# Patient Record
Sex: FEMALE | Race: WHITE | NOT HISPANIC OR LATINO | URBAN - METROPOLITAN AREA
[De-identification: names, ages, dates, MRNs, and addresses within clinical notes are randomized per-mention and may not be internally consistent; named-entity substitution may affect disease eponyms.]

---

## 2017-06-19 ENCOUNTER — OUTPATIENT (OUTPATIENT)
Dept: OUTPATIENT SERVICES | Facility: HOSPITAL | Age: 82
LOS: 1 days | End: 2017-06-19
Payer: MEDICARE

## 2017-06-19 ENCOUNTER — APPOINTMENT (OUTPATIENT)
Dept: MRI IMAGING | Facility: CLINIC | Age: 82
End: 2017-06-19

## 2017-06-19 DIAGNOSIS — Z00.8 ENCOUNTER FOR OTHER GENERAL EXAMINATION: ICD-10-CM

## 2017-06-19 PROBLEM — Z00.00 ENCOUNTER FOR PREVENTIVE HEALTH EXAMINATION: Noted: 2017-06-19

## 2017-06-19 PROCEDURE — 73718 MRI LOWER EXTREMITY W/O DYE: CPT

## 2021-01-01 ENCOUNTER — INPATIENT (INPATIENT)
Facility: HOSPITAL | Age: 86
LOS: 18 days | Discharge: NURSING, REHAB OR LONG-TERM CARE | DRG: 628 | End: 2021-12-31
Attending: INTERNAL MEDICINE | Admitting: INTERNAL MEDICINE
Payer: MEDICARE

## 2021-01-01 ENCOUNTER — TRANSCRIPTION ENCOUNTER (OUTPATIENT)
Age: 86
End: 2021-01-01

## 2021-01-01 ENCOUNTER — RESULT REVIEW (OUTPATIENT)
Age: 86
End: 2021-01-01

## 2021-01-01 VITALS
DIASTOLIC BLOOD PRESSURE: 72 MMHG | RESPIRATION RATE: 17 BRPM | SYSTOLIC BLOOD PRESSURE: 126 MMHG | OXYGEN SATURATION: 95 % | HEART RATE: 60 BPM | TEMPERATURE: 98 F

## 2021-01-01 VITALS
RESPIRATION RATE: 20 BRPM | OXYGEN SATURATION: 100 % | SYSTOLIC BLOOD PRESSURE: 139 MMHG | DIASTOLIC BLOOD PRESSURE: 81 MMHG | WEIGHT: 164.91 LBS | TEMPERATURE: 99 F | HEART RATE: 77 BPM | HEIGHT: 62 IN

## 2021-01-01 DIAGNOSIS — Z95.2 PRESENCE OF PROSTHETIC HEART VALVE: Chronic | ICD-10-CM

## 2021-01-01 DIAGNOSIS — E03.9 HYPOTHYROIDISM, UNSPECIFIED: ICD-10-CM

## 2021-01-01 DIAGNOSIS — M00.9 PYOGENIC ARTHRITIS, UNSPECIFIED: ICD-10-CM

## 2021-01-01 DIAGNOSIS — L03.115 CELLULITIS OF RIGHT LOWER LIMB: ICD-10-CM
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
## 2021-01-01 DIAGNOSIS — D63.8 ANEMIA IN OTHER CHRONIC DISEASES CLASSIFIED ELSEWHERE: ICD-10-CM

## 2021-01-01 DIAGNOSIS — I50.9 HEART FAILURE, UNSPECIFIED: ICD-10-CM

## 2021-01-01 DIAGNOSIS — I10 ESSENTIAL (PRIMARY) HYPERTENSION: ICD-10-CM

## 2021-01-01 DIAGNOSIS — N18.30 CHRONIC KIDNEY DISEASE, STAGE 3 UNSPECIFIED: ICD-10-CM

## 2021-01-01 DIAGNOSIS — E11.9 TYPE 2 DIABETES MELLITUS WITHOUT COMPLICATIONS: ICD-10-CM

## 2021-01-01 DIAGNOSIS — M86.179 OTHER ACUTE OSTEOMYELITIS, UNSPECIFIED ANKLE AND FOOT: ICD-10-CM

## 2021-01-01 DIAGNOSIS — I48.91 UNSPECIFIED ATRIAL FIBRILLATION: ICD-10-CM

## 2021-01-01 LAB
A1C WITH ESTIMATED AVERAGE GLUCOSE RESULT: 7.3 % — HIGH (ref 4–5.6)
ALBUMIN SERPL ELPH-MCNC: 2.7 G/DL — LOW (ref 3.3–5)
ALP SERPL-CCNC: 117 U/L — SIGNIFICANT CHANGE UP (ref 40–120)
ALT FLD-CCNC: 15 U/L — SIGNIFICANT CHANGE UP (ref 10–45)
ANION GAP SERPL CALC-SCNC: 10 MMOL/L — SIGNIFICANT CHANGE UP (ref 5–17)
ANION GAP SERPL CALC-SCNC: 10 MMOL/L — SIGNIFICANT CHANGE UP (ref 5–17)
ANION GAP SERPL CALC-SCNC: 11 MMOL/L — SIGNIFICANT CHANGE UP (ref 5–17)
ANION GAP SERPL CALC-SCNC: 12 MMOL/L — SIGNIFICANT CHANGE UP (ref 5–17)
ANION GAP SERPL CALC-SCNC: 12 MMOL/L — SIGNIFICANT CHANGE UP (ref 5–17)
ANION GAP SERPL CALC-SCNC: 13 MMOL/L — SIGNIFICANT CHANGE UP (ref 5–17)
ANION GAP SERPL CALC-SCNC: 14 MMOL/L — SIGNIFICANT CHANGE UP (ref 5–17)
ANION GAP SERPL CALC-SCNC: 9 MMOL/L — SIGNIFICANT CHANGE UP (ref 5–17)
ANION GAP SERPL CALC-SCNC: 9 MMOL/L — SIGNIFICANT CHANGE UP (ref 5–17)
APPEARANCE UR: ABNORMAL
APPEARANCE UR: CLEAR — SIGNIFICANT CHANGE UP
APTT BLD: 23.7 SEC — LOW (ref 27.5–35.5)
APTT BLD: 35.9 SEC — HIGH (ref 27.5–35.5)
AST SERPL-CCNC: 32 U/L — SIGNIFICANT CHANGE UP (ref 10–40)
BACTERIA # UR AUTO: NEGATIVE — SIGNIFICANT CHANGE UP
BACTERIA # UR AUTO: NEGATIVE — SIGNIFICANT CHANGE UP
BASE EXCESS BLDV CALC-SCNC: 7.7 MMOL/L — HIGH (ref -2–2)
BASOPHILS # BLD AUTO: 0.09 K/UL — SIGNIFICANT CHANGE UP (ref 0–0.2)
BASOPHILS NFR BLD AUTO: 0.8 % — SIGNIFICANT CHANGE UP (ref 0–2)
BILIRUB SERPL-MCNC: 0.3 MG/DL — SIGNIFICANT CHANGE UP (ref 0.2–1.2)
BILIRUB UR-MCNC: NEGATIVE — SIGNIFICANT CHANGE UP
BILIRUB UR-MCNC: NEGATIVE — SIGNIFICANT CHANGE UP
BLD GP AB SCN SERPL QL: NEGATIVE — SIGNIFICANT CHANGE UP
BLD GP AB SCN SERPL QL: NEGATIVE — SIGNIFICANT CHANGE UP
BUN SERPL-MCNC: 21 MG/DL — SIGNIFICANT CHANGE UP (ref 7–23)
BUN SERPL-MCNC: 22 MG/DL — SIGNIFICANT CHANGE UP (ref 7–23)
BUN SERPL-MCNC: 23 MG/DL — SIGNIFICANT CHANGE UP (ref 7–23)
BUN SERPL-MCNC: 25 MG/DL — HIGH (ref 7–23)
BUN SERPL-MCNC: 25 MG/DL — HIGH (ref 7–23)
BUN SERPL-MCNC: 26 MG/DL — HIGH (ref 7–23)
BUN SERPL-MCNC: 29 MG/DL — HIGH (ref 7–23)
BUN SERPL-MCNC: 29 MG/DL — HIGH (ref 7–23)
BUN SERPL-MCNC: 32 MG/DL — HIGH (ref 7–23)
BUN SERPL-MCNC: 33 MG/DL — HIGH (ref 7–23)
BUN SERPL-MCNC: 33 MG/DL — HIGH (ref 7–23)
BUN SERPL-MCNC: 35 MG/DL — HIGH (ref 7–23)
BUN SERPL-MCNC: 37 MG/DL — HIGH (ref 7–23)
BUN SERPL-MCNC: 37 MG/DL — HIGH (ref 7–23)
CA-I SERPL-SCNC: 1.36 MMOL/L — HIGH (ref 1.15–1.33)
CALCIUM SERPL-MCNC: 10.1 MG/DL — SIGNIFICANT CHANGE UP (ref 8.4–10.5)
CALCIUM SERPL-MCNC: 10.4 MG/DL — SIGNIFICANT CHANGE UP (ref 8.4–10.5)
CALCIUM SERPL-MCNC: 10.4 MG/DL — SIGNIFICANT CHANGE UP (ref 8.4–10.5)
CALCIUM SERPL-MCNC: 9.1 MG/DL — SIGNIFICANT CHANGE UP (ref 8.4–10.5)
CALCIUM SERPL-MCNC: 9.1 MG/DL — SIGNIFICANT CHANGE UP (ref 8.4–10.5)
CALCIUM SERPL-MCNC: 9.2 MG/DL — SIGNIFICANT CHANGE UP (ref 8.4–10.5)
CALCIUM SERPL-MCNC: 9.3 MG/DL — SIGNIFICANT CHANGE UP (ref 8.4–10.5)
CALCIUM SERPL-MCNC: 9.4 MG/DL — SIGNIFICANT CHANGE UP (ref 8.4–10.5)
CALCIUM SERPL-MCNC: 9.4 MG/DL — SIGNIFICANT CHANGE UP (ref 8.4–10.5)
CALCIUM SERPL-MCNC: 9.6 MG/DL — SIGNIFICANT CHANGE UP (ref 8.4–10.5)
CALCIUM SERPL-MCNC: 9.8 MG/DL — SIGNIFICANT CHANGE UP (ref 8.4–10.5)
CHLORIDE BLDV-SCNC: 100 MMOL/L — SIGNIFICANT CHANGE UP (ref 96–108)
CHLORIDE SERPL-SCNC: 100 MMOL/L — SIGNIFICANT CHANGE UP (ref 96–108)
CHLORIDE SERPL-SCNC: 101 MMOL/L — SIGNIFICANT CHANGE UP (ref 96–108)
CHLORIDE SERPL-SCNC: 103 MMOL/L — SIGNIFICANT CHANGE UP (ref 96–108)
CHLORIDE SERPL-SCNC: 94 MMOL/L — LOW (ref 96–108)
CHLORIDE SERPL-SCNC: 97 MMOL/L — SIGNIFICANT CHANGE UP (ref 96–108)
CHLORIDE SERPL-SCNC: 97 MMOL/L — SIGNIFICANT CHANGE UP (ref 96–108)
CHLORIDE SERPL-SCNC: 98 MMOL/L — SIGNIFICANT CHANGE UP (ref 96–108)
CHLORIDE SERPL-SCNC: 98 MMOL/L — SIGNIFICANT CHANGE UP (ref 96–108)
CHLORIDE SERPL-SCNC: 99 MMOL/L — SIGNIFICANT CHANGE UP (ref 96–108)
CO2 BLDV-SCNC: 35 MMOL/L — HIGH (ref 22–26)
CO2 SERPL-SCNC: 23 MMOL/L — SIGNIFICANT CHANGE UP (ref 22–31)
CO2 SERPL-SCNC: 24 MMOL/L — SIGNIFICANT CHANGE UP (ref 22–31)
CO2 SERPL-SCNC: 24 MMOL/L — SIGNIFICANT CHANGE UP (ref 22–31)
CO2 SERPL-SCNC: 25 MMOL/L — SIGNIFICANT CHANGE UP (ref 22–31)
CO2 SERPL-SCNC: 26 MMOL/L — SIGNIFICANT CHANGE UP (ref 22–31)
CO2 SERPL-SCNC: 27 MMOL/L — SIGNIFICANT CHANGE UP (ref 22–31)
COLOR SPEC: YELLOW — SIGNIFICANT CHANGE UP
COLOR SPEC: YELLOW — SIGNIFICANT CHANGE UP
COMMENT - URINE: SIGNIFICANT CHANGE UP
CORTIS AM PEAK SERPL-MCNC: 18.5 UG/DL — HIGH (ref 6–18.4)
CREAT ?TM UR-MCNC: 76 MG/DL — SIGNIFICANT CHANGE UP
CREAT SERPL-MCNC: 1.28 MG/DL — SIGNIFICANT CHANGE UP (ref 0.5–1.3)
CREAT SERPL-MCNC: 1.31 MG/DL — HIGH (ref 0.5–1.3)
CREAT SERPL-MCNC: 1.31 MG/DL — HIGH (ref 0.5–1.3)
CREAT SERPL-MCNC: 1.32 MG/DL — HIGH (ref 0.5–1.3)
CREAT SERPL-MCNC: 1.4 MG/DL — HIGH (ref 0.5–1.3)
CREAT SERPL-MCNC: 1.42 MG/DL — HIGH (ref 0.5–1.3)
CREAT SERPL-MCNC: 1.5 MG/DL — HIGH (ref 0.5–1.3)
CREAT SERPL-MCNC: 1.57 MG/DL — HIGH (ref 0.5–1.3)
CREAT SERPL-MCNC: 1.74 MG/DL — HIGH (ref 0.5–1.3)
CREAT SERPL-MCNC: 1.76 MG/DL — HIGH (ref 0.5–1.3)
CREAT SERPL-MCNC: 1.85 MG/DL — HIGH (ref 0.5–1.3)
CREAT SERPL-MCNC: 1.89 MG/DL — HIGH (ref 0.5–1.3)
CREAT SERPL-MCNC: 1.9 MG/DL — HIGH (ref 0.5–1.3)
CREAT SERPL-MCNC: 1.92 MG/DL — HIGH (ref 0.5–1.3)
CRP SERPL-MCNC: 144 MG/L — HIGH (ref 0–4)
CULTURE RESULTS: NO GROWTH — SIGNIFICANT CHANGE UP
CULTURE RESULTS: NO GROWTH — SIGNIFICANT CHANGE UP
CULTURE RESULTS: SIGNIFICANT CHANGE UP
DIFF PNL FLD: ABNORMAL
DIFF PNL FLD: NEGATIVE — SIGNIFICANT CHANGE UP
EOSINOPHIL # BLD AUTO: 0.29 K/UL — SIGNIFICANT CHANGE UP (ref 0–0.5)
EOSINOPHIL NFR BLD AUTO: 2.4 % — SIGNIFICANT CHANGE UP (ref 0–6)
EPI CELLS # UR: 2 /HPF — SIGNIFICANT CHANGE UP
EPI CELLS # UR: 2 /HPF — SIGNIFICANT CHANGE UP
ERYTHROCYTE [SEDIMENTATION RATE] IN BLOOD: 120 MM/HR — HIGH (ref 0–20)
ESTIMATED AVERAGE GLUCOSE: 163 MG/DL — HIGH (ref 68–114)
FERRITIN SERPL-MCNC: 385 NG/ML — HIGH (ref 15–150)
FERRITIN SERPL-MCNC: 420 NG/ML — HIGH (ref 15–150)
FOLATE SERPL-MCNC: 14.4 NG/ML — SIGNIFICANT CHANGE UP
GAS PNL BLDV: 135 MMOL/L — LOW (ref 136–145)
GAS PNL BLDV: SIGNIFICANT CHANGE UP
GAS PNL BLDV: SIGNIFICANT CHANGE UP
GLUCOSE BLDC GLUCOMTR-MCNC: 100 MG/DL — HIGH (ref 70–99)
GLUCOSE BLDC GLUCOMTR-MCNC: 101 MG/DL — HIGH (ref 70–99)
GLUCOSE BLDC GLUCOMTR-MCNC: 102 MG/DL — HIGH (ref 70–99)
GLUCOSE BLDC GLUCOMTR-MCNC: 106 MG/DL — HIGH (ref 70–99)
GLUCOSE BLDC GLUCOMTR-MCNC: 106 MG/DL — HIGH (ref 70–99)
GLUCOSE BLDC GLUCOMTR-MCNC: 107 MG/DL — HIGH (ref 70–99)
GLUCOSE BLDC GLUCOMTR-MCNC: 108 MG/DL — HIGH (ref 70–99)
GLUCOSE BLDC GLUCOMTR-MCNC: 110 MG/DL — HIGH (ref 70–99)
GLUCOSE BLDC GLUCOMTR-MCNC: 114 MG/DL — HIGH (ref 70–99)
GLUCOSE BLDC GLUCOMTR-MCNC: 116 MG/DL — HIGH (ref 70–99)
GLUCOSE BLDC GLUCOMTR-MCNC: 117 MG/DL — HIGH (ref 70–99)
GLUCOSE BLDC GLUCOMTR-MCNC: 118 MG/DL — HIGH (ref 70–99)
GLUCOSE BLDC GLUCOMTR-MCNC: 121 MG/DL — HIGH (ref 70–99)
GLUCOSE BLDC GLUCOMTR-MCNC: 129 MG/DL — HIGH (ref 70–99)
GLUCOSE BLDC GLUCOMTR-MCNC: 133 MG/DL — HIGH (ref 70–99)
GLUCOSE BLDC GLUCOMTR-MCNC: 136 MG/DL — HIGH (ref 70–99)
GLUCOSE BLDC GLUCOMTR-MCNC: 137 MG/DL — HIGH (ref 70–99)
GLUCOSE BLDC GLUCOMTR-MCNC: 138 MG/DL — HIGH (ref 70–99)
GLUCOSE BLDC GLUCOMTR-MCNC: 138 MG/DL — HIGH (ref 70–99)
GLUCOSE BLDC GLUCOMTR-MCNC: 139 MG/DL — HIGH (ref 70–99)
GLUCOSE BLDC GLUCOMTR-MCNC: 141 MG/DL — HIGH (ref 70–99)
GLUCOSE BLDC GLUCOMTR-MCNC: 144 MG/DL — HIGH (ref 70–99)
GLUCOSE BLDC GLUCOMTR-MCNC: 144 MG/DL — HIGH (ref 70–99)
GLUCOSE BLDC GLUCOMTR-MCNC: 145 MG/DL — HIGH (ref 70–99)
GLUCOSE BLDC GLUCOMTR-MCNC: 146 MG/DL — HIGH (ref 70–99)
GLUCOSE BLDC GLUCOMTR-MCNC: 147 MG/DL — HIGH (ref 70–99)
GLUCOSE BLDC GLUCOMTR-MCNC: 147 MG/DL — HIGH (ref 70–99)
GLUCOSE BLDC GLUCOMTR-MCNC: 148 MG/DL — HIGH (ref 70–99)
GLUCOSE BLDC GLUCOMTR-MCNC: 149 MG/DL — HIGH (ref 70–99)
GLUCOSE BLDC GLUCOMTR-MCNC: 151 MG/DL — HIGH (ref 70–99)
GLUCOSE BLDC GLUCOMTR-MCNC: 158 MG/DL — HIGH (ref 70–99)
GLUCOSE BLDC GLUCOMTR-MCNC: 163 MG/DL — HIGH (ref 70–99)
GLUCOSE BLDC GLUCOMTR-MCNC: 166 MG/DL — HIGH (ref 70–99)
GLUCOSE BLDC GLUCOMTR-MCNC: 167 MG/DL — HIGH (ref 70–99)
GLUCOSE BLDC GLUCOMTR-MCNC: 169 MG/DL — HIGH (ref 70–99)
GLUCOSE BLDC GLUCOMTR-MCNC: 177 MG/DL — HIGH (ref 70–99)
GLUCOSE BLDC GLUCOMTR-MCNC: 177 MG/DL — HIGH (ref 70–99)
GLUCOSE BLDC GLUCOMTR-MCNC: 181 MG/DL — HIGH (ref 70–99)
GLUCOSE BLDC GLUCOMTR-MCNC: 182 MG/DL — HIGH (ref 70–99)
GLUCOSE BLDC GLUCOMTR-MCNC: 184 MG/DL — HIGH (ref 70–99)
GLUCOSE BLDC GLUCOMTR-MCNC: 185 MG/DL — HIGH (ref 70–99)
GLUCOSE BLDC GLUCOMTR-MCNC: 186 MG/DL — HIGH (ref 70–99)
GLUCOSE BLDC GLUCOMTR-MCNC: 191 MG/DL — HIGH (ref 70–99)
GLUCOSE BLDC GLUCOMTR-MCNC: 201 MG/DL — HIGH (ref 70–99)
GLUCOSE BLDC GLUCOMTR-MCNC: 202 MG/DL — HIGH (ref 70–99)
GLUCOSE BLDC GLUCOMTR-MCNC: 205 MG/DL — HIGH (ref 70–99)
GLUCOSE BLDC GLUCOMTR-MCNC: 205 MG/DL — HIGH (ref 70–99)
GLUCOSE BLDC GLUCOMTR-MCNC: 208 MG/DL — HIGH (ref 70–99)
GLUCOSE BLDC GLUCOMTR-MCNC: 210 MG/DL — HIGH (ref 70–99)
GLUCOSE BLDC GLUCOMTR-MCNC: 211 MG/DL — HIGH (ref 70–99)
GLUCOSE BLDC GLUCOMTR-MCNC: 214 MG/DL — HIGH (ref 70–99)
GLUCOSE BLDC GLUCOMTR-MCNC: 217 MG/DL — HIGH (ref 70–99)
GLUCOSE BLDC GLUCOMTR-MCNC: 218 MG/DL — HIGH (ref 70–99)
GLUCOSE BLDC GLUCOMTR-MCNC: 221 MG/DL — HIGH (ref 70–99)
GLUCOSE BLDC GLUCOMTR-MCNC: 228 MG/DL — HIGH (ref 70–99)
GLUCOSE BLDC GLUCOMTR-MCNC: 228 MG/DL — HIGH (ref 70–99)
GLUCOSE BLDC GLUCOMTR-MCNC: 233 MG/DL — HIGH (ref 70–99)
GLUCOSE BLDC GLUCOMTR-MCNC: 234 MG/DL — HIGH (ref 70–99)
GLUCOSE BLDC GLUCOMTR-MCNC: 240 MG/DL — HIGH (ref 70–99)
GLUCOSE BLDC GLUCOMTR-MCNC: 242 MG/DL — HIGH (ref 70–99)
GLUCOSE BLDC GLUCOMTR-MCNC: 245 MG/DL — HIGH (ref 70–99)
GLUCOSE BLDC GLUCOMTR-MCNC: 246 MG/DL — HIGH (ref 70–99)
GLUCOSE BLDC GLUCOMTR-MCNC: 247 MG/DL — HIGH (ref 70–99)
GLUCOSE BLDC GLUCOMTR-MCNC: 250 MG/DL — HIGH (ref 70–99)
GLUCOSE BLDC GLUCOMTR-MCNC: 255 MG/DL — HIGH (ref 70–99)
GLUCOSE BLDC GLUCOMTR-MCNC: 258 MG/DL — HIGH (ref 70–99)
GLUCOSE BLDC GLUCOMTR-MCNC: 258 MG/DL — HIGH (ref 70–99)
GLUCOSE BLDC GLUCOMTR-MCNC: 260 MG/DL — HIGH (ref 70–99)
GLUCOSE BLDC GLUCOMTR-MCNC: 271 MG/DL — HIGH (ref 70–99)
GLUCOSE BLDC GLUCOMTR-MCNC: 275 MG/DL — HIGH (ref 70–99)
GLUCOSE BLDC GLUCOMTR-MCNC: 277 MG/DL — HIGH (ref 70–99)
GLUCOSE BLDC GLUCOMTR-MCNC: 281 MG/DL — HIGH (ref 70–99)
GLUCOSE BLDC GLUCOMTR-MCNC: 281 MG/DL — HIGH (ref 70–99)
GLUCOSE BLDC GLUCOMTR-MCNC: 292 MG/DL — HIGH (ref 70–99)
GLUCOSE BLDC GLUCOMTR-MCNC: 298 MG/DL — HIGH (ref 70–99)
GLUCOSE BLDC GLUCOMTR-MCNC: 300 MG/DL — HIGH (ref 70–99)
GLUCOSE BLDC GLUCOMTR-MCNC: 306 MG/DL — HIGH (ref 70–99)
GLUCOSE BLDC GLUCOMTR-MCNC: 313 MG/DL — HIGH (ref 70–99)
GLUCOSE BLDC GLUCOMTR-MCNC: 323 MG/DL — HIGH (ref 70–99)
GLUCOSE BLDC GLUCOMTR-MCNC: 62 MG/DL — LOW (ref 70–99)
GLUCOSE BLDC GLUCOMTR-MCNC: 82 MG/DL — SIGNIFICANT CHANGE UP (ref 70–99)
GLUCOSE BLDC GLUCOMTR-MCNC: 83 MG/DL — SIGNIFICANT CHANGE UP (ref 70–99)
GLUCOSE BLDC GLUCOMTR-MCNC: 92 MG/DL — SIGNIFICANT CHANGE UP (ref 70–99)
GLUCOSE BLDC GLUCOMTR-MCNC: 96 MG/DL — SIGNIFICANT CHANGE UP (ref 70–99)
GLUCOSE BLDC GLUCOMTR-MCNC: 97 MG/DL — SIGNIFICANT CHANGE UP (ref 70–99)
GLUCOSE BLDC GLUCOMTR-MCNC: 99 MG/DL — SIGNIFICANT CHANGE UP (ref 70–99)
GLUCOSE BLDV-MCNC: 107 MG/DL — HIGH (ref 70–99)
GLUCOSE SERPL-MCNC: 102 MG/DL — HIGH (ref 70–99)
GLUCOSE SERPL-MCNC: 103 MG/DL — HIGH (ref 70–99)
GLUCOSE SERPL-MCNC: 119 MG/DL — HIGH (ref 70–99)
GLUCOSE SERPL-MCNC: 119 MG/DL — HIGH (ref 70–99)
GLUCOSE SERPL-MCNC: 122 MG/DL — HIGH (ref 70–99)
GLUCOSE SERPL-MCNC: 127 MG/DL — HIGH (ref 70–99)
GLUCOSE SERPL-MCNC: 136 MG/DL — HIGH (ref 70–99)
GLUCOSE SERPL-MCNC: 167 MG/DL — HIGH (ref 70–99)
GLUCOSE SERPL-MCNC: 199 MG/DL — HIGH (ref 70–99)
GLUCOSE SERPL-MCNC: 207 MG/DL — HIGH (ref 70–99)
GLUCOSE SERPL-MCNC: 213 MG/DL — HIGH (ref 70–99)
GLUCOSE SERPL-MCNC: 218 MG/DL — HIGH (ref 70–99)
GLUCOSE SERPL-MCNC: 243 MG/DL — HIGH (ref 70–99)
GLUCOSE SERPL-MCNC: 285 MG/DL — HIGH (ref 70–99)
GLUCOSE UR QL: ABNORMAL
GLUCOSE UR QL: NEGATIVE — SIGNIFICANT CHANGE UP
GRAM STN FLD: SIGNIFICANT CHANGE UP
GRAN CASTS # UR COMP ASSIST: 1 /LPF — SIGNIFICANT CHANGE UP
HCO3 BLDV-SCNC: 34 MMOL/L — HIGH (ref 22–29)
HCT VFR BLD CALC: 23.8 % — LOW (ref 34.5–45)
HCT VFR BLD CALC: 24.4 % — LOW (ref 34.5–45)
HCT VFR BLD CALC: 25 % — LOW (ref 34.5–45)
HCT VFR BLD CALC: 25 % — LOW (ref 34.5–45)
HCT VFR BLD CALC: 25.3 % — LOW (ref 34.5–45)
HCT VFR BLD CALC: 25.3 % — LOW (ref 34.5–45)
HCT VFR BLD CALC: 25.4 % — LOW (ref 34.5–45)
HCT VFR BLD CALC: 25.8 % — LOW (ref 34.5–45)
HCT VFR BLD CALC: 25.9 % — LOW (ref 34.5–45)
HCT VFR BLD CALC: 26.8 % — LOW (ref 34.5–45)
HCT VFR BLD CALC: 27.5 % — LOW (ref 34.5–45)
HCT VFR BLD CALC: 27.5 % — LOW (ref 34.5–45)
HCT VFR BLD CALC: 28.1 % — LOW (ref 34.5–45)
HCT VFR BLD CALC: 29 % — LOW (ref 34.5–45)
HCT VFR BLD CALC: 30.3 % — LOW (ref 34.5–45)
HCT VFR BLDA CALC: 39 % — SIGNIFICANT CHANGE UP (ref 34.5–46.5)
HGB BLD CALC-MCNC: 13 G/DL — SIGNIFICANT CHANGE UP (ref 11.7–16.1)
HGB BLD-MCNC: 7.2 G/DL — LOW (ref 11.5–15.5)
HGB BLD-MCNC: 7.4 G/DL — LOW (ref 11.5–15.5)
HGB BLD-MCNC: 7.4 G/DL — LOW (ref 11.5–15.5)
HGB BLD-MCNC: 7.5 G/DL — LOW (ref 11.5–15.5)
HGB BLD-MCNC: 7.6 G/DL — LOW (ref 11.5–15.5)
HGB BLD-MCNC: 7.7 G/DL — LOW (ref 11.5–15.5)
HGB BLD-MCNC: 7.9 G/DL — LOW (ref 11.5–15.5)
HGB BLD-MCNC: 8.1 G/DL — LOW (ref 11.5–15.5)
HGB BLD-MCNC: 8.1 G/DL — LOW (ref 11.5–15.5)
HGB BLD-MCNC: 8.2 G/DL — LOW (ref 11.5–15.5)
HGB BLD-MCNC: 8.3 G/DL — LOW (ref 11.5–15.5)
HGB BLD-MCNC: 8.7 G/DL — LOW (ref 11.5–15.5)
HGB BLD-MCNC: 9.2 G/DL — LOW (ref 11.5–15.5)
HYALINE CASTS # UR AUTO: 1 /LPF — SIGNIFICANT CHANGE UP (ref 0–7)
HYALINE CASTS # UR AUTO: 7 /LPF — HIGH (ref 0–2)
IMM GRANULOCYTES NFR BLD AUTO: 1.3 % — SIGNIFICANT CHANGE UP (ref 0–1.5)
INR BLD: 1.26 RATIO — HIGH (ref 0.88–1.16)
INR BLD: 1.59 RATIO — HIGH (ref 0.88–1.16)
IRON SATN MFR SERPL: 11 % — LOW (ref 14–50)
IRON SATN MFR SERPL: 14 % — SIGNIFICANT CHANGE UP (ref 14–50)
IRON SATN MFR SERPL: 20 UG/DL — LOW (ref 30–160)
IRON SATN MFR SERPL: 30 UG/DL — SIGNIFICANT CHANGE UP (ref 30–160)
KETONES UR-MCNC: NEGATIVE — SIGNIFICANT CHANGE UP
KETONES UR-MCNC: NEGATIVE — SIGNIFICANT CHANGE UP
LACTATE BLDV-MCNC: 1.7 MMOL/L — SIGNIFICANT CHANGE UP (ref 0.7–2)
LEUKOCYTE ESTERASE UR-ACNC: ABNORMAL
LEUKOCYTE ESTERASE UR-ACNC: NEGATIVE — SIGNIFICANT CHANGE UP
LYMPHOCYTES # BLD AUTO: 1 K/UL — SIGNIFICANT CHANGE UP (ref 1–3.3)
LYMPHOCYTES # BLD AUTO: 8.4 % — LOW (ref 13–44)
MCHC RBC-ENTMCNC: 28.8 PG — SIGNIFICANT CHANGE UP (ref 27–34)
MCHC RBC-ENTMCNC: 28.8 PG — SIGNIFICANT CHANGE UP (ref 27–34)
MCHC RBC-ENTMCNC: 28.9 PG — SIGNIFICANT CHANGE UP (ref 27–34)
MCHC RBC-ENTMCNC: 29 PG — SIGNIFICANT CHANGE UP (ref 27–34)
MCHC RBC-ENTMCNC: 29.1 PG — SIGNIFICANT CHANGE UP (ref 27–34)
MCHC RBC-ENTMCNC: 29.2 GM/DL — LOW (ref 32–36)
MCHC RBC-ENTMCNC: 29.2 PG — SIGNIFICANT CHANGE UP (ref 27–34)
MCHC RBC-ENTMCNC: 29.3 PG — SIGNIFICANT CHANGE UP (ref 27–34)
MCHC RBC-ENTMCNC: 29.4 PG — SIGNIFICANT CHANGE UP (ref 27–34)
MCHC RBC-ENTMCNC: 29.4 PG — SIGNIFICANT CHANGE UP (ref 27–34)
MCHC RBC-ENTMCNC: 29.5 GM/DL — LOW (ref 32–36)
MCHC RBC-ENTMCNC: 29.5 GM/DL — LOW (ref 32–36)
MCHC RBC-ENTMCNC: 29.5 PG — SIGNIFICANT CHANGE UP (ref 27–34)
MCHC RBC-ENTMCNC: 29.7 GM/DL — LOW (ref 32–36)
MCHC RBC-ENTMCNC: 29.8 GM/DL — LOW (ref 32–36)
MCHC RBC-ENTMCNC: 30 GM/DL — LOW (ref 32–36)
MCHC RBC-ENTMCNC: 30.2 GM/DL — LOW (ref 32–36)
MCHC RBC-ENTMCNC: 30.3 GM/DL — LOW (ref 32–36)
MCHC RBC-ENTMCNC: 30.4 GM/DL — LOW (ref 32–36)
MCHC RBC-ENTMCNC: 30.6 GM/DL — LOW (ref 32–36)
MCHC RBC-ENTMCNC: 30.8 GM/DL — LOW (ref 32–36)
MCV RBC AUTO: 95.4 FL — SIGNIFICANT CHANGE UP (ref 80–100)
MCV RBC AUTO: 95.8 FL — SIGNIFICANT CHANGE UP (ref 80–100)
MCV RBC AUTO: 96.2 FL — SIGNIFICANT CHANGE UP (ref 80–100)
MCV RBC AUTO: 96.3 FL — SIGNIFICANT CHANGE UP (ref 80–100)
MCV RBC AUTO: 96.5 FL — SIGNIFICANT CHANGE UP (ref 80–100)
MCV RBC AUTO: 96.7 FL — SIGNIFICANT CHANGE UP (ref 80–100)
MCV RBC AUTO: 96.8 FL — SIGNIFICANT CHANGE UP (ref 80–100)
MCV RBC AUTO: 96.9 FL — SIGNIFICANT CHANGE UP (ref 80–100)
MCV RBC AUTO: 96.9 FL — SIGNIFICANT CHANGE UP (ref 80–100)
MCV RBC AUTO: 97 FL — SIGNIFICANT CHANGE UP (ref 80–100)
MCV RBC AUTO: 97.1 FL — SIGNIFICANT CHANGE UP (ref 80–100)
MCV RBC AUTO: 97.6 FL — SIGNIFICANT CHANGE UP (ref 80–100)
MCV RBC AUTO: 98.3 FL — SIGNIFICANT CHANGE UP (ref 80–100)
MCV RBC AUTO: 98.8 FL — SIGNIFICANT CHANGE UP (ref 80–100)
MCV RBC AUTO: 98.9 FL — SIGNIFICANT CHANGE UP (ref 80–100)
MONOCYTES # BLD AUTO: 1.23 K/UL — HIGH (ref 0–0.9)
MONOCYTES NFR BLD AUTO: 10.4 % — SIGNIFICANT CHANGE UP (ref 2–14)
MRSA PCR RESULT.: SIGNIFICANT CHANGE UP
NEUTROPHILS # BLD AUTO: 9.11 K/UL — HIGH (ref 1.8–7.4)
NEUTROPHILS NFR BLD AUTO: 76.7 % — SIGNIFICANT CHANGE UP (ref 43–77)
NIGHT BLUE STAIN TISS: SIGNIFICANT CHANGE UP
NITRITE UR-MCNC: NEGATIVE — SIGNIFICANT CHANGE UP
NITRITE UR-MCNC: NEGATIVE — SIGNIFICANT CHANGE UP
NRBC # BLD: 0 /100 WBCS — SIGNIFICANT CHANGE UP (ref 0–0)
PCO2 BLDV: 52 MMHG — HIGH (ref 39–42)
PH BLDV: 7.42 — SIGNIFICANT CHANGE UP (ref 7.32–7.43)
PH UR: 5.5 — SIGNIFICANT CHANGE UP (ref 5–8)
PH UR: 6 — SIGNIFICANT CHANGE UP (ref 5–8)
PHOSPHATE SERPL-MCNC: 3 MG/DL — SIGNIFICANT CHANGE UP (ref 2.5–4.5)
PLATELET # BLD AUTO: 199 K/UL — SIGNIFICANT CHANGE UP (ref 150–400)
PLATELET # BLD AUTO: 249 K/UL — SIGNIFICANT CHANGE UP (ref 150–400)
PLATELET # BLD AUTO: 253 K/UL — SIGNIFICANT CHANGE UP (ref 150–400)
PLATELET # BLD AUTO: 286 K/UL — SIGNIFICANT CHANGE UP (ref 150–400)
PLATELET # BLD AUTO: 296 K/UL — SIGNIFICANT CHANGE UP (ref 150–400)
PLATELET # BLD AUTO: 322 K/UL — SIGNIFICANT CHANGE UP (ref 150–400)
PLATELET # BLD AUTO: 333 K/UL — SIGNIFICANT CHANGE UP (ref 150–400)
PLATELET # BLD AUTO: 342 K/UL — SIGNIFICANT CHANGE UP (ref 150–400)
PLATELET # BLD AUTO: 343 K/UL — SIGNIFICANT CHANGE UP (ref 150–400)
PLATELET # BLD AUTO: 346 K/UL — SIGNIFICANT CHANGE UP (ref 150–400)
PLATELET # BLD AUTO: 364 K/UL — SIGNIFICANT CHANGE UP (ref 150–400)
PLATELET # BLD AUTO: 369 K/UL — SIGNIFICANT CHANGE UP (ref 150–400)
PLATELET # BLD AUTO: 371 K/UL — SIGNIFICANT CHANGE UP (ref 150–400)
PO2 BLDV: 23 MMHG — LOW (ref 25–45)
POTASSIUM BLDV-SCNC: 3.9 MMOL/L — SIGNIFICANT CHANGE UP (ref 3.5–5.1)
POTASSIUM SERPL-MCNC: 3.5 MMOL/L — SIGNIFICANT CHANGE UP (ref 3.5–5.3)
POTASSIUM SERPL-MCNC: 3.5 MMOL/L — SIGNIFICANT CHANGE UP (ref 3.5–5.3)
POTASSIUM SERPL-MCNC: 3.8 MMOL/L — SIGNIFICANT CHANGE UP (ref 3.5–5.3)
POTASSIUM SERPL-MCNC: 3.8 MMOL/L — SIGNIFICANT CHANGE UP (ref 3.5–5.3)
POTASSIUM SERPL-MCNC: 4 MMOL/L — SIGNIFICANT CHANGE UP (ref 3.5–5.3)
POTASSIUM SERPL-MCNC: 4.1 MMOL/L — SIGNIFICANT CHANGE UP (ref 3.5–5.3)
POTASSIUM SERPL-MCNC: 4.1 MMOL/L — SIGNIFICANT CHANGE UP (ref 3.5–5.3)
POTASSIUM SERPL-MCNC: 4.2 MMOL/L — SIGNIFICANT CHANGE UP (ref 3.5–5.3)
POTASSIUM SERPL-MCNC: 4.2 MMOL/L — SIGNIFICANT CHANGE UP (ref 3.5–5.3)
POTASSIUM SERPL-MCNC: 4.3 MMOL/L — SIGNIFICANT CHANGE UP (ref 3.5–5.3)
POTASSIUM SERPL-MCNC: 4.4 MMOL/L — SIGNIFICANT CHANGE UP (ref 3.5–5.3)
POTASSIUM SERPL-MCNC: 4.5 MMOL/L — SIGNIFICANT CHANGE UP (ref 3.5–5.3)
POTASSIUM SERPL-MCNC: 4.6 MMOL/L — SIGNIFICANT CHANGE UP (ref 3.5–5.3)
POTASSIUM SERPL-MCNC: 4.6 MMOL/L — SIGNIFICANT CHANGE UP (ref 3.5–5.3)
POTASSIUM SERPL-SCNC: 3.5 MMOL/L — SIGNIFICANT CHANGE UP (ref 3.5–5.3)
POTASSIUM SERPL-SCNC: 3.5 MMOL/L — SIGNIFICANT CHANGE UP (ref 3.5–5.3)
POTASSIUM SERPL-SCNC: 3.8 MMOL/L — SIGNIFICANT CHANGE UP (ref 3.5–5.3)
POTASSIUM SERPL-SCNC: 3.8 MMOL/L — SIGNIFICANT CHANGE UP (ref 3.5–5.3)
POTASSIUM SERPL-SCNC: 4 MMOL/L — SIGNIFICANT CHANGE UP (ref 3.5–5.3)
POTASSIUM SERPL-SCNC: 4.1 MMOL/L — SIGNIFICANT CHANGE UP (ref 3.5–5.3)
POTASSIUM SERPL-SCNC: 4.1 MMOL/L — SIGNIFICANT CHANGE UP (ref 3.5–5.3)
POTASSIUM SERPL-SCNC: 4.2 MMOL/L — SIGNIFICANT CHANGE UP (ref 3.5–5.3)
POTASSIUM SERPL-SCNC: 4.2 MMOL/L — SIGNIFICANT CHANGE UP (ref 3.5–5.3)
POTASSIUM SERPL-SCNC: 4.3 MMOL/L — SIGNIFICANT CHANGE UP (ref 3.5–5.3)
POTASSIUM SERPL-SCNC: 4.4 MMOL/L — SIGNIFICANT CHANGE UP (ref 3.5–5.3)
POTASSIUM SERPL-SCNC: 4.5 MMOL/L — SIGNIFICANT CHANGE UP (ref 3.5–5.3)
POTASSIUM SERPL-SCNC: 4.6 MMOL/L — SIGNIFICANT CHANGE UP (ref 3.5–5.3)
POTASSIUM SERPL-SCNC: 4.6 MMOL/L — SIGNIFICANT CHANGE UP (ref 3.5–5.3)
PROT ?TM UR-MCNC: 88 MG/DL — HIGH (ref 0–12)
PROT SERPL-MCNC: 6.1 G/DL — SIGNIFICANT CHANGE UP (ref 6–8.3)
PROT UR-MCNC: 100 — SIGNIFICANT CHANGE UP
PROT UR-MCNC: ABNORMAL
PROT/CREAT UR-RTO: 1.2 RATIO — HIGH (ref 0–0.2)
PROTHROM AB SERPL-ACNC: 15 SEC — HIGH (ref 10.6–13.6)
PROTHROM AB SERPL-ACNC: 18.7 SEC — HIGH (ref 10.6–13.6)
PTH-INTACT FLD-MCNC: 134 PG/ML — HIGH (ref 15–65)
RBC # BLD: 2.46 M/UL — LOW (ref 3.8–5.2)
RBC # BLD: 2.52 M/UL — LOW (ref 3.8–5.2)
RBC # BLD: 2.56 M/UL — LOW (ref 3.8–5.2)
RBC # BLD: 2.58 M/UL — LOW (ref 3.8–5.2)
RBC # BLD: 2.61 M/UL — LOW (ref 3.8–5.2)
RBC # BLD: 2.62 M/UL — LOW (ref 3.8–5.2)
RBC # BLD: 2.62 M/UL — LOW (ref 3.8–5.2)
RBC # BLD: 2.65 M/UL — LOW (ref 3.8–5.2)
RBC # BLD: 2.67 M/UL — LOW (ref 3.8–5.2)
RBC # BLD: 2.68 M/UL — LOW (ref 3.8–5.2)
RBC # BLD: 2.76 M/UL — LOW (ref 3.8–5.2)
RBC # BLD: 2.78 M/UL — LOW (ref 3.8–5.2)
RBC # BLD: 2.85 M/UL — LOW (ref 3.8–5.2)
RBC # BLD: 2.86 M/UL — LOW (ref 3.8–5.2)
RBC # BLD: 2.97 M/UL — LOW (ref 3.8–5.2)
RBC # BLD: 3.15 M/UL — LOW (ref 3.8–5.2)
RBC # FLD: 14.8 % — HIGH (ref 10.3–14.5)
RBC # FLD: 14.9 % — HIGH (ref 10.3–14.5)
RBC # FLD: 15 % — HIGH (ref 10.3–14.5)
RBC # FLD: 15.1 % — HIGH (ref 10.3–14.5)
RBC # FLD: 15.2 % — HIGH (ref 10.3–14.5)
RBC # FLD: 15.3 % — HIGH (ref 10.3–14.5)
RBC # FLD: 15.3 % — HIGH (ref 10.3–14.5)
RBC # FLD: 15.5 % — HIGH (ref 10.3–14.5)
RBC # FLD: 15.9 % — HIGH (ref 10.3–14.5)
RBC # FLD: 16.1 % — HIGH (ref 10.3–14.5)
RBC # FLD: 17.3 % — HIGH (ref 10.3–14.5)
RBC # FLD: 18 % — HIGH (ref 10.3–14.5)
RBC # FLD: 18.1 % — HIGH (ref 10.3–14.5)
RBC CASTS # UR COMP ASSIST: 4 /HPF — SIGNIFICANT CHANGE UP (ref 0–4)
RBC CASTS # UR COMP ASSIST: 6 /HPF — HIGH (ref 0–4)
RETICS #: 61.6 K/UL — SIGNIFICANT CHANGE UP (ref 25–125)
RETICS/RBC NFR: 2.4 % — SIGNIFICANT CHANGE UP (ref 0.5–2.5)
RH IG SCN BLD-IMP: POSITIVE — SIGNIFICANT CHANGE UP
S AUREUS DNA NOSE QL NAA+PROBE: SIGNIFICANT CHANGE UP
SAO2 % BLDV: 39.7 % — LOW (ref 67–88)
SARS-COV-2 RNA SPEC QL NAA+PROBE: SIGNIFICANT CHANGE UP
SODIUM SERPL-SCNC: 132 MMOL/L — LOW (ref 135–145)
SODIUM SERPL-SCNC: 132 MMOL/L — LOW (ref 135–145)
SODIUM SERPL-SCNC: 133 MMOL/L — LOW (ref 135–145)
SODIUM SERPL-SCNC: 134 MMOL/L — LOW (ref 135–145)
SODIUM SERPL-SCNC: 135 MMOL/L — SIGNIFICANT CHANGE UP (ref 135–145)
SODIUM SERPL-SCNC: 136 MMOL/L — SIGNIFICANT CHANGE UP (ref 135–145)
SODIUM SERPL-SCNC: 136 MMOL/L — SIGNIFICANT CHANGE UP (ref 135–145)
SODIUM SERPL-SCNC: 137 MMOL/L — SIGNIFICANT CHANGE UP (ref 135–145)
SODIUM SERPL-SCNC: 138 MMOL/L — SIGNIFICANT CHANGE UP (ref 135–145)
SODIUM SERPL-SCNC: 138 MMOL/L — SIGNIFICANT CHANGE UP (ref 135–145)
SODIUM SERPL-SCNC: 139 MMOL/L — SIGNIFICANT CHANGE UP (ref 135–145)
SODIUM SERPL-SCNC: 139 MMOL/L — SIGNIFICANT CHANGE UP (ref 135–145)
SP GR SPEC: 1.01 — SIGNIFICANT CHANGE UP (ref 1.01–1.02)
SP GR SPEC: 1.02 — SIGNIFICANT CHANGE UP (ref 1.01–1.02)
SPECIMEN SOURCE: SIGNIFICANT CHANGE UP
TIBC SERPL-MCNC: 177 UG/DL — LOW (ref 220–430)
TIBC SERPL-MCNC: 209 UG/DL — LOW (ref 220–430)
TSH SERPL-MCNC: 2.84 UIU/ML — SIGNIFICANT CHANGE UP (ref 0.27–4.2)
UIBC SERPL-MCNC: 157 UG/DL — SIGNIFICANT CHANGE UP (ref 110–370)
UIBC SERPL-MCNC: 179 UG/DL — SIGNIFICANT CHANGE UP (ref 110–370)
UROBILINOGEN FLD QL: NEGATIVE — SIGNIFICANT CHANGE UP
UROBILINOGEN FLD QL: NEGATIVE — SIGNIFICANT CHANGE UP
VANCOMYCIN FLD-MCNC: 12.9 UG/ML — SIGNIFICANT CHANGE UP
VANCOMYCIN FLD-MCNC: 17.1 UG/ML — SIGNIFICANT CHANGE UP
VANCOMYCIN TROUGH SERPL-MCNC: 16.4 UG/ML — SIGNIFICANT CHANGE UP (ref 10–20)
VANCOMYCIN TROUGH SERPL-MCNC: 16.4 UG/ML — SIGNIFICANT CHANGE UP (ref 10–20)
VANCOMYCIN TROUGH SERPL-MCNC: 9.6 UG/ML — LOW (ref 10–20)
VIT B12 SERPL-MCNC: >2000 PG/ML — HIGH (ref 232–1245)
WBC # BLD: 10.34 K/UL — SIGNIFICANT CHANGE UP (ref 3.8–10.5)
WBC # BLD: 10.56 K/UL — HIGH (ref 3.8–10.5)
WBC # BLD: 10.89 K/UL — HIGH (ref 3.8–10.5)
WBC # BLD: 11.87 K/UL — HIGH (ref 3.8–10.5)
WBC # BLD: 12.31 K/UL — HIGH (ref 3.8–10.5)
WBC # BLD: 12.8 K/UL — HIGH (ref 3.8–10.5)
WBC # BLD: 8.36 K/UL — SIGNIFICANT CHANGE UP (ref 3.8–10.5)
WBC # BLD: 8.75 K/UL — SIGNIFICANT CHANGE UP (ref 3.8–10.5)
WBC # BLD: 8.88 K/UL — SIGNIFICANT CHANGE UP (ref 3.8–10.5)
WBC # BLD: 9.01 K/UL — SIGNIFICANT CHANGE UP (ref 3.8–10.5)
WBC # BLD: 9.15 K/UL — SIGNIFICANT CHANGE UP (ref 3.8–10.5)
WBC # BLD: 9.27 K/UL — SIGNIFICANT CHANGE UP (ref 3.8–10.5)
WBC # BLD: 9.4 K/UL — SIGNIFICANT CHANGE UP (ref 3.8–10.5)
WBC # BLD: 9.66 K/UL — SIGNIFICANT CHANGE UP (ref 3.8–10.5)
WBC # BLD: 9.75 K/UL — SIGNIFICANT CHANGE UP (ref 3.8–10.5)
WBC # FLD AUTO: 10.34 K/UL — SIGNIFICANT CHANGE UP (ref 3.8–10.5)
WBC # FLD AUTO: 10.56 K/UL — HIGH (ref 3.8–10.5)
WBC # FLD AUTO: 10.89 K/UL — HIGH (ref 3.8–10.5)
WBC # FLD AUTO: 11.87 K/UL — HIGH (ref 3.8–10.5)
WBC # FLD AUTO: 12.31 K/UL — HIGH (ref 3.8–10.5)
WBC # FLD AUTO: 12.8 K/UL — HIGH (ref 3.8–10.5)
WBC # FLD AUTO: 8.36 K/UL — SIGNIFICANT CHANGE UP (ref 3.8–10.5)
WBC # FLD AUTO: 8.75 K/UL — SIGNIFICANT CHANGE UP (ref 3.8–10.5)
WBC # FLD AUTO: 8.88 K/UL — SIGNIFICANT CHANGE UP (ref 3.8–10.5)
WBC # FLD AUTO: 9.01 K/UL — SIGNIFICANT CHANGE UP (ref 3.8–10.5)
WBC # FLD AUTO: 9.15 K/UL — SIGNIFICANT CHANGE UP (ref 3.8–10.5)
WBC # FLD AUTO: 9.27 K/UL — SIGNIFICANT CHANGE UP (ref 3.8–10.5)
WBC # FLD AUTO: 9.4 K/UL — SIGNIFICANT CHANGE UP (ref 3.8–10.5)
WBC # FLD AUTO: 9.66 K/UL — SIGNIFICANT CHANGE UP (ref 3.8–10.5)
WBC # FLD AUTO: 9.75 K/UL — SIGNIFICANT CHANGE UP (ref 3.8–10.5)
WBC UR QL: 120 /HPF — HIGH (ref 0–5)
WBC UR QL: 2 /HPF — SIGNIFICANT CHANGE UP (ref 0–5)

## 2021-01-01 PROCEDURE — 97164 PT RE-EVAL EST PLAN CARE: CPT

## 2021-01-01 PROCEDURE — 70450 CT HEAD/BRAIN W/O DYE: CPT | Mod: 26,MA

## 2021-01-01 PROCEDURE — 85018 HEMOGLOBIN: CPT

## 2021-01-01 PROCEDURE — 96374 THER/PROPH/DIAG INJ IV PUSH: CPT

## 2021-01-01 PROCEDURE — 36415 COLL VENOUS BLD VENIPUNCTURE: CPT

## 2021-01-01 PROCEDURE — 99233 SBSQ HOSP IP/OBS HIGH 50: CPT

## 2021-01-01 PROCEDURE — 87077 CULTURE AEROBIC IDENTIFY: CPT

## 2021-01-01 PROCEDURE — 81001 URINALYSIS AUTO W/SCOPE: CPT

## 2021-01-01 PROCEDURE — 82803 BLOOD GASES ANY COMBINATION: CPT

## 2021-01-01 PROCEDURE — 88311 DECALCIFY TISSUE: CPT

## 2021-01-01 PROCEDURE — 76770 US EXAM ABDO BACK WALL COMP: CPT | Mod: 26

## 2021-01-01 PROCEDURE — 86140 C-REACTIVE PROTEIN: CPT

## 2021-01-01 PROCEDURE — 10060 I&D ABSCESS SIMPLE/SINGLE: CPT

## 2021-01-01 PROCEDURE — 93280 PM DEVICE PROGR EVAL DUAL: CPT | Mod: 26

## 2021-01-01 PROCEDURE — 99232 SBSQ HOSP IP/OBS MODERATE 35: CPT

## 2021-01-01 PROCEDURE — 72125 CT NECK SPINE W/O DYE: CPT | Mod: 26,MA

## 2021-01-01 PROCEDURE — 82746 ASSAY OF FOLIC ACID SERUM: CPT

## 2021-01-01 PROCEDURE — 86900 BLOOD TYPING SEROLOGIC ABO: CPT

## 2021-01-01 PROCEDURE — U0003: CPT

## 2021-01-01 PROCEDURE — 88304 TISSUE EXAM BY PATHOLOGIST: CPT | Mod: 26

## 2021-01-01 PROCEDURE — 80048 BASIC METABOLIC PNL TOTAL CA: CPT

## 2021-01-01 PROCEDURE — 83735 ASSAY OF MAGNESIUM: CPT

## 2021-01-01 PROCEDURE — 99222 1ST HOSP IP/OBS MODERATE 55: CPT

## 2021-01-01 PROCEDURE — 73030 X-RAY EXAM OF SHOULDER: CPT | Mod: 26,LT

## 2021-01-01 PROCEDURE — 82310 ASSAY OF CALCIUM: CPT

## 2021-01-01 PROCEDURE — 97530 THERAPEUTIC ACTIVITIES: CPT

## 2021-01-01 PROCEDURE — 85014 HEMATOCRIT: CPT

## 2021-01-01 PROCEDURE — 73610 X-RAY EXAM OF ANKLE: CPT | Mod: 26,RT

## 2021-01-01 PROCEDURE — 87075 CULTR BACTERIA EXCEPT BLOOD: CPT

## 2021-01-01 PROCEDURE — 83605 ASSAY OF LACTIC ACID: CPT

## 2021-01-01 PROCEDURE — 80202 ASSAY OF VANCOMYCIN: CPT

## 2021-01-01 PROCEDURE — 87635 SARS-COV-2 COVID-19 AMP PRB: CPT

## 2021-01-01 PROCEDURE — 76770 US EXAM ABDO BACK WALL COMP: CPT

## 2021-01-01 PROCEDURE — 97110 THERAPEUTIC EXERCISES: CPT

## 2021-01-01 PROCEDURE — 83970 ASSAY OF PARATHORMONE: CPT

## 2021-01-01 PROCEDURE — 99231 SBSQ HOSP IP/OBS SF/LOW 25: CPT | Mod: GC

## 2021-01-01 PROCEDURE — 87206 SMEAR FLUORESCENT/ACID STAI: CPT

## 2021-01-01 PROCEDURE — 99285 EMERGENCY DEPT VISIT HI MDM: CPT | Mod: 25

## 2021-01-01 PROCEDURE — 88311 DECALCIFY TISSUE: CPT | Mod: 26

## 2021-01-01 PROCEDURE — 86850 RBC ANTIBODY SCREEN: CPT

## 2021-01-01 PROCEDURE — 73610 X-RAY EXAM OF ANKLE: CPT

## 2021-01-01 PROCEDURE — 72125 CT NECK SPINE W/O DYE: CPT | Mod: MA

## 2021-01-01 PROCEDURE — 83036 HEMOGLOBIN GLYCOSYLATED A1C: CPT

## 2021-01-01 PROCEDURE — 99231 SBSQ HOSP IP/OBS SF/LOW 25: CPT | Mod: 25,GC

## 2021-01-01 PROCEDURE — 88304 TISSUE EXAM BY PATHOLOGIST: CPT

## 2021-01-01 PROCEDURE — 71045 X-RAY EXAM CHEST 1 VIEW: CPT

## 2021-01-01 PROCEDURE — 85730 THROMBOPLASTIN TIME PARTIAL: CPT

## 2021-01-01 PROCEDURE — 70450 CT HEAD/BRAIN W/O DYE: CPT | Mod: MA

## 2021-01-01 PROCEDURE — 82330 ASSAY OF CALCIUM: CPT

## 2021-01-01 PROCEDURE — 82962 GLUCOSE BLOOD TEST: CPT

## 2021-01-01 PROCEDURE — 84156 ASSAY OF PROTEIN URINE: CPT

## 2021-01-01 PROCEDURE — 97116 GAIT TRAINING THERAPY: CPT

## 2021-01-01 PROCEDURE — 87205 SMEAR GRAM STAIN: CPT

## 2021-01-01 PROCEDURE — 82947 ASSAY GLUCOSE BLOOD QUANT: CPT

## 2021-01-01 PROCEDURE — 83550 IRON BINDING TEST: CPT

## 2021-01-01 PROCEDURE — 82607 VITAMIN B-12: CPT

## 2021-01-01 PROCEDURE — 73723 MRI JOINT LWR EXTR W/O&W/DYE: CPT

## 2021-01-01 PROCEDURE — 87040 BLOOD CULTURE FOR BACTERIA: CPT

## 2021-01-01 PROCEDURE — 73723 MRI JOINT LWR EXTR W/O&W/DYE: CPT | Mod: 26,RT

## 2021-01-01 PROCEDURE — 80053 COMPREHEN METABOLIC PANEL: CPT

## 2021-01-01 PROCEDURE — 87070 CULTURE OTHR SPECIMN AEROBIC: CPT

## 2021-01-01 PROCEDURE — 85025 COMPLETE CBC W/AUTO DIFF WBC: CPT

## 2021-01-01 PROCEDURE — 87640 STAPH A DNA AMP PROBE: CPT

## 2021-01-01 PROCEDURE — 84295 ASSAY OF SERUM SODIUM: CPT

## 2021-01-01 PROCEDURE — 96375 TX/PRO/DX INJ NEW DRUG ADDON: CPT

## 2021-01-01 PROCEDURE — 84443 ASSAY THYROID STIM HORMONE: CPT

## 2021-01-01 PROCEDURE — 87116 MYCOBACTERIA CULTURE: CPT

## 2021-01-01 PROCEDURE — 83540 ASSAY OF IRON: CPT

## 2021-01-01 PROCEDURE — 82435 ASSAY OF BLOOD CHLORIDE: CPT

## 2021-01-01 PROCEDURE — 82533 TOTAL CORTISOL: CPT

## 2021-01-01 PROCEDURE — A9585: CPT

## 2021-01-01 PROCEDURE — 82728 ASSAY OF FERRITIN: CPT

## 2021-01-01 PROCEDURE — 82570 ASSAY OF URINE CREATININE: CPT

## 2021-01-01 PROCEDURE — 85652 RBC SED RATE AUTOMATED: CPT

## 2021-01-01 PROCEDURE — 85610 PROTHROMBIN TIME: CPT

## 2021-01-01 PROCEDURE — 86901 BLOOD TYPING SEROLOGIC RH(D): CPT

## 2021-01-01 PROCEDURE — 87086 URINE CULTURE/COLONY COUNT: CPT

## 2021-01-01 PROCEDURE — 73030 X-RAY EXAM OF SHOULDER: CPT

## 2021-01-01 PROCEDURE — 84132 ASSAY OF SERUM POTASSIUM: CPT

## 2021-01-01 PROCEDURE — 99285 EMERGENCY DEPT VISIT HI MDM: CPT

## 2021-01-01 PROCEDURE — 84100 ASSAY OF PHOSPHORUS: CPT

## 2021-01-01 PROCEDURE — 97162 PT EVAL MOD COMPLEX 30 MIN: CPT

## 2021-01-01 PROCEDURE — 85045 AUTOMATED RETICULOCYTE COUNT: CPT

## 2021-01-01 PROCEDURE — 71045 X-RAY EXAM CHEST 1 VIEW: CPT | Mod: 26

## 2021-01-01 PROCEDURE — 85027 COMPLETE CBC AUTOMATED: CPT

## 2021-01-01 PROCEDURE — U0005: CPT

## 2021-01-01 PROCEDURE — 87641 MR-STAPH DNA AMP PROBE: CPT

## 2021-01-01 RX ORDER — ERYTHROPOIETIN 10000 [IU]/ML
10000 INJECTION, SOLUTION INTRAVENOUS; SUBCUTANEOUS ONCE
Refills: 0 | Status: COMPLETED | OUTPATIENT
Start: 2021-01-01 | End: 2021-01-01

## 2021-01-01 RX ORDER — INSULIN LISPRO 100/ML
4 VIAL (ML) SUBCUTANEOUS
Refills: 0 | Status: DISCONTINUED | OUTPATIENT
Start: 2021-01-01 | End: 2021-01-01

## 2021-01-01 RX ORDER — CHLORHEXIDINE GLUCONATE 213 G/1000ML
1 SOLUTION TOPICAL
Refills: 0 | Status: DISCONTINUED | OUTPATIENT
Start: 2021-01-01 | End: 2021-01-01

## 2021-01-01 RX ORDER — INSULIN LISPRO 100/ML
6 VIAL (ML) SUBCUTANEOUS
Refills: 0 | Status: DISCONTINUED | OUTPATIENT
Start: 2021-01-01 | End: 2021-01-01

## 2021-01-01 RX ORDER — METRONIDAZOLE 500 MG
500 TABLET ORAL EVERY 12 HOURS
Refills: 0 | Status: DISCONTINUED | OUTPATIENT
Start: 2021-01-01 | End: 2021-01-01

## 2021-01-01 RX ORDER — INSULIN LISPRO 100/ML
2 VIAL (ML) SUBCUTANEOUS
Refills: 0 | Status: DISCONTINUED | OUTPATIENT
Start: 2021-01-01 | End: 2021-01-01

## 2021-01-01 RX ORDER — VANCOMYCIN HCL 1 G
750 VIAL (EA) INTRAVENOUS EVERY 24 HOURS
Refills: 0 | Status: DISCONTINUED | OUTPATIENT
Start: 2021-01-01 | End: 2021-01-01

## 2021-01-01 RX ORDER — DEXTROSE 50 % IN WATER 50 %
15 SYRINGE (ML) INTRAVENOUS ONCE
Refills: 0 | Status: DISCONTINUED | OUTPATIENT
Start: 2021-01-01 | End: 2021-01-01

## 2021-01-01 RX ORDER — VANCOMYCIN HCL 1 G
1000 VIAL (EA) INTRAVENOUS EVERY 24 HOURS
Refills: 0 | Status: DISCONTINUED | OUTPATIENT
Start: 2021-01-01 | End: 2021-01-01

## 2021-01-01 RX ORDER — LANOLIN ALCOHOL/MO/W.PET/CERES
3 CREAM (GRAM) TOPICAL ONCE
Refills: 0 | Status: COMPLETED | OUTPATIENT
Start: 2021-01-01 | End: 2021-01-01

## 2021-01-01 RX ORDER — PANTOPRAZOLE SODIUM 20 MG/1
1 TABLET, DELAYED RELEASE ORAL
Qty: 0 | Refills: 0 | DISCHARGE

## 2021-01-01 RX ORDER — INSULIN LISPRO 100/ML
VIAL (ML) SUBCUTANEOUS
Refills: 0 | Status: DISCONTINUED | OUTPATIENT
Start: 2021-01-01 | End: 2021-01-01

## 2021-01-01 RX ORDER — INSULIN GLARGINE 100 [IU]/ML
5 INJECTION, SOLUTION SUBCUTANEOUS AT BEDTIME
Refills: 0 | Status: DISCONTINUED | OUTPATIENT
Start: 2021-01-01 | End: 2021-01-01

## 2021-01-01 RX ORDER — LEVOTHYROXINE SODIUM 125 MCG
1 TABLET ORAL
Qty: 0 | Refills: 0 | DISCHARGE
Start: 2021-01-01

## 2021-01-01 RX ORDER — HYDROMORPHONE HYDROCHLORIDE 2 MG/ML
0.5 INJECTION INTRAMUSCULAR; INTRAVENOUS; SUBCUTANEOUS ONCE
Refills: 0 | Status: DISCONTINUED | OUTPATIENT
Start: 2021-01-01 | End: 2021-01-01

## 2021-01-01 RX ORDER — CLOPIDOGREL BISULFATE 75 MG/1
75 TABLET, FILM COATED ORAL DAILY
Refills: 0 | Status: DISCONTINUED | OUTPATIENT
Start: 2021-01-01 | End: 2021-01-01

## 2021-01-01 RX ORDER — METOPROLOL TARTRATE 50 MG
100 TABLET ORAL DAILY
Refills: 0 | Status: DISCONTINUED | OUTPATIENT
Start: 2021-01-01 | End: 2021-01-01

## 2021-01-01 RX ORDER — LEVOTHYROXINE SODIUM 125 MCG
1 TABLET ORAL
Qty: 0 | Refills: 0 | DISCHARGE

## 2021-01-01 RX ORDER — NYSTATIN CREAM 100000 [USP'U]/G
1 CREAM TOPICAL
Qty: 0 | Refills: 0 | DISCHARGE
Start: 2021-01-01

## 2021-01-01 RX ORDER — FUROSEMIDE 40 MG
1 TABLET ORAL
Qty: 0 | Refills: 0 | DISCHARGE

## 2021-01-01 RX ORDER — INSULIN LISPRO 100/ML
0 VIAL (ML) SUBCUTANEOUS
Qty: 0 | Refills: 0 | DISCHARGE
Start: 2021-01-01

## 2021-01-01 RX ORDER — PANTOPRAZOLE SODIUM 20 MG/1
40 TABLET, DELAYED RELEASE ORAL
Refills: 0 | Status: DISCONTINUED | OUTPATIENT
Start: 2021-01-01 | End: 2021-01-01

## 2021-01-01 RX ORDER — MAGNESIUM HYDROXIDE 400 MG/1
30 TABLET, CHEWABLE ORAL
Qty: 0 | Refills: 0 | DISCHARGE

## 2021-01-01 RX ORDER — INSULIN LISPRO 100/ML
VIAL (ML) SUBCUTANEOUS AT BEDTIME
Refills: 0 | Status: DISCONTINUED | OUTPATIENT
Start: 2021-01-01 | End: 2021-01-01

## 2021-01-01 RX ORDER — SIMVASTATIN 20 MG/1
20 TABLET, FILM COATED ORAL AT BEDTIME
Refills: 0 | Status: DISCONTINUED | OUTPATIENT
Start: 2021-01-01 | End: 2021-01-01

## 2021-01-01 RX ORDER — SODIUM CHLORIDE 9 MG/ML
1000 INJECTION, SOLUTION INTRAVENOUS
Refills: 0 | Status: DISCONTINUED | OUTPATIENT
Start: 2021-01-01 | End: 2021-01-01

## 2021-01-01 RX ORDER — MEROPENEM 1 G/30ML
1000 INJECTION INTRAVENOUS EVERY 12 HOURS
Refills: 0 | Status: DISCONTINUED | OUTPATIENT
Start: 2021-01-01 | End: 2021-01-01

## 2021-01-01 RX ORDER — FUROSEMIDE 40 MG
40 TABLET ORAL DAILY
Refills: 0 | Status: DISCONTINUED | OUTPATIENT
Start: 2021-01-01 | End: 2021-01-01

## 2021-01-01 RX ORDER — INSULIN GLARGINE 100 [IU]/ML
14 INJECTION, SOLUTION SUBCUTANEOUS AT BEDTIME
Refills: 0 | Status: DISCONTINUED | OUTPATIENT
Start: 2021-01-01 | End: 2021-01-01

## 2021-01-01 RX ORDER — AMLODIPINE BESYLATE 2.5 MG/1
1 TABLET ORAL
Qty: 0 | Refills: 0 | DISCHARGE

## 2021-01-01 RX ORDER — CLOPIDOGREL BISULFATE 75 MG/1
1 TABLET, FILM COATED ORAL
Qty: 0 | Refills: 0 | DISCHARGE

## 2021-01-01 RX ORDER — INSULIN GLARGINE 100 [IU]/ML
7 INJECTION, SOLUTION SUBCUTANEOUS AT BEDTIME
Refills: 0 | Status: DISCONTINUED | OUTPATIENT
Start: 2021-01-01 | End: 2021-01-01

## 2021-01-01 RX ORDER — PREGABALIN 225 MG/1
2.5 CAPSULE ORAL
Qty: 0 | Refills: 0 | DISCHARGE

## 2021-01-01 RX ORDER — APIXABAN 2.5 MG/1
1 TABLET, FILM COATED ORAL
Qty: 0 | Refills: 0 | DISCHARGE

## 2021-01-01 RX ORDER — NYSTATIN CREAM 100000 [USP'U]/G
1 CREAM TOPICAL
Qty: 0 | Refills: 0 | DISCHARGE

## 2021-01-01 RX ORDER — SENNA PLUS 8.6 MG/1
2 TABLET ORAL
Qty: 0 | Refills: 0 | DISCHARGE
Start: 2021-01-01

## 2021-01-01 RX ORDER — SIMVASTATIN 20 MG/1
1 TABLET, FILM COATED ORAL
Qty: 0 | Refills: 0 | DISCHARGE

## 2021-01-01 RX ORDER — LANOLIN ALCOHOL/MO/W.PET/CERES
1 CREAM (GRAM) TOPICAL ONCE
Refills: 0 | Status: COMPLETED | OUTPATIENT
Start: 2021-01-01 | End: 2021-01-01

## 2021-01-01 RX ORDER — DEXTROSE 50 % IN WATER 50 %
25 SYRINGE (ML) INTRAVENOUS ONCE
Refills: 0 | Status: DISCONTINUED | OUTPATIENT
Start: 2021-01-01 | End: 2021-01-01

## 2021-01-01 RX ORDER — LIRAGLUTIDE 6 MG/ML
1.2 INJECTION SUBCUTANEOUS
Qty: 0 | Refills: 0 | DISCHARGE

## 2021-01-01 RX ORDER — PANTOPRAZOLE SODIUM 20 MG/1
1 TABLET, DELAYED RELEASE ORAL
Qty: 0 | Refills: 0 | DISCHARGE
Start: 2021-01-01

## 2021-01-01 RX ORDER — SENNA PLUS 8.6 MG/1
2 TABLET ORAL ONCE
Refills: 0 | Status: COMPLETED | OUTPATIENT
Start: 2021-01-01 | End: 2021-01-01

## 2021-01-01 RX ORDER — CALCITRIOL 0.5 UG/1
1 CAPSULE ORAL
Qty: 0 | Refills: 0 | DISCHARGE
Start: 2021-01-01

## 2021-01-01 RX ORDER — SODIUM CHLORIDE 9 MG/ML
250 INJECTION INTRAMUSCULAR; INTRAVENOUS; SUBCUTANEOUS ONCE
Refills: 0 | Status: COMPLETED | OUTPATIENT
Start: 2021-01-01 | End: 2021-01-01

## 2021-01-01 RX ORDER — ASCORBIC ACID 60 MG
500 TABLET,CHEWABLE ORAL DAILY
Refills: 0 | Status: DISCONTINUED | OUTPATIENT
Start: 2021-01-01 | End: 2021-01-01

## 2021-01-01 RX ORDER — MEROPENEM 1 G/30ML
500 INJECTION INTRAVENOUS EVERY 12 HOURS
Refills: 0 | Status: DISCONTINUED | OUTPATIENT
Start: 2021-01-01 | End: 2021-01-01

## 2021-01-01 RX ORDER — SENNA PLUS 8.6 MG/1
2 TABLET ORAL AT BEDTIME
Refills: 0 | Status: DISCONTINUED | OUTPATIENT
Start: 2021-01-01 | End: 2021-01-01

## 2021-01-01 RX ORDER — CEFEPIME 1 G/1
INJECTION, POWDER, FOR SOLUTION INTRAMUSCULAR; INTRAVENOUS
Refills: 0 | Status: DISCONTINUED | OUTPATIENT
Start: 2021-01-01 | End: 2021-01-01

## 2021-01-01 RX ORDER — FUROSEMIDE 40 MG
1 TABLET ORAL
Qty: 0 | Refills: 0 | DISCHARGE
Start: 2021-01-01

## 2021-01-01 RX ORDER — POLYETHYLENE GLYCOL 3350 17 G/17G
17 POWDER, FOR SOLUTION ORAL DAILY
Refills: 0 | Status: DISCONTINUED | OUTPATIENT
Start: 2021-01-01 | End: 2021-01-01

## 2021-01-01 RX ORDER — INSULIN GLARGINE 100 [IU]/ML
10 INJECTION, SOLUTION SUBCUTANEOUS AT BEDTIME
Refills: 0 | Status: DISCONTINUED | OUTPATIENT
Start: 2021-01-01 | End: 2021-01-01

## 2021-01-01 RX ORDER — APIXABAN 2.5 MG/1
2.5 TABLET, FILM COATED ORAL EVERY 12 HOURS
Refills: 0 | Status: DISCONTINUED | OUTPATIENT
Start: 2021-01-01 | End: 2021-01-01

## 2021-01-01 RX ORDER — INSULIN GLARGINE 100 [IU]/ML
10 INJECTION, SOLUTION SUBCUTANEOUS
Qty: 0 | Refills: 0 | DISCHARGE
Start: 2021-01-01

## 2021-01-01 RX ORDER — APIXABAN 2.5 MG/1
1 TABLET, FILM COATED ORAL
Qty: 0 | Refills: 0 | DISCHARGE
Start: 2021-01-01

## 2021-01-01 RX ORDER — CEFEPIME 1 G/1
1000 INJECTION, POWDER, FOR SOLUTION INTRAMUSCULAR; INTRAVENOUS ONCE
Refills: 0 | Status: COMPLETED | OUTPATIENT
Start: 2021-01-01 | End: 2021-01-01

## 2021-01-01 RX ORDER — ACETAMINOPHEN 500 MG
2 TABLET ORAL
Qty: 0 | Refills: 0 | DISCHARGE

## 2021-01-01 RX ORDER — ASCORBIC ACID 60 MG
1 TABLET,CHEWABLE ORAL
Qty: 0 | Refills: 0 | DISCHARGE
Start: 2021-01-01

## 2021-01-01 RX ORDER — INSULIN LISPRO 100/ML
0 VIAL (ML) SUBCUTANEOUS
Qty: 0 | Refills: 0 | DISCHARGE

## 2021-01-01 RX ORDER — LEVOTHYROXINE SODIUM 125 MCG
50 TABLET ORAL DAILY
Refills: 0 | Status: DISCONTINUED | OUTPATIENT
Start: 2021-01-01 | End: 2021-01-01

## 2021-01-01 RX ORDER — ENOXAPARIN SODIUM 100 MG/ML
20 INJECTION SUBCUTANEOUS
Qty: 0 | Refills: 0 | DISCHARGE
Start: 2021-01-01

## 2021-01-01 RX ORDER — ACETAMINOPHEN 500 MG
2 TABLET ORAL
Qty: 0 | Refills: 0 | DISCHARGE
Start: 2021-01-01

## 2021-01-01 RX ORDER — ALLOPURINOL 300 MG
1 TABLET ORAL
Qty: 0 | Refills: 0 | DISCHARGE
Start: 2021-01-01

## 2021-01-01 RX ORDER — ALLOPURINOL 300 MG
1 TABLET ORAL
Qty: 0 | Refills: 0 | DISCHARGE

## 2021-01-01 RX ORDER — DEXTROSE 50 % IN WATER 50 %
12.5 SYRINGE (ML) INTRAVENOUS ONCE
Refills: 0 | Status: DISCONTINUED | OUTPATIENT
Start: 2021-01-01 | End: 2021-01-01

## 2021-01-01 RX ORDER — METOPROLOL TARTRATE 50 MG
1 TABLET ORAL
Qty: 0 | Refills: 0 | DISCHARGE

## 2021-01-01 RX ORDER — ALLOPURINOL 300 MG
100 TABLET ORAL DAILY
Refills: 0 | Status: DISCONTINUED | OUTPATIENT
Start: 2021-01-01 | End: 2021-01-01

## 2021-01-01 RX ORDER — ACETAMINOPHEN 500 MG
3 TABLET ORAL
Qty: 0 | Refills: 0 | DISCHARGE
Start: 2021-01-01

## 2021-01-01 RX ORDER — INSULIN LISPRO 100/ML
5 VIAL (ML) SUBCUTANEOUS
Refills: 0 | Status: DISCONTINUED | OUTPATIENT
Start: 2021-01-01 | End: 2021-01-01

## 2021-01-01 RX ORDER — DOCUSATE SODIUM 100 MG
2 CAPSULE ORAL
Qty: 0 | Refills: 0 | DISCHARGE

## 2021-01-01 RX ORDER — METOPROLOL TARTRATE 50 MG
100 TABLET ORAL
Qty: 0 | Refills: 0 | DISCHARGE

## 2021-01-01 RX ORDER — ERTAPENEM SODIUM 1 G/1
500 INJECTION, POWDER, LYOPHILIZED, FOR SOLUTION INTRAMUSCULAR; INTRAVENOUS
Qty: 0 | Refills: 0 | DISCHARGE

## 2021-01-01 RX ORDER — ACETAMINOPHEN 500 MG
1000 TABLET ORAL ONCE
Refills: 0 | Status: COMPLETED | OUTPATIENT
Start: 2021-01-01 | End: 2021-01-01

## 2021-01-01 RX ORDER — POLYETHYLENE GLYCOL 3350 17 G/17G
17 POWDER, FOR SOLUTION ORAL
Qty: 0 | Refills: 0 | DISCHARGE
Start: 2021-01-01

## 2021-01-01 RX ORDER — ENOXAPARIN SODIUM 100 MG/ML
0 INJECTION SUBCUTANEOUS
Qty: 0 | Refills: 0 | DISCHARGE

## 2021-01-01 RX ORDER — FUROSEMIDE 40 MG
20 TABLET ORAL DAILY
Refills: 0 | Status: DISCONTINUED | OUTPATIENT
Start: 2021-01-01 | End: 2021-01-01

## 2021-01-01 RX ORDER — ERTAPENEM SODIUM 1 G/1
500 INJECTION, POWDER, LYOPHILIZED, FOR SOLUTION INTRAMUSCULAR; INTRAVENOUS EVERY 24 HOURS
Refills: 0 | Status: DISCONTINUED | OUTPATIENT
Start: 2021-01-01 | End: 2021-01-01

## 2021-01-01 RX ORDER — VANCOMYCIN HCL 1 G
750 VIAL (EA) INTRAVENOUS ONCE
Refills: 0 | Status: COMPLETED | OUTPATIENT
Start: 2021-01-01 | End: 2021-01-01

## 2021-01-01 RX ORDER — VANCOMYCIN HCL 1 G
1000 VIAL (EA) INTRAVENOUS ONCE
Refills: 0 | Status: COMPLETED | OUTPATIENT
Start: 2021-01-01 | End: 2021-01-01

## 2021-01-01 RX ORDER — GLUCAGON INJECTION, SOLUTION 0.5 MG/.1ML
1 INJECTION, SOLUTION SUBCUTANEOUS ONCE
Refills: 0 | Status: DISCONTINUED | OUTPATIENT
Start: 2021-01-01 | End: 2021-01-01

## 2021-01-01 RX ORDER — HEPARIN SODIUM 5000 [USP'U]/ML
5000 INJECTION INTRAVENOUS; SUBCUTANEOUS EVERY 12 HOURS
Refills: 0 | Status: DISCONTINUED | OUTPATIENT
Start: 2021-01-01 | End: 2021-01-01

## 2021-01-01 RX ORDER — APIXABAN 2.5 MG/1
2.5 TABLET, FILM COATED ORAL
Refills: 0 | Status: DISCONTINUED | OUTPATIENT
Start: 2021-01-01 | End: 2021-01-01

## 2021-01-01 RX ORDER — ACETAMINOPHEN 500 MG
1000 TABLET ORAL ONCE
Refills: 0 | Status: DISCONTINUED | OUTPATIENT
Start: 2021-01-01 | End: 2021-01-01

## 2021-01-01 RX ORDER — ONDANSETRON 8 MG/1
4 TABLET, FILM COATED ORAL ONCE
Refills: 0 | Status: DISCONTINUED | OUTPATIENT
Start: 2021-01-01 | End: 2021-01-01

## 2021-01-01 RX ORDER — SENNA PLUS 8.6 MG/1
1 TABLET ORAL ONCE
Refills: 0 | Status: COMPLETED | OUTPATIENT
Start: 2021-01-01 | End: 2021-01-01

## 2021-01-01 RX ORDER — AMLODIPINE BESYLATE 2.5 MG/1
5 TABLET ORAL DAILY
Refills: 0 | Status: DISCONTINUED | OUTPATIENT
Start: 2021-01-01 | End: 2021-01-01

## 2021-01-01 RX ORDER — CEFEPIME 1 G/1
1000 INJECTION, POWDER, FOR SOLUTION INTRAMUSCULAR; INTRAVENOUS EVERY 12 HOURS
Refills: 0 | Status: DISCONTINUED | OUTPATIENT
Start: 2021-01-01 | End: 2021-01-01

## 2021-01-01 RX ORDER — LANOLIN ALCOHOL/MO/W.PET/CERES
3 CREAM (GRAM) TOPICAL AT BEDTIME
Refills: 0 | Status: DISCONTINUED | OUTPATIENT
Start: 2021-01-01 | End: 2021-01-01

## 2021-01-01 RX ORDER — ERTAPENEM SODIUM 1 G/1
1000 INJECTION, POWDER, LYOPHILIZED, FOR SOLUTION INTRAMUSCULAR; INTRAVENOUS EVERY 24 HOURS
Refills: 0 | Status: DISCONTINUED | OUTPATIENT
Start: 2021-01-01 | End: 2021-01-01

## 2021-01-01 RX ORDER — NYSTATIN CREAM 100000 [USP'U]/G
1 CREAM TOPICAL
Refills: 0 | Status: DISCONTINUED | OUTPATIENT
Start: 2021-01-01 | End: 2021-01-01

## 2021-01-01 RX ORDER — ACETAMINOPHEN 500 MG
650 TABLET ORAL EVERY 6 HOURS
Refills: 0 | Status: DISCONTINUED | OUTPATIENT
Start: 2021-01-01 | End: 2021-01-01

## 2021-01-01 RX ORDER — INSULIN GLARGINE 100 [IU]/ML
18 INJECTION, SOLUTION SUBCUTANEOUS AT BEDTIME
Refills: 0 | Status: DISCONTINUED | OUTPATIENT
Start: 2021-01-01 | End: 2021-01-01

## 2021-01-01 RX ORDER — TRAMADOL HYDROCHLORIDE 50 MG/1
25 TABLET ORAL ONCE
Refills: 0 | Status: DISCONTINUED | OUTPATIENT
Start: 2021-01-01 | End: 2021-01-01

## 2021-01-01 RX ORDER — FENTANYL CITRATE 50 UG/ML
25 INJECTION INTRAVENOUS
Refills: 0 | Status: DISCONTINUED | OUTPATIENT
Start: 2021-01-01 | End: 2021-01-01

## 2021-01-01 RX ORDER — AMLODIPINE BESYLATE 2.5 MG/1
1 TABLET ORAL
Qty: 0 | Refills: 0 | DISCHARGE
Start: 2021-01-01

## 2021-01-01 RX ORDER — CALCITRIOL 0.5 UG/1
0.25 CAPSULE ORAL DAILY
Refills: 0 | Status: DISCONTINUED | OUTPATIENT
Start: 2021-01-01 | End: 2021-01-01

## 2021-01-01 RX ORDER — METOPROLOL TARTRATE 50 MG
1 TABLET ORAL
Qty: 0 | Refills: 0 | DISCHARGE
Start: 2021-01-01

## 2021-01-01 RX ORDER — LANOLIN ALCOHOL/MO/W.PET/CERES
1 CREAM (GRAM) TOPICAL
Qty: 0 | Refills: 0 | DISCHARGE
Start: 2021-01-01

## 2021-01-01 RX ORDER — INSULIN LISPRO 100/ML
8 VIAL (ML) SUBCUTANEOUS
Refills: 0 | Status: DISCONTINUED | OUTPATIENT
Start: 2021-01-01 | End: 2021-01-01

## 2021-01-01 RX ORDER — ACETAMINOPHEN 500 MG
325 TABLET ORAL ONCE
Refills: 0 | Status: COMPLETED | OUTPATIENT
Start: 2021-01-01 | End: 2021-01-01

## 2021-01-01 RX ORDER — CHOLECALCIFEROL (VITAMIN D3) 125 MCG
1 CAPSULE ORAL
Qty: 0 | Refills: 0 | DISCHARGE

## 2021-01-01 RX ADMIN — Medication 6 UNIT(S): at 18:09

## 2021-01-01 RX ADMIN — CHLORHEXIDINE GLUCONATE 1 APPLICATION(S): 213 SOLUTION TOPICAL at 05:10

## 2021-01-01 RX ADMIN — Medication 500 MILLIGRAM(S): at 11:24

## 2021-01-01 RX ADMIN — AMLODIPINE BESYLATE 5 MILLIGRAM(S): 2.5 TABLET ORAL at 05:06

## 2021-01-01 RX ADMIN — POLYETHYLENE GLYCOL 3350 17 GRAM(S): 17 POWDER, FOR SOLUTION ORAL at 12:14

## 2021-01-01 RX ADMIN — INSULIN GLARGINE 14 UNIT(S): 100 INJECTION, SOLUTION SUBCUTANEOUS at 22:34

## 2021-01-01 RX ADMIN — Medication 50 MICROGRAM(S): at 05:03

## 2021-01-01 RX ADMIN — CLOPIDOGREL BISULFATE 75 MILLIGRAM(S): 75 TABLET, FILM COATED ORAL at 11:42

## 2021-01-01 RX ADMIN — Medication 3 MILLIGRAM(S): at 22:57

## 2021-01-01 RX ADMIN — HYDROMORPHONE HYDROCHLORIDE 0.5 MILLIGRAM(S): 2 INJECTION INTRAMUSCULAR; INTRAVENOUS; SUBCUTANEOUS at 01:54

## 2021-01-01 RX ADMIN — AMLODIPINE BESYLATE 5 MILLIGRAM(S): 2.5 TABLET ORAL at 05:32

## 2021-01-01 RX ADMIN — Medication 100 MILLIGRAM(S): at 05:01

## 2021-01-01 RX ADMIN — ERTAPENEM SODIUM 100 MILLIGRAM(S): 1 INJECTION, POWDER, LYOPHILIZED, FOR SOLUTION INTRAMUSCULAR; INTRAVENOUS at 18:10

## 2021-01-01 RX ADMIN — Medication 50 MICROGRAM(S): at 05:15

## 2021-01-01 RX ADMIN — PANTOPRAZOLE SODIUM 40 MILLIGRAM(S): 20 TABLET, DELAYED RELEASE ORAL at 06:14

## 2021-01-01 RX ADMIN — Medication 50 MICROGRAM(S): at 05:51

## 2021-01-01 RX ADMIN — INSULIN GLARGINE 5 UNIT(S): 100 INJECTION, SOLUTION SUBCUTANEOUS at 22:00

## 2021-01-01 RX ADMIN — Medication 2: at 08:54

## 2021-01-01 RX ADMIN — Medication 1 TABLET(S): at 11:42

## 2021-01-01 RX ADMIN — CEFEPIME 100 MILLIGRAM(S): 1 INJECTION, POWDER, FOR SOLUTION INTRAMUSCULAR; INTRAVENOUS at 05:52

## 2021-01-01 RX ADMIN — ERTAPENEM SODIUM 100 MILLIGRAM(S): 1 INJECTION, POWDER, LYOPHILIZED, FOR SOLUTION INTRAMUSCULAR; INTRAVENOUS at 17:41

## 2021-01-01 RX ADMIN — CEFEPIME 100 MILLIGRAM(S): 1 INJECTION, POWDER, FOR SOLUTION INTRAMUSCULAR; INTRAVENOUS at 17:26

## 2021-01-01 RX ADMIN — Medication 100 MILLIGRAM(S): at 17:26

## 2021-01-01 RX ADMIN — SENNA PLUS 1 TABLET(S): 8.6 TABLET ORAL at 23:44

## 2021-01-01 RX ADMIN — CEFEPIME 100 MILLIGRAM(S): 1 INJECTION, POWDER, FOR SOLUTION INTRAMUSCULAR; INTRAVENOUS at 06:07

## 2021-01-01 RX ADMIN — SIMVASTATIN 20 MILLIGRAM(S): 20 TABLET, FILM COATED ORAL at 21:43

## 2021-01-01 RX ADMIN — CHLORHEXIDINE GLUCONATE 1 APPLICATION(S): 213 SOLUTION TOPICAL at 05:26

## 2021-01-01 RX ADMIN — Medication 250 MILLIGRAM(S): at 14:42

## 2021-01-01 RX ADMIN — Medication 2: at 17:50

## 2021-01-01 RX ADMIN — NYSTATIN CREAM 1 APPLICATION(S): 100000 CREAM TOPICAL at 17:40

## 2021-01-01 RX ADMIN — Medication 3: at 12:38

## 2021-01-01 RX ADMIN — Medication 100 MILLIGRAM(S): at 11:49

## 2021-01-01 RX ADMIN — Medication 20 MILLIGRAM(S): at 05:52

## 2021-01-01 RX ADMIN — POLYETHYLENE GLYCOL 3350 17 GRAM(S): 17 POWDER, FOR SOLUTION ORAL at 12:21

## 2021-01-01 RX ADMIN — Medication 500 MILLIGRAM(S): at 11:36

## 2021-01-01 RX ADMIN — Medication 3: at 08:57

## 2021-01-01 RX ADMIN — CLOPIDOGREL BISULFATE 75 MILLIGRAM(S): 75 TABLET, FILM COATED ORAL at 12:18

## 2021-01-01 RX ADMIN — Medication 1 TABLET(S): at 12:18

## 2021-01-01 RX ADMIN — Medication 100 MILLIGRAM(S): at 05:23

## 2021-01-01 RX ADMIN — Medication 4 UNIT(S): at 09:02

## 2021-01-01 RX ADMIN — Medication 1: at 22:06

## 2021-01-01 RX ADMIN — MEROPENEM 100 MILLIGRAM(S): 1 INJECTION INTRAVENOUS at 17:41

## 2021-01-01 RX ADMIN — SENNA PLUS 2 TABLET(S): 8.6 TABLET ORAL at 22:18

## 2021-01-01 RX ADMIN — Medication 8 UNIT(S): at 08:59

## 2021-01-01 RX ADMIN — Medication 100 MILLIGRAM(S): at 13:04

## 2021-01-01 RX ADMIN — Medication 1: at 09:00

## 2021-01-01 RX ADMIN — Medication 1 MILLIGRAM(S): at 23:46

## 2021-01-01 RX ADMIN — HYDROMORPHONE HYDROCHLORIDE 0.5 MILLIGRAM(S): 2 INJECTION INTRAMUSCULAR; INTRAVENOUS; SUBCUTANEOUS at 02:20

## 2021-01-01 RX ADMIN — CLOPIDOGREL BISULFATE 75 MILLIGRAM(S): 75 TABLET, FILM COATED ORAL at 13:31

## 2021-01-01 RX ADMIN — CHLORHEXIDINE GLUCONATE 1 APPLICATION(S): 213 SOLUTION TOPICAL at 06:23

## 2021-01-01 RX ADMIN — CHLORHEXIDINE GLUCONATE 1 APPLICATION(S): 213 SOLUTION TOPICAL at 05:32

## 2021-01-01 RX ADMIN — Medication 1 TABLET(S): at 11:24

## 2021-01-01 RX ADMIN — CHLORHEXIDINE GLUCONATE 1 APPLICATION(S): 213 SOLUTION TOPICAL at 06:51

## 2021-01-01 RX ADMIN — MEROPENEM 100 MILLIGRAM(S): 1 INJECTION INTRAVENOUS at 18:22

## 2021-01-01 RX ADMIN — CLOPIDOGREL BISULFATE 75 MILLIGRAM(S): 75 TABLET, FILM COATED ORAL at 18:46

## 2021-01-01 RX ADMIN — SIMVASTATIN 20 MILLIGRAM(S): 20 TABLET, FILM COATED ORAL at 22:40

## 2021-01-01 RX ADMIN — CLOPIDOGREL BISULFATE 75 MILLIGRAM(S): 75 TABLET, FILM COATED ORAL at 13:15

## 2021-01-01 RX ADMIN — INSULIN GLARGINE 10 UNIT(S): 100 INJECTION, SOLUTION SUBCUTANEOUS at 22:18

## 2021-01-01 RX ADMIN — HEPARIN SODIUM 5000 UNIT(S): 5000 INJECTION INTRAVENOUS; SUBCUTANEOUS at 05:51

## 2021-01-01 RX ADMIN — AMLODIPINE BESYLATE 5 MILLIGRAM(S): 2.5 TABLET ORAL at 06:29

## 2021-01-01 RX ADMIN — SIMVASTATIN 20 MILLIGRAM(S): 20 TABLET, FILM COATED ORAL at 21:07

## 2021-01-01 RX ADMIN — PANTOPRAZOLE SODIUM 40 MILLIGRAM(S): 20 TABLET, DELAYED RELEASE ORAL at 06:31

## 2021-01-01 RX ADMIN — Medication 100 MILLIGRAM(S): at 11:30

## 2021-01-01 RX ADMIN — INSULIN GLARGINE 14 UNIT(S): 100 INJECTION, SOLUTION SUBCUTANEOUS at 22:05

## 2021-01-01 RX ADMIN — Medication 8 UNIT(S): at 17:33

## 2021-01-01 RX ADMIN — CEFEPIME 100 MILLIGRAM(S): 1 INJECTION, POWDER, FOR SOLUTION INTRAMUSCULAR; INTRAVENOUS at 05:14

## 2021-01-01 RX ADMIN — CHLORHEXIDINE GLUCONATE 1 APPLICATION(S): 213 SOLUTION TOPICAL at 05:01

## 2021-01-01 RX ADMIN — Medication 100 MILLIGRAM(S): at 05:33

## 2021-01-01 RX ADMIN — Medication 50 MICROGRAM(S): at 05:26

## 2021-01-01 RX ADMIN — Medication 1: at 13:04

## 2021-01-01 RX ADMIN — AMLODIPINE BESYLATE 5 MILLIGRAM(S): 2.5 TABLET ORAL at 05:23

## 2021-01-01 RX ADMIN — Medication 1 TABLET(S): at 11:27

## 2021-01-01 RX ADMIN — Medication 500 MILLIGRAM(S): at 11:33

## 2021-01-01 RX ADMIN — AMLODIPINE BESYLATE 5 MILLIGRAM(S): 2.5 TABLET ORAL at 05:01

## 2021-01-01 RX ADMIN — Medication 1 TABLET(S): at 12:13

## 2021-01-01 RX ADMIN — Medication 50 MICROGRAM(S): at 05:32

## 2021-01-01 RX ADMIN — MEROPENEM 100 MILLIGRAM(S): 1 INJECTION INTRAVENOUS at 17:34

## 2021-01-01 RX ADMIN — Medication 100 MILLIGRAM(S): at 12:24

## 2021-01-01 RX ADMIN — Medication 3: at 12:55

## 2021-01-01 RX ADMIN — Medication 250 MILLIGRAM(S): at 11:37

## 2021-01-01 RX ADMIN — TRAMADOL HYDROCHLORIDE 25 MILLIGRAM(S): 50 TABLET ORAL at 14:15

## 2021-01-01 RX ADMIN — Medication 6 UNIT(S): at 08:33

## 2021-01-01 RX ADMIN — APIXABAN 2.5 MILLIGRAM(S): 2.5 TABLET, FILM COATED ORAL at 06:46

## 2021-01-01 RX ADMIN — INSULIN GLARGINE 10 UNIT(S): 100 INJECTION, SOLUTION SUBCUTANEOUS at 22:07

## 2021-01-01 RX ADMIN — Medication 650 MILLIGRAM(S): at 02:22

## 2021-01-01 RX ADMIN — CALCITRIOL 0.25 MICROGRAM(S): 0.5 CAPSULE ORAL at 13:08

## 2021-01-01 RX ADMIN — Medication 650 MILLIGRAM(S): at 22:27

## 2021-01-01 RX ADMIN — Medication 650 MILLIGRAM(S): at 23:15

## 2021-01-01 RX ADMIN — Medication 100 MILLIGRAM(S): at 11:42

## 2021-01-01 RX ADMIN — CALCITRIOL 0.25 MICROGRAM(S): 0.5 CAPSULE ORAL at 09:13

## 2021-01-01 RX ADMIN — MEROPENEM 100 MILLIGRAM(S): 1 INJECTION INTRAVENOUS at 06:45

## 2021-01-01 RX ADMIN — AMLODIPINE BESYLATE 5 MILLIGRAM(S): 2.5 TABLET ORAL at 06:13

## 2021-01-01 RX ADMIN — Medication 4 UNIT(S): at 08:43

## 2021-01-01 RX ADMIN — Medication 4 UNIT(S): at 08:27

## 2021-01-01 RX ADMIN — Medication 500 MILLIGRAM(S): at 13:15

## 2021-01-01 RX ADMIN — APIXABAN 2.5 MILLIGRAM(S): 2.5 TABLET, FILM COATED ORAL at 17:26

## 2021-01-01 RX ADMIN — Medication 4 UNIT(S): at 17:36

## 2021-01-01 RX ADMIN — MEROPENEM 100 MILLIGRAM(S): 1 INJECTION INTRAVENOUS at 17:47

## 2021-01-01 RX ADMIN — CALCITRIOL 0.25 MICROGRAM(S): 0.5 CAPSULE ORAL at 11:33

## 2021-01-01 RX ADMIN — CHLORHEXIDINE GLUCONATE 1 APPLICATION(S): 213 SOLUTION TOPICAL at 06:04

## 2021-01-01 RX ADMIN — PANTOPRAZOLE SODIUM 40 MILLIGRAM(S): 20 TABLET, DELAYED RELEASE ORAL at 05:23

## 2021-01-01 RX ADMIN — SENNA PLUS 2 TABLET(S): 8.6 TABLET ORAL at 21:43

## 2021-01-01 RX ADMIN — ERYTHROPOIETIN 10000 UNIT(S): 10000 INJECTION, SOLUTION INTRAVENOUS; SUBCUTANEOUS at 15:40

## 2021-01-01 RX ADMIN — Medication 3 MILLIGRAM(S): at 22:58

## 2021-01-01 RX ADMIN — INSULIN GLARGINE 10 UNIT(S): 100 INJECTION, SOLUTION SUBCUTANEOUS at 21:43

## 2021-01-01 RX ADMIN — Medication 2: at 17:42

## 2021-01-01 RX ADMIN — Medication 1 TABLET(S): at 13:01

## 2021-01-01 RX ADMIN — Medication 100 MILLIGRAM(S): at 11:36

## 2021-01-01 RX ADMIN — Medication 40 MILLIGRAM(S): at 05:23

## 2021-01-01 RX ADMIN — APIXABAN 2.5 MILLIGRAM(S): 2.5 TABLET, FILM COATED ORAL at 05:23

## 2021-01-01 RX ADMIN — AMLODIPINE BESYLATE 5 MILLIGRAM(S): 2.5 TABLET ORAL at 05:15

## 2021-01-01 RX ADMIN — Medication 20 MILLIGRAM(S): at 06:06

## 2021-01-01 RX ADMIN — Medication 8 UNIT(S): at 17:50

## 2021-01-01 RX ADMIN — Medication 50 MICROGRAM(S): at 06:42

## 2021-01-01 RX ADMIN — PANTOPRAZOLE SODIUM 40 MILLIGRAM(S): 20 TABLET, DELAYED RELEASE ORAL at 05:06

## 2021-01-01 RX ADMIN — POLYETHYLENE GLYCOL 3350 17 GRAM(S): 17 POWDER, FOR SOLUTION ORAL at 13:16

## 2021-01-01 RX ADMIN — Medication 100 MILLIGRAM(S): at 06:29

## 2021-01-01 RX ADMIN — Medication 40 MILLIGRAM(S): at 05:25

## 2021-01-01 RX ADMIN — NYSTATIN CREAM 1 APPLICATION(S): 100000 CREAM TOPICAL at 17:37

## 2021-01-01 RX ADMIN — Medication 100 MILLIGRAM(S): at 06:13

## 2021-01-01 RX ADMIN — INSULIN GLARGINE 10 UNIT(S): 100 INJECTION, SOLUTION SUBCUTANEOUS at 22:14

## 2021-01-01 RX ADMIN — Medication 6 UNIT(S): at 17:25

## 2021-01-01 RX ADMIN — CLOPIDOGREL BISULFATE 75 MILLIGRAM(S): 75 TABLET, FILM COATED ORAL at 11:48

## 2021-01-01 RX ADMIN — MEROPENEM 100 MILLIGRAM(S): 1 INJECTION INTRAVENOUS at 05:05

## 2021-01-01 RX ADMIN — MEROPENEM 100 MILLIGRAM(S): 1 INJECTION INTRAVENOUS at 05:00

## 2021-01-01 RX ADMIN — MEROPENEM 100 MILLIGRAM(S): 1 INJECTION INTRAVENOUS at 06:28

## 2021-01-01 RX ADMIN — MEROPENEM 100 MILLIGRAM(S): 1 INJECTION INTRAVENOUS at 05:36

## 2021-01-01 RX ADMIN — PANTOPRAZOLE SODIUM 40 MILLIGRAM(S): 20 TABLET, DELAYED RELEASE ORAL at 05:20

## 2021-01-01 RX ADMIN — Medication 1 TABLET(S): at 11:33

## 2021-01-01 RX ADMIN — Medication 1 TABLET(S): at 09:12

## 2021-01-01 RX ADMIN — Medication 50 MICROGRAM(S): at 05:20

## 2021-01-01 RX ADMIN — AMLODIPINE BESYLATE 5 MILLIGRAM(S): 2.5 TABLET ORAL at 05:03

## 2021-01-01 RX ADMIN — PANTOPRAZOLE SODIUM 40 MILLIGRAM(S): 20 TABLET, DELAYED RELEASE ORAL at 06:12

## 2021-01-01 RX ADMIN — Medication 100 MILLIGRAM(S): at 06:42

## 2021-01-01 RX ADMIN — Medication 400 MILLIGRAM(S): at 06:45

## 2021-01-01 RX ADMIN — Medication 4 UNIT(S): at 12:14

## 2021-01-01 RX ADMIN — Medication 100 MILLIGRAM(S): at 13:00

## 2021-01-01 RX ADMIN — SIMVASTATIN 20 MILLIGRAM(S): 20 TABLET, FILM COATED ORAL at 21:42

## 2021-01-01 RX ADMIN — PANTOPRAZOLE SODIUM 40 MILLIGRAM(S): 20 TABLET, DELAYED RELEASE ORAL at 06:08

## 2021-01-01 RX ADMIN — Medication 2: at 15:30

## 2021-01-01 RX ADMIN — CEFEPIME 100 MILLIGRAM(S): 1 INJECTION, POWDER, FOR SOLUTION INTRAMUSCULAR; INTRAVENOUS at 05:32

## 2021-01-01 RX ADMIN — Medication 100 MILLIGRAM(S): at 12:18

## 2021-01-01 RX ADMIN — CLOPIDOGREL BISULFATE 75 MILLIGRAM(S): 75 TABLET, FILM COATED ORAL at 13:04

## 2021-01-01 RX ADMIN — Medication 6 UNIT(S): at 12:25

## 2021-01-01 RX ADMIN — ERTAPENEM SODIUM 100 MILLIGRAM(S): 1 INJECTION, POWDER, LYOPHILIZED, FOR SOLUTION INTRAMUSCULAR; INTRAVENOUS at 17:24

## 2021-01-01 RX ADMIN — Medication 1: at 13:46

## 2021-01-01 RX ADMIN — HEPARIN SODIUM 5000 UNIT(S): 5000 INJECTION INTRAVENOUS; SUBCUTANEOUS at 05:31

## 2021-01-01 RX ADMIN — Medication 3: at 13:34

## 2021-01-01 RX ADMIN — APIXABAN 2.5 MILLIGRAM(S): 2.5 TABLET, FILM COATED ORAL at 06:04

## 2021-01-01 RX ADMIN — SIMVASTATIN 20 MILLIGRAM(S): 20 TABLET, FILM COATED ORAL at 21:27

## 2021-01-01 RX ADMIN — Medication 50 MICROGRAM(S): at 06:12

## 2021-01-01 RX ADMIN — Medication 250 MILLIGRAM(S): at 12:18

## 2021-01-01 RX ADMIN — MEROPENEM 100 MILLIGRAM(S): 1 INJECTION INTRAVENOUS at 18:27

## 2021-01-01 RX ADMIN — APIXABAN 2.5 MILLIGRAM(S): 2.5 TABLET, FILM COATED ORAL at 18:56

## 2021-01-01 RX ADMIN — Medication 1: at 16:55

## 2021-01-01 RX ADMIN — NYSTATIN CREAM 1 APPLICATION(S): 100000 CREAM TOPICAL at 16:55

## 2021-01-01 RX ADMIN — Medication 100 MILLIGRAM(S): at 05:27

## 2021-01-01 RX ADMIN — Medication 1: at 09:01

## 2021-01-01 RX ADMIN — APIXABAN 2.5 MILLIGRAM(S): 2.5 TABLET, FILM COATED ORAL at 17:41

## 2021-01-01 RX ADMIN — Medication 2: at 18:00

## 2021-01-01 RX ADMIN — CALCITRIOL 0.25 MICROGRAM(S): 0.5 CAPSULE ORAL at 11:32

## 2021-01-01 RX ADMIN — Medication 50 MICROGRAM(S): at 05:28

## 2021-01-01 RX ADMIN — Medication 5 MILLIGRAM(S): at 01:47

## 2021-01-01 RX ADMIN — APIXABAN 2.5 MILLIGRAM(S): 2.5 TABLET, FILM COATED ORAL at 05:06

## 2021-01-01 RX ADMIN — Medication 5 MILLIGRAM(S): at 22:18

## 2021-01-01 RX ADMIN — APIXABAN 2.5 MILLIGRAM(S): 2.5 TABLET, FILM COATED ORAL at 18:46

## 2021-01-01 RX ADMIN — Medication 2: at 13:01

## 2021-01-01 RX ADMIN — APIXABAN 2.5 MILLIGRAM(S): 2.5 TABLET, FILM COATED ORAL at 17:47

## 2021-01-01 RX ADMIN — SIMVASTATIN 20 MILLIGRAM(S): 20 TABLET, FILM COATED ORAL at 22:18

## 2021-01-01 RX ADMIN — CHLORHEXIDINE GLUCONATE 1 APPLICATION(S): 213 SOLUTION TOPICAL at 05:05

## 2021-01-01 RX ADMIN — Medication 100 MILLIGRAM(S): at 18:45

## 2021-01-01 RX ADMIN — Medication 5 MILLIGRAM(S): at 22:06

## 2021-01-01 RX ADMIN — HEPARIN SODIUM 5000 UNIT(S): 5000 INJECTION INTRAVENOUS; SUBCUTANEOUS at 09:12

## 2021-01-01 RX ADMIN — SIMVASTATIN 20 MILLIGRAM(S): 20 TABLET, FILM COATED ORAL at 22:08

## 2021-01-01 RX ADMIN — CLOPIDOGREL BISULFATE 75 MILLIGRAM(S): 75 TABLET, FILM COATED ORAL at 12:25

## 2021-01-01 RX ADMIN — SIMVASTATIN 20 MILLIGRAM(S): 20 TABLET, FILM COATED ORAL at 22:28

## 2021-01-01 RX ADMIN — SIMVASTATIN 20 MILLIGRAM(S): 20 TABLET, FILM COATED ORAL at 22:01

## 2021-01-01 RX ADMIN — Medication 100 MILLIGRAM(S): at 06:45

## 2021-01-01 RX ADMIN — Medication 3 MILLIGRAM(S): at 22:15

## 2021-01-01 RX ADMIN — ERYTHROPOIETIN 10000 UNIT(S): 10000 INJECTION, SOLUTION INTRAVENOUS; SUBCUTANEOUS at 12:24

## 2021-01-01 RX ADMIN — APIXABAN 2.5 MILLIGRAM(S): 2.5 TABLET, FILM COATED ORAL at 05:25

## 2021-01-01 RX ADMIN — Medication 650 MILLIGRAM(S): at 14:07

## 2021-01-01 RX ADMIN — Medication 4: at 09:21

## 2021-01-01 RX ADMIN — HEPARIN SODIUM 5000 UNIT(S): 5000 INJECTION INTRAVENOUS; SUBCUTANEOUS at 06:32

## 2021-01-01 RX ADMIN — CHLORHEXIDINE GLUCONATE 1 APPLICATION(S): 213 SOLUTION TOPICAL at 09:22

## 2021-01-01 RX ADMIN — Medication 50 MICROGRAM(S): at 06:04

## 2021-01-01 RX ADMIN — INSULIN GLARGINE 18 UNIT(S): 100 INJECTION, SOLUTION SUBCUTANEOUS at 22:58

## 2021-01-01 RX ADMIN — CALCITRIOL 0.25 MICROGRAM(S): 0.5 CAPSULE ORAL at 11:24

## 2021-01-01 RX ADMIN — MEROPENEM 100 MILLIGRAM(S): 1 INJECTION INTRAVENOUS at 21:59

## 2021-01-01 RX ADMIN — Medication 250 MILLIGRAM(S): at 07:38

## 2021-01-01 RX ADMIN — AMLODIPINE BESYLATE 5 MILLIGRAM(S): 2.5 TABLET ORAL at 05:20

## 2021-01-01 RX ADMIN — APIXABAN 2.5 MILLIGRAM(S): 2.5 TABLET, FILM COATED ORAL at 06:42

## 2021-01-01 RX ADMIN — Medication 1: at 17:58

## 2021-01-01 RX ADMIN — Medication 2 UNIT(S): at 08:57

## 2021-01-01 RX ADMIN — Medication 2: at 13:30

## 2021-01-01 RX ADMIN — Medication 2: at 12:35

## 2021-01-01 RX ADMIN — MEROPENEM 100 MILLIGRAM(S): 1 INJECTION INTRAVENOUS at 12:26

## 2021-01-01 RX ADMIN — Medication 500 MILLIGRAM(S): at 13:31

## 2021-01-01 RX ADMIN — Medication 6 UNIT(S): at 18:26

## 2021-01-01 RX ADMIN — Medication 650 MILLIGRAM(S): at 13:28

## 2021-01-01 RX ADMIN — NYSTATIN CREAM 1 APPLICATION(S): 100000 CREAM TOPICAL at 05:23

## 2021-01-01 RX ADMIN — Medication 3 MILLIGRAM(S): at 21:27

## 2021-01-01 RX ADMIN — SODIUM CHLORIDE 250 MILLILITER(S): 9 INJECTION INTRAMUSCULAR; INTRAVENOUS; SUBCUTANEOUS at 18:50

## 2021-01-01 RX ADMIN — Medication 100 MILLIGRAM(S): at 12:39

## 2021-01-01 RX ADMIN — Medication 500 MILLIGRAM(S): at 12:18

## 2021-01-01 RX ADMIN — Medication 2: at 08:49

## 2021-01-01 RX ADMIN — MEROPENEM 100 MILLIGRAM(S): 1 INJECTION INTRAVENOUS at 06:51

## 2021-01-01 RX ADMIN — CALCITRIOL 0.25 MICROGRAM(S): 0.5 CAPSULE ORAL at 11:48

## 2021-01-01 RX ADMIN — CLOPIDOGREL BISULFATE 75 MILLIGRAM(S): 75 TABLET, FILM COATED ORAL at 09:12

## 2021-01-01 RX ADMIN — Medication 6 UNIT(S): at 09:14

## 2021-01-01 RX ADMIN — MEROPENEM 100 MILLIGRAM(S): 1 INJECTION INTRAVENOUS at 18:03

## 2021-01-01 RX ADMIN — Medication 1000 MILLIGRAM(S): at 07:17

## 2021-01-01 RX ADMIN — Medication 6 UNIT(S): at 08:48

## 2021-01-01 RX ADMIN — Medication 2 UNIT(S): at 18:15

## 2021-01-01 RX ADMIN — APIXABAN 2.5 MILLIGRAM(S): 2.5 TABLET, FILM COATED ORAL at 05:32

## 2021-01-01 RX ADMIN — Medication 500 MILLIGRAM(S): at 13:00

## 2021-01-01 RX ADMIN — Medication 50 MICROGRAM(S): at 06:29

## 2021-01-01 RX ADMIN — CEFEPIME 100 MILLIGRAM(S): 1 INJECTION, POWDER, FOR SOLUTION INTRAMUSCULAR; INTRAVENOUS at 20:15

## 2021-01-01 RX ADMIN — CHLORHEXIDINE GLUCONATE 1 APPLICATION(S): 213 SOLUTION TOPICAL at 11:23

## 2021-01-01 RX ADMIN — Medication 4: at 18:10

## 2021-01-01 RX ADMIN — NYSTATIN CREAM 1 APPLICATION(S): 100000 CREAM TOPICAL at 05:20

## 2021-01-01 RX ADMIN — Medication 8 UNIT(S): at 13:31

## 2021-01-01 RX ADMIN — Medication 100 MILLIGRAM(S): at 05:32

## 2021-01-01 RX ADMIN — Medication 50 MICROGRAM(S): at 06:50

## 2021-01-01 RX ADMIN — CLOPIDOGREL BISULFATE 75 MILLIGRAM(S): 75 TABLET, FILM COATED ORAL at 11:30

## 2021-01-01 RX ADMIN — HEPARIN SODIUM 5000 UNIT(S): 5000 INJECTION INTRAVENOUS; SUBCUTANEOUS at 18:17

## 2021-01-01 RX ADMIN — APIXABAN 2.5 MILLIGRAM(S): 2.5 TABLET, FILM COATED ORAL at 06:13

## 2021-01-01 RX ADMIN — Medication 40 MILLIGRAM(S): at 05:32

## 2021-01-01 RX ADMIN — Medication 100 MILLIGRAM(S): at 13:16

## 2021-01-01 RX ADMIN — Medication 20 MILLIGRAM(S): at 18:46

## 2021-01-01 RX ADMIN — MEROPENEM 100 MILLIGRAM(S): 1 INJECTION INTRAVENOUS at 17:50

## 2021-01-01 RX ADMIN — SIMVASTATIN 20 MILLIGRAM(S): 20 TABLET, FILM COATED ORAL at 22:00

## 2021-01-01 RX ADMIN — CLOPIDOGREL BISULFATE 75 MILLIGRAM(S): 75 TABLET, FILM COATED ORAL at 11:33

## 2021-01-01 RX ADMIN — Medication 100 MILLIGRAM(S): at 11:33

## 2021-01-01 RX ADMIN — Medication 1 TABLET(S): at 12:25

## 2021-01-01 RX ADMIN — Medication 40 MILLIGRAM(S): at 05:20

## 2021-01-01 RX ADMIN — Medication 2: at 17:25

## 2021-01-01 RX ADMIN — PANTOPRAZOLE SODIUM 40 MILLIGRAM(S): 20 TABLET, DELAYED RELEASE ORAL at 06:04

## 2021-01-01 RX ADMIN — AMLODIPINE BESYLATE 5 MILLIGRAM(S): 2.5 TABLET ORAL at 06:31

## 2021-01-01 RX ADMIN — APIXABAN 2.5 MILLIGRAM(S): 2.5 TABLET, FILM COATED ORAL at 17:38

## 2021-01-01 RX ADMIN — Medication 1 TABLET(S): at 11:36

## 2021-01-01 RX ADMIN — AMLODIPINE BESYLATE 5 MILLIGRAM(S): 2.5 TABLET ORAL at 06:04

## 2021-01-01 RX ADMIN — PANTOPRAZOLE SODIUM 40 MILLIGRAM(S): 20 TABLET, DELAYED RELEASE ORAL at 06:42

## 2021-01-01 RX ADMIN — PANTOPRAZOLE SODIUM 40 MILLIGRAM(S): 20 TABLET, DELAYED RELEASE ORAL at 05:32

## 2021-01-01 RX ADMIN — Medication 100 MILLIGRAM(S): at 12:36

## 2021-01-01 RX ADMIN — Medication 2 UNIT(S): at 12:38

## 2021-01-01 RX ADMIN — Medication 100 MILLIGRAM(S): at 05:14

## 2021-01-01 RX ADMIN — APIXABAN 2.5 MILLIGRAM(S): 2.5 TABLET, FILM COATED ORAL at 18:28

## 2021-01-01 RX ADMIN — Medication 50 MICROGRAM(S): at 06:06

## 2021-01-01 RX ADMIN — Medication 325 MILLIGRAM(S): at 00:18

## 2021-01-01 RX ADMIN — AMLODIPINE BESYLATE 5 MILLIGRAM(S): 2.5 TABLET ORAL at 05:52

## 2021-01-01 RX ADMIN — CALCITRIOL 0.25 MICROGRAM(S): 0.5 CAPSULE ORAL at 12:18

## 2021-01-01 RX ADMIN — Medication 3 MILLIGRAM(S): at 23:08

## 2021-01-01 RX ADMIN — Medication 100 MILLIGRAM(S): at 11:31

## 2021-01-01 RX ADMIN — APIXABAN 2.5 MILLIGRAM(S): 2.5 TABLET, FILM COATED ORAL at 17:37

## 2021-01-01 RX ADMIN — Medication 40 MILLIGRAM(S): at 06:05

## 2021-01-01 RX ADMIN — Medication 6 UNIT(S): at 13:09

## 2021-01-01 RX ADMIN — Medication 4 UNIT(S): at 17:59

## 2021-01-01 RX ADMIN — Medication 100 MILLIGRAM(S): at 06:06

## 2021-01-01 RX ADMIN — Medication 2: at 23:35

## 2021-01-01 RX ADMIN — CLOPIDOGREL BISULFATE 75 MILLIGRAM(S): 75 TABLET, FILM COATED ORAL at 13:00

## 2021-01-01 RX ADMIN — Medication 100 MILLIGRAM(S): at 13:31

## 2021-01-01 RX ADMIN — PANTOPRAZOLE SODIUM 40 MILLIGRAM(S): 20 TABLET, DELAYED RELEASE ORAL at 06:06

## 2021-01-01 RX ADMIN — Medication 100 MILLIGRAM(S): at 11:24

## 2021-01-01 RX ADMIN — Medication 1: at 13:15

## 2021-01-01 RX ADMIN — CLOPIDOGREL BISULFATE 75 MILLIGRAM(S): 75 TABLET, FILM COATED ORAL at 12:13

## 2021-01-01 RX ADMIN — Medication 6 UNIT(S): at 09:20

## 2021-01-01 RX ADMIN — MEROPENEM 100 MILLIGRAM(S): 1 INJECTION INTRAVENOUS at 17:36

## 2021-01-01 RX ADMIN — Medication 4 UNIT(S): at 17:37

## 2021-01-01 RX ADMIN — Medication 100 MILLIGRAM(S): at 05:06

## 2021-01-01 RX ADMIN — Medication 100 MILLIGRAM(S): at 09:12

## 2021-01-01 RX ADMIN — Medication 100 MILLIGRAM(S): at 18:46

## 2021-01-01 RX ADMIN — MEROPENEM 100 MILLIGRAM(S): 1 INJECTION INTRAVENOUS at 19:34

## 2021-01-01 RX ADMIN — Medication 100 MILLIGRAM(S): at 05:26

## 2021-01-01 RX ADMIN — Medication 3: at 09:00

## 2021-01-01 RX ADMIN — Medication 3 MILLIGRAM(S): at 21:07

## 2021-01-01 RX ADMIN — PANTOPRAZOLE SODIUM 40 MILLIGRAM(S): 20 TABLET, DELAYED RELEASE ORAL at 05:27

## 2021-01-01 RX ADMIN — Medication 20 MILLIGRAM(S): at 05:15

## 2021-01-01 RX ADMIN — SIMVASTATIN 20 MILLIGRAM(S): 20 TABLET, FILM COATED ORAL at 22:06

## 2021-01-01 RX ADMIN — Medication 5 MILLIGRAM(S): at 21:43

## 2021-01-01 RX ADMIN — APIXABAN 2.5 MILLIGRAM(S): 2.5 TABLET, FILM COATED ORAL at 17:50

## 2021-01-01 RX ADMIN — AMLODIPINE BESYLATE 5 MILLIGRAM(S): 2.5 TABLET ORAL at 06:45

## 2021-01-01 RX ADMIN — Medication 8 UNIT(S): at 09:25

## 2021-01-01 RX ADMIN — SIMVASTATIN 20 MILLIGRAM(S): 20 TABLET, FILM COATED ORAL at 21:48

## 2021-01-01 RX ADMIN — INSULIN GLARGINE 14 UNIT(S): 100 INJECTION, SOLUTION SUBCUTANEOUS at 22:24

## 2021-01-01 RX ADMIN — Medication 3 MILLIGRAM(S): at 22:07

## 2021-01-01 RX ADMIN — CLOPIDOGREL BISULFATE 75 MILLIGRAM(S): 75 TABLET, FILM COATED ORAL at 11:24

## 2021-01-01 RX ADMIN — Medication 250 MILLIGRAM(S): at 14:54

## 2021-01-01 RX ADMIN — INSULIN GLARGINE 14 UNIT(S): 100 INJECTION, SOLUTION SUBCUTANEOUS at 22:41

## 2021-01-01 RX ADMIN — INSULIN GLARGINE 7 UNIT(S): 100 INJECTION, SOLUTION SUBCUTANEOUS at 22:43

## 2021-01-01 RX ADMIN — Medication 100 MILLIGRAM(S): at 12:14

## 2021-01-01 RX ADMIN — Medication 1 TABLET(S): at 11:49

## 2021-01-01 RX ADMIN — AMLODIPINE BESYLATE 5 MILLIGRAM(S): 2.5 TABLET ORAL at 05:25

## 2021-01-01 RX ADMIN — APIXABAN 2.5 MILLIGRAM(S): 2.5 TABLET, FILM COATED ORAL at 05:20

## 2021-01-01 RX ADMIN — Medication 650 MILLIGRAM(S): at 02:15

## 2021-01-01 RX ADMIN — Medication 20 MILLIGRAM(S): at 06:42

## 2021-01-01 RX ADMIN — Medication 1 TABLET(S): at 13:15

## 2021-01-01 RX ADMIN — CEFEPIME 100 MILLIGRAM(S): 1 INJECTION, POWDER, FOR SOLUTION INTRAMUSCULAR; INTRAVENOUS at 06:31

## 2021-01-01 RX ADMIN — Medication 20 MILLIGRAM(S): at 05:28

## 2021-01-01 RX ADMIN — Medication 6 UNIT(S): at 18:02

## 2021-01-01 RX ADMIN — Medication 100 MILLIGRAM(S): at 05:51

## 2021-01-01 RX ADMIN — INSULIN GLARGINE 10 UNIT(S): 100 INJECTION, SOLUTION SUBCUTANEOUS at 21:26

## 2021-01-01 RX ADMIN — SIMVASTATIN 20 MILLIGRAM(S): 20 TABLET, FILM COATED ORAL at 22:52

## 2021-01-01 RX ADMIN — Medication 50 MICROGRAM(S): at 05:05

## 2021-01-01 RX ADMIN — PANTOPRAZOLE SODIUM 40 MILLIGRAM(S): 20 TABLET, DELAYED RELEASE ORAL at 06:45

## 2021-01-01 RX ADMIN — INSULIN GLARGINE 14 UNIT(S): 100 INJECTION, SOLUTION SUBCUTANEOUS at 22:00

## 2021-01-01 RX ADMIN — Medication 100 MILLIGRAM(S): at 06:05

## 2021-01-01 RX ADMIN — INSULIN GLARGINE 10 UNIT(S): 100 INJECTION, SOLUTION SUBCUTANEOUS at 22:04

## 2021-01-01 RX ADMIN — Medication 500 MILLIGRAM(S): at 11:42

## 2021-01-01 RX ADMIN — Medication 1 TABLET(S): at 12:43

## 2021-01-01 RX ADMIN — CHLORHEXIDINE GLUCONATE 1 APPLICATION(S): 213 SOLUTION TOPICAL at 05:36

## 2021-01-01 RX ADMIN — Medication 500 MILLIGRAM(S): at 13:04

## 2021-01-01 RX ADMIN — CHLORHEXIDINE GLUCONATE 1 APPLICATION(S): 213 SOLUTION TOPICAL at 05:19

## 2021-01-01 RX ADMIN — CHLORHEXIDINE GLUCONATE 1 APPLICATION(S): 213 SOLUTION TOPICAL at 06:32

## 2021-01-01 RX ADMIN — CLOPIDOGREL BISULFATE 75 MILLIGRAM(S): 75 TABLET, FILM COATED ORAL at 11:31

## 2021-01-01 RX ADMIN — Medication 500 MILLIGRAM(S): at 11:27

## 2021-01-01 RX ADMIN — CHLORHEXIDINE GLUCONATE 1 APPLICATION(S): 213 SOLUTION TOPICAL at 05:33

## 2021-01-01 RX ADMIN — Medication 50 MICROGRAM(S): at 06:32

## 2021-01-01 RX ADMIN — Medication 20 MILLIGRAM(S): at 06:45

## 2021-01-01 RX ADMIN — ERYTHROPOIETIN 10000 UNIT(S): 10000 INJECTION, SOLUTION INTRAVENOUS; SUBCUTANEOUS at 17:25

## 2021-01-01 RX ADMIN — Medication 2: at 11:10

## 2021-01-01 RX ADMIN — Medication 100 MILLIGRAM(S): at 06:31

## 2021-01-01 RX ADMIN — INSULIN GLARGINE 18 UNIT(S): 100 INJECTION, SOLUTION SUBCUTANEOUS at 22:00

## 2021-01-01 RX ADMIN — Medication 6 UNIT(S): at 12:21

## 2021-01-01 RX ADMIN — Medication 500 MILLIGRAM(S): at 11:49

## 2021-01-01 RX ADMIN — CEFEPIME 100 MILLIGRAM(S): 1 INJECTION, POWDER, FOR SOLUTION INTRAMUSCULAR; INTRAVENOUS at 17:47

## 2021-01-01 RX ADMIN — AMLODIPINE BESYLATE 5 MILLIGRAM(S): 2.5 TABLET ORAL at 05:27

## 2021-01-01 RX ADMIN — CLOPIDOGREL BISULFATE 75 MILLIGRAM(S): 75 TABLET, FILM COATED ORAL at 12:39

## 2021-01-01 RX ADMIN — ERTAPENEM SODIUM 100 MILLIGRAM(S): 1 INJECTION, POWDER, LYOPHILIZED, FOR SOLUTION INTRAMUSCULAR; INTRAVENOUS at 18:18

## 2021-01-01 RX ADMIN — SIMVASTATIN 20 MILLIGRAM(S): 20 TABLET, FILM COATED ORAL at 22:07

## 2021-01-01 RX ADMIN — Medication 20 MILLIGRAM(S): at 05:32

## 2021-01-01 RX ADMIN — Medication 20 MILLIGRAM(S): at 05:01

## 2021-01-01 RX ADMIN — CALCITRIOL 0.25 MICROGRAM(S): 0.5 CAPSULE ORAL at 12:14

## 2021-01-01 RX ADMIN — MEROPENEM 100 MILLIGRAM(S): 1 INJECTION INTRAVENOUS at 06:31

## 2021-01-01 RX ADMIN — Medication 500 MILLIGRAM(S): at 09:13

## 2021-01-01 RX ADMIN — CALCITRIOL 0.25 MICROGRAM(S): 0.5 CAPSULE ORAL at 11:42

## 2021-01-01 RX ADMIN — APIXABAN 2.5 MILLIGRAM(S): 2.5 TABLET, FILM COATED ORAL at 06:29

## 2021-01-01 RX ADMIN — HEPARIN SODIUM 5000 UNIT(S): 5000 INJECTION INTRAVENOUS; SUBCUTANEOUS at 18:51

## 2021-01-01 RX ADMIN — Medication 1 TABLET(S): at 13:04

## 2021-01-01 RX ADMIN — PANTOPRAZOLE SODIUM 40 MILLIGRAM(S): 20 TABLET, DELAYED RELEASE ORAL at 06:29

## 2021-01-01 RX ADMIN — Medication 8 UNIT(S): at 13:04

## 2021-01-01 RX ADMIN — Medication 20 MILLIGRAM(S): at 06:31

## 2021-01-01 RX ADMIN — TRAMADOL HYDROCHLORIDE 25 MILLIGRAM(S): 50 TABLET ORAL at 13:26

## 2021-01-01 RX ADMIN — Medication 3: at 17:36

## 2021-01-01 RX ADMIN — NYSTATIN CREAM 1 APPLICATION(S): 100000 CREAM TOPICAL at 05:26

## 2021-01-01 RX ADMIN — Medication 50 MICROGRAM(S): at 05:33

## 2021-01-01 RX ADMIN — CHLORHEXIDINE GLUCONATE 1 APPLICATION(S): 213 SOLUTION TOPICAL at 09:12

## 2021-01-01 RX ADMIN — SIMVASTATIN 20 MILLIGRAM(S): 20 TABLET, FILM COATED ORAL at 21:24

## 2021-01-01 RX ADMIN — PANTOPRAZOLE SODIUM 40 MILLIGRAM(S): 20 TABLET, DELAYED RELEASE ORAL at 05:01

## 2021-01-01 RX ADMIN — AMLODIPINE BESYLATE 5 MILLIGRAM(S): 2.5 TABLET ORAL at 18:46

## 2021-01-01 RX ADMIN — MEROPENEM 100 MILLIGRAM(S): 1 INJECTION INTRAVENOUS at 05:31

## 2021-01-01 RX ADMIN — CEFEPIME 100 MILLIGRAM(S): 1 INJECTION, POWDER, FOR SOLUTION INTRAMUSCULAR; INTRAVENOUS at 18:10

## 2021-01-01 RX ADMIN — PANTOPRAZOLE SODIUM 40 MILLIGRAM(S): 20 TABLET, DELAYED RELEASE ORAL at 05:03

## 2021-01-01 RX ADMIN — Medication 500 MILLIGRAM(S): at 11:31

## 2021-01-01 RX ADMIN — Medication 500 MILLIGRAM(S): at 12:14

## 2021-01-01 RX ADMIN — APIXABAN 2.5 MILLIGRAM(S): 2.5 TABLET, FILM COATED ORAL at 17:39

## 2021-01-01 RX ADMIN — Medication 100 MILLIGRAM(S): at 05:03

## 2021-01-01 RX ADMIN — Medication 4 UNIT(S): at 18:01

## 2021-01-01 RX ADMIN — Medication 250 MILLIGRAM(S): at 12:35

## 2021-01-01 RX ADMIN — CEFEPIME 100 MILLIGRAM(S): 1 INJECTION, POWDER, FOR SOLUTION INTRAMUSCULAR; INTRAVENOUS at 17:37

## 2021-01-01 RX ADMIN — Medication 6 UNIT(S): at 09:00

## 2021-01-01 RX ADMIN — APIXABAN 2.5 MILLIGRAM(S): 2.5 TABLET, FILM COATED ORAL at 18:00

## 2021-01-01 RX ADMIN — Medication 1 TABLET(S): at 13:31

## 2021-01-01 RX ADMIN — Medication 5 UNIT(S): at 17:45

## 2021-01-01 RX ADMIN — Medication 100 MILLIGRAM(S): at 05:20

## 2021-01-01 RX ADMIN — Medication 1: at 17:35

## 2021-01-01 RX ADMIN — Medication 3 MILLIGRAM(S): at 21:24

## 2021-01-01 RX ADMIN — MEROPENEM 100 MILLIGRAM(S): 1 INJECTION INTRAVENOUS at 23:46

## 2021-01-01 RX ADMIN — Medication 3 MILLIGRAM(S): at 22:18

## 2021-01-01 RX ADMIN — CEFEPIME 100 MILLIGRAM(S): 1 INJECTION, POWDER, FOR SOLUTION INTRAMUSCULAR; INTRAVENOUS at 05:27

## 2021-01-01 RX ADMIN — Medication 250 MILLIGRAM(S): at 12:44

## 2021-01-01 RX ADMIN — Medication 2: at 08:57

## 2021-01-01 RX ADMIN — NYSTATIN CREAM 1 APPLICATION(S): 100000 CREAM TOPICAL at 06:06

## 2021-01-01 RX ADMIN — SIMVASTATIN 20 MILLIGRAM(S): 20 TABLET, FILM COATED ORAL at 22:57

## 2021-01-01 RX ADMIN — AMLODIPINE BESYLATE 5 MILLIGRAM(S): 2.5 TABLET ORAL at 06:42

## 2021-01-01 RX ADMIN — PANTOPRAZOLE SODIUM 40 MILLIGRAM(S): 20 TABLET, DELAYED RELEASE ORAL at 06:24

## 2021-01-01 RX ADMIN — Medication 50 MICROGRAM(S): at 05:01

## 2021-01-01 RX ADMIN — CHLORHEXIDINE GLUCONATE 1 APPLICATION(S): 213 SOLUTION TOPICAL at 05:52

## 2021-01-01 RX ADMIN — Medication 500 MILLIGRAM(S): at 12:24

## 2021-01-01 RX ADMIN — Medication 1: at 13:09

## 2021-01-01 RX ADMIN — Medication 500 MILLIGRAM(S): at 12:43

## 2021-01-01 RX ADMIN — Medication 4 UNIT(S): at 12:18

## 2021-01-01 RX ADMIN — CLOPIDOGREL BISULFATE 75 MILLIGRAM(S): 75 TABLET, FILM COATED ORAL at 17:26

## 2021-01-01 RX ADMIN — HEPARIN SODIUM 5000 UNIT(S): 5000 INJECTION INTRAVENOUS; SUBCUTANEOUS at 05:00

## 2021-01-01 RX ADMIN — APIXABAN 2.5 MILLIGRAM(S): 2.5 TABLET, FILM COATED ORAL at 18:11

## 2021-01-01 RX ADMIN — CLOPIDOGREL BISULFATE 75 MILLIGRAM(S): 75 TABLET, FILM COATED ORAL at 12:36

## 2021-01-01 RX ADMIN — Medication 100 MILLIGRAM(S): at 05:31

## 2021-01-01 RX ADMIN — Medication 1 TABLET(S): at 11:31

## 2021-01-01 RX ADMIN — APIXABAN 2.5 MILLIGRAM(S): 2.5 TABLET, FILM COATED ORAL at 18:10

## 2021-01-01 RX ADMIN — PANTOPRAZOLE SODIUM 40 MILLIGRAM(S): 20 TABLET, DELAYED RELEASE ORAL at 05:33

## 2021-01-01 RX ADMIN — NYSTATIN CREAM 1 APPLICATION(S): 100000 CREAM TOPICAL at 05:32

## 2021-01-01 RX ADMIN — Medication 325 MILLIGRAM(S): at 23:46

## 2021-01-01 RX ADMIN — Medication 50 MICROGRAM(S): at 05:23

## 2021-01-01 RX ADMIN — ERYTHROPOIETIN 10000 UNIT(S): 10000 INJECTION, SOLUTION INTRAVENOUS; SUBCUTANEOUS at 16:06

## 2021-01-01 RX ADMIN — HEPARIN SODIUM 5000 UNIT(S): 5000 INJECTION INTRAVENOUS; SUBCUTANEOUS at 17:49

## 2021-01-01 RX ADMIN — INSULIN GLARGINE 14 UNIT(S): 100 INJECTION, SOLUTION SUBCUTANEOUS at 22:35

## 2021-01-01 RX ADMIN — Medication 4 UNIT(S): at 12:56

## 2021-01-01 RX ADMIN — APIXABAN 2.5 MILLIGRAM(S): 2.5 TABLET, FILM COATED ORAL at 05:28

## 2021-01-01 RX ADMIN — MEROPENEM 100 MILLIGRAM(S): 1 INJECTION INTRAVENOUS at 06:12

## 2021-01-01 RX ADMIN — CALCITRIOL 0.25 MICROGRAM(S): 0.5 CAPSULE ORAL at 13:16

## 2021-01-01 RX ADMIN — APIXABAN 2.5 MILLIGRAM(S): 2.5 TABLET, FILM COATED ORAL at 05:15

## 2021-12-12 NOTE — ED PROVIDER NOTE - CLINICAL SUMMARY MEDICAL DECISION MAKING FREE TEXT BOX
81 y.o F with PMHx of TAVR earlier this year, DM, hypothyroidism, afib on eliquis here after fall and found to have right ankle wound. Right ankle wound concerning for infection, here with elevated CRP, WBC, and imaging findings concerning for possible osteomyelitis. Given vancomycin and cefepime and plan to admit for osteomyelitis.

## 2021-12-12 NOTE — ED PROVIDER NOTE - ATTENDING CONTRIBUTION TO CARE
Attending MD Serna.  Agree with HPI by resident physician.  Pt is an 80 yo female with pmhx of gastritis, HLD, gout, cardiac murmur, HTN, hypothyroidism, DMII, s/p recent TAVR in September of this year on eliquis and plavix who presents to ED after she fell out of bed onto a hard floor when she attempted to roll over and fell.  Pt sent to ED following fall however also noted to be febrile with LLE cellulitis and wound.  Pt has a picc line in place but med rec does not appear to indicate current abxs admin at living facility.  Concern for worsened cellulitis vs. osteomyelitis.  Will obtain trauma imaging, labs, administer abxs and admit to medicine.

## 2021-12-12 NOTE — PHYSICAL THERAPY INITIAL EVALUATION ADULT - GENERAL OBSERVATIONS, REHAB EVAL
Pt rec'd on stretcher w/ IVL, room air and NAD .Pt now s/p R ankle I&D & fibula bone biopsy on 12/21, x-ray R ankle (-) fx or dislocation, pt now WBAT RLE per orders from podiatry. Pt received semi-supine in bed in NAD, +IV R PICC, +primafit, +cair boots, A&Ox3-4, agreeable to PT.

## 2021-12-12 NOTE — ED ADULT NURSE REASSESSMENT NOTE - NS ED NURSE REASSESS COMMENT FT1
pt resting in bed, vitals stable, R lateral malleolus dime-sized wound dressing changed, clean dressing applied, R lower leg redness/erythema noted, LLE lymphedema noted, R mid-arm PICC in place, *pink band placed, nasal cannula 2 LO2 in place, 93% room air, denies c/o, +spoke with dtr over phone, adm pending

## 2021-12-12 NOTE — ED PROVIDER NOTE - NSICDXPASTMEDICALHX_GEN_ALL_CORE_FT
PAST MEDICAL HISTORY:  Diabetes 2     Dyslipidemia     Dyslipidemia     Gastritis     Gout     H/O: Total Hysterectomy, BSO 1988     HTN (Hypertension)     Hypothyroidism     Murmur, Cardiac     right Rotator Cuff (Capsule) tear- no surgical intervention     S/P Tonsillectomy     Spinal Stenosis lumbar L4-5

## 2021-12-12 NOTE — ED ADULT NURSE REASSESSMENT NOTE - NS ED NURSE REASSESS COMMENT FT1
pt sitting up eating breakfast, 2 units Admelog SQ given prior to eating, +admitted, bed pending, seen by Hospitalist dr franz pt sitting up eating breakfast, 2 units Admelog SQ given prior to eating, +admitted, bed pending, seen by Hospitalist dr franz, call hernandez in reach

## 2021-12-12 NOTE — H&P ADULT - HISTORY OF PRESENT ILLNESS
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 Notes shes been trying to move out of bed and fell, hitting mainly her hip and parts of her back. She does note feeling a bit weaker than usual, and not drinking enough water because she doesn't want to urinate at night. No fevers, chills, cough that she endorses. Does note hitting her R ankle previously and developing infection recently, for which was treated? Has PICC in R arm, and she is unsure what it is for. D/w  who said patient was admitted to [facility] 3 weeks ago and was diagnosed with right ankle cellulitis ? osteomyelitis. The patient was discharge to [facility]. She fell out of bed in the SNF and was transferred to [facility]

## 2021-12-12 NOTE — H&P ADULT - NSHPPHYSICALEXAM_GEN_ALL_CORE
PHYSICAL EXAM: vital signs noted on [place]  in no apparent distress  HEENT: DEBBIE EOMI  Neck: Supple, no JVD, no thyromegaly  Lungs: decreased breath sounds at bases   CVS: S1 S2 ejection systolic murmur +   Abdomen: no tenderness, no organomegaly, BS present  Neuro: AO x 3 no focal weakness, no sensory abnormalities  Skin: warm, dry right ankle with bandage with clean looking ulcer   chronic changes with venous stasis bilateral LE  Ext: no cyanosis or clubbing, 1+ edema  right arm PICC  Msk: no joint swelling or deformities  Back: no CVA tenderness, no kyphosis/scoliosis

## 2021-12-12 NOTE — H&P ADULT - NSHPREVIEWOFSYSTEMS_GEN_ALL_CORE
Gen: no loss of wt poor appetite  ENT: no dizziness no hearing loss  Ophth: no blurring of vision no loss of vision  Resp: No cough no sputum production  CVS: No chest pain no palpitations no orthopnea  GI: no nausea, vomiting or diarrhea   : no dysuria, hematuria  Endo: no polyuria no excessive sweating  Neuro: no weakness no paresthesias  Heme: No petechiae no easy bruising  Msk: bilateral ankle pain with   Skin: No rash no itching

## 2021-12-12 NOTE — ED PROVIDER NOTE - NSICDXPASTSURGICALHX_GEN_ALL_CORE_FT
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

## 2021-12-12 NOTE — ED PROVIDER NOTE - PHYSICAL EXAMINATION
VITALS:     GENERAL: NAD, lying in bed comfortably, immobile appearing. frail, pale  HEAD:  Atraumatic, Normocephalic  EYES: EOMI, PERRLA, conjunctiva and sclera clear  ENT: dry  mucous membranes  NECK: Supple, No JVD  CHEST/LUNG: Clear to auscultation bilaterally; No rales, rhonchi, wheezing, or rubs. Unlabored respirations  HEART: Regular rate and rhythm; No murmurs, rubs, or gallops  ABDOMEN: Bowel sounds present; Soft, Nontender, Nondistended. No hepatomegaly  EXTREMITIES:  2+ Peripheral Pulses, brisk capillary refill. No clubbing, cyanosis, or edema  NERVOUS SYSTEM:  Alert & Oriented X3, speech clear. No deficits   MSK: FROM all 4 extremities, full and equal strength. R ankle wound  SKIN: No rashes or lesions  PSYCH: No SI/HI, normal affect VITALS:     GENERAL: NAD, lying in bed comfortably, immobile appearing. frail, pale  HEAD:  Atraumatic, Normocephalic  EYES: EOMI, PERRLA, conjunctiva and sclera clear  ENT: dry  mucous membranes  NECK: Supple, No JVD  CHEST/LUNG: Clear to auscultation bilaterally; No rales, rhonchi, wheezing, or rubs. Unlabored respirations  HEART: Regular rate and rhythm; No murmurs, rubs, or gallops  ABDOMEN: Bowel sounds present; Soft, Nontender, Nondistended. No hepatomegaly  EXTREMITIES:  2+ Peripheral Pulses, brisk capillary refill. No clubbing, cyanosis, or edema  NERVOUS SYSTEM:  Alert & Oriented X3, speech clear. No deficits   MSK: FROM all 4 extremities, full and equal strength. R ankle wound, with expressed purulence  SKIN: No rashes or lesions  PSYCH: No SI/HI, normal affect

## 2021-12-12 NOTE — H&P ADULT - ASSESSMENT
89 y.o F with PMH of TAVR earlier this year, DM 2, hypothyroidism, A fib on Eliquis here because she fell while trying to get out of [facility] and was brought here for evaluation

## 2021-12-12 NOTE — ED ADULT NURSE REASSESSMENT NOTE - NS ED NURSE REASSESS COMMENT FT1
Pt straight cathed using sterile technique. RN and ED tech present to confirm sterility. Pt tolerated procedure well. Sterile specimen collected. UA and Culture sent as ordered. Approx 700 cc dark yellow urine output noted to bag.

## 2021-12-12 NOTE — ED PROVIDER NOTE - NS ED ROS FT
REVIEW OF SYSTEMS:    CONSTITUTIONAL: Weakness.  EYES/ENT: No visual changes;  No vertigo or throat pain   NECK: No pain or stiffness  RESPIRATORY: No cough, wheezing, hemoptysis; No shortness of breath  CARDIOVASCULAR: No chest pain or palpitations  GASTROINTESTINAL: No abdominal or epigastric pain. No nausea, vomiting, or hematemesis; No diarrhea or constipation. No melena or hematochezia.  GENITOURINARY: No dysuria, frequency or hematuria  NEUROLOGICAL: No numbness or weakness  SKIN: No itching, burning, rashes, or lesions   All other review of systems is negative unless indicated above.

## 2021-12-12 NOTE — ED PROVIDER NOTE - CARE PLAN
1 Principal Discharge DX:	Osteomyelitis of ankle or foot, acute  Secondary Diagnosis:	Osteomyelitis of ankle or foot, acute

## 2021-12-12 NOTE — H&P ADULT - PROBLEM SELECTOR PLAN 3
on po furosemide   will continue for now   may change to IV if shortness of breath occurs  currently comfortable

## 2021-12-12 NOTE — H&P ADULT - NSICDXPASTSURGICALHX_GEN_ALL_CORE_FT
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

## 2021-12-12 NOTE — ED PROVIDER NOTE - OBJECTIVE STATEMENT
81 y.o F with PMHx of TAVR earlier this year, DM, hypothyroidism, afib on eliquis here because she fell while trying to get out of bed. Notes shes been trying to move out of bed and fell, hitting mainly her hip and parts of her back. She does note feeling a bit weaker than usual, and not drinking enough water because she doesn't want to urinate at night. No fevers, chills, cough that she endorses. Does note hitting her R ankle previously and developing infection recently, for which was treated? Has PICC in R arm, and she is unsure what it is for.

## 2021-12-12 NOTE — H&P ADULT - PROBLEM SELECTOR PLAN 1
unclear if osteomyelitis or just cellulitis   not clear on details but suspects she had bone infection   also patient has a PICC which is suggestive of osteomyelitis  ESR and CRP are quite high consistent with osteomyelitis  continue cefepime for now  obtain records from [facility] tomorrow

## 2021-12-12 NOTE — ED ADULT NURSE NOTE - OBJECTIVE STATEMENT
80 y/o female PMHx Diabetes 2, Dyslipidemia, Gastritis, Gout, Total Hysterectomy, HTN, Hypothyroidism, Heart Murmur, PPM, right Rotator Cuff (Capsule) tear, Spinal Stenosis lumbar L4-5 arrives to [facility]. Primary RN did not receive patient, patient received by covering RN. Pt states woke up having to use the restroom, fell from bed and landed on right hip. 80 y/o female PMHx TAVR earlier this year, Afib on eliquis, Diabetes 2, Dyslipidemia, Gastritis, Gout, Total Hysterectomy, HTN, Hypothyroidism, Heart Murmur, PPM, right Rotator Cuff (Capsule) tear, Spinal Stenosis lumbar L4-5 [facility] to [facility]. Primary RN did not receive patient, patient received by covering RN. Pt states woke up having to use the restroom, fell from bed and landed on right hip and back. Patient states she does not feel weaker than usual, and endorses decreased fluid intake to prevent urinating during the night. Patient arrives with PICC line in right upper arm for IV abx after hit right ankle and dx with cellulitis. Patient is A&Ox4, moments of confusion, neuro assessment intact. Respirations spontaneous and unlabored. Denies SOB, dyspnea, cough, chest pain, palpitations. No abdominal pain, soft NT/ND. No n/v/d. Febrile. No sick contacts. Skin is hot/dry and normal for race, except sacral blanchable redness. Decreased ROM left shoulder from previous injury. 82 y/o female PMHx TAVR earlier this year, Afib on eliquis, Diabetes 2, Dyslipidemia, Gastritis, Gout, Total Hysterectomy, HTN, Hypothyroidism, Heart Murmur, PPM, right Rotator Cuff (Capsule) tear, Spinal Stenosis lumbar L4-5 arrives to [facility] ED by [facility] from [facility] with c/o fall. Primary RN did not receive patient, patient received by covering RN. Pt states woke up having to use the restroom, fell from bed and landed on right hip and back, denies hitting head or LOC. Patient states she does not feel weaker than usual, and endorses decreased fluid intake to prevent urinating during the night. Patient arrives with PICC line in right upper arm for IV abx after hit right ankle and dx with cellulitis. Patient is A&Ox4, moments of confusion, neuro assessment intact. Respirations spontaneous and unlabored. Denies SOB, dyspnea, cough, chest pain, palpitations. No abdominal pain, soft NT/ND. No n/v/d. Febrile. No sick contacts. Skin is hot/dry and normal for race, except sacral blanchable redness. Decreased ROM left shoulder from previous injury.

## 2021-12-13 NOTE — CONSULT NOTE ADULT - ASSESSMENT
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 Notes shes been trying to move out of bed and fell, hitting mainly her hip and parts of her back. She does note feeling a bit weaker than usual, and not drinking enough water because she doesn't want to urinate at night. No fevers, chills, cough that she endorses. Does note hitting her R ankle previously and developing infection recently, for which was treated? Has PICC in R arm, and she is unsure what it is for.     Per review of [facility] records no abx since 12/3  Exam with R lateral malleolar ulcer with discharge  No surrounding cellulitis  X ray with perisosteal reaction  High ESra nd CRP though is non specific and could reflect fall     ? OM with sinus tract  ? Ulcer with cellulitis  ? other pathology      Rec:  A) Ankle ulcer  Podiatry eval  check Cx  MRI  empiric vanco + cefepime-pt tolerating despite cefazolin and zosyn allergy  Monitor vanco trougha nd creatinine   Obtain old records from [facility] though there may be progression from her recent admission    B) abx allergied  tolerating cefepime so far  monitor clinically for s/s any allergic reaction    C) fall  will defer to primary team    Will tailor plan for ID issues  per course,results.Will defer to primary team on management of other issues.  Assessment, plan and recommendations as detailed above were discussed with the medical/primary  team.  Will Follow.  Beeper [phone] [facility] [id number].   Wknd/afterhours/No response-[phone] or Fellow on call

## 2021-12-13 NOTE — PATIENT PROFILE ADULT - FALL HARM RISK - HARM RISK INTERVENTIONS

## 2021-12-13 NOTE — CONSULT NOTE ADULT - SUBJECTIVE AND OBJECTIVE BOX
Podiatry pager #: [phone]    Patient is a 89y old  Female who presents with a chief complaint of s/p fall (13 Dec 2021 13:02)      HPI:  89 y.o F with PMH of TAVR earlier this year, DM 2, hypothyroidism, A fib on Eliquis here because she fell while trying to get out of bed. Notes shes been trying to move out of bed and fell, hitting mainly her hip and parts of her back. She does note feeling a bit weaker than usual, and not drinking enough water because she doesn't want to urinate at night. No fevers, chills, cough that she endorses. Does note hitting her R ankle previously and developing infection recently, for which was treated? Has PICC in R arm, and she is unsure what it is for. D/w  who said patient was admitted to [facility] 3 weeks ago and was diagnosed with right ankle cellulitis ? osteomyelitis. The patient was discharge to [facility] re[facility]. 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 1000 milliLiter(s) (50 mL/Hr) IV Continuous <Continuous>  dextrose 5%. 1000 milliLiter(s) (100 mL/Hr) IV Continuous <Continuous>  dextrose 50% Injectable 25 Gram(s) IV Push once  dextrose 50% Injectable 12.5 Gram(s) IV Push once  dextrose 50% Injectable 25 Gram(s) IV Push once  furosemide    Tablet 20 milliGRAM(s) Oral daily  glucagon  Injectable 1 milliGRAM(s) IntraMuscular once  insulin lispro (ADMELOG) corrective regimen sliding scale   SubCutaneous three times a day before meals  levothyroxine 50 MICROGram(s) Oral daily  metoprolol succinate  milliGRAM(s) Oral daily  pantoprazole    Tablet 40 milliGRAM(s) Oral before breakfast  simvastatin 20 milliGRAM(s) Oral at bedtime  vancomycin  IVPB 1000 milliGRAM(s) IV Intermittent every 24 hours    MEDICATIONS  (PRN):      Allergies    Bactrim (Unknown)  Flagyl (Hives; Pruritus)  Macrobid (Other)  sulfa drugs (Hives)  Zosyn (Other)    Intolerances        VITALS:    Vital Signs Last 24 Hrs  T(C): 36.7 (13 Dec 2021 08:27), Max: 37.1 (12 Dec 2021 21:18)  T(F): 98.1 (13 Dec 2021 08:27), Max: 98.7 (12 Dec 2021 21:18)  HR: 73 (13 Dec 2021 08:27) (72 - 88)  BP: 129/82 (13 Dec 2021 08:27) (120/47 - 150/72)  BP(mean): 72 (12 Dec 2021 20:22) (72 - 72)  RR: 18 (13 Dec 2021 08:27) (18 - 20)  SpO2: 94% (13 Dec 2021 08:27) (93% - 99%)    LABS:                          8.7    8.75  )-----------( 322      ( 13 Dec 2021 07:19 )             29.0       12-13    137  |  99  |  23  ----------------------------<  285<H>  4.0   |  25  |  1.31<H>    Ca    9.4      13 Dec 2021 07:19  Phos  2.3     12-12  Mg     2.0     12-12    TPro  6.1  /  Alb  2.7<L>  /  TBili  0.3  /  DBili  x   /  AST  32  /  ALT  15  /  AlkPhos  117  12-12      CAPILLARY BLOOD GLUCOSE      POCT Blood Glucose.: 271 mg/dL (13 Dec 2021 13:10)  POCT Blood Glucose.: 292 mg/dL (13 Dec 2021 09:28)  POCT Blood Glucose.: 260 mg/dL (12 Dec 2021 22:17)  POCT Blood Glucose.: 228 mg/dL (12 Dec 2021 21:19)  POCT Blood Glucose.: 208 mg/dL (12 Dec 2021 18:43)  POCT Blood Glucose.: 211 mg/dL (12 Dec 2021 15:23)      PT/INR - ( 12 Dec 2021 07:15 )   PT: 18.7 sec;   INR: 1.59 ratio         PTT - ( 12 Dec 2021 07:15 )  PTT:35.9 sec    LOWER EXTREMITY PHYSICAL EXAM:    Vasular: DP/PT 1/4, B/L, CFT <3 seconds B/L, Temperature gradient WNL, B/L.   Neuro: Epicritic sensation diminished to the level of _, B/L.  Musculoskeletal/Ortho: unremarkable    Skin: Right lateral mal wound to periosteum, w/ scant purulence noted, wound tracks 1.5cm proximally, no malodor, mild periwound erythema.    s/p R lateral mal incision and drainage to the level of subQ and not beyond using a 15 blade along the lines of tracking, expressing no purulence, no malodor    Left posterior heel deep tissue injury, no fluctuance, no local signs of infection    RADIOLOGY & ADDITIONAL STUDIES:  < from: Xray Ankle Complete 3 Views, Right (12.12.21 @ 05:05) >    ACC: [id number] EXAM:  XR ANKLE COMP MIN 3 VIEWS RT                          PROCEDURE DATE:  12/12/2021          INTERPRETATION:  CLINICAL INDICATION: Right ankle wound, concern for   osteomyelitis.    TECHNIQUE: 3 views of the right ankle.    COMPARISON: There are no similar prior studies available for comparison.    FINDINGS:  There is a soft tissue defect at the lateral malleolus with mild   periosteal reaction of the distal fibula, suspicious for osteomyelitis.  There is circumferential softtissue swelling about the ankle, most   prominent over the lateral malleolus.  No tracking subcutaneous gas.  Joint arthrosis, particularly about the tarsometatarsal joints.  Small calcaneal enthesophytes.    IMPRESSION:  Soft tissue defect at the lateral malleolus with mild periosteal reaction   of the distal fibula, suspicious for osteomyelitis. MRI can be performed   for further evaluation.    --- End of Report ---           STELLA NUNN MD; Resident Radiology  This document has been electronically signed.  ESTHELA SANON MD; Attending Radiologist  This document has been electronically signed. Dec 12 2021 10:11AM    < end of copied text >     Podiatry pager #: [phone]/ [phone]    Patient is a 89y old  Female who presents with a chief complaint of s/p fall (13 Dec 2021 13:02)      HPI:  89 y.o F with PMH of TAVR earlier this year, DM 2, hypothyroidism, A fib on Eliquis here because she fell while trying to get out of bed. Notes shes been trying to move out of bed and fell, hitting mainly her hip and parts of her back. She does note feeling a bit weaker than usual, and not drinking enough water because she doesn't want to urinate at night. No fevers, chills, cough that she endorses. Does note hitting her R ankle previously and developing infection recently, for which was treated? Has PICC in R arm, and she is unsure what it is for. D/w  who said patient was admitted to [facility] 3 weeks ago and was diagnosed with right ankle cellulitis ? osteomyelitis. The patient was discharge to [place] rehab. 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 1000 milliLiter(s) (50 mL/Hr) IV Continuous <Continuous>  dextrose 5%. 1000 milliLiter(s) (100 mL/Hr) IV Continuous <Continuous>  dextrose 50% Injectable 25 Gram(s) IV Push once  dextrose 50% Injectable 12.5 Gram(s) IV Push once  dextrose 50% Injectable 25 Gram(s) IV Push once  furosemide    Tablet 20 milliGRAM(s) Oral daily  glucagon  Injectable 1 milliGRAM(s) IntraMuscular once  insulin lispro (ADMELOG) corrective regimen sliding scale   SubCutaneous three times a day before meals  levothyroxine 50 MICROGram(s) Oral daily  metoprolol succinate  milliGRAM(s) Oral daily  pantoprazole    Tablet 40 milliGRAM(s) Oral before breakfast  simvastatin 20 milliGRAM(s) Oral at bedtime  vancomycin  IVPB 1000 milliGRAM(s) IV Intermittent every 24 hours    MEDICATIONS  (PRN):      Allergies    Bactrim (Unknown)  Flagyl (Hives; Pruritus)  Macrobid (Other)  sulfa drugs (Hives)  Zosyn (Other)    Intolerances        VITALS:    Vital Signs Last 24 Hrs  T(C): 36.7 (13 Dec 2021 08:27), Max: 37.1 (12 Dec 2021 21:18)  T(F): 98.1 (13 Dec 2021 08:27), Max: 98.7 (12 Dec 2021 21:18)  HR: 73 (13 Dec 2021 08:27) (72 - 88)  BP: 129/82 (13 Dec 2021 08:27) (120/47 - 150/72)  BP(mean): 72 (12 Dec 2021 20:22) (72 - 72)  RR: 18 (13 Dec 2021 08:27) (18 - 20)  SpO2: 94% (13 Dec 2021 08:27) (93% - 99%)    LABS:                          8.7    8.75  )-----------( 322      ( 13 Dec 2021 07:19 )             29.0       12-13    137  |  99  |  23  ----------------------------<  285<H>  4.0   |  25  |  1.31<H>    Ca    9.4      13 Dec 2021 07:19  Phos  2.3     12-12  Mg     2.0     12-12    TPro  6.1  /  Alb  2.7<L>  /  TBili  0.3  /  DBili  x   /  AST  32  /  ALT  15  /  AlkPhos  117  12-12      CAPILLARY BLOOD GLUCOSE      POCT Blood Glucose.: 271 mg/dL (13 Dec 2021 13:10)  POCT Blood Glucose.: 292 mg/dL (13 Dec 2021 09:28)  POCT Blood Glucose.: 260 mg/dL (12 Dec 2021 22:17)  POCT Blood Glucose.: 228 mg/dL (12 Dec 2021 21:19)  POCT Blood Glucose.: 208 mg/dL (12 Dec 2021 18:43)  POCT Blood Glucose.: 211 mg/dL (12 Dec 2021 15:23)      PT/INR - ( 12 Dec 2021 07:15 )   PT: 18.7 sec;   INR: 1.59 ratio         PTT - ( 12 Dec 2021 07:15 )  PTT:35.9 sec    LOWER EXTREMITY PHYSICAL EXAM:    Vasular: DP/PT 1/4, B/L, CFT <3 seconds B/L, Temperature gradient WNL, B/L.   Neuro: Epicritic sensation diminished to the level of _, B/L.  Musculoskeletal/Ortho: unremarkable    Skin: Right lateral mal wound to periosteum, w/ scant purulence noted, wound tracks 1.5cm proximally, no malodor, mild periwound erythema.    s/p R lateral mal incision and drainage to the level of subQ and not beyond using a 15 blade along the lines of tracking, expressing no purulence, no malodor    bilateral posterior heel deep tissue injury, no fluctuance, no local signs of infection    RADIOLOGY & ADDITIONAL STUDIES:  < from: Xray Ankle Complete 3 Views, Right (12.12.21 @ 05:05) >    ACC: [id number] EXAM:  XR ANKLE COMP MIN 3 VIEWS RT                          PROCEDURE DATE:  12/12/2021          INTERPRETATION:  CLINICAL INDICATION: Right ankle wound, concern for   osteomyelitis.    TECHNIQUE: 3 views of the right ankle.    COMPARISON: There are no similar prior studies available for comparison.    FINDINGS:  There is a soft tissue defect at the lateral malleolus with mild   periosteal reaction of the distal fibula, suspicious for osteomyelitis.  There is circumferential softtissue swelling about the ankle, most   prominent over the lateral malleolus.  No tracking subcutaneous gas.  Joint arthrosis, particularly about the tarsometatarsal joints.  Small calcaneal enthesophytes.    IMPRESSION:  Soft tissue defect at the lateral malleolus with mild periosteal reaction   of the distal fibula, suspicious for osteomyelitis. MRI can be performed   for further evaluation.    --- End of Report ---           STELLA NUNN MD; Resident Radiology  This document has been electronically signed.  ESTHELA SANON MD; Attending Radiologist  This document has been electronically signed. Dec 12 2021 10:11AM    < end of copied text >

## 2021-12-13 NOTE — CONSULT NOTE ADULT - ASSESSMENT
89yo who presents w/ R lateral mal wound  -Right lateral mal wound to periosteum, w/ scant purulence noted, wound tracks 1.5cm proximally, no malodor, mild periwound erythema.  s/p R lateral mal incision and drainage to the level of subQ and not beyond using a 15 blade along the lines of tracking, expressing no purulence, no malodor  -Left heel DTI  -R ankle xray demonstrating: lateral malleolus with mild periosteal reaction of the distal fibula, suspicious for osteomyelitis.  -R ankle MRI pending  -R ankle wound taken  -Continue IV antibiotics  -Please offload bilateral heels w/ z flow booties  -Pod plan pending MRI  -Seen with attending 89yo who presents w/ R lateral mal wound  -Right lateral mal wound to periosteum, w/ scant purulence noted, wound tracks 1.5cm proximally, no malodor, mild periwound erythema.  s/p R lateral mal incision and drainage to the level of subQ and not beyond using a 15 blade along the lines of tracking, expressing no purulence, no malodor  -bilateral heel DTI  -R ankle xray demonstrating: lateral malleolus with mild periosteal reaction of the distal fibula, suspicious for osteomyelitis.  -R ankle MRI pending  -R ankle wound taken  -Continue IV antibiotics  -Please offload bilateral heels w/ z flow booties  -Pod plan pending MRI  -Seen with attending

## 2021-12-13 NOTE — CHART NOTE - NSCHARTNOTEFT_GEN_A_CORE
Wound Care Team Note:    Request for wound care consult for foot wound received and referred to wound care team Podiatrists. Will defer to Podiatry for management. Will not follow.    Deyanira Barber NP-C, CWOCN [id number]

## 2021-12-13 NOTE — CONSULT NOTE ADULT - ATTENDING COMMENTS
-Right lateral mal wound to periosteum, w/ scant purulence noted, wound tracks 1.5cm proximally, no malodor, mild periwound erythema  -Left heel DTI  -R ankle xray demonstrating: lateral malleolus with mild periosteal reaction of the distal fibula, suspicious for osteomyelitis.  -R ankle MRI pending  -Continue IV antibiotics  -Pod plan pending MRI

## 2021-12-13 NOTE — PROGRESS NOTE ADULT - SUBJECTIVE AND OBJECTIVE BOX
Patient is a 89y old  Female who presents with a chief complaint of s/p fall (12 Dec 2021 12:42)      DATE OF SERVICE: 21 @ 13:03    SUBJECTIVE / OVERNIGHT EVENTS: overnight events noted    ROS:  Resp: No cough no sputum production  CVS: No chest pain no palpitations no orthopnea  GI: no N/V/D  : no dysuria, no hematuria  Neuro: no weakness no paresthesias  Heme: No petechiae no easy bruising  Msk: right ankle pain no swelling  Skin: No rash no itching        MEDICATIONS  (STANDING):  allopurinol 100 milliGRAM(s) Oral daily  amLODIPine   Tablet 5 milliGRAM(s) Oral daily  apixaban 2.5 milliGRAM(s) Oral every 12 hours  cefepime   IVPB      cefepime   IVPB 1000 milliGRAM(s) IV Intermittent every 12 hours  clopidogrel Tablet 75 milliGRAM(s) Oral daily  dextrose 40% Gel 15 Gram(s) Oral once  dextrose 5%. 1000 milliLiter(s) (50 mL/Hr) IV Continuous <Continuous>  dextrose 5%. 1000 milliLiter(s) (100 mL/Hr) IV Continuous <Continuous>  dextrose 50% Injectable 25 Gram(s) IV Push once  dextrose 50% Injectable 12.5 Gram(s) IV Push once  dextrose 50% Injectable 25 Gram(s) IV Push once  furosemide    Tablet 20 milliGRAM(s) Oral daily  glucagon  Injectable 1 milliGRAM(s) IntraMuscular once  insulin lispro (ADMELOG) corrective regimen sliding scale   SubCutaneous three times a day before meals  levothyroxine 50 MICROGram(s) Oral daily  metoprolol succinate  milliGRAM(s) Oral daily  pantoprazole    Tablet 40 milliGRAM(s) Oral before breakfast  simvastatin 20 milliGRAM(s) Oral at bedtime  vancomycin  IVPB 1000 milliGRAM(s) IV Intermittent every 24 hours    MEDICATIONS  (PRN):        CAPILLARY BLOOD GLUCOSE      POCT Blood Glucose.: 292 mg/dL (13 Dec 2021 09:28)  POCT Blood Glucose.: 260 mg/dL (12 Dec 2021 22:17)  POCT Blood Glucose.: 228 mg/dL (12 Dec 2021 21:19)  POCT Blood Glucose.: 208 mg/dL (12 Dec 2021 18:43)  POCT Blood Glucose.: 211 mg/dL (12 Dec 2021 15:23)    I&O's Summary    12 Dec 2021 07:01  -  13 Dec 2021 07:00  --------------------------------------------------------  IN: 250 mL / OUT: 700 mL / NET: -450 mL        Vital Signs Last 24 Hrs  T(C): 36.7 (13 Dec 2021 08:27), Max: 37.1 (12 Dec 2021 21:18)  T(F): 98.1 (13 Dec 2021 08:27), Max: 98.7 (12 Dec 2021 21:18)  HR: 73 (13 Dec 2021 08:) (72 - 88)  BP: 129/82 (13 Dec 2021 08:27) (120/47 - 150/72)  BP(mean): 72 (12 Dec 2021 20:22) (72 - 72)  RR: 18 (13 Dec 2021 08:) (18 - 20)  SpO2: 94% (13 Dec 2021 08:27) (93% - 99%)    PHYSICAL EXAM:   HEENT: DEBBIE EOMI  Neck: Supple, no JVD  Lungs: clear  CVS: S1 S2 systolic murmur +   Abdomen: non tender BS +  Neuro: AO x 3 nonfocal  Skin: right ankle with clean looking ulcer and draining small wound  chronic changes with venous stasis bilateral LE  Ext: no cyanosis or clubbing, 1+ edema  right arm PICC  Msk: no joint swelling    LABS:                        8.7    8.75  )-----------( 322      ( 13 Dec 2021 07:19 )             29.0         137  |  99  |  23  ----------------------------<  285<H>  4.0   |  25  |  1.31<H>    Ca    9.4      13 Dec 2021 07:19  Phos  2.3     12-12  Mg     2.0     12-12    TPro  6.1  /  Alb  2.7<L>  /  TBili  0.3  /  DBili  x   /  AST  32  /  ALT  15  /  AlkPhos  117  12-12    PT/INR - ( 12 Dec 2021 07:15 )   PT: 18.7 sec;   INR: 1.59 ratio         PTT - ( 12 Dec 2021 07:15 )  PTT:35.9 sec      Urinalysis Basic - ( 12 Dec 2021 06:32 )    Color: Yellow / Appearance: Clear / S.019 / pH: x  Gluc: x / Ketone: Negative  / Bili: Negative / Urobili: Negative   Blood: x / Protein: 30 mg/dL / Nitrite: Negative   Leuk Esterase: Negative / RBC: 6 /hpf / WBC 2 /HPF   Sq Epi: x / Non Sq Epi: 2 /hpf / Bacteria: Negative          All consultant(s) notes reviewed and care discussed with other providers        Contact Number, Dr Ho [phone]

## 2021-12-13 NOTE — CONSULT NOTE ADULT - SUBJECTIVE AND OBJECTIVE BOX
Patient is a 89y old  Female who presents with a chief complaint of s/p fall (13 Dec 2021 13:24)    From HPI" HPI:  89 y.o F with PMH of TAVR earlier this year, DM 2, hypothyroidism, A fib on Eliquis here because she fell while trying to get out of bed. Notes shes been trying to move out of bed and fell, hitting mainly her hip and parts of her back. She does note feeling a bit weaker than usual, and not drinking enough water because she doesn't want to urinate at night. No fevers, chills, cough that she endorses. Does note hitting her R ankle previously and developing infection recently, for which was treated? Has PICC in R arm, and she is unsure what it is for. D/w  who said patient was admitted to [facility] 3 weeks ago and was diagnosed with right ankle cellulitis ? osteomyelitis. The patient was discharge to [facility] re[facility]. She fell out of bed in the SNF and was transferred to [facility] (12 Dec 2021 12:42)  "    Above verified-agree with above unless noted below.  on my interview- pt says that she was on abx previously but none recently  Review of 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    REVIEW OF SYSTEMS  General:	Denies any malaise fatigue or chills. Fevers absent    Skin:No rash  	  Ophthalmologic:Denies any visual complaints,discharge redness or photophobia  	  ENMT:No nasal discharge,headache,sinus congestion or throat pain.No dental complaints    Respiratory and Thorax:No cough,sputum or chest pain.Denies shortness of breath  	  Cardiovascular:	No chest pain,palpitaions or dizziness    Gastrointestinal:	NO nausea,abdominal pain or diarrhea.    Genitourinary:	No dysuria,frequency. No flank pain    Musculoskeletal:	says ankle swelling  denies pain  some discharge from wound though pt unclear on timing /quantity    Neurological:No confusion,diziness.No extremity weakness.No bladder or bowel incontinence	        Hematology/Lymphatics:	No LN swelling.No gum bleeding     Endocrine:	No recent weight gain or loss.No abnormal heat/cold intolerance    Allergic/Immunologic:	No hives or rash   Allergies    Bactrim (Unknown)  Flagyl (Hives; Pruritus)  Macrobid (Other)  sulfa drugs (Hives)  Zosyn (Other)    Intolerances        Antimicrobials:    cefepime   IVPB      cefepime   IVPB 1000 milliGRAM(s) IV Intermittent every 12 hours  vancomycin  IVPB 1000 milliGRAM(s) IV Intermittent every 24 hours      Prior Antimicrobials:  MEDICATIONS  (STANDING):    cefepime   IVPB   100 mL/Hr IV Intermittent (12-13-21 @ 06:07)   100 mL/Hr IV Intermittent (12-12-21 @ 20:15)    cefepime   IVPB   100 mL/Hr IV Intermittent (12-12-21 @ 06:31)    vancomycin  IVPB   250 mL/Hr IV Intermittent (12-13-21 @ 12:18)    vancomycin  IVPB.   250 mL/Hr IV Intermittent (12-12-21 @ 07:38)      Other medications reviewed      Vital Signs Last 24 Hrs  T(C): 36.7 (13 Dec 2021 08:27), Max: 37.1 (12 Dec 2021 21:18)  T(F): 98.1 (13 Dec 2021 08:27), Max: 98.7 (12 Dec 2021 21:18)  HR: 73 (13 Dec 2021 08:27) (72 - 88)  BP: 129/82 (13 Dec 2021 08:27) (120/47 - 150/72)  BP(mean): 72 (12 Dec 2021 20:22) (72 - 72)  RR: 18 (13 Dec 2021 08:27) (18 - 20)  SpO2: 94% (13 Dec 2021 08:27) (93% - 99%)    PHYSICAL EXAM:Pleasant patient in no acute distress.      Constitutional:Comfortable.Awake and alert  No cachexia     Eyes:PERRL EOMI.NO discharge or conjunctival injection    ENMT:No sinus tenderness.No thrush.No pharyngeal exudate or erythema.Fair dental hygiene    Neck:Supple,No LN,no JVD      Respiratory:Good air entry bilaterally,CTA    Cardiovascular:S1 S2 wnl, ESM no rub or gallops    Gastrointestinal:Soft BS(+) no tenderness no masses ,No rebound or guarding    Genitourinary:No CVA tendereness         Extremities:R ankle lateral malleolus- swelling with pinhole like ulcer with soem discharge  No LOM  minimal tenderness  no erythema     Heel DTI no skin break     R UE PICC no erythema or tenderness    Vascular:peripheral pulses felt    Neurological:AAO X 2,No grossly focal deficits          Musculoskeletal:No joint swelling or LOM            Labs:                            8.7    8.75  )-----------( 322      ( 13 Dec 2021 07:19 )             29.0     WBC Count: 8.75 (12-13-21 @ 07:19)  WBC Count: 11.87 (12-12-21 @ 04:51)      12-13    137  |  99  |  23  ----------------------------<  285<H>  4.0   |  25  |  1.31<H>      Creatinine, Serum: 1.31 mg/dL (12-13-21 @ 07:19)  Creatinine, Serum: 1.50 mg/dL (12-12-21 @ 04:51)    Ca    9.4      13 Dec 2021 07:19  Phos  2.3     12-12  Mg     2.0     12-12    TPro  6.1  /  Alb  2.7<L>  /  TBili  0.3  /  DBili  x   /  AST  32  /  ALT  15  /  AlkPhos  117  12-12            Culture - Blood (collected 12 Dec 2021 08:25)  Source: .Blood Blood-Venous  Preliminary Report (13 Dec 2021 09:01):    No growth to date.    Culture - Blood (collected 12 Dec 2021 08:25)  Source: .Blood Blood-Peripheral  Preliminary Report (13 Dec 2021 09:01):    No growth to date.      < from: Xray Shoulder 2 Views, Left (12.12.21 @ 18:33) >      IMPRESSION:  1.  No acute fracture or dislocation.  2.  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    --- End of Report ---    < end of copied text >  < from: CT Head No Cont (12.12.21 @ 05:48) >  IMPRESSION:    CT BRAIN: No acuteintracranial bleeding.    CT CERVICAL SPINE: No fracture.  Reversed cervical lordosis associated with advanced spondylosis and   spondylolisthesis.    --- End of Report ---      < end of copied text >  < from: CT Cervical Spine No Cont (12.12.21 @ 05:43) >  IMPRESSION:    CT BRAIN: No acuteintracranial bleeding.    CT CERVICAL SPINE: No fracture.  Reversed cervical lordosis associated with advanced spondylosis and   spondylolisthesis.    --- End of Report ---        < end of copied text >  < from: Xray Chest 1 View- PORTABLE-Urgent (Xray Chest 1 View- PORTABLE-Urgent .) (12.12.21 @ 05:09) >  IMPRESSION:    Prominent interstitial markings represent pulmonary vascular congestion.  Bibasilar opacities may represent atelectasis or multifocal infection.    --- End of Report ---        < end of copied text >  < from: Xray Ankle Complete 3 Views, Right (12.12.21 @ 05:05) >    IMPRESSION:  Soft tissue defect at the lateral malleolus with mild periosteal reaction   of the distal fibula, suspicious for osteomyelitis. MRI can be performed   for further evaluation.    --- End of Report ---        < end of copied text >        Imaging studies(films) were independently reviewed.Findings as detailed in report above     Sedimentation Rate, Erythrocyte (12.12.21 @ 08:21)   Sedimentation Rate, Erythrocyte: 120 mm/hr  C-Reactive Protein, Serum (12.12.21 @ 04:51)   C-Reactive Protein, Serum: 144 mg/L

## 2021-12-14 NOTE — PROGRESS NOTE ADULT - SUBJECTIVE AND OBJECTIVE BOX
Patient is a 89y old  Female who presents with a chief complaint of s/p fall (14 Dec 2021 09:55)    Being followed by ID for R ankle ulcer    Interval history:s/p ankle debridement at bedside by podiatry  denies any pain  alert awake- answers questions-however confused as to location  collateral obtained by dr franz from vascular surgeon at [facility]-pt was beiong treated with IV invanz for wound infection-though entire course unclear( MAR from [facility] no abx)  No acute events      ROS:  No cough,SOB,CP  No N/V/D./abd pain  No other complaints      Antimicrobials:    cefepime   IVPB      cefepime   IVPB 1000 milliGRAM(s) IV Intermittent every 12 hours  vancomycin  IVPB 1000 milliGRAM(s) IV Intermittent every 24 hours    Other medications reviewed    Vital Signs Last 24 Hrs  T(C): 36.6 (12-14-21 @ 04:26), Max: 36.9 (12-13-21 @ 20:30)  T(F): 97.9 (12-14-21 @ 04:26), Max: 98.5 (12-13-21 @ 20:30)  HR: 74 (12-14-21 @ 04:26) (65 - 74)  BP: 144/60 (12-14-21 @ 04:26) (125/73 - 162/61)  BP(mean): --  RR: 17 (12-14-21 @ 04:26) (17 - 17)  SpO2: 92% (12-14-21 @ 04:26) (92% - 94%)    Physical Exam:        HEENT PERRLA EOMI    No oral exudate or erythema  L axillary PM     Chest Good AE,CTA    CVS RRR S1 S2 WNl No murmur or rub or gallop    Abd soft BS normal No tenderness no masses    R ankle dressing no discharge     PICC  site no erythema tenderness or discharge    CNS AAO X 2   no focal    Lab Data:                          8.1    10.56 )-----------( 286      ( 14 Dec 2021 03:26 )             26.8       12-13    137  |  99  |  23  ----------------------------<  285<H>  4.0   |  25  |  1.31<H>    Ca    9.4      13 Dec 2021 07:19          Culture - Urine (collected 12 Dec 2021 14:14)  Source: Catheterized Catheterized  Final Report (13 Dec 2021 15:24):    No growth    Culture - Blood (collected 12 Dec 2021 08:25)  Source: .Blood Blood-Venous  Preliminary Report (13 Dec 2021 09:01):    No growth to date.    Culture - Blood (collected 12 Dec 2021 08:25)  Source: .Blood Blood-Peripheral  Preliminary Report (13 Dec 2021 09:01):    No growth to date.      < from: Xray Ankle Complete 3 Views, Right (12.12.21 @ 05:05) >    IMPRESSION:  Soft tissue defect at the lateral malleolus with mild periosteal reaction   of the distal fibula, suspicious for osteomyelitis. MRI can be performed   for further evaluation.    --- End of Report ---    < end of copied text >

## 2021-12-14 NOTE — PROGRESS NOTE ADULT - ASSESSMENT
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 Notes shes been trying to move out of bed and fell, hitting mainly her hip and parts of her back. She does note feeling a bit weaker than usual, and not drinking enough water because she doesn't want to urinate at night. No fevers, chills, cough that she endorses. Does note hitting her R ankle previously and developing infection recently, for which was treated? Has PICC in R arm, and she is unsure what it is for.     Per review of [facility] records no abx since 12/3  Exam with R lateral malleolar ulcer with discharge  No surrounding cellulitis  X ray with perisosteal reaction  High ESra nd CRP though is non specific and could reflect fall   previousl;y was on Invanz at [facility] though no abx at MAR in SNF    ? OM with sinus tract  ? Ulcer with cellulitis  ? other pathology      Rec:  A) Ankle ulcer  Podiatry eval noted- s/p debridement- follow Cx  check Cx  MRI if possible with PM-if not obtain CT   empiric vanco + cefepime-pt tolerating despite cefazolin and zosyn allergy  Monitor vanco trougha nd creatinine   Monitor for s/s any confusion with cefepime   Obtain old records from [facility] though there may be progression from her recent admission    B) abx allergies  tolerating cefepime so far  monitor clinically for s/s any allergic reaction    C) fall  will defer to primary team    Will tailor plan for ID issues  per course,results.Will defer to primary team on management of other issues.  Assessment, plan and recommendations as detailed above were discussed with the medical/primary  team.  Will Follow.  Beeper [phone] [facility] [id number].   Wknd/afterhours/No response-[phone] or Fellow on call

## 2021-12-14 NOTE — CHART NOTE - NSCHARTNOTEFT_GEN_A_CORE
C/C: Called for patient with bright red blood saturated through right foot dressing.  Pt asymptomatic at this time.  Pt unaware of bleeding.  Currently taking Eliquis for A-fib. C/C: Called for patient with bright red blood saturated through right foot dressing onto the bedding.  Pt asymptomatic at this time.  Pt unaware of bleeding.  VSS. Currently taking Eliquis for A-fib.  Repeating H/Hct at this time, check Type and screen.  Spoke with Podiatry and will be able to check patient.     Advised RN to elevate extremity, place Z-flow boots on patient.    ~Sofia Pollock ANP-C   [phone]

## 2021-12-14 NOTE — CONSULT NOTE ADULT - SUBJECTIVE AND OBJECTIVE BOX
Wound Surgery Consult Note:    HPI:  89 y.o F with PMH of TAVR earlier this year, DM 2, hypothyroidism, A fib on Eliquis here because she fell while trying to get out of bed. Notes shes been trying to move out of bed and fell, hitting mainly her hip and parts of her back. She does note feeling a bit weaker than usual, and not drinking enough water because she doesn't want to urinate at night. No fevers, chills, cough that she endorses. Does note hitting her R ankle previously and developing infection recently, for which was treated? Has PICC in R arm, and she is unsure what it is for. D/w  who said patient was admitted to [facility] 3 weeks ago and was diagnosed with right ankle cellulitis ? osteomyelitis. The patient was discharge to [place] rehab. She fell out of bed in the SNF and was transferred to [facility] (12 Dec 2021 12:42)    Request for wound care consult for sacral/bilateral buttocks skin breakdown received from nursing. Ms. Briones was encountered on an alternating air with low air loss surface. She was able turn and reposition self in bed although she is weak. Her extreme immobility, inactivity, as well as poor nutritional status all contribute to her high risk for pressure injury development and hinder healing. Identification of the initial signs of deep tissue damage at the level of the skin within 72 hours of admission make this an injury that occurred prior to admission.    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 1000 milliLiter(s) (50 mL/Hr) IV Continuous <Continuous>  dextrose 5%. 1000 milliLiter(s) (100 mL/Hr) IV Continuous <Continuous>  dextrose 50% Injectable 25 Gram(s) IV Push once  dextrose 50% Injectable 12.5 Gram(s) IV Push once  dextrose 50% Injectable 25 Gram(s) IV Push once  furosemide    Tablet 20 milliGRAM(s) Oral daily  glucagon  Injectable 1 milliGRAM(s) IntraMuscular once  insulin lispro (ADMELOG) corrective regimen sliding scale   SubCutaneous three times a day before meals  insulin lispro (ADMELOG) corrective regimen sliding scale   SubCutaneous at bedtime  levothyroxine 50 MICROGram(s) Oral daily  metoprolol succinate  milliGRAM(s) Oral daily  pantoprazole    Tablet 40 milliGRAM(s) Oral before breakfast  simvastatin 20 milliGRAM(s) Oral at bedtime  vancomycin  IVPB 1000 milliGRAM(s) IV Intermittent every 24 hours    MEDICATIONS  (PRN):    Allergies    Bactrim (Unknown)  Flagyl (Hives; Pruritus)  Macrobid (Other)  sulfa drugs (Hives)  Zosyn (Other)    Intolerances    Vital Signs Last 24 Hrs  T(C): 36.6 (14 Dec 2021 04:26), Max: 36.9 (13 Dec 2021 20:30)  T(F): 97.9 (14 Dec 2021 04:26), Max: 98.5 (13 Dec 2021 20:30)  HR: 74 (14 Dec 2021 04:26) (65 - 74)  BP: 144/60 (14 Dec 2021 04:26) (125/73 - 162/61)  BP(mean): --  RR: 17 (14 Dec 2021 04:26) (17 - 17)  SpO2: 92% (14 Dec 2021 04:26) (92% - 94%)    Physical Exam:  General: Somnolent, arousable   Respiratory: no SOB on room air  Gastrointestinal: soft NT/ND  Neurology: verbal, following commands  Musculoskeletal: no contractures  Vascular: BLE edema equal  Skin:  Sacral and bilateral buttocks with central L 1cm x w 1cm x D none - area of dusky purple intact discolored skin surrounded by nonblanching erythema, small area of deep maroon intact discolored skin on the Right buttocks - L 0.5cm x W 2cm X D none, no drainage  No odor, increased warmth, tenderness, induration, fluctuance    LABS:  12-13    137  |  99  |  23  ----------------------------<  285<H>  4.0   |  25  |  1.31<H>    Ca    9.4      13 Dec 2021 07:19                            8.1    10.56 )-----------( 286      ( 14 Dec 2021 03:26 )             26.8

## 2021-12-14 NOTE — PHARMACOTHERAPY INTERVENTION NOTE - COMMENTS
89F with PMH of TAVR, T2DM (A1C 7.3), hypothyrodisim, AF on eliquis presenting s/p fall while trying to get out of bed.  provided history in which patient was admitted to [facility] 3 weeks ago where she was diagnosed with right ankle cellulitis ? osteomyelitis. Pt was discharged to [facility] but upon review of records, pt has not had abx since 12/3. For this admission, pt was noted to have R lateral malleolar ulcer with discharge with Xray of right ankle showing periosteal reaction, high ESR and CRP, pending MRI or CT depending on if compatible with pacemaker. Pt is now on Empiric vancomycin 1 gram q24h and cefepime 1 gram q12H for possible osteomyelitis. Today is day 3 of abx, ordered pre-third dose trough for vancomycin at 11 AM. Spoke with nurse Adair about obtaining trough. Pre-third trough of 9.6 with calculated AUC of 348. Would continue current vancomycin regimen now, as trough and AUC expected to increase with repeated dosing, and given patient's age.     Thank you,    Nena Levi PharmD, PGY-1 Pharmacy Resident  Available on [employer] or [employer] [phone] with PMH of TAVR, T2DM (A1C 7.3), hypothyrodisim, AF on eliquis presenting s/p fall while trying to get out of bed.  provided history in which patient was admitted to [facility] 3 weeks ago where she was diagnosed with right ankle cellulitis/? osteomyelitis. Pt was discharged to [facility] but upon review of records, pt has not had abx since 12/3. For this admission, pt was noted to have R lateral malleolar ulcer with discharge with Xray of right ankle showing periosteal reaction, high ESR and CRP, pending MRI or CT depending on if compatible with pacemaker. Pt is now on Empiric vancomycin 1 gram q24h and cefepime 1 gram q12H for possible osteomyelitis. Today is day 3 of abx, ordered pre-third dose trough for vancomycin at 11 AM. Spoke with nurse Adair about obtaining trough. Pre-third trough of 9.6 with calculated AUC of 348. Would continue current vancomycin regimen now, as trough and AUC expected to increase with repeated dosing, and given patient's age.     Thank you,    Nena Levi PharmD, PGY-1 Pharmacy Resident  Available on [employer] or [employer] [phone] with PMH of TAVR, T2DM (A1C 7.3), hypothyrodisim, AF on eliquis presenting s/p fall while trying to get out of bed.  provided history in which patient was admitted to [facility] 3 weeks ago where she was diagnosed with right ankle cellulitis/? osteomyelitis. Pt was discharged to [place] [facility] but upon review of records, pt has not had abx since 12/3. For this admission, pt was noted to have R lateral malleolar ulcer with discharge with Xray of right ankle showing periosteal reaction, high ESR and CRP, pending MRI or CT depending on if compatible with pacemaker. Pt is now on Empiric vancomycin 1 gram q24h and cefepime 1 gram q12H for possible osteomyelitis. Today is day 3 of abx, ordered pre-third dose trough for vancomycin at 11 AM. Spoke with nurse Adair about obtaining trough. Pre-third trough of 9.6 with calculated AUC of 348. SCr 1.31 yesterday; improved from admission). Would continue current vancomycin regimen now, as trough and AUC expected to increase with repeated dosing, and given patient's age.     Thank you,    Nena Levi PharmD, PGY-1 Pharmacy Resident  Available on [employer] or [employer] [phone]

## 2021-12-14 NOTE — CHART NOTE - NSCHARTNOTEFT_GEN_A_CORE
Podiatry called for strikethrough/ bleeding  no active bleeding  surgicel, DSD and compressive dressing applied  will reassess in am

## 2021-12-14 NOTE — PHARMACOTHERAPY INTERVENTION NOTE - COMMENTS
89F with PMH of TAVR, T2DM (A1C 7.3), hypothyrodisim, AF on eliquis presenting s/p fall while trying to get out of bed. Attempted to perform medication reconciliation but pt appeared lethargic. Will further clarify medication history with ED technician tomorrow and reattempt med rec tomorrow. Pt was able to confirm she does basal + bolus insulin regimen at home. She could not confirm the lantus dosing but endorses taking 2 units of humalog with meals. Reached out to ACP Willow who started the pt on Lantus 5 units with insulin sliding scale. Recommended also starting 2 units insulin lispro with meals for mealtime coverage. Willow will consider this.    Thank you,    Nena Levi PharmD, PGY-1 Pharmacy Resident  Available on [employer] or [employer] [phone] with PMH of TAVR, T2DM (A1C 7.3), hypothyrodisim, AF on eliquis presenting s/p fall while trying to get out of bed. Attempted to perform medication reconciliation but pt appeared lethargic. Will further clarify medication history with ED technician tomorrow and reattempt med rec tomorrow. Pt was able to confirm she does basal + bolus insulin regimen at home. She could not confirm the lantus dosing but endorses taking 2 units of humalog with meals. Recommend adding lantus 5 units with insulin sliding scale and admelog 2 units insulin lispro with meals for mealtime coverage. Discussed with NP Willow    Thank you,    Nena Levi PharmD, PGY-1 Pharmacy Resident  Available on [employer] or [employer] [phone]

## 2021-12-14 NOTE — PROGRESS NOTE ADULT - ASSESSMENT
89yo who presents w/ R lateral mal wound  -Right lateral mal wound to periosteum, w/ scant purulence noted, wound tracks 1.5cm proximally, no malodor, mild periwound erythema.  s/p R lateral mal incision and drainage to the level of subQ and not beyond using a 15 blade along the lines of tracking, expressing no purulence, no malodor  -bilateral heel DTI- present upon admission   -R ankle xray demonstrating: lateral malleolus with mild periosteal reaction of the distal fibula, suspicious for osteomyelitis.  -R ankle MRI pending  -R ankle wound culture pending   -Continue IV antibiotics  -Please offload bilateral heels w/ z flow booties  -Pod plan pending MRI  -Discussed with attending

## 2021-12-14 NOTE — CONSULT NOTE ADULT - ASSESSMENT
Impression:    Sacral/bilateral Buttocks deep tissue injury present on admission  Moisture Associated Dermatitis    Recommend:  1.) topical therapy: sacral/bilateral buttock injury - cleanse with incontinence cleanser, pat dry, apply an allevyn foam dressing daily  2.) Incontinence Management - incontinence cleanser, pads, pericare BID, continue female external urinary catheter  3.) Maintain on an alternating air with low air loss surface  4.) Turn and reposition Q 2 hours  5.) Nutrition optimization  6.) Offload heels/feet with complete cair air fluidized boots; ensure that the soles of the feet are not resting on the foot board of the bed.  7.) Chair cushion for chair sitting    Care as per medicine. Will not actively follow but will remain available. Please recall for new issues or deterioration  Upon [facility] [address] [place] [address] [phone]  Seen and discussed with clinical nurse  Thank you for this consult  Deyanira Barber, FACUNDO-C, CWOCN [id number]

## 2021-12-14 NOTE — PROGRESS NOTE ADULT - SUBJECTIVE AND OBJECTIVE BOX
Date of service: 12-14-21 @ 18:06      Patient is a 89y old  Female who presents with a chief complaint of s/p fall (14 Dec 2021 10:41)                                                               INTERVAL HPI/OVERNIGHT EVENTS:    REVIEW OF SYSTEMS:     CONSTITUTIONAL: No weakness, fevers or chills  RESPIRATORY: No cough, wheezing,  No shortness of breath  CARDIOVASCULAR: No chest pain or palpitations  GASTROINTESTINAL: No abdominal pain  . No nausea, vomiting, or hematemesis; No diarrhea or constipation. No melena or hematochezia.  GENITOURINARY: No dysuria, frequency or hematuria  NEUROLOGICAL: No numbness or weakness                                                                                                                                                                                                                                                                               Medications:  MEDICATIONS  (STANDING):  allopurinol 100 milliGRAM(s) Oral daily  amLODIPine   Tablet 5 milliGRAM(s) Oral daily  apixaban 2.5 milliGRAM(s) Oral every 12 hours  cefepime   IVPB      cefepime   IVPB 1000 milliGRAM(s) IV Intermittent every 12 hours  clopidogrel Tablet 75 milliGRAM(s) Oral daily  dextrose 40% Gel 15 Gram(s) Oral once  dextrose 5%. 1000 milliLiter(s) (50 mL/Hr) IV Continuous <Continuous>  dextrose 5%. 1000 milliLiter(s) (100 mL/Hr) IV Continuous <Continuous>  dextrose 50% Injectable 25 Gram(s) IV Push once  dextrose 50% Injectable 12.5 Gram(s) IV Push once  dextrose 50% Injectable 25 Gram(s) IV Push once  furosemide    Tablet 20 milliGRAM(s) Oral daily  glucagon  Injectable 1 milliGRAM(s) IntraMuscular once  insulin glargine Injectable (LANTUS) 5 Unit(s) SubCutaneous at bedtime  insulin lispro (ADMELOG) corrective regimen sliding scale   SubCutaneous at bedtime  insulin lispro (ADMELOG) corrective regimen sliding scale   SubCutaneous three times a day before meals  insulin lispro Injectable (ADMELOG) 2 Unit(s) SubCutaneous three times a day before meals  levothyroxine 50 MICROGram(s) Oral daily  metoprolol succinate  milliGRAM(s) Oral daily  pantoprazole    Tablet 40 milliGRAM(s) Oral before breakfast  simvastatin 20 milliGRAM(s) Oral at bedtime  vancomycin  IVPB 1000 milliGRAM(s) IV Intermittent every 24 hours    MEDICATIONS  (PRN):       Allergies    Bactrim (Unknown)  Flagyl (Hives; Pruritus)  Macrobid (Other)  sulfa drugs (Hives)  Zosyn (Other)    Intolerances      Vital Signs Last 24 Hrs  T(C): 36.8 (14 Dec 2021 12:01), Max: 36.9 (13 Dec 2021 20:30)  T(F): 98.2 (14 Dec 2021 12:01), Max: 98.5 (13 Dec 2021 20:30)  HR: 74 (14 Dec 2021 12:01) (65 - 74)  BP: 146/64 (14 Dec 2021 12:01) (125/73 - 146/64)  BP(mean): --  RR: 17 (14 Dec 2021 12:01) (17 - 17)  SpO2: 93% (14 Dec 2021 12:01) (92% - 93%)  CAPILLARY BLOOD GLUCOSE      POCT Blood Glucose.: 250 mg/dL (14 Dec 2021 17:34)  POCT Blood Glucose.: 233 mg/dL (14 Dec 2021 12:23)  POCT Blood Glucose.: 313 mg/dL (14 Dec 2021 08:58)  POCT Blood Glucose.: 323 mg/dL (13 Dec 2021 23:31)  POCT Blood Glucose.: 298 mg/dL (13 Dec 2021 22:22)      12-13 @ 07:01  -  12-14 @ 07:00  --------------------------------------------------------  IN: 0 mL / OUT: 1010 mL / NET: -1010 mL    12-14 @ 07:01  -  12-14 @ 18:06  --------------------------------------------------------  IN: 240 mL / OUT: 625 mL / NET: -385 mL      Physical Exam:      General:  NAD   HEENT:  Nonicteric, PERRLA  CV:  RRR, S1S2   Lungs:  CTA B/L, no wheezes, rales, rhonchi  Abdomen:  Soft, non-tender, no distended, positive BS  Extremities: R foot in dressing/ACE wrap  non pitting edema                                                                                                                                                                                                                                                                                             LABS:                               8.1    10.56 )-----------( 286      ( 14 Dec 2021 03:26 )             26.8                      12-13    137  |  99  |  23  ----------------------------<  285<H>  4.0   |  25  |  1.31<H>    Ca    9.4      13 Dec 2021 07:19

## 2021-12-15 NOTE — DIETITIAN INITIAL EVALUATION ADULT. - ORAL INTAKE PTA/DIET HISTORY
Pt reports decreased appetite and PO intake since March (~8months) due to not feeling hungry. Confirms NKFA. Reports taking Multivitamin, Vitamin D3, Vitamin B12, and Vitamin B Complex PTA; denies drinking oral supplements due to disliking them. Pt reports not following any type of diet or restriction at home. Reports monitoring BG via censor with ranges  mg/dl and sometimes <70 mg/dl and states taking Lantus and Humalog at home; HbA1c (12/13) 7.3% - indicates good BG control due to age.

## 2021-12-15 NOTE — PROGRESS NOTE ADULT - ASSESSMENT
Assessment and Plan:   · Assessment	  89yo who presents w/ R lateral mal wound    -Right lateral mal wound to periosteum, w/ scant purulence noted, wound tracks 1.5cm proximally, no malodor, mild periwound erythema.  s/p R lateral mal incision and drainage to the level of subQ and not beyond using a 15 blade along the lines of tracking, expressing no purulence, no malodor  -bilateral heel DTI- present upon admission   -R ankle xray demonstrating: lateral malleolus with mild periosteal reaction of the distal fibula, suspicious for osteomyelitis.  -R ankle MRI still pending  -R ankle wound culture no growth   -Continue IV antibiotics  -Please offload bilateral heels w/ z flow booties  Patient interested in getting physical therapy for balance training  -Pod plan pending MRI  -Podiatry will follow

## 2021-12-15 NOTE — DIETITIAN INITIAL EVALUATION ADULT. - REASON FOR ADMISSION
Pt 89 y/o F with PMH as per chart: TAVR earlier this year, DM2, hypothyroidism, A fib on Eliquis, dyslipidemia, HTN, gastritis, gout, admitted S/P fall, brought here for evaluation, found with cellulitis of right ankle, acute CHF.

## 2021-12-15 NOTE — DIETITIAN INITIAL EVALUATION ADULT. - SIGNS/SYMPTOMS
PO intake <75% with 11.4% weight loss x ~8 months; fluid accumulation; sign of muscle loss Pt with pressure ulcers as above

## 2021-12-15 NOTE — PROGRESS NOTE ADULT - SUBJECTIVE AND OBJECTIVE BOX
Patient is a 89y old  Female who presents with a chief complaint of s/p fall (14 Dec 2021 18:06)    Being followed by ID for R ankle wound     Interval history:feels well  denies any pain   answers queries appropriately  No acute events      ROS:  No cough,SOB,CP  No N/V/D./abd pain  No other complaints      Antimicrobials:    cefepime   IVPB      cefepime   IVPB 1000 milliGRAM(s) IV Intermittent every 12 hours  vancomycin  IVPB 1000 milliGRAM(s) IV Intermittent every 24 hours    Other medications reviewed    Vital Signs Last 24 Hrs  T(C): 36.8 (12-15-21 @ 04:45), Max: 36.8 (12-14-21 @ 12:01)  T(F): 98.2 (12-15-21 @ 04:45), Max: 98.3 (12-14-21 @ 20:03)  HR: 80 (12-15-21 @ 04:45) (74 - 80)  BP: 154/62 (12-15-21 @ 04:45) (144/75 - 154/62)  BP(mean): --  RR: 17 (12-15-21 @ 04:45) (17 - 17)  SpO2: 93% (12-15-21 @ 04:45) (93% - 93%)    Physical Exam:  HEENT PERRLA EOMI    No oral exudate or erythema  L axillary PM     Chest Good AE,CTA    CVS RRR S1 S2 WNl No murmur or rub or gallop    Abd soft BS normal No tenderness no masses    R ankle dressing no discharge     PICC  site no erythema tenderness or discharge    CNS AAO X 2  Lab Data:                          7.7    10.89 )-----------( 371      ( 15 Dec 2021 06:54 )             25.0       12-15    135  |  97  |  21  ----------------------------<  243<H>  3.5   |  27  |  1.40<H>    Ca    9.1      15 Dec 2021 06:54          Culture - Abscess with Gram Stain (collected 13 Dec 2021 18:54)  Source: .Abscess  Preliminary Report (14 Dec 2021 16:03):    No growth    Culture - Abscess with Gram Stain (collected 13 Dec 2021 18:54)  Source: .Abscess right ankle wound  Preliminary Report (14 Dec 2021 16:02):    No growth    Culture - Other (collected 13 Dec 2021 18:54)  Source: .Other Foot wound  Preliminary Report (14 Dec 2021 16:11):    No growth to date.    Culture - Urine (collected 12 Dec 2021 14:14)  Source: Catheterized Catheterized  Final Report (13 Dec 2021 15:24):    No growth    Culture - Blood (collected 12 Dec 2021 08:25)  Source: .Blood Blood-Venous  Preliminary Report (13 Dec 2021 09:01):    No growth to date.    Culture - Blood (collected 12 Dec 2021 08:25)  Source: .Blood Blood-Peripheral  Preliminary Report (13 Dec 2021 09:01):    No growth to date.            Vancomycin Level, Trough: 9.6 ug/mL (12-14-21 @ 10:45)    < from: Xray Ankle Complete 3 Views, Right (12.12.21 @ 05:05) >  IMPRESSION:  Soft tissue defect at the lateral malleolus with mild periosteal reaction   of the distal fibula, suspicious for osteomyelitis. MRI can be performed   for further evaluation.    --- End of Report ---          < end of copied text >

## 2021-12-15 NOTE — PROGRESS NOTE ADULT - ASSESSMENT
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 Notes shes been trying to move out of bed and fell, hitting mainly her hip and parts of her back. She does note feeling a bit weaker than usual, and not drinking enough water because she doesn't want to urinate at night. No fevers, chills, cough that she endorses. Does note hitting her R ankle previously and developing infection recently, for which was treated? Has PICC in R arm, and she is unsure what it is for.     Per review of [facility] records no abx since 12/3  Exam with R lateral malleolar ulcer with discharge  No surrounding cellulitis  X ray with perisosteal reaction  High ESra nd CRP though is non specific and could reflect fall   previousl;y was on In[facility]z at [facility] though no abx [facility]    ? OM with sinus tract  ? Ulcer with cellulitis  ? other pathology      Rec:  A) Ankle ulcer  Podiatry eval noted- s/p debridement- follow Cx  MRI if possible with PM-if not obtain CT   empiric vanco + cefepime-pt tolerating despite cefazolin and zosyn allergy  Monitor vanco trougha nd creatinine -would not push vanco dose unless MRSA   Monitor for s/s any confusion with cefepime       B) abx allergies  tolerating cefepime so far  monitor clinically for s/s any allergic reaction    C) fall  will defer to primary team    Will tailor plan for ID issues  per course,results.Will defer to primary team on management of other issues.  Assessment, plan and recommendations as detailed above were discussed with the medical/primary  team.  Will Follow.  Beeper [phone] [facility] [id number].   Wknd/afterhours/No response-[phone] or Fellow on call

## 2021-12-15 NOTE — DIETITIAN INITIAL EVALUATION ADULT. - OTHER INFO
Pt reports continues with decreased appetite and PO in house. Noted 26-50% PO intake as per flow sheets x 1 yesterday (12/14) and 25% of breakfast tray at bedside. Offered nutritional supplement - pt agreed to try diet Mighty Shakes. Denies difficulty chewing/swallowing. Pt denies nausea, vomiting, diarrhea, or constipation, reports last BM "maybe" yesterday (12/14).     Pt reports ~12 pounds weight loss x 8 months due to decreased PO intake from 185 to 173 pounds, followed by weight gain due to fluid accumulation to 193 pounds. Weight as per flow sheets (12/12) 164 pounds - suggests 21 pounds weight loss. Obtained bed weight (12/15) 169.8 pounds -?accuracy due to edema.     Provided recommendations to optimize PO and protein intake and education on hypoglycemia management, recommended nutrient-dense snacks, non-perishable food, or nutritional supplement between meals and to start with protein; reviewed foods with protein, nutrient-dense snacks, and menu order procedures in hospital. Reviewed BG monitoring and how to manage low BG. Pt denies having further questions/concerns about diet and nutrition - made aware RD remains available.

## 2021-12-15 NOTE — DIETITIAN INITIAL EVALUATION ADULT. - ADD RECOMMEND
1. Will continue to monitor PO intake, weight, labs, skin, GI status, diet. 2. Encourage PO intake and obtain food preferences as able (obtained at this time). 3. Recommend Multivitamin and Vitamin C if medically feasible to optimize nutrient intake and further aid with pressure ulcer healing. 4. Provided recommendations to optimize PO and protein intake and education on hypoglycemia management - made aware RD remains available. 5. Malnutrition notification placed in chart.

## 2021-12-15 NOTE — DIETITIAN INITIAL EVALUATION ADULT. - PHYSCIAL ASSESSMENT
Skin: pressure ulcers in khushbu. heel suspected deep tissue injury, right lower buttock stage 2, and sacrum stage 1 as per documentation.   Performed nutrition focused physical exam with pt's consent and noted: overweight

## 2021-12-15 NOTE — DIETITIAN INITIAL EVALUATION ADULT. - REASON INDICATOR FOR ASSESSMENT
Nutrition consult received for pressure ulcer >2.  Information obtained from: medical record and pt.

## 2021-12-15 NOTE — DIETITIAN INITIAL EVALUATION ADULT. - PERTINENT LABORATORY DATA
(12/13) HbA1c 7.3%; Finger sticks: (12/15) 300 (12/14) 245 - 313; (12/15) Cr 1.40, , all other pertinent labs WNL

## 2021-12-15 NOTE — PHARMACOTHERAPY INTERVENTION NOTE - COMMENTS
Medication reconciliation completed. Please refer to specifics in home medication list (outpatient medication review). Medications verified with patient and [place] records in chart.     Medication list from [place]:  acetaminophen 325 mg oral tablet: 2 tab(s) orally every 6 hours, As Needed for pain   allopurinol 100 mg oral tablet: 1 tab(s) orally once a day  cholecalciferol 125 mcg (5000 intl units) oral tablet: 1 tab(s) orally once a day  cyanocobalamin 1000 mcg oral tablet: 2.5 tab(s) orally once a day  docusate potassium 100 mg oral capsule: 2 cap(s) orally once a day (at bedtime)  Dulcolax Laxative 10 mg rectal suppository: 1 suppository(ies) rectal once a day  Eliquis 2.5 mg oral tablet: 1 tab(s) orally 2 times a day  Fleet Enema 19 g-7 g rectal enema: 1 enema rectally PRN for constipation x 1 dose  HumaLOG 100 units/mL subcutaneous solution: inject via sliding scale  Lantus Solostar Pen 100 units/mL subcutaneous solution: 15 units once a day  Lasix 20 mg oral tablet: 1 tab(s) orally once a day  Metoprolol Succinate  mg oral tablet, extended release: 1 tab(s) orally once a day  Milk of Magnesia 8% oral suspension: 30 milliliter(s) orally prn for constipation  Multiple Vitamins oral tablet: 1 tab(s) orally once a day  Norvasc 5 mg oral tablet: 1 tab(s) orally once a day  nystatin 100,000 units/g topical cream: Apply topically to affected area on abdomen twice daily for rash  pantoprazole 40 mg oral delayed release tablet: 1 tab(s) orally once a day  Plavix 75 mg oral tablet: 1 tab(s) orally once a day  Synthroid 50 mcg (0.05 mg) oral tablet: 1 tab(s) orally once a day  Tramadol 25 mg oral tablet: 1 tab(s) orally every 6 hour(s), As Needed for moderate to severe pain  Zocor 20 mg oral tablet: 1 tab(s) orally once a day (at bedtime)    Removed:  Victoza 18 mg/3 mL subcutaneous solution: 1.2 milligram(s) subcutaneous once a day (patient uses at home but was not continued at [place])    Reconciled medication with patient and [facility]. Patient says she would like nurse to explain what medication she is being given during administration. Discussed with nurse. Patient would like to be restarted on vitamin supplements. Recommend starting multivitamin for patient. Patient is on cefepime 1 gram q12h +  vancomycin 1 gram q24h for right ankle infection, ordered vancomycin trough before 12/16 dose, scheduled blood draw for 11:18 AM. Patient has been on 5 unit Lantus and 2 unit Admelog with meals and sliding scale. Given her glucose of 313, 233, 250, 245 yesterday (12/14) and glucose of 243, 300, and 281 today. Recommend increase Lantus to 10 unit and Admelog 4 units with meals. Discussed with FACUNDO Daugherty.     Time spent: 10 minutes    Thank you,    Nena Levi PharmD, PGY-1 Pharmacy Resident  Available on [employer] or [employer] [id number] Medication reconciliation completed. Please refer to specifics in home medication list (outpatient medication review). Medications verified with patient and [facility] records in chart.     Medication list from [facility]:  acetaminophen 325 mg oral tablet: 2 tab(s) orally every 6 hours, As Needed for pain   allopurinol 100 mg oral tablet: 1 tab(s) orally once a day  cholecalciferol 125 mcg (5000 intl units) oral tablet: 1 tab(s) orally once a day  cyanocobalamin 1000 mcg oral tablet: 2.5 tab(s) orally once a day  docusate potassium 100 mg oral capsule: 2 cap(s) orally once a day (at bedtime)  Dulcolax Laxative 10 mg rectal suppository: 1 suppository(ies) rectal once a day  Eliquis 2.5 mg oral tablet: 1 tab(s) orally 2 times a day  Fleet Enema 19 g-7 g rectal enema: 1 enema rectally PRN for constipation x 1 dose  HumaLOG 100 units/mL subcutaneous solution: inject via sliding scale  Lantus Solostar Pen 100 units/mL subcutaneous solution: 15 units once a day  Lasix 20 mg oral tablet: 1 tab(s) orally once a day  Metoprolol Succinate  mg oral tablet, extended release: 1 tab(s) orally once a day  Milk of Magnesia 8% oral suspension: 30 milliliter(s) orally prn for constipation  Multiple Vitamins oral tablet: 1 tab(s) orally once a day  Norvasc 5 mg oral tablet: 1 tab(s) orally once a day  nystatin 100,000 units/g topical cream: Apply topically to affected area on abdomen twice daily for rash  pantoprazole 40 mg oral delayed release tablet: 1 tab(s) orally once a day  Plavix 75 mg oral tablet: 1 tab(s) orally once a day  Synthroid 50 mcg (0.05 mg) oral tablet: 1 tab(s) orally once a day  Tramadol 25 mg oral tablet: 1 tab(s) orally every 6 hour(s), As Needed for moderate to severe pain  Zocor 20 mg oral tablet: 1 tab(s) orally once a day (at bedtime)    Removed:  Victoza 18 mg/3 mL subcutaneous solution: 1.2 milligram(s) subcutaneous once a day (patient uses at home but was not continued at [place])    Reconciled medication with patient and [facility]. Patient says she would like nurse to explain what medication she is being given during administration. Discussed with nurse. Patient is on cefepime 1 gram q12h +  vancomycin 1 gram q24h for right ankle infection, ordered vancomycin trough before 12/16 dose, scheduled blood draw for 11:18 AM. Patient has been on 5 unit Lantus and 2 unit Admelog with meals and sliding scale. Given her glucose of 313, 233, 250, 245 yesterday (12/14) and glucose of 243, 300, and 281 today. Recommend increase Lantus to 10 unit and Admelog 4 units with meals. Discussed with NP [place].     Time spent: 10 minutes    Thank you,    Nena Levi PharmD, PGY-1 Pharmacy Resident  Available on [employer] or [employer] [id number] Medication reconciliation completed. Please refer to specifics in home medication list (outpatient medication review). Medications verified with patient and [place] records in chart.     Medication list from [place]:  acetaminophen 325 mg oral tablet: 2 tab(s) orally every 6 hours, As Needed for pain   allopurinol 100 mg oral tablet: 1 tab(s) orally once a day  cholecalciferol 125 mcg (5000 intl units) oral tablet: 1 tab(s) orally once a day  cyanocobalamin 1000 mcg oral tablet: 2.5 tab(s) orally once a day  docusate potassium 100 mg oral capsule: 2 cap(s) orally once a day (at bedtime)  Dulcolax Laxative 10 mg rectal suppository: 1 suppository(ies) rectal once a day  Eliquis 2.5 mg oral tablet: 1 tab(s) orally 2 times a day  Fleet Enema 19 g-7 g rectal enema: 1 enema rectally PRN for constipation x 1 dose  HumaLOG 100 units/mL subcutaneous solution: inject via sliding scale  Lantus Solostar Pen 100 units/mL subcutaneous solution: 15 units once a day  Lasix 20 mg oral tablet: 1 tab(s) orally once a day  Metoprolol Succinate  mg oral tablet, extended release: 1 tab(s) orally once a day  Milk of Magnesia 8% oral suspension: 30 milliliter(s) orally prn for constipation  Multiple Vitamins oral tablet: 1 tab(s) orally once a day  Norvasc 5 mg oral tablet: 1 tab(s) orally once a day  nystatin 100,000 units/g topical cream: Apply topically to affected area on abdomen twice daily for rash  pantoprazole 40 mg oral delayed release tablet: 1 tab(s) orally once a day  Plavix 75 mg oral tablet: 1 tab(s) orally once a day  Synthroid 50 mcg (0.05 mg) oral tablet: 1 tab(s) orally once a day  Tramadol 25 mg oral tablet: 1 tab(s) orally every 6 hour(s), As Needed for moderate to severe pain  Victoza 18 mg/3 mL subcutaneous solution: 1.2 milligram(s) subcutaneous once a day (patient uses at home but was not continued at [place])  Zocor 20 mg oral tablet: 1 tab(s) orally once a day (at bedtime)    Reconciled medication with patient and with copy of medication list from [facility] in physical chart. Patient says she would like nurse to explain what medication she is being given during administration. Discussed with nurse. Patient is on cefepime 1 gram q12h +  vancomycin 1 gram q24h for right ankle infection, ordered vancomycin trough before 12/16 dose, scheduled blood draw for 11:18 AM. Patient has been on 5 unit Lantus and 2 unit Admelog with meals and sliding scale. Given her glucose of 313, 233, 250, 245 yesterday (12/14) and glucose of 243, 300, and 281 today. Recommend increase Lantus to 10 unit and Admelog 4 units with meals. Discussed with FACUNDO Daugherty.     Time spent: 10 minutes    Thank you,    Nena Levi PharmD, PGY-1 Pharmacy Resident  Available on [employer] or [employer] [id number]

## 2021-12-15 NOTE — DIETITIAN INITIAL EVALUATION ADULT. - PROBLEM SELECTOR PLAN 1
unclear if osteomyelitis or just cellulitis   not clear on details but suspects she had bone infection   also patient has a PICC which is suggestive of osteomyelitis  ESR and CRP are quite high consistent with osteomyelitis  continue cefepime for now  obtain records from [facility] tomorrow

## 2021-12-15 NOTE — DIETITIAN INITIAL EVALUATION ADULT. - DIET TYPE
1. Recommend Consistent Carbohydrate with snack diet. Will continue to monitor as able and adjust as needed. 2. RD will provide diet Mighty Shakes 3xday to optimize kcal and protein intake.

## 2021-12-15 NOTE — PROGRESS NOTE ADULT - SUBJECTIVE AND OBJECTIVE BOX
Patient is a 89y old  Female who presents with a chief complaint of Pt 89 y/o F with PMH as per chart: TAVR earlier this year, DM2, hypothyroidism, A fib on Eliquis, dyslipidemia, HTN, gastritis, gout, admitted S/P fall, brought here for evaluation, found with cellulitis of right ankle, acute CHF. (15 Dec 2021 10:22)       INTERVAL HPI/OVERNIGHT EVENTS:  Patient seen and evaluated at bedside.  Pt is resting comfortable in NAD. Denies N/V/F/C.  Pain rated at X/10    Allergies    Bactrim (Unknown)  Flagyl (Hives; Pruritus)  Macrobid (Other)  sulfa drugs (Hives)  Zosyn (Other)    Intolerances        Vital Signs Last 24 Hrs  T(C): 36.7 (15 Dec 2021 11:19), Max: 36.8 (14 Dec 2021 20:03)  T(F): 98.1 (15 Dec 2021 11:19), Max: 98.3 (14 Dec 2021 20:03)  HR: 70 (15 Dec 2021 11:19) (70 - 80)  BP: 124/69 (15 Dec 2021 11:19) (124/69 - 154/62)  BP(mean): --  RR: 18 (15 Dec 2021 11:19) (17 - 18)  SpO2: 94% (15 Dec 2021 11:19) (93% - 94%)    LABS:                        7.7    10.89 )-----------( 371      ( 15 Dec 2021 06:54 )             25.0     12-15    135  |  97  |  21  ----------------------------<  243<H>  3.5   |  27  |  1.40<H>    Ca    9.1      15 Dec 2021 06:54          CAPILLARY BLOOD GLUCOSE      POCT Blood Glucose.: 281 mg/dL (15 Dec 2021 12:35)  POCT Blood Glucose.: 300 mg/dL (15 Dec 2021 08:45)  POCT Blood Glucose.: 245 mg/dL (14 Dec 2021 21:37)  POCT Blood Glucose.: 250 mg/dL (14 Dec 2021 17:34)

## 2021-12-15 NOTE — PROGRESS NOTE ADULT - SUBJECTIVE AND OBJECTIVE BOX
Patient is a 89y old  Female who presents with a chief complaint of s/p fall (15 Dec 2021 14:04)      DATE OF SERVICE: 12-15-21 @ 17:07    SUBJECTIVE / OVERNIGHT EVENTS: overnight events noted    ROS:  Resp: No cough no sputum production  CVS: No chest pain no palpitations no orthopnea  GI: no N/V/D  : no dysuria, no hematuria  Neuro: no weakness no paresthesias        MEDICATIONS  (STANDING):  allopurinol 100 milliGRAM(s) Oral daily  amLODIPine   Tablet 5 milliGRAM(s) Oral daily  apixaban 2.5 milliGRAM(s) Oral every 12 hours  ascorbic acid 500 milliGRAM(s) Oral daily  cefepime   IVPB      cefepime   IVPB 1000 milliGRAM(s) IV Intermittent every 12 hours  chlorhexidine 2% Cloths 1 Application(s) Topical <User Schedule>  clopidogrel Tablet 75 milliGRAM(s) Oral daily  dextrose 40% Gel 15 Gram(s) Oral once  dextrose 5%. 1000 milliLiter(s) (50 mL/Hr) IV Continuous <Continuous>  dextrose 5%. 1000 milliLiter(s) (100 mL/Hr) IV Continuous <Continuous>  dextrose 50% Injectable 25 Gram(s) IV Push once  dextrose 50% Injectable 12.5 Gram(s) IV Push once  dextrose 50% Injectable 25 Gram(s) IV Push once  furosemide    Tablet 20 milliGRAM(s) Oral daily  glucagon  Injectable 1 milliGRAM(s) IntraMuscular once  insulin glargine Injectable (LANTUS) 10 Unit(s) SubCutaneous at bedtime  insulin lispro (ADMELOG) corrective regimen sliding scale   SubCutaneous three times a day before meals  insulin lispro (ADMELOG) corrective regimen sliding scale   SubCutaneous at bedtime  insulin lispro Injectable (ADMELOG) 4 Unit(s) SubCutaneous three times a day before meals  levothyroxine 50 MICROGram(s) Oral daily  metoprolol succinate  milliGRAM(s) Oral daily  multivitamin 1 Tablet(s) Oral daily  pantoprazole    Tablet 40 milliGRAM(s) Oral before breakfast  simvastatin 20 milliGRAM(s) Oral at bedtime  vancomycin  IVPB 1000 milliGRAM(s) IV Intermittent every 24 hours    MEDICATIONS  (PRN):        CAPILLARY BLOOD GLUCOSE      POCT Blood Glucose.: 281 mg/dL (15 Dec 2021 12:35)  POCT Blood Glucose.: 300 mg/dL (15 Dec 2021 08:45)  POCT Blood Glucose.: 245 mg/dL (14 Dec 2021 21:37)  POCT Blood Glucose.: 250 mg/dL (14 Dec 2021 17:34)    I&O's Summary    14 Dec 2021 07:01  -  15 Dec 2021 07:00  --------------------------------------------------------  IN: 360 mL / OUT: 925 mL / NET: -565 mL    15 Dec 2021 07:01  -  15 Dec 2021 17:07  --------------------------------------------------------  IN: 480 mL / OUT: 375 mL / NET: 105 mL        Vital Signs Last 24 Hrs  T(C): 36.7 (15 Dec 2021 11:19), Max: 36.8 (14 Dec 2021 20:03)  T(F): 98.1 (15 Dec 2021 11:19), Max: 98.3 (14 Dec 2021 20:03)  HR: 70 (15 Dec 2021 11:19) (70 - 80)  BP: 124/69 (15 Dec 2021 11:19) (124/69 - 154/62)  BP(mean): --  RR: 18 (15 Dec 2021 11:19) (17 - 18)  SpO2: 94% (15 Dec 2021 11:19) (93% - 94%)    PHYSICAL EXAM:   HEENT: DEBBIE EOMI  Neck: Supple, no JVD  Lungs: clear  CVS: S1 S2 systolic murmur +   Abdomen: non tender BS +  Neuro: AO x 3 nonfocal  Ext: no cyanosis or clubbing, 1+ edema  right ankle bandage  right arm PICC  Msk: no joint swelling    LABS:                        7.7    10.89 )-----------( 371      ( 15 Dec 2021 06:54 )             25.0     12-15    135  |  97  |  21  ----------------------------<  243<H>  3.5   |  27  |  1.40<H>    Ca    9.1      15 Dec 2021 06:54                  All consultant(s) notes reviewed and care discussed with other providers        Contact Number, Dr Ho [phone]

## 2021-12-16 NOTE — PROGRESS NOTE ADULT - SUBJECTIVE AND OBJECTIVE BOX
Patient is a 89y old  Female who presents with a chief complaint of s/p fall (15 Dec 2021 17:07)    Being followed by ID for ankle infection    Interval history:feels well  denies any ankle pain   ?diarrhea  No acute events      ROS:  No cough,SOB,CP  No N/V/abd pain  No other complaints      Antimicrobials:    cefepime   IVPB      cefepime   IVPB 1000 milliGRAM(s) IV Intermittent every 12 hours  vancomycin  IVPB 1000 milliGRAM(s) IV Intermittent every 24 hours    Other medications reviewed  MEDICATIONS  (STANDING):  allopurinol 100 milliGRAM(s) Oral daily  amLODIPine   Tablet 5 milliGRAM(s) Oral daily  apixaban 2.5 milliGRAM(s) Oral every 12 hours  ascorbic acid 500 milliGRAM(s) Oral daily  cefepime   IVPB      cefepime   IVPB 1000 milliGRAM(s) IV Intermittent every 12 hours  chlorhexidine 2% Cloths 1 Application(s) Topical <User Schedule>  clopidogrel Tablet 75 milliGRAM(s) Oral daily  dextrose 40% Gel 15 Gram(s) Oral once  dextrose 5%. 1000 milliLiter(s) (50 mL/Hr) IV Continuous <Continuous>  dextrose 5%. 1000 milliLiter(s) (100 mL/Hr) IV Continuous <Continuous>  dextrose 50% Injectable 25 Gram(s) IV Push once  dextrose 50% Injectable 12.5 Gram(s) IV Push once  dextrose 50% Injectable 25 Gram(s) IV Push once  furosemide    Tablet 20 milliGRAM(s) Oral daily  glucagon  Injectable 1 milliGRAM(s) IntraMuscular once  insulin glargine Injectable (LANTUS) 10 Unit(s) SubCutaneous at bedtime  insulin lispro (ADMELOG) corrective regimen sliding scale   SubCutaneous at bedtime  insulin lispro (ADMELOG) corrective regimen sliding scale   SubCutaneous three times a day before meals  insulin lispro Injectable (ADMELOG) 4 Unit(s) SubCutaneous three times a day before meals  levothyroxine 50 MICROGram(s) Oral daily  metoprolol succinate  milliGRAM(s) Oral daily  multivitamin 1 Tablet(s) Oral daily  pantoprazole    Tablet 40 milliGRAM(s) Oral before breakfast  simvastatin 20 milliGRAM(s) Oral at bedtime  vancomycin  IVPB 1000 milliGRAM(s) IV Intermittent every 24 hours      Vital Signs Last 24 Hrs  T(C): 37.1 (12-16-21 @ 04:08), Max: 37.4 (12-15-21 @ 20:31)  T(F): 98.8 (12-16-21 @ 04:08), Max: 99.3 (12-15-21 @ 20:31)  HR: 83 (12-16-21 @ 04:08) (70 - 83)  BP: 146/62 (12-16-21 @ 04:08) (124/69 - 146/62)  BP(mean): --  RR: 18 (12-16-21 @ 04:08) (18 - 18)  SpO2: 97% (12-16-21 @ 04:08) (94% - 97%)    Physical Exam:  HEENT PERRLA EOMI    No oral exudate or erythema  L axillary PM     Chest Good AE,CTA    CVS RRR S1 S2 WNl No murmur or rub or gallop    Abd soft BS normal No tenderness no masses    R ankle dressing no discharge   ankle no erythema     PICC  site no erythema tenderness or discharge    CNS AAO X 2    Lab Data:                          7.9    12.80 )-----------( 343      ( 16 Dec 2021 07:08 )             25.8     WBC Count: 12.80 (12-16-21 @ 07:08)  WBC Count: 10.89 (12-15-21 @ 06:54)  WBC Count: 10.56 (12-14-21 @ 03:26)  WBC Count: 8.75 (12-13-21 @ 07:19)  WBC Count: 11.87 (12-12-21 @ 04:51)    12-16    132<L>  |  94<L>  |  22  ----------------------------<  207<H>  3.5   |  26  |  1.42<H>    Ca    9.1      16 Dec 2021 07:06          Culture - Abscess with Gram Stain (collected 13 Dec 2021 18:54)  Source: .Abscess  Preliminary Report (14 Dec 2021 16:03):    No growth    Culture - Abscess with Gram Stain (collected 13 Dec 2021 18:54)  Source: .Abscess right ankle wound  Preliminary Report (14 Dec 2021 16:02):    No growth    Culture - Other (collected 13 Dec 2021 18:54)  Source: .Other Foot wound  Final Report (15 Dec 2021 10:11):    No growth    Culture - Urine (collected 12 Dec 2021 14:14)  Source: Catheterized Catheterized  Final Report (13 Dec 2021 15:24):    No growth    Culture - Blood (collected 12 Dec 2021 08:25)  Source: .Blood Blood-Venous  Preliminary Report (13 Dec 2021 09:01):    No growth to date.    Culture - Blood (collected 12 Dec 2021 08:25)  Source: .Blood Blood-Peripheral  Preliminary Report (13 Dec 2021 09:01):    No growth to date.            Vancomycin Level, Trough: 9.6 ug/mL (12-14-21 @ 10:45)             Patient is a 89y old  Female who presents with a chief complaint of s/p fall (15 Dec 2021 17:07)    Being followed by ID for ankle infection    Interval history:feels well  denies any ankle pain   ?diarrhea-none documented   No acute events      ROS:  No cough,SOB,CP  No N/V/abd pain  No other complaints      Antimicrobials:    cefepime   IVPB      cefepime   IVPB 1000 milliGRAM(s) IV Intermittent every 12 hours  vancomycin  IVPB 1000 milliGRAM(s) IV Intermittent every 24 hours    Other medications reviewed  MEDICATIONS  (STANDING):  allopurinol 100 milliGRAM(s) Oral daily  amLODIPine   Tablet 5 milliGRAM(s) Oral daily  apixaban 2.5 milliGRAM(s) Oral every 12 hours  ascorbic acid 500 milliGRAM(s) Oral daily  cefepime   IVPB      cefepime   IVPB 1000 milliGRAM(s) IV Intermittent every 12 hours  chlorhexidine 2% Cloths 1 Application(s) Topical <User Schedule>  clopidogrel Tablet 75 milliGRAM(s) Oral daily  dextrose 40% Gel 15 Gram(s) Oral once  dextrose 5%. 1000 milliLiter(s) (50 mL/Hr) IV Continuous <Continuous>  dextrose 5%. 1000 milliLiter(s) (100 mL/Hr) IV Continuous <Continuous>  dextrose 50% Injectable 25 Gram(s) IV Push once  dextrose 50% Injectable 12.5 Gram(s) IV Push once  dextrose 50% Injectable 25 Gram(s) IV Push once  furosemide    Tablet 20 milliGRAM(s) Oral daily  glucagon  Injectable 1 milliGRAM(s) IntraMuscular once  insulin glargine Injectable (LANTUS) 10 Unit(s) SubCutaneous at bedtime  insulin lispro (ADMELOG) corrective regimen sliding scale   SubCutaneous at bedtime  insulin lispro (ADMELOG) corrective regimen sliding scale   SubCutaneous three times a day before meals  insulin lispro Injectable (ADMELOG) 4 Unit(s) SubCutaneous three times a day before meals  levothyroxine 50 MICROGram(s) Oral daily  metoprolol succinate  milliGRAM(s) Oral daily  multivitamin 1 Tablet(s) Oral daily  pantoprazole    Tablet 40 milliGRAM(s) Oral before breakfast  simvastatin 20 milliGRAM(s) Oral at bedtime  vancomycin  IVPB 1000 milliGRAM(s) IV Intermittent every 24 hours      Vital Signs Last 24 Hrs  T(C): 37.1 (12-16-21 @ 04:08), Max: 37.4 (12-15-21 @ 20:31)  T(F): 98.8 (12-16-21 @ 04:08), Max: 99.3 (12-15-21 @ 20:31)  HR: 83 (12-16-21 @ 04:08) (70 - 83)  BP: 146/62 (12-16-21 @ 04:08) (124/69 - 146/62)  BP(mean): --  RR: 18 (12-16-21 @ 04:08) (18 - 18)  SpO2: 97% (12-16-21 @ 04:08) (94% - 97%)    Physical Exam:  HEENT PERRLA EOMI    No oral exudate or erythema  L axillary PM     Chest Good AE,CTA    CVS RRR S1 S2 WNl No murmur or rub or gallop    Abd soft BS normal No tenderness no masses    R ankle dressing no discharge   ankle no erythema     PICC  site no erythema tenderness or discharge    CNS AAO X 2    Lab Data:                          7.9    12.80 )-----------( 343      ( 16 Dec 2021 07:08 )             25.8     WBC Count: 12.80 (12-16-21 @ 07:08)  WBC Count: 10.89 (12-15-21 @ 06:54)  WBC Count: 10.56 (12-14-21 @ 03:26)  WBC Count: 8.75 (12-13-21 @ 07:19)  WBC Count: 11.87 (12-12-21 @ 04:51)    12-16    132<L>  |  94<L>  |  22  ----------------------------<  207<H>  3.5   |  26  |  1.42<H>    Ca    9.1      16 Dec 2021 07:06          Culture - Abscess with Gram Stain (collected 13 Dec 2021 18:54)  Source: .Abscess  Preliminary Report (14 Dec 2021 16:03):    No growth    Culture - Abscess with Gram Stain (collected 13 Dec 2021 18:54)  Source: .Abscess right ankle wound  Preliminary Report (14 Dec 2021 16:02):    No growth    Culture - Other (collected 13 Dec 2021 18:54)  Source: .Other Foot wound  Final Report (15 Dec 2021 10:11):    No growth    Culture - Urine (collected 12 Dec 2021 14:14)  Source: Catheterized Catheterized  Final Report (13 Dec 2021 15:24):    No growth    Culture - Blood (collected 12 Dec 2021 08:25)  Source: .Blood Blood-Venous  Preliminary Report (13 Dec 2021 09:01):    No growth to date.    Culture - Blood (collected 12 Dec 2021 08:25)  Source: .Blood Blood-Peripheral  Preliminary Report (13 Dec 2021 09:01):    No growth to date.            Vancomycin Level, Trough: 9.6 ug/mL (12-14-21 @ 10:45)      < from: US Kidney and Bladder (12.15.21 @ 11:52) >  IMPRESSION:  Increased renal echogenicity, suggesting medical renal disease. No   hydronephrosis.    Urinary bladder collapsed around a Heredia catheter.    Bilateral pleural effusions.    --- End of Report ---        < end of copied text >        < from: Xray Chest 1 View- PORTABLE-Routine (Xray Chest 1 View- PORTABLE-Routine .) (12.15.21 @ 10:18) >  IMPRESSION:    The heart is normal in size. The Lungs are clear. No pleural effusion. No   pneumothorax. A pacer was placed on the left and the electrodes are in   the right atrium and right ventricular. Calcified aortic knob.    --- End of Report ---      < end of copied text >

## 2021-12-16 NOTE — CONSULT NOTE ADULT - ASSESSMENT
Assessment  DMT2: 89y Female with DM T2 with hyperglycemia, A1C 7.3%, was on insulin at home, now on basal bolus insulin, blood sugars running high and not at target, no hypoglycemic episodes, eating partial meals.  Fall: workup in progress, stable, monitored.  Hypothyroidism: On Synthroid 50 mcg po daily, compliant with Synthroid intake, euthyroid.  HTN: Controlled,  on antihypertensive medications.  CKD: Monitor labs/BMP,       Jono Zaldivar MD  Cell: [phone]  Office: [phone]     Assessment  DMT2: 89y Female with DM T2 with hyperglycemia, A1C 7.3%, was on insulin at home, now on basal bolus insulin, blood sugars running high and not at target, no hypoglycemic  episodes, eating partial meals.  Fall: workup in progress, stable, monitored.  Hypothyroidism: On Synthroid 50 mcg po daily, compliant with Synthroid intake, euthyroid.  HTN: Controlled,  on antihypertensive medications.  CKD: Monitor labs/BMP,       Jono Zaldivar MD  Cell: [phone]  Office: [phone]

## 2021-12-16 NOTE — PROGRESS NOTE ADULT - ASSESSMENT
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 Notes shes been trying to move out of bed and fell, hitting mainly her hip and parts of her back. She does note feeling a bit weaker than usual, and not drinking enough water because she doesn't want to urinate at night. No fevers, chills, cough that she endorses. Does note hitting her R ankle previously and developing infection recently, for which was treated? Has PICC in R arm, and she is unsure what it is for.     Per review of [facility] records no abx since 12/3  Exam with R lateral malleolar ulcer with discharge  No surrounding cellulitis  X ray with perisosteal reaction  High ESra nd CRP though is non specific and could reflect fall   previousl;y was on Invanz at [facility] though [facility]    ? OM with sinus tract  ? Ulcer with cellulitis  ? other pathology  also increased WBC today      Rec:  A) Ankle ulcer  Podiatry eval noted- s/p debridement- follow Cx-so far negative   MRI if possible with PM-if not obtain CT   empiric vanco + cefepime-pt tolerating despite cefazolin and zosyn allergy  Monitor vanco trougha nd creatinine -would not push vanco dose unless MRSA   Monitor for s/s any confusion with cefepime       B) abx allergies  tolerating cefepime so far  monitor clinically for s/s any allergic reaction    C) fall  will defer to primary team    D) leucocytosis  monitor for C diff   Monitor for s/s any other foci  if further incraese check cultures and repeat imaging     Will tailor plan for ID issues  per course,results.Will defer to primary team on management of other issues.  Assessment, plan and recommendations as detailed above were discussed with the medical/primary  team.  Will Follow.  Beeper [phone] JONATHAN [id number].   Wknd/afterhours/No response-[phone] or Fellow on call

## 2021-12-16 NOTE — CONSULT NOTE ADULT - SUBJECTIVE AND OBJECTIVE BOX
HPI:  89 y.o F with PMH of TAVR earlier this year, DM 2, hypothyroidism, A fib on Eliquis here because she fell while trying to get out of bed. Notes shes been trying to move out of bed and fell, hitting mainly her hip and parts of her back. She does note feeling a bit weaker than usual, and not drinking enough water because she doesn't want to urinate at night. No fevers, chills, cough that she endorses. Does note hitting her R ankle previously and developing infection recently, for which was treated? Has PICC in R arm, and she is unsure what it is for. D/w  who said patient was admitted to [facility] 3 weeks ago and was diagnosed with right ankle cellulitis ? osteomyelitis. The patient was discharge to [facility]. She fell out of bed in the SNF and was transferred to [facility] (12 Dec 2021 12:42)    Patient has history of diabetes, A1C 7.3%, on insulin at home, no recent hypoglycemic episodes, no polyuria polydipsia. Patient follows up with PCP for diabetes management.  Endo was consulted for glycemic control.    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    S/P bilateral Cataract Extraction and ocular lens implant    S/P Tonsillectomy    S/P TAVR (transcatheter aortic valve replacement)        FAMILY HISTORY:  No pertinent family history in first degree relatives        Social History:    Outpatient Medications:    MEDICATIONS  (STANDING):  allopurinol 100 milliGRAM(s) Oral daily  amLODIPine   Tablet 5 milliGRAM(s) Oral daily  apixaban 2.5 milliGRAM(s) Oral every 12 hours  ascorbic acid 500 milliGRAM(s) Oral daily  cefepime   IVPB      cefepime   IVPB 1000 milliGRAM(s) IV Intermittent every 12 hours  chlorhexidine 2% Cloths 1 Application(s) Topical <User Schedule>  clopidogrel Tablet 75 milliGRAM(s) Oral daily  dextrose 40% Gel 15 Gram(s) Oral once  dextrose 5%. 1000 milliLiter(s) (50 mL/Hr) IV Continuous <Continuous>  dextrose 5%. 1000 milliLiter(s) (100 mL/Hr) IV Continuous <Continuous>  dextrose 50% Injectable 25 Gram(s) IV Push once  dextrose 50% Injectable 12.5 Gram(s) IV Push once  dextrose 50% Injectable 25 Gram(s) IV Push once  furosemide    Tablet 20 milliGRAM(s) Oral daily  glucagon  Injectable 1 milliGRAM(s) IntraMuscular once  insulin glargine Injectable (LANTUS) 10 Unit(s) SubCutaneous at bedtime  insulin lispro (ADMELOG) corrective regimen sliding scale   SubCutaneous at bedtime  insulin lispro (ADMELOG) corrective regimen sliding scale   SubCutaneous three times a day before meals  insulin lispro Injectable (ADMELOG) 4 Unit(s) SubCutaneous three times a day before meals  levothyroxine 50 MICROGram(s) Oral daily  metoprolol succinate  milliGRAM(s) Oral daily  multivitamin 1 Tablet(s) Oral daily  pantoprazole    Tablet 40 milliGRAM(s) Oral before breakfast  simvastatin 20 milliGRAM(s) Oral at bedtime  vancomycin  IVPB 1000 milliGRAM(s) IV Intermittent every 24 hours    MEDICATIONS  (PRN):  acetaminophen     Tablet .. 650 milliGRAM(s) Oral every 6 hours PRN Temp greater or equal to 38C (100.4F), Moderate Pain (4 - 6)      Allergies    Bactrim (Unknown)  Flagyl (Hives; Pruritus)  Macrobid (Other)  sulfa drugs (Hives)  Zosyn (Other)    Intolerances      Review of Systems:  Constitutional: No fever, no chills  Eyes: No blurry vision  Neuro: No tremors  HEENT: No pain, no neck swelling  Cardiovascular: No chest pain, no palpitations  Respiratory: Has SOB, no cough  GI: No nausea, vomiting, abdominal pain  : No dysuria  Skin: no rash  MSK: Has leg swelling.  Psych: no depression  Endocrine: no polyuria, polydipsia    ALL OTHER SYSTEMS REVIEWED AND NEGATIVE    UNABLE TO OBTAIN    PHYSICAL EXAM:  VITALS: T(C): 37.1 (12-16-21 @ 04:08)  T(F): 98.8 (12-16-21 @ 04:08), Max: 99.3 (12-15-21 @ 20:31)  HR: 83 (12-16-21 @ 04:08) (74 - 83)  BP: 146/62 (12-16-21 @ 04:08) (132/56 - 146/62)  RR:  (18 - 18)  SpO2:  (94% - 97%)  Wt(kg): --  GENERAL: NAD, well-groomed, well-developed  EYES: No proptosis, no lid lag  HEENT:  Atraumatic, Normocephalic  THYROID: Normal size, no palpable nodules  RESPIRATORY: Clear to auscultation bilaterally; No rales, rhonchi, wheezing  CARDIOVASCULAR: Si S2, No murmurs;  GI: Soft, non distended, normal bowel sounds  SKIN: Dry, intact, No rashes or lesions  MUSCULOSKELETAL: Has BL lower extremity edema.  NEURO:  no tremor, sensation decreased in feet BL,    POCT Blood Glucose.: 255 mg/dL (12-16-21 @ 12:31)  POCT Blood Glucose.: 247 mg/dL (12-16-21 @ 08:15)  POCT Blood Glucose.: 242 mg/dL (12-15-21 @ 21:20)  POCT Blood Glucose.: 258 mg/dL (12-15-21 @ 17:16)  POCT Blood Glucose.: 281 mg/dL (12-15-21 @ 12:35)  POCT Blood Glucose.: 300 mg/dL (12-15-21 @ 08:45)  POCT Blood Glucose.: 245 mg/dL (12-14-21 @ 21:37)  POCT Blood Glucose.: 250 mg/dL (12-14-21 @ 17:34)  POCT Blood Glucose.: 233 mg/dL (12-14-21 @ 12:23)  POCT Blood Glucose.: 313 mg/dL (12-14-21 @ 08:58)  POCT Blood Glucose.: 323 mg/dL (12-13-21 @ 23:31)  POCT Blood Glucose.: 298 mg/dL (12-13-21 @ 22:22)  POCT Blood Glucose.: 306 mg/dL (12-13-21 @ 17:38)                            7.9    12.80 )-----------( 343      ( 16 Dec 2021 07:08 )             25.8       12-16    132<L>  |  94<L>  |  22  ----------------------------<  207<H>  3.5   |  26  |  1.42<H>    EGFR if : 38<L>  EGFR if non : 32<L>    Ca    9.1      12-16        Thyroid Function Tests:  12-13 @ 10:23 TSH 2.84 FreeT4 -- T3 -- Anti TPO -- Anti Thyroglobulin Ab -- TSI --              Radiology:                    HPI:  89 y.o F with PMH of TAVR earlier this year, DM 2, hypothyroidism, A fib on Eliquis here because she fell while trying to get out of bed. Notes shes been trying to move out of bed  and fell, hitting mainly her hip and parts of her back. She does note feeling a bit weaker than usual, and not drinking enough water because she doesn't want to urinate at night. No fevers, chills, cough that she endorses. Does note hitting her R ankle previously and developing infection recently, for which was treated? Has PICC in R arm, and she is unsure what it is for. D/w  who said patient was admitted to [facility] 3 weeks ago and was diagnosed with right ankle cellulitis ? osteomyelitis. The patient was discharge to [facility]. She fell out of bed in the SNF and was transferred to [facility] (12 Dec 2021 12:42)    Patient has history of diabetes, A1C 7.3%, on insulin at home, no recent hypoglycemic episodes, no polyuria polydipsia. Patient follows up with PCP for diabetes management.  Endo was consulted for glycemic control.    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    S/P bilateral Cataract Extraction and ocular lens implant    S/P Tonsillectomy    S/P TAVR (transcatheter aortic valve replacement)        FAMILY HISTORY:  No pertinent family history in first degree relatives        Social History:    Outpatient Medications:    MEDICATIONS  (STANDING):  allopurinol 100 milliGRAM(s) Oral daily  amLODIPine   Tablet 5 milliGRAM(s) Oral daily  apixaban 2.5 milliGRAM(s) Oral every 12 hours  ascorbic acid 500 milliGRAM(s) Oral daily  cefepime   IVPB      cefepime   IVPB 1000 milliGRAM(s) IV Intermittent every 12 hours  chlorhexidine 2% Cloths 1 Application(s) Topical <User Schedule>  clopidogrel Tablet 75 milliGRAM(s) Oral daily  dextrose 40% Gel 15 Gram(s) Oral once  dextrose 5%. 1000 milliLiter(s) (50 mL/Hr) IV Continuous <Continuous>  dextrose 5%. 1000 milliLiter(s) (100 mL/Hr) IV Continuous <Continuous>  dextrose 50% Injectable 25 Gram(s) IV Push once  dextrose 50% Injectable 12.5 Gram(s) IV Push once  dextrose 50% Injectable 25 Gram(s) IV Push once  furosemide    Tablet 20 milliGRAM(s) Oral daily  glucagon  Injectable 1 milliGRAM(s) IntraMuscular once  insulin glargine Injectable (LANTUS) 10 Unit(s) SubCutaneous at bedtime  insulin lispro (ADMELOG) corrective regimen sliding scale   SubCutaneous at bedtime  insulin lispro (ADMELOG) corrective regimen sliding scale   SubCutaneous three times a day before meals  insulin lispro Injectable (ADMELOG) 4 Unit(s) SubCutaneous three times a day before meals  levothyroxine 50 MICROGram(s) Oral daily  metoprolol succinate  milliGRAM(s) Oral daily  multivitamin 1 Tablet(s) Oral daily  pantoprazole    Tablet 40 milliGRAM(s) Oral before breakfast  simvastatin 20 milliGRAM(s) Oral at bedtime  vancomycin  IVPB 1000 milliGRAM(s) IV Intermittent every 24 hours    MEDICATIONS  (PRN):  acetaminophen     Tablet .. 650 milliGRAM(s) Oral every 6 hours PRN Temp greater or equal to 38C (100.4F), Moderate Pain (4 - 6)      Allergies    Bactrim (Unknown)  Flagyl (Hives; Pruritus)  Macrobid (Other)  sulfa drugs (Hives)  Zosyn (Other)    Intolerances      Review of Systems:  Constitutional: No fever, no chills  Eyes: No blurry vision  Neuro: No tremors  HEENT: No pain, no neck swelling  Cardiovascular: No chest pain, no palpitations  Respiratory: Has SOB, no cough  GI: No nausea, vomiting, abdominal pain  : No dysuria  Skin: no rash  MSK: Has leg swelling.  Psych: no depression  Endocrine: no polyuria, polydipsia    ALL OTHER SYSTEMS REVIEWED AND NEGATIVE    UNABLE TO OBTAIN    PHYSICAL EXAM:  VITALS: T(C): 37.1 (12-16-21 @ 04:08)  T(F): 98.8 (12-16-21 @ 04:08), Max: 99.3 (12-15-21 @ 20:31)  HR: 83 (12-16-21 @ 04:08) (74 - 83)  BP: 146/62 (12-16-21 @ 04:08) (132/56 - 146/62)  RR:  (18 - 18)  SpO2:  (94% - 97%)  Wt(kg): --  GENERAL: NAD, well-groomed, well-developed  EYES: No proptosis, no lid lag  HEENT:  Atraumatic, Normocephalic  THYROID: Normal size, no palpable nodules  RESPIRATORY: Clear to auscultation bilaterally; No rales, rhonchi, wheezing  CARDIOVASCULAR: Si S2, No murmurs;  GI: Soft, non distended, normal bowel sounds  SKIN: Dry, intact, No rashes or lesions  MUSCULOSKELETAL: Has BL lower extremity edema.  NEURO:  no tremor, sensation decreased in feet BL,    POCT Blood Glucose.: 255 mg/dL (12-16-21 @ 12:31)  POCT Blood Glucose.: 247 mg/dL (12-16-21 @ 08:15)  POCT Blood Glucose.: 242 mg/dL (12-15-21 @ 21:20)  POCT Blood Glucose.: 258 mg/dL (12-15-21 @ 17:16)  POCT Blood Glucose.: 281 mg/dL (12-15-21 @ 12:35)  POCT Blood Glucose.: 300 mg/dL (12-15-21 @ 08:45)  POCT Blood Glucose.: 245 mg/dL (12-14-21 @ 21:37)  POCT Blood Glucose.: 250 mg/dL (12-14-21 @ 17:34)  POCT Blood Glucose.: 233 mg/dL (12-14-21 @ 12:23)  POCT Blood Glucose.: 313 mg/dL (12-14-21 @ 08:58)  POCT Blood Glucose.: 323 mg/dL (12-13-21 @ 23:31)  POCT Blood Glucose.: 298 mg/dL (12-13-21 @ 22:22)  POCT Blood Glucose.: 306 mg/dL (12-13-21 @ 17:38)                            7.9    12.80 )-----------( 343      ( 16 Dec 2021 07:08 )             25.8       12-16    132<L>  |  94<L>  |  22  ----------------------------<  207<H>  3.5   |  26  |  1.42<H>    EGFR if : 38<L>  EGFR if non : 32<L>    Ca    9.1      12-16        Thyroid Function Tests:  12-13 @ 10:23 TSH 2.84 FreeT4 -- T3 -- Anti TPO -- Anti Thyroglobulin Ab -- TSI --              Radiology:

## 2021-12-16 NOTE — CONSULT NOTE ADULT - PROBLEM SELECTOR RECOMMENDATION 9
Will increase Lantus to 14u at bedtime.  Will increase Admelog to 6u before each meal and continue Admelog correction scale coverage. Will continue monitoring FS and FU.  If blood sugars are stable can DC on current basal bolus insulin regimen, endo FU 3 months.  Patient counseled for compliance with consistent low carb diet.

## 2021-12-16 NOTE — PROGRESS NOTE ADULT - SUBJECTIVE AND OBJECTIVE BOX
Patient is a 89y old  Female who presents with a chief complaint of s/p fall (16 Dec 2021 14:24)      DATE OF SERVICE: 12-16-21 @ 15:29    SUBJECTIVE / OVERNIGHT EVENTS: overnight events noted    ROS:  Resp: No cough no sputum production  CVS: No chest pain no palpitations no orthopnea  GI: no N/V/D  : no dysuria, no hematuria  Neuro: no weakness no paresthesias          MEDICATIONS  (STANDING):  allopurinol 100 milliGRAM(s) Oral daily  amLODIPine   Tablet 5 milliGRAM(s) Oral daily  apixaban 2.5 milliGRAM(s) Oral every 12 hours  ascorbic acid 500 milliGRAM(s) Oral daily  cefepime   IVPB      cefepime   IVPB 1000 milliGRAM(s) IV Intermittent every 12 hours  chlorhexidine 2% Cloths 1 Application(s) Topical <User Schedule>  clopidogrel Tablet 75 milliGRAM(s) Oral daily  dextrose 40% Gel 15 Gram(s) Oral once  dextrose 5%. 1000 milliLiter(s) (50 mL/Hr) IV Continuous <Continuous>  dextrose 5%. 1000 milliLiter(s) (100 mL/Hr) IV Continuous <Continuous>  dextrose 50% Injectable 25 Gram(s) IV Push once  dextrose 50% Injectable 12.5 Gram(s) IV Push once  dextrose 50% Injectable 25 Gram(s) IV Push once  furosemide    Tablet 20 milliGRAM(s) Oral daily  glucagon  Injectable 1 milliGRAM(s) IntraMuscular once  insulin glargine Injectable (LANTUS) 14 Unit(s) SubCutaneous at bedtime  insulin lispro (ADMELOG) corrective regimen sliding scale   SubCutaneous at bedtime  insulin lispro (ADMELOG) corrective regimen sliding scale   SubCutaneous three times a day before meals  insulin lispro Injectable (ADMELOG) 6 Unit(s) SubCutaneous three times a day before meals  levothyroxine 50 MICROGram(s) Oral daily  metoprolol succinate  milliGRAM(s) Oral daily  multivitamin 1 Tablet(s) Oral daily  pantoprazole    Tablet 40 milliGRAM(s) Oral before breakfast  simvastatin 20 milliGRAM(s) Oral at bedtime  vancomycin  IVPB 1000 milliGRAM(s) IV Intermittent every 24 hours    MEDICATIONS  (PRN):  acetaminophen     Tablet .. 650 milliGRAM(s) Oral every 6 hours PRN Temp greater or equal to 38C (100.4F), Moderate Pain (4 - 6)        CAPILLARY BLOOD GLUCOSE      POCT Blood Glucose.: 255 mg/dL (16 Dec 2021 12:31)  POCT Blood Glucose.: 247 mg/dL (16 Dec 2021 08:15)  POCT Blood Glucose.: 242 mg/dL (15 Dec 2021 21:20)  POCT Blood Glucose.: 258 mg/dL (15 Dec 2021 17:16)    I&O's Summary    15 Dec 2021 07:01  -  16 Dec 2021 07:00  --------------------------------------------------------  IN: 890 mL / OUT: 625 mL / NET: 265 mL    16 Dec 2021 07:01  -  16 Dec 2021 15:29  --------------------------------------------------------  IN: 480 mL / OUT: 0 mL / NET: 480 mL        Vital Signs Last 24 Hrs  T(C): 37.1 (16 Dec 2021 04:08), Max: 37.4 (15 Dec 2021 20:31)  T(F): 98.8 (16 Dec 2021 04:08), Max: 99.3 (15 Dec 2021 20:31)  HR: 83 (16 Dec 2021 04:08) (74 - 83)  BP: 146/62 (16 Dec 2021 04:08) (132/56 - 146/62)  BP(mean): --  RR: 18 (16 Dec 2021 04:08) (18 - 18)  SpO2: 97% (16 Dec 2021 04:08) (94% - 97%)    PHYSICAL EXAM:   HEENT: DEBBIE EOMI  Neck: Supple, no JVD  Lungs: clear  CVS: S1 S2 systolic murmur +   Abdomen: non tender BS +  Neuro: AO x 3 nonfocal  Ext: no cyanosis or clubbing, 1+ edema  right ankle bandage  right arm PICC  Msk: no joint swelling    LABS:                        7.9    12.80 )-----------( 343      ( 16 Dec 2021 07:08 )             25.8     12-16    132<L>  |  94<L>  |  22  ----------------------------<  207<H>  3.5   |  26  |  1.42<H>    Ca    9.1      16 Dec 2021 07:06                  All consultant(s) notes reviewed and care discussed with other providers        Contact Number, Dr Ho [phone]

## 2021-12-16 NOTE — PROCEDURE NOTE - ADDITIONAL PROCEDURE DETAILS
Indication for Interrogation: Post-MRI Ankle   Presenting Rhythm: AFib 70-100s  Measured Data: Sensing WNL, V Threshold WNL, Lead Impedence Stable, Unable to determine Atrial threshold d/t underlying AF rates, Normal device function. Not PPM dependent.   Changes made: MRI mode disabled. Mode programmed to AAIR<-->DDDR       GEOVANI Hdz PA-C  [id number]
Indication for Interrogation: Pre-MRI Ankle   Presenting Rhythm: AFib / V-Sensed 70-100s  Measured Data: Sensing WNL, V Threshold WNL, Lead Impedence Stable, Unable to determine Atrial threshold d/t underlying AF rates, Normal device function. Not PPM dependent.   Changes made: MRI mode enabled. Mode programmed to ODO (pacing off).  Call back post-MRI for reprogramming      GEOVANI Hdz PA-C  [id number]
Indication for Interrogation: Pre-MRI Ankle   Presenting Rhythm: AFib / V-Sensed 70-100s  Measured Data: Sensing WNL, V Threshold WNL, Lead Impedence Stable, Unable to determine Atrial threshold d/t underlying AF rates, Normal device function. Not PPM dependent.   Events since Last Interrogation: multiple recorded episodes of AF, reviewed available EGMs likely consistent with AF, most recent episode began 12/11/2021.   Changes made: none  Discussed with primary team.       GEOVANI Hdz PA-C  [id number]

## 2021-12-17 NOTE — PROGRESS NOTE ADULT - ASSESSMENT
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 Notes shes been trying to move out of bed and fell, hitting mainly her hip and parts of her back. She does note feeling a bit weaker than usual, and not drinking enough water because she doesn't want to urinate at night. No fevers, chills, cough that she endorses. Does note hitting her R ankle previously and developing infection recently, for which was treated? Has PICC in R arm, and she is unsure what it is for.     Per review of [facility] records no abx since 12/3  Exam with R lateral malleolar ulcer with discharge  No surrounding cellulitis  X ray with perisosteal reaction  High ESra nd CRP though is non specific and could reflect fall   previousl;y was on Invanz at [facility] though no abx at [facility] in SNF  MRI as above   ? OM ? septic arthritis- ? Cx negative as did get some abx prior to Cx  Less likely non infectious  Blood Cx negative-MRSA PCR negative  Pt though with recent PCR     Rec:  A) Ankle ulcer  Podiatry eval noted- s/p debridement- follow Cx-so far negative   would recommend consideration for washout/surgical intervention given she has TAVR and has previously been on abx  for now empiric vanco + cefepime-pt tolerating despite cefazolin and zosyn allergy  add flagyl  If no MRSA on Cx will attempt de-escalation   Monitor vanco trougha nd creatinine -would not push vanco dose unless MRSA   Monitor for s/s any confusion with cefepime       B) abx allergies  tolerating cefepime so far  monitor clinically for s/s any allergic reaction    C) fall  will defer to primary team    D) leucocytosis  monitor for C diff   Monitor for s/s any other foci  flagyl added today   if further increase check cultures and repeat imaging     Will tailor plan for ID issues  per course,results.Will defer to primary team on management of other issues.  Assessment, plan and recommendations as detailed above were discussed with the medical/primary  team.  Infectious Diseases Service will cover over weekend.  Please call [phone] if issues

## 2021-12-17 NOTE — PROGRESS NOTE ADULT - SUBJECTIVE AND OBJECTIVE BOX
Patient is a 89y old  Female who presents with a chief complaint of s/p fall (16 Dec 2021 15:29)    Being followed by ID for ankle cellulitis/OM    Interval history:denies any pain  denies ankle pain   flagyl added  No acute events      ROS:  No cough,SOB,CP  No N/V/D./abd pain  No other complaints      Antimicrobials:    cefepime   IVPB      cefepime   IVPB 1000 milliGRAM(s) IV Intermittent every 12 hours  metroNIDAZOLE  IVPB 500 milliGRAM(s) IV Intermittent every 12 hours  vancomycin  IVPB 1000 milliGRAM(s) IV Intermittent every 24 hours    Other medications reviewed    Vital Signs Last 24 Hrs  T(C): 36.8 (12-17-21 @ 04:43), Max: 36.9 (12-16-21 @ 20:15)  T(F): 98.2 (12-17-21 @ 04:43), Max: 98.4 (12-16-21 @ 20:15)  HR: 78 (12-17-21 @ 06:00) (71 - 78)  BP: 132/68 (12-17-21 @ 06:00) (126/71 - 132/68)  BP(mean): --  RR: 18 (12-17-21 @ 04:43) (18 - 18)  SpO2: 93% (12-17-21 @ 04:43) (93% - 96%)    Physical Exam:    HEENT PERRLA EOMI    No oral exudate or erythema  L axillary PM     Chest Good AE,CTA    CVS RRR S1 S2 WNl No murmur or rub or gallop    Abd soft BS normal No tenderness no masses    R ankle dressing no discharge   ankle no erythema   no LOM   PICC  site no erythema tenderness or discharge    CNS AAO X 2  Lab Data:                          7.7    12.31 )-----------( 342      ( 17 Dec 2021 06:44 )             25.4     WBC Count: 12.31 (12-17-21 @ 06:44)  WBC Count: 12.80 (12-16-21 @ 07:08)  WBC Count: 10.89 (12-15-21 @ 06:54)  WBC Count: 10.56 (12-14-21 @ 03:26)  WBC Count: 8.75 (12-13-21 @ 07:19)  WBC Count: 11.87 (12-12-21 @ 04:51)    12-17    132<L>  |  97  |  26<H>  ----------------------------<  213<H>  3.8   |  26  |  1.57<H>    Ca    9.3      17 Dec 2021 06:43          Culture - Abscess with Gram Stain (collected 13 Dec 2021 18:54)  Source: .Abscess  Preliminary Report (14 Dec 2021 16:03):    No growth    Culture - Abscess with Gram Stain (collected 13 Dec 2021 18:54)  Source: .Abscess right ankle wound  Preliminary Report (14 Dec 2021 16:02):    No growth    Culture - Other (collected 13 Dec 2021 18:54)  Source: .Other Foot wound  Final Report (15 Dec 2021 10:11):    No growth    Culture - Urine (collected 12 Dec 2021 14:14)  Source: Catheterized Catheterized  Final Report (13 Dec 2021 15:24):    No growth            Vancomycin Level, Trough: 16.4 ug/mL (12-16-21 @ 17:15)    MRSA/MSSA PCR (12.16.21 @ 19:23)   MRSA PCR Result.: NotDetec:    < from: MR Ankle w/wo IV Cont, Right (12.16.21 @ 14:35) >  IMPRESSION:  1. Changes concerning for septic arthritis of the ankle/hindfoot and   midfoot with osteomyelitis given the clinical concern. Inflammatory   arthropathy could also be considered in the appropriate setting.  2. Mild tenosynovitis of the medial flexor tendons with moderate   tenosynovitis of the peroneal tendons. This may be reactive or infectious.  3. Findings discussed with FACUNDO Navas on 12/16/2021 3:00 PM.    --- End of Report ---    < end of copied text >

## 2021-12-17 NOTE — PROGRESS NOTE ADULT - NSPROGADDITIONALINFOA_GEN_ALL_CORE
start heparin SQ  discussed with patient in detail, expresses understanding of treatment plans.  discussed with patient's  at bedside    discussed with covering ACP

## 2021-12-17 NOTE — PROGRESS NOTE ADULT - SUBJECTIVE AND OBJECTIVE BOX
Patient is a 89y old  Female who presents with a chief complaint of s/p fall (17 Dec 2021 12:40)      DATE OF SERVICE: 12-17-21 @ 15:13    SUBJECTIVE / OVERNIGHT EVENTS: overnight events noted    ROS:  Resp: No cough no sputum production  CVS: No chest pain no palpitations no orthopnea  GI: no N/V/D  : no dysuria, no hematuria        MEDICATIONS  (STANDING):  allopurinol 100 milliGRAM(s) Oral daily  amLODIPine   Tablet 5 milliGRAM(s) Oral daily  ascorbic acid 500 milliGRAM(s) Oral daily  chlorhexidine 2% Cloths 1 Application(s) Topical <User Schedule>  clopidogrel Tablet 75 milliGRAM(s) Oral daily  dextrose 40% Gel 15 Gram(s) Oral once  dextrose 5%. 1000 milliLiter(s) (50 mL/Hr) IV Continuous <Continuous>  dextrose 5%. 1000 milliLiter(s) (100 mL/Hr) IV Continuous <Continuous>  dextrose 50% Injectable 25 Gram(s) IV Push once  dextrose 50% Injectable 12.5 Gram(s) IV Push once  dextrose 50% Injectable 25 Gram(s) IV Push once  furosemide    Tablet 20 milliGRAM(s) Oral daily  glucagon  Injectable 1 milliGRAM(s) IntraMuscular once  heparin   Injectable 5000 Unit(s) SubCutaneous every 12 hours  insulin glargine Injectable (LANTUS) 18 Unit(s) SubCutaneous at bedtime  insulin lispro (ADMELOG) corrective regimen sliding scale   SubCutaneous three times a day before meals  insulin lispro (ADMELOG) corrective regimen sliding scale   SubCutaneous at bedtime  insulin lispro Injectable (ADMELOG) 8 Unit(s) SubCutaneous three times a day before meals  levothyroxine 50 MICROGram(s) Oral daily  meropenem  IVPB 1000 milliGRAM(s) IV Intermittent every 12 hours  metoprolol succinate  milliGRAM(s) Oral daily  multivitamin 1 Tablet(s) Oral daily  pantoprazole    Tablet 40 milliGRAM(s) Oral before breakfast  simvastatin 20 milliGRAM(s) Oral at bedtime  vancomycin  IVPB 750 milliGRAM(s) IV Intermittent every 24 hours    MEDICATIONS  (PRN):  acetaminophen     Tablet .. 650 milliGRAM(s) Oral every 6 hours PRN Temp greater or equal to 38C (100.4F), Moderate Pain (4 - 6)        CAPILLARY BLOOD GLUCOSE      POCT Blood Glucose.: 205 mg/dL (17 Dec 2021 12:14)  POCT Blood Glucose.: 281 mg/dL (17 Dec 2021 08:43)  POCT Blood Glucose.: 258 mg/dL (16 Dec 2021 21:34)  POCT Blood Glucose.: 246 mg/dL (16 Dec 2021 17:16)    I&O's Summary    16 Dec 2021 07:01  -  17 Dec 2021 07:00  --------------------------------------------------------  IN: 990 mL / OUT: 700 mL / NET: 290 mL    17 Dec 2021 07:01  -  17 Dec 2021 15:13  --------------------------------------------------------  IN: 960 mL / OUT: 400 mL / NET: 560 mL        Vital Signs Last 24 Hrs  T(C): 36.4 (17 Dec 2021 13:32), Max: 36.9 (16 Dec 2021 20:15)  T(F): 97.5 (17 Dec 2021 13:32), Max: 98.4 (16 Dec 2021 20:15)  HR: 81 (17 Dec 2021 13:32) (71 - 81)  BP: 105/70 (17 Dec 2021 13:32) (105/70 - 132/68)  BP(mean): --  RR: 18 (17 Dec 2021 13:32) (18 - 18)  SpO2: 97% (17 Dec 2021 13:32) (93% - 97%)    PHYSICAL EXAM:   HEENT: DEBBIE EOMI  Neck: Supple, no JVD  Lungs: clear  CVS: S1 S2 systolic murmur +   Abdomen: non tender BS +  Neuro: AO x 3 nonfocal  Ext: 1+ edema right ankle bandage  right arm PICC  Msk: no joint swelling    LABS:                        7.7    12.31 )-----------( 342      ( 17 Dec 2021 06:44 )             25.4     12-17    132<L>  |  97  |  26<H>  ----------------------------<  213<H>  3.8   |  26  |  1.57<H>    Ca    9.3      17 Dec 2021 06:43                  All consultant(s) notes reviewed and care discussed with other providers        Contact Number, Dr Ho [phone]

## 2021-12-17 NOTE — PHARMACOTHERAPY INTERVENTION NOTE - COMMENTS
89F with PMH significant for TAVR, T2DM (A1c 7.3%), hypothyroidism, AF on Eliquis p/w s/p fall at rehab trying to get out of bed. Pt had recent admission to [facility] 3 weeks prior for right ankle cellulits/?osteomyelitis. Being followed by ID, with recent MRI of ankle on 12/16 showing changes concerning for septic arthritis of the ankle/hindfoot and midfoot with osteomyelitis given clinical concern. Pt is s/p debridement with abscess and wound cultures NGTD. Pt is on vancomycin 1 gram q12h + meropenem 1g q12h (CrCl 23 mL/min). SCr slightly increasing, unknown baseline. Trough drawn yesterday 12/16 was 16.4 with  a calculated . Recommend decreasing dose to vancomycin 750 mg q24h and redraw pre-third dose trough on 12/19. Discussed with Dr. Bill and FACUNDO Chavez.     Thank you,    Nena Levi PharmD, PGY-1 Pharmacy Resident  Available on [employer] or [email or web address] [phone] with PMH significant for TAVR, T2DM (A1c 7.3%), hypothyroidism, AF on Eliquis p/w s/p fall at rehab trying to get out of bed. Pt had recent admission to [facility] 3 weeks prior for right ankle cellulits/?osteomyelitis. Being followed by ID, with recent MRI of ankle on 12/16 showing changes concerning for septic arthritis of the ankle/hindfoot and midfoot with osteomyelitis given clinical concern. Pt is s/p debridement with abscess and wound cultures NGTD, MRSA/MSSA PCR negative. Pt is on vancomycin 1 gram q12h + meropenem 1g q12h (CrCl 23 mL/min). SCr slightly increasing, unknown baseline. Trough drawn yesterday 12/16 was 16.4 with  a calculated . Recommend decreasing dose to vancomycin 750 mg q24h and redraw pre-third dose trough on 12/19. Discussed with Dr. Bill and FACUNDO Chavez.     Thank you,    Nena Levi PharmD, PGY-1 Pharmacy Resident  Available on [employer] or [employer] [phone] with PMH significant for TAVR, T2DM (A1c 7.3%), hypothyroidism, AF on Eliquis p/w s/p fall at rehab trying to get out of bed. Pt had recent admission to [facility] 3 weeks prior for right ankle cellulits/?osteomyelitis. Being followed by ID, with recent MRI of ankle on 12/16 showing changes concerning for septic arthritis of the ankle/hindfoot and midfoot with osteomyelitis given clinical concern. Pt is s/p debridement with abscess and wound cultures NGTD, MRSA/MSSA PCR negative. Pt is on vancomycin 1 gram q12h + meropenem 1g q12h (CrCl 28 mL/min). SCr slightly increasing, unknown baseline. Trough drawn yesterday 12/16 was 16.4 with  a calculated . Recommend decreasing dose to vancomycin 750 mg q24h and redraw pre-third dose trough on 12/19. Discussed with Dr. Bill and FACUNDO Chavez.     Thank you,    Nena Levi PharmD, PGY-1 Pharmacy Resident  Available on [employer] or [employer] [phone]

## 2021-12-17 NOTE — PROGRESS NOTE ADULT - SUBJECTIVE AND OBJECTIVE BOX
Patient is a 89y old  Female who presents with a chief complaint of s/p fall (17 Dec 2021 08:38)       INTERVAL HPI/OVERNIGHT EVENTS:  Patient seen and evaluated at bedside.  Pt is resting comfortable in NAD. Denies N/V/F/C.      Allergies    Bactrim (Unknown)  Flagyl (Hives; Pruritus)  Macrobid (Other)  sulfa drugs (Hives)  Zosyn (Other)    Intolerances        Vital Signs Last 24 Hrs  T(C): 36.8 (17 Dec 2021 04:43), Max: 36.9 (16 Dec 2021 20:15)  T(F): 98.2 (17 Dec 2021 04:43), Max: 98.4 (16 Dec 2021 20:15)  HR: 78 (17 Dec 2021 06:00) (71 - 78)  BP: 132/68 (17 Dec 2021 06:00) (126/71 - 132/68)  BP(mean): --  RR: 18 (17 Dec 2021 04:43) (18 - 18)  SpO2: 93% (17 Dec 2021 04:43) (93% - 96%)    LABS:                        7.7    12.31 )-----------( 342      ( 17 Dec 2021 06:44 )             25.4     12-17    132<L>  |  97  |  26<H>  ----------------------------<  213<H>  3.8   |  26  |  1.57<H>    Ca    9.3      17 Dec 2021 06:43          CAPILLARY BLOOD GLUCOSE      POCT Blood Glucose.: 281 mg/dL (17 Dec 2021 08:43)  POCT Blood Glucose.: 258 mg/dL (16 Dec 2021 21:34)  POCT Blood Glucose.: 246 mg/dL (16 Dec 2021 17:16)  POCT Blood Glucose.: 255 mg/dL (16 Dec 2021 12:31)      Lower Extremity Physical Exam:    Vasular: DP/PT 1/4, B/L, CFT <3 seconds B/L, Temperature gradient WNL, B/L.   Neuro: Epicritic sensation diminished to the level of _, B/L.  Musculoskeletal/Ortho: Able for R Ankle ROM without any pain elicited  Skin: Right lateral mal I&D wound down to periosteum , w/ no purulence noted, wound tracks 1.5cm proximally, no malodor, mild periwound erythema.  bilateral posterior heel deep tissue injury, no fluctuance, no local signs of infection    RADIOLOGY & ADDITIONAL TESTS:  < from: MR Ankle w/wo IV Cont, Right (12.16.21 @ 14:35) >    ACC: [id number] EXAM:  MR ANKLE WAW IC RT                          PROCEDURE DATE:  12/16/2021          INTERPRETATION:  MR ANKLE WITHOUT AND WITH IV CONTRAST RIGHT    HISTORY:  Right ankle pain. Concern for osteomyelitis. Right ankle   infection following injury. Type II diabetes.    TECHNIQUE:  Multiplanar MRI of the right ankle was performed without and   with 7 cc administered Gadavist intravenous gadolinium contrast using 9   sequences.    COMPARISON:  Right ankle x-rays dated December 12, 2021    FINDINGS:    OSSEOUS STRUCTURES    Acute Fractures/Contusions:  None.    Chronic Fractures/Ossifications:  None.    Marrow:  There is extensive marrow edema and enhancement the   ankle/hindfoot and midfoot. Marrow edema also involves the 2nd through   5th metatarsal bases.    Tarsal Coalition:  None.    LIGAMENTS    Talofibular/Calcaneofibular Ligaments:  Intact.    Tibiofibular/Syndesmotic Ligaments:  Intact.    Medial Deltoid Ligament Complex:  Intact.    Subtalar Ligaments:  Intact.    TENDONS    Posterior Tibialis/Medial Flexor Tendons:  Intact with mild tendon   sheath enhancement.    Peroneal Tendons:  Intact with small tendon sheath effusion and   moderate enhancement.    Extensor Tendons:  Intact.    ACHILLES TENDON  Intact.    PLANTAR FASCIA  Intact.    JOINTS    Tibiotalar Joint:  Articular surfaces appear preserved although there   is mild widening of the superolateral mortise. Severe synovitis is   present with moderate effusion decompressing posteriorly along the flexor  hallucis longus myotendinous junction.    Subtalar Joints:  Cartilage surfaces appear preserved although there is   severe synovitis. Small to moderate posterior subtalar joint effusion is   also present.    Intertarsal Joints:  Tiny scattered osteophytes are present with severe   synovitis and small effusions.    Tarsometatarsal Joints:  Tiny scattered osteophytes are noted.    SOFT TISSUES    Masses/Cysts:  None.    Musculature:  Severe muscle atrophy is consistent with diabetic   neuropathy.    Subcutaneous Tissues:  Severe subcutaneous edema is present with   enhancement reflecting cellulitis. Lateral sided soft tissue wounds are   noted. No soft tissue gas is appreciated.    NEUROVASCULAR STRUCTURES    Tarsal Tunnel:  Preserved.    IMPRESSION:  1. Changes concerning for septic arthritis of the ankle/hindfoot and   midfoot with osteomyelitis given the clinical concern. Inflammatory   arthropathy could also be considered in the appropriate setting.  2. Mild tenosynovitis of the medial flexor tendons with moderate   tenosynovitis of the peroneal tendons. This may be reactive or infectious.  3. Findings discussed with FACUNDO Navas on 12/16/2021 3:00 PM.    --- End of Report ---            VENU HILL MD; Attending Radiologist  This document has been electronically signed. Dec 16 2021  3:08PM    < end of copied text >

## 2021-12-17 NOTE — PROGRESS NOTE ADULT - SUBJECTIVE AND OBJECTIVE BOX
Chief complaint  Patient is a 89y old  Female who presents with a chief complaint of s/p fall (17 Dec 2021 11:13)   Review of systems  Patient in bed, looks comfortable, no hypoglycemic episodes.    Labs and Fingersticks  CAPILLARY BLOOD GLUCOSE      POCT Blood Glucose.: 205 mg/dL (17 Dec 2021 12:14)  POCT Blood Glucose.: 281 mg/dL (17 Dec 2021 08:43)  POCT Blood Glucose.: 258 mg/dL (16 Dec 2021 21:34)  POCT Blood Glucose.: 246 mg/dL (16 Dec 2021 17:16)      Anion Gap, Serum: 9 (12-17 @ 06:43)  Anion Gap, Serum: 12 (12-16 @ 07:06)      Calcium, Total Serum: 9.3 (12-17 @ 06:43)  Calcium, Total Serum: 9.1 (12-16 @ 07:06)          12-17    132<L>  |  97  |  26<H>  ----------------------------<  213<H>  3.8   |  26  |  1.57<H>    Ca    9.3      17 Dec 2021 06:43                          7.7    12.31 )-----------( 342      ( 17 Dec 2021 06:44 )             25.4     Medications  MEDICATIONS  (STANDING):  allopurinol 100 milliGRAM(s) Oral daily  amLODIPine   Tablet 5 milliGRAM(s) Oral daily  ascorbic acid 500 milliGRAM(s) Oral daily  chlorhexidine 2% Cloths 1 Application(s) Topical <User Schedule>  clopidogrel Tablet 75 milliGRAM(s) Oral daily  dextrose 40% Gel 15 Gram(s) Oral once  dextrose 5%. 1000 milliLiter(s) (50 mL/Hr) IV Continuous <Continuous>  dextrose 5%. 1000 milliLiter(s) (100 mL/Hr) IV Continuous <Continuous>  dextrose 50% Injectable 25 Gram(s) IV Push once  dextrose 50% Injectable 12.5 Gram(s) IV Push once  dextrose 50% Injectable 25 Gram(s) IV Push once  furosemide    Tablet 20 milliGRAM(s) Oral daily  glucagon  Injectable 1 milliGRAM(s) IntraMuscular once  heparin   Injectable 5000 Unit(s) SubCutaneous every 12 hours  insulin glargine Injectable (LANTUS) 18 Unit(s) SubCutaneous at bedtime  insulin lispro (ADMELOG) corrective regimen sliding scale   SubCutaneous at bedtime  insulin lispro (ADMELOG) corrective regimen sliding scale   SubCutaneous three times a day before meals  insulin lispro Injectable (ADMELOG) 8 Unit(s) SubCutaneous three times a day before meals  levothyroxine 50 MICROGram(s) Oral daily  meropenem  IVPB 1000 milliGRAM(s) IV Intermittent every 12 hours  metoprolol succinate  milliGRAM(s) Oral daily  multivitamin 1 Tablet(s) Oral daily  pantoprazole    Tablet 40 milliGRAM(s) Oral before breakfast  simvastatin 20 milliGRAM(s) Oral at bedtime  vancomycin  IVPB 750 milliGRAM(s) IV Intermittent every 24 hours      Physical Exam  General: Patient comfortable in bed  Vital Signs Last 12 Hrs  T(F): 98.2 (12-17-21 @ 04:43), Max: 98.2 (12-17-21 @ 04:43)  HR: 78 (12-17-21 @ 06:00) (77 - 78)  BP: 132/68 (12-17-21 @ 06:00) (130/57 - 132/68)  BP(mean): --  RR: 18 (12-17-21 @ 04:43) (18 - 18)  SpO2: 93% (12-17-21 @ 04:43) (93% - 93%)  Neck: No palpable thyroid nodules.  CVS: S1S2, No murmurs  Respiratory: No wheezing, no crepitations  GI: Abdomen soft, bowel sounds positive  Musculoskeletal:  edema lower extremities.   Skin: No skin rashes, no ecchymosis    Diagnostics    Cortisol AM, Serum: AM Sched. Collection: 17-Dec-2021 06:00 (12-16 @ 14:32)           Chief complaint  Patient is a 89y old  Female who presents with a chief complaint of s/p fall (17 Dec 2021 11:13)   Review of systems  Patient in bed, looks comfortable, no hypoglycemic episodes.    Labs and Fingersticks  CAPILLARY BLOOD GLUCOSE      POCT Blood Glucose.: 205 mg/dL (17 Dec 2021 12:14)  POCT Blood Glucose.: 281 mg/dL (17 Dec 2021 08:43)  POCT Blood Glucose.: 258 mg/dL (16 Dec 2021 21:34)  POCT Blood Glucose.: 246 mg/dL (16 Dec 2021 17:16)      Anion Gap, Serum: 9 (12-17 @ 06:43)  Anion Gap, Serum: 12 (12-16 @ 07:06)      Calcium, Total Serum: 9.3 (12-17 @ 06:43)  Calcium, Total Serum: 9.1 (12-16 @ 07:06)          12-17    132<L>  |  97  |  26<H>  ----------------------------<  213<H>  3.8   |  26  |  1.57<H>    Ca    9.3      17 Dec 2021 06:43                          7.7    12.31 )-----------( 342      ( 17 Dec 2021 06:44 )             25.4     Medications  MEDICATIONS  (STANDING):  allopurinol 100 milliGRAM(s) Oral daily  amLODIPine   Tablet 5 milliGRAM(s) Oral daily  ascorbic acid 500 milliGRAM(s) Oral daily  chlorhexidine 2% Cloths 1 Application(s) Topical <User Schedule>  clopidogrel Tablet 75 milliGRAM(s) Oral daily  dextrose 40% Gel 15 Gram(s) Oral once  dextrose 5%. 1000 milliLiter(s) (50 mL/Hr) IV Continuous <Continuous>  dextrose 5%. 1000 milliLiter(s) (100 mL/Hr) IV Continuous <Continuous>  dextrose 50% Injectable 25 Gram(s) IV Push once  dextrose 50% Injectable 12.5 Gram(s) IV Push once  dextrose 50% Injectable 25 Gram(s) IV Push once  furosemide    Tablet 20 milliGRAM(s) Oral daily  glucagon  Injectable 1 milliGRAM(s) IntraMuscular once  heparin   Injectable 5000 Unit(s) SubCutaneous every 12 hours  insulin glargine Injectable (LANTUS) 18 Unit(s) SubCutaneous at bedtime  insulin lispro (ADMELOG) corrective regimen sliding scale   SubCutaneous at bedtime  insulin lispro (ADMELOG) corrective regimen sliding scale   SubCutaneous three times a day before meals  insulin lispro Injectable (ADMELOG) 8 Unit(s) SubCutaneous three times a day before meals  levothyroxine 50 MICROGram(s) Oral daily  meropenem  IVPB 1000 milliGRAM(s) IV Intermittent every 12 hours  metoprolol succinate  milliGRAM(s) Oral daily  multivitamin 1 Tablet(s) Oral daily  pantoprazole    Tablet 40 milliGRAM(s) Oral before breakfast  simvastatin 20 milliGRAM(s) Oral at bedtime  vancomycin  IVPB 750 milliGRAM(s) IV Intermittent every 24 hours      Physical Exam  General: Patient comfortable in bed  Vital Signs Last 12 Hrs  T(F): 98.2 (12-17-21 @ 04:43), Max: 98.2 (12-17-21 @ 04:43)  HR: 78 (12-17-21 @ 06:00) (77 - 78)  BP: 132/68 (12-17-21 @ 06:00) (130/57 - 132/68)  BP(mean): --  RR: 18 (12-17-21 @ 04:43) (18 - 18)  SpO2: 93% (12-17-21 @ 04:43) (93% - 93%)  Neck: No palpable thyroid nodules.  CVS: S1S2, No murmurs  Respiratory: No wheezing, no crepitations  GI: Abdomen soft, bowel sounds positive  Musculoskeletal:  edema lower extremities.   Skin: No skin rashes, no ecchymosis    Diagnostics    Cortisol AM, Serum: AM Sched. Collection: 17-Dec-2021 06:00 (12-16 @ 14:32)

## 2021-12-17 NOTE — PROGRESS NOTE ADULT - ASSESSMENT
Assessment  DMT2: 89y Female with DM T2 with hyperglycemia, A1C 7.3%, was on insulin at home, now on basal bolus insulin, increased dose yesterday, blood sugars are still running high and not at target, no hypoglycemic episodes, eating meals, NAD.  Fall: AI R/O, workup in progress, stable, monitored.  Hypothyroidism: On Synthroid 50 mcg po daily, compliant with Synthroid intake, euthyroid.  HTN: Controlled,  on antihypertensive medications.  CKD: Monitor labs/BMP,       Jono Zaldivar MD  Cell: [phone]  Office: [phone]     Assessment  DMT2: 89y Female with DM T2 with hyperglycemia, A1C 7.3%, was on insulin at home, now on basal bolus insulin, increased dose yesterday, blood sugars are still running high and not at target, no hypoglycemic episodes,  eating meals, NAD.  Fall: AI R/O, workup in progress, stable, monitored.  Hypothyroidism: On Synthroid 50 mcg po daily, compliant with Synthroid intake, euthyroid.  HTN: Controlled,  on antihypertensive medications.  CKD: Monitor labs/BMP,       Jono Zaldivar MD  Cell: [phone]  Office: [phone]

## 2021-12-17 NOTE — PROGRESS NOTE ADULT - ASSESSMENT
89yo who presents w/ R lateral mal wound  - Pt seen and evaluated  - Right lateral mal I&D wound down to periosteum , w/ no purulence noted, wound tracks 1.5cm proximally, no malodor, mild periwound erythema. Bilateral posterior heel deep tissue injury, no fluctuance, no local signs of infection  -Bilateral heel DTI- present upon admission without any signs of infection  -R ankle xray demonstrating: lateral malleolus with mild periosteal reaction of the distal fibula, suspicious for osteomyelitis.  -R ankle MRI showing possible osteomyelitis of ankle, hindfoot and midfoot; no asbcess  -R ankle wound culture prelim showing no growth   -Continue IV antibiotics and follow ID recs (appreciated)  -Please offload bilateral heels w/ z flow booties  -Extensive and detailed discussion with patient and daugther Tawanna over the phone at bedside concerning patient's condition with optimal treatment being OR surgical intervention for incision and drainage with bone biopsy as compared to bedside bone biopsy alone. Patient and daughter showing and confirming full understanding and in agreement for plan.  -Pod plan for R ankle wound incision and drainage with washout and Bone Biopsy for Monday 12/21 at 1PM w/ Dr. Katina Márquez  - Please evaluate and medical and cardiac clearance for procedure under local anesthesia and sedation  -Discussed with attending 89yo who presents w/ R lateral mal wound  - Pt seen and evaluated  - Right lateral mal I&D wound down to periosteum , w/ no purulence noted, wound tracks 1.5cm proximally, no malodor, mild periwound erythema. Bilateral posterior heel deep tissue injury, no fluctuance, no local signs of infection  -Bilateral heel DTI- present upon admission without any signs of infection  -R ankle xray demonstrating: lateral malleolus with mild periosteal reaction of the distal fibula, suspicious for osteomyelitis.  -R ankle MRI showing possible osteomyelitis of ankle, hindfoot and midfoot; no asbcess  -R ankle wound culture prelim showing no growth   -Continue IV antibiotics and follow ID recs (appreciated)  -Please offload bilateral heels w/ z flow booties  -Please have PT consult requested by patient and family for upper body exercising since patient has been bedbound for prolonged period of time (Appreciated)  -Extensive and detailed discussion with patient and daugther Tawanna over the phone at bedside concerning patient's condition with optimal treatment being OR surgical intervention for incision and drainage with bone biopsy as compared to bedside bone biopsy alone. Patient and daughter showing and confirming full understanding and in agreement for plan.  -Consented and Pod plan for R ankle wound incision and drainage with washout and Bone Biopsy for Monday 12/21 at 1PM w/ Dr. Katina Márquez  - Please evaluate and medical and cardiac clearance for procedure under local anesthesia and sedation  -Discussed with attending

## 2021-12-18 NOTE — PROGRESS NOTE ADULT - SUBJECTIVE AND OBJECTIVE BOX
Patient is a 89y old  Female who presents with a chief complaint of s/p fall (17 Dec 2021 15:13)    Being followed by ID for ankle OM , abscess    Interval history:feels well  denies any pain  podiatry plan noted   No acute events      ROS:  No cough,SOB,CP  No N/V/D./abd pain  No other complaints      Antimicrobials:    meropenem  IVPB 1000 milliGRAM(s) IV Intermittent every 12 hours  vancomycin  IVPB 750 milliGRAM(s) IV Intermittent every 24 hours    Other medications reviewed    Vital Signs Last 24 Hrs  T(C): 36.8 (12-18-21 @ 04:30), Max: 36.8 (12-18-21 @ 04:30)  T(F): 98.3 (12-18-21 @ 04:30), Max: 98.3 (12-18-21 @ 04:30)  HR: 68 (12-18-21 @ 06:15) (65 - 81)  BP: 128/70 (12-18-21 @ 06:15) (105/70 - 128/70)  BP(mean): --  RR: 18 (12-18-21 @ 06:15) (17 - 18)  SpO2: 96% (12-18-21 @ 04:30) (96% - 98%)    Physical Exam:    HEENT PERRLA EOMI    No oral exudate or erythema  L axillary PM     Chest Good AE,CTA    CVS RRR S1 S2 WNl No murmur or rub or gallop    Abd soft BS normal No tenderness no masses    R ankle dressing no discharge   ankle no erythema   no LOM   PICC  site no erythema tenderness or discharge    CNS AAO X 2  Lab Data:                          7.2    9.27  )-----------( 322      ( 18 Dec 2021 08:12 )             23.8       12-18    134<L>  |  98  |  29<H>  ----------------------------<  199<H>  3.8   |  24  |  1.76<H>    Creatinine, Serum: 1.76 mg/dL (12-18-21 @ 08:12)  Creatinine, Serum: 1.57 mg/dL (12-17-21 @ 06:43)  Creatinine, Serum: 1.42 mg/dL (12-16-21 @ 07:06)  Creatinine, Serum: 1.40 mg/dL (12-15-21 @ 06:54)      Ca    9.2      18 Dec 2021 08:12          Culture - Abscess with Gram Stain (collected 13 Dec 2021 18:54)  Source: .Abscess  Preliminary Report (17 Dec 2021 20:43):    Growth in fluid media only Staphylococcus epidermidis    "Susceptibilities not performed"    Culture - Abscess with Gram Stain (collected 13 Dec 2021 18:54)  Source: .Abscess right ankle wound  Preliminary Report (14 Dec 2021 16:02):    No growth    Culture - Other (collected 13 Dec 2021 18:54)  Source: .Other Foot wound  Final Report (15 Dec 2021 10:11):    No growth            Vancomycin Level, Trough: 16.4 ug/mL (12-16-21 @ 17:15)      < from: MR Ankle w/wo IV Cont, Right (12.16.21 @ 14:35) >    IMPRESSION:  1. Changes concerning for septic arthritis of the ankle/hindfoot and   midfoot with osteomyelitis given the clinical concern. Inflammatory   arthropathy could also be considered in the appropriate setting.  2. Mild tenosynovitis of the medial flexor tendons with moderate   tenosynovitis of the peroneal tendons. This may be reactive or infectious.  3. Findings discussed with FACUNDO Navas on 12/16/2021 3:00 PM.    --- End of Report ---    < end of copied text >

## 2021-12-18 NOTE — PROGRESS NOTE ADULT - SUBJECTIVE AND OBJECTIVE BOX
Patient is a 89y old  Female who presents with a chief complaint of s/p fall (18 Dec 2021 10:43)    DATE OF SERVICE: 12-18-21 @ 13:47    SUBJECTIVE / OVERNIGHT EVENTS: overnight events noted    ROS:  Resp: No cough no sputum production  CVS: No chest pain no palpitations no orthopnea  GI: no N/V/D  : no dysuria, no hematuria  Neuro: no weakness no paresthesias          MEDICATIONS  (STANDING):  allopurinol 100 milliGRAM(s) Oral daily  amLODIPine   Tablet 5 milliGRAM(s) Oral daily  ascorbic acid 500 milliGRAM(s) Oral daily  chlorhexidine 2% Cloths 1 Application(s) Topical <User Schedule>  clopidogrel Tablet 75 milliGRAM(s) Oral daily  dextrose 40% Gel 15 Gram(s) Oral once  dextrose 5%. 1000 milliLiter(s) (50 mL/Hr) IV Continuous <Continuous>  dextrose 5%. 1000 milliLiter(s) (100 mL/Hr) IV Continuous <Continuous>  dextrose 50% Injectable 25 Gram(s) IV Push once  dextrose 50% Injectable 12.5 Gram(s) IV Push once  dextrose 50% Injectable 25 Gram(s) IV Push once  furosemide    Tablet 20 milliGRAM(s) Oral daily  glucagon  Injectable 1 milliGRAM(s) IntraMuscular once  heparin   Injectable 5000 Unit(s) SubCutaneous every 12 hours  insulin glargine Injectable (LANTUS) 18 Unit(s) SubCutaneous at bedtime  insulin lispro (ADMELOG) corrective regimen sliding scale   SubCutaneous three times a day before meals  insulin lispro (ADMELOG) corrective regimen sliding scale   SubCutaneous at bedtime  insulin lispro Injectable (ADMELOG) 8 Unit(s) SubCutaneous three times a day before meals  levothyroxine 50 MICROGram(s) Oral daily  meropenem  IVPB 1000 milliGRAM(s) IV Intermittent every 12 hours  metoprolol succinate  milliGRAM(s) Oral daily  multivitamin 1 Tablet(s) Oral daily  pantoprazole    Tablet 40 milliGRAM(s) Oral before breakfast  simvastatin 20 milliGRAM(s) Oral at bedtime    MEDICATIONS  (PRN):  acetaminophen     Tablet .. 650 milliGRAM(s) Oral every 6 hours PRN Temp greater or equal to 38C (100.4F), Moderate Pain (4 - 6)        CAPILLARY BLOOD GLUCOSE      POCT Blood Glucose.: 218 mg/dL (18 Dec 2021 13:15)  POCT Blood Glucose.: 205 mg/dL (18 Dec 2021 07:56)  POCT Blood Glucose.: 234 mg/dL (17 Dec 2021 22:54)  POCT Blood Glucose.: 202 mg/dL (17 Dec 2021 17:46)    I&O's Summary    17 Dec 2021 07:01  -  18 Dec 2021 07:00  --------------------------------------------------------  IN: 1520 mL / OUT: 750 mL / NET: 770 mL    18 Dec 2021 07:01  -  18 Dec 2021 13:47  --------------------------------------------------------  IN: 600 mL / OUT: 800 mL / NET: -200 mL        Vital Signs Last 24 Hrs  T(C): 36.6 (18 Dec 2021 12:51), Max: 36.8 (18 Dec 2021 04:30)  T(F): 97.9 (18 Dec 2021 12:51), Max: 98.3 (18 Dec 2021 04:30)  HR: 69 (18 Dec 2021 12:51) (65 - 70)  BP: 128/70 (18 Dec 2021 12:51) (111/68 - 128/70)  BP(mean): --  RR: 17 (18 Dec 2021 12:51) (17 - 18)  SpO2: 95% (18 Dec 2021 12:51) (95% - 98%)      PHYSICAL EXAM:   HEENT: DEBBIE EOMI  Neck: Supple, no JVD  Lungs: clear  CVS: S1 S2 systolic murmur +   Abdomen: non tender BS +  Neuro: AO x 3 nonfocal  Ext: 1+ edema right ankle bandage  right arm PICC  Msk: no joint swelling    LABS:                        7.2    9.27  )-----------( 322      ( 18 Dec 2021 08:12 )             23.8     12-18    134<L>  |  98  |  29<H>  ----------------------------<  199<H>  3.8   |  24  |  1.76<H>    Ca    9.2      18 Dec 2021 08:12                  All consultant(s) notes reviewed and care discussed with other providers        Contact Number, Dr Ho [phone]

## 2021-12-18 NOTE — PROGRESS NOTE ADULT - ASSESSMENT
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 Notes shes been trying to move out of bed and fell, hitting mainly her hip and parts of her back. She does note feeling a bit weaker than usual, and not drinking enough water because she doesn't want to urinate at night. No fevers, chills, cough that she endorses. Does note hitting her R ankle previously and developing infection recently, for which was treated? Has PICC in R arm, and she is unsure what it is for.     Per review of [facility] records no abx since 12/3  Exam with R lateral malleolar ulcer with discharge  No surrounding cellulitis  X ray with perisosteal reaction  High ESra nd CRP though is non specific and could reflect fall   previously was on Invanz at [facility] though [facility] abx [facility]  MRI as above   ? OM ? septic arthritis  Less likely non infectious  Blood Cx negative-MRSA PCR negative  Pt though with recent PCR     Rec:  A) Ankle ulcer  Cx with only CNS so far butw as on abx  Podiatry eval noted- for surgical intervention MOnday  continue vanco + merem   creatinine higher-check trough prior to next dose and adjust dose if needed  If deeper Cx no MRSA will dc vanco  final plan depending on results         B) abx allergies  tolerating cefepime so far  monitor clinically for s/s any allergic reaction    C) fall  will defer to primary team    D) leucocytosis  monitor for C diff   Monitor for s/s any other foci  decreased today     Will tailor plan for ID issues  per course,results.Will defer to primary team on management of other issues.  Assessment, plan and recommendations as detailed above were discussed with the medical/primary  team.  Infectious Diseases Service will cover over weekend.  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 Notes shes been trying to move out of bed and fell, hitting mainly her hip and parts of her back. She does note feeling a bit weaker than usual, and not drinking enough water because she doesn't want to urinate at night. No fevers, chills, cough that she endorses. Does note hitting her R ankle previously and developing infection recently, for which was treated? Has PICC in R arm, and she is unsure what it is for.     Per review of [facility] records no abx since 12/3  Exam with R lateral malleolar ulcer with discharge  No surrounding cellulitis  X ray with perisosteal reaction  High ESra nd CRP though is non specific and could reflect fall   previously was on [facility]z at [facility] [place] though no abx [facility]  MRI as above   ? OM ? septic arthritis  Less likely non infectious  Blood Cx negative-MRSA PCR negative  Pt though with recent PCR     Rec:  A) Ankle ulcer  Cx with only CNS so far butw as on abx  Podiatry eval noted- for surgical intervention MOnday  continue merem   creatinine higher-dose vanco by levels- if no MRSA on deeper Cx will dc vanco   final plan depending on results         B) abx allergies  tolerating cefepime so far  monitor clinically for s/s any allergic reaction    C) fall  will defer to primary team    D) leucocytosis  monitor for C diff   Monitor for s/s any other foci  decreased today     Will tailor plan for ID issues  per course,results.Will defer to primary team on management of other issues.  Assessment, plan and recommendations as detailed above were discussed with the medical/primary  team.  Infectious Diseases Service will cover over weekend.  Please call [phone] if issues

## 2021-12-18 NOTE — PROGRESS NOTE ADULT - NSPROGADDITIONALINFOA_GEN_ALL_CORE
discussed with patient in detail, expresses understanding of treatment plans.   discussed with patient's  at bedside   discussed with son

## 2021-12-18 NOTE — PROGRESS NOTE ADULT - ASSESSMENT
Assessment  DMT2: 89y Female with DM T2 with hyperglycemia, A1C 7.3%, was on insulin at home, now on basal bolus insulin, increased dose yesterday, blood sugars improving, no hypoglycemic episodes,  eating meals.  Fall: AI R/O, workup in progress, stable, monitored.  Hypothyroidism: On Synthroid 50 mcg po daily, compliant with Synthroid intake, euthyroid.  HTN: Controlled,  on antihypertensive medications.  CKD: Monitor labs/BMP,       Jono Zaldivar MD  Cell: [phone]  Office: [phone]

## 2021-12-18 NOTE — PROGRESS NOTE ADULT - SUBJECTIVE AND OBJECTIVE BOX
Chief complaint    Patient is a 89y old  Female who presents with a chief complaint of s/p fall (18 Dec 2021 13:46)   Review of systems  Patient in bed, appears comfortable.    Labs and Fingersticks  CAPILLARY BLOOD GLUCOSE      POCT Blood Glucose.: 218 mg/dL (18 Dec 2021 13:15)  POCT Blood Glucose.: 205 mg/dL (18 Dec 2021 07:56)  POCT Blood Glucose.: 234 mg/dL (17 Dec 2021 22:54)  POCT Blood Glucose.: 202 mg/dL (17 Dec 2021 17:46)      Anion Gap, Serum: 9 (12-17 @ 06:43)      Calcium, Total Serum: 9.2 (12-18 @ 08:12)  Calcium, Total Serum: 9.3 (12-17 @ 06:43)          12-18    134<L>  |  98  |  29<H>  ----------------------------<  199<H>  3.8   |  24  |  1.76<H>    Ca    9.2      18 Dec 2021 08:12                          7.2    9.27  )-----------( 322      ( 18 Dec 2021 08:12 )             23.8     Medications  MEDICATIONS  (STANDING):  allopurinol 100 milliGRAM(s) Oral daily  amLODIPine   Tablet 5 milliGRAM(s) Oral daily  ascorbic acid 500 milliGRAM(s) Oral daily  chlorhexidine 2% Cloths 1 Application(s) Topical <User Schedule>  clopidogrel Tablet 75 milliGRAM(s) Oral daily  dextrose 40% Gel 15 Gram(s) Oral once  dextrose 5%. 1000 milliLiter(s) (50 mL/Hr) IV Continuous <Continuous>  dextrose 5%. 1000 milliLiter(s) (100 mL/Hr) IV Continuous <Continuous>  dextrose 50% Injectable 25 Gram(s) IV Push once  dextrose 50% Injectable 12.5 Gram(s) IV Push once  dextrose 50% Injectable 25 Gram(s) IV Push once  furosemide    Tablet 20 milliGRAM(s) Oral daily  glucagon  Injectable 1 milliGRAM(s) IntraMuscular once  heparin   Injectable 5000 Unit(s) SubCutaneous every 12 hours  insulin glargine Injectable (LANTUS) 18 Unit(s) SubCutaneous at bedtime  insulin lispro (ADMELOG) corrective regimen sliding scale   SubCutaneous three times a day before meals  insulin lispro (ADMELOG) corrective regimen sliding scale   SubCutaneous at bedtime  insulin lispro Injectable (ADMELOG) 8 Unit(s) SubCutaneous three times a day before meals  levothyroxine 50 MICROGram(s) Oral daily  meropenem  IVPB 1000 milliGRAM(s) IV Intermittent every 12 hours  metoprolol succinate  milliGRAM(s) Oral daily  multivitamin 1 Tablet(s) Oral daily  pantoprazole    Tablet 40 milliGRAM(s) Oral before breakfast  simvastatin 20 milliGRAM(s) Oral at bedtime      Physical Exam  General: Patient comfortable in bed  Vital Signs Last 12 Hrs  T(F): 97.9 (12-18-21 @ 12:51), Max: 98.3 (12-18-21 @ 04:30)  HR: 69 (12-18-21 @ 12:51) (65 - 69)  BP: 128/70 (12-18-21 @ 12:51) (117/69 - 128/70)  BP(mean): --  RR: 17 (12-18-21 @ 12:51) (17 - 18)  SpO2: 95% (12-18-21 @ 12:51) (95% - 96%)  Neck: No palpable thyroid nodules.  CVS: S1S2, No murmurs  Respiratory: No wheezing, no crepitations  GI: Abdomen soft, bowel sounds positive  Musculoskeletal:  edema lower extremities.     Diagnostics

## 2021-12-19 NOTE — PROGRESS NOTE ADULT - ASSESSMENT
Assessment  DMT2: 89y Female with DM T2 with hyperglycemia, A1C 7.3%, was on insulin at home, now on basal bolus insulin, blood sugars are trending down now, no hypoglycemic episodes,  eating meals.  Fall: AI R/O, workup in progress, stable, monitored.  Hypothyroidism: On Synthroid 50 mcg po daily, compliant with Synthroid intake, euthyroid.  HTN: Controlled,  on antihypertensive medications.  CKD: Monitor labs/BMP,       Jono Zaldivar MD  Cell: [phone]  Office: [phone]

## 2021-12-19 NOTE — PROGRESS NOTE ADULT - ASSESSMENT
89yo F w/ R lateral mal wound  - Pt seen and evaluated  - afebrile, no leukocytosis   - Right lateral mal I&D wound down to periosteum , w/ no purulence noted, wound tracks 1.5cm proximally, no malodor, mild periwound erythema. Bilateral posterior heel deep tissue injury, no fluctuance, no local signs of infection  -Bilateral heel DTI- present upon admission without any signs of infection  -R ankle xray demonstrating: lateral malleolus with mild periosteal reaction of the distal fibula, suspicious for osteomyelitis.  -R ankle MRI showing possible osteomyelitis of ankle, hindfoot and midfoot; no abscess  -R ankle wound culture prelim showing Staph epi in fluid media only   -Continue IV antibiotics and follow ID recs (appreciated)  -Please offload bilateral heels w/ z flow booties  -Please have PT consult requested by patient and family for upper body exercising since patient has been bedbound for prolonged period of time (Appreciated)  -Extensive and detailed discussion with patient and daughter Tawanna over the phone at bedside concerning patient's condition with optimal treatment being OR surgical intervention for incision and drainage with bone biopsy as compared to bedside bone biopsy alone. Patient and daughter showing and confirming full understanding and in agreement for plan.  - Booked for R ankle wound incision and drainage with washout and Bone Biopsy for Monday 12/21 at 5PM w/ Dr. Katina Márquez  - Please document medical optimization for surgery under light sedation w/ local anesthesia  - NPO after 9am tomorrow, please coordinate breakfast early   -Discussed with attending

## 2021-12-19 NOTE — PROGRESS NOTE ADULT - SUBJECTIVE AND OBJECTIVE BOX
Chief complaint    Patient is a 89y old  Female who presents with a chief complaint of s/p fall (19 Dec 2021 14:19)   Review of systems  Patient in bed, appears comfortable.    Labs and Fingersticks  CAPILLARY BLOOD GLUCOSE      POCT Blood Glucose.: 83 mg/dL (19 Dec 2021 17:15)  POCT Blood Glucose.: 110 mg/dL (19 Dec 2021 12:52)  POCT Blood Glucose.: 138 mg/dL (19 Dec 2021 08:46)  POCT Blood Glucose.: 92 mg/dL (18 Dec 2021 21:34)      Anion Gap, Serum: 13 (12-19 @ 07:28)      Calcium, Total Serum: 9.8 (12-19 @ 07:28)  Calcium, Total Serum: 9.2 (12-18 @ 08:12)          12-19    135  |  99  |  35<H>  ----------------------------<  136<H>  4.2   |  23  |  1.92<H>    Ca    9.8      19 Dec 2021 07:28                          7.7    10.34 )-----------( 249      ( 19 Dec 2021 07:28 )             25.9     Medications  MEDICATIONS  (STANDING):  allopurinol 100 milliGRAM(s) Oral daily  amLODIPine   Tablet 5 milliGRAM(s) Oral daily  ascorbic acid 500 milliGRAM(s) Oral daily  chlorhexidine 2% Cloths 1 Application(s) Topical <User Schedule>  clopidogrel Tablet 75 milliGRAM(s) Oral daily  dextrose 40% Gel 15 Gram(s) Oral once  dextrose 5%. 1000 milliLiter(s) (50 mL/Hr) IV Continuous <Continuous>  dextrose 5%. 1000 milliLiter(s) (100 mL/Hr) IV Continuous <Continuous>  dextrose 50% Injectable 25 Gram(s) IV Push once  dextrose 50% Injectable 12.5 Gram(s) IV Push once  dextrose 50% Injectable 25 Gram(s) IV Push once  glucagon  Injectable 1 milliGRAM(s) IntraMuscular once  heparin   Injectable 5000 Unit(s) SubCutaneous every 12 hours  insulin glargine Injectable (LANTUS) 18 Unit(s) SubCutaneous at bedtime  insulin lispro (ADMELOG) corrective regimen sliding scale   SubCutaneous at bedtime  insulin lispro (ADMELOG) corrective regimen sliding scale   SubCutaneous three times a day before meals  insulin lispro Injectable (ADMELOG) 8 Unit(s) SubCutaneous three times a day before meals  levothyroxine 50 MICROGram(s) Oral daily  meropenem  IVPB 1000 milliGRAM(s) IV Intermittent every 12 hours  metoprolol succinate  milliGRAM(s) Oral daily  multivitamin 1 Tablet(s) Oral daily  pantoprazole    Tablet 40 milliGRAM(s) Oral before breakfast  simvastatin 20 milliGRAM(s) Oral at bedtime  sodium chloride 0.9% Bolus 250 milliLiter(s) IV Bolus once      Physical Exam  General: Patient comfortable in bed  Vital Signs Last 12 Hrs  T(F): 98.4 (12-19-21 @ 13:30), Max: 98.4 (12-19-21 @ 13:30)  HR: 67 (12-19-21 @ 13:30) (67 - 67)  BP: 129/70 (12-19-21 @ 13:30) (129/70 - 129/70)  BP(mean): --  RR: 18 (12-19-21 @ 13:30) (18 - 18)  SpO2: 97% (12-19-21 @ 13:30) (97% - 97%)  Neck: No palpable thyroid nodules.  CVS: S1S2, No murmurs  Respiratory: No wheezing, no crepitations  GI: Abdomen soft, bowel sounds positive  Musculoskeletal:  edema lower extremities.     Diagnostics

## 2021-12-19 NOTE — PROGRESS NOTE ADULT - SUBJECTIVE AND OBJECTIVE BOX
Patient is a 89y old  Female who presents with a chief complaint of s/p fall (19 Dec 2021 11:04)      DATE OF SERVICE: 12-19-21 @ 14:19    SUBJECTIVE / OVERNIGHT EVENTS: overnight events noted    ROS:  Resp: No cough no sputum production  CVS: No chest pain no palpitations no orthopnea  GI: no N/V/D  : no dysuria, no hematuria  Neuro: no weakness no paresthesias  Heme: No petechiae no easy bruising  Msk: No joint pain no swelling  Skin: No rash no itching        MEDICATIONS  (STANDING):  allopurinol 100 milliGRAM(s) Oral daily  amLODIPine   Tablet 5 milliGRAM(s) Oral daily  ascorbic acid 500 milliGRAM(s) Oral daily  chlorhexidine 2% Cloths 1 Application(s) Topical <User Schedule>  clopidogrel Tablet 75 milliGRAM(s) Oral daily  dextrose 40% Gel 15 Gram(s) Oral once  dextrose 5%. 1000 milliLiter(s) (50 mL/Hr) IV Continuous <Continuous>  dextrose 5%. 1000 milliLiter(s) (100 mL/Hr) IV Continuous <Continuous>  dextrose 50% Injectable 25 Gram(s) IV Push once  dextrose 50% Injectable 12.5 Gram(s) IV Push once  dextrose 50% Injectable 25 Gram(s) IV Push once  glucagon  Injectable 1 milliGRAM(s) IntraMuscular once  heparin   Injectable 5000 Unit(s) SubCutaneous every 12 hours  insulin glargine Injectable (LANTUS) 18 Unit(s) SubCutaneous at bedtime  insulin lispro (ADMELOG) corrective regimen sliding scale   SubCutaneous three times a day before meals  insulin lispro (ADMELOG) corrective regimen sliding scale   SubCutaneous at bedtime  insulin lispro Injectable (ADMELOG) 8 Unit(s) SubCutaneous three times a day before meals  levothyroxine 50 MICROGram(s) Oral daily  meropenem  IVPB 1000 milliGRAM(s) IV Intermittent every 12 hours  metoprolol succinate  milliGRAM(s) Oral daily  multivitamin 1 Tablet(s) Oral daily  pantoprazole    Tablet 40 milliGRAM(s) Oral before breakfast  simvastatin 20 milliGRAM(s) Oral at bedtime  sodium chloride 0.9% Bolus 250 milliLiter(s) IV Bolus once    MEDICATIONS  (PRN):  acetaminophen     Tablet .. 650 milliGRAM(s) Oral every 6 hours PRN Temp greater or equal to 38C (100.4F), Moderate Pain (4 - 6)        CAPILLARY BLOOD GLUCOSE      POCT Blood Glucose.: 110 mg/dL (19 Dec 2021 12:52)  POCT Blood Glucose.: 138 mg/dL (19 Dec 2021 08:46)  POCT Blood Glucose.: 92 mg/dL (18 Dec 2021 21:34)  POCT Blood Glucose.: 136 mg/dL (18 Dec 2021 17:13)    I&O's Summary    18 Dec 2021 07:01  -  19 Dec 2021 07:00  --------------------------------------------------------  IN: 840 mL / OUT: 1400 mL / NET: -560 mL        Vital Signs Last 24 Hrs  T(C): 36.9 (19 Dec 2021 13:30), Max: 36.9 (19 Dec 2021 13:30)  T(F): 98.4 (19 Dec 2021 13:30), Max: 98.4 (19 Dec 2021 13:30)  HR: 67 (19 Dec 2021 13:30) (65 - 68)  BP: 129/70 (19 Dec 2021 13:30) (125/67 - 135/74)  BP(mean): --  RR: 18 (19 Dec 2021 13:30) (17 - 18)  SpO2: 97% (19 Dec 2021 13:30) (95% - 97%)      PHYSICAL EXAM:   HEENT: DEBBIE EOMI  Neck: Supple, no JVD  Lungs: clear  CVS: S1 S2 systolic murmur +   Abdomen: non tender BS +  Neuro: AO x 3 nonfocal  Ext: 1+ edema right ankle bandage  right arm PICC  Msk: no joint swelling    LABS:                        7.7    10.34 )-----------( 249      ( 19 Dec 2021 07:28 )             25.9     12-19    135  |  99  |  35<H>  ----------------------------<  136<H>  4.2   |  23  |  1.92<H>    Ca    9.8      19 Dec 2021 07:28                  All consultant(s) notes reviewed and care discussed with other providers        Contact Number, Dr Ho [phone]

## 2021-12-19 NOTE — PROGRESS NOTE ADULT - SUBJECTIVE AND OBJECTIVE BOX
Podiatry pager #: [phone] ([place])/ [phone] ([facility])    Patient is a 89y old  Female who presents with a chief complaint of s/p fall (18 Dec 2021 14:07)       INTERVAL HPI/OVERNIGHT EVENTS:  Patient seen and evaluated at bedside.  Pt is resting comfortable in NAD. Denies N/V/F/C.     Allergies    Bactrim (Unknown)  Flagyl (Hives; Pruritus)  Macrobid (Other)  sulfa drugs (Hives)  Zosyn (Other)    Intolerances        Vital Signs Last 24 Hrs  T(C): 36.6 (19 Dec 2021 04:43), Max: 36.6 (18 Dec 2021 12:51)  T(F): 97.8 (19 Dec 2021 04:43), Max: 97.9 (18 Dec 2021 12:51)  HR: 68 (19 Dec 2021 04:43) (65 - 69)  BP: 135/74 (19 Dec 2021 04:43) (125/67 - 135/74)  BP(mean): --  RR: 17 (19 Dec 2021 04:43) (17 - 17)  SpO2: 96% (19 Dec 2021 04:43) (95% - 96%)    LABS:                        7.7    10.34 )-----------( 249      ( 19 Dec 2021 07:28 )             25.9     12-19    135  |  99  |  35<H>  ----------------------------<  136<H>  4.2   |  23  |  1.92<H>    Ca    9.8      19 Dec 2021 07:28          CAPILLARY BLOOD GLUCOSE      POCT Blood Glucose.: 138 mg/dL (19 Dec 2021 08:46)  POCT Blood Glucose.: 92 mg/dL (18 Dec 2021 21:34)  POCT Blood Glucose.: 136 mg/dL (18 Dec 2021 17:13)  POCT Blood Glucose.: 218 mg/dL (18 Dec 2021 13:15)      Lower Extremity Physical Exam:  Vascular: DP/PT 1/4, B/L, CFT <3 seconds B/L, Temperature gradient WNL, B/L.   Neuro: Epicritic sensation diminished to the level of _, B/L.  Musculoskeletal/Ortho: Able for R Ankle ROM without any pain elicited  Skin: Right lateral mal I&D wound down to periosteum , w/ no purulence noted, wound tracks 1.5cm proximally, no malodor, mild periwound erythema.  bilateral posterior heel deep tissue injury, no fluctuance, no local signs of infection    RADIOLOGY & ADDITIONAL TESTS:

## 2021-12-20 NOTE — PRE-ANESTHESIA EVALUATION ADULT - NSANTHPEFT_GEN_ALL_CORE
PHYSICAL EXAM:  GENERAL: NAD, well-developed  CHEST/LUNG: Clear to auscultation bilaterally; No wheeze  HEART: Regular rate and rhythm;  PSYCH/NEURO: AAOx3 PHYSICAL EXAM:  GENERAL: NAD, well-developed  CHEST/LUNG: Clear to auscultation bilaterally; No wheeze  HEART: Regular rate and rhythm; Systolic murmur  PSYCH/NEURO: AAOx3

## 2021-12-20 NOTE — DISCHARGE NOTE PROVIDER - NSDCCPCAREPLAN_GEN_ALL_CORE_FT
PRINCIPAL DISCHARGE DIAGNOSIS  Diagnosis: Osteomyelitis of ankle or foot, acute  Assessment and Plan of Treatment: Continue with antibiotics as prescribed.  Last date of regimen will be 1/31/22.  Weekly CBC and CMP.  Follow up with infectious disease in 3-4 weeks.  Please call to schedule your appointment.      SECONDARY DISCHARGE DIAGNOSES  Diagnosis: Afib  Assessment and Plan of Treatment: Rate controlled.  Continue with Eliquis as prescribed.    Diagnosis: Stage 3 chronic kidney disease  Assessment and Plan of Treatment: Heredia placed for urinary retention with failed trial of void.  Trial of void to be attempted again while at rehab.  Creatinine on 12/27=1.28.    Diagnosis: Anemia of chronic disease  Assessment and Plan of Treatment: Hemoglobin stable.  On date of discharge Hgb = 9.1.    Diagnosis: Diabetes mellitus  Assessment and Plan of Treatment: Continue with insulin as prescribed.    Diagnosis: HTN (hypertension)  Assessment and Plan of Treatment: Continue to take your blood pressure medications as prescribed.    Diagnosis: Hypothyroidism  Assessment and Plan of Treatment: Continue with synthroid as prescribed.    Diagnosis: Acute CHF  Assessment and Plan of Treatment: Continue lasix daily as prescribed.     PRINCIPAL DISCHARGE DIAGNOSIS  Diagnosis: Osteomyelitis of ankle or foot, acute  Assessment and Plan of Treatment: Continue with antibiotics (Ertapenem) as prescribed.  Last date of regimen will be 1/31/22.  Weekly CBC and CMP.  Follow up with infectious disease in 3-4 weeks.  Please call to schedule your appointment.      SECONDARY DISCHARGE DIAGNOSES  Diagnosis: Acute CHF  Assessment and Plan of Treatment: Continue lasix daily as prescribed.    Diagnosis: Diabetes mellitus  Assessment and Plan of Treatment: Continue with insulin as prescribed.    Diagnosis: Anemia of chronic disease  Assessment and Plan of Treatment: Hemoglobin stable.  On date of discharge Hgb = 9.1.    Diagnosis: Stage 3 chronic kidney disease  Assessment and Plan of Treatment: Heredia placed for urinary retention with failed trial of void.  Trial of void to be attempted again while at rehab.  Creatinine on 12/27=1.28.    Diagnosis: Afib  Assessment and Plan of Treatment: Rate controlled.  Continue with Eliquis as prescribed.    Diagnosis: Hypothyroidism  Assessment and Plan of Treatment: Continue with synthroid as prescribed.    Diagnosis: HTN (hypertension)  Assessment and Plan of Treatment: Continue to take your blood pressure medications as prescribed.

## 2021-12-20 NOTE — CONSULT NOTE ADULT - ASSESSMENT
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

## 2021-12-20 NOTE — PROGRESS NOTE ADULT - ASSESSMENT
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 Notes shes been trying to move out of bed and fell, hitting mainly her hip and parts of her back. She does note feeling a bit weaker than usual, and not drinking enough water because she doesn't want to urinate at night. No fevers, chills, cough that she endorses. Does note hitting her R ankle previously and developing infection recently, for which was treated? Has PICC in R arm, and she is unsure what it is for.     Per review of [facility] records no abx since 12/3  Exam with R lateral malleolar ulcer with discharge  No surrounding cellulitis  X ray with perisosteal reaction  High ESra nd CRP though is non specific and could reflect fall   previously was on Invanz at [facility] though no abx at [facility] in [facility]  MRI as above   ? OM ? septic arthritis  Less likely non infectious  Blood Cx negative-MRSA PCR negative  Pt though with recent PCR     Rec:  A) Ankle ulcer  Cx with only CNS so far butw as on abx  Podiatry eval noted- for surgical intervention MOnday  continue merem   creatinine higher-dose vanco by levels- if no MRSA on deeper Cx will dc vanco   final plan depending on results         B) abx allergies  tolerating cefepime so far  monitor clinically for s/s any allergic reaction    C) fall  will defer to primary team    D) leucocytosis  monitor for C diff   Monitor for s/s any other foci  decreased today     Will tailor plan for ID issues  per course,results.Will defer to primary team on management of other issues.  Assessment, plan and recommendations as detailed above were discussed with the medical/primary  team.  Will Follow.  Beeper [phone] [facility] [id number].   Wknd/afterhours/No response-[phone] or Fellow on call

## 2021-12-20 NOTE — DISCHARGE NOTE PROVIDER - NSDCMRMEDTOKEN_GEN_ALL_CORE_FT
acetaminophen 325 mg oral tablet: 2 tab(s) orally every 6 hours, As Needed for pain   allopurinol 100 mg oral tablet: 1 tab(s) orally once a day  cholecalciferol 125 mcg (5000 intl units) oral tablet: 1 tab(s) orally once a day  cyanocobalamin 1000 mcg oral tablet: 2.5 tab(s) orally once a day  docusate potassium 100 mg oral capsule: 2 cap(s) orally once a day (at bedtime)  Dulcolax Laxative 10 mg rectal suppository: 1 suppository(ies) rectal once a day  Eliquis 2.5 mg oral tablet: 1 tab(s) orally 2 times a day  Fleet Enema 19 g-7 g rectal enema: 1 enema rectally PRN for constipation x 1 dose  HumaLOG 100 units/mL subcutaneous solution: inject via sliding scale  Lantus Solostar Pen 100 units/mL subcutaneous solution: 15 units once a day  Lasix 20 mg oral tablet: 1 tab(s) orally once a day  Metoprolol Succinate  mg oral tablet, extended release: 1 tab(s) orally once a day  Milk of Magnesia 8% oral suspension: 30 milliliter(s) orally prn for constipation  Multiple Vitamins oral tablet: 1 tab(s) orally once a day  Norvasc 5 mg oral tablet: 1 tab(s) orally once a day  nystatin 100,000 units/g topical cream: Apply topically to affected area on abdomen twice daily for rash  pantoprazole 40 mg oral delayed release tablet: 1 tab(s) orally once a day  Plavix 75 mg oral tablet: 1 tab(s) orally once a day  Synthroid 50 mcg (0.05 mg) oral tablet: 1 tab(s) orally once a day  Victoza 18 mg/3 mL subcutaneous solution: 1.2 milligram(s) subcutaneous once a day    Note:Pharmacy has directions as 1.8mg daily pt states she takes 1.2mg daily  Zocor 20 mg oral tablet: 1 tab(s) orally once a day (at bedtime)   acetaminophen 325 mg oral tablet: 2 tab(s) orally every 6 hours, As needed, Temp greater or equal to 38C (100.4F), Moderate Pain (4 - 6)  allopurinol 100 mg oral tablet: 1 tab(s) orally once a day  amLODIPine 5 mg oral tablet: 1 tab(s) orally once a day  apixaban 2.5 mg oral tablet: 1 tab(s) orally 2 times a day  ascorbic acid 500 mg oral tablet: 1 tab(s) orally once a day  bisacodyl 5 mg oral delayed release tablet: 1 tab(s) orally once a day (at bedtime)  calcitriol 0.25 mcg oral capsule: 1 cap(s) orally once a day  ertapenem: 500 milligram(s) intravenously once a day  furosemide 40 mg oral tablet: 1 tab(s) orally once a day  insulin glargine: 10 unit(s) subcutaneous once a day (at bedtime)  insulin lispro 100 units/mL injectable solution: 0 Unit(s) if Glucose 0 - 250  1 Unit(s) if Glucose 251 - 300  2 Unit(s) if Glucose 301 - 350  3 Unit(s) if Glucose 351 - 400  4 Unit(s) if Glucose Greater Than 400  insulin lispro 100 units/mL injectable solution: 1 Unit(s) if Glucose 151 - 200  2 Unit(s) if Glucose 201 - 250  3 Unit(s) if Glucose 251 - 300  4 Unit(s) if Glucose 301 - 350  5 Unit(s) if Glucose 351 - 400  6 Unit(s) if Glucose Greater Than 400  levothyroxine 50 mcg (0.05 mg) oral tablet: 1 tab(s) orally once a day  melatonin 3 mg oral tablet: 1 tab(s) orally once a day (at bedtime)  metoprolol succinate 100 mg oral tablet, extended release: 1 tab(s) orally once a day  Multiple Vitamins oral tablet: 1 tab(s) orally once a day  nystatin 100,000 units/g topical powder: 1 application topically 2 times a day    To abdomen  pantoprazole 40 mg oral delayed release tablet: 1 tab(s) orally once a day (before a meal)  Plavix 75 mg oral tablet: 1 tab(s) orally once a day  polyethylene glycol 3350 oral powder for reconstitution: 17 gram(s) orally once a day  senna oral tablet: 2 tab(s) orally once a day (at bedtime)  simvastatin 20 mg oral tablet: 1 tab(s) orally once a day (at bedtime)   acetaminophen 325 mg oral tablet: 2 tab(s) orally every 6 hours, As needed, Temp greater or equal to 38C (100.4F), Moderate Pain (4 - 6)  allopurinol 100 mg oral tablet: 1 tab(s) orally once a day  amLODIPine 5 mg oral tablet: 1 tab(s) orally once a day  apixaban 2.5 mg oral tablet: 1 tab(s) orally 2 times a day  ascorbic acid 500 mg oral tablet: 1 tab(s) orally once a day  bisacodyl 5 mg oral delayed release tablet: 1 tab(s) orally once a day (at bedtime)  calcitriol 0.25 mcg oral capsule: 1 cap(s) orally once a day  ertapenem: 500 milligram(s) intravenously once a day until 01/31/22  furosemide 40 mg oral tablet: 1 tab(s) orally once a day  insulin glargine: 10 unit(s) subcutaneous once a day (at bedtime)  insulin lispro 100 units/mL injectable solution: 0 Unit(s) if Glucose 0 - 250  1 Unit(s) if Glucose 251 - 300  2 Unit(s) if Glucose 301 - 350  3 Unit(s) if Glucose 351 - 400  4 Unit(s) if Glucose Greater Than 400  insulin lispro 100 units/mL injectable solution: 1 Unit(s) if Glucose 151 - 200  2 Unit(s) if Glucose 201 - 250  3 Unit(s) if Glucose 251 - 300  4 Unit(s) if Glucose 301 - 350  5 Unit(s) if Glucose 351 - 400  6 Unit(s) if Glucose Greater Than 400  levothyroxine 50 mcg (0.05 mg) oral tablet: 1 tab(s) orally once a day  melatonin 3 mg oral tablet: 1 tab(s) orally once a day (at bedtime)  metoprolol succinate 100 mg oral tablet, extended release: 1 tab(s) orally once a day  Multiple Vitamins oral tablet: 1 tab(s) orally once a day  nystatin 100,000 units/g topical powder: 1 application topically 2 times a day    To abdomen  pantoprazole 40 mg oral delayed release tablet: 1 tab(s) orally once a day (before a meal)  Plavix 75 mg oral tablet: 1 tab(s) orally once a day  polyethylene glycol 3350 oral powder for reconstitution: 17 gram(s) orally once a day  senna oral tablet: 2 tab(s) orally once a day (at bedtime)  simvastatin 20 mg oral tablet: 1 tab(s) orally once a day (at bedtime)

## 2021-12-20 NOTE — CONSULT NOTE ADULT - CONSULT REASON
MARICARMEN
ankle ulcer
R lateral mal wound
sacral/buttocks skin discoloration
High Blood Sugars/DMT2

## 2021-12-20 NOTE — BRIEF OPERATIVE NOTE - NSICDXBRIEFPROCEDURE_GEN_ALL_CORE_FT
PROCEDURES:  Incision and drainage of infected bursa of ankle 20-Dec-2021 19:52:38  Maryuri Faye  Biopsy of bone of right ankle 20-Dec-2021 19:53:23  Maryuri Faye

## 2021-12-20 NOTE — PROGRESS NOTE ADULT - ASSESSMENT
Assessment  DMT2: 89y Female with DM T2 with hyperglycemia, A1C 7.3%, was on insulin at home, now on basal bolus insulin, decreased dose, bolus dose being held 2/2 NPO status, blood sugars are improving and in acceptable range, no hypoglycemic episodes, for right ankle wound debridement and washout today.  Fall: AI R/O, workup in progress, stable, monitored.  Hypothyroidism: On Synthroid 50 mcg po daily, compliant with Synthroid intake, euthyroid.  HTN: Controlled,  on antihypertensive medications.  CKD: Monitor labs/BMP,       Jono Zaldivar MD  Cell: [phone]  Office: [phone]     Assessment  DMT2: 89y Female with DM T2 with hyperglycemia, A1C 7.3%, was on insulin at home, now on basal bolus insulin, decreased dose, bolus dose being held 2/2 NPO status, blood sugars are improving and in acceptable range, no hypoglycemic  episodes, for right ankle wound debridement and washout today.  Fall: AI R/O, workup in progress, stable, monitored.  Hypothyroidism: On Synthroid 50 mcg po daily, compliant with Synthroid intake, euthyroid.  HTN: Controlled,  on antihypertensive medications.  CKD: Monitor labs/BMP,       Jono Zaldivar MD  Cell: [phone]  Office: [phone]

## 2021-12-20 NOTE — BRIEF OPERATIVE NOTE - SPECIMENS
path 1 - right fibula bone biopsy; micro 1 - right fibula bone biopsy, 2 - right deep wound culture ankle

## 2021-12-20 NOTE — DISCHARGE NOTE PROVIDER - DETAILS OF MALNUTRITION DIAGNOSIS/DIAGNOSES
This patient has been assessed with a concern for Malnutrition and was treated during this hospitalization for the following Nutrition diagnosis/diagnoses:     -  12/15/2021: Severe protein-calorie malnutrition

## 2021-12-20 NOTE — DISCHARGE NOTE PROVIDER - HOSPITAL COURSE
89 y.o F with PMH of TAVR earlier this year, DM 2, hypothyroidism, A fib on Eliquis presented to hospital because she fell while trying to get out of bed in [facility].  She was brought here for evaluation.  Diagnosed with cellulitis of right ankle.  Followed by infectious disease with recommendation to continue antibiotics now in Invanz 500 IV qd x 6 weeks total.     Problem/Plan - 2:  ·  Problem: Acute CHF.   ·  Plan: currently euvolemic   continue furosemide 40 po qd.     Problem/Plan - 3:  ·  Problem: Diabetes mellitus.   ·  Plan: finger sticks better  continue finger sticks with short acting insulin sliding scale   continue to follow endo recommendations.     Problem/Plan - 4:  ·  Problem: Anemia of chronic disease.   ·  Plan: will continue to monitor   likely multifactorial.     Problem/Plan - 5:  ·  Problem: Stage 3 chronic kidney disease.   ·  Plan: will need to be discharged with in-dwelling Heredia for TOV in [facility]acute Rehab   creatinine at baseline.     Problem/Plan - 6:  ·  Problem: Afib.   ·  Plan: heart rate controlled  chest pain apixaban.     Problem/Plan - 7:  ·  Problem: HTN (hypertension).   ·  Plan: acceptable  continue home meds with hold parameters.     Problem/Plan - 8:  ·  Problem: Hypothyroidism.   ·  Plan: continue synthroid.     89 y.o F with PMH of TAVR earlier this year, DM 2, hypothyroidism, A fib on Eliquis presented to hospital because she fell while trying to get out of bed in [facility].  She was brought here for evaluation.  Diagnosed with cellulitis of right ankle.  Followed by infectious disease with recommendation to continue antibiotics now in Invanz 500 IV qd x 6 weeks total (last date of abx to be 1/31/22).  To have weekly CBC, CMP while in rehab and follow up with infectious disease in 3 to 4 weeks.  Has indwelling dejesus catheter for urinary retention.  Failed TOV.      Medically cleared for d/c to rehab with dejesus.   Trial of void while at rehab.           89 y.o F with PMH of TAVR earlier this year, DM 2, hypothyroidism, A fib on Eliquis presented to hospital because she fell while trying to get out of bed in [facility].  She was brought here for evaluation.  Diagnosed with cellulitis of right ankle.  Followed by infectious disease with recommendation to continue antibiotics now in Invanz 500 IV qd x 6 weeks total (last date of abx to be 1/31/22).  To have weekly CBC, CMP while in rehab and follow up with infectious disease in 3 to 4 weeks.  Has indwelling dejesus catheter for urinary retention.  Failed TOV.    Medically cleared for d/c to rehab with Dejesus.   Trial of void while at rehab.  She was seen by wound care, Podiatry, ID, EP, Endocrine and Renal.

## 2021-12-20 NOTE — BRIEF OPERATIVE NOTE - OPERATION/FINDINGS
Pt is s/p right ankle incision and drainage and right fibula bone biopsy, partially open, EBL 20cc, low concern for residual infection, low concern for viability. Patient stable for discharge pending bone biopsy results, and possible PICC placement Pt is s/p right ankle incision and drainage and right fibula bone biopsy, partially open, EBL 20cc, 3cc purulence noted, low concern for residual infection, low concern for viability. Patient stable for discharge pending bone biopsy results, and possible PICC placement

## 2021-12-20 NOTE — DISCHARGE NOTE PROVIDER - CARE PROVIDERS DIRECT ADDRESSES
,[email or web address],DirectAddress_[email or web address]

## 2021-12-20 NOTE — PROGRESS NOTE ADULT - ASSESSMENT
Plan:   To OR today at 5pm with Dr. Márquez for right ankle wound debridement w/ washout.   CXR on sunrise.  EKG on sunrise.  Awaiting medical clearance documentation  Consent signed and in chart.  Procedure was explained to patient in detail. All alternatives, risks and complications were discussed. All questions answered.

## 2021-12-20 NOTE — DISCHARGE NOTE PROVIDER - NSDCFUADDAPPT_GEN_ALL_CORE_FT
Podiatry Discharge Instructions:  Follow up: Please follow up with Dr. Márquez within 1 week of discharge from the hospital, please call [phone] for appointment and discuss that you recently were seen in the hospital.  Wound Care: Please apply 1/4' iodoform packing, 4x4 gauze, and kerlix to right ankle  Weight bearing: Please weight bear as tolerated to the right ankle  Antibiotics: Please continue as instructed. Podiatry Discharge Instructions:  Follow up: Please follow up with Dr. Márquez within 1 week of discharge from the hospital, please call [phone] for appointment and discuss that you recently were seen in the hospital.  Wound Care: Please keep dressing clean, dry and intact until follow up.   Weight bearing: Please weight bear as tolerated to the right ankle  Antibiotics: Please continue as instructed.

## 2021-12-20 NOTE — DISCHARGE NOTE PROVIDER - PROVIDER TOKENS
PROVIDER:[TOKEN:[[id number]],FOLLOWUP:[1 month]],PROVIDER:[TOKEN:[[id number]]],PROVIDER:[TOKEN:[[id number]],FOLLOWUP:[1 month]],PROVIDER:[TOKEN:[[id number]]]

## 2021-12-20 NOTE — CONSULT NOTE ADULT - SUBJECTIVE AND OBJECTIVE BOX
NEPHROLOGY - [facility]    Patient seen and examined.    HPI:  89 y.o F with PMH of TAVR earlier this year, DM 2, hypothyroidism, A fib on Eliquis here because she fell while trying to get out of bed. Notes shes been trying to move out of bed and fell, hitting mainly her hip and parts of her back. She does note feeling a bit weaker than usual, and not drinking enough water because she doesn't want to urinate at night. No fevers, chills, cough that she endorses. Does note hitting her R ankle previously and developing infection recently, for which was treated? Has PICC in R arm, and she is unsure what it is for. D/w  who said patient was admitted to [facility] 3 weeks ago and was diagnosed with right ankle cellulitis ? osteomyelitis. The patient was discharge to [facility]. 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 1000 milliLiter(s) (50 mL/Hr) IV Continuous <Continuous>  dextrose 5%. 1000 milliLiter(s) (100 mL/Hr) IV Continuous <Continuous>  dextrose 50% Injectable 25 Gram(s) IV Push once  dextrose 50% Injectable 12.5 Gram(s) IV Push once  dextrose 50% Injectable 25 Gram(s) IV Push once  glucagon  Injectable 1 milliGRAM(s) IntraMuscular once  heparin   Injectable 5000 Unit(s) SubCutaneous every 12 hours  insulin glargine Injectable (LANTUS) 14 Unit(s) SubCutaneous at bedtime  insulin lispro (ADMELOG) corrective regimen sliding scale   SubCutaneous three times a day before meals  insulin lispro (ADMELOG) corrective regimen sliding scale   SubCutaneous at bedtime  insulin lispro Injectable (ADMELOG) 6 Unit(s) SubCutaneous three times a day before meals  levothyroxine 50 MICROGram(s) Oral daily  meropenem  IVPB 1000 milliGRAM(s) IV Intermittent every 12 hours  metoprolol succinate  milliGRAM(s) Oral daily  multivitamin 1 Tablet(s) Oral daily  pantoprazole    Tablet 40 milliGRAM(s) Oral before breakfast  simvastatin 20 milliGRAM(s) Oral at bedtime      Allergies    Bactrim (Unknown)  Flagyl (Hives; Pruritus)  Macrobid (Other)  sulfa drugs (Hives)  Zosyn (Other)    Intolerances        SOCIAL HISTORY:  Denies alcohol abuse, drug abuse or tobacco usage.     FAMILY HISTORY:  No pertinent family history in first degree relatives        VITALS:  T(C): 36.7 (12-20-21 @ 05:01), Max: 36.9 (12-19-21 @ 13:30)  HR: 75 (12-20-21 @ 05:01) (67 - 75)  BP: 134/74 (12-20-21 @ 05:01) (128/66 - 134/74)  RR: 18 (12-20-21 @ 05:01) (18 - 18)  SpO2: 95% (12-20-21 @ 05:01) (95% - 97%)    REVIEW OF SYSTEMS:  Denies any nausea, vomiting, diarrhea, fever or chills. Denies chest pain, SOB, focal weakness, hematuria or dysuria. Good oral intake and denies fatigue or weakness. All other pertinent systems are reviewed and are negative.    PHYSICAL EXAM:  Constitutional: NAD  HEENT: EOMI  Neck:  No JVD, supple   Respiratory: CTA B/L  Cardiovascular: S1 and S2, RRR  Gastrointestinal: + BS, soft, NT, ND  Extremities: No peripheral edema, + peripheral pulses  Neurological: A/O x 3, CN2-12 intact  Psychiatric: Normal mood, normal affect  : No Heredia  Skin: No rashes, C/D/I  Access: Not applicable    I and O's:    12-18 @ 07:01  -  12-19 @ 07:00  --------------------------------------------------------  IN: 840 mL / OUT: 1400 mL / NET: -560 mL    12-19 @ 07:01  -  12-20 @ 07:00  --------------------------------------------------------  IN: 0 mL / OUT: 1100 mL / NET: -1100 mL          LABS:                        7.5    9.15  )-----------( 199      ( 20 Dec 2021 07:07 )             25.0     12-20    139  |  101  |  33<H>  ----------------------------<  102<H>  4.1   |  25  |  1.85<H>    Ca    9.2      20 Dec 2021 07:02        URINE:      RADIOLOGY & ADDITIONAL STUDIES:     NEPHROLOGY - [facility]    Patient seen and examined.    HPI:  89 y.o F with PMH of TAVR earlier this year, DM 2, hypothyroidism, A fib on Eliquis here because she fell while trying to get out of bed. Notes shes been trying to move out of bed and fell, hitting mainly her hip and parts of her back. She does note feeling a bit weaker than usual, and not drinking enough water because she doesn't want to urinate at night. No fevers, chills, cough that she endorses. Does note hitting her R ankle previously and developing infection recently, for which was treated? Has PICC in R arm, and she is unsure what it is for. D/w  who said patient was admitted to [facility] 3 weeks ago and was diagnosed with right ankle cellulitis ? osteomyelitis. The patient was discharge to [facility]. She fell out of bed in the SNF and was transferred to [facility] (12 Dec 2021 12:42)  She has had CKD stage 4 and she is aware creatinine elevation in the past.  She has known of this for at least 5 years.  DM for 30 yrs now   There is no hematuria or bubbles in the urine.  No history of NSAIDS or nephrolithisis.  The patient urinates once or twice in the night and there is no incontinence.  No family hx or renal disease or back pain.  No alleviating or aggravating factors with respect to the kidneys.       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    S/P bilateral Cataract Extraction and ocular lens implant    S/P Tonsillectomy    S/P TAVR (transcatheter aortic valve replacement)        MEDICATIONS  (STANDING):  allopurinol 100 milliGRAM(s) Oral daily  amLODIPine   Tablet 5 milliGRAM(s) Oral daily  ascorbic acid 500 milliGRAM(s) Oral daily  chlorhexidine 2% Cloths 1 Application(s) Topical <User Schedule>  clopidogrel Tablet 75 milliGRAM(s) Oral daily  dextrose 40% Gel 15 Gram(s) Oral once  dextrose 5%. 1000 milliLiter(s) (50 mL/Hr) IV Continuous <Continuous>  dextrose 5%. 1000 milliLiter(s) (100 mL/Hr) IV Continuous <Continuous>  dextrose 50% Injectable 25 Gram(s) IV Push once  dextrose 50% Injectable 12.5 Gram(s) IV Push once  dextrose 50% Injectable 25 Gram(s) IV Push once  glucagon  Injectable 1 milliGRAM(s) IntraMuscular once  heparin   Injectable 5000 Unit(s) SubCutaneous every 12 hours  insulin glargine Injectable (LANTUS) 14 Unit(s) SubCutaneous at bedtime  insulin lispro (ADMELOG) corrective regimen sliding scale   SubCutaneous three times a day before meals  insulin lispro (ADMELOG) corrective regimen sliding scale   SubCutaneous at bedtime  insulin lispro Injectable (ADMELOG) 6 Unit(s) SubCutaneous three times a day before meals  levothyroxine 50 MICROGram(s) Oral daily  meropenem  IVPB 1000 milliGRAM(s) IV Intermittent every 12 hours  metoprolol succinate  milliGRAM(s) Oral daily  multivitamin 1 Tablet(s) Oral daily  pantoprazole    Tablet 40 milliGRAM(s) Oral before breakfast  simvastatin 20 milliGRAM(s) Oral at bedtime      Allergies    Bactrim (Unknown)  Flagyl (Hives; Pruritus)  Macrobid (Other)  sulfa drugs (Hives)  Zosyn (Other)    Intolerances        SOCIAL HISTORY:  Denies alcohol abuse, drug abuse or tobacco usage.     FAMILY HISTORY:  No pertinent family history in first degree relatives        VITALS:  T(C): 36.7 (12-20-21 @ 05:01), Max: 36.9 (12-19-21 @ 13:30)  HR: 75 (12-20-21 @ 05:01) (67 - 75)  BP: 134/74 (12-20-21 @ 05:01) (128/66 - 134/74)  RR: 18 (12-20-21 @ 05:01) (18 - 18)  SpO2: 95% (12-20-21 @ 05:01) (95% - 97%)    REVIEW OF SYSTEMS:  Denies any nausea, vomiting, diarrhea, fever or chills. Good oral intake and denies fatigue or weakness. All other pertinent systems are reviewed and are negative.    PHYSICAL EXAM:  Constitutional: NAD  HEENT: EOMI  Neck:  No JVD, supple   Respiratory: CTA B/L  Cardiovascular: S1 and S2, RRR  Gastrointestinal: + BS, soft, NT, ND  Extremities: No peripheral edema, + peripheral pulses  Neurological: A/O x 3, CN2-12 intact  Psychiatric: Normal mood, normal affect  : +  Heredia  Skin: No rashes, C/D/I  Access: Not applicable    I and O's:    12-18 @ 07:01  -  12-19 @ 07:00  --------------------------------------------------------  IN: 840 mL / OUT: 1400 mL / NET: -560 mL    12-19 @ 07:01  -  12-20 @ 07:00  --------------------------------------------------------  IN: 0 mL / OUT: 1100 mL / NET: -1100 mL          LABS:                        7.5    9.15  )-----------( 199      ( 20 Dec 2021 07:07 )             25.0     12-20    139  |  101  |  33<H>  ----------------------------<  102<H>  4.1   |  25  |  1.85<H>    Ca    9.2      20 Dec 2021 07:02        URINE:      RADIOLOGY & ADDITIONAL STUDIES:    < from: US Kidney and Bladder (12.15.21 @ 11:52) >    ACC: [id number] EXAM:  US KIDNEYS AND BLADDER                          PROCEDURE DATE:  12/15/2021          INTERPRETATION:  CLINICAL INFORMATION: Urinary retention.    COMPARISON: CT abdomen pelvis 10/18/2011    TECHNIQUE: Sonography of the kidneys and bladder.    FINDINGS:    Right kidney: 9.1 cm. No hydronephrosis. Increased cortical echogenicity.   Renal cysts, measuring up to 1.9 x 2.3 x 1.9 cm in the upper pole.    Left kidney: 11.8 cm. No hydronephrosis. Increased cortical echogenicity.   Renal cysts, measuring up to 4.6 x 3.8 x 3.9 cm in the lower pole.    Urinary bladder: Decompressed with Heredia catheter present.    Other: Partially imaged bilateral pleural effusions.    IMPRESSION:  Increased renal echogenicity, suggesting medical renal disease. No   hydronephrosis.    Urinary bladder collapsed around a Heredia catheter.    Bilateral pleural effusions.    --- End of Report ---           ARMINDA DUNN MD; Resident Interventional Radiology  This document has been electronically signed.  GADIEL LAKE MD; Attending Radiologist  This document has been electronically signed. Dec 15 2021 12:05PM    < end of copied text >

## 2021-12-20 NOTE — PROGRESS NOTE ADULT - SUBJECTIVE AND OBJECTIVE BOX
Patient is a 89y old  Female who presents with a chief complaint of s/p fall (19 Dec 2021 18:03)      INTERVAL HPI/OVERNIGHT EVENTS:   Pt is scheduled for Right ankle wound debridement w/ washout with Dr. Márquez at 5pm. Patient is aware of procedure and is NPO since midnight.    MEDICATIONS  (STANDING):  allopurinol 100 milliGRAM(s) Oral daily  amLODIPine   Tablet 5 milliGRAM(s) Oral daily  ascorbic acid 500 milliGRAM(s) Oral daily  chlorhexidine 2% Cloths 1 Application(s) Topical <User Schedule>  clopidogrel Tablet 75 milliGRAM(s) Oral daily  dextrose 40% Gel 15 Gram(s) Oral once  dextrose 5%. 1000 milliLiter(s) (50 mL/Hr) IV Continuous <Continuous>  dextrose 5%. 1000 milliLiter(s) (100 mL/Hr) IV Continuous <Continuous>  dextrose 50% Injectable 25 Gram(s) IV Push once  dextrose 50% Injectable 12.5 Gram(s) IV Push once  dextrose 50% Injectable 25 Gram(s) IV Push once  glucagon  Injectable 1 milliGRAM(s) IntraMuscular once  heparin   Injectable 5000 Unit(s) SubCutaneous every 12 hours  insulin glargine Injectable (LANTUS) 14 Unit(s) SubCutaneous at bedtime  insulin lispro (ADMELOG) corrective regimen sliding scale   SubCutaneous at bedtime  insulin lispro (ADMELOG) corrective regimen sliding scale   SubCutaneous three times a day before meals  insulin lispro Injectable (ADMELOG) 6 Unit(s) SubCutaneous three times a day before meals  levothyroxine 50 MICROGram(s) Oral daily  meropenem  IVPB 1000 milliGRAM(s) IV Intermittent every 12 hours  metoprolol succinate  milliGRAM(s) Oral daily  multivitamin 1 Tablet(s) Oral daily  pantoprazole    Tablet 40 milliGRAM(s) Oral before breakfast  simvastatin 20 milliGRAM(s) Oral at bedtime    MEDICATIONS  (PRN):  acetaminophen     Tablet .. 650 milliGRAM(s) Oral every 6 hours PRN Temp greater or equal to 38C (100.4F), Moderate Pain (4 - 6)      Allergies    Bactrim (Unknown)  Flagyl (Hives; Pruritus)  Macrobid (Other)  sulfa drugs (Hives)  Zosyn (Other)    Intolerances        Vital Signs Last 24 Hrs  T(C): 36.7 (20 Dec 2021 05:01), Max: 36.9 (19 Dec 2021 13:30)  T(F): 98.1 (20 Dec 2021 05:01), Max: 98.4 (19 Dec 2021 13:30)  HR: 75 (20 Dec 2021 05:01) (67 - 75)  BP: 134/74 (20 Dec 2021 05:01) (128/66 - 134/74)  BP(mean): --  RR: 18 (20 Dec 2021 05:01) (18 - 18)  SpO2: 95% (20 Dec 2021 05:01) (95% - 97%)    LABS:                        7.7    10.34 )-----------( 249      ( 19 Dec 2021 07:28 )             25.9     12-19    135  |  99  |  35<H>  ----------------------------<  136<H>  4.2   |  23  |  1.92<H>    Ca    9.8      19 Dec 2021 07:28          CAPILLARY BLOOD GLUCOSE      POCT Blood Glucose.: 141 mg/dL (19 Dec 2021 21:29)  POCT Blood Glucose.: 83 mg/dL (19 Dec 2021 17:15)  POCT Blood Glucose.: 110 mg/dL (19 Dec 2021 12:52)  POCT Blood Glucose.: 138 mg/dL (19 Dec 2021 08:46)      RADIOLOGY & ADDITIONAL TESTS:

## 2021-12-20 NOTE — PRE-ANESTHESIA EVALUATION ADULT - NS MD HP INPLANTS MED DEV
right hip THR, bilateral IOL/Artificial joint/Lens implant right hip THR, bilateral IOL/Artificial joint/Lens implant/Pacemaker

## 2021-12-20 NOTE — PROGRESS NOTE ADULT - SUBJECTIVE AND OBJECTIVE BOX
Chief complaint  Patient is a 89y old  Female who presents with a chief complaint of s/p fall (20 Dec 2021 10:47)   Review of systems  No hypoglycemic episodes.    Labs and Fingersticks  CAPILLARY BLOOD GLUCOSE      POCT Blood Glucose.: 169 mg/dL (20 Dec 2021 12:59)  POCT Blood Glucose.: 101 mg/dL (20 Dec 2021 09:00)  POCT Blood Glucose.: 141 mg/dL (19 Dec 2021 21:29)  POCT Blood Glucose.: 83 mg/dL (19 Dec 2021 17:15)      Anion Gap, Serum: 13 (12-20 @ 07:02)  Anion Gap, Serum: 13 (12-19 @ 07:28)      Calcium, Total Serum: 9.2 (12-20 @ 07:02)  Calcium, Total Serum: 9.8 (12-19 @ 07:28)          12-20    139  |  101  |  33<H>  ----------------------------<  102<H>  4.1   |  25  |  1.85<H>    Ca    9.2      20 Dec 2021 07:02  Phos  3.0     12-20                          7.5    9.15  )-----------( 199      ( 20 Dec 2021 07:07 )             25.0     Medications  MEDICATIONS  (STANDING):  allopurinol 100 milliGRAM(s) Oral daily  amLODIPine   Tablet 5 milliGRAM(s) Oral daily  ascorbic acid 500 milliGRAM(s) Oral daily  chlorhexidine 2% Cloths 1 Application(s) Topical <User Schedule>  clopidogrel Tablet 75 milliGRAM(s) Oral daily  dextrose 40% Gel 15 Gram(s) Oral once  dextrose 5%. 1000 milliLiter(s) (50 mL/Hr) IV Continuous <Continuous>  dextrose 5%. 1000 milliLiter(s) (100 mL/Hr) IV Continuous <Continuous>  dextrose 50% Injectable 25 Gram(s) IV Push once  dextrose 50% Injectable 12.5 Gram(s) IV Push once  dextrose 50% Injectable 25 Gram(s) IV Push once  glucagon  Injectable 1 milliGRAM(s) IntraMuscular once  heparin   Injectable 5000 Unit(s) SubCutaneous every 12 hours  insulin glargine Injectable (LANTUS) 14 Unit(s) SubCutaneous at bedtime  insulin lispro (ADMELOG) corrective regimen sliding scale   SubCutaneous at bedtime  insulin lispro (ADMELOG) corrective regimen sliding scale   SubCutaneous three times a day before meals  insulin lispro Injectable (ADMELOG) 6 Unit(s) SubCutaneous three times a day before meals  levothyroxine 50 MICROGram(s) Oral daily  meropenem  IVPB 1000 milliGRAM(s) IV Intermittent every 12 hours  metoprolol succinate  milliGRAM(s) Oral daily  multivitamin 1 Tablet(s) Oral daily  pantoprazole    Tablet 40 milliGRAM(s) Oral before breakfast  simvastatin 20 milliGRAM(s) Oral at bedtime      Physical Exam  General: Patient comfortable in bed  Vital Signs Last 12 Hrs  T(F): 98 (12-20-21 @ 14:16), Max: 98.1 (12-20-21 @ 05:01)  HR: 66 (12-20-21 @ 14:16) (66 - 75)  BP: 121/77 (12-20-21 @ 14:16) (121/77 - 134/74)  BP(mean): --  RR: 18 (12-20-21 @ 14:16) (18 - 18)  SpO2: 97% (12-20-21 @ 14:16) (95% - 97%)    Diagnostics    Cortisol AM, Serum: AM Sched. Collection: 17-Dec-2021 06:00 (12-16 @ 14:32)           Chief complaint  Patient is a 89y old  Female who presents with a chief complaint of s/p fall (20 Dec 2021 10:47)   Review of systems  No hypoglycemic episodes.    Labs and Fingersticks  CAPILLARY BLOOD GLUCOSE      POCT Blood Glucose.: 169 mg/dL (20 Dec 2021 12:59)  POCT Blood Glucose.: 101 mg/dL (20 Dec 2021 09:00)  POCT Blood Glucose.: 141 mg/dL (19 Dec 2021 21:29)  POCT Blood Glucose.: 83 mg/dL (19 Dec 2021 17:15)      Anion Gap, Serum: 13 (12-20 @ 07:02)  Anion Gap, Serum: 13 (12-19 @ 07:28)      Calcium, Total Serum: 9.2 (12-20 @ 07:02)  Calcium, Total Serum: 9.8 (12-19 @ 07:28)          12-20    139  |  101  |  33<H>  ----------------------------<  102<H>  4.1   |  25  |  1.85<H>    Ca    9.2      20 Dec 2021 07:02  Phos  3.0     12-20                          7.5    9.15  )-----------( 199      ( 20 Dec 2021 07:07 )             25.0     Medications  MEDICATIONS  (STANDING):  allopurinol 100 milliGRAM(s) Oral daily  amLODIPine   Tablet 5 milliGRAM(s) Oral daily  ascorbic acid 500 milliGRAM(s) Oral daily  chlorhexidine 2% Cloths 1 Application(s) Topical <User Schedule>  clopidogrel Tablet 75 milliGRAM(s) Oral daily  dextrose 40% Gel 15 Gram(s) Oral once  dextrose 5%. 1000 milliLiter(s) (50 mL/Hr) IV Continuous <Continuous>  dextrose 5%. 1000 milliLiter(s) (100 mL/Hr) IV Continuous <Continuous>  dextrose 50% Injectable 25 Gram(s) IV Push once  dextrose 50% Injectable 12.5 Gram(s) IV Push once  dextrose 50% Injectable 25 Gram(s) IV Push once  glucagon  Injectable 1 milliGRAM(s) IntraMuscular once  heparin   Injectable 5000 Unit(s) SubCutaneous every 12 hours  insulin glargine Injectable (LANTUS) 14 Unit(s) SubCutaneous at bedtime  insulin lispro (ADMELOG) corrective regimen sliding scale   SubCutaneous at bedtime  insulin lispro (ADMELOG) corrective regimen sliding scale   SubCutaneous three times a day before meals  insulin lispro Injectable (ADMELOG) 6 Unit(s) SubCutaneous three times a day before meals  levothyroxine 50 MICROGram(s) Oral daily  meropenem  IVPB 1000 milliGRAM(s) IV Intermittent every 12 hours  metoprolol succinate  milliGRAM(s) Oral daily  multivitamin 1 Tablet(s) Oral daily  pantoprazole    Tablet 40 milliGRAM(s) Oral before breakfast  simvastatin 20 milliGRAM(s) Oral at bedtime      Physical Exam  General: Patient comfortable in bed  Vital Signs Last 12 Hrs  T(F): 98 (12-20-21 @ 14:16), Max: 98.1 (12-20-21 @ 05:01)  HR: 66 (12-20-21 @ 14:16) (66 - 75)  BP: 121/77 (12-20-21 @ 14:16) (121/77 - 134/74)  BP(mean): --  RR: 18 (12-20-21 @ 14:16) (18 - 18)  SpO2: 97% (12-20-21 @ 14:16) (95% - 97%)    Diagnostics    Cortisol AM, Serum: AM Sched. Collection: 17-Dec-2021 06:00 (12-16 @ 14:32)

## 2021-12-20 NOTE — PRE-ANESTHESIA EVALUATION ADULT - NSANTHPMHFT_GEN_ALL_CORE
89f hx CHF, CKD3, anemia of chronic disease, afib, hypothyroidism, DM, cellulitis presenting for I&D. Patient currently denies CHF symptoms. Plan for MAC with ankle block by surgical team, MADHAVI as backup. Cleared by medicine.

## 2021-12-20 NOTE — PROGRESS NOTE ADULT - SUBJECTIVE AND OBJECTIVE BOX
Patient is a 89y old  Female who presents with a chief complaint of s/p fall (20 Dec 2021 08:07)    Being followed by ID for ankle OM /septic arthritis    Interval history:denies any pain   No acute events      ROS:  No cough,SOB,CP  No N/V/D./abd pain  No other complaints      Antimicrobials:    meropenem  IVPB 1000 milliGRAM(s) IV Intermittent every 12 hours  vancomycin  IVPB 750 milliGRAM(s) IV Intermittent once-being dosed by levels     Other medications reviewed    Vital Signs Last 24 Hrs  T(C): 36.7 (12-20-21 @ 05:01), Max: 36.9 (12-19-21 @ 13:30)  T(F): 98.1 (12-20-21 @ 05:01), Max: 98.4 (12-19-21 @ 13:30)  HR: 75 (12-20-21 @ 05:01) (67 - 75)  BP: 134/74 (12-20-21 @ 05:01) (128/66 - 134/74)  BP(mean): --  RR: 18 (12-20-21 @ 05:01) (18 - 18)  SpO2: 95% (12-20-21 @ 05:01) (95% - 97%)    Physical Exam:  HEENT PERRLA EOMI    No oral exudate or erythema  L axillary PM     Chest Good AE,CTA    CVS RRR S1 S2 WNl No murmur or rub or gallop    Abd soft BS normal No tenderness no masses    R ankle dressing no discharge   ankle no erythema   no LOM   PICC  site no erythema tenderness or discharge    CNS AAO X 2  Lab Data:                          7.5    9.15  )-----------( 199      ( 20 Dec 2021 07:07 )             25.0       12-20    139  |  101  |  33<H>  ----------------------------<  102<H>  4.1   |  25  |  1.85<H>    Ca    9.2      20 Dec 2021 07:02                Vancomycin Level, Random: 12.9 ug/mL (12-20-21 @ 07:07)  Vancomycin Level, Random: 17.1 ug/mL (12-19-21 @ 07:28)      < from: MR Ankle w/wo IV Cont, Right (12.16.21 @ 14:35) >  IMPRESSION:  1. Changes concerning for septic arthritis of the ankle/hindfoot and   midfoot with osteomyelitis given the clinical concern. Inflammatory   arthropathy could also be considered in the appropriate setting.  2. Mild tenosynovitis of the medial flexor tendons with moderate   tenosynovitis of the peroneal tendons. This may be reactive or infectious.  3. Findings discussed with FACUNDO Navas on 12/16/2021 3:00 PM.    --- End of Report ---          < end of copied text >

## 2021-12-20 NOTE — DISCHARGE NOTE PROVIDER - CARE PROVIDER_API CALL
Jad Bill)  Infectious Disease; Internal Medicine  [address] [place] [address]  Phone: [phone]  Fax: [phone]  Follow Up Time: 1 month    Katina Márquez (DPM)  Surgery  [address] [facility], [place] [address]  Phone: [phone]  Fax: [phone]  Follow Up Time:     Jono Zaldivar)  EndocrinologyMetabDiabetes; Internal Medicine  [address] [place] [address]  Phone: [phone]  Fax: [phone]  Follow Up Time: 1 month    Alondra Martinez)  Internal Medicine; Nephrology  [address] [facility], [address]  [place] [address]  Phone: [phone]  Fax: [phone]  Follow Up Time:

## 2021-12-20 NOTE — PROGRESS NOTE ADULT - SUBJECTIVE AND OBJECTIVE BOX
Patient is a 89y old  Female who presents with a chief complaint of s/p fall (20 Dec 2021 14:46)      DATE OF SERVICE: 21 @ 16:14    SUBJECTIVE / OVERNIGHT EVENTS: overnight events noted    ROS:  Resp: No cough no sputum production  CVS: No chest pain no palpitations no orthopnea  GI: no N/V/D  : no dysuria, no hematuria  Neuro: no weakness no paresthesias          MEDICATIONS  (STANDING):  allopurinol 100 milliGRAM(s) Oral daily  amLODIPine   Tablet 5 milliGRAM(s) Oral daily  ascorbic acid 500 milliGRAM(s) Oral daily  chlorhexidine 2% Cloths 1 Application(s) Topical <User Schedule>  clopidogrel Tablet 75 milliGRAM(s) Oral daily  dextrose 40% Gel 15 Gram(s) Oral once  dextrose 5%. 1000 milliLiter(s) (50 mL/Hr) IV Continuous <Continuous>  dextrose 5%. 1000 milliLiter(s) (100 mL/Hr) IV Continuous <Continuous>  dextrose 50% Injectable 25 Gram(s) IV Push once  dextrose 50% Injectable 12.5 Gram(s) IV Push once  dextrose 50% Injectable 25 Gram(s) IV Push once  glucagon  Injectable 1 milliGRAM(s) IntraMuscular once  heparin   Injectable 5000 Unit(s) SubCutaneous every 12 hours  insulin glargine Injectable (LANTUS) 14 Unit(s) SubCutaneous at bedtime  insulin lispro (ADMELOG) corrective regimen sliding scale   SubCutaneous at bedtime  insulin lispro (ADMELOG) corrective regimen sliding scale   SubCutaneous three times a day before meals  insulin lispro Injectable (ADMELOG) 6 Unit(s) SubCutaneous three times a day before meals  levothyroxine 50 MICROGram(s) Oral daily  meropenem  IVPB 1000 milliGRAM(s) IV Intermittent every 12 hours  metoprolol succinate  milliGRAM(s) Oral daily  multivitamin 1 Tablet(s) Oral daily  pantoprazole    Tablet 40 milliGRAM(s) Oral before breakfast  simvastatin 20 milliGRAM(s) Oral at bedtime    MEDICATIONS  (PRN):  acetaminophen     Tablet .. 650 milliGRAM(s) Oral every 6 hours PRN Temp greater or equal to 38C (100.4F), Moderate Pain (4 - 6)        CAPILLARY BLOOD GLUCOSE      POCT Blood Glucose.: 169 mg/dL (20 Dec 2021 12:59)  POCT Blood Glucose.: 101 mg/dL (20 Dec 2021 09:00)  POCT Blood Glucose.: 141 mg/dL (19 Dec 2021 21:29)  POCT Blood Glucose.: 83 mg/dL (19 Dec 2021 17:15)    I&O's Summary    19 Dec 2021 07:01  -  20 Dec 2021 07:00  --------------------------------------------------------  IN: 0 mL / OUT: 1100 mL / NET: -1100 mL    20 Dec 2021 07:01  -  20 Dec 2021 16:14  --------------------------------------------------------  IN: 240 mL / OUT: 400 mL / NET: -160 mL        Vital Signs Last 24 Hrs  T(C): 36.7 (20 Dec 2021 14:16), Max: 36.7 (20 Dec 2021 05:01)  T(F): 98 (20 Dec 2021 14:16), Max: 98.1 (20 Dec 2021 05:01)  HR: 66 (20 Dec 2021 14:16) (66 - 75)  BP: 121/77 (20 Dec 2021 14:16) (121/77 - 134/74)  BP(mean): --  RR: 18 (20 Dec 2021 14:16) (18 - 18)  SpO2: 97% (20 Dec 2021 14:16) (95% - 97%)    PHYSICAL EXAM:   HEENT: DEBBIE EOMI  Neck: Supple, no JVD  Lungs: clear  CVS: S1 S2 systolic murmur +   Abdomen: non tender BS +  Neuro: AO x 3 nonfocal  Ext: 1+ edema right ankle bandage  right arm PICC  Msk: no joint swelling    LABS:                        7.5    9.15  )-----------( 199      ( 20 Dec 2021 07:07 )             25.0     12-20    139  |  101  |  33<H>  ----------------------------<  102<H>  4.1   |  25  |  1.85<H>    Ca    9.2      20 Dec 2021 07:02  Phos  3.0     12-20      PT/INR - ( 20 Dec 2021 07:07 )   PT: 15.0 sec;   INR: 1.26 ratio         PTT - ( 20 Dec 2021 07:07 )  PTT:23.7 sec      Urinalysis Basic - ( 20 Dec 2021 12:08 )    Color: Yellow / Appearance: Slightly Turbid / S.012 / pH: x  Gluc: x / Ketone: Negative  / Bili: Negative / Urobili: Negative   Blood: x / Protein: 100 / Nitrite: Negative   Leuk Esterase: Large / RBC: 4 /hpf /  /HPF   Sq Epi: x / Non Sq Epi: 2 /hpf / Bacteria: Negative          All consultant(s) notes reviewed and care discussed with other providers        Contact Number, Dr Ho [phone]

## 2021-12-21 NOTE — PHYSICAL THERAPY INITIAL EVALUATION ADULT - BED MOBILITY TRAINING, PT EVAL
goal: pt will be I with all bed mobs within 4 weeks
goal: pt will be I with all bed mobs within 4 weeks

## 2021-12-21 NOTE — PROGRESS NOTE ADULT - SUBJECTIVE AND OBJECTIVE BOX
[facility]Y-[facility] [phone]        Patient seen and examined in bed.  She was in good spirits and offered no complaints         MEDICATIONS  (STANDING):  allopurinol 100 milliGRAM(s) Oral daily  amLODIPine   Tablet 5 milliGRAM(s) Oral daily  ascorbic acid 500 milliGRAM(s) Oral daily  chlorhexidine 2% Cloths 1 Application(s) Topical <User Schedule>  clopidogrel Tablet 75 milliGRAM(s) Oral daily  dextrose 40% Gel 15 Gram(s) Oral once  dextrose 5%. 1000 milliLiter(s) (50 mL/Hr) IV Continuous <Continuous>  dextrose 5%. 1000 milliLiter(s) (100 mL/Hr) IV Continuous <Continuous>  dextrose 50% Injectable 25 Gram(s) IV Push once  dextrose 50% Injectable 25 Gram(s) IV Push once  dextrose 50% Injectable 12.5 Gram(s) IV Push once  glucagon  Injectable 1 milliGRAM(s) IntraMuscular once  heparin   Injectable 5000 Unit(s) SubCutaneous every 12 hours  insulin glargine Injectable (LANTUS) 14 Unit(s) SubCutaneous at bedtime  insulin lispro (ADMELOG) corrective regimen sliding scale   SubCutaneous three times a day before meals  insulin lispro (ADMELOG) corrective regimen sliding scale   SubCutaneous at bedtime  insulin lispro Injectable (ADMELOG) 6 Unit(s) SubCutaneous three times a day before meals  levothyroxine 50 MICROGram(s) Oral daily  meropenem  IVPB 500 milliGRAM(s) IV Intermittent every 12 hours  metoprolol succinate  milliGRAM(s) Oral daily  multivitamin 1 Tablet(s) Oral daily  pantoprazole    Tablet 40 milliGRAM(s) Oral before breakfast  simvastatin 20 milliGRAM(s) Oral at bedtime      VITAL:  T(C): , Max: 36.7 (21 @ 14:16)  T(F): , Max: 98.1 (21 @ 21:43)  HR: 62 (21 @ 04:36)  BP: 112/68 (21 @ 04:36)  BP(mean): 88 (21 @ 21:00)  RR: 18 (21 @ 04:36)  SpO2: 96% (21 @ 04:36)  Wt(kg): --    I and O's:     @ 07:01  -   @ 07:00  --------------------------------------------------------  IN: 720 mL / OUT: 700 mL / NET: 20 mL      Height (cm): 157.5 ( @ 18:32)  Weight (kg): 74.8 ( @ 18:32)  BMI (kg/m2): 30.2 ( @ 18:32)  BSA (m2): 1.76 ( @ 18:32)    PHYSICAL EXAM:    Constitutional: NAD  Neck:  No JVD  Respiratory: CTAB/L  Cardiovascular: S1 and S2  Gastrointestinal: BS+, soft, NT/ND  Extremities: No peripheral edema  Neurological: A/O x 3, no focal deficits  Psychiatric: Normal mood, normal affect  : No Heredia  Skin: No rashes  Access: Not applicable    LABS:                        7.4    9.75  )-----------( 364      ( 21 Dec 2021 07:10 )             24.4         138  |  103  |  33<H>  ----------------------------<  127<H>  4.3   |  26  |  1.74<H>    Ca    9.8      21 Dec 2021 07:10  Phos  3.0                 Urine Studies:  Urinalysis Basic - ( 20 Dec 2021 12:08 )    Color: Yellow / Appearance: Slightly Turbid / S.012 / pH: x  Gluc: x / Ketone: Negative  / Bili: Negative / Urobili: Negative   Blood: x / Protein: 100 / Nitrite: Negative   Leuk Esterase: Large / RBC: 4 /hpf /  /HPF   Sq Epi: x / Non Sq Epi: 2 /hpf / Bacteria: Negative      Creatinine, Random Urine: 76 mg/dL ( @ 12:07)  Protein/Creatinine Ratio Calculation: 1.2 Ratio ( @ 12:07)        RADIOLOGY & ADDITIONAL STUDIES:

## 2021-12-21 NOTE — PROGRESS NOTE ADULT - NSPROGADDITIONALINFOA_GEN_ALL_CORE
discussed with patient in detail, expresses understanding of treatment plans.  discussed with covering ACP  likely discharge to [facility] Thu

## 2021-12-21 NOTE — PHYSICAL THERAPY INITIAL EVALUATION ADULT - DIAGNOSIS, PT EVAL
Decreased strength, endurance and balance limiting functional mobility
Decreased strength, endurance and balance limiting functional mobility

## 2021-12-21 NOTE — PROGRESS NOTE ADULT - ASSESSMENT
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 Notes shes been trying to move out of bed and fell, hitting mainly her hip and parts of her back. She does note feeling a bit weaker than usual, and not drinking enough water because she doesn't want to urinate at night. No fevers, chills, cough that she endorses. Does note hitting her R ankle previously and developing infection recently, for which was treated? Has PICC in R arm, and she is unsure what it is for.     Per review of [facility] records no abx since 12/3  Exam with R lateral malleolar ulcer with discharge  No surrounding cellulitis  X ray with perisosteal reaction  High ESra nd CRP though is non specific and could reflect fall   previously was on Invanz at [facility] though no abx at [facility] in SNF  MRI as above   ? OM ? septic arthritis  Less likely non infectious  s/p I&D- purulence noted  Cx pending     Rec:  A) Ankle ulcer  Cx with only CNS so far but was on abx  s/p washout  OR cx pending  continue merem   creatinine higher-dose vanco by levels- if no MRSA on deeper Cx no further vanco  final plan depending on results -may need long course as suspicious for OM        B) abx allergies  tolerating merem   monitor clinically for s/s any allergic reaction    C) fall  will defer to primary team    D) leucocytosis  monitor for C diff   Monitor for s/s any other foci  decreased today     Will tailor plan for ID issues  per course,results.Will defer to primary team on management of other issues.  Assessment, plan and recommendations as detailed above were discussed with the medical/primary  team.  I am away tomorrow.My colleagues in ID service will cover .Please call [phone] if any issues or questions.

## 2021-12-21 NOTE — PHYSICAL THERAPY INITIAL EVALUATION ADULT - STRENGTHENING, PT EVAL
goal: pt will inc strength by 1/2 grade MMT within 4 weeks
goal: pt will inc strength by 1/2 grade MMT within 4 weeks

## 2021-12-21 NOTE — PHYSICAL THERAPY INITIAL EVALUATION ADULT - TRANSFER TRAINING, PT EVAL
goal: pt will be I with transfers w/ use of RW within 4 weeks
goal: pt will be I with transfers w/ use of RW within 4 weeks

## 2021-12-21 NOTE — PHYSICAL THERAPY INITIAL EVALUATION ADULT - IMPAIRMENTS FOUND, PT EVAL
aerobic capacity/endurance/gait, locomotion, and balance/muscle strength
not reaching hands back when sitting down/aerobic capacity/endurance/gait, locomotion, and balance/muscle strength/poor safety awareness/posture/ROM

## 2021-12-21 NOTE — PHYSICAL THERAPY INITIAL EVALUATION ADULT - ASR WT BEARING STATUS EVAL
No fevers, chills, cough that she endorses. Does note hitting her R ankle previously and developing infection recently, for which was treated? Has PICC in R arm, and she is unsure what it is for. D/w  who said patient was admitted to [facility] 3 weeks ago and was diagnosed with right ankle cellulitis ? osteomyelitis. The patient was discharge to [place] rehab. She fell out of bed in the SNF and was transferred to [facility]/no weight-bearing restrictions
No fevers, chills, cough that she endorses. Does note hitting her R ankle previously and developing infection recently, for which was treated? Has PICC in R arm, and she is unsure what it is for. D/w  who said patient was admitted to [facility] 3 weeks ago and was diagnosed with right ankle cellulitis ? osteomyelitis. The patient was discharge to [place] rehab. She fell out of bed in the SNF and was transferred to [facility]

## 2021-12-21 NOTE — PHYSICAL THERAPY INITIAL EVALUATION ADULT - PERTINENT HX OF CURRENT PROBLEM, REHAB EVAL
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 Notes shes been trying to move out of bed and fell, hitting mainly her hip and parts of her back. She does note feeling a bit weaker than usual, and not drinking enough water because she doesn't want to urinate at night.
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 Notes shes been trying to move out of bed and fell, hitting mainly her hip and parts of her back. She does note feeling a bit weaker than usual, and not drinking enough water because she doesn't want to urinate at night.

## 2021-12-21 NOTE — PROGRESS NOTE ADULT - SUBJECTIVE AND OBJECTIVE BOX
Podiatry pager #: [phone] ([place])/ [phone] ([facility])    Patient is a 89y old  Female who presents with a chief complaint of s/p fall (20 Dec 2021 19:56)       INTERVAL HPI/OVERNIGHT EVENTS:  Patient seen and evaluated at bedside.  Pt is resting comfortable in NAD. Denies N/V/F/C.     Allergies    Bactrim (Unknown)  Flagyl (Hives; Pruritus)  Macrobid (Other)  sulfa drugs (Hives)  Zosyn (Other)    Intolerances        Vital Signs Last 24 Hrs  T(C): 36.3 (21 Dec 2021 04:36), Max: 36.7 (20 Dec 2021 14:16)  T(F): 97.4 (21 Dec 2021 04:36), Max: 98.1 (20 Dec 2021 21:43)  HR: 62 (21 Dec 2021 04:36) (62 - 78)  BP: 112/68 (21 Dec 2021 04:36) (112/55 - 163/69)  BP(mean): 88 (20 Dec 2021 21:00) (77 - 99)  RR: 18 (21 Dec 2021 04:36) (16 - 19)  SpO2: 96% (21 Dec 2021 04:36) (93% - 100%)    LABS:                        7.4    9.75  )-----------( 364      ( 21 Dec 2021 07:10 )             24.4     12-21    138  |  103  |  33<H>  ----------------------------<  127<H>  4.3   |  26  |  1.74<H>    Ca    9.8      21 Dec 2021 07:10  Phos  3.0     12-20      PT/INR - ( 20 Dec 2021 07:07 )   PT: 15.0 sec;   INR: 1.26 ratio         PTT - ( 20 Dec 2021 07:07 )  PTT:23.7 sec  Urinalysis Basic - ( 20 Dec 2021 12:08 )    Color: Yellow / Appearance: Slightly Turbid / S.012 / pH: x  Gluc: x / Ketone: Negative  / Bili: Negative / Urobili: Negative   Blood: x / Protein: 100 / Nitrite: Negative   Leuk Esterase: Large / RBC: 4 /hpf /  /HPF   Sq Epi: x / Non Sq Epi: 2 /hpf / Bacteria: Negative      CAPILLARY BLOOD GLUCOSE      POCT Blood Glucose.: 145 mg/dL (21 Dec 2021 09:02)  POCT Blood Glucose.: 182 mg/dL (20 Dec 2021 21:51)  POCT Blood Glucose.: 107 mg/dL (20 Dec 2021 20:32)  POCT Blood Glucose.: 97 mg/dL (20 Dec 2021 20:09)  POCT Blood Glucose.: 102 mg/dL (20 Dec 2021 17:29)  POCT Blood Glucose.: 169 mg/dL (20 Dec 2021 12:59)      Lower Extremity Physical Exam:  Vascular: DP/PT 1/4, B/L, CFT <3 seconds B/L, Temperature gradient WNL, B/L.   Neuro: Epicritic sensation diminished to the level of _, B/L.  Musculoskeletal/Ortho: Able for R Ankle ROM without any pain elicited  Skin: bilateral posterior heel deep tissue injury, no fluctuance, no local signs of infection  s/p right ankle I&D w/ right fibular bone biopsy partially closed (DOS ): sutures intact w/ central wound opening, no purulence, no periwound erythema, no signs of acute infection, skin flaps warm to touch     RADIOLOGY & ADDITIONAL TESTS:

## 2021-12-21 NOTE — PROGRESS NOTE ADULT - ASSESSMENT
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

## 2021-12-21 NOTE — PROGRESS NOTE ADULT - SUBJECTIVE AND OBJECTIVE BOX
Patient is a 89y old  Female who presents with a chief complaint of s/p fall (21 Dec 2021 10:50)    Being followed by ID for ankle OM/arthritis    Interval history:s/p washout/debridement   feels well  denies pain presenetly   No acute events      ROS:  No cough,SOB,CP  No N/V/D./abd pain  No other complaints      Antimicrobials:    meropenem  IVPB 500 milliGRAM(s) IV Intermittent every 12 hours  s/p vanco yesterday    Other medications reviewed    Vital Signs Last 24 Hrs  T(C): 36.3 (12-21-21 @ 04:36), Max: 36.7 (12-20-21 @ 14:16)  T(F): 97.4 (12-21-21 @ 04:36), Max: 98.1 (12-20-21 @ 21:43)  HR: 62 (12-21-21 @ 04:36) (62 - 78)  BP: 112/68 (12-21-21 @ 04:36) (112/55 - 163/69)  BP(mean): 88 (12-20-21 @ 21:00) (77 - 99)  RR: 18 (12-21-21 @ 04:36) (16 - 19)  SpO2: 96% (12-21-21 @ 04:36) (93% - 100%)    Physical Exam:    HEENT PERRLA EOMI    No oral exudate or erythema  L axillary PM     Chest Good AE,CTA    CVS RRR S1 S2 WNl No murmur or rub or gallop    Abd soft BS normal No tenderness no masses    R ankle dressing no discharge   ankle no erythema   no LOM   PICC  site no erythema tenderness or discharge    CNS AAO X 2  Lab Data:                          7.4    9.75  )-----------( 364      ( 21 Dec 2021 07:10 )             24.4       12-21    138  |  103  |  33<H>  ----------------------------<  127<H>  4.3   |  26  |  1.74<H>    Ca    9.8      21 Dec 2021 07:10  Phos  3.0     12-20          Culture - Tissue with Gram Stain (collected 21 Dec 2021 01:10)  Source: .Tissue right fibula  Gram Stain (21 Dec 2021 05:09):    No polymorphonuclear cells seen per low power field    No organisms seen per oil power field            Vancomycin Level, Trough: 16.4 ug/mL (12-21-21 @ 07:11)  Vancomycin Level, Random: 12.9 ug/mL (12-20-21 @ 07:07)        < from: Xray Ankle Complete 3 Views, Right (12.20.21 @ 20:07) >  IMPRESSION:  Postprocedural osseous and soft tissue changes in lateral right ankle   region. Otherwise no tracking gas collections. Changes suspicious for   septicarthritis and osteomyelitis better demonstrated on the MRI study.   Also correlate with biopsy results    No fractures or dislocations.    Lateral ankle mortise widening may reflect ligamentous   laxity/insufficiency. Otherwise no acute fractures or dislocations.    Appearance of a pes planus on lateral view however nonweightbearing.    Generalized osteopenia otherwise no discrete lytic or blastic lesions.    --- End of Report ---      < end of copied text >  < from: MR Ankle w/wo IV Cont, Right (12.16.21 @ 14:35) >    IMPRESSION:  1. Changes concerning for septic arthritis of the ankle/hindfoot and   midfoot with osteomyelitis given the clinical concern. Inflammatory   arthropathy could also be considered in the appropriate setting.  2. Mild tenosynovitis of the medial flexor tendons with moderate   tenosynovitis of the peroneal tendons. This may be reactive or infectious.  3. Findings discussed with FACUNDO Navas on 12/16/2021 3:00 PM.    --- End of Report ---      < end of copied text >

## 2021-12-21 NOTE — PHYSICAL THERAPY INITIAL EVALUATION ADULT - BALANCE TRAINING, PT EVAL
goal: pt will improve balance by 1/2 grade within 4weeks
goal: pt will improve balance by 1/2 grade within 4weeks

## 2021-12-21 NOTE — PROGRESS NOTE ADULT - ASSESSMENT
Assessment  DMT2: 89y Female with DM T2 with hyperglycemia, A1C 7.3%, was on insulin at home, now on basal bolus insulin, received half-dose Lantus, blood sugars are stable and trending within acceptable range, no hypoglycemic episodes, postop right ankle wound debridement and washout, eating meals, NAD.  Fall: AI R/O, workup in progress, stable, monitored.  Hypothyroidism: On Synthroid 50 mcg po daily, compliant with Synthroid intake, euthyroid.  HTN: Controlled,  on antihypertensive medications.  CKD: Monitor labs/BMP,       Jono Zaldivar MD  Cell: [phone]  Office: [phone]     Assessment  DMT2: 89y Female with DM T2 with hyperglycemia, A1C 7.3%, was on insulin at home, now on basal bolus insulin, received half-dose Lantus, blood sugars are stable and trending within acceptable range, no hypoglycemic episodes, postop is s/o I&D of ankle abscess, eating meals, NAD.  Fall: AI R/O, workup in progress, stable, monitored.  Hypothyroidism: On Synthroid 50 mcg po daily, compliant with Synthroid intake, euthyroid.  HTN: Controlled,  on antihypertensive medications.  CKD: Monitor labs/BMP,       Jono Zaldivar MD  Cell: [phone]  Office: [phone]     Assessment  DMT2: 89y Female with DM T2 with hyperglycemia, A1C 7.3%, was on insulin at home, now on basal bolus insulin, received half-dose Lantus, blood sugars are stable and trending  within acceptable range, no hypoglycemic episodes, postop is s/o I&D of ankle abscess, eating meals, NAD.  Fall: AI R/O, workup in progress, stable, monitored.  Hypothyroidism: On Synthroid 50 mcg po daily, compliant with Synthroid intake, euthyroid.  HTN: Controlled,  on antihypertensive medications.  CKD: Monitor labs/BMP,       Jono Zaldivar MD  Cell: [phone]  Office: [phone]

## 2021-12-21 NOTE — PHYSICAL THERAPY INITIAL EVALUATION ADULT - GAIT DEVIATIONS NOTED, PT EVAL
kyphotic standing posture/increased time in double stance/decreased step length/decreased stride length

## 2021-12-21 NOTE — PROGRESS NOTE ADULT - SUBJECTIVE AND OBJECTIVE BOX
Chief complaint  Patient is a 89y old  Female who presents with a chief complaint of s/p fall (21 Dec 2021 10:56)   Review of systems  Patient in bed, looks comfortable, no hypoglycemic episodes.    Labs and Fingersticks  CAPILLARY BLOOD GLUCOSE      POCT Blood Glucose.: 100 mg/dL (21 Dec 2021 12:09)  POCT Blood Glucose.: 145 mg/dL (21 Dec 2021 09:02)  POCT Blood Glucose.: 182 mg/dL (20 Dec 2021 21:51)  POCT Blood Glucose.: 107 mg/dL (20 Dec 2021 20:32)  POCT Blood Glucose.: 97 mg/dL (20 Dec 2021 20:09)  POCT Blood Glucose.: 102 mg/dL (20 Dec 2021 17:29)      Anion Gap, Serum: 9 (12-21 @ 07:10)  Anion Gap, Serum: 13 (12-20 @ 07:02)      Calcium, Total Serum: 9.8 (12-21 @ 07:10)  Calcium, Total Serum: 9.2 (12-20 @ 07:02)  Intact PTH: 134 *H* (12-20 @ 19:56)          12-21    138  |  103  |  33<H>  ----------------------------<  127<H>  4.3   |  26  |  1.74<H>    Ca    9.8      21 Dec 2021 07:10  Phos  3.0     12-20                          7.4    9.75  )-----------( 364      ( 21 Dec 2021 07:10 )             24.4     Medications  MEDICATIONS  (STANDING):  allopurinol 100 milliGRAM(s) Oral daily  amLODIPine   Tablet 5 milliGRAM(s) Oral daily  apixaban 2.5 milliGRAM(s) Oral two times a day  ascorbic acid 500 milliGRAM(s) Oral daily  chlorhexidine 2% Cloths 1 Application(s) Topical <User Schedule>  clopidogrel Tablet 75 milliGRAM(s) Oral daily  dextrose 40% Gel 15 Gram(s) Oral once  dextrose 5%. 1000 milliLiter(s) (100 mL/Hr) IV Continuous <Continuous>  dextrose 5%. 1000 milliLiter(s) (50 mL/Hr) IV Continuous <Continuous>  dextrose 50% Injectable 25 Gram(s) IV Push once  dextrose 50% Injectable 12.5 Gram(s) IV Push once  dextrose 50% Injectable 25 Gram(s) IV Push once  glucagon  Injectable 1 milliGRAM(s) IntraMuscular once  insulin glargine Injectable (LANTUS) 14 Unit(s) SubCutaneous at bedtime  insulin lispro (ADMELOG) corrective regimen sliding scale   SubCutaneous three times a day before meals  insulin lispro (ADMELOG) corrective regimen sliding scale   SubCutaneous at bedtime  insulin lispro Injectable (ADMELOG) 6 Unit(s) SubCutaneous three times a day before meals  levothyroxine 50 MICROGram(s) Oral daily  meropenem  IVPB 500 milliGRAM(s) IV Intermittent every 12 hours  metoprolol succinate  milliGRAM(s) Oral daily  multivitamin 1 Tablet(s) Oral daily  pantoprazole    Tablet 40 milliGRAM(s) Oral before breakfast  simvastatin 20 milliGRAM(s) Oral at bedtime      Physical Exam  Culture - Tissue with Gram Stain (collected 12-21-21 @ 01:10)  Source: .Tissue right fibula  Gram Stain (12-21-21 @ 05:09):    No polymorphonuclear cells seen per low power field    No organisms seen per oil power field      General: Patient comfortable in bed  Vital Signs Last 12 Hrs  T(F): 97.7 (12-21-21 @ 11:52), Max: 97.7 (12-21-21 @ 11:52)  HR: 58 (12-21-21 @ 11:52) (58 - 78)  BP: 112/67 (12-21-21 @ 11:52) (112/67 - 128/78)  BP(mean): --  RR: 18 (12-21-21 @ 11:52) (18 - 18)  SpO2: 98% (12-21-21 @ 11:52) (96% - 98%)  Neck: No palpable thyroid nodules.  CVS: S1S2, No murmurs  Respiratory: No wheezing, no crepitations  GI: Abdomen soft, bowel sounds positive  Musculoskeletal:  edema lower extremities.   Skin: No skin rashes, no ecchymosis    Diagnostics    Cortisol AM, Serum: AM Sched. Collection: 17-Dec-2021 06:00 (12-16 @ 14:32)             Chief complaint  Patient is a 89y old  Female who presents with a chief complaint of s/p fall (21 Dec 2021 10:56)   Review of systems  Patient in bed, looks comfortable, no hypoglycemic episodes.    Labs and Fingersticks  CAPILLARY BLOOD GLUCOSE      POCT Blood Glucose.: 100 mg/dL (21 Dec 2021 12:09)  POCT Blood Glucose.: 145 mg/dL (21 Dec 2021 09:02)  POCT Blood Glucose.: 182 mg/dL (20 Dec 2021 21:51)  POCT Blood Glucose.: 107 mg/dL (20 Dec 2021 20:32)  POCT Blood Glucose.: 97 mg/dL (20 Dec 2021 20:09)  POCT Blood Glucose.: 102 mg/dL (20 Dec 2021 17:29)      Anion Gap, Serum: 9 (12-21 @ 07:10)  Anion Gap, Serum: 13 (12-20 @ 07:02)        Calcium, Total Serum: 9.8 (12-21 @ 07:10)  Calcium, Total Serum: 9.2 (12-20 @ 07:02)  Intact PTH: 134 *H* (12-20 @ 19:56)          12-21    138  |  103  |  33<H>  ----------------------------<  127<H>  4.3   |  26  |  1.74<H>    Ca    9.8      21 Dec 2021 07:10  Phos  3.0     12-20                          7.4    9.75  )-----------( 364      ( 21 Dec 2021 07:10 )             24.4     Medications  MEDICATIONS  (STANDING):  allopurinol 100 milliGRAM(s) Oral daily  amLODIPine   Tablet 5 milliGRAM(s) Oral daily  apixaban 2.5 milliGRAM(s) Oral two times a day  ascorbic acid 500 milliGRAM(s) Oral daily  chlorhexidine 2% Cloths 1 Application(s) Topical <User Schedule>  clopidogrel Tablet 75 milliGRAM(s) Oral daily  dextrose 40% Gel 15 Gram(s) Oral once  dextrose 5%. 1000 milliLiter(s) (100 mL/Hr) IV Continuous <Continuous>  dextrose 5%. 1000 milliLiter(s) (50 mL/Hr) IV Continuous <Continuous>  dextrose 50% Injectable 25 Gram(s) IV Push once  dextrose 50% Injectable 12.5 Gram(s) IV Push once  dextrose 50% Injectable 25 Gram(s) IV Push once  glucagon  Injectable 1 milliGRAM(s) IntraMuscular once  insulin glargine Injectable (LANTUS) 14 Unit(s) SubCutaneous at bedtime  insulin lispro (ADMELOG) corrective regimen sliding scale   SubCutaneous three times a day before meals  insulin lispro (ADMELOG) corrective regimen sliding scale   SubCutaneous at bedtime  insulin lispro Injectable (ADMELOG) 6 Unit(s) SubCutaneous three times a day before meals  levothyroxine 50 MICROGram(s) Oral daily  meropenem  IVPB 500 milliGRAM(s) IV Intermittent every 12 hours  metoprolol succinate  milliGRAM(s) Oral daily  multivitamin 1 Tablet(s) Oral daily  pantoprazole    Tablet 40 milliGRAM(s) Oral before breakfast  simvastatin 20 milliGRAM(s) Oral at bedtime      Physical Exam  Culture - Tissue with Gram Stain (collected 12-21-21 @ 01:10)  Source: .Tissue right fibula  Gram Stain (12-21-21 @ 05:09):    No polymorphonuclear cells seen per low power field    No organisms seen per oil power field      General: Patient comfortable in bed  Vital Signs Last 12 Hrs  T(F): 97.7 (12-21-21 @ 11:52), Max: 97.7 (12-21-21 @ 11:52)  HR: 58 (12-21-21 @ 11:52) (58 - 78)  BP: 112/67 (12-21-21 @ 11:52) (112/67 - 128/78)  BP(mean): --  RR: 18 (12-21-21 @ 11:52) (18 - 18)  SpO2: 98% (12-21-21 @ 11:52) (96% - 98%)  Neck: No palpable thyroid nodules.  CVS: S1S2, No murmurs  Respiratory: No wheezing, no crepitations  GI: Abdomen soft, bowel sounds positive  Musculoskeletal:  edema lower extremities.   Skin: No skin rashes, no ecchymosis    Diagnostics    Cortisol AM, Serum: AM Sched. Collection: 17-Dec-2021 06:00 (12-16 @ 14:32)

## 2021-12-21 NOTE — PHYSICAL THERAPY INITIAL EVALUATION ADULT - MANUAL MUSCLE TESTING RESULTS, REHAB EVAL
R UE at least 3+/5, L UE at least 2/5 (painful w/ AROM), R LE at least 3+/5 and  L LE at least 2+/5 MMT
R UE at least 3+/5, L UE at least 2/5 (painful w/ AROM), R LE at least 3+/5 and  L LE at least 2+/5 MMT

## 2021-12-21 NOTE — PHYSICAL THERAPY INITIAL EVALUATION ADULT - GENERAL OBSERVATIONS, REHAB EVAL
.Pt now s/p R ankle I&D & fibula bone biopsy on 12/21, x-ray R ankle (-) fx or dislocation, pt now WBAT RLE per orders from podiatry. Pt received semi-supine in bed in NAD, +IV R PICC, +primafit, +cair boots, A&Ox3-4, agreeable to PT.

## 2021-12-21 NOTE — PROGRESS NOTE ADULT - SUBJECTIVE AND OBJECTIVE BOX
Patient is a 89y old  Female who presents with a chief complaint of s/p fall (21 Dec 2021 13:43)      DATE OF SERVICE: 21 @ 15:52    SUBJECTIVE / OVERNIGHT EVENTS: overnight events noted    ROS:  Resp: No cough no sputum production  CVS: No chest pain no palpitations no orthopnea  GI: no N/V/D  : no dysuria, no hematuria  Msk: No joint pain no swelling  Skin: No rash no itching        MEDICATIONS  (STANDING):  allopurinol 100 milliGRAM(s) Oral daily  amLODIPine   Tablet 5 milliGRAM(s) Oral daily  apixaban 2.5 milliGRAM(s) Oral two times a day  ascorbic acid 500 milliGRAM(s) Oral daily  chlorhexidine 2% Cloths 1 Application(s) Topical <User Schedule>  clopidogrel Tablet 75 milliGRAM(s) Oral daily  dextrose 40% Gel 15 Gram(s) Oral once  dextrose 5%. 1000 milliLiter(s) (100 mL/Hr) IV Continuous <Continuous>  dextrose 5%. 1000 milliLiter(s) (50 mL/Hr) IV Continuous <Continuous>  dextrose 50% Injectable 25 Gram(s) IV Push once  dextrose 50% Injectable 12.5 Gram(s) IV Push once  dextrose 50% Injectable 25 Gram(s) IV Push once  glucagon  Injectable 1 milliGRAM(s) IntraMuscular once  insulin glargine Injectable (LANTUS) 14 Unit(s) SubCutaneous at bedtime  insulin lispro (ADMELOG) corrective regimen sliding scale   SubCutaneous three times a day before meals  insulin lispro (ADMELOG) corrective regimen sliding scale   SubCutaneous at bedtime  insulin lispro Injectable (ADMELOG) 6 Unit(s) SubCutaneous three times a day before meals  levothyroxine 50 MICROGram(s) Oral daily  meropenem  IVPB 500 milliGRAM(s) IV Intermittent every 12 hours  metoprolol succinate  milliGRAM(s) Oral daily  multivitamin 1 Tablet(s) Oral daily  pantoprazole    Tablet 40 milliGRAM(s) Oral before breakfast  simvastatin 20 milliGRAM(s) Oral at bedtime    MEDICATIONS  (PRN):  acetaminophen     Tablet .. 650 milliGRAM(s) Oral every 6 hours PRN Temp greater or equal to 38C (100.4F), Moderate Pain (4 - 6)        CAPILLARY BLOOD GLUCOSE      POCT Blood Glucose.: 100 mg/dL (21 Dec 2021 12:09)  POCT Blood Glucose.: 145 mg/dL (21 Dec 2021 09:02)  POCT Blood Glucose.: 182 mg/dL (20 Dec 2021 21:51)  POCT Blood Glucose.: 107 mg/dL (20 Dec 2021 20:32)  POCT Blood Glucose.: 97 mg/dL (20 Dec 2021 20:09)  POCT Blood Glucose.: 102 mg/dL (20 Dec 2021 17:29)    I&O's Summary    20 Dec 2021 07:01  -  21 Dec 2021 07:00  --------------------------------------------------------  IN: 720 mL / OUT: 700 mL / NET: 20 mL    21 Dec 2021 07:01  -  21 Dec 2021 15:52  --------------------------------------------------------  IN: 480 mL / OUT: 125 mL / NET: 355 mL        Vital Signs Last 24 Hrs  T(C): 36.5 (21 Dec 2021 11:52), Max: 36.7 (20 Dec 2021 18:32)  T(F): 97.7 (21 Dec 2021 11:52), Max: 98.1 (20 Dec 2021 21:43)  HR: 58 (21 Dec 2021 11:52) (58 - 78)  BP: 112/67 (21 Dec 2021 11:52) (112/55 - 163/69)  BP(mean): 88 (20 Dec 2021 21:00) (77 - 99)  RR: 18 (21 Dec 2021 11:52) (16 - 19)  SpO2: 98% (21 Dec 2021 11:52) (93% - 100%)    PHYSICAL EXAM:   HEENT: DEBBIE EOMI  Neck: Supple, no JVD  Lungs: clear  CVS: S1 S2 systolic murmur +   Abdomen: non tender BS +  Neuro: AO x 3 nonfocal  Ext: 1+ edema right ankle bandage  right arm PICC  Msk: no joint swelling    LABS:                        7.4    9.75  )-----------( 364      ( 21 Dec 2021 07:10 )             24.4     12-21    138  |  103  |  33<H>  ----------------------------<  127<H>  4.3   |  26  |  1.74<H>    Ca    9.8      21 Dec 2021 07:10  Phos  3.0     12-20      PT/INR - ( 20 Dec 2021 07:07 )   PT: 15.0 sec;   INR: 1.26 ratio         PTT - ( 20 Dec 2021 07:07 )  PTT:23.7 sec      Urinalysis Basic - ( 20 Dec 2021 12:08 )    Color: Yellow / Appearance: Slightly Turbid / S.012 / pH: x  Gluc: x / Ketone: Negative  / Bili: Negative / Urobili: Negative   Blood: x / Protein: 100 / Nitrite: Negative   Leuk Esterase: Large / RBC: 4 /hpf /  /HPF   Sq Epi: x / Non Sq Epi: 2 /hpf / Bacteria: Negative          All consultant(s) notes reviewed and care discussed with other providers        Contact Number, Dr Ho [phone]

## 2021-12-21 NOTE — PHYSICAL THERAPY INITIAL EVALUATION ADULT - ADDITIONAL COMMENTS
Pt came from rehab, prior to that she lived in private home w/  and was I with ADLs with use of RW
Pt came from rehab, prior to that she lived in private home w/  and was I with ADLs with use of RW

## 2021-12-22 NOTE — PROGRESS NOTE ADULT - SUBJECTIVE AND OBJECTIVE BOX
[facility]PHROLOGY[facility] [phone]        Patient seen and examined in bed.  She was the same        MEDICATIONS  (STANDING):  allopurinol 100 milliGRAM(s) Oral daily  amLODIPine   Tablet 5 milliGRAM(s) Oral daily  apixaban 2.5 milliGRAM(s) Oral two times a day  ascorbic acid 500 milliGRAM(s) Oral daily  chlorhexidine 2% Cloths 1 Application(s) Topical <User Schedule>  clopidogrel Tablet 75 milliGRAM(s) Oral daily  dextrose 40% Gel 15 Gram(s) Oral once  dextrose 5%. 1000 milliLiter(s) (100 mL/Hr) IV Continuous <Continuous>  dextrose 5%. 1000 milliLiter(s) (50 mL/Hr) IV Continuous <Continuous>  dextrose 50% Injectable 25 Gram(s) IV Push once  dextrose 50% Injectable 12.5 Gram(s) IV Push once  dextrose 50% Injectable 25 Gram(s) IV Push once  glucagon  Injectable 1 milliGRAM(s) IntraMuscular once  insulin glargine Injectable (LANTUS) 14 Unit(s) SubCutaneous at bedtime  insulin lispro (ADMELOG) corrective regimen sliding scale   SubCutaneous three times a day before meals  insulin lispro (ADMELOG) corrective regimen sliding scale   SubCutaneous at bedtime  insulin lispro Injectable (ADMELOG) 6 Unit(s) SubCutaneous three times a day before meals  levothyroxine 50 MICROGram(s) Oral daily  meropenem  IVPB 500 milliGRAM(s) IV Intermittent every 12 hours  metoprolol succinate  milliGRAM(s) Oral daily  multivitamin 1 Tablet(s) Oral daily  pantoprazole    Tablet 40 milliGRAM(s) Oral before breakfast  simvastatin 20 milliGRAM(s) Oral at bedtime      VITAL:  T(C): , Max: 36.7 (21 @ 20:13)  T(F): , Max: 98.1 (21 @ 20:13)  HR: 66 (21 @ 05:13)  BP: 121/65 (21 @ 05:13)  BP(mean): --  RR: 18 (21 @ 05:13)  SpO2: 95% (21 @ 05:13)  Wt(kg): --    I and O's:     @ 07:01  -   @ 07:00  --------------------------------------------------------  IN: 720 mL / OUT: 425 mL / NET: 295 mL          PHYSICAL EXAM:    Constitutional: NAD  Neck:  No JVD  Respiratory: CTAB/L  Cardiovascular: S1 and S2  Gastrointestinal: BS+, soft, NT/ND  Extremities: No peripheral edema  Neurological: A/O x 3, no focal deficits  Psychiatric: Normal mood, normal affect  : No Heredia  Skin: No rashes  Access: Not applicable    LABS:                        7.4    9.66  )-----------( 296      ( 22 Dec 2021 08:07 )             25.3         135  |  99  |  37<H>  ----------------------------<  218<H>  4.5   |  24  |  1.90<H>    Ca    9.2      22 Dec 2021 08:07  Phos  3.0                 Urine Studies:  Urinalysis Basic - ( 20 Dec 2021 12:08 )    Color: Yellow / Appearance: Slightly Turbid / S.012 / pH: x  Gluc: x / Ketone: Negative  / Bili: Negative / Urobili: Negative   Blood: x / Protein: 100 / Nitrite: Negative   Leuk Esterase: Large / RBC: 4 /hpf /  /HPF   Sq Epi: x / Non Sq Epi: 2 /hpf / Bacteria: Negative      Creatinine, Random Urine: 76 mg/dL ( @ 12:07)  Protein/Creatinine Ratio Calculation: 1.2 Ratio ( @ 12:07)        RADIOLOGY & ADDITIONAL STUDIES:  < from: Xray Ankle Complete 3 Views, Right (21 @ 20:07) >    ACC: [id number] EXAM:  XR ANKLE COMP MIN 3 VIEWS RT                          PROCEDURE DATE:  2021          INTERPRETATION:  CLINICAL INDICATION: status post right ankle I&D and   fibular bone biopsy    EXAM:  Frontal, oblique, and lateral right ankle from 2021 at 2007.   Reviewed in conjunction with MRI from 2021.    IMPRESSION:  Postprocedural osseous and soft tissue changes in lateral right ankle   region. Otherwise no tracking gas collections. Changes suspicious for   septicarthritis and osteomyelitis better demonstrated on the MRI study.   Also correlate with biopsy results    No fractures or dislocations.    Lateral ankle mortise widening may reflect ligamentous   laxity/insufficiency. Otherwise no acute fractures or dislocations.    Appearance of a pes planus on lateral view however nonweightbearing.    Generalized osteopenia otherwise no discrete lytic or blastic lesions.    --- End of Report ---            MELONIE IRIZARRY MD; Attending Radiologist  This document has been electronically signed. Dec 21 2021  9:27AM    < end of copied text >

## 2021-12-22 NOTE — PROGRESS NOTE ADULT - ASSESSMENT
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

## 2021-12-22 NOTE — PROGRESS NOTE ADULT - SUBJECTIVE AND OBJECTIVE BOX
[address] [place] [id number]  [phone]      3/14/2017      Isiah Cooper  [address] [facility] [address]      Dear Isiah:    This is to confirm that the date of surgery with Dr. Santoyo has been reschedule from March 17, 2017 to April 10, 2017 as you requested.  As your surgery is scheduled for Monday, please call the Pre-Admission Testing department at [phone] after 10 a.m. on April 7, 2017 to confirm the time you should arrive on the day of surgery.     Please contact Dr. Santoyo's office should you have any questions.    Sincerely,        Sarai Rojas LPN   The Office of Kobi Santoyo MD  Department of Otolaryngology        Patient is a 89y old  Female who presents with a chief complaint of s/p fall (22 Dec 2021 14:52)      DATE OF SERVICE: 12-22-21 @ 15:15    SUBJECTIVE / OVERNIGHT EVENTS: overnight events noted    ROS:  Resp: No cough no sputum production  CVS: No chest pain no palpitations no orthopnea  GI: no N/V/D  : no dysuria, no hematuria  Neuro: no weakness no paresthesias  Heme: No petechiae no easy bruising  Msk: No joint pain no swelling  Skin: No rash no itching        MEDICATIONS  (STANDING):  allopurinol 100 milliGRAM(s) Oral daily  amLODIPine   Tablet 5 milliGRAM(s) Oral daily  apixaban 2.5 milliGRAM(s) Oral two times a day  ascorbic acid 500 milliGRAM(s) Oral daily  calcitriol   Capsule 0.25 MICROGram(s) Oral daily  chlorhexidine 2% Cloths 1 Application(s) Topical <User Schedule>  clopidogrel Tablet 75 milliGRAM(s) Oral daily  dextrose 40% Gel 15 Gram(s) Oral once  dextrose 5%. 1000 milliLiter(s) (100 mL/Hr) IV Continuous <Continuous>  dextrose 5%. 1000 milliLiter(s) (50 mL/Hr) IV Continuous <Continuous>  dextrose 50% Injectable 25 Gram(s) IV Push once  dextrose 50% Injectable 12.5 Gram(s) IV Push once  dextrose 50% Injectable 25 Gram(s) IV Push once  glucagon  Injectable 1 milliGRAM(s) IntraMuscular once  insulin glargine Injectable (LANTUS) 14 Unit(s) SubCutaneous at bedtime  insulin lispro (ADMELOG) corrective regimen sliding scale   SubCutaneous three times a day before meals  insulin lispro (ADMELOG) corrective regimen sliding scale   SubCutaneous at bedtime  insulin lispro Injectable (ADMELOG) 6 Unit(s) SubCutaneous three times a day before meals  levothyroxine 50 MICROGram(s) Oral daily  meropenem  IVPB 500 milliGRAM(s) IV Intermittent every 12 hours  metoprolol succinate  milliGRAM(s) Oral daily  multivitamin 1 Tablet(s) Oral daily  pantoprazole    Tablet 40 milliGRAM(s) Oral before breakfast  simvastatin 20 milliGRAM(s) Oral at bedtime    MEDICATIONS  (PRN):  acetaminophen     Tablet .. 650 milliGRAM(s) Oral every 6 hours PRN Temp greater or equal to 38C (100.4F), Moderate Pain (4 - 6)        CAPILLARY BLOOD GLUCOSE      POCT Blood Glucose.: 163 mg/dL (22 Dec 2021 12:55)  POCT Blood Glucose.: 240 mg/dL (22 Dec 2021 08:40)  POCT Blood Glucose.: 121 mg/dL (21 Dec 2021 21:38)  POCT Blood Glucose.: 129 mg/dL (21 Dec 2021 18:03)    I&O's Summary    21 Dec 2021 07:01  -  22 Dec 2021 07:00  --------------------------------------------------------  IN: 720 mL / OUT: 425 mL / NET: 295 mL    22 Dec 2021 07:01  -  22 Dec 2021 15:15  --------------------------------------------------------  IN: 600 mL / OUT: 0 mL / NET: 600 mL        Vital Signs Last 24 Hrs  T(C): 36.4 (22 Dec 2021 11:54), Max: 36.7 (21 Dec 2021 20:13)  T(F): 97.5 (22 Dec 2021 11:54), Max: 98.1 (21 Dec 2021 20:13)  HR: 61 (22 Dec 2021 11:54) (61 - 70)  BP: 114/62 (22 Dec 2021 11:54) (104/49 - 138/67)  BP(mean): --  RR: 18 (22 Dec 2021 11:54) (18 - 18)  SpO2: 96% (22 Dec 2021 11:54) (93% - 96%)    PHYSICAL EXAM:   HEENT: DEBBIE EOMI  Neck: Supple, no JVD  Lungs: clear  CVS: S1 S2 systolic murmur +   Abdomen: non tender BS +  Neuro: Alert nonfocal  Ext: right ankle bandage  Msk: no joint swelling    LABS:                        7.4    9.66  )-----------( 296      ( 22 Dec 2021 08:07 )             25.3     12-22    135  |  99  |  37<H>  ----------------------------<  218<H>  4.5   |  24  |  1.90<H>    Ca    9.2      22 Dec 2021 08:07                  All consultant(s) notes reviewed and care discussed with other providers        Contact Number, Dr Ho [phone]

## 2021-12-22 NOTE — PROGRESS NOTE ADULT - SUBJECTIVE AND OBJECTIVE BOX
Podiatry pager #: [phone] ([place])/ [phone] ([facility])    Patient is a 89y old  Female who presents with a chief complaint of s/p fall (21 Dec 2021 15:52)       INTERVAL HPI/OVERNIGHT EVENTS:  Patient seen and evaluated at bedside.  Pt is resting comfortable in NAD. Denies N/V/F/C.     Allergies    Bactrim (Unknown)  Flagyl (Hives; Pruritus)  Macrobid (Other)  sulfa drugs (Hives)  Zosyn (Other)    Intolerances        Vital Signs Last 24 Hrs  T(C): 36.6 (22 Dec 2021 05:13), Max: 36.7 (21 Dec 2021 20:13)  T(F): 97.8 (22 Dec 2021 05:13), Max: 98.1 (21 Dec 2021 20:13)  HR: 66 (22 Dec 2021 05:13) (58 - 70)  BP: 121/65 (22 Dec 2021 05:13) (104/49 - 138/67)  BP(mean): --  RR: 18 (22 Dec 2021 05:13) (18 - 18)  SpO2: 95% (22 Dec 2021 05:13) (93% - 98%)    LABS:                        7.4    9.66  )-----------( 296      ( 22 Dec 2021 08:07 )             25.3     12-    135  |  99  |  37<H>  ----------------------------<  218<H>  4.5   |  24  |  1.90<H>    Ca    9.2      22 Dec 2021 08:07  Phos  3.0     12-20        Urinalysis Basic - ( 20 Dec 2021 12:08 )    Color: Yellow / Appearance: Slightly Turbid / S.012 / pH: x  Gluc: x / Ketone: Negative  / Bili: Negative / Urobili: Negative   Blood: x / Protein: 100 / Nitrite: Negative   Leuk Esterase: Large / RBC: 4 /hpf /  /HPF   Sq Epi: x / Non Sq Epi: 2 /hpf / Bacteria: Negative      CAPILLARY BLOOD GLUCOSE      POCT Blood Glucose.: 240 mg/dL (22 Dec 2021 08:40)  POCT Blood Glucose.: 121 mg/dL (21 Dec 2021 21:38)  POCT Blood Glucose.: 129 mg/dL (21 Dec 2021 18:03)  POCT Blood Glucose.: 100 mg/dL (21 Dec 2021 12:09)      Lower Extremity Physical Exam:  Vascular: DP/PT 1/4, B/L, CFT <3 seconds B/L, Temperature gradient WNL, B/L.   Neuro: Epicritic sensation diminished to the level of _, B/L.  Musculoskeletal/Ortho: Able for R Ankle ROM without any pain elicited  Skin: bilateral posterior heel deep tissue injury, no fluctuance, no local signs of infection  s/p right ankle I&D w/ right fibular bone biopsy partially closed (DOS ): sutures intact w/ central wound opening, no purulence, no periwound erythema, no signs of acute infection, skin flaps warm to touch     RADIOLOGY & ADDITIONAL TESTS:

## 2021-12-22 NOTE — PROGRESS NOTE ADULT - ASSESSMENT
Assessment  DMT2: 89y Female with DM T2 with hyperglycemia, A1C 7.3%, was on insulin at home, now on basal bolus insulin, adjusted dose, blood sugars are fluctuating within overall acceptable range, no hypoglycemic episodes, postop is s/o I&D of ankle abscess, eating meals, NAD.  Fall: AI R/O, workup in progress, stable, monitored.  Hypothyroidism: On Synthroid 50 mcg po daily, compliant with Synthroid intake, euthyroid.  HTN: Controlled,  on antihypertensive medications.  CKD: Monitor labs/BMP,       Jono Zaldivar MD  Cell: [phone]  Office: [phone]     Assessment  DMT2: 89y Female with DM T2 with hyperglycemia, A1C 7.3%, was on insulin at home, now on basal bolus insulin, adjusted dose, blood sugars are fluctuating within overall  acceptable range, no hypoglycemic episodes, postop is s/o I&D of ankle abscess, eating meals, NAD.  Fall: AI R/O, workup in progress, stable, monitored.  Hypothyroidism: On Synthroid 50 mcg po daily, compliant with Synthroid intake, euthyroid.  HTN: Controlled,  on antihypertensive medications.  CKD: Monitor labs/BMP,       Jono Zaldivar MD  Cell: [phone]  Office: [phone]

## 2021-12-22 NOTE — PROVIDER CONTACT NOTE (OTHER) - BACKGROUND
Dx: Acute osteomyelitis of ankle and foot
pt had urine retention and dejesus
Dx: Acute osteomyelitis of ankle and foot

## 2021-12-22 NOTE — PROVIDER CONTACT NOTE (OTHER) - SITUATION
Pt. RT heel wound bleeding through dressing. Saturated.
pt did not void after dejesus dc,d, bladder scan shows 190 cc
Pt c/o bladder fullness

## 2021-12-22 NOTE — PROVIDER CONTACT NOTE (OTHER) - ASSESSMENT
Pt is A&Ox4. Pt denies any chest pain, shortness of breath, headache or dizziness. Pt states she feels like she needs to urinate but can't. Pt shows no s/s of distress.
Pt. A&O x4 denied Light headedness & Dizziness. VSS, BP:136/61, HR: 69. RN reinforced dressing with ABD pad.
pt was not feeling uncomfortable , encouraged to void during the night.

## 2021-12-22 NOTE — PROVIDER CONTACT NOTE (OTHER) - ACTION/TREATMENT ORDERED:
PA notified. Bladder scan/straight cath if needed ordered. Will continue to monitor.
was done 300  urine out put
Provide came to see pt. ordered CBC & T/S STAT. Called Podiatry to come change heel dressing. Zfloat boot applied.

## 2021-12-22 NOTE — PROGRESS NOTE ADULT - SUBJECTIVE AND OBJECTIVE BOX
Podiatry pager #: [phone] ([place])/ [phone] ([facility])    Patient is a 89y old  Female who presents with a chief complaint of s/p fall (22 Dec 2021 09:31)       INTERVAL HPI/OVERNIGHT EVENTS:  Patient seen and evaluated at bedside.  Pt is resting comfortable in NAD. Denies N/V/F/C.     Allergies    Bactrim (Unknown)  Flagyl (Hives; Pruritus)  Macrobid (Other)  sulfa drugs (Hives)  Zosyn (Other)    Intolerances        Vital Signs Last 24 Hrs  T(C): 36.6 (22 Dec 2021 05:13), Max: 36.7 (21 Dec 2021 20:13)  T(F): 97.8 (22 Dec 2021 05:13), Max: 98.1 (21 Dec 2021 20:13)  HR: 66 (22 Dec 2021 05:13) (58 - 70)  BP: 121/65 (22 Dec 2021 05:13) (104/49 - 138/67)  BP(mean): --  RR: 18 (22 Dec 2021 05:13) (18 - 18)  SpO2: 95% (22 Dec 2021 05:13) (93% - 98%)    LABS:                        7.4    9.66  )-----------( 296      ( 22 Dec 2021 08:07 )             25.3     12-    135  |  99  |  37<H>  ----------------------------<  218<H>  4.5   |  24  |  1.90<H>    Ca    9.2      22 Dec 2021 08:07  Phos  3.0     12-20        Urinalysis Basic - ( 20 Dec 2021 12:08 )    Color: Yellow / Appearance: Slightly Turbid / S.012 / pH: x  Gluc: x / Ketone: Negative  / Bili: Negative / Urobili: Negative   Blood: x / Protein: 100 / Nitrite: Negative   Leuk Esterase: Large / RBC: 4 /hpf /  /HPF   Sq Epi: x / Non Sq Epi: 2 /hpf / Bacteria: Negative      CAPILLARY BLOOD GLUCOSE      POCT Blood Glucose.: 240 mg/dL (22 Dec 2021 08:40)  POCT Blood Glucose.: 121 mg/dL (21 Dec 2021 21:38)  POCT Blood Glucose.: 129 mg/dL (21 Dec 2021 18:03)  POCT Blood Glucose.: 100 mg/dL (21 Dec 2021 12:09)      Lower Extremity Physical Exam:  Vascular: DP/PT 1/4, B/L, CFT <3 seconds B/L, Temperature gradient WNL, B/L.   Neuro: Epicritic sensation diminished to the level of _, B/L.  Musculoskeletal/Ortho: Able for R Ankle ROM without any pain elicited  Skin: bilateral posterior heel deep tissue injury, no fluctuance, no local signs of infection  s/p right ankle I&D w/ right fibular bone biopsy partially closed (DOS ): sutures intact w/ central wound opening, no purulence, no periwound erythema, no signs of acute infection, skin flaps warm to touch     RADIOLOGY & ADDITIONAL TESTS:

## 2021-12-22 NOTE — PROGRESS NOTE ADULT - SUBJECTIVE AND OBJECTIVE BOX
Chief complaint  Patient is a 89y old  Female who presents with a chief complaint of s/p fall (22 Dec 2021 10:38)   Review of systems  Patient in bed, looks comfortable, no hypoglycemic episodes.    Labs and Fingersticks  CAPILLARY BLOOD GLUCOSE      POCT Blood Glucose.: 163 mg/dL (22 Dec 2021 12:55)  POCT Blood Glucose.: 240 mg/dL (22 Dec 2021 08:40)  POCT Blood Glucose.: 121 mg/dL (21 Dec 2021 21:38)  POCT Blood Glucose.: 129 mg/dL (21 Dec 2021 18:03)      Anion Gap, Serum: 12 (12-22 @ 08:07)  Anion Gap, Serum: 9 (12-21 @ 07:10)      Calcium, Total Serum: 9.2 (12-22 @ 08:07)  Calcium, Total Serum: 9.8 (12-21 @ 07:10)  Intact PTH: 134 *H* (12-20 @ 19:56)          12-22    135  |  99  |  37<H>  ----------------------------<  218<H>  4.5   |  24  |  1.90<H>    Ca    9.2      22 Dec 2021 08:07                          7.4    9.66  )-----------( 296      ( 22 Dec 2021 08:07 )             25.3     Medications  MEDICATIONS  (STANDING):  allopurinol 100 milliGRAM(s) Oral daily  amLODIPine   Tablet 5 milliGRAM(s) Oral daily  apixaban 2.5 milliGRAM(s) Oral two times a day  ascorbic acid 500 milliGRAM(s) Oral daily  calcitriol   Capsule 0.25 MICROGram(s) Oral daily  chlorhexidine 2% Cloths 1 Application(s) Topical <User Schedule>  clopidogrel Tablet 75 milliGRAM(s) Oral daily  dextrose 40% Gel 15 Gram(s) Oral once  dextrose 5%. 1000 milliLiter(s) (100 mL/Hr) IV Continuous <Continuous>  dextrose 5%. 1000 milliLiter(s) (50 mL/Hr) IV Continuous <Continuous>  dextrose 50% Injectable 25 Gram(s) IV Push once  dextrose 50% Injectable 12.5 Gram(s) IV Push once  dextrose 50% Injectable 25 Gram(s) IV Push once  glucagon  Injectable 1 milliGRAM(s) IntraMuscular once  insulin glargine Injectable (LANTUS) 14 Unit(s) SubCutaneous at bedtime  insulin lispro (ADMELOG) corrective regimen sliding scale   SubCutaneous three times a day before meals  insulin lispro (ADMELOG) corrective regimen sliding scale   SubCutaneous at bedtime  insulin lispro Injectable (ADMELOG) 6 Unit(s) SubCutaneous three times a day before meals  levothyroxine 50 MICROGram(s) Oral daily  meropenem  IVPB 500 milliGRAM(s) IV Intermittent every 12 hours  metoprolol succinate  milliGRAM(s) Oral daily  multivitamin 1 Tablet(s) Oral daily  pantoprazole    Tablet 40 milliGRAM(s) Oral before breakfast  simvastatin 20 milliGRAM(s) Oral at bedtime      Physical Exam  General: Patient comfortable in bed  Vital Signs Last 12 Hrs  T(F): 97.5 (12-22-21 @ 11:54), Max: 97.8 (12-22-21 @ 05:13)  HR: 61 (12-22-21 @ 11:54) (61 - 66)  BP: 114/62 (12-22-21 @ 11:54) (114/62 - 121/65)  BP(mean): --  RR: 18 (12-22-21 @ 11:54) (18 - 18)  SpO2: 96% (12-22-21 @ 11:54) (95% - 96%)  Neck: No palpable thyroid nodules.  CVS: S1S2, No murmurs  Respiratory: No wheezing, no crepitations  GI: Abdomen soft, bowel sounds positive  Musculoskeletal:  edema lower extremities.   Skin: No skin rashes, no ecchymosis    Diagnostics    Cortisol AM, Serum: AM Sched. Collection: 17-Dec-2021 06:00 (12-16 @ 14:32)           Chief complaint  Patient is a 89y old  Female who presents with a chief complaint of s/p fall (22 Dec 2021 10:38)   Review of systems  Patient in bed, looks comfortable, no hypoglycemic episodes.    Labs and Fingersticks  CAPILLARY BLOOD GLUCOSE      POCT Blood Glucose.: 163 mg/dL (22 Dec 2021 12:55)  POCT Blood Glucose.: 240 mg/dL (22 Dec 2021 08:40)  POCT Blood Glucose.: 121 mg/dL (21 Dec 2021 21:38)  POCT Blood Glucose.: 129 mg/dL (21 Dec 2021 18:03)      Anion Gap, Serum: 12 (12-22 @ 08:07)  Anion Gap, Serum: 9 (12-21 @ 07:10)      Calcium, Total Serum: 9.2 (12-22 @ 08:07)  Calcium, Total Serum: 9.8 (12-21 @ 07:10)  Intact PTH: 134 *H* (12-20 @ 19:56)          12-22    135  |  99  |  37<H>  ----------------------------<  218<H>  4.5   |  24  |  1.90<H>    Ca    9.2      22 Dec 2021 08:07                          7.4    9.66  )-----------( 296      ( 22 Dec 2021 08:07 )             25.3     Medications  MEDICATIONS  (STANDING):  allopurinol 100 milliGRAM(s) Oral daily  amLODIPine   Tablet 5 milliGRAM(s) Oral daily  apixaban 2.5 milliGRAM(s) Oral two times a day  ascorbic acid 500 milliGRAM(s) Oral daily  calcitriol   Capsule 0.25 MICROGram(s) Oral daily  chlorhexidine 2% Cloths 1 Application(s) Topical <User Schedule>  clopidogrel Tablet 75 milliGRAM(s) Oral daily  dextrose 40% Gel 15 Gram(s) Oral once  dextrose 5%. 1000 milliLiter(s) (100 mL/Hr) IV Continuous <Continuous>  dextrose 5%. 1000 milliLiter(s) (50 mL/Hr) IV Continuous <Continuous>  dextrose 50% Injectable 25 Gram(s) IV Push once  dextrose 50% Injectable 12.5 Gram(s) IV Push once  dextrose 50% Injectable 25 Gram(s) IV Push once  glucagon  Injectable 1 milliGRAM(s) IntraMuscular once  insulin glargine Injectable (LANTUS) 14 Unit(s) SubCutaneous at bedtime  insulin lispro (ADMELOG) corrective regimen sliding scale   SubCutaneous three times a day before meals  insulin lispro (ADMELOG) corrective regimen sliding scale   SubCutaneous at bedtime  insulin lispro Injectable (ADMELOG) 6 Unit(s) SubCutaneous three times a day before meals  levothyroxine 50 MICROGram(s) Oral daily  meropenem  IVPB 500 milliGRAM(s) IV Intermittent every 12 hours  metoprolol succinate  milliGRAM(s) Oral daily  multivitamin 1 Tablet(s) Oral daily  pantoprazole    Tablet 40 milliGRAM(s) Oral before breakfast  simvastatin 20 milliGRAM(s) Oral at bedtime      Physical Exam  General: Patient comfortable in bed  Vital Signs Last 12 Hrs  T(F): 97.5 (12-22-21 @ 11:54), Max: 97.8 (12-22-21 @ 05:13)  HR: 61 (12-22-21 @ 11:54) (61 - 66)  BP: 114/62 (12-22-21 @ 11:54) (114/62 - 121/65)  BP(mean): --  RR: 18 (12-22-21 @ 11:54) (18 - 18)  SpO2: 96% (12-22-21 @ 11:54) (95% - 96%)  Neck: No palpable thyroid nodules.  CVS: S1S2, No murmurs  Respiratory: No wheezing, no crepitations  GI: Abdomen soft, bowel sounds positive  Musculoskeletal:  edema lower extremities.   Skin: No skin rashes, no ecchymosis    Diagnostics    Cortisol AM, Serum: AM Sched. Collection: 17-Dec-2021 06:00 (12-16 @ 14:32)

## 2021-12-22 NOTE — PROGRESS NOTE ADULT - ASSESSMENT
89yo F s/p right lateral ankle I&D w/ fibular bone biopsy (DOS 12/20)  - Pt seen and evaluated  - afebrile, no leukocytosis   - Right lateral ankle I&D site w/ sutures intact proximally and distally, central incision left open for packing, no purulence or drainage, no periwound erythema, no acute signs of infection, skin flaps warm to touch   -Bilateral heel DTI- present upon admission without any signs of infection  -Please offload bilateral heels w/ z flow booties  -Please have PT consult requested by patient and family for upper body exercising since patient has been bedbound for prolonged period of time (Appreciated)  - low concern for residual infection  - low concern for viability  - pod plan for possible delayed primary closure at bedside on Thursday pending appearance  - discharge pending bone biopsy results, if positive for OM - PICC line likely necessary   - ID recommendations appreciated   - Discussed with attending

## 2021-12-23 NOTE — PROGRESS NOTE ADULT - SUBJECTIVE AND OBJECTIVE BOX
Podiatry pager #: [phone] ([place])/ [phone] ([facility])    Patient is a 89y old  Female who presents with a chief complaint of s/p fall (23 Dec 2021 11:11)       INTERVAL HPI/OVERNIGHT EVENTS:  Patient seen and evaluated at bedside.  Pt is resting comfortable in NAD. Denies N/V/F/C.     Allergies    Bactrim (Unknown)  Flagyl (Hives; Pruritus)  Macrobid (Other)  sulfa drugs (Hives)  Zosyn (Other)    Intolerances        Vital Signs Last 24 Hrs  T(C): 36.6 (23 Dec 2021 11:06), Max: 36.7 (23 Dec 2021 04:04)  T(F): 97.9 (23 Dec 2021 11:06), Max: 98.1 (23 Dec 2021 04:04)  HR: 62 (23 Dec 2021 11:06) (61 - 62)  BP: 120/60 (23 Dec 2021 11:06) (120/60 - 137/75)  BP(mean): --  RR: 18 (23 Dec 2021 11:06) (18 - 18)  SpO2: 93% (23 Dec 2021 11:06) (93% - 95%)    LABS:                        7.6    9.01  )-----------( 346      ( 23 Dec 2021 07:03 )             25.3     12-23    133<L>  |  98  |  37<H>  ----------------------------<  119<H>  4.6   |  25  |  1.89<H>    Ca    9.6      23 Dec 2021 07:05          CAPILLARY BLOOD GLUCOSE      POCT Blood Glucose.: 138 mg/dL (23 Dec 2021 12:13)  POCT Blood Glucose.: 149 mg/dL (23 Dec 2021 08:59)  POCT Blood Glucose.: 146 mg/dL (22 Dec 2021 21:35)  POCT Blood Glucose.: 148 mg/dL (22 Dec 2021 17:43)  POCT Blood Glucose.: 163 mg/dL (22 Dec 2021 12:55)      Lower Extremity Physical Exam:  Vascular: DP/PT 1/4, B/L, CFT <3 seconds B/L, Temperature gradient WNL, B/L.   Neuro: Epicritic sensation diminished to the level of _, B/L.  Musculoskeletal/Ortho: Able for R Ankle ROM without any pain elicited  Skin: bilateral posterior heel deep tissue injury, no fluctuance, no local signs of infection  s/p right ankle I&D w/ right fibular bone biopsy partially closed (DOS 12/20): sutures intact w/ central wound opening, no purulence, no periwound erythema, no signs of acute infection, skin flaps warm to touch     RADIOLOGY & ADDITIONAL TESTS:

## 2021-12-23 NOTE — PROGRESS NOTE ADULT - SUBJECTIVE AND OBJECTIVE BOX
Patient is a 89y old  Female who presents with a chief complaint of s/p fall (22 Dec 2021 15:14)    Being followed by ID for ankle OM    Interval history:denies pain  no other complaints   No acute events      ROS:  No cough,SOB,CP  No N/V/D./abd pain  No other complaints      Antimicrobials:    meropenem  IVPB 500 milliGRAM(s) IV Intermittent every 12 hours    Other medications reviewed    Vital Signs Last 24 Hrs  T(C): 36.7 (12-23-21 @ 04:04), Max: 36.7 (12-23-21 @ 04:04)  T(F): 98.1 (12-23-21 @ 04:04), Max: 98.1 (12-23-21 @ 04:04)  HR: 61 (12-23-21 @ 04:04) (61 - 61)  BP: 137/75 (12-23-21 @ 04:04) (114/62 - 137/75)  BP(mean): --  RR: 18 (12-23-21 @ 04:04) (18 - 18)  SpO2: 95% (12-23-21 @ 04:04) (95% - 96%)    Physical Exam:  HEENT PERRLA EOMI    No oral exudate or erythema  L axillary PM     Chest Good AE,CTA    CVS RRR S1 S2 WNl No murmur or rub or gallop    Abd soft BS normal No tenderness no masses    R ankle dressing no discharge   ankle no erythema   no LOM   PICC  site no erythema tenderness or discharge    CNS AAO X 2  Lab Data:                          7.6    9.01  )-----------( 346      ( 23 Dec 2021 07:03 )             25.3       12-23    133<L>  |  98  |  37<H>  ----------------------------<  119<H>  4.6   |  25  |  1.89<H>    Creatinine, Serum: 1.89 mg/dL (12-23-21 @ 07:05)  Creatinine, Serum: 1.90 mg/dL (12-22-21 @ 08:07)  Creatinine, Serum: 1.74 mg/dL (12-21-21 @ 07:10)  Creatinine, Serum: 1.85 mg/dL (12-20-21 @ 07:02)  Creatinine, Serum: 1.92 mg/dL (12-19-21 @ 07:28)      Ca    9.6      23 Dec 2021 07:05          Culture - Acid Fast - Other w/Smear (collected 21 Dec 2021 01:41)  Source: .Other right foot  Preliminary Report (22 Dec 2021 15:05):    Culture is being performed.    Culture - Surgical Swab (collected 21 Dec 2021 01:38)  Source: .Surgical Swab right foot  Preliminary Report (22 Dec 2021 08:16):    No growth to date.    Culture - Tissue with Gram Stain (collected 21 Dec 2021 01:10)  Source: .Tissue right fibula  Gram Stain (21 Dec 2021 05:09):    No polymorphonuclear cells seen per low power field    No organisms seen per oil power field  Preliminary Report (21 Dec 2021 17:44):    No growth      < from: Xray Ankle Complete 3 Views, Right (12.20.21 @ 20:07) >  IMPRESSION:  Postprocedural osseous and soft tissue changes in lateral right ankle   region. Otherwise no tracking gas collections. Changes suspicious for   septicarthritis and osteomyelitis better demonstrated on the MRI study.   Also correlate with biopsy results    No fractures or dislocations.    Lateral ankle mortise widening may reflect ligamentous   laxity/insufficiency. Otherwise no acute fractures or dislocations.    Appearance of a pes planus on lateral view however nonweightbearing.    Generalized osteopenia otherwise no discrete lytic or blastic lesions.    --- End of Report ---        < end of copied text >

## 2021-12-23 NOTE — PROGRESS NOTE ADULT - SUBJECTIVE AND OBJECTIVE BOX
[facility]PHROLOGY[facility] [phone]        Patient seen and examined in bed.  She was in good spirits         MEDICATIONS  (STANDING):  allopurinol 100 milliGRAM(s) Oral daily  amLODIPine   Tablet 5 milliGRAM(s) Oral daily  apixaban 2.5 milliGRAM(s) Oral two times a day  ascorbic acid 500 milliGRAM(s) Oral daily  bisacodyl 5 milliGRAM(s) Oral at bedtime  calcitriol   Capsule 0.25 MICROGram(s) Oral daily  chlorhexidine 2% Cloths 1 Application(s) Topical <User Schedule>  clopidogrel Tablet 75 milliGRAM(s) Oral daily  dextrose 40% Gel 15 Gram(s) Oral once  dextrose 5%. 1000 milliLiter(s) (100 mL/Hr) IV Continuous <Continuous>  dextrose 5%. 1000 milliLiter(s) (50 mL/Hr) IV Continuous <Continuous>  dextrose 50% Injectable 25 Gram(s) IV Push once  dextrose 50% Injectable 12.5 Gram(s) IV Push once  dextrose 50% Injectable 25 Gram(s) IV Push once  glucagon  Injectable 1 milliGRAM(s) IntraMuscular once  insulin glargine Injectable (LANTUS) 14 Unit(s) SubCutaneous at bedtime  insulin lispro (ADMELOG) corrective regimen sliding scale   SubCutaneous three times a day before meals  insulin lispro (ADMELOG) corrective regimen sliding scale   SubCutaneous at bedtime  insulin lispro Injectable (ADMELOG) 6 Unit(s) SubCutaneous three times a day before meals  levothyroxine 50 MICROGram(s) Oral daily  meropenem  IVPB 500 milliGRAM(s) IV Intermittent every 12 hours  metoprolol succinate  milliGRAM(s) Oral daily  multivitamin 1 Tablet(s) Oral daily  pantoprazole    Tablet 40 milliGRAM(s) Oral before breakfast  polyethylene glycol 3350 17 Gram(s) Oral daily  senna 2 Tablet(s) Oral at bedtime  simvastatin 20 milliGRAM(s) Oral at bedtime      VITAL:  T(C): , Max: 36.7 (12-23-21 @ 04:04)  T(F): , Max: 98.1 (12-23-21 @ 04:04)  HR: 62 (12-23-21 @ 11:06)  BP: 120/60 (12-23-21 @ 11:06)  BP(mean): --  RR: 18 (12-23-21 @ 11:06)  SpO2: 93% (12-23-21 @ 11:06)  Wt(kg): --    I and O's:    12-22 @ 07:01  -  12-23 @ 07:00  --------------------------------------------------------  IN: 1320 mL / OUT: 1100 mL / NET: 220 mL          PHYSICAL EXAM:    Constitutional: NAD  Neck:  No JVD  Respiratory: CTAB/L  Cardiovascular: S1 and S2  Gastrointestinal: BS+, soft, NT/ND  Extremities: No peripheral edema  Neurological: A/O x 3, no focal deficits  Psychiatric: Normal mood, normal affect  : No [place]  Skin: No rashes  Access: PICC    LABS:                        7.6    9.01  )-----------( 346      ( 23 Dec 2021 07:03 )             25.3     12-23    133<L>  |  98  |  37<H>  ----------------------------<  119<H>  4.6   |  25  |  1.89<H>    Ca    9.6      23 Dec 2021 07:05            Urine Studies:          RADIOLOGY & ADDITIONAL STUDIES:

## 2021-12-23 NOTE — PROGRESS NOTE ADULT - ASSESSMENT
89yo F s/p right lateral ankle I&D w/ fibular bone biopsy (DOS 12/20)  - Pt seen and evaluated  - afebrile, no leukocytosis   - Right lateral ankle I&D site w/ sutures intact proximally and distally, central incision left open for packing, no purulence or drainage, no periwound erythema, no acute signs of infection, skin flaps warm to touch   -Bilateral heel DTI- present upon admission without any signs of infection  -Please offload bilateral heels w/ z flow booties  -Please have PT consult requested by patient and family for upper body exercising since patient has been bedbound for prolonged period of time (Appreciated)  - low concern for residual infection  - low concern for viability  - Discussed bedside delayed primary closure with patient, explaining soft tissue infection resolved and benefits of closing open wound including ability to bear weight sooner and reduced chance of re-infection. Pt agreed and consented to bedside delayed primary closure procedure of the right lateral ankle.   - 10cc 1% lidocaine plain injection administered to right lateral ankle   - Bedside right lateral ankle central incision delayed primary closure performed using 3-0 nylon w/ skin well coapted  - discharge pending bone biopsy results, if positive for OM - PICC line likely necessary   - ID recommendations appreciated   - Seen with attending

## 2021-12-23 NOTE — PROGRESS NOTE ADULT - SUBJECTIVE AND OBJECTIVE BOX
Patient is a 89y old  Female who presents with a chief complaint of s/p fall (23 Dec 2021 12:28)      DATE OF SERVICE: 12-23-21 @ 13:19    SUBJECTIVE / OVERNIGHT EVENTS: overnight events noted    ROS:  Resp: No cough no sputum production  CVS: No chest pain no palpitations no orthopnea  GI: no N/V/D  : no dysuria, no hematuria        MEDICATIONS  (STANDING):  allopurinol 100 milliGRAM(s) Oral daily  amLODIPine   Tablet 5 milliGRAM(s) Oral daily  apixaban 2.5 milliGRAM(s) Oral two times a day  ascorbic acid 500 milliGRAM(s) Oral daily  bisacodyl 5 milliGRAM(s) Oral at bedtime  calcitriol   Capsule 0.25 MICROGram(s) Oral daily  chlorhexidine 2% Cloths 1 Application(s) Topical <User Schedule>  clopidogrel Tablet 75 milliGRAM(s) Oral daily  dextrose 40% Gel 15 Gram(s) Oral once  dextrose 5%. 1000 milliLiter(s) (100 mL/Hr) IV Continuous <Continuous>  dextrose 5%. 1000 milliLiter(s) (50 mL/Hr) IV Continuous <Continuous>  dextrose 50% Injectable 25 Gram(s) IV Push once  dextrose 50% Injectable 12.5 Gram(s) IV Push once  dextrose 50% Injectable 25 Gram(s) IV Push once  epoetin destiny-epbx (RETACRIT) Injectable 92066 Unit(s) SubCutaneous once  glucagon  Injectable 1 milliGRAM(s) IntraMuscular once  insulin glargine Injectable (LANTUS) 14 Unit(s) SubCutaneous at bedtime  insulin lispro (ADMELOG) corrective regimen sliding scale   SubCutaneous three times a day before meals  insulin lispro (ADMELOG) corrective regimen sliding scale   SubCutaneous at bedtime  insulin lispro Injectable (ADMELOG) 6 Unit(s) SubCutaneous three times a day before meals  levothyroxine 50 MICROGram(s) Oral daily  meropenem  IVPB 500 milliGRAM(s) IV Intermittent every 12 hours  metoprolol succinate  milliGRAM(s) Oral daily  multivitamin 1 Tablet(s) Oral daily  pantoprazole    Tablet 40 milliGRAM(s) Oral before breakfast  polyethylene glycol 3350 17 Gram(s) Oral daily  senna 2 Tablet(s) Oral at bedtime  simvastatin 20 milliGRAM(s) Oral at bedtime  traMADol 25 milliGRAM(s) Oral once    MEDICATIONS  (PRN):  acetaminophen     Tablet .. 650 milliGRAM(s) Oral every 6 hours PRN Temp greater or equal to 38C (100.4F), Moderate Pain (4 - 6)        CAPILLARY BLOOD GLUCOSE      POCT Blood Glucose.: 138 mg/dL (23 Dec 2021 12:13)  POCT Blood Glucose.: 149 mg/dL (23 Dec 2021 08:59)  POCT Blood Glucose.: 146 mg/dL (22 Dec 2021 21:35)  POCT Blood Glucose.: 148 mg/dL (22 Dec 2021 17:43)    I&O's Summary    22 Dec 2021 07:01  -  23 Dec 2021 07:00  --------------------------------------------------------  IN: 1320 mL / OUT: 1100 mL / NET: 220 mL    23 Dec 2021 07:01  -  23 Dec 2021 13:19  --------------------------------------------------------  IN: 240 mL / OUT: 300 mL / NET: -60 mL        Vital Signs Last 24 Hrs  T(C): 36.6 (23 Dec 2021 11:06), Max: 36.7 (23 Dec 2021 04:04)  T(F): 97.9 (23 Dec 2021 11:06), Max: 98.1 (23 Dec 2021 04:04)  HR: 62 (23 Dec 2021 11:06) (61 - 62)  BP: 120/60 (23 Dec 2021 11:06) (120/60 - 137/75)  BP(mean): --  RR: 18 (23 Dec 2021 11:06) (18 - 18)  SpO2: 93% (23 Dec 2021 11:06) (93% - 95%)    PHYSICAL EXAM:   HEENT: DEBBIE EOMI  Neck: Supple, no JVD  Lungs: clear  CVS: S1 S2 systolic murmur +   Abdomen: non tender BS +  Neuro: Alert nonfocal  Ext: right ankle bandage  Msk: no joint swelling    LABS:                        7.6    9.01  )-----------( 346      ( 23 Dec 2021 07:03 )             25.3     12-23    133<L>  |  98  |  37<H>  ----------------------------<  119<H>  4.6   |  25  |  1.89<H>    Ca    9.6      23 Dec 2021 07:05                  All consultant(s) notes reviewed and care discussed with other providers        Contact Number, Dr Ho [phone]

## 2021-12-23 NOTE — PROGRESS NOTE ADULT - SUBJECTIVE AND OBJECTIVE BOX
Chief complaint  Patient is a 89y old  Female who presents with a chief complaint of s/p fall (23 Dec 2021 13:19)   Review of systems  Patient in bed, looks comfortable, no hypoglycemic episodes.    Labs and Fingersticks  CAPILLARY BLOOD GLUCOSE      POCT Blood Glucose.: 138 mg/dL (23 Dec 2021 12:13)  POCT Blood Glucose.: 149 mg/dL (23 Dec 2021 08:59)  POCT Blood Glucose.: 146 mg/dL (22 Dec 2021 21:35)  POCT Blood Glucose.: 148 mg/dL (22 Dec 2021 17:43)      Anion Gap, Serum: 10 (12-23 @ 07:05)  Anion Gap, Serum: 12 (12-22 @ 08:07)      Calcium, Total Serum: 9.6 (12-23 @ 07:05)  Calcium, Total Serum: 9.2 (12-22 @ 08:07)          12-23    133<L>  |  98  |  37<H>  ----------------------------<  119<H>  4.6   |  25  |  1.89<H>    Ca    9.6      23 Dec 2021 07:05                          7.6    9.01  )-----------( 346      ( 23 Dec 2021 07:03 )             25.3     Medications  MEDICATIONS  (STANDING):  allopurinol 100 milliGRAM(s) Oral daily  amLODIPine   Tablet 5 milliGRAM(s) Oral daily  apixaban 2.5 milliGRAM(s) Oral two times a day  ascorbic acid 500 milliGRAM(s) Oral daily  bisacodyl 5 milliGRAM(s) Oral at bedtime  calcitriol   Capsule 0.25 MICROGram(s) Oral daily  chlorhexidine 2% Cloths 1 Application(s) Topical <User Schedule>  clopidogrel Tablet 75 milliGRAM(s) Oral daily  dextrose 40% Gel 15 Gram(s) Oral once  dextrose 5%. 1000 milliLiter(s) (100 mL/Hr) IV Continuous <Continuous>  dextrose 5%. 1000 milliLiter(s) (50 mL/Hr) IV Continuous <Continuous>  dextrose 50% Injectable 25 Gram(s) IV Push once  dextrose 50% Injectable 12.5 Gram(s) IV Push once  dextrose 50% Injectable 25 Gram(s) IV Push once  epoetin destiny-epbx (RETACRIT) Injectable 69848 Unit(s) SubCutaneous once  glucagon  Injectable 1 milliGRAM(s) IntraMuscular once  insulin glargine Injectable (LANTUS) 14 Unit(s) SubCutaneous at bedtime  insulin lispro (ADMELOG) corrective regimen sliding scale   SubCutaneous three times a day before meals  insulin lispro (ADMELOG) corrective regimen sliding scale   SubCutaneous at bedtime  insulin lispro Injectable (ADMELOG) 6 Unit(s) SubCutaneous three times a day before meals  levothyroxine 50 MICROGram(s) Oral daily  meropenem  IVPB 500 milliGRAM(s) IV Intermittent every 12 hours  metoprolol succinate  milliGRAM(s) Oral daily  multivitamin 1 Tablet(s) Oral daily  pantoprazole    Tablet 40 milliGRAM(s) Oral before breakfast  polyethylene glycol 3350 17 Gram(s) Oral daily  senna 2 Tablet(s) Oral at bedtime  simvastatin 20 milliGRAM(s) Oral at bedtime      Physical Exam  General: Patient comfortable in bed  Vital Signs Last 12 Hrs  T(F): 97.9 (12-23-21 @ 11:06), Max: 98.1 (12-23-21 @ 04:04)  HR: 62 (12-23-21 @ 11:06) (61 - 62)  BP: 120/60 (12-23-21 @ 11:06) (120/60 - 137/75)  BP(mean): --  RR: 18 (12-23-21 @ 11:06) (18 - 18)  SpO2: 93% (12-23-21 @ 11:06) (93% - 95%)  Neck: No palpable thyroid nodules.  CVS: S1S2, No murmurs  Respiratory: No wheezing, no crepitations  GI: Abdomen soft, bowel sounds positive  Musculoskeletal:  edema lower extremities.   Skin: No skin rashes, no ecchymosis    Diagnostics    Cortisol AM, Serum: AM Sched. Collection: 17-Dec-2021 06:00 (12-16 @ 14:32)             Chief complaint  Patient is a 89y old  Female who presents with a chief complaint of s/p fall (23 Dec 2021 13:19)   Review of systems  Patient in bed, looks comfortable, no hypoglycemic episodes.    Labs and Fingersticks  CAPILLARY BLOOD GLUCOSE      POCT Blood Glucose.: 138 mg/dL (23 Dec 2021 12:13)  POCT Blood Glucose.: 149 mg/dL (23 Dec 2021 08:59)  POCT Blood Glucose.: 146 mg/dL (22 Dec 2021 21:35)  POCT Blood Glucose.: 148 mg/dL (22 Dec 2021 17:43)      Anion Gap, Serum: 10 (12-23 @ 07:05)  Anion Gap, Serum: 12 (12-22 @ 08:07)      Calcium, Total Serum: 9.6 (12-23 @ 07:05)  Calcium, Total Serum: 9.2 (12-22 @ 08:07)          12-23    133<L>  |  98  |  37<H>  ----------------------------<  119<H>  4.6   |  25  |  1.89<H>    Ca    9.6       23 Dec 2021 07:05                          7.6    9.01  )-----------( 346      ( 23 Dec 2021 07:03 )             25.3     Medications  MEDICATIONS  (STANDING):  allopurinol 100 milliGRAM(s) Oral daily  amLODIPine   Tablet 5 milliGRAM(s) Oral daily  apixaban 2.5 milliGRAM(s) Oral two times a day  ascorbic acid 500 milliGRAM(s) Oral daily  bisacodyl 5 milliGRAM(s) Oral at bedtime  calcitriol   Capsule 0.25 MICROGram(s) Oral daily  chlorhexidine 2% Cloths 1 Application(s) Topical <User Schedule>  clopidogrel Tablet 75 milliGRAM(s) Oral daily  dextrose 40% Gel 15 Gram(s) Oral once  dextrose 5%. 1000 milliLiter(s) (100 mL/Hr) IV Continuous <Continuous>  dextrose 5%. 1000 milliLiter(s) (50 mL/Hr) IV Continuous <Continuous>  dextrose 50% Injectable 25 Gram(s) IV Push once  dextrose 50% Injectable 12.5 Gram(s) IV Push once  dextrose 50% Injectable 25 Gram(s) IV Push once  epoetin destiny-epbx (RETACRIT) Injectable 14047 Unit(s) SubCutaneous once  glucagon  Injectable 1 milliGRAM(s) IntraMuscular once  insulin glargine Injectable (LANTUS) 14 Unit(s) SubCutaneous at bedtime  insulin lispro (ADMELOG) corrective regimen sliding scale   SubCutaneous three times a day before meals  insulin lispro (ADMELOG) corrective regimen sliding scale   SubCutaneous at bedtime  insulin lispro Injectable (ADMELOG) 6 Unit(s) SubCutaneous three times a day before meals  levothyroxine 50 MICROGram(s) Oral daily  meropenem  IVPB 500 milliGRAM(s) IV Intermittent every 12 hours  metoprolol succinate  milliGRAM(s) Oral daily  multivitamin 1 Tablet(s) Oral daily  pantoprazole    Tablet 40 milliGRAM(s) Oral before breakfast  polyethylene glycol 3350 17 Gram(s) Oral daily  senna 2 Tablet(s) Oral at bedtime  simvastatin 20 milliGRAM(s) Oral at bedtime      Physical Exam  General: Patient comfortable in bed  Vital Signs Last 12 Hrs  T(F): 97.9 (12-23-21 @ 11:06), Max: 98.1 (12-23-21 @ 04:04)  HR: 62 (12-23-21 @ 11:06) (61 - 62)  BP: 120/60 (12-23-21 @ 11:06) (120/60 - 137/75)  BP(mean): --  RR: 18 (12-23-21 @ 11:06) (18 - 18)  SpO2: 93% (12-23-21 @ 11:06) (93% - 95%)  Neck: No palpable thyroid nodules.  CVS: S1S2, No murmurs  Respiratory: No wheezing, no crepitations  GI: Abdomen soft, bowel sounds positive  Musculoskeletal:  edema lower extremities.   Skin: No skin rashes, no ecchymosis    Diagnostics    Cortisol AM, Serum: AM Sched. Collection: 17-Dec-2021 06:00 (12-16 @ 14:32)

## 2021-12-23 NOTE — PROGRESS NOTE ADULT - ASSESSMENT
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 Notes shes been trying to move out of bed and fell, hitting mainly her hip and parts of her back. She does note feeling a bit weaker than usual, and not drinking enough water because she doesn't want to urinate at night. No fevers, chills, cough that she endorses. Does note hitting her R ankle previously and developing infection recently, for which was treated? Has PICC in R arm, and she is unsure what it is for.     Per review of [facility] records no abx since 12/3  Exam with R lateral malleolar ulcer with discharge  No surrounding cellulitis  X ray with perisosteal reaction  High ESra nd CRP though is non specific and could reflect fall   previously was on [facility] at [facility] though [facility]x [facility]  MRI as above   ? OM ? septic arthritis  Less likely non infectious  s/p I&D- purulence noted-for delayed closure  Cx so far no growth     Rec:  A) Ankle ulcer  Cx with only CNS so far but was on abx  s/p washout  OR cx so far negative but was on abx  continue merem    if no MRSA on deeper Cx no further vanco  final plan depending on results -may need long course as suspicious for OM  if no growth on Cx IV INvanz X 6 weeks with weekly CBC CMP-already has PICC (unless path and Cx remain negative and not s/o OM)        B) abx allergies  tolerating merem -was on invanz before   monitor clinically for s/s any allergic reaction    C)) leucocytosis  monitor for C diff   Monitor for s/s any other foci  resolved    Will tailor plan for ID issues  per course,results.Will defer to primary team on management of other issues.  Assessment, plan and recommendations as detailed above were discussed with the medical/primary  team.  I am away till 12/27/21  My colleagues in ID service will cover and follow for ID issues -Please call [phone] if issues or questions.

## 2021-12-23 NOTE — CHART NOTE - NSCHARTNOTEFT_GEN_A_CORE
Nutrition Follow Up Note  Patient seen for malnutrition follow up    Pt 89 y/o F with PMH as per chart: TAVR earlier this year, DM2, hypothyroidism, A fib on Eliquis, dyslipidemia, HTN, gastritis, gout, admitted S/P fall, brought here for evaluation, found with cellulitis of right ankle, acute CHF, S/P incision and drainage of infected bursa of ankle and biopsy of bone of right ankle ().     Source: [x] Patient       [x] EMR        [x] RN        [] Family at bedside       [] Other:    -If unable to interview patient: [] Trach/Vent/BiPAP  [] Disoriented/confused/inappropriate to interview as per chart     Pt forgetful at times as per documentation - confirmed information with RN. Pt reports good appetite and PO intake. Noted % PO intake as per flow sheets. Reports drinking Mighty Shakes 3xday - continues to refuse other supplements. Denies difficulty chewing. Reports sometimes difficulty swallowing dry chicken - refused changes in diet texture. Pt denies nausea, vomiting, diarrhea, or constipation, reports last BM yesterday (). Encouraged to continue adequate kcal and protein intake to help with skin healing. Pt denies having further questions/concerns about diet and nutrition - made aware RD remains available.     Diet Order:   Diet, Regular:   Consistent Carbohydrate {No Snacks} (CSTCHO) (-)    Weights:   Daily Weight in k.2 (12-18), Weight in k.2 (12-17) -?accuracy as pt with edema.     Nutritionally Pertinent MEDICATIONS  (STANDING):  allopurinol  amLODIPine   Tablet  ascorbic acid  bisacodyl  calcitriol   Capsule  dextrose 40% Gel  dextrose 5%.  dextrose 5%.  dextrose 50% Injectable  dextrose 50% Injectable  dextrose 50% Injectable  glucagon  Injectable  insulin glargine Injectable (LANTUS)  insulin lispro (ADMELOG) corrective regimen sliding scale  insulin lispro (ADMELOG) corrective regimen sliding scale  insulin lispro Injectable (ADMELOG)  levothyroxine  meropenem  IVPB  metoprolol succinate ER  multivitamin  pantoprazole    Tablet  polyethylene glycol 3350  senna  simvastatin    Pertinent Labs: ( @ 07:05): Na 133<L>, BUN 37<H>, Cr 1.89<H>, <H>, K+ 4.6, Phos --, Mg --, Alk Phos --, ALT/SGPT --, AST/SGOT --    A1C with Estimated Average Glucose Result: 7.3 % (- @ 10:22)    Finger Sticks:  POCT Blood Glucose.: 138 mg/dL ( @ 12:13)  POCT Blood Glucose.: 149 mg/dL ( @ 08:59)  POCT Blood Glucose.: 146 mg/dL ( @ 21:35)  POCT Blood Glucose.: 148 mg/dL ( @ 17:43)    Skin per nursing documentation: pressure ulcers in khushbu. heel suspected deep tissue injury, right lower buttock stage 2, and sacrum stage 1.   Edema as per flow sheets: +2 left foot and +3 right foot (previously +3 khushbu. leg and +4 khushbu. foot edema).      Estimated Needs:   [x] no change since previous assessment (meets estimated needs for pressure ulcer healing).   [] recalculated:     Previous Nutrition Diagnoses:  [x] Malnutrition; severe   [x] Increased Nutrient Needs      Nutrition Diagnoses are: [x] ongoing - addressed with PO intake encouragement and nutritional supplements.    New Nutrition Diagnosis: [x] Not applicable    Nutrition Care Plan:  [x] Achieved      Nutrition Interventions:     Education Provided:       [x] Yes  [] No    Recommendations:      1. Recommend Consistent Carbohydrate with snack diet. Defer diet/fluid consistencies to medical team/pt's preference. Will continue to monitor as able and adjust as needed.   2. RD will continue to provide diet Mighty Shakes 3xday to optimize kcal and protein intake.  3. Encourage PO intake and obtain food preferences as able (obtained at this time).   4. Continue Multivitamin and Vitamin C as medically feasible to optimize nutrient intake and further aid with pressure ulcer healing.  5. Encouraged to continue adequate kcal and protein intake to help with skin healing.   6. Continue to obtain weights as able to identify changes if any.     Monitoring and Evaluation:   Continue to monitor nutritional intake, tolerance to diet prescription, weights, labs, skin integrity    RD remains available upon request and will follow up per protocol  Natalie Larry MS RDN CDN #[phone]

## 2021-12-23 NOTE — PROGRESS NOTE ADULT - ASSESSMENT
Assessment  DMT2: 89y Female with DM T2 with hyperglycemia, A1C 7.3%, was on insulin at home, now on basal bolus insulin, FS improving and trending within overall  acceptable range, no hypoglycemic episodes, postop is s/p I&D of ankle abscess, eating meals, NAD.  Fall: AI R/O, workup in progress, stable, monitored.  Hypothyroidism: On Synthroid 50 mcg po daily, compliant with Synthroid intake, euthyroid.  HTN: Controlled,  on antihypertensive medications.  CKD: Monitor labs/BMP,       Jono Zaldivar MD  Cell: [phone]  Office: [phone]     Assessment  DMT2: 89y  Female with DM T2 with hyperglycemia, A1C 7.3%, was on insulin at home, now on basal bolus insulin, FS improving and trending within overall  acceptable range, no hypoglycemic episodes, postop is s/p I&D of ankle abscess, eating meals, NAD.  Fall: AI R/O, workup in progress, stable, monitored.  Hypothyroidism: On Synthroid 50 mcg po daily, compliant with Synthroid intake, euthyroid.  HTN: Controlled,  on antihypertensive medications.  CKD: Monitor labs/BMP,       Jono Zaldivar MD  Cell: [phone]  Office: [phone]

## 2021-12-23 NOTE — PROGRESS NOTE ADULT - ASSESSMENT
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

## 2021-12-24 NOTE — PROGRESS NOTE ADULT - ASSESSMENT
89yo F s/p right lateral ankle I&D w/ fibular bone biopsy (DOS 12/20), now closed   - Pt seen and evaluated  - afebrile, no leukocytosis   - Right lateral ankle I&D site w/ sutures intact, skin well coapted, no purulence or drainage, no periwound erythema, no acute signs of infection, skin flaps warm to touch   -Bilateral heel DTI- present upon admission without any signs of infection  -Please offload bilateral heels w/ z flow booties  -Please have PT consult requested by patient and family for upper body exercising since patient has been bedbound for prolonged period of time (Appreciated)  - low concern for residual infection  - low concern for viability  - keep dressing clean, dry and intact   - discharge pending bone biopsy results, if positive for OM - PICC line likely necessary   - ID recommendations appreciated   - Discussed with attending

## 2021-12-24 NOTE — PROGRESS NOTE ADULT - SUBJECTIVE AND OBJECTIVE BOX
Chief complaint  Patient is a 89y old  Female who presents with a chief complaint of s/p fall (24 Dec 2021 08:54)   Review of systems  Patient in bed, looks comfortable, no hypoglycemic episodes.    Labs and Fingersticks  CAPILLARY BLOOD GLUCOSE      POCT Blood Glucose.: 99 mg/dL (24 Dec 2021 12:39)  POCT Blood Glucose.: 114 mg/dL (24 Dec 2021 08:17)  POCT Blood Glucose.: 108 mg/dL (23 Dec 2021 22:05)  POCT Blood Glucose.: 106 mg/dL (23 Dec 2021 17:43)      Anion Gap, Serum: 10 (12-23 @ 07:05)      Calcium, Total Serum: 9.6 (12-23 @ 07:05)          12-23    133<L>  |  98  |  37<H>  ----------------------------<  119<H>  4.6   |  25  |  1.89<H>    Ca    9.6      23 Dec 2021 07:05                          7.6    9.01  )-----------( 346      ( 23 Dec 2021 07:03 )             25.3     Medications  MEDICATIONS  (STANDING):  allopurinol 100 milliGRAM(s) Oral daily  amLODIPine   Tablet 5 milliGRAM(s) Oral daily  apixaban 2.5 milliGRAM(s) Oral two times a day  ascorbic acid 500 milliGRAM(s) Oral daily  bisacodyl 5 milliGRAM(s) Oral at bedtime  calcitriol   Capsule 0.25 MICROGram(s) Oral daily  chlorhexidine 2% Cloths 1 Application(s) Topical <User Schedule>  clopidogrel Tablet 75 milliGRAM(s) Oral daily  dextrose 40% Gel 15 Gram(s) Oral once  dextrose 5%. 1000 milliLiter(s) (100 mL/Hr) IV Continuous <Continuous>  dextrose 5%. 1000 milliLiter(s) (50 mL/Hr) IV Continuous <Continuous>  dextrose 50% Injectable 25 Gram(s) IV Push once  dextrose 50% Injectable 12.5 Gram(s) IV Push once  dextrose 50% Injectable 25 Gram(s) IV Push once  glucagon  Injectable 1 milliGRAM(s) IntraMuscular once  insulin glargine Injectable (LANTUS) 14 Unit(s) SubCutaneous at bedtime  insulin lispro (ADMELOG) corrective regimen sliding scale   SubCutaneous three times a day before meals  insulin lispro (ADMELOG) corrective regimen sliding scale   SubCutaneous at bedtime  insulin lispro Injectable (ADMELOG) 5 Unit(s) SubCutaneous three times a day before meals  levothyroxine 50 MICROGram(s) Oral daily  meropenem  IVPB 500 milliGRAM(s) IV Intermittent every 12 hours  metoprolol succinate  milliGRAM(s) Oral daily  multivitamin 1 Tablet(s) Oral daily  pantoprazole    Tablet 40 milliGRAM(s) Oral before breakfast  polyethylene glycol 3350 17 Gram(s) Oral daily  senna 2 Tablet(s) Oral at bedtime  simvastatin 20 milliGRAM(s) Oral at bedtime      Physical Exam  General: Patient comfortable in bed  Vital Signs Last 12 Hrs  T(F): 98.3 (12-24-21 @ 11:29), Max: 98.3 (12-24-21 @ 11:29)  HR: 78 (12-24-21 @ 11:29) (78 - 96)  BP: 140/72 (12-24-21 @ 11:29) (140/72 - 150/73)  BP(mean): --  RR: 18 (12-24-21 @ 11:29) (18 - 18)  SpO2: 94% (12-24-21 @ 11:29) (94% - 96%)  Neck: No palpable thyroid nodules.  CVS: S1S2, No murmurs  Respiratory: No wheezing, no crepitations  GI: Abdomen soft, bowel sounds positive  Musculoskeletal:  edema lower extremities.   Skin: No skin rashes, no ecchymosis    Diagnostics    Cortisol AM, Serum: AM Sched. Collection: 17-Dec-2021 06:00 (12-16 @ 14:32)           Chief complaint  Patient is a 89y old  Female who presents with a chief complaint of s/p fall (24 Dec 2021 08:54)   Review of systems  Patient in bed, looks comfortable, no hypoglycemic episodes.    Labs and Fingersticks  CAPILLARY BLOOD GLUCOSE      POCT Blood Glucose.: 99 mg/dL (24 Dec 2021 12:39)  POCT Blood Glucose.: 114 mg/dL (24 Dec 2021 08:17)  POCT Blood Glucose.: 108 mg/dL (23 Dec 2021 22:05)  POCT Blood Glucose.: 106 mg/dL (23 Dec 2021 17:43)      Anion Gap, Serum: 10 (12-23 @ 07:05)      Calcium, Total Serum: 9.6 (12-23 @ 07:05)          12-23    133<L>  |  98  |  37<H>  ----------------------------<  119<H>  4.6   |  25  |  1.89<H>    Ca    9.6      23 Dec 2021 07:05                          7.6    9.01  )-----------( 346      ( 23 Dec 2021 07:03 )             25.3     Medications  MEDICATIONS  (STANDING):  allopurinol 100 milliGRAM(s) Oral daily  amLODIPine   Tablet 5 milliGRAM(s) Oral daily  apixaban 2.5 milliGRAM(s) Oral two times a day  ascorbic acid 500 milliGRAM(s) Oral daily  bisacodyl 5 milliGRAM(s) Oral at bedtime  calcitriol   Capsule 0.25 MICROGram(s) Oral daily  chlorhexidine 2% Cloths 1 Application(s) Topical <User Schedule>  clopidogrel Tablet 75 milliGRAM(s) Oral daily  dextrose 40% Gel 15 Gram(s) Oral once  dextrose 5%. 1000 milliLiter(s) (100 mL/Hr) IV Continuous <Continuous>  dextrose 5%. 1000 milliLiter(s) (50 mL/Hr) IV Continuous <Continuous>  dextrose 50% Injectable 25 Gram(s) IV Push once  dextrose 50% Injectable 12.5 Gram(s) IV Push once  dextrose 50% Injectable 25 Gram(s) IV Push once  glucagon  Injectable 1 milliGRAM(s) IntraMuscular once  insulin glargine Injectable (LANTUS) 14 Unit(s) SubCutaneous at bedtime  insulin lispro (ADMELOG) corrective regimen sliding scale   SubCutaneous three times a day before meals  insulin lispro (ADMELOG) corrective regimen sliding scale   SubCutaneous at bedtime  insulin lispro Injectable (ADMELOG) 5 Unit(s) SubCutaneous three times a day before meals  levothyroxine 50 MICROGram(s) Oral daily  meropenem  IVPB 500 milliGRAM(s) IV Intermittent every 12 hours  metoprolol succinate  milliGRAM(s) Oral daily  multivitamin 1 Tablet(s) Oral daily  pantoprazole    Tablet 40 milliGRAM(s) Oral before breakfast  polyethylene glycol 3350 17 Gram(s) Oral daily  senna 2 Tablet(s) Oral at bedtime  simvastatin 20 milliGRAM(s) Oral at bedtime      Physical Exam  General: Patient comfortable in bed  Vital Signs Last 12 Hrs  T(F): 98.3 (12-24-21 @ 11:29), Max: 98.3 (12-24-21 @ 11:29)  HR: 78 (12-24-21 @ 11:29) (78 - 96)  BP: 140/72 (12-24-21 @ 11:29) (140/72 - 150/73)  BP(mean): --  RR: 18 (12-24-21 @ 11:29) (18 - 18)  SpO2: 94% (12-24-21 @ 11:29) (94% - 96%)  Neck: No palpable thyroid nodules.  CVS: S1S2, No murmurs  Respiratory: No wheezing, no crepitations  GI: Abdomen soft, bowel sounds positive  Musculoskeletal:  edema lower extremities.   Skin: No skin rashes, no ecchymosis    Diagnostics    Cortisol AM, Serum: AM Sched. Collection: 17-Dec-2021 06:00 (12-16 @ 14:32)

## 2021-12-24 NOTE — PROGRESS NOTE ADULT - SUBJECTIVE AND OBJECTIVE BOX
Patient is a 89y old  Female who presents with a chief complaint of s/p fall (24 Dec 2021 12:55)      DATE OF SERVICE: 12-24-21 @ 15:22    SUBJECTIVE / OVERNIGHT EVENTS: overnight events noted    ROS:  Resp: No cough no sputum production  CVS: No chest pain no palpitations no orthopnea  GI: no N/V/D  : no dysuria, no hematuria  Neuro: no weakness no paresthesias          MEDICATIONS  (STANDING):  allopurinol 100 milliGRAM(s) Oral daily  amLODIPine   Tablet 5 milliGRAM(s) Oral daily  apixaban 2.5 milliGRAM(s) Oral two times a day  ascorbic acid 500 milliGRAM(s) Oral daily  bisacodyl 5 milliGRAM(s) Oral at bedtime  calcitriol   Capsule 0.25 MICROGram(s) Oral daily  chlorhexidine 2% Cloths 1 Application(s) Topical <User Schedule>  clopidogrel Tablet 75 milliGRAM(s) Oral daily  dextrose 40% Gel 15 Gram(s) Oral once  dextrose 5%. 1000 milliLiter(s) (100 mL/Hr) IV Continuous <Continuous>  dextrose 5%. 1000 milliLiter(s) (50 mL/Hr) IV Continuous <Continuous>  dextrose 50% Injectable 25 Gram(s) IV Push once  dextrose 50% Injectable 12.5 Gram(s) IV Push once  dextrose 50% Injectable 25 Gram(s) IV Push once  glucagon  Injectable 1 milliGRAM(s) IntraMuscular once  insulin glargine Injectable (LANTUS) 14 Unit(s) SubCutaneous at bedtime  insulin lispro (ADMELOG) corrective regimen sliding scale   SubCutaneous three times a day before meals  insulin lispro (ADMELOG) corrective regimen sliding scale   SubCutaneous at bedtime  insulin lispro Injectable (ADMELOG) 5 Unit(s) SubCutaneous three times a day before meals  levothyroxine 50 MICROGram(s) Oral daily  meropenem  IVPB 500 milliGRAM(s) IV Intermittent every 12 hours  metoprolol succinate  milliGRAM(s) Oral daily  multivitamin 1 Tablet(s) Oral daily  pantoprazole    Tablet 40 milliGRAM(s) Oral before breakfast  polyethylene glycol 3350 17 Gram(s) Oral daily  senna 2 Tablet(s) Oral at bedtime  simvastatin 20 milliGRAM(s) Oral at bedtime    MEDICATIONS  (PRN):  acetaminophen     Tablet .. 650 milliGRAM(s) Oral every 6 hours PRN Temp greater or equal to 38C (100.4F), Moderate Pain (4 - 6)        CAPILLARY BLOOD GLUCOSE      POCT Blood Glucose.: 99 mg/dL (24 Dec 2021 12:39)  POCT Blood Glucose.: 114 mg/dL (24 Dec 2021 08:17)  POCT Blood Glucose.: 108 mg/dL (23 Dec 2021 22:05)  POCT Blood Glucose.: 106 mg/dL (23 Dec 2021 17:43)    I&O's Summary    23 Dec 2021 07:01  -  24 Dec 2021 07:00  --------------------------------------------------------  IN: 1200 mL / OUT: 1350 mL / NET: -150 mL    24 Dec 2021 07:01  -  24 Dec 2021 15:22  --------------------------------------------------------  IN: 0 mL / OUT: 900 mL / NET: -900 mL        Vital Signs Last 24 Hrs  T(C): 36.8 (24 Dec 2021 11:29), Max: 36.8 (23 Dec 2021 20:53)  T(F): 98.3 (24 Dec 2021 11:29), Max: 98.3 (23 Dec 2021 20:53)  HR: 78 (24 Dec 2021 11:29) (61 - 96)  BP: 140/72 (24 Dec 2021 11:29) (129/73 - 150/73)  BP(mean): --  RR: 18 (24 Dec 2021 11:29) (18 - 18)  SpO2: 94% (24 Dec 2021 11:29) (94% - 96%)    PHYSICAL EXAM:   HEENT: DEBBIE EOMI  Neck: Supple, no JVD  Lungs: clear  CVS: S1 S2 systolic murmur +   Abdomen: non tender BS +  Neuro: Alert nonfocal  Ext: right ankle bandage  Msk: no joint swelling    LABS:                        7.6    9.01  )-----------( 346      ( 23 Dec 2021 07:03 )             25.3     12-23    133<L>  |  98  |  37<H>  ----------------------------<  119<H>  4.6   |  25  |  1.89<H>    Ca    9.6      23 Dec 2021 07:05                  All consultant(s) notes reviewed and care discussed with other providers        Contact Number, Dr Ho [phone]

## 2021-12-24 NOTE — PROGRESS NOTE ADULT - SUBJECTIVE AND OBJECTIVE BOX
Podiatry pager #: [phone] ([place])/ [phone] ([facility])    Patient is a 89y old  Female who presents with a chief complaint of s/p fall (23 Dec 2021 13:49)       INTERVAL HPI/OVERNIGHT EVENTS:  Patient seen and evaluated at bedside.  Pt is resting comfortable in NAD. Denies N/V/F/C.     Allergies    Bactrim (Unknown)  Flagyl (Hives; Pruritus)  Macrobid (Other)  sulfa drugs (Hives)  Zosyn (Other)    Intolerances        Vital Signs Last 24 Hrs  T(C): 36.8 (24 Dec 2021 04:59), Max: 36.8 (23 Dec 2021 20:53)  T(F): 98.2 (24 Dec 2021 04:59), Max: 98.3 (23 Dec 2021 20:53)  HR: 96 (24 Dec 2021 04:59) (61 - 96)  BP: 150/73 (24 Dec 2021 04:59) (120/60 - 150/73)  BP(mean): --  RR: 18 (24 Dec 2021 04:59) (18 - 18)  SpO2: 96% (24 Dec 2021 04:59) (93% - 96%)    LABS:                        7.6    9.01  )-----------( 346      ( 23 Dec 2021 07:03 )             25.3     12-23    133<L>  |  98  |  37<H>  ----------------------------<  119<H>  4.6   |  25  |  1.89<H>    Ca    9.6      23 Dec 2021 07:05          CAPILLARY BLOOD GLUCOSE      POCT Blood Glucose.: 114 mg/dL (24 Dec 2021 08:17)  POCT Blood Glucose.: 108 mg/dL (23 Dec 2021 22:05)  POCT Blood Glucose.: 106 mg/dL (23 Dec 2021 17:43)  POCT Blood Glucose.: 138 mg/dL (23 Dec 2021 12:13)  POCT Blood Glucose.: 149 mg/dL (23 Dec 2021 08:59)      Lower Extremity Physical Exam:  Vascular: DP/PT 1/4, B/L, CFT <3 seconds B/L, Temperature gradient WNL, B/L.   Neuro: Epicritic sensation diminished to the level of _, B/L.  Musculoskeletal/Ortho: Able for R Ankle ROM without any pain elicited  Skin: bilateral posterior heel deep tissue injury, no fluctuance, no local signs of infection  s/p right ankle I&D w/ right fibular bone biopsy, now closed w/ bedside DPC on 12/23: sutures intact, skin well coapted, no purulence, no periwound erythema, no signs of acute infection, skin flaps warm to touch     RADIOLOGY & ADDITIONAL TESTS:

## 2021-12-24 NOTE — PROGRESS NOTE ADULT - NSPROGADDITIONALINFOA_GEN_ALL_CORE
discussed with patient in detail, expresses understanding of treatment plans.  discussed with patient's  over the phone in detail

## 2021-12-25 NOTE — PROGRESS NOTE ADULT - ASSESSMENT
Assessment  DMT2: 89y  Female with DM T2 with hyperglycemia, A1C 7.3%, was on insulin at home, now on basal bolus insulin, blood sugars  trending down, no hypoglycemic episodes,  eating meals, no overnight events.  Fall: AI R/O, workup in progress, stable, monitored.  Hypothyroidism: On Synthroid 50 mcg po daily, compliant with Synthroid intake, euthyroid.  HTN: Controlled,  on antihypertensive medications.  CKD: Monitor labs/BMP,       Jono Zaldivar MD  Cell: [phone]  Office: [phone]

## 2021-12-25 NOTE — PROGRESS NOTE ADULT - SUBJECTIVE AND OBJECTIVE BOX
Chief complaint    Patient is a 89y old  Female who presents with a chief complaint of s/p fall (24 Dec 2021 15:22)   Review of systems  Patient in bed, appears comfortable.    Labs and Fingersticks  CAPILLARY BLOOD GLUCOSE      POCT Blood Glucose.: 82 mg/dL (25 Dec 2021 08:41)  POCT Blood Glucose.: 133 mg/dL (24 Dec 2021 22:38)  POCT Blood Glucose.: 139 mg/dL (24 Dec 2021 21:25)  POCT Blood Glucose.: 144 mg/dL (24 Dec 2021 17:30)  POCT Blood Glucose.: 99 mg/dL (24 Dec 2021 12:39)        Medications  MEDICATIONS  (STANDING):  allopurinol 100 milliGRAM(s) Oral daily  amLODIPine   Tablet 5 milliGRAM(s) Oral daily  apixaban 2.5 milliGRAM(s) Oral two times a day  ascorbic acid 500 milliGRAM(s) Oral daily  bisacodyl 5 milliGRAM(s) Oral at bedtime  calcitriol   Capsule 0.25 MICROGram(s) Oral daily  chlorhexidine 2% Cloths 1 Application(s) Topical <User Schedule>  clopidogrel Tablet 75 milliGRAM(s) Oral daily  dextrose 40% Gel 15 Gram(s) Oral once  dextrose 5%. 1000 milliLiter(s) (100 mL/Hr) IV Continuous <Continuous>  dextrose 5%. 1000 milliLiter(s) (50 mL/Hr) IV Continuous <Continuous>  dextrose 50% Injectable 25 Gram(s) IV Push once  dextrose 50% Injectable 12.5 Gram(s) IV Push once  dextrose 50% Injectable 25 Gram(s) IV Push once  furosemide    Tablet 20 milliGRAM(s) Oral daily  glucagon  Injectable 1 milliGRAM(s) IntraMuscular once  insulin glargine Injectable (LANTUS) 10 Unit(s) SubCutaneous at bedtime  insulin lispro (ADMELOG) corrective regimen sliding scale   SubCutaneous three times a day before meals  insulin lispro (ADMELOG) corrective regimen sliding scale   SubCutaneous at bedtime  insulin lispro Injectable (ADMELOG) 4 Unit(s) SubCutaneous three times a day before meals  levothyroxine 50 MICROGram(s) Oral daily  meropenem  IVPB 500 milliGRAM(s) IV Intermittent every 12 hours  metoprolol succinate  milliGRAM(s) Oral daily  multivitamin 1 Tablet(s) Oral daily  pantoprazole    Tablet 40 milliGRAM(s) Oral before breakfast  polyethylene glycol 3350 17 Gram(s) Oral daily  senna 2 Tablet(s) Oral at bedtime  simvastatin 20 milliGRAM(s) Oral at bedtime      Physical Exam  General: Patient comfortable in bed  Vital Signs Last 12 Hrs  T(F): 97.6 (12-25-21 @ 05:26), Max: 97.6 (12-25-21 @ 05:26)  HR: 67 (12-25-21 @ 05:26) (67 - 67)  BP: 145/72 (12-25-21 @ 05:26) (145/72 - 145/72)  BP(mean): --  RR: 18 (12-25-21 @ 05:26) (18 - 18)  SpO2: 93% (12-25-21 @ 05:26) (93% - 93%)  Neck: No palpable thyroid nodules.  CVS: S1S2, No murmurs  Respiratory: No wheezing, no crepitations  GI: Abdomen soft, bowel sounds positive  Musculoskeletal:  edema lower extremities.     Diagnostics

## 2021-12-25 NOTE — PROGRESS NOTE ADULT - SUBJECTIVE AND OBJECTIVE BOX
Patient is a 89y old  Female who presents with a chief complaint of s/p fall (25 Dec 2021 11:13)      DATE OF SERVICE: 12-25-21 @ 13:21    SUBJECTIVE / OVERNIGHT EVENTS: overnight events noted    ROS:  Resp: No cough no sputum production  CVS: No chest pain no palpitations no orthopnea  GI: no N/V/D  : no dysuria, no hematuria  Neuro: no weakness no paresthesias  Heme: No petechiae no easy bruising  Msk: No joint pain no swelling  Skin: No rash no itching        MEDICATIONS  (STANDING):  allopurinol 100 milliGRAM(s) Oral daily  amLODIPine   Tablet 5 milliGRAM(s) Oral daily  apixaban 2.5 milliGRAM(s) Oral two times a day  ascorbic acid 500 milliGRAM(s) Oral daily  bisacodyl 5 milliGRAM(s) Oral at bedtime  calcitriol   Capsule 0.25 MICROGram(s) Oral daily  chlorhexidine 2% Cloths 1 Application(s) Topical <User Schedule>  clopidogrel Tablet 75 milliGRAM(s) Oral daily  dextrose 40% Gel 15 Gram(s) Oral once  dextrose 5%. 1000 milliLiter(s) (100 mL/Hr) IV Continuous <Continuous>  dextrose 5%. 1000 milliLiter(s) (50 mL/Hr) IV Continuous <Continuous>  dextrose 50% Injectable 25 Gram(s) IV Push once  dextrose 50% Injectable 12.5 Gram(s) IV Push once  dextrose 50% Injectable 25 Gram(s) IV Push once  furosemide    Tablet 20 milliGRAM(s) Oral daily  glucagon  Injectable 1 milliGRAM(s) IntraMuscular once  insulin glargine Injectable (LANTUS) 10 Unit(s) SubCutaneous at bedtime  insulin lispro (ADMELOG) corrective regimen sliding scale   SubCutaneous three times a day before meals  insulin lispro (ADMELOG) corrective regimen sliding scale   SubCutaneous at bedtime  insulin lispro Injectable (ADMELOG) 4 Unit(s) SubCutaneous three times a day before meals  levothyroxine 50 MICROGram(s) Oral daily  meropenem  IVPB 500 milliGRAM(s) IV Intermittent every 12 hours  metoprolol succinate  milliGRAM(s) Oral daily  multivitamin 1 Tablet(s) Oral daily  pantoprazole    Tablet 40 milliGRAM(s) Oral before breakfast  polyethylene glycol 3350 17 Gram(s) Oral daily  senna 2 Tablet(s) Oral at bedtime  simvastatin 20 milliGRAM(s) Oral at bedtime    MEDICATIONS  (PRN):  acetaminophen     Tablet .. 650 milliGRAM(s) Oral every 6 hours PRN Temp greater or equal to 38C (100.4F), Moderate Pain (4 - 6)        CAPILLARY BLOOD GLUCOSE      POCT Blood Glucose.: 117 mg/dL (25 Dec 2021 12:24)  POCT Blood Glucose.: 82 mg/dL (25 Dec 2021 08:41)  POCT Blood Glucose.: 133 mg/dL (24 Dec 2021 22:38)  POCT Blood Glucose.: 139 mg/dL (24 Dec 2021 21:25)  POCT Blood Glucose.: 144 mg/dL (24 Dec 2021 17:30)    I&O's Summary    24 Dec 2021 07:01  -  25 Dec 2021 07:00  --------------------------------------------------------  IN: 0 mL / OUT: 1900 mL / NET: -1900 mL    25 Dec 2021 07:01  -  25 Dec 2021 13:21  --------------------------------------------------------  IN: 0 mL / OUT: 800 mL / NET: -800 mL        Vital Signs Last 24 Hrs  T(C): 36.8 (25 Dec 2021 12:01), Max: 36.8 (25 Dec 2021 12:01)  T(F): 98.2 (25 Dec 2021 12:01), Max: 98.2 (25 Dec 2021 12:01)  HR: 65 (25 Dec 2021 12:01) (64 - 67)  BP: 149/75 (25 Dec 2021 12:01) (125/66 - 149/75)  BP(mean): --  RR: 18 (25 Dec 2021 12:01) (18 - 18)  SpO2: 95% (25 Dec 2021 12:01) (93% - 95%)    PHYSICAL EXAM:   HEENT: DEBBIE EOMI  Neck: Supple, no JVD  Lungs: clear  CVS: S1 S2 systolic murmur +   Abdomen: non tender BS +  Neuro: Alert nonfocal  Ext: right ankle bandage  Msk: no joint swelling    LABS:                      All consultant(s) notes reviewed and care discussed with other providers        Contact Number, Dr Ho [phone]

## 2021-12-26 NOTE — PROGRESS NOTE ADULT - SUBJECTIVE AND OBJECTIVE BOX
Patient is a 89y old  Female who presents with a chief complaint of s/p fall (26 Dec 2021 10:15)      DATE OF SERVICE: 12-26-21 @ 13:29    SUBJECTIVE / OVERNIGHT EVENTS: overnight events noted    ROS:  Resp: No cough no sputum production  CVS: No chest pain no palpitations no orthopnea  GI: no N/V/D  : no dysuria, no hematuria  Neuro: no weakness no paresthesias          MEDICATIONS  (STANDING):  allopurinol 100 milliGRAM(s) Oral daily  amLODIPine   Tablet 5 milliGRAM(s) Oral daily  apixaban 2.5 milliGRAM(s) Oral two times a day  ascorbic acid 500 milliGRAM(s) Oral daily  bisacodyl 5 milliGRAM(s) Oral at bedtime  calcitriol   Capsule 0.25 MICROGram(s) Oral daily  chlorhexidine 2% Cloths 1 Application(s) Topical <User Schedule>  clopidogrel Tablet 75 milliGRAM(s) Oral daily  dextrose 40% Gel 15 Gram(s) Oral once  dextrose 5%. 1000 milliLiter(s) (100 mL/Hr) IV Continuous <Continuous>  dextrose 5%. 1000 milliLiter(s) (50 mL/Hr) IV Continuous <Continuous>  dextrose 50% Injectable 25 Gram(s) IV Push once  dextrose 50% Injectable 12.5 Gram(s) IV Push once  dextrose 50% Injectable 25 Gram(s) IV Push once  glucagon  Injectable 1 milliGRAM(s) IntraMuscular once  insulin glargine Injectable (LANTUS) 10 Unit(s) SubCutaneous at bedtime  insulin lispro (ADMELOG) corrective regimen sliding scale   SubCutaneous three times a day before meals  insulin lispro (ADMELOG) corrective regimen sliding scale   SubCutaneous at bedtime  insulin lispro Injectable (ADMELOG) 4 Unit(s) SubCutaneous three times a day before meals  levothyroxine 50 MICROGram(s) Oral daily  melatonin 3 milliGRAM(s) Oral at bedtime  meropenem  IVPB 500 milliGRAM(s) IV Intermittent every 12 hours  metoprolol succinate  milliGRAM(s) Oral daily  multivitamin 1 Tablet(s) Oral daily  pantoprazole    Tablet 40 milliGRAM(s) Oral before breakfast  polyethylene glycol 3350 17 Gram(s) Oral daily  senna 2 Tablet(s) Oral at bedtime  simvastatin 20 milliGRAM(s) Oral at bedtime    MEDICATIONS  (PRN):  acetaminophen     Tablet .. 650 milliGRAM(s) Oral every 6 hours PRN Temp greater or equal to 38C (100.4F), Moderate Pain (4 - 6)        CAPILLARY BLOOD GLUCOSE      POCT Blood Glucose.: 118 mg/dL (26 Dec 2021 12:41)  POCT Blood Glucose.: 177 mg/dL (26 Dec 2021 08:59)  POCT Blood Glucose.: 181 mg/dL (25 Dec 2021 21:47)  POCT Blood Glucose.: 221 mg/dL (25 Dec 2021 17:34)    I&O's Summary    25 Dec 2021 07:01  -  26 Dec 2021 07:00  --------------------------------------------------------  IN: 600 mL / OUT: 1800 mL / NET: -1200 mL        Vital Signs Last 24 Hrs  T(C): 36.4 (26 Dec 2021 12:41), Max: 36.9 (26 Dec 2021 07:00)  T(F): 97.6 (26 Dec 2021 12:41), Max: 98.5 (26 Dec 2021 07:00)  HR: 66 (26 Dec 2021 12:41) (65 - 68)  BP: 153/76 (26 Dec 2021 12:41) (146/70 - 153/76)  BP(mean): --  RR: 18 (26 Dec 2021 12:41) (18 - 19)  SpO2: 94% (26 Dec 2021 12:41) (93% - 95%)    PHYSICAL EXAM:   HEENT: DEBBIE EOMI  Neck: Supple, no JVD  Lungs: clear  CVS: S1 S2 systolic murmur +   Abdomen: non tender BS +  Neuro: Alert nonfocal  Ext: right ankle bandage  Msk: no joint swelling    LABS:                        8.2    9.40  )-----------( 369      ( 26 Dec 2021 07:22 )             27.5     12-26    136  |  100  |  32<H>  ----------------------------<  167<H>  4.4   |  25  |  1.31<H>    Ca    10.4      26 Dec 2021 07:20                  All consultant(s) notes reviewed and care discussed with other providers        Contact Number, Dr Ho [phone]

## 2021-12-26 NOTE — PROGRESS NOTE ADULT - ASSESSMENT
Assessment  DMT2: 89y  Female with DM T2 with hyperglycemia, A1C 7.3%, was on insulin at home, now on basal bolus insulin, blood sugars  trending down, no hypoglycemic episodes,  eating meals, no overnight events.  Fall: AI R/O, workup in progress, stable, monitored.  Hypothyroidism: On Synthroid 50 mcg po daily, compliant with Synthroid intake, euthyroid.  HTN: Controlled,  on antihypertensive medications.  CKD: Monitor labs/BMP,       Dr Malhotra  cell; [phone]

## 2021-12-26 NOTE — PROGRESS NOTE ADULT - SUBJECTIVE AND OBJECTIVE BOX
Endocrinology Attending Covering for Dr. Zaldivar      Chief complaint  Patient is a 89y old  Female who presents with a chief complaint of s/p fall (25 Dec 2021 13:21)   Review of systems  Patient in bed, looks comfortable, no fever,  had no hypoglycemia.    Labs and Fingersticks  CAPILLARY BLOOD GLUCOSE      POCT Blood Glucose.: 177 mg/dL (26 Dec 2021 08:59)  POCT Blood Glucose.: 181 mg/dL (25 Dec 2021 21:47)  POCT Blood Glucose.: 221 mg/dL (25 Dec 2021 17:34)  POCT Blood Glucose.: 117 mg/dL (25 Dec 2021 12:24)      Anion Gap, Serum: 11 (12-26 @ 07:20)      Calcium, Total Serum: 10.4 (12-26 @ 07:20)          12-26    136  |  100  |  32<H>  ----------------------------<  167<H>  4.4   |  25  |  1.31<H>    Ca    10.4      26 Dec 2021 07:20                          8.2    9.40  )-----------( 369      ( 26 Dec 2021 07:22 )             27.5     Medications  MEDICATIONS  (STANDING):  allopurinol 100 milliGRAM(s) Oral daily  amLODIPine   Tablet 5 milliGRAM(s) Oral daily  apixaban 2.5 milliGRAM(s) Oral two times a day  ascorbic acid 500 milliGRAM(s) Oral daily  bisacodyl 5 milliGRAM(s) Oral at bedtime  calcitriol   Capsule 0.25 MICROGram(s) Oral daily  chlorhexidine 2% Cloths 1 Application(s) Topical <User Schedule>  clopidogrel Tablet 75 milliGRAM(s) Oral daily  dextrose 40% Gel 15 Gram(s) Oral once  dextrose 5%. 1000 milliLiter(s) (100 mL/Hr) IV Continuous <Continuous>  dextrose 5%. 1000 milliLiter(s) (50 mL/Hr) IV Continuous <Continuous>  dextrose 50% Injectable 25 Gram(s) IV Push once  dextrose 50% Injectable 12.5 Gram(s) IV Push once  dextrose 50% Injectable 25 Gram(s) IV Push once  furosemide    Tablet 20 milliGRAM(s) Oral daily  glucagon  Injectable 1 milliGRAM(s) IntraMuscular once  insulin glargine Injectable (LANTUS) 10 Unit(s) SubCutaneous at bedtime  insulin lispro (ADMELOG) corrective regimen sliding scale   SubCutaneous three times a day before meals  insulin lispro (ADMELOG) corrective regimen sliding scale   SubCutaneous at bedtime  insulin lispro Injectable (ADMELOG) 4 Unit(s) SubCutaneous three times a day before meals  levothyroxine 50 MICROGram(s) Oral daily  melatonin 3 milliGRAM(s) Oral at bedtime  meropenem  IVPB 500 milliGRAM(s) IV Intermittent every 12 hours  metoprolol succinate  milliGRAM(s) Oral daily  multivitamin 1 Tablet(s) Oral daily  pantoprazole    Tablet 40 milliGRAM(s) Oral before breakfast  polyethylene glycol 3350 17 Gram(s) Oral daily  senna 2 Tablet(s) Oral at bedtime  simvastatin 20 milliGRAM(s) Oral at bedtime      Physical Exam  General: Patient comfortable in bed  Vital Signs Last 12 Hrs  T(F): 98.5 (12-26-21 @ 07:00), Max: 98.5 (12-26-21 @ 07:00)  HR: 68 (12-26-21 @ 07:00) (68 - 68)  BP: 146/70 (12-26-21 @ 07:00) (146/70 - 146/70)  BP(mean): --  RR: 19 (12-26-21 @ 07:00) (19 - 19)  SpO2: 95% (12-26-21 @ 07:00) (95% - 95%)  Neck: No palpable thyroid nodules.  CVS: S1S2, No murmurs  Respiratory: No wheezing, no crepitations  GI: Abdomen soft, bowel sounds positive  Musculoskeletal:  edema lower extremities.   Skin: No skin rashes, no ecchymosis    Diagnostics

## 2021-12-27 NOTE — PROGRESS NOTE ADULT - SUBJECTIVE AND OBJECTIVE BOX
Chief complaint  Patient is a 89y old  Female who presents with a chief complaint of s/p fall (27 Dec 2021 10:20)   Review of systems  Patient in bed, looks comfortable, no hypoglycemic episodes.    Labs and Fingersticks  CAPILLARY BLOOD GLUCOSE      POCT Blood Glucose.: 144 mg/dL (27 Dec 2021 12:14)  POCT Blood Glucose.: 137 mg/dL (27 Dec 2021 08:37)  POCT Blood Glucose.: 191 mg/dL (26 Dec 2021 21:21)  POCT Blood Glucose.: 177 mg/dL (26 Dec 2021 17:47)      Anion Gap, Serum: 10 (12-27 @ 06:25)  Anion Gap, Serum: 11 (12-26 @ 07:20)      Calcium, Total Serum: 10.4 (12-27 @ 06:25)  Calcium, Total Serum: 10.4 (12-26 @ 07:20)          12-27    136  |  101  |  29<H>  ----------------------------<  122<H>  4.1   |  25  |  1.28    Ca    10.4      27 Dec 2021 06:25                          8.2    9.40  )-----------( 369      ( 26 Dec 2021 07:22 )             27.5     Medications  MEDICATIONS  (STANDING):  allopurinol 100 milliGRAM(s) Oral daily  amLODIPine   Tablet 5 milliGRAM(s) Oral daily  apixaban 2.5 milliGRAM(s) Oral two times a day  ascorbic acid 500 milliGRAM(s) Oral daily  bisacodyl 5 milliGRAM(s) Oral at bedtime  calcitriol   Capsule 0.25 MICROGram(s) Oral daily  chlorhexidine 2% Cloths 1 Application(s) Topical <User Schedule>  clopidogrel Tablet 75 milliGRAM(s) Oral daily  dextrose 40% Gel 15 Gram(s) Oral once  dextrose 5%. 1000 milliLiter(s) (100 mL/Hr) IV Continuous <Continuous>  dextrose 5%. 1000 milliLiter(s) (50 mL/Hr) IV Continuous <Continuous>  dextrose 50% Injectable 25 Gram(s) IV Push once  dextrose 50% Injectable 12.5 Gram(s) IV Push once  dextrose 50% Injectable 25 Gram(s) IV Push once  epoetin destiny-epbx (RETACRIT) Injectable 11450 Unit(s) SubCutaneous once  ertapenem  IVPB 500 milliGRAM(s) IV Intermittent every 24 hours  furosemide    Tablet 40 milliGRAM(s) Oral daily  glucagon  Injectable 1 milliGRAM(s) IntraMuscular once  insulin glargine Injectable (LANTUS) 10 Unit(s) SubCutaneous at bedtime  insulin lispro (ADMELOG) corrective regimen sliding scale   SubCutaneous three times a day before meals  insulin lispro (ADMELOG) corrective regimen sliding scale   SubCutaneous at bedtime  insulin lispro Injectable (ADMELOG) 4 Unit(s) SubCutaneous three times a day before meals  levothyroxine 50 MICROGram(s) Oral daily  melatonin 3 milliGRAM(s) Oral at bedtime  metoprolol succinate  milliGRAM(s) Oral daily  multivitamin 1 Tablet(s) Oral daily  nystatin Powder 1 Application(s) Topical two times a day  pantoprazole    Tablet 40 milliGRAM(s) Oral before breakfast  polyethylene glycol 3350 17 Gram(s) Oral daily  senna 2 Tablet(s) Oral at bedtime  simvastatin 20 milliGRAM(s) Oral at bedtime      Physical Exam  General: Patient comfortable in bed  Vital Signs Last 12 Hrs  T(F): 98.4 (12-27-21 @ 12:46), Max: 98.4 (12-27-21 @ 12:46)  HR: 67 (12-27-21 @ 12:46) (64 - 67)  BP: 122/60 (12-27-21 @ 12:46) (122/60 - 154/72)  BP(mean): --  RR: 17 (12-27-21 @ 12:46) (17 - 17)  SpO2: 95% (12-27-21 @ 12:46) (95% - 95%)  Neck: No palpable thyroid nodules.  CVS: S1S2, No murmurs  Respiratory: No wheezing, no crepitations  GI: Abdomen soft, bowel sounds positive  Musculoskeletal:  edema lower extremities.   Skin: No skin rashes, no ecchymosis    Diagnostics    Cortisol AM, Serum: AM Sched. Collection: 17-Dec-2021 06:00 (12-16 @ 14:32)

## 2021-12-27 NOTE — PROGRESS NOTE ADULT - SUBJECTIVE AND OBJECTIVE BOX
Patient is a 89y old  Female who presents with a chief complaint of s/p fall (27 Dec 2021 15:06)      DATE OF SERVICE: 12-27-21 @ 16:56    SUBJECTIVE / OVERNIGHT EVENTS: overnight events noted    ROS:  Resp: No cough no sputum production  CVS: No chest pain no palpitations no orthopnea  GI: no N/V/D  : no dysuria, no hematuria  Neuro: no weakness no paresthesias        MEDICATIONS  (STANDING):  allopurinol 100 milliGRAM(s) Oral daily  amLODIPine   Tablet 5 milliGRAM(s) Oral daily  apixaban 2.5 milliGRAM(s) Oral two times a day  ascorbic acid 500 milliGRAM(s) Oral daily  bisacodyl 5 milliGRAM(s) Oral at bedtime  calcitriol   Capsule 0.25 MICROGram(s) Oral daily  chlorhexidine 2% Cloths 1 Application(s) Topical <User Schedule>  clopidogrel Tablet 75 milliGRAM(s) Oral daily  dextrose 40% Gel 15 Gram(s) Oral once  dextrose 5%. 1000 milliLiter(s) (100 mL/Hr) IV Continuous <Continuous>  dextrose 5%. 1000 milliLiter(s) (50 mL/Hr) IV Continuous <Continuous>  dextrose 50% Injectable 25 Gram(s) IV Push once  dextrose 50% Injectable 12.5 Gram(s) IV Push once  dextrose 50% Injectable 25 Gram(s) IV Push once  ertapenem  IVPB 500 milliGRAM(s) IV Intermittent every 24 hours  furosemide    Tablet 40 milliGRAM(s) Oral daily  glucagon  Injectable 1 milliGRAM(s) IntraMuscular once  insulin glargine Injectable (LANTUS) 10 Unit(s) SubCutaneous at bedtime  insulin lispro (ADMELOG) corrective regimen sliding scale   SubCutaneous three times a day before meals  insulin lispro (ADMELOG) corrective regimen sliding scale   SubCutaneous at bedtime  insulin lispro Injectable (ADMELOG) 4 Unit(s) SubCutaneous three times a day before meals  levothyroxine 50 MICROGram(s) Oral daily  melatonin 3 milliGRAM(s) Oral at bedtime  metoprolol succinate  milliGRAM(s) Oral daily  multivitamin 1 Tablet(s) Oral daily  nystatin Powder 1 Application(s) Topical two times a day  pantoprazole    Tablet 40 milliGRAM(s) Oral before breakfast  polyethylene glycol 3350 17 Gram(s) Oral daily  senna 2 Tablet(s) Oral at bedtime  simvastatin 20 milliGRAM(s) Oral at bedtime    MEDICATIONS  (PRN):  acetaminophen     Tablet .. 650 milliGRAM(s) Oral every 6 hours PRN Temp greater or equal to 38C (100.4F), Moderate Pain (4 - 6)        CAPILLARY BLOOD GLUCOSE      POCT Blood Glucose.: 144 mg/dL (27 Dec 2021 12:14)  POCT Blood Glucose.: 137 mg/dL (27 Dec 2021 08:37)  POCT Blood Glucose.: 191 mg/dL (26 Dec 2021 21:21)  POCT Blood Glucose.: 177 mg/dL (26 Dec 2021 17:47)    I&O's Summary    26 Dec 2021 07:01  -  27 Dec 2021 07:00  --------------------------------------------------------  IN: 50 mL / OUT: 2300 mL / NET: -2250 mL        Vital Signs Last 24 Hrs  T(C): 36.9 (27 Dec 2021 12:46), Max: 36.9 (27 Dec 2021 12:46)  T(F): 98.4 (27 Dec 2021 12:46), Max: 98.4 (27 Dec 2021 12:46)  HR: 67 (27 Dec 2021 12:46) (64 - 77)  BP: 122/60 (27 Dec 2021 12:46) (122/60 - 154/72)  BP(mean): --  RR: 17 (27 Dec 2021 12:46) (17 - 19)  SpO2: 95% (27 Dec 2021 12:46) (95% - 96%)    PHYSICAL EXAM:  GENERAL: in no apparent distress  HEAD:  Atraumatic, Normocephalic  EYES: EOMI, PERRLA, sclera clear  NECK: Supple, No JVD  CHEST/LUNG: clear b/l, no wheeze  HEART: S1 S2; no murmurs appreciated  ABDOMEN: Soft, Nontender, Bowel sounds present  EXTREMITIES:  No clubbing or cyanosis,  no edema  NEUROLOGY: AO x 3 non-focal  SKIN: No rashes or lesions    LABS:                        8.2    9.40  )-----------( 369      ( 26 Dec 2021 07:22 )             27.5     12-27    136  |  101  |  29<H>  ----------------------------<  122<H>  4.1   |  25  |  1.28    Ca    10.4      27 Dec 2021 06:25                  All consultant(s) notes reviewed and care discussed with other providers        Contact Number, Dr Ho [phone]

## 2021-12-27 NOTE — PROGRESS NOTE ADULT - SUBJECTIVE AND OBJECTIVE BOX
Patient is a 89y old  Female who presents with a chief complaint of s/p fall (26 Dec 2021 13:29)    Being followed by ID for ankle OM    Interval history:feels well  No pain   No acute events      ROS:  No cough,SOB,CP  No N/V/D./abd pain  No other complaints      Antimicrobials:    ertapenem  IVPB 1000 milliGRAM(s) IV Intermittent every 24 hours    Other medications reviewed    Vital Signs Last 24 Hrs  T(C): 36.6 (12-27-21 @ 05:25), Max: 36.7 (12-26-21 @ 21:02)  T(F): 97.9 (12-27-21 @ 05:25), Max: 98.1 (12-26-21 @ 21:02)  HR: 64 (12-27-21 @ 05:25) (64 - 77)  BP: 154/72 (12-27-21 @ 05:25) (147/73 - 154/72)  BP(mean): --  RR: 17 (12-27-21 @ 05:25) (17 - 19)  SpO2: 95% (12-27-21 @ 05:25) (94% - 96%)    Physical Exam:  HEENT PERRLA EOMI    No oral exudate or erythema  L axillary PM     Chest Good AE,CTA    CVS RRR S1 S2 WNl No murmur or rub or gallop    Abd soft BS normal No tenderness no masses    R ankle dressing no discharge   ankle no erythema   no LOM   PICC  site no erythema tenderness or discharge    CNS AAO X 2  Lab Data:                          8.2    9.40  )-----------( 369      ( 26 Dec 2021 07:22 )             27.5       12-27    136  |  101  |  29<H>  ----------------------------<  122<H>  4.1   |  25  |  1.28    Ca    10.4      27 Dec 2021 06:25      Culture - Surgical Swab (12.21.21 @ 01:38)   Specimen Source: .Surgical Swab right foot   Culture Results:   No growth at 5 days Culture - Tissue with Gram Stain (12.21.21 @ 01:10)   Gram Stain:   No polymorphonuclear cells seen per low power field   No organisms seen per oil power field   Specimen Source: .Tissue right fibula   Culture Results:   No growth at 5 days

## 2021-12-27 NOTE — PROGRESS NOTE ADULT - ASSESSMENT
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 Notes shes been trying to move out of bed and fell, hitting mainly her hip and parts of her back. She does note feeling a bit weaker than usual, and not drinking enough water because she doesn't want to urinate at night. No fevers, chills, cough that she endorses. Does note hitting her R ankle previously and developing infection recently, for which was treated? Has PICC in R arm, and she is unsure what it is for.     Per review of [facility] records no abx since 12/3  Exam with R lateral malleolar ulcer with discharge  No surrounding cellulitis  X ray with perisosteal reaction  High ESra nd CRP though is non specific and could reflect fall   previously was on [facility]z at [facility] though no abx [facility] in [facility]  MRI as above   ? OM ? septic arthritis  Less likely non infectious  s/p I&D- purulence noted-for delayed closure  Cx so far no growth     Rec:  A) Ankle ulcer  Cx with only CNS so far but was on abx  s/p washout  OR cx negative but was on abx  Can change to INvanz X 6 weeks with weekly CBC CMP-already has PICC   Pt for JEREMIAS MCDOWELL MD to monitor labs  To follow in ID clinic in 3-4 weeks        B) abx allergies  tolerating merem -was on invanz before   monitor clinically for s/s any allergic reaction    C)) leucocytosis  likely from above   resolved    Will tailor plan for ID issues  per course,results.Will defer to primary team on management of other issues.  Assessment, plan and recommendations as detailed above were discussed with the medical/primary  team.  ID will follow as needed during hospital stay ,please call [phone] if any questions or issues.  to follow in ID clinic as detailed above

## 2021-12-27 NOTE — PROGRESS NOTE ADULT - SUBJECTIVE AND OBJECTIVE BOX
NEPHROLOGY-[facility] [phone]        Patient seen and examined in bed.  She was about the same         MEDICATIONS  (STANDING):  allopurinol 100 milliGRAM(s) Oral daily  amLODIPine   Tablet 5 milliGRAM(s) Oral daily  apixaban 2.5 milliGRAM(s) Oral two times a day  ascorbic acid 500 milliGRAM(s) Oral daily  bisacodyl 5 milliGRAM(s) Oral at bedtime  calcitriol   Capsule 0.25 MICROGram(s) Oral daily  chlorhexidine 2% Cloths 1 Application(s) Topical <User Schedule>  clopidogrel Tablet 75 milliGRAM(s) Oral daily  dextrose 40% Gel 15 Gram(s) Oral once  dextrose 5%. 1000 milliLiter(s) (100 mL/Hr) IV Continuous <Continuous>  dextrose 5%. 1000 milliLiter(s) (50 mL/Hr) IV Continuous <Continuous>  dextrose 50% Injectable 25 Gram(s) IV Push once  dextrose 50% Injectable 12.5 Gram(s) IV Push once  dextrose 50% Injectable 25 Gram(s) IV Push once  ertapenem  IVPB 1000 milliGRAM(s) IV Intermittent every 24 hours  furosemide    Tablet 40 milliGRAM(s) Oral daily  glucagon  Injectable 1 milliGRAM(s) IntraMuscular once  insulin glargine Injectable (LANTUS) 10 Unit(s) SubCutaneous at bedtime  insulin lispro (ADMELOG) corrective regimen sliding scale   SubCutaneous three times a day before meals  insulin lispro (ADMELOG) corrective regimen sliding scale   SubCutaneous at bedtime  insulin lispro Injectable (ADMELOG) 4 Unit(s) SubCutaneous three times a day before meals  levothyroxine 50 MICROGram(s) Oral daily  melatonin 3 milliGRAM(s) Oral at bedtime  metoprolol succinate  milliGRAM(s) Oral daily  multivitamin 1 Tablet(s) Oral daily  nystatin Powder 1 Application(s) Topical two times a day  pantoprazole    Tablet 40 milliGRAM(s) Oral before breakfast  polyethylene glycol 3350 17 Gram(s) Oral daily  senna 2 Tablet(s) Oral at bedtime  simvastatin 20 milliGRAM(s) Oral at bedtime      VITAL:  T(C): , Max: 36.7 (12-26-21 @ 21:02)  T(F): , Max: 98.1 (12-26-21 @ 21:02)  HR: 64 (12-27-21 @ 05:25)  BP: 154/72 (12-27-21 @ 05:25)  BP(mean): --  RR: 17 (12-27-21 @ 05:25)  SpO2: 95% (12-27-21 @ 05:25)  Wt(kg): --    I and O's:    12-26 @ 07:01  -  12-27 @ 07:00  --------------------------------------------------------  IN: 50 mL / OUT: 1200 mL / NET: -1150 mL          PHYSICAL EXAM:    Constitutional: NAD  Neck:  No JVD  Respiratory: CTAB/L  Cardiovascular: S1 and S2  Gastrointestinal: BS+, soft, NT/ND  Extremities: No peripheral edema  Neurological: A/O x 3, no focal deficits  Psychiatric: Normal mood, normal affect  : No Heredia  Skin: No rashes  Access: Not applicable    LABS:                        8.2    9.40  )-----------( 369      ( 26 Dec 2021 07:22 )             27.5     12-27    136  |  101  |  29<H>  ----------------------------<  122<H>  4.1   |  25  |  1.28    Ca    10.4      27 Dec 2021 06:25            Urine Studies:          RADIOLOGY & ADDITIONAL STUDIES:

## 2021-12-27 NOTE — PROGRESS NOTE ADULT - ASSESSMENT
Assessment  DMT2: 89y  Female with DM T2 with hyperglycemia, A1C 7.3%, was on insulin at home, now on low-dose basal bolus insulin, blood sugars are trending within overall acceptable range with intermittent postprandial elevations, no hypoglycemic episodes, eating meals, NAD, planning DC to rehab.  Fall: AI R/O, workup in progress, stable, monitored.  Hypothyroidism: On Synthroid 50 mcg po daily, compliant with Synthroid intake, euthyroid.  HTN: Controlled,  on antihypertensive medications.  CKD: Monitor labs/BMP,       Dr Malhotra  cell; [phone]

## 2021-12-27 NOTE — PHARMACOTHERAPY INTERVENTION NOTE - COMMENTS
89 yo F PMH of TAVR earlier this year, DM 2, hypothyroidism, A fib on Eliquis here because she fell while trying to get out of bed. Per ID patient to transition to Invanz x 6 weeks for ankle ulcer. Currently ordered for ertapenem 1g IV Q24H. SCr 1.28 today; improved overall; CrCl ~27 mL/min. Recommend switching ertapenem to 500mg IV Q24H. Discussed with Dr. Bill and VARGAS Sanchez.    Saul Monsalve, PharmD, BCPS  [phone]  Available on [employer] Teams

## 2021-12-27 NOTE — PROGRESS NOTE ADULT - ASSESSMENT
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

## 2021-12-28 NOTE — PROGRESS NOTE ADULT - ASSESSMENT
89yo F s/p right lateral ankle I&D w/ fibular bone biopsy (DOS 12/20), now closed   - Pt seen and evaluated  - afebrile, no leukocytosis   - Right lateral ankle I&D site w/ sutures intact & DPC, skin well coapted, no purulence or drainage, no periwound erythema, no acute signs of infection, skin flaps warm to touch   -Bilateral heel DTI- present upon admission without any signs of infection  -Please offload bilateral heels w/ z flow booties  -Please have PT consult requested by patient and family for upper body exercising since patient has been bedbound for prolonged period of time (Appreciated)  - low concern for residual infection  - low concern for viability  - keep dressing clean, dry and intact  - ID recs PICC for 6 weeks with Ertapenem   - stable for discharge from podiatry standpoint  - follow up information and instructions cand be found in the follow up section of the provider d/c note   - ID recommendations appreciated   - Discussed with attending

## 2021-12-28 NOTE — PROGRESS NOTE ADULT - SUBJECTIVE AND OBJECTIVE BOX
Patient is a 89y old  Female who presents with a chief complaint of s/p fall (27 Dec 2021 16:56)    Being followed by ID for ankle OM    Interval history:feels well  occ ankle pain but controlled-none now  No acute events      ROS:  No cough,SOB,CP  No N/V/D./abd pain  No other complaints      Antimicrobials:    ertapenem  IVPB 500 milliGRAM(s) IV Intermittent every 24 hours    Other medications reviewed    Vital Signs Last 24 Hrs  T(C): 36.5 (12-28-21 @ 04:26), Max: 36.9 (12-27-21 @ 12:46)  T(F): 97.7 (12-28-21 @ 04:26), Max: 98.4 (12-27-21 @ 12:46)  HR: 61 (12-28-21 @ 04:26) (61 - 67)  BP: 151/81 (12-28-21 @ 04:26) (122/60 - 151/81)  BP(mean): --  RR: 17 (12-28-21 @ 04:26) (17 - 18)  SpO2: 93% (12-28-21 @ 04:26) (93% - 95%)    Physical Exam:    HEENT PERRLA EOMI    No oral exudate or erythema  L axillary PM     Chest Good AE,CTA    CVS RRR S1 S2 WNl No murmur or rub or gallop    Abd soft BS normal No tenderness no masses    R ankle dressing no discharge   ankle no erythema   no LOM   PICC  site no erythema tenderness or discharge    CNS AAO X 2    Lab Data:        12-27    136  |  101  |  29<H>  ----------------------------<  122<H>  4.1   |  25  |  1.28    Ca    10.4      27 Dec 2021 06:25            COVID-19 PCR . (12.26.21 @ 07:28)   COVID-19 PCR: NotDetec:Culture - Surgical Swab (12.21.21 @ 01:38)   Specimen Source: .Surgical Swab right foot   Culture Results:   No growth at 5 days

## 2021-12-28 NOTE — PROGRESS NOTE ADULT - SUBJECTIVE AND OBJECTIVE BOX
Chief complaint  Patient is a 89y old  Female who presents with a chief complaint of s/p fall (28 Dec 2021 16:45)   Review of systems  Patient in bed, lethargic-appearing, had hypoglycemia.    Labs and Fingersticks  CAPILLARY BLOOD GLUCOSE      POCT Blood Glucose.: 277 mg/dL (28 Dec 2021 14:43)  POCT Blood Glucose.: 275 mg/dL (28 Dec 2021 14:40)  POCT Blood Glucose.: 62 mg/dL (28 Dec 2021 14:34)  POCT Blood Glucose.: 96 mg/dL (28 Dec 2021 12:08)  POCT Blood Glucose.: 147 mg/dL (28 Dec 2021 08:20)  POCT Blood Glucose.: 184 mg/dL (27 Dec 2021 21:55)  POCT Blood Glucose.: 151 mg/dL (27 Dec 2021 17:09)      Anion Gap, Serum: 10 (12-27 @ 06:25)      Calcium, Total Serum: 10.4 (12-27 @ 06:25)          12-27    136  |  101  |  29<H>  ----------------------------<  122<H>  4.1   |  25  |  1.28    Ca    10.4      27 Dec 2021 06:25      Medications  MEDICATIONS  (STANDING):  allopurinol 100 milliGRAM(s) Oral daily  amLODIPine   Tablet 5 milliGRAM(s) Oral daily  apixaban 2.5 milliGRAM(s) Oral two times a day  ascorbic acid 500 milliGRAM(s) Oral daily  bisacodyl 5 milliGRAM(s) Oral at bedtime  calcitriol   Capsule 0.25 MICROGram(s) Oral daily  chlorhexidine 2% Cloths 1 Application(s) Topical <User Schedule>  clopidogrel Tablet 75 milliGRAM(s) Oral daily  dextrose 40% Gel 15 Gram(s) Oral once  dextrose 5%. 1000 milliLiter(s) (100 mL/Hr) IV Continuous <Continuous>  dextrose 5%. 1000 milliLiter(s) (50 mL/Hr) IV Continuous <Continuous>  dextrose 50% Injectable 25 Gram(s) IV Push once  dextrose 50% Injectable 12.5 Gram(s) IV Push once  dextrose 50% Injectable 25 Gram(s) IV Push once  ertapenem  IVPB 500 milliGRAM(s) IV Intermittent every 24 hours  furosemide    Tablet 40 milliGRAM(s) Oral daily  glucagon  Injectable 1 milliGRAM(s) IntraMuscular once  insulin glargine Injectable (LANTUS) 10 Unit(s) SubCutaneous at bedtime  insulin lispro (ADMELOG) corrective regimen sliding scale   SubCutaneous three times a day before meals  insulin lispro (ADMELOG) corrective regimen sliding scale   SubCutaneous at bedtime  levothyroxine 50 MICROGram(s) Oral daily  melatonin 3 milliGRAM(s) Oral at bedtime  metoprolol succinate  milliGRAM(s) Oral daily  multivitamin 1 Tablet(s) Oral daily  nystatin Powder 1 Application(s) Topical two times a day  pantoprazole    Tablet 40 milliGRAM(s) Oral before breakfast  polyethylene glycol 3350 17 Gram(s) Oral daily  senna 2 Tablet(s) Oral at bedtime  simvastatin 20 milliGRAM(s) Oral at bedtime      Physical Exam  General: Patient comfortable in bed  Vital Signs Last 12 Hrs  T(F): 97.5 (12-28-21 @ 13:32), Max: 97.5 (12-28-21 @ 13:32)  HR: 60 (12-28-21 @ 13:32) (60 - 60)  BP: 138/70 (12-28-21 @ 13:32) (138/70 - 138/70)  BP(mean): --  RR: 18 (12-28-21 @ 13:32) (18 - 18)  SpO2: 93% (12-28-21 @ 13:32) (93% - 93%)  Neck: No palpable thyroid nodules.  CVS: S1S2, No murmurs  Respiratory: No wheezing, no crepitations  GI: Abdomen soft, bowel sounds positive  Musculoskeletal:  edema lower extremities.   Skin: No skin rashes, no ecchymosis    Diagnostics    Cortisol AM, Serum: AM Sched. Collection: 17-Dec-2021 06:00 (12-16 @ 14:32)

## 2021-12-28 NOTE — PROGRESS NOTE ADULT - ASSESSMENT
Assessment  DMT2: 89y  Female with DM T2 with hyperglycemia, A1C 7.3%, was on insulin at home, now on low-dose basal bolus insulin, blood sugars are fluctuating/trending down, had hypoglycemic episode, lethargic and not eating much.  Fall: AI R/O, workup in progress, stable, monitored.  Hypothyroidism: On Synthroid 50 mcg po daily, compliant with Synthroid intake, euthyroid.  HTN: Controlled,  on antihypertensive medications.  CKD: Monitor labs/BMP,       Dr Malhotra  cell; [phone]

## 2021-12-28 NOTE — PROGRESS NOTE ADULT - SUBJECTIVE AND OBJECTIVE BOX
Patient is a 89y old  Female who presents with a chief complaint of s/p fall (28 Dec 2021 13:23)      DATE OF SERVICE: 12-28-21 @ 16:45    SUBJECTIVE / OVERNIGHT EVENTS: overnight events noted    ROS:  Resp: No cough no sputum production  CVS: No chest pain no palpitations no orthopnea  GI: no N/V/D  : no dysuria, no hematuria  Neuro: no weakness no paresthesias  Heme: No petechiae no easy bruising  Msk: No joint pain no swelling  Skin: No rash no itching        MEDICATIONS  (STANDING):  allopurinol 100 milliGRAM(s) Oral daily  amLODIPine   Tablet 5 milliGRAM(s) Oral daily  apixaban 2.5 milliGRAM(s) Oral two times a day  ascorbic acid 500 milliGRAM(s) Oral daily  bisacodyl 5 milliGRAM(s) Oral at bedtime  calcitriol   Capsule 0.25 MICROGram(s) Oral daily  chlorhexidine 2% Cloths 1 Application(s) Topical <User Schedule>  clopidogrel Tablet 75 milliGRAM(s) Oral daily  dextrose 40% Gel 15 Gram(s) Oral once  dextrose 5%. 1000 milliLiter(s) (100 mL/Hr) IV Continuous <Continuous>  dextrose 5%. 1000 milliLiter(s) (50 mL/Hr) IV Continuous <Continuous>  dextrose 50% Injectable 25 Gram(s) IV Push once  dextrose 50% Injectable 12.5 Gram(s) IV Push once  dextrose 50% Injectable 25 Gram(s) IV Push once  ertapenem  IVPB 500 milliGRAM(s) IV Intermittent every 24 hours  furosemide    Tablet 40 milliGRAM(s) Oral daily  glucagon  Injectable 1 milliGRAM(s) IntraMuscular once  insulin glargine Injectable (LANTUS) 10 Unit(s) SubCutaneous at bedtime  insulin lispro (ADMELOG) corrective regimen sliding scale   SubCutaneous three times a day before meals  insulin lispro (ADMELOG) corrective regimen sliding scale   SubCutaneous at bedtime  insulin lispro Injectable (ADMELOG) 4 Unit(s) SubCutaneous three times a day before meals  levothyroxine 50 MICROGram(s) Oral daily  melatonin 3 milliGRAM(s) Oral at bedtime  metoprolol succinate  milliGRAM(s) Oral daily  multivitamin 1 Tablet(s) Oral daily  nystatin Powder 1 Application(s) Topical two times a day  pantoprazole    Tablet 40 milliGRAM(s) Oral before breakfast  polyethylene glycol 3350 17 Gram(s) Oral daily  senna 2 Tablet(s) Oral at bedtime  simvastatin 20 milliGRAM(s) Oral at bedtime    MEDICATIONS  (PRN):  acetaminophen     Tablet .. 650 milliGRAM(s) Oral every 6 hours PRN Temp greater or equal to 38C (100.4F), Moderate Pain (4 - 6)        CAPILLARY BLOOD GLUCOSE      POCT Blood Glucose.: 277 mg/dL (28 Dec 2021 14:43)  POCT Blood Glucose.: 275 mg/dL (28 Dec 2021 14:40)  POCT Blood Glucose.: 62 mg/dL (28 Dec 2021 14:34)  POCT Blood Glucose.: 96 mg/dL (28 Dec 2021 12:08)  POCT Blood Glucose.: 147 mg/dL (28 Dec 2021 08:20)  POCT Blood Glucose.: 184 mg/dL (27 Dec 2021 21:55)  POCT Blood Glucose.: 151 mg/dL (27 Dec 2021 17:09)    I&O's Summary    28 Dec 2021 07:01  -  28 Dec 2021 16:45  --------------------------------------------------------  IN: 0 mL / OUT: 1000 mL / NET: -1000 mL        Vital Signs Last 24 Hrs  T(C): 36.4 (28 Dec 2021 13:32), Max: 36.6 (27 Dec 2021 21:27)  T(F): 97.5 (28 Dec 2021 13:32), Max: 97.8 (27 Dec 2021 21:27)  HR: 60 (28 Dec 2021 13:32) (60 - 67)  BP: 138/70 (28 Dec 2021 13:32) (136/72 - 151/81)  BP(mean): --  RR: 18 (28 Dec 2021 13:32) (17 - 18)  SpO2: 93% (28 Dec 2021 13:32) (93% - 95%)    PHYSICAL EXAM:   HEENT: DEBBIE EOMI  Neck: Supple, no JVD  Lungs: clear  CVS: S1 S2 systolic murmur +   Abdomen: non tender BS +  Neuro: Alert nonfocal  Ext: right ankle bandage      LABS:    12-27    136  |  101  |  29<H>  ----------------------------<  122<H>  4.1   |  25  |  1.28    Ca    10.4      27 Dec 2021 06:25                  All consultant(s) notes reviewed and care discussed with other providers        Contact Number, Dr Ho [phone]

## 2021-12-28 NOTE — PROGRESS NOTE ADULT - SUBJECTIVE AND OBJECTIVE BOX
NEPHROLOGY-[facility] [phone]        Patient seen and examined no new complaints at this time.         MEDICATIONS  (STANDING):  allopurinol 100 milliGRAM(s) Oral daily  amLODIPine   Tablet 5 milliGRAM(s) Oral daily  apixaban 2.5 milliGRAM(s) Oral two times a day  ascorbic acid 500 milliGRAM(s) Oral daily  bisacodyl 5 milliGRAM(s) Oral at bedtime  calcitriol   Capsule 0.25 MICROGram(s) Oral daily  chlorhexidine 2% Cloths 1 Application(s) Topical <User Schedule>  clopidogrel Tablet 75 milliGRAM(s) Oral daily  dextrose 40% Gel 15 Gram(s) Oral once  dextrose 5%. 1000 milliLiter(s) (100 mL/Hr) IV Continuous <Continuous>  dextrose 5%. 1000 milliLiter(s) (50 mL/Hr) IV Continuous <Continuous>  dextrose 50% Injectable 25 Gram(s) IV Push once  dextrose 50% Injectable 12.5 Gram(s) IV Push once  dextrose 50% Injectable 25 Gram(s) IV Push once  ertapenem  IVPB 500 milliGRAM(s) IV Intermittent every 24 hours  furosemide    Tablet 40 milliGRAM(s) Oral daily  glucagon  Injectable 1 milliGRAM(s) IntraMuscular once  insulin glargine Injectable (LANTUS) 10 Unit(s) SubCutaneous at bedtime  insulin lispro (ADMELOG) corrective regimen sliding scale   SubCutaneous three times a day before meals  insulin lispro (ADMELOG) corrective regimen sliding scale   SubCutaneous at bedtime  insulin lispro Injectable (ADMELOG) 4 Unit(s) SubCutaneous three times a day before meals  levothyroxine 50 MICROGram(s) Oral daily  melatonin 3 milliGRAM(s) Oral at bedtime  metoprolol succinate  milliGRAM(s) Oral daily  multivitamin 1 Tablet(s) Oral daily  nystatin Powder 1 Application(s) Topical two times a day  pantoprazole    Tablet 40 milliGRAM(s) Oral before breakfast  polyethylene glycol 3350 17 Gram(s) Oral daily  senna 2 Tablet(s) Oral at bedtime  simvastatin 20 milliGRAM(s) Oral at bedtime      VITAL:  T(C): , Max: 36.9 (12-27-21 @ 12:46)  T(F): , Max: 98.4 (12-27-21 @ 12:46)  HR: 61 (12-28-21 @ 04:26)  BP: 151/81 (12-28-21 @ 04:26)  BP(mean): --  RR: 17 (12-28-21 @ 04:26)  SpO2: 93% (12-28-21 @ 04:26)  Wt(kg): --    I and O's:        PHYSICAL EXAM:    Constitutional: NAD  Neck:  No JVD  Respiratory: CTAB/L  Cardiovascular: S1 and S2  Gastrointestinal: BS+, soft, NT/ND  Extremities: No peripheral edema  Neurological: A/O x 3, no focal deficits  Psychiatric: Normal mood, normal affect  : + Heredia  Skin: No rashes  Access: Not applicable    LABS:    12-27    136  |  101  |  29<H>  ----------------------------<  122<H>  4.1   |  25  |  1.28    Ca    10.4      27 Dec 2021 06:25            Urine Studies:          RADIOLOGY & ADDITIONAL STUDIES:

## 2021-12-28 NOTE — PROGRESS NOTE ADULT - SUBJECTIVE AND OBJECTIVE BOX
Podiatry pager #: [phone] ([place])/ [phone] ([facility])    Patient is a 89y old  Female who presents with a chief complaint of s/p fall (28 Dec 2021 09:58)       INTERVAL HPI/OVERNIGHT EVENTS:  Patient seen and evaluated at bedside.  Pt is resting comfortable in NAD. Denies N/V/F/C.     Allergies    Bactrim (Unknown)  Flagyl (Hives; Pruritus)  Macrobid (Other)  sulfa drugs (Hives)  Zosyn (Other)    Intolerances        Vital Signs Last 24 Hrs  T(C): 36.5 (28 Dec 2021 04:26), Max: 36.6 (27 Dec 2021 21:27)  T(F): 97.7 (28 Dec 2021 04:26), Max: 97.8 (27 Dec 2021 21:27)  HR: 61 (28 Dec 2021 04:26) (61 - 67)  BP: 151/81 (28 Dec 2021 04:26) (136/72 - 151/81)  BP(mean): --  RR: 17 (28 Dec 2021 04:26) (17 - 18)  SpO2: 93% (28 Dec 2021 04:26) (93% - 95%)    LABS:    12-27    136  |  101  |  29<H>  ----------------------------<  122<H>  4.1   |  25  |  1.28    Ca    10.4      27 Dec 2021 06:25          CAPILLARY BLOOD GLUCOSE      POCT Blood Glucose.: 96 mg/dL (28 Dec 2021 12:08)  POCT Blood Glucose.: 147 mg/dL (28 Dec 2021 08:20)  POCT Blood Glucose.: 184 mg/dL (27 Dec 2021 21:55)  POCT Blood Glucose.: 151 mg/dL (27 Dec 2021 17:09)      Lower Extremity Physical Exam:  Vascular: DP/PT 1/4, B/L, CFT <3 seconds B/L, Temperature gradient WNL, B/L.   Neuro: Epicritic sensation diminished to the level of _, B/L.  Musculoskeletal/Ortho: Able for R Ankle ROM without any pain elicited  Skin: bilateral posterior heel deep tissue injury, no fluctuance, no local signs of infection  s/p right ankle I&D w/ right fibular bone biopsy, now closed w/ bedside DPC on 12/23: sutures intact, skin well coapted, no purulence, no periwound erythema, no signs of acute infection, skin flaps warm to touch     RADIOLOGY & ADDITIONAL TESTS:

## 2021-12-28 NOTE — PROGRESS NOTE ADULT - ASSESSMENT
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 Notes shes been trying to move out of bed and fell, hitting mainly her hip and parts of her back. She does note feeling a bit weaker than usual, and not drinking enough water because she doesn't want to urinate at night. No fevers, chills, cough that she endorses. Does note hitting her R ankle previously and developing infection recently, for which was treated? Has PICC in R arm, and she is unsure what it is for.     Per review of [facility] records no abx since 12/3  Exam with R lateral malleolar ulcer with discharge  No surrounding cellulitis  X ray with perisosteal reaction  High ESra nd CRP though is non specific and could reflect fall   previously was on [facility]z at [facility] though [facility] abx [facility] in [facility]  MRI as above   ? OM ? septic arthritis  Less likely non infectious  s/p I&D- purulence noted-for delayed closure  Cx so far no growth     Rec:  A) Ankle ulcer  Cx with only CNS so far but was on abx  s/p washout  OR cx negative but was on abx  Can change to INvanz X 6 weeks with weekly CBC CMP-already has PICC   Pt for JEREMIAS MCDOWELL MD to monitor labs  To follow in ID clinic in 3-4 weeks        B) abx allergies  tolerating merem -was on invanz before   monitor clinically for s/s any allergic reaction    C)) leucocytosis  likely from above   resolved    Will tailor plan for ID issues  per course,results.Will defer to primary team on management of other issues.  Assessment, plan and recommendations as detailed above were discussed with the medical/primary  team.  ID will follow as needed during hospital stay ,please call [phone] if any questions or issues.  to follow in ID clinic as detailed above

## 2021-12-28 NOTE — PROGRESS NOTE ADULT - ASSESSMENT
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

## 2021-12-28 NOTE — PROVIDER CONTACT NOTE (HYPOGLYCEMIA EVENT) - NS PROVIDER CONTACT BACKGROUND-HYPO
Age: 89y    Gender: Female    POCT Blood Glucose:  277 mg/dL (12-28-21 @ 14:43)  275 mg/dL (12-28-21 @ 14:40)  62 mg/dL (12-28-21 @ 14:34)  96 mg/dL (12-28-21 @ 12:08)  147 mg/dL (12-28-21 @ 08:20)  184 mg/dL (12-27-21 @ 21:55)      eMAR:allopurinol   100 milliGRAM(s) Oral (12-28-21 @ 12:14)    insulin glargine Injectable (LANTUS)   10 Unit(s) SubCutaneous (12-27-21 @ 22:18)    insulin lispro (ADMELOG) corrective regimen sliding scale   1 Unit(s) SubCutaneous (12-27-21 @ 17:35)    insulin lispro Injectable (ADMELOG)   4 Unit(s) SubCutaneous (12-28-21 @ 12:14)   4 Unit(s) SubCutaneous (12-28-21 @ 08:27)   4 Unit(s) SubCutaneous (12-27-21 @ 17:36)    levothyroxine   50 MICROGram(s) Oral (12-28-21 @ 05:32)    simvastatin   20 milliGRAM(s) Oral (12-27-21 @ 22:18)

## 2021-12-29 NOTE — PROGRESS NOTE ADULT - SUBJECTIVE AND OBJECTIVE BOX
Patient is a 89y old  Female who presents with a chief complaint of s/p fall (29 Dec 2021 14:40)      DATE OF SERVICE: 12-29-21 @ 16:21    SUBJECTIVE / OVERNIGHT EVENTS: overnight events noted  upset at not getting to Subacute Rehab     ROS:  Resp: No cough no sputum production  CVS: No chest pain no palpitations no orthopnea  GI: no N/V/D  : no dysuria, no hematuria  Neuro: no weakness no paresthesias  Heme: No petechiae no easy bruising  Msk: No joint pain no swelling  Skin: No rash no itching        MEDICATIONS  (STANDING):  allopurinol 100 milliGRAM(s) Oral daily  amLODIPine   Tablet 5 milliGRAM(s) Oral daily  apixaban 2.5 milliGRAM(s) Oral two times a day  ascorbic acid 500 milliGRAM(s) Oral daily  bisacodyl 5 milliGRAM(s) Oral at bedtime  calcitriol   Capsule 0.25 MICROGram(s) Oral daily  chlorhexidine 2% Cloths 1 Application(s) Topical <User Schedule>  clopidogrel Tablet 75 milliGRAM(s) Oral daily  dextrose 40% Gel 15 Gram(s) Oral once  dextrose 5%. 1000 milliLiter(s) (100 mL/Hr) IV Continuous <Continuous>  dextrose 5%. 1000 milliLiter(s) (50 mL/Hr) IV Continuous <Continuous>  dextrose 50% Injectable 25 Gram(s) IV Push once  dextrose 50% Injectable 12.5 Gram(s) IV Push once  dextrose 50% Injectable 25 Gram(s) IV Push once  ertapenem  IVPB 500 milliGRAM(s) IV Intermittent every 24 hours  furosemide    Tablet 40 milliGRAM(s) Oral daily  glucagon  Injectable 1 milliGRAM(s) IntraMuscular once  insulin glargine Injectable (LANTUS) 10 Unit(s) SubCutaneous at bedtime  insulin lispro (ADMELOG) corrective regimen sliding scale   SubCutaneous three times a day before meals  insulin lispro (ADMELOG) corrective regimen sliding scale   SubCutaneous at bedtime  levothyroxine 50 MICROGram(s) Oral daily  melatonin 3 milliGRAM(s) Oral at bedtime  metoprolol succinate  milliGRAM(s) Oral daily  multivitamin 1 Tablet(s) Oral daily  nystatin Powder 1 Application(s) Topical two times a day  pantoprazole    Tablet 40 milliGRAM(s) Oral before breakfast  polyethylene glycol 3350 17 Gram(s) Oral daily  senna 2 Tablet(s) Oral at bedtime  simvastatin 20 milliGRAM(s) Oral at bedtime    MEDICATIONS  (PRN):  acetaminophen     Tablet .. 650 milliGRAM(s) Oral every 6 hours PRN Temp greater or equal to 38C (100.4F), Moderate Pain (4 - 6)        CAPILLARY BLOOD GLUCOSE      POCT Blood Glucose.: 167 mg/dL (29 Dec 2021 13:11)  POCT Blood Glucose.: 106 mg/dL (29 Dec 2021 08:50)  POCT Blood Glucose.: 214 mg/dL (28 Dec 2021 21:31)  POCT Blood Glucose.: 116 mg/dL (28 Dec 2021 17:29)    I&O's Summary    28 Dec 2021 07:01  -  29 Dec 2021 07:00  --------------------------------------------------------  IN: 0 mL / OUT: 1000 mL / NET: -1000 mL    29 Dec 2021 07:01  -  29 Dec 2021 16:21  --------------------------------------------------------  IN: 240 mL / OUT: 600 mL / NET: -360 mL        Vital Signs Last 24 Hrs  T(C): 36.4 (29 Dec 2021 12:06), Max: 36.4 (29 Dec 2021 04:56)  T(F): 97.5 (29 Dec 2021 12:06), Max: 97.6 (29 Dec 2021 04:56)  HR: 60 (29 Dec 2021 12:06) (60 - 60)  BP: 146/72 (29 Dec 2021 12:06) (140/78 - 152/76)  BP(mean): --  RR: 17 (29 Dec 2021 12:06) (17 - 17)  SpO2: 96% (29 Dec 2021 12:06) (94% - 96%)    PHYSICAL EXAM:   HEENT: DEBBIE EOMI  Neck: Supple, no JVD  Lungs: clear  CVS: S1 S2 systolic murmur +   Abdomen: non tender BS +  Neuro: Alert nonfocal  Ext: no edema    LABS:                      All consultant(s) notes reviewed and care discussed with other providers        Contact Number, Dr Ho [phone]

## 2021-12-29 NOTE — PROGRESS NOTE ADULT - ASSESSMENT
Assessment  DMT2: 89y  Female with DM T2 with hyperglycemia, A1C 7.3%, was on insulin at home, now on basal insulin and coverage, DC'd bolus dose s/p hypoglycemia yesterday, blood sugars are improving, no new hypoglycemias, eating small portions, NAD, DC planning.  Fall: AI R/O, workup in progress, stable, monitored.  Hypothyroidism: On Synthroid 50 mcg po daily, compliant with Synthroid intake, euthyroid.  HTN: Controlled,  on antihypertensive medications.  CKD: Monitor labs/BMP,       Dr Malhotra  cell; [phone]

## 2021-12-29 NOTE — PROGRESS NOTE ADULT - SUBJECTIVE AND OBJECTIVE BOX
Chief complaint  Patient is a 89y old  Female who presents with a chief complaint of s/p fall (28 Dec 2021 16:49)   Review of systems  Patient in bed, looks comfortable, no hypoglycemic episodes.    Labs and Fingersticks  CAPILLARY BLOOD GLUCOSE      POCT Blood Glucose.: 167 mg/dL (29 Dec 2021 13:11)  POCT Blood Glucose.: 106 mg/dL (29 Dec 2021 08:50)  POCT Blood Glucose.: 214 mg/dL (28 Dec 2021 21:31)  POCT Blood Glucose.: 116 mg/dL (28 Dec 2021 17:29)  POCT Blood Glucose.: 277 mg/dL (28 Dec 2021 14:43)                        Medications  MEDICATIONS  (STANDING):  allopurinol 100 milliGRAM(s) Oral daily  amLODIPine   Tablet 5 milliGRAM(s) Oral daily  apixaban 2.5 milliGRAM(s) Oral two times a day  ascorbic acid 500 milliGRAM(s) Oral daily  bisacodyl 5 milliGRAM(s) Oral at bedtime  calcitriol   Capsule 0.25 MICROGram(s) Oral daily  chlorhexidine 2% Cloths 1 Application(s) Topical <User Schedule>  clopidogrel Tablet 75 milliGRAM(s) Oral daily  dextrose 40% Gel 15 Gram(s) Oral once  dextrose 5%. 1000 milliLiter(s) (50 mL/Hr) IV Continuous <Continuous>  dextrose 5%. 1000 milliLiter(s) (100 mL/Hr) IV Continuous <Continuous>  dextrose 50% Injectable 25 Gram(s) IV Push once  dextrose 50% Injectable 12.5 Gram(s) IV Push once  dextrose 50% Injectable 25 Gram(s) IV Push once  ertapenem  IVPB 500 milliGRAM(s) IV Intermittent every 24 hours  furosemide    Tablet 40 milliGRAM(s) Oral daily  glucagon  Injectable 1 milliGRAM(s) IntraMuscular once  insulin glargine Injectable (LANTUS) 10 Unit(s) SubCutaneous at bedtime  insulin lispro (ADMELOG) corrective regimen sliding scale   SubCutaneous three times a day before meals  insulin lispro (ADMELOG) corrective regimen sliding scale   SubCutaneous at bedtime  levothyroxine 50 MICROGram(s) Oral daily  melatonin 3 milliGRAM(s) Oral at bedtime  metoprolol succinate  milliGRAM(s) Oral daily  multivitamin 1 Tablet(s) Oral daily  nystatin Powder 1 Application(s) Topical two times a day  pantoprazole    Tablet 40 milliGRAM(s) Oral before breakfast  polyethylene glycol 3350 17 Gram(s) Oral daily  senna 2 Tablet(s) Oral at bedtime  simvastatin 20 milliGRAM(s) Oral at bedtime      Physical Exam  General: Patient comfortable in bed  Vital Signs Last 12 Hrs  T(F): 97.5 (12-29-21 @ 12:06), Max: 97.6 (12-29-21 @ 04:56)  HR: 60 (12-29-21 @ 12:06) (60 - 60)  BP: 146/72 (12-29-21 @ 12:06) (146/72 - 152/76)  BP(mean): --  RR: 17 (12-29-21 @ 12:06) (17 - 17)  SpO2: 96% (12-29-21 @ 12:06) (94% - 96%)  Neck: No palpable thyroid nodules.  CVS: S1S2, No murmurs  Respiratory: No wheezing, no crepitations  GI: Abdomen soft, bowel sounds positive  Musculoskeletal:  edema lower extremities.   Skin: No skin rashes, no ecchymosis    Diagnostics    Cortisol AM, Serum: AM Sched. Collection: 17-Dec-2021 06:00 (12-16 @ 14:32)

## 2021-12-30 NOTE — PROGRESS NOTE ADULT - ASSESSMENT
Assessment  DMT2: 89y  Female with DM T2 with hyperglycemia, A1C 7.3%, was on insulin at home, now on basal insulin and coverage, blood sugars trending within overall acceptable range, no new hypoglycemias. Patient appears more alert today, in good spirits, eating meals with improving appetite, DC planning for rehab.  Fall: AI R/O, workup in progress, stable, monitored.  Hypothyroidism: On Synthroid 50 mcg po daily, compliant with Synthroid intake, euthyroid.  HTN: Controlled,  on antihypertensive medications.  CKD: Monitor labs/BMP,       Dr Malhotra  cell; [phone]

## 2021-12-30 NOTE — CONSULT NOTE ADULT - CONSULT REQUESTED BY NAME
Department of Anesthesiology  Postprocedure Note    Patient: Nini Ryan  MRN: [id number]  YOB: 1965  Date of evaluation: 12/30/2021  Time:  4:28 PM     Procedure Summary     Date: 12/30/21 Room / Location: [facility]    Anesthesia Start: 6751 Anesthesia Stop: 2480    Procedure: CYSTOSCOPY BILATERAL URETEROSCOPY, LASER LITHOTRIPSY, BASKET RETRIEVAL OF STONE FRAGMENTS,  BILATERAL URETERAL STENT PLACEMENT (Bilateral Ureter) Diagnosis: (KIDNEY STONE)    Surgeons: Claude Marti MD Responsible Provider: Philipp Aranda DO    Anesthesia Type: general ASA Status: 2          Anesthesia Type: general    Linda Phase I: Linda Score: 9    Linda Phase II: Linda Score: 10    Last vitals: Reviewed and per EMR flowsheets.        Anesthesia Post Evaluation    Patient location during evaluation: PACU  Patient participation: complete - patient participated  Level of consciousness: awake and alert  Airway patency: patent  Nausea & Vomiting: no vomiting and no nausea  Complications: no  Cardiovascular status: hemodynamically stable  Respiratory status: acceptable  Hydration status: stable
RN
Dr Ho
Dr Ho
ED
Dr Ho

## 2021-12-30 NOTE — PROGRESS NOTE ADULT - SUBJECTIVE AND OBJECTIVE BOX
NEPHROLOGY-[facility] [phone]        Patient seen and examined in bed.  She was in good spirits         MEDICATIONS  (STANDING):  allopurinol 100 milliGRAM(s) Oral daily  amLODIPine   Tablet 5 milliGRAM(s) Oral daily  apixaban 2.5 milliGRAM(s) Oral two times a day  ascorbic acid 500 milliGRAM(s) Oral daily  bisacodyl 5 milliGRAM(s) Oral at bedtime  calcitriol   Capsule 0.25 MICROGram(s) Oral daily  chlorhexidine 2% Cloths 1 Application(s) Topical <User Schedule>  clopidogrel Tablet 75 milliGRAM(s) Oral daily  dextrose 40% Gel 15 Gram(s) Oral once  dextrose 5%. 1000 milliLiter(s) (100 mL/Hr) IV Continuous <Continuous>  dextrose 5%. 1000 milliLiter(s) (50 mL/Hr) IV Continuous <Continuous>  dextrose 50% Injectable 25 Gram(s) IV Push once  dextrose 50% Injectable 12.5 Gram(s) IV Push once  dextrose 50% Injectable 25 Gram(s) IV Push once  ertapenem  IVPB 500 milliGRAM(s) IV Intermittent every 24 hours  furosemide    Tablet 40 milliGRAM(s) Oral daily  glucagon  Injectable 1 milliGRAM(s) IntraMuscular once  insulin glargine Injectable (LANTUS) 10 Unit(s) SubCutaneous at bedtime  insulin lispro (ADMELOG) corrective regimen sliding scale   SubCutaneous three times a day before meals  insulin lispro (ADMELOG) corrective regimen sliding scale   SubCutaneous at bedtime  levothyroxine 50 MICROGram(s) Oral daily  melatonin 3 milliGRAM(s) Oral at bedtime  metoprolol succinate  milliGRAM(s) Oral daily  multivitamin 1 Tablet(s) Oral daily  nystatin Powder 1 Application(s) Topical two times a day  pantoprazole    Tablet 40 milliGRAM(s) Oral before breakfast  polyethylene glycol 3350 17 Gram(s) Oral daily  senna 2 Tablet(s) Oral at bedtime  simvastatin 20 milliGRAM(s) Oral at bedtime      VITAL:  T(C): , Max: 36.4 (12-29-21 @ 12:06)  T(F): , Max: 97.5 (12-29-21 @ 12:06)  HR: 61 (12-30-21 @ 05:24)  BP: 146/75 (12-30-21 @ 04:23)  BP(mean): --  RR: 17 (12-30-21 @ 05:24)  SpO2: 93% (12-30-21 @ 05:24)  Wt(kg): --    I and O's:    12-29 @ 07:01  -  12-30 @ 07:00  --------------------------------------------------------  IN: 240 mL / OUT: 1100 mL / NET: -860 mL          PHYSICAL EXAM:    Constitutional: NAD  Neck:  No JVD  Respiratory: CTAB/L  Cardiovascular: S1 and S2  Gastrointestinal: BS+, soft, NT/ND  Extremities: No peripheral edema  Neurological: A/O x 3, no focal deficits  Psychiatric: Normal mood, normal affect  : +  Heredia  Skin: No rashes  Access: Not applicable    LABS:                        8.1    8.88  )-----------( 322      ( 30 Dec 2021 07:18 )             27.5     12-30    138  |  101  |  25<H>  ----------------------------<  119<H>  4.2   |  26  |  1.32<H>    Ca    10.1      30 Dec 2021 07:18            Urine Studies:          RADIOLOGY & ADDITIONAL STUDIES:

## 2021-12-30 NOTE — PROGRESS NOTE ADULT - SUBJECTIVE AND OBJECTIVE BOX
Patient is a 89y old  Female who presents with a chief complaint of s/p fall (30 Dec 2021 15:42)      DATE OF SERVICE: 12-30-21 @ 18:05    SUBJECTIVE / OVERNIGHT EVENTS: overnight events noted    ROS:  Resp: No cough no sputum production  CVS: No chest pain no palpitations no orthopnea  GI: no N/V/D  : no dysuria, no hematuria  Neuro: no weakness no paresthesias          MEDICATIONS  (STANDING):  allopurinol 100 milliGRAM(s) Oral daily  amLODIPine   Tablet 5 milliGRAM(s) Oral daily  apixaban 2.5 milliGRAM(s) Oral two times a day  ascorbic acid 500 milliGRAM(s) Oral daily  bisacodyl 5 milliGRAM(s) Oral at bedtime  calcitriol   Capsule 0.25 MICROGram(s) Oral daily  chlorhexidine 2% Cloths 1 Application(s) Topical <User Schedule>  clopidogrel Tablet 75 milliGRAM(s) Oral daily  dextrose 40% Gel 15 Gram(s) Oral once  dextrose 5%. 1000 milliLiter(s) (100 mL/Hr) IV Continuous <Continuous>  dextrose 5%. 1000 milliLiter(s) (50 mL/Hr) IV Continuous <Continuous>  dextrose 50% Injectable 25 Gram(s) IV Push once  dextrose 50% Injectable 12.5 Gram(s) IV Push once  dextrose 50% Injectable 25 Gram(s) IV Push once  ertapenem  IVPB 500 milliGRAM(s) IV Intermittent every 24 hours  furosemide    Tablet 40 milliGRAM(s) Oral daily  glucagon  Injectable 1 milliGRAM(s) IntraMuscular once  insulin glargine Injectable (LANTUS) 10 Unit(s) SubCutaneous at bedtime  insulin lispro (ADMELOG) corrective regimen sliding scale   SubCutaneous three times a day before meals  insulin lispro (ADMELOG) corrective regimen sliding scale   SubCutaneous at bedtime  levothyroxine 50 MICROGram(s) Oral daily  melatonin 3 milliGRAM(s) Oral at bedtime  metoprolol succinate  milliGRAM(s) Oral daily  multivitamin 1 Tablet(s) Oral daily  nystatin Powder 1 Application(s) Topical two times a day  pantoprazole    Tablet 40 milliGRAM(s) Oral before breakfast  polyethylene glycol 3350 17 Gram(s) Oral daily  senna 2 Tablet(s) Oral at bedtime  simvastatin 20 milliGRAM(s) Oral at bedtime    MEDICATIONS  (PRN):  acetaminophen     Tablet .. 650 milliGRAM(s) Oral every 6 hours PRN Temp greater or equal to 38C (100.4F), Moderate Pain (4 - 6)        CAPILLARY BLOOD GLUCOSE      POCT Blood Glucose.: 166 mg/dL (30 Dec 2021 16:33)  POCT Blood Glucose.: 158 mg/dL (30 Dec 2021 12:44)  POCT Blood Glucose.: 147 mg/dL (30 Dec 2021 08:23)  POCT Blood Glucose.: 228 mg/dL (29 Dec 2021 21:36)    I&O's Summary    29 Dec 2021 07:01  -  30 Dec 2021 07:00  --------------------------------------------------------  IN: 240 mL / OUT: 1100 mL / NET: -860 mL    30 Dec 2021 07:01  -  30 Dec 2021 18:05  --------------------------------------------------------  IN: 480 mL / OUT: 600 mL / NET: -120 mL        Vital Signs Last 24 Hrs  T(C): 36.6 (30 Dec 2021 12:56), Max: 36.6 (30 Dec 2021 12:56)  T(F): 97.9 (30 Dec 2021 12:56), Max: 97.9 (30 Dec 2021 12:56)  HR: 67 (30 Dec 2021 12:56) (59 - 69)  BP: 115/66 (30 Dec 2021 12:56) (115/66 - 148/75)  BP(mean): --  RR: 17 (30 Dec 2021 12:56) (16 - 17)  SpO2: 94% (30 Dec 2021 12:56) (93% - 96%)    PHYSICAL EXAM:   HEENT: DEBBIE EOMI  Neck: Supple, no JVD  Lungs: clear  CVS: S1 S2 systolic murmur +   Abdomen: non tender BS +  Neuro: Alert nonfocal  Ext: no edema    LABS:                        8.1    8.88  )-----------( 322      ( 30 Dec 2021 07:18 )             27.5     12-30    138  |  101  |  25<H>  ----------------------------<  119<H>  4.2   |  26  |  1.32<H>    Ca    10.1      30 Dec 2021 07:18                  All consultant(s) notes reviewed and care discussed with other providers        Contact Number, Dr Ho [phone]

## 2021-12-30 NOTE — PROGRESS NOTE ADULT - ASSESSMENT
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

## 2021-12-30 NOTE — PROGRESS NOTE ADULT - SUBJECTIVE AND OBJECTIVE BOX
Chief complaint  Patient is a 89y old  Female who presents with a chief complaint of s/p fall (30 Dec 2021 09:50)   Review of systems  Patient awake in chair, well-appearing and in good spirits today, no hypoglycemic episodes.    Labs and Fingersticks  CAPILLARY BLOOD GLUCOSE      POCT Blood Glucose.: 158 mg/dL (30 Dec 2021 12:44)  POCT Blood Glucose.: 147 mg/dL (30 Dec 2021 08:23)  POCT Blood Glucose.: 228 mg/dL (29 Dec 2021 21:36)  POCT Blood Glucose.: 210 mg/dL (29 Dec 2021 17:33)      Anion Gap, Serum: 11 (12-30 @ 07:18)      Calcium, Total Serum: 10.1 (12-30 @ 07:18)          12-30    138  |  101  |  25<H>  ----------------------------<  119<H>  4.2   |  26  |  1.32<H>    Ca    10.1      30 Dec 2021 07:18                          8.1    8.88  )-----------( 322      ( 30 Dec 2021 07:18 )             27.5     Medications  MEDICATIONS  (STANDING):  allopurinol 100 milliGRAM(s) Oral daily  amLODIPine   Tablet 5 milliGRAM(s) Oral daily  apixaban 2.5 milliGRAM(s) Oral two times a day  ascorbic acid 500 milliGRAM(s) Oral daily  bisacodyl 5 milliGRAM(s) Oral at bedtime  calcitriol   Capsule 0.25 MICROGram(s) Oral daily  chlorhexidine 2% Cloths 1 Application(s) Topical <User Schedule>  clopidogrel Tablet 75 milliGRAM(s) Oral daily  dextrose 40% Gel 15 Gram(s) Oral once  dextrose 5%. 1000 milliLiter(s) (100 mL/Hr) IV Continuous <Continuous>  dextrose 5%. 1000 milliLiter(s) (50 mL/Hr) IV Continuous <Continuous>  dextrose 50% Injectable 25 Gram(s) IV Push once  dextrose 50% Injectable 12.5 Gram(s) IV Push once  dextrose 50% Injectable 25 Gram(s) IV Push once  epoetin destiny-epbx (RETACRIT) Injectable 10924 Unit(s) SubCutaneous once  ertapenem  IVPB 500 milliGRAM(s) IV Intermittent every 24 hours  furosemide    Tablet 40 milliGRAM(s) Oral daily  glucagon  Injectable 1 milliGRAM(s) IntraMuscular once  insulin glargine Injectable (LANTUS) 10 Unit(s) SubCutaneous at bedtime  insulin lispro (ADMELOG) corrective regimen sliding scale   SubCutaneous three times a day before meals  insulin lispro (ADMELOG) corrective regimen sliding scale   SubCutaneous at bedtime  levothyroxine 50 MICROGram(s) Oral daily  melatonin 3 milliGRAM(s) Oral at bedtime  metoprolol succinate  milliGRAM(s) Oral daily  multivitamin 1 Tablet(s) Oral daily  nystatin Powder 1 Application(s) Topical two times a day  pantoprazole    Tablet 40 milliGRAM(s) Oral before breakfast  polyethylene glycol 3350 17 Gram(s) Oral daily  senna 2 Tablet(s) Oral at bedtime  simvastatin 20 milliGRAM(s) Oral at bedtime      Physical Exam  General: Patient comfortable in bed  Vital Signs Last 12 Hrs  T(F): 97.9 (12-30-21 @ 12:56), Max: 97.9 (12-30-21 @ 12:56)  HR: 67 (12-30-21 @ 12:56) (59 - 67)  BP: 115/66 (12-30-21 @ 12:56) (115/66 - 146/75)  BP(mean): --  RR: 17 (12-30-21 @ 12:56) (16 - 17)  SpO2: 94% (12-30-21 @ 12:56) (93% - 94%)    Diagnostics    Cortisol AM, Serum: AM Sched. Collection: 17-Dec-2021 06:00 (12-16 @ 14:32)

## 2021-12-31 NOTE — PROGRESS NOTE ADULT - PROBLEM SELECTOR PLAN 2
Continue home-dose synthroid, will FU.
euvolemic   continue to monitor closely  currently comfortable
euvolemic   continue to monitor closely  currently comfortable  continue furosemide increase to 40 po qd
euvolemic   continue to monitor closely  currently comfortable  resume low dose furosemide 20 po qd
euvolemic now   no shortness of breath   currently comfortable
Continue home-dose synthroid, will FU.
Continue home-dose synthroid, will FU.
euvolemic   continue to monitor closely  currently comfortable  resume low dose furosemide on discharge
Continue home-dose synthroid, will FU.
Continue home-dose synthroid, will FU.
currently euvolemic   continue furosemide 40 po qd
likely mechanical  fall precautions   PT evaluation in progress
Continue home-dose synthroid, will FU.
likely mechanical  fall precautions
Continue home-dose synthroid, will FU.
euvolemic   continue to monitor closely  currently comfortable  resume low dose furosemide 20 po qd
euvolemic   continue to monitor closely  currently comfortable  continue furosemide increase to 20 po qd
likely mechanical  fall precautions   PT evaluation in progress
currently euvolemic   continue furosemide 40 po qd
currently euvolemic   continue furosemide 40 po qd
Continue home-dose synthroid, will FU.
currently euvolemic   continue furosemide 40 po qd
euvolemic   no shortness of breath   currently comfortable
Continue home-dose synthroid, will FU.
euvolemic   no shortness of breath   currently comfortable
likely mechanical  fall precautions   PT evaluation Recommends Subacute Rehab
acute CHF ruled out after admission   no shortness of breath   currently comfortable
euvolemic   no shortness of breath   currently comfortable

## 2021-12-31 NOTE — PROGRESS NOTE ADULT - PROBLEM SELECTOR PROBLEM 6
HTN (hypertension)
Stage 3 chronic kidney disease
Afib
HTN (hypertension)
Afib
Afib
Stage 3 chronic kidney disease
Afib
Stage 3 chronic kidney disease
Afib
Stage 3 chronic kidney disease
Afib
Stage 3 chronic kidney disease
Afib
Stage 3 chronic kidney disease
Afib
Afib
Stage 3 chronic kidney disease
Afib
Afib
Stage 3 chronic kidney disease
Stage 3 chronic kidney disease
Afib
Stage 3 chronic kidney disease
Afib
Stage 3 chronic kidney disease

## 2021-12-31 NOTE — PROGRESS NOTE ADULT - ASSESSMENT
Assessment  DMT2: 89y  Female with DM T2 with hyperglycemia, A1C 7.3%, was on insulin at home, now on basal insulin and coverage, blood sugars fluctuating within overall acceptable range, no new hypoglycemias, eating meals, NAD, planning DC to rehab today.  Fall: AI R/O, workup in progress, stable, monitored.  Hypothyroidism: On Synthroid 50 mcg po daily, compliant with Synthroid intake, euthyroid.  HTN: Controlled,  on antihypertensive medications.  CKD: Monitor labs/BMP,       Dr Malhotra  cell; [phone]

## 2021-12-31 NOTE — PROGRESS NOTE ADULT - PROBLEM SELECTOR PROBLEM 8
Hypothyroidism
HTN (hypertension)
Hypothyroidism
HTN (hypertension)

## 2021-12-31 NOTE — PROGRESS NOTE ADULT - PROBLEM SELECTOR PROBLEM 1
Cellulitis of right ankle
Diabetes mellitus
Diabetes mellitus
Cellulitis of right ankle
Diabetes mellitus
Diabetes mellitus
Cellulitis of right ankle
Cellulitis of right ankle
Diabetes mellitus
Septic arthritis
Cellulitis of right ankle
Diabetes mellitus
Septic arthritis
Cellulitis of right ankle
Diabetes mellitus
Cellulitis of right ankle
Septic arthritis
Cellulitis of right ankle

## 2021-12-31 NOTE — PROGRESS NOTE ADULT - NUTRITIONAL ASSESSMENT
This patient has been assessed with a concern for Malnutrition and has been determined to have a diagnosis/diagnoses of Severe protein-calorie malnutrition.    This patient is being managed with:   Diet Regular-  Consistent Carbohydrate {No Snacks} (CSTCHO)  Entered: Dec 20 2021  7:50PM    
This patient has been assessed with a concern for Malnutrition and has been determined to have a diagnosis/diagnoses of Severe protein-calorie malnutrition.    This patient is being managed with:   Diet Regular-  Consistent Carbohydrate {No Snacks} (CSTCHO)  Entered: Dec 12 2021 10:28AM    
This patient has been assessed with a concern for Malnutrition and has been determined to have a diagnosis/diagnoses of Severe protein-calorie malnutrition.    This patient is being managed with:   Diet Regular-  Consistent Carbohydrate {No Snacks} (CSTCHO)  Entered: Dec 20 2021  7:50PM    
This patient has been assessed with a concern for Malnutrition and has been determined to have a diagnosis/diagnoses of Severe protein-calorie malnutrition.    This patient is being managed with:   Diet NPO-  NPO for Procedure/Test     NPO Start Date: 20-Dec-2021   NPO Start Time: 09:00  Except Medications  Entered: Dec 20 2021  7:14AM    Diet Regular-  Consistent Carbohydrate {No Snacks} (CSTCHO)  Entered: Dec 12 2021 10:28AM    
This patient has been assessed with a concern for Malnutrition and has been determined to have a diagnosis/diagnoses of Severe protein-calorie malnutrition.    This patient is being managed with:   Diet Regular-  Consistent Carbohydrate {No Snacks} (CSTCHO)  Entered: Dec 20 2021  7:50PM    
This patient has been assessed with a concern for Malnutrition and has been determined to have a diagnosis/diagnoses of Severe protein-calorie malnutrition.    This patient is being managed with:   Diet Regular-  Consistent Carbohydrate {No Snacks} (CSTCHO)  Entered: Dec 20 2021  7:50PM    
This patient has been assessed with a concern for Malnutrition and has been determined to have a diagnosis/diagnoses of Severe protein-calorie malnutrition.    This patient is being managed with:   Diet Regular-  Consistent Carbohydrate {No Snacks} (CSTCHO)  Entered: Dec 12 2021 10:28AM    
This patient has been assessed with a concern for Malnutrition and has been determined to have a diagnosis/diagnoses of Severe protein-calorie malnutrition.    This patient is being managed with:   Diet Regular-  Consistent Carbohydrate {No Snacks} (CSTCHO)  Entered: Dec 20 2021  7:50PM    

## 2021-12-31 NOTE — PROGRESS NOTE ADULT - SUBJECTIVE AND OBJECTIVE BOX
Patient is a 89y old  Female who presents with a chief complaint of s/p fall (31 Dec 2021 11:49)      DATE OF SERVICE: 12-31-21 @ 15:16    SUBJECTIVE / OVERNIGHT EVENTS: overnight events noted    ROS:  Resp: No cough no sputum production  CVS: No chest pain no palpitations no orthopnea  GI: no N/V/D  : no dysuria, no hematuria          MEDICATIONS  (STANDING):  allopurinol 100 milliGRAM(s) Oral daily  amLODIPine   Tablet 5 milliGRAM(s) Oral daily  apixaban 2.5 milliGRAM(s) Oral two times a day  ascorbic acid 500 milliGRAM(s) Oral daily  bisacodyl 5 milliGRAM(s) Oral at bedtime  calcitriol   Capsule 0.25 MICROGram(s) Oral daily  chlorhexidine 2% Cloths 1 Application(s) Topical <User Schedule>  clopidogrel Tablet 75 milliGRAM(s) Oral daily  dextrose 40% Gel 15 Gram(s) Oral once  dextrose 5%. 1000 milliLiter(s) (100 mL/Hr) IV Continuous <Continuous>  dextrose 5%. 1000 milliLiter(s) (50 mL/Hr) IV Continuous <Continuous>  dextrose 50% Injectable 25 Gram(s) IV Push once  dextrose 50% Injectable 12.5 Gram(s) IV Push once  dextrose 50% Injectable 25 Gram(s) IV Push once  ertapenem  IVPB 500 milliGRAM(s) IV Intermittent every 24 hours  furosemide    Tablet 40 milliGRAM(s) Oral daily  glucagon  Injectable 1 milliGRAM(s) IntraMuscular once  insulin glargine Injectable (LANTUS) 10 Unit(s) SubCutaneous at bedtime  insulin lispro (ADMELOG) corrective regimen sliding scale   SubCutaneous three times a day before meals  insulin lispro (ADMELOG) corrective regimen sliding scale   SubCutaneous at bedtime  levothyroxine 50 MICROGram(s) Oral daily  melatonin 3 milliGRAM(s) Oral at bedtime  metoprolol succinate  milliGRAM(s) Oral daily  multivitamin 1 Tablet(s) Oral daily  nystatin Powder 1 Application(s) Topical two times a day  pantoprazole    Tablet 40 milliGRAM(s) Oral before breakfast  polyethylene glycol 3350 17 Gram(s) Oral daily  senna 2 Tablet(s) Oral at bedtime  simvastatin 20 milliGRAM(s) Oral at bedtime    MEDICATIONS  (PRN):  acetaminophen     Tablet .. 650 milliGRAM(s) Oral every 6 hours PRN Temp greater or equal to 38C (100.4F), Moderate Pain (4 - 6)        CAPILLARY BLOOD GLUCOSE      POCT Blood Glucose.: 185 mg/dL (31 Dec 2021 12:35)  POCT Blood Glucose.: 186 mg/dL (31 Dec 2021 08:29)  POCT Blood Glucose.: 217 mg/dL (30 Dec 2021 22:03)  POCT Blood Glucose.: 166 mg/dL (30 Dec 2021 16:33)    I&O's Summary    30 Dec 2021 07:01  -  31 Dec 2021 07:00  --------------------------------------------------------  IN: 480 mL / OUT: 1300 mL / NET: -820 mL        Vital Signs Last 24 Hrs  T(C): 36.7 (31 Dec 2021 12:13), Max: 36.7 (31 Dec 2021 12:13)  T(F): 98 (31 Dec 2021 12:13), Max: 98 (31 Dec 2021 12:13)  HR: 60 (31 Dec 2021 12:13) (60 - 66)  BP: 126/72 (31 Dec 2021 12:13) (126/72 - 143/77)  BP(mean): --  RR: 17 (31 Dec 2021 12:13) (17 - 18)  SpO2: 95% (31 Dec 2021 12:13) (95% - 98%)    PHYSICAL EXAM:   HEENT: DEBBIE EOMI  Neck: Supple, no JVD  Lungs: clear  CVS: S1 S2 systolic murmur +   Abdomen: non tender BS +  Neuro: Alert nonfocal  Ext: no edema    LABS:                        8.3    8.36  )-----------( 333      ( 31 Dec 2021 07:25 )             28.1     12-30    138  |  101  |  25<H>  ----------------------------<  119<H>  4.2   |  26  |  1.32<H>    Ca    10.1      30 Dec 2021 07:18                  All consultant(s) notes reviewed and care discussed with other providers        Contact Number, Dr Ho [phone]

## 2021-12-31 NOTE — PROGRESS NOTE ADULT - PROBLEM SELECTOR PLAN 6
Monitor labs, Renal FU.
heart rate controlled  chest pain apixaban
Monitor labs, Renal FU.
heart rate controlled  chest pain apixaban
heart rate controlled  off apixaban for procedure
heart rate controlled  chest pain apixaban
Monitor labs, Renal FU.
heart rate controlled  off apixaban for procedure
Monitor labs, Renal FU.
heart rate controlled  chest pain apixaban
heart rate controlled  resume apixaban per podiatry
heart rate controlled  chest pain apixaban
Monitor labs, Renal FU.
Monitor labs, Renal FU.
heart rate controlled  off apixaban for procedure
Monitor labs, Renal FU.
stable  MARICARMEN ruled out
Monitor labs, Renal FU.
acceptable  continue home meds with hold parameters
Monitor labs, Renal FU.
heart rate controlled  chest pain apixaban
stable  MARICARMEN ruled out
acceptable  continue home meds with hold parameters
heart rate controlled  chest pain apixaban
heart rate controlled  off apixaban for procedure
heart rate controlled  chest pain apixaban

## 2021-12-31 NOTE — PROGRESS NOTE ADULT - SUBJECTIVE AND OBJECTIVE BOX
Chief complaint  Patient is a 89y old  Female who presents with a chief complaint of s/p fall (30 Dec 2021 18:03)   Review of systems  Patient in bed, looks comfortable, no hypoglycemic episodes.    Labs and Fingersticks  CAPILLARY BLOOD GLUCOSE      POCT Blood Glucose.: 186 mg/dL (31 Dec 2021 08:29)  POCT Blood Glucose.: 217 mg/dL (30 Dec 2021 22:03)  POCT Blood Glucose.: 166 mg/dL (30 Dec 2021 16:33)  POCT Blood Glucose.: 158 mg/dL (30 Dec 2021 12:44)      Anion Gap, Serum: 11 (12-30 @ 07:18)      Calcium, Total Serum: 10.1 (12-30 @ 07:18)          12-30    138  |  101  |  25<H>  ----------------------------<  119<H>  4.2   |  26  |  1.32<H>    Ca    10.1      30 Dec 2021 07:18                          8.3    8.36  )-----------( 333      ( 31 Dec 2021 07:25 )             28.1     Medications  MEDICATIONS  (STANDING):  allopurinol 100 milliGRAM(s) Oral daily  amLODIPine   Tablet 5 milliGRAM(s) Oral daily  apixaban 2.5 milliGRAM(s) Oral two times a day  ascorbic acid 500 milliGRAM(s) Oral daily  bisacodyl 5 milliGRAM(s) Oral at bedtime  calcitriol   Capsule 0.25 MICROGram(s) Oral daily  chlorhexidine 2% Cloths 1 Application(s) Topical <User Schedule>  clopidogrel Tablet 75 milliGRAM(s) Oral daily  dextrose 40% Gel 15 Gram(s) Oral once  dextrose 5%. 1000 milliLiter(s) (100 mL/Hr) IV Continuous <Continuous>  dextrose 5%. 1000 milliLiter(s) (50 mL/Hr) IV Continuous <Continuous>  dextrose 50% Injectable 25 Gram(s) IV Push once  dextrose 50% Injectable 12.5 Gram(s) IV Push once  dextrose 50% Injectable 25 Gram(s) IV Push once  ertapenem  IVPB 500 milliGRAM(s) IV Intermittent every 24 hours  furosemide    Tablet 40 milliGRAM(s) Oral daily  glucagon  Injectable 1 milliGRAM(s) IntraMuscular once  insulin glargine Injectable (LANTUS) 10 Unit(s) SubCutaneous at bedtime  insulin lispro (ADMELOG) corrective regimen sliding scale   SubCutaneous three times a day before meals  insulin lispro (ADMELOG) corrective regimen sliding scale   SubCutaneous at bedtime  levothyroxine 50 MICROGram(s) Oral daily  melatonin 3 milliGRAM(s) Oral at bedtime  metoprolol succinate  milliGRAM(s) Oral daily  multivitamin 1 Tablet(s) Oral daily  nystatin Powder 1 Application(s) Topical two times a day  pantoprazole    Tablet 40 milliGRAM(s) Oral before breakfast  polyethylene glycol 3350 17 Gram(s) Oral daily  senna 2 Tablet(s) Oral at bedtime  simvastatin 20 milliGRAM(s) Oral at bedtime      Physical Exam  General: Patient comfortable in bed  Vital Signs Last 12 Hrs  T(F): 97 (12-31-21 @ 04:38), Max: 97 (12-31-21 @ 04:38)  HR: 66 (12-31-21 @ 04:38) (66 - 66)  BP: 134/77 (12-31-21 @ 04:38) (134/77 - 134/77)  BP(mean): --  RR: 18 (12-31-21 @ 04:38) (18 - 18)  SpO2: 98% (12-31-21 @ 04:38) (98% - 98%)  Neck: No palpable thyroid nodules.  CVS: S1S2, No murmurs  Respiratory: No wheezing, no crepitations  GI: Abdomen soft, bowel sounds positive  Musculoskeletal:  edema lower extremities.   Skin: No skin rashes, no ecchymosis    Diagnostics    Cortisol AM, Serum: AM Sched. Collection: 17-Dec-2021 06:00 (12-16 @ 14:32)

## 2021-12-31 NOTE — PROGRESS NOTE ADULT - PROBLEM SELECTOR PROBLEM 7
Hypothyroidism
Hypothyroidism
HTN (hypertension)
Afib
HTN (hypertension)
Afib

## 2021-12-31 NOTE — DISCHARGE NOTE NURSING/CASE MANAGEMENT/SOCIAL WORK - PATIENT PORTAL LINK FT
You can access the FollowMyHealth Patient Portal offered by [facility] by registering at the following website: [email or web address]. By joining [facility]’s FollowMyHealth portal, you will also be able to view your health information using other applications (apps) compatible with our system.

## 2021-12-31 NOTE — DISCHARGE NOTE NURSING/CASE MANAGEMENT/SOCIAL WORK - NSDCFUADDAPPT_GEN_ALL_CORE_FT
Podiatry Discharge Instructions:  Follow up: Please follow up with Dr. Márquez within 1 week of discharge from the hospital, please call [phone] for appointment and discuss that you recently were seen in the hospital.  Wound Care: Please keep dressing clean, dry and intact until follow up.   Weight bearing: Please weight bear as tolerated to the right ankle  Antibiotics: Please continue as instructed.

## 2021-12-31 NOTE — PROGRESS NOTE ADULT - PROBLEM SELECTOR PLAN 7
heart rate controlled  continue apixaban
acceptable  continue home meds with hold parameters
heart rate controlled  continue apixaban
acceptable  continue home meds with hold parameters
continue synthroid  TSH normal
acceptable  continue home meds with hold parameters
acceptable  continue home meds with hold parameters
continue synthroid  TSH normal    anemia : H/H slowly trending down   would check hoff and monitor   no reports of melena    MARICAREMN : baseline Cr. ?  seems improving Cr   monitor       ACP : pt is DNR
acceptable  continue home meds with hold parameters
acceptable  continue home meds with hold parameters

## 2021-12-31 NOTE — PROGRESS NOTE ADULT - PROBLEM SELECTOR PROBLEM 5
Stage 3 chronic kidney disease
HTN (hypertension)
HTN (hypertension)
Stage 3 chronic kidney disease
Stage 3 chronic kidney disease
HTN (hypertension)
Stage 3 chronic kidney disease
Stage 3 chronic kidney disease
HTN (hypertension)
HTN (hypertension)
Stage 3 chronic kidney disease
HTN (hypertension)
HTN (hypertension)
Stage 3 chronic kidney disease
HTN (hypertension)
Stage 3 chronic kidney disease
Afib
Anemia of chronic disease
Stage 3 chronic kidney disease
Anemia of chronic disease
Afib

## 2021-12-31 NOTE — PROGRESS NOTE ADULT - PROBLEM SELECTOR PROBLEM 3
Acute CHF
Cellulitis of right ankle
Diabetes mellitus
Cellulitis of right ankle
Diabetes mellitus
Cellulitis of right ankle
Diabetes mellitus
Cellulitis of right ankle
Diabetes mellitus
Diabetes mellitus
Cellulitis of right ankle
Diabetes mellitus
Acute CHF
Cellulitis of right ankle
Cellulitis of right ankle
Diabetes mellitus
Acute CHF
Acute CHF

## 2021-12-31 NOTE — DISCHARGE NOTE NURSING/CASE MANAGEMENT/SOCIAL WORK - NSDCPEFALRISK_GEN_ALL_CORE
For information on Fall & Injury Prevention, visit: [email or web address] OR  [email or web address] OR  https://www.cdc.gov/steadi/patient.html

## 2021-12-31 NOTE — PROGRESS NOTE ADULT - PROBLEM SELECTOR PLAN 5
Suggest to continue medications, monitoring, FU primary team recommendations.
Suggest to continue medications, monitoring, FU primary team recommendations.
will need to be discharged with in-dwelling [facility] for TOV in Subacute Rehab     creatinine at baseline
Suggest to continue medications, monitoring, FU primary team recommendations.
Suggest to continue medications, monitoring, FU primary team recommendations.
stable  MARICARMEN ruled out after admission
Suggest to continue medications, monitoring, FU primary team recommendations.
MARICARMEN on CKD  resolved   renal help appreciated   continue to follow  TOV
Suggest to continue medications, monitoring, FU primary team recommendations.
bump in creatinine likely secondary to TOV and recurrence of TOV  will need to be discharged with in-dwelling [facility] for TOV in Subacute Rehab   renal help appreciated   continue to follow
will need to be discharged with in-dwelling [facility] for TOV in Subacute Rehab   creatinine at baseline
will need to be discharged with in-dwelling [facility] for TOV in Subacute Rehab   renal help appreciated   continue to follow  creatinine at baseline
Suggest to continue medications, monitoring, FU primary team recommendations.
will need to be discharged with in-dwelling [facility] for TOV in Subacute Rehab     creatinine at baseline
Suggest to continue medications, monitoring, FU primary team recommendations.
MARICARMEN on CKD   unclear etiology  will discontinue furosemide    mls bolus  encourage po fluids  renal evaluation in AM (called by me)
Suggest to continue medications, monitoring, FU primary team recommendations.
will need to be discharged with in-dwelling [facility] for TOV in Subacute Rehab   creatinine at baseline
Suggest to continue medications, monitoring, FU primary team recommendations.
creatinine at baseline  TOV in Subacute Rehab
will continue to monitor   work up negative  no evidence of overt GI blood loss
Suggest to continue medications, monitoring, FU primary team recommendations.
will continue to monitor   work up negative  no evidence of overt GI blood loss
creatinine at baseline  TOV in Subacute Rehab
heart rate controlled  continue apixaban
stable  MARICARMEN ruled out after admission
heart rate controlled  continue apixaban
Suggest to continue medications, monitoring, FU primary team recommendations.
MARICARMEN on CKD   stable  renal help appreciated   continue to follow
will need to be discharged with in-dwelling [facility] for TOV in Subacute Rehab   renal help appreciated   continue to follow
will need to be discharged with in-dwelling [facility] for TOV in Subacute Rehab   creatinine at baseline

## 2021-12-31 NOTE — PROGRESS NOTE ADULT - PROVIDER SPECIALTY LIST ADULT
Infectious Disease
Infectious Disease
Nephrology
Endocrinology
Infectious Disease
Infectious Disease
Nephrology
Podiatry
Infectious Disease
Nephrology
Podiatry
Infectious Disease
Nephrology
Podiatry
Endocrinology
Internal Medicine
Endocrinology
Internal Medicine
Internal Medicine
Endocrinology
Endocrinology
Internal Medicine
Internal Medicine
Endocrinology
Internal Medicine
Endocrinology
Internal Medicine
Endocrinology
Internal Medicine
Internal Medicine
Endocrinology
Internal Medicine

## 2021-12-31 NOTE — PROGRESS NOTE ADULT - PROBLEM SELECTOR PLAN 3
Suggest to continue medications, monitoring, FU primary team/Podiatry recommendations.
finger sticks better  continue finger sticks with short acting insulin sliding scale   continue to follow endo recommendations
finger sticks better  continue finger sticks with short acting insulin sliding scale   continue to follow endo recommendations
uncontrolled DM Type 2   endo consultation appreciated
acute CHF ruled out after admission   no shortness of breath   currently comfortable
Suggest to continue medications, monitoring, FU primary team/Podiatry recommendations.
Suggest to continue medications, monitoring, FU primary team/Podiatry recommendations.
finger sticks better  continue finger sticks with short acting insulin sliding scale   continue to follow endo recommendations
uncontrolled DM Type 2   finger sticks better  continue finger sticks with short acting insulin sliding scale   endo consultation appreciated
finger sticks better  continue finger sticks with short acting insulin sliding scale   continue to follow endo recommendations
Suggest to continue medications, monitoring, FU primary team/Podiatry recommendations.
on po furosemide   will continue for now   no shortness of breath   currently comfortable
Suggest to continue medications, monitoring, FU primary team/Podiatry recommendations.
finger sticks better  continue finger sticks with short acting insulin sliding scale   continue to follow endo recommendations
uncontrolled DM Type 2   endo consultation appreciated
on po furosemide   will continue for now   no shortness of breath   currently comfortable
Suggest to continue medications, monitoring, FU primary team/Podiatry recommendations.
finger sticks better  continue finger sticks with short acting insulin sliding scale   continue to follow endo recommendations
uncontrolled DM Type 2   endo consultation appreciated
finger sticks better  continue finger sticks with short acting insulin sliding scale   continue to follow endo recommendations
finger sticks better  continue finger sticks with short acting insulin sliding scale   continue to follow endo recommendations
uncontrolled DM Type 2   endo consultation appreciated
acute CHF ruled out after admission   no shortness of breath   currently comfortable

## 2021-12-31 NOTE — PROGRESS NOTE ADULT - PROBLEM SELECTOR PROBLEM 4
Fall
Anemia of chronic disease
Anemia of chronic disease
Diabetes mellitus
Fall
Anemia of chronic disease
Diabetes mellitus
Anemia of chronic disease
Fall
Anemia of chronic disease
Anemia of chronic disease
Fall
Anemia of chronic disease
Diabetes mellitus
Fall
Anemia of chronic disease
Fall
Fall
Anemia of chronic disease
Anemia of chronic disease
Fall
Anemia of chronic disease
Anemia of chronic disease
Diabetes mellitus
Anemia of chronic disease

## 2021-12-31 NOTE — PROGRESS NOTE ADULT - PROBLEM SELECTOR PROBLEM 2
Acute CHF
Hypothyroidism
Acute CHF
Fall
Hypothyroidism
Acute CHF
Hypothyroidism
Acute CHF
Hypothyroidism
Acute CHF
Hypothyroidism
Acute CHF
Acute CHF
Hypothyroidism
Hypothyroidism
Acute CHF
Hypothyroidism
Fall
Fall
Acute CHF
Acute CHF
Fall

## 2021-12-31 NOTE — PROGRESS NOTE ADULT - ATTENDING COMMENTS
- Booked for R ankle wound incision and drainage with washout and Bone Biopsy for Monday 12/21 at 5PM   - Please document medical optimization for surgery under light sedation w/ local anesthesia  - NPO after 9am tomorrow, please coordinate breakfast early
-Extensive and detailed discussion with patient and daugther Tawanna over the phone at bedside concerning patient's condition with optimal treatment being OR surgical intervention for incision and drainage with bone biopsy as compared to bedside bone biopsy alone. Patient and daughter showing and confirming full understanding and in agreement for plan.  -Consented and Pod plan for R ankle wound incision and drainage with washout and Bone Biopsy for Monday 12/21 at 1PM
-Right lateral mal wound to periosteum, w/ scant purulence noted, wound tracks 1.5cm proximally, no malodor, mild periwound erythema.  s/p R lateral mal incision and drainage to the level of subQ and not beyond using a 15 blade along the lines of tracking, expressing no purulence, no malodor  -Continue IV antibiotics
Assessment and pllan discussed with ACP.
assessment and plan discussed with the ACp
assessment and plane discussed with the ACP
assessment and plan discussed with patient
assessment and plan of care discussed with ACP

## 2021-12-31 NOTE — PROGRESS NOTE ADULT - PROBLEM SELECTOR PLAN 1
MRI noted:    Changes concerning for septic arthritis of the ankle/hindfoot and   midfoot with osteomyelitis given the clinical concern. Inflammatory   arthropathy could also be considered in the appropriate setting.  2. Mild tenosynovitis of the medial flexor tendons with moderate   tenosynovitis of the peroneal tendons. This may be reactive or infectious  continue cefepime IV and vancomycin     ortho evaluation requested   ID feels needs washout  continue antibiotics
Will continue current insulin regimen for now. Will continue monitoring  blood sugars, will Follow up.  Patient counseled for compliance with consistent low carb diet.
discussed with podiatry attending  for OR washout Monday  apixaban on hold  continue antibiotics over the weekend  now on meropenem and vancomycin
Will continue current insulin regimen for now. Will continue monitoring FS and FU.  Patient counseled for compliance with consistent low carb diet.
Will increase Lantus to 20u at bedtime.  Will increase Admelog to 8u before each meal and continue Admelog correction scale coverage. Will continue monitoring FS and FU.  Patient counseled for compliance with consistent low carb diet and exercise as tolerated outpatient.
Continue current insulin regimen for now. Will continue monitoring FS and FU.  Patient was on insulin at home (Lantus & Humalog); If blood sugars are stable can DC on current basal bolus insulin regimen, endo FU 4 weeks.  Patient counseled for compliance with consistent low carb diet.
Will continue current insulin regimen for now. Will continue monitoring FS and FU.  Patient counseled for compliance with consistent low carb diet.
cultures remain negative so far   continue antibiotics now in Invanz 500 IV qd x 6 weeks total
s/p washout  await cultures   continue meropenem  discussed with ID
s/p washout  await cultures from same   continue meropenem  so far cultures negative
s/p washout  negative cultures so far   continue meropenem  likely 4 - 6 weeks total
Will continue Lantus 10u at bedtime as well as Admelog correction scale coverage. Will continue monitoring FS and FU.  Patient was on insulin at home (Lantus & Humalog); If blood sugars are stable can DC on the following DM regimen:  -Lantus 10u at bedtime  -Humalog sliding scale before meals  -Endo FU 4 weeks  Discussed plan with patient. Counseled for compliance with low-carb diet.
Will continue current insulin regimen for now. Will continue monitoring  blood sugars, will Follow up.  Patient counseled for compliance with consistent low carb diet.
continue antibiotics now in Invanz 500 IV qd x 6 weeks total
s/p washout  negative cultures so far   continue meropenem
Continue Lantus 10u at bedtime.  Continue  Admelog to 4u before each meal and continue Admelog correction scale coverage. Will continue monitoring FS and FU.  Patient was on insulin at home (Lantus & Humalog); If blood sugars are stable can DC on current basal bolus insulin regimen, endo FU 4 weeks.  Patient counseled for compliance with consistent low carb diet.
Will continue current insulin regimen for now. Will continue monitoring FS and FU.  Patient counseled for compliance with consistent low carb diet.
Will continue current insulin regimen for now. Will continue monitoring FS and FU.  Patient was on insulin at home (Lantus & Humalog); If blood sugars are stable can DC on the following DM regimen:  -Lantus 10u at bedtime  -Humalog sliding scale before meals  -Endo FU 4 weeks  Discussed plan with patient. Counseled for compliance with low-carb diet.
cultures remain negative so far   continue meropenem  likely 4 - 6 weeks total
with likely osteomyelitis   will continue Invanz via PICC
Will continue Lantus 10u at bedtime as well as Admelog correction scale coverage. Will continue monitoring FS and FU.  Patient was on insulin at home (Lantus & Humalog); If blood sugars are stable can DC on the following DM regimen:  -Lantus 10u at bedtime  -Tradjenta 5mg daily  -Discontinue all other hypoglycemic agents  -Endo FU 4 weeks  Patient counseled to increase PO intake, nutritional supplements as tolerated.
Will DC standing-dose Admelog for now.  Will continue Lantus 10u at bedtime as well as Admelog correction scale coverage. Will continue monitoring FS and FU.  Patient was on insulin at home (Lantus & Humalog); If blood sugars are stable can DC on the following DM regimen:  -Lantus 10u at bedtime  -Tradjenta 5mg daily  -Discontinue all other hypoglycemic agents  -Endo FU 4 weeks  Patient counseled to increase PO intake, nutritional supplements as tolerated.
Will continue current insulin regimen for now. Will continue monitoring FS and FU.  Patient was on insulin at home (Lantus & Humalog); If blood sugars are stable can DC on current basal bolus insulin regimen, endo FU 4 weeks.  Patient counseled for compliance with consistent low carb diet.
ID help appreciated  agree with recommendations  local gram stain and cultures from draining wound  continue cefepime IV and vancomycin   MR right ankle ordered
Will continue Lantus 14u at bedtime.  Will decrease Admelog to 5u before each meal and continue Admelog correction scale coverage. Will continue monitoring FS and FU.  Patient was on insulin at home (Lantus & Humalog); If blood sugars are stable can DC on current basal bolus insulin regimen, endo FU 4 weeks.  Patient counseled for compliance with consistent low carb diet.
cultures remain negative so far   continue meropenem  duration per ID
ID help appreciated  agree with recommendations  local gram stain and cultures from draining wound negative   continue cefepime IV and vancomycin   x-ray suspicious for OM   awaiting MRI right ankle
Will  decrease Lantus 10u at bedtime.  Will decrease Admelog to 4u before each meal and continue Admelog correction scale coverage. Will continue monitoring FS and FU.  Patient was on insulin at home (Lantus & Humalog); If blood sugars are stable can DC on current basal bolus insulin regimen, endo FU 4 weeks.  Patient counseled for compliance with consistent low carb diet.
with likely osteomyelitis   will continue Invanz per ID recommendations
apixaban on hold  now on meropenem
ID help appreciated  agree with recommendations  local gram stain and cultures from draining wound  continue cefepime IV and vancomycin   xray suspecious for OM   MR right ankle ordered : awaiting to clarify if ppm is compatible..if not check CT   monitor ESR and LFT while on Abx   monitor Cr while on vanc
with likely osteomyelitis   will continue Invanz
apixaban on hold  continue antibiotics over the weekend  now on meropenem and vancomycin
apixaban on hold  continue antibiotics over the weekend  now on meropenem

## 2021-12-31 NOTE — PROGRESS NOTE ADULT - PROBLEM SELECTOR PLAN 8
continue synthroid
continue synthroid
acceptable  continue home meds with hold parameters
continue synthroid
acceptable  continue home meds with hold parameters
continue synthroid
continue synthroid

## 2021-12-31 NOTE — PROGRESS NOTE ADULT - PROBLEM SELECTOR PLAN 4
FS uncontrolled : will continue Lantus and monitor   cont short acting insulin sliding scale  no oral meds  endo consultation requested
Suggest to continue medications, monitoring, FU primary team recommendations.
Suggest to continue medications, monitoring, FU primary team recommendations.
will continue to monitor   work up negative  no evidence of overt GI blood loss
Suggest to continue medications, monitoring, FU primary team recommendations.
Suggest to continue medications, monitoring, FU primary team recommendations.
will continue to monitor   likely multifactorial
FS uncontrolled : will continue Lantus and monitor   cont short acting insulin sliding scale  HbA1C 7.3  no oral meds  endo consultation requested
Suggest to continue medications, monitoring, FU primary team recommendations.
will continue to monitor   likely multifactorial including blood draws, CKD and inflammation
will continue to monitor   work up negative  no evidence of overt GI blood loss
will continue to monitor   work up negative  no evidence of overt GI blood loss
Suggest to continue medications, monitoring, FU primary team recommendations.
will continue to monitor   likely multifactorial
Suggest to continue medications, monitoring, FU primary team recommendations.
will continue to monitor   work up negative  no evidence of overt GI blood loss
will continue to monitor   work up negative  no evidence of overt GI blood loss
will continue to monitor   likely multifactorial
Suggest to continue medications, monitoring, FU primary team recommendations.
Suggest to continue medications, monitoring, FU primary team recommendations.
continue finger sticks with short acting insulin sliding scale  HbA1C 7.3  no oral meds
Suggest to continue medications, monitoring, FU primary team recommendations.
Suggest to continue medications, monitoring, FU primary team recommendations.
FS uncontrolled : will add lantus and monitor   cont short acting insulin sliding scale  HbA1C 7.3  no oral meds
will continue to monitor   likely multifactorial including blood draws, CKD and inflammation
will continue to monitor   work up negative  no evidence of overt GI blood loss
will continue to monitor   work up negative  no evidence of overt GI blood loss
will continue to monitor   likely multifactorial

## 2022-01-01 ENCOUNTER — NON-APPOINTMENT (OUTPATIENT)
Age: 87
End: 2022-01-01

## 2022-01-01 ENCOUNTER — LABORATORY RESULT (OUTPATIENT)
Age: 87
End: 2022-01-01

## 2022-01-01 ENCOUNTER — OUTPATIENT (OUTPATIENT)
Dept: OUTPATIENT SERVICES | Facility: HOSPITAL | Age: 87
LOS: 1 days | End: 2022-01-01

## 2022-01-01 ENCOUNTER — APPOINTMENT (OUTPATIENT)
Dept: GERIATRICS | Facility: CLINIC | Age: 87
End: 2022-01-01
Payer: MEDICARE

## 2022-01-01 ENCOUNTER — APPOINTMENT (OUTPATIENT)
Dept: GERIATRICS | Facility: CLINIC | Age: 87
End: 2022-01-01

## 2022-01-01 ENCOUNTER — INPATIENT (INPATIENT)
Facility: HOSPITAL | Age: 87
LOS: 35 days | DRG: 283 | End: 2022-06-04
Attending: INTERNAL MEDICINE | Admitting: STUDENT IN AN ORGANIZED HEALTH CARE EDUCATION/TRAINING PROGRAM
Payer: MEDICARE

## 2022-01-01 ENCOUNTER — TRANSCRIPTION ENCOUNTER (OUTPATIENT)
Age: 87
End: 2022-01-01

## 2022-01-01 ENCOUNTER — RESULT REVIEW (OUTPATIENT)
Age: 87
End: 2022-01-01

## 2022-01-01 ENCOUNTER — APPOINTMENT (OUTPATIENT)
Dept: RADIOLOGY | Facility: CLINIC | Age: 87
End: 2022-01-01
Payer: MEDICARE

## 2022-01-01 ENCOUNTER — INPATIENT (INPATIENT)
Facility: HOSPITAL | Age: 87
LOS: 1 days | Discharge: HOME | DRG: 312 | End: 2022-03-04
Attending: HOSPITALIST | Admitting: HOSPITALIST
Payer: MEDICARE

## 2022-01-01 ENCOUNTER — APPOINTMENT (OUTPATIENT)
Dept: ORTHOPEDIC SURGERY | Facility: CLINIC | Age: 87
End: 2022-01-01
Payer: MEDICARE

## 2022-01-01 VITALS — DIASTOLIC BLOOD PRESSURE: 52 MMHG | SYSTOLIC BLOOD PRESSURE: 132 MMHG

## 2022-01-01 VITALS
DIASTOLIC BLOOD PRESSURE: 70 MMHG | HEART RATE: 72 BPM | HEIGHT: 62 IN | TEMPERATURE: 98 F | RESPIRATION RATE: 18 BRPM | SYSTOLIC BLOOD PRESSURE: 158 MMHG

## 2022-01-01 VITALS
SYSTOLIC BLOOD PRESSURE: 138 MMHG | DIASTOLIC BLOOD PRESSURE: 58 MMHG | BODY MASS INDEX: 30.02 KG/M2 | OXYGEN SATURATION: 99 % | WEIGHT: 159 LBS | RESPIRATION RATE: 16 BRPM | HEIGHT: 61 IN | TEMPERATURE: 97.4 F | HEART RATE: 61 BPM

## 2022-01-01 VITALS
TEMPERATURE: 98.1 F | WEIGHT: 168 LBS | DIASTOLIC BLOOD PRESSURE: 50 MMHG | BODY MASS INDEX: 31.74 KG/M2 | RESPIRATION RATE: 20 BRPM | OXYGEN SATURATION: 92 % | HEART RATE: 65 BPM | SYSTOLIC BLOOD PRESSURE: 152 MMHG

## 2022-01-01 VITALS
RESPIRATION RATE: 18 BRPM | OXYGEN SATURATION: 96 % | DIASTOLIC BLOOD PRESSURE: 68 MMHG | HEART RATE: 70 BPM | TEMPERATURE: 98 F | SYSTOLIC BLOOD PRESSURE: 129 MMHG

## 2022-01-01 VITALS
RESPIRATION RATE: 24 BRPM | SYSTOLIC BLOOD PRESSURE: 142 MMHG | HEART RATE: 87 BPM | DIASTOLIC BLOOD PRESSURE: 74 MMHG | TEMPERATURE: 98 F | HEIGHT: 62 IN | OXYGEN SATURATION: 98 % | WEIGHT: 160.06 LBS

## 2022-01-01 VITALS — BODY MASS INDEX: 28.7 KG/M2 | HEIGHT: 63 IN | WEIGHT: 162 LBS

## 2022-01-01 VITALS
SYSTOLIC BLOOD PRESSURE: 121 MMHG | OXYGEN SATURATION: 96 % | HEART RATE: 60 BPM | DIASTOLIC BLOOD PRESSURE: 48 MMHG | TEMPERATURE: 98 F | RESPIRATION RATE: 20 BRPM

## 2022-01-01 DIAGNOSIS — N18.9 CHRONIC KIDNEY DISEASE, UNSPECIFIED: ICD-10-CM

## 2022-01-01 DIAGNOSIS — N18.30 CHRONIC KIDNEY DISEASE, STAGE 3 UNSPECIFIED: ICD-10-CM

## 2022-01-01 DIAGNOSIS — R53.81 OTHER MALAISE: ICD-10-CM

## 2022-01-01 DIAGNOSIS — I48.20 CHRONIC ATRIAL FIBRILLATION, UNSPECIFIED: ICD-10-CM

## 2022-01-01 DIAGNOSIS — K21.9 GASTRO-ESOPHAGEAL REFLUX DISEASE W/OUT ESOPHAGITIS: ICD-10-CM

## 2022-01-01 DIAGNOSIS — R74.01 ELEVATION OF LEVELS OF LIVER TRANSAMINASE LEVELS: ICD-10-CM

## 2022-01-01 DIAGNOSIS — I10 ESSENTIAL (PRIMARY) HYPERTENSION: ICD-10-CM

## 2022-01-01 DIAGNOSIS — E11.9 TYPE 2 DIABETES MELLITUS WITHOUT COMPLICATIONS: ICD-10-CM

## 2022-01-01 DIAGNOSIS — R45.1 RESTLESSNESS AND AGITATION: ICD-10-CM

## 2022-01-01 DIAGNOSIS — S91.301A UNSPECIFIED OPEN WOUND, RIGHT FOOT, INITIAL ENCOUNTER: ICD-10-CM

## 2022-01-01 DIAGNOSIS — I48.91 UNSPECIFIED ATRIAL FIBRILLATION: ICD-10-CM

## 2022-01-01 DIAGNOSIS — E03.9 HYPOTHYROIDISM, UNSPECIFIED: ICD-10-CM

## 2022-01-01 DIAGNOSIS — R53.1 WEAKNESS: ICD-10-CM

## 2022-01-01 DIAGNOSIS — R82.71 BACTERIURIA: ICD-10-CM

## 2022-01-01 DIAGNOSIS — I50.33 ACUTE ON CHRONIC DIASTOLIC (CONGESTIVE) HEART FAILURE: ICD-10-CM

## 2022-01-01 DIAGNOSIS — R06.00 DYSPNEA, UNSPECIFIED: ICD-10-CM

## 2022-01-01 DIAGNOSIS — I50.9 HEART FAILURE, UNSPECIFIED: ICD-10-CM

## 2022-01-01 DIAGNOSIS — Z23 ENCOUNTER FOR IMMUNIZATION: ICD-10-CM

## 2022-01-01 DIAGNOSIS — Z29.9 ENCOUNTER FOR PROPHYLACTIC MEASURES, UNSPECIFIED: ICD-10-CM

## 2022-01-01 DIAGNOSIS — S80.00XA CONTUSION OF UNSPECIFIED KNEE, INITIAL ENCOUNTER: ICD-10-CM

## 2022-01-01 DIAGNOSIS — Z95.0 PRESENCE OF CARDIAC PACEMAKER: ICD-10-CM

## 2022-01-01 DIAGNOSIS — L29.8 OTHER PRURITUS: ICD-10-CM

## 2022-01-01 DIAGNOSIS — J96.01 ACUTE RESPIRATORY FAILURE WITH HYPOXIA: ICD-10-CM

## 2022-01-01 DIAGNOSIS — Z86.79 PERSONAL HISTORY OF OTHER DISEASES OF THE CIRCULATORY SYSTEM: ICD-10-CM

## 2022-01-01 DIAGNOSIS — E78.5 HYPERLIPIDEMIA, UNSPECIFIED: ICD-10-CM

## 2022-01-01 DIAGNOSIS — M25.511 PAIN IN RIGHT SHOULDER: ICD-10-CM

## 2022-01-01 DIAGNOSIS — E11.9 TYPE 2 DIABETES MELLITUS W/OUT COMPLICATIONS: ICD-10-CM

## 2022-01-01 DIAGNOSIS — Z51.5 ENCOUNTER FOR PALLIATIVE CARE: ICD-10-CM

## 2022-01-01 DIAGNOSIS — R53.2 FUNCTIONAL QUADRIPLEGIA: ICD-10-CM

## 2022-01-01 DIAGNOSIS — Z96.642 PRESENCE OF LEFT ARTIFICIAL HIP JOINT: Chronic | ICD-10-CM

## 2022-01-01 DIAGNOSIS — F41.9 ANXIETY DISORDER, UNSPECIFIED: ICD-10-CM

## 2022-01-01 DIAGNOSIS — R52 PAIN, UNSPECIFIED: ICD-10-CM

## 2022-01-01 DIAGNOSIS — L89.90 PRESSURE ULCER OF UNSPECIFIED SITE, UNSPECIFIED STAGE: ICD-10-CM

## 2022-01-01 DIAGNOSIS — M19.011 PRIMARY OSTEOARTHRITIS, RIGHT SHOULDER: ICD-10-CM

## 2022-01-01 DIAGNOSIS — Z71.89 OTHER SPECIFIED COUNSELING: ICD-10-CM

## 2022-01-01 DIAGNOSIS — Z95.2 PRESENCE OF PROSTHETIC HEART VALVE: Chronic | ICD-10-CM

## 2022-01-01 DIAGNOSIS — Z86.39 PERSONAL HISTORY OF OTHER ENDOCRINE, NUTRITIONAL AND METABOLIC DISEASE: ICD-10-CM

## 2022-01-01 DIAGNOSIS — M25.562 PAIN IN LEFT KNEE: ICD-10-CM
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
## 2022-01-01 DIAGNOSIS — Z95.2 PRESENCE OF PROSTHETIC HEART VALVE: ICD-10-CM

## 2022-01-01 DIAGNOSIS — I50.30 UNSPECIFIED DIASTOLIC (CONGESTIVE) HEART FAILURE: ICD-10-CM

## 2022-01-01 DIAGNOSIS — L89.610 PRESSURE ULCER OF RIGHT HEEL, UNSTAGEABLE: ICD-10-CM

## 2022-01-01 LAB
-  AMIKACIN: SIGNIFICANT CHANGE UP
-  AMOXICILLIN/CLAVULANIC ACID: SIGNIFICANT CHANGE UP
-  AMPICILLIN/SULBACTAM: SIGNIFICANT CHANGE UP
-  AMPICILLIN: SIGNIFICANT CHANGE UP
-  AZTREONAM: SIGNIFICANT CHANGE UP
-  CEFAZOLIN: SIGNIFICANT CHANGE UP
-  CEFEPIME: SIGNIFICANT CHANGE UP
-  CEFOXITIN: SIGNIFICANT CHANGE UP
-  CEFTRIAXONE: SIGNIFICANT CHANGE UP
-  CIPROFLOXACIN: SIGNIFICANT CHANGE UP
-  ERTAPENEM: SIGNIFICANT CHANGE UP
-  GENTAMICIN: SIGNIFICANT CHANGE UP
-  IMIPENEM: SIGNIFICANT CHANGE UP
-  LEVOFLOXACIN: SIGNIFICANT CHANGE UP
-  MEROPENEM: SIGNIFICANT CHANGE UP
-  NITROFURANTOIN: SIGNIFICANT CHANGE UP
-  PIPERACILLIN/TAZOBACTAM: SIGNIFICANT CHANGE UP
-  TIGECYCLINE: SIGNIFICANT CHANGE UP
-  TOBRAMYCIN: SIGNIFICANT CHANGE UP
-  TRIMETHOPRIM/SULFAMETHOXAZOLE: SIGNIFICANT CHANGE UP
A1C WITH ESTIMATED AVERAGE GLUCOSE RESULT: 7.4 % — HIGH (ref 4–5.6)
ALBUMIN SERPL ELPH-MCNC: 2.8 G/DL — LOW (ref 3.3–5)
ALBUMIN SERPL ELPH-MCNC: 2.9 G/DL — LOW (ref 3.3–5)
ALBUMIN SERPL ELPH-MCNC: 3 G/DL — LOW (ref 3.3–5)
ALBUMIN SERPL ELPH-MCNC: 3 G/DL — LOW (ref 3.3–5)
ALBUMIN SERPL ELPH-MCNC: 3.1 G/DL — LOW (ref 3.3–5)
ALBUMIN SERPL ELPH-MCNC: 3.2 G/DL — LOW (ref 3.3–5)
ALBUMIN SERPL ELPH-MCNC: 3.2 G/DL — LOW (ref 3.3–5)
ALBUMIN SERPL ELPH-MCNC: 3.4 G/DL — SIGNIFICANT CHANGE UP (ref 3.3–5)
ALBUMIN SERPL ELPH-MCNC: 3.4 G/DL — SIGNIFICANT CHANGE UP (ref 3.3–5)
ALBUMIN SERPL ELPH-MCNC: 3.6 G/DL — SIGNIFICANT CHANGE UP (ref 3.3–5)
ALBUMIN SERPL ELPH-MCNC: 3.6 G/DL — SIGNIFICANT CHANGE UP (ref 3.3–5)
ALBUMIN SERPL ELPH-MCNC: 3.8 G/DL — SIGNIFICANT CHANGE UP (ref 3.3–5)
ALP SERPL-CCNC: 108 U/L — SIGNIFICANT CHANGE UP (ref 40–120)
ALP SERPL-CCNC: 109 U/L — SIGNIFICANT CHANGE UP (ref 40–120)
ALP SERPL-CCNC: 115 U/L — SIGNIFICANT CHANGE UP (ref 40–120)
ALP SERPL-CCNC: 119 U/L — SIGNIFICANT CHANGE UP (ref 40–120)
ALP SERPL-CCNC: 120 U/L — SIGNIFICANT CHANGE UP (ref 40–120)
ALP SERPL-CCNC: 121 U/L — HIGH (ref 40–120)
ALP SERPL-CCNC: 123 U/L — HIGH (ref 40–120)
ALP SERPL-CCNC: 144 U/L — HIGH (ref 40–120)
ALP SERPL-CCNC: 147 U/L — HIGH (ref 40–120)
ALP SERPL-CCNC: 150 U/L — HIGH (ref 40–120)
ALP SERPL-CCNC: 153 U/L — HIGH (ref 40–120)
ALP SERPL-CCNC: 91 U/L — SIGNIFICANT CHANGE UP (ref 40–120)
ALT FLD-CCNC: 14 U/L — SIGNIFICANT CHANGE UP (ref 10–45)
ALT FLD-CCNC: 15 U/L — SIGNIFICANT CHANGE UP (ref 10–45)
ALT FLD-CCNC: 21 U/L — SIGNIFICANT CHANGE UP (ref 10–45)
ALT FLD-CCNC: 22 U/L — SIGNIFICANT CHANGE UP (ref 10–45)
ALT FLD-CCNC: 32 U/L — SIGNIFICANT CHANGE UP (ref 10–45)
ALT FLD-CCNC: 39 U/L — SIGNIFICANT CHANGE UP (ref 10–45)
ALT FLD-CCNC: 45 U/L — SIGNIFICANT CHANGE UP (ref 10–45)
ALT FLD-CCNC: 45 U/L — SIGNIFICANT CHANGE UP (ref 10–45)
ALT FLD-CCNC: 68 U/L — HIGH (ref 10–45)
ALT FLD-CCNC: 77 U/L — HIGH (ref 10–45)
ALT FLD-CCNC: 78 U/L — HIGH (ref 10–45)
ALT FLD-CCNC: 79 U/L — HIGH (ref 10–45)
ANION GAP SERPL CALC-SCNC: 10 MMOL/L — SIGNIFICANT CHANGE UP (ref 5–17)
ANION GAP SERPL CALC-SCNC: 11 MMOL/L — SIGNIFICANT CHANGE UP (ref 5–17)
ANION GAP SERPL CALC-SCNC: 12 MMOL/L — SIGNIFICANT CHANGE UP (ref 5–17)
ANION GAP SERPL CALC-SCNC: 13 MMOL/L — SIGNIFICANT CHANGE UP (ref 5–17)
ANION GAP SERPL CALC-SCNC: 14 MMOL/L — SIGNIFICANT CHANGE UP (ref 5–17)
ANION GAP SERPL CALC-SCNC: 15 MMOL/L — SIGNIFICANT CHANGE UP (ref 5–17)
ANION GAP SERPL CALC-SCNC: 16 MMOL/L — SIGNIFICANT CHANGE UP (ref 5–17)
ANION GAP SERPL CALC-SCNC: 18 MMOL/L — HIGH (ref 5–17)
ANION GAP SERPL CALC-SCNC: 18 MMOL/L — HIGH (ref 5–17)
ANION GAP SERPL CALC-SCNC: 19 MMOL/L — HIGH (ref 5–17)
ANION GAP SERPL CALC-SCNC: 19 MMOL/L — HIGH (ref 5–17)
ANION GAP SERPL CALC-SCNC: 8 MMOL/L — SIGNIFICANT CHANGE UP (ref 5–17)
ANION GAP SERPL CALC-SCNC: 9 MMOL/L — SIGNIFICANT CHANGE UP (ref 5–17)
ANISOCYTOSIS BLD QL: SLIGHT — SIGNIFICANT CHANGE UP
APPEARANCE UR: ABNORMAL
APPEARANCE UR: CLEAR — SIGNIFICANT CHANGE UP
APTT BLD: 32.1 SEC — SIGNIFICANT CHANGE UP (ref 27.5–35.5)
APTT BLD: 34.5 SEC — SIGNIFICANT CHANGE UP (ref 27.5–35.5)
APTT BLD: 41.1 SEC — HIGH (ref 27.5–35.5)
APTT BLD: 85.3 SEC — HIGH (ref 27.5–35.5)
APTT BLD: >200 SEC — CRITICAL HIGH (ref 27.5–35.5)
AST SERPL-CCNC: 112 U/L — HIGH (ref 10–40)
AST SERPL-CCNC: 112 U/L — HIGH (ref 10–40)
AST SERPL-CCNC: 127 U/L — HIGH (ref 10–40)
AST SERPL-CCNC: 134 U/L — HIGH (ref 10–40)
AST SERPL-CCNC: 17 U/L — SIGNIFICANT CHANGE UP (ref 10–40)
AST SERPL-CCNC: 19 U/L — SIGNIFICANT CHANGE UP (ref 10–40)
AST SERPL-CCNC: 24 U/L — SIGNIFICANT CHANGE UP (ref 10–40)
AST SERPL-CCNC: 28 U/L — SIGNIFICANT CHANGE UP (ref 10–40)
AST SERPL-CCNC: 35 U/L — SIGNIFICANT CHANGE UP (ref 10–40)
AST SERPL-CCNC: 37 U/L — SIGNIFICANT CHANGE UP (ref 10–40)
AST SERPL-CCNC: 76 U/L — HIGH (ref 10–40)
AST SERPL-CCNC: 77 U/L — HIGH (ref 10–40)
BACTERIA # UR AUTO: 0 — SIGNIFICANT CHANGE UP
BACTERIA # UR AUTO: ABNORMAL
BACTERIA # UR AUTO: NEGATIVE — SIGNIFICANT CHANGE UP
BACTERIA # UR AUTO: NEGATIVE — SIGNIFICANT CHANGE UP
BASE EXCESS BLDV CALC-SCNC: -0.4 MMOL/L — SIGNIFICANT CHANGE UP (ref -2–2)
BASE EXCESS BLDV CALC-SCNC: -1.8 MMOL/L — SIGNIFICANT CHANGE UP (ref -2–2)
BASE EXCESS BLDV CALC-SCNC: 1.6 MMOL/L — SIGNIFICANT CHANGE UP (ref -2–2)
BASE EXCESS BLDV CALC-SCNC: 1.7 MMOL/L — SIGNIFICANT CHANGE UP (ref -2–2)
BASE EXCESS BLDV CALC-SCNC: 4.7 MMOL/L — HIGH (ref -2–2)
BASE EXCESS BLDV CALC-SCNC: 7 MMOL/L — HIGH (ref -2–2)
BASE EXCESS BLDV CALC-SCNC: 9.4 MMOL/L — HIGH (ref -2–2)
BASOPHILS # BLD AUTO: 0 K/UL — SIGNIFICANT CHANGE UP (ref 0–0.2)
BASOPHILS # BLD AUTO: 0.02 K/UL — SIGNIFICANT CHANGE UP (ref 0–0.2)
BASOPHILS # BLD AUTO: 0.03 K/UL — SIGNIFICANT CHANGE UP (ref 0–0.2)
BASOPHILS # BLD AUTO: 0.03 K/UL — SIGNIFICANT CHANGE UP (ref 0–0.2)
BASOPHILS # BLD AUTO: 0.05 K/UL — SIGNIFICANT CHANGE UP (ref 0–0.2)
BASOPHILS # BLD AUTO: 0.06 K/UL — SIGNIFICANT CHANGE UP (ref 0–0.2)
BASOPHILS # BLD AUTO: 0.08 K/UL — SIGNIFICANT CHANGE UP (ref 0–0.2)
BASOPHILS # BLD AUTO: 0.09 K/UL — SIGNIFICANT CHANGE UP (ref 0–0.2)
BASOPHILS NFR BLD AUTO: 0 % — SIGNIFICANT CHANGE UP (ref 0–2)
BASOPHILS NFR BLD AUTO: 0.2 % — SIGNIFICANT CHANGE UP (ref 0–2)
BASOPHILS NFR BLD AUTO: 0.3 % — SIGNIFICANT CHANGE UP (ref 0–2)
BASOPHILS NFR BLD AUTO: 0.4 % — SIGNIFICANT CHANGE UP (ref 0–2)
BASOPHILS NFR BLD AUTO: 0.6 % — SIGNIFICANT CHANGE UP (ref 0–2)
BASOPHILS NFR BLD AUTO: 0.9 % — SIGNIFICANT CHANGE UP (ref 0–2)
BASOPHILS NFR BLD AUTO: 1 % — SIGNIFICANT CHANGE UP (ref 0–2)
BASOPHILS NFR BLD AUTO: 1.1 % — SIGNIFICANT CHANGE UP (ref 0–2)
BILIRUB SERPL-MCNC: 0.3 MG/DL — SIGNIFICANT CHANGE UP (ref 0.2–1.2)
BILIRUB SERPL-MCNC: 0.4 MG/DL — SIGNIFICANT CHANGE UP (ref 0.2–1.2)
BILIRUB SERPL-MCNC: 0.5 MG/DL — SIGNIFICANT CHANGE UP (ref 0.2–1.2)
BILIRUB SERPL-MCNC: 0.6 MG/DL — SIGNIFICANT CHANGE UP (ref 0.2–1.2)
BILIRUB UR-MCNC: NEGATIVE — SIGNIFICANT CHANGE UP
BLD GP AB SCN SERPL QL: NEGATIVE — SIGNIFICANT CHANGE UP
BLD GP AB SCN SERPL QL: NEGATIVE — SIGNIFICANT CHANGE UP
BLOOD GAS VENOUS - CREATININE: SIGNIFICANT CHANGE UP MG/DL (ref 0.5–1.3)
BUN SERPL-MCNC: 104 MG/DL — HIGH (ref 7–23)
BUN SERPL-MCNC: 54 MG/DL — HIGH (ref 7–23)
BUN SERPL-MCNC: 54 MG/DL — HIGH (ref 7–23)
BUN SERPL-MCNC: 55 MG/DL — HIGH (ref 7–23)
BUN SERPL-MCNC: 59 MG/DL — HIGH (ref 7–23)
BUN SERPL-MCNC: 59 MG/DL — HIGH (ref 7–23)
BUN SERPL-MCNC: 60 MG/DL — HIGH (ref 7–23)
BUN SERPL-MCNC: 60 MG/DL — HIGH (ref 7–23)
BUN SERPL-MCNC: 61 MG/DL — HIGH (ref 7–23)
BUN SERPL-MCNC: 62 MG/DL — HIGH (ref 7–23)
BUN SERPL-MCNC: 62 MG/DL — HIGH (ref 7–23)
BUN SERPL-MCNC: 63 MG/DL — HIGH (ref 7–23)
BUN SERPL-MCNC: 65 MG/DL — HIGH (ref 7–23)
BUN SERPL-MCNC: 66 MG/DL — HIGH (ref 7–23)
BUN SERPL-MCNC: 67 MG/DL — HIGH (ref 7–23)
BUN SERPL-MCNC: 68 MG/DL — HIGH (ref 7–23)
BUN SERPL-MCNC: 71 MG/DL — HIGH (ref 7–23)
BUN SERPL-MCNC: 71 MG/DL — HIGH (ref 7–23)
BUN SERPL-MCNC: 72 MG/DL — HIGH (ref 7–23)
BUN SERPL-MCNC: 73 MG/DL — HIGH (ref 7–23)
BUN SERPL-MCNC: 73 MG/DL — HIGH (ref 7–23)
BUN SERPL-MCNC: 75 MG/DL — HIGH (ref 7–23)
BUN SERPL-MCNC: 77 MG/DL — HIGH (ref 7–23)
BUN SERPL-MCNC: 78 MG/DL — HIGH (ref 7–23)
BUN SERPL-MCNC: 88 MG/DL — HIGH (ref 7–23)
BUN SERPL-MCNC: 95 MG/DL — HIGH (ref 7–23)
BUN SERPL-MCNC: 96 MG/DL — HIGH (ref 7–23)
BUN SERPL-MCNC: 97 MG/DL — HIGH (ref 7–23)
CA-I SERPL-SCNC: 1.28 MMOL/L — SIGNIFICANT CHANGE UP (ref 1.15–1.33)
CA-I SERPL-SCNC: 1.3 MMOL/L — SIGNIFICANT CHANGE UP (ref 1.15–1.33)
CA-I SERPL-SCNC: 1.33 MMOL/L — SIGNIFICANT CHANGE UP (ref 1.15–1.33)
CA-I SERPL-SCNC: 1.37 MMOL/L — HIGH (ref 1.15–1.33)
CA-I SERPL-SCNC: 1.4 MMOL/L — HIGH (ref 1.15–1.33)
CA-I SERPL-SCNC: 1.47 MMOL/L — HIGH (ref 1.15–1.33)
CA-I SERPL-SCNC: 1.48 MMOL/L — HIGH (ref 1.15–1.33)
CALCIUM SERPL-MCNC: 10.1 MG/DL — SIGNIFICANT CHANGE UP (ref 8.4–10.5)
CALCIUM SERPL-MCNC: 10.2 MG/DL — SIGNIFICANT CHANGE UP (ref 8.4–10.5)
CALCIUM SERPL-MCNC: 10.2 MG/DL — SIGNIFICANT CHANGE UP (ref 8.4–10.5)
CALCIUM SERPL-MCNC: 10.3 MG/DL — SIGNIFICANT CHANGE UP (ref 8.4–10.5)
CALCIUM SERPL-MCNC: 10.3 MG/DL — SIGNIFICANT CHANGE UP (ref 8.4–10.5)
CALCIUM SERPL-MCNC: 10.4 MG/DL — SIGNIFICANT CHANGE UP (ref 8.4–10.5)
CALCIUM SERPL-MCNC: 10.5 MG/DL — SIGNIFICANT CHANGE UP (ref 8.4–10.5)
CALCIUM SERPL-MCNC: 10.6 MG/DL — HIGH (ref 8.4–10.5)
CALCIUM SERPL-MCNC: 10.7 MG/DL — HIGH (ref 8.4–10.5)
CALCIUM SERPL-MCNC: 10.8 MG/DL — HIGH (ref 8.4–10.5)
CALCIUM SERPL-MCNC: 10.9 MG/DL — HIGH (ref 8.4–10.5)
CALCIUM SERPL-MCNC: 11 MG/DL — HIGH (ref 8.4–10.5)
CALCIUM SERPL-MCNC: 11 MG/DL — HIGH (ref 8.4–10.5)
CALCIUM SERPL-MCNC: 11.1 MG/DL — HIGH (ref 8.4–10.5)
CALCIUM SERPL-MCNC: 9.5 MG/DL — SIGNIFICANT CHANGE UP (ref 8.4–10.5)
CALCIUM SERPL-MCNC: 9.6 MG/DL — SIGNIFICANT CHANGE UP (ref 8.4–10.5)
CALCIUM SERPL-MCNC: 9.7 MG/DL — SIGNIFICANT CHANGE UP (ref 8.4–10.5)
CALCIUM SERPL-MCNC: 9.8 MG/DL — SIGNIFICANT CHANGE UP (ref 8.4–10.5)
CALCIUM SERPL-MCNC: 9.8 MG/DL — SIGNIFICANT CHANGE UP (ref 8.4–10.5)
CHLORIDE BLDV-SCNC: 100 MMOL/L — SIGNIFICANT CHANGE UP (ref 96–108)
CHLORIDE BLDV-SCNC: 101 MMOL/L — SIGNIFICANT CHANGE UP (ref 96–108)
CHLORIDE BLDV-SCNC: 92 MMOL/L — LOW (ref 96–108)
CHLORIDE BLDV-SCNC: 94 MMOL/L — LOW (ref 96–108)
CHLORIDE BLDV-SCNC: 95 MMOL/L — LOW (ref 96–108)
CHLORIDE BLDV-SCNC: 99 MMOL/L — SIGNIFICANT CHANGE UP (ref 96–108)
CHLORIDE BLDV-SCNC: 99 MMOL/L — SIGNIFICANT CHANGE UP (ref 96–108)
CHLORIDE SERPL-SCNC: 100 MMOL/L — SIGNIFICANT CHANGE UP (ref 96–108)
CHLORIDE SERPL-SCNC: 101 MMOL/L — SIGNIFICANT CHANGE UP (ref 96–108)
CHLORIDE SERPL-SCNC: 89 MMOL/L — LOW (ref 96–108)
CHLORIDE SERPL-SCNC: 90 MMOL/L — LOW (ref 96–108)
CHLORIDE SERPL-SCNC: 91 MMOL/L — LOW (ref 96–108)
CHLORIDE SERPL-SCNC: 92 MMOL/L — LOW (ref 96–108)
CHLORIDE SERPL-SCNC: 92 MMOL/L — LOW (ref 96–108)
CHLORIDE SERPL-SCNC: 95 MMOL/L — LOW (ref 96–108)
CHLORIDE SERPL-SCNC: 96 MMOL/L — SIGNIFICANT CHANGE UP (ref 96–108)
CHLORIDE SERPL-SCNC: 96 MMOL/L — SIGNIFICANT CHANGE UP (ref 96–108)
CHLORIDE SERPL-SCNC: 97 MMOL/L — SIGNIFICANT CHANGE UP (ref 96–108)
CHLORIDE SERPL-SCNC: 98 MMOL/L — SIGNIFICANT CHANGE UP (ref 96–108)
CHLORIDE SERPL-SCNC: 99 MMOL/L — SIGNIFICANT CHANGE UP (ref 96–108)
CLOSURE TME COLL+EPINEP BLD: 90 K/UL — LOW (ref 150–400)
CO2 BLDV-SCNC: 26 MMOL/L — SIGNIFICANT CHANGE UP (ref 22–26)
CO2 BLDV-SCNC: 28 MMOL/L — HIGH (ref 22–26)
CO2 BLDV-SCNC: 30 MMOL/L — HIGH (ref 22–26)
CO2 BLDV-SCNC: 31 MMOL/L — HIGH (ref 22–26)
CO2 BLDV-SCNC: 33 MMOL/L — HIGH (ref 22–26)
CO2 BLDV-SCNC: 37 MMOL/L — HIGH (ref 22–26)
CO2 BLDV-SCNC: 37 MMOL/L — HIGH (ref 22–26)
CO2 SERPL-SCNC: 19 MMOL/L — LOW (ref 22–31)
CO2 SERPL-SCNC: 22 MMOL/L — SIGNIFICANT CHANGE UP (ref 22–31)
CO2 SERPL-SCNC: 23 MMOL/L — SIGNIFICANT CHANGE UP (ref 22–31)
CO2 SERPL-SCNC: 24 MMOL/L — SIGNIFICANT CHANGE UP (ref 22–31)
CO2 SERPL-SCNC: 25 MMOL/L — SIGNIFICANT CHANGE UP (ref 22–31)
CO2 SERPL-SCNC: 26 MMOL/L — SIGNIFICANT CHANGE UP (ref 22–31)
CO2 SERPL-SCNC: 26 MMOL/L — SIGNIFICANT CHANGE UP (ref 22–31)
CO2 SERPL-SCNC: 27 MMOL/L — SIGNIFICANT CHANGE UP (ref 22–31)
CO2 SERPL-SCNC: 28 MMOL/L — SIGNIFICANT CHANGE UP (ref 22–31)
CO2 SERPL-SCNC: 29 MMOL/L — SIGNIFICANT CHANGE UP (ref 22–31)
CO2 SERPL-SCNC: 30 MMOL/L — SIGNIFICANT CHANGE UP (ref 22–31)
COLOR SPEC: ABNORMAL
COLOR SPEC: YELLOW — SIGNIFICANT CHANGE UP
CREAT ?TM UR-MCNC: 139 MG/DL — SIGNIFICANT CHANGE UP
CREAT ?TM UR-MCNC: 70 MG/DL — SIGNIFICANT CHANGE UP
CREAT ?TM UR-MCNC: 94 MG/DL — SIGNIFICANT CHANGE UP
CREAT SERPL-MCNC: 1.39 MG/DL — HIGH (ref 0.5–1.3)
CREAT SERPL-MCNC: 1.41 MG/DL — HIGH (ref 0.5–1.3)
CREAT SERPL-MCNC: 1.43 MG/DL — HIGH (ref 0.5–1.3)
CREAT SERPL-MCNC: 1.44 MG/DL — HIGH (ref 0.5–1.3)
CREAT SERPL-MCNC: 1.47 MG/DL — HIGH (ref 0.5–1.3)
CREAT SERPL-MCNC: 1.48 MG/DL — HIGH (ref 0.5–1.3)
CREAT SERPL-MCNC: 1.5 MG/DL — HIGH (ref 0.5–1.3)
CREAT SERPL-MCNC: 1.51 MG/DL — HIGH (ref 0.5–1.3)
CREAT SERPL-MCNC: 1.55 MG/DL — HIGH (ref 0.5–1.3)
CREAT SERPL-MCNC: 1.57 MG/DL — HIGH (ref 0.5–1.3)
CREAT SERPL-MCNC: 1.58 MG/DL — HIGH (ref 0.5–1.3)
CREAT SERPL-MCNC: 1.59 MG/DL — HIGH (ref 0.5–1.3)
CREAT SERPL-MCNC: 1.6 MG/DL — HIGH (ref 0.5–1.3)
CREAT SERPL-MCNC: 1.64 MG/DL — HIGH (ref 0.5–1.3)
CREAT SERPL-MCNC: 1.66 MG/DL — HIGH (ref 0.5–1.3)
CREAT SERPL-MCNC: 1.68 MG/DL — HIGH (ref 0.5–1.3)
CREAT SERPL-MCNC: 1.76 MG/DL — HIGH (ref 0.5–1.3)
CREAT SERPL-MCNC: 1.78 MG/DL — HIGH (ref 0.5–1.3)
CREAT SERPL-MCNC: 1.84 MG/DL — HIGH (ref 0.5–1.3)
CREAT SERPL-MCNC: 1.86 MG/DL — HIGH (ref 0.5–1.3)
CREAT SERPL-MCNC: 1.87 MG/DL — HIGH (ref 0.5–1.3)
CREAT SERPL-MCNC: 1.98 MG/DL — HIGH (ref 0.5–1.3)
CREAT SERPL-MCNC: 1.99 MG/DL — HIGH (ref 0.5–1.3)
CREAT SERPL-MCNC: 2 MG/DL — HIGH (ref 0.5–1.3)
CREAT SERPL-MCNC: 2.02 MG/DL — HIGH (ref 0.5–1.3)
CREAT SERPL-MCNC: 2.13 MG/DL — HIGH (ref 0.5–1.3)
CREAT SERPL-MCNC: 2.17 MG/DL — HIGH (ref 0.5–1.3)
CREAT SERPL-MCNC: 2.18 MG/DL — HIGH (ref 0.5–1.3)
CREAT SERPL-MCNC: 2.35 MG/DL — HIGH (ref 0.5–1.3)
CREAT SERPL-MCNC: 2.48 MG/DL — HIGH (ref 0.5–1.3)
CREAT SERPL-MCNC: 2.77 MG/DL — HIGH (ref 0.5–1.3)
CREAT SERPL-MCNC: 3.01 MG/DL — HIGH (ref 0.5–1.3)
CREAT SERPL-MCNC: 3.12 MG/DL — HIGH (ref 0.5–1.3)
CREAT SERPL-MCNC: 3.16 MG/DL — HIGH (ref 0.5–1.3)
CREAT SERPL-MCNC: 3.24 MG/DL — HIGH (ref 0.5–1.3)
CREAT SERPL-MCNC: 3.34 MG/DL — HIGH (ref 0.5–1.3)
CULTURE RESULTS: SIGNIFICANT CHANGE UP
CULTURE RESULTS: SIGNIFICANT CHANGE UP
DACRYOCYTES BLD QL SMEAR: SLIGHT — SIGNIFICANT CHANGE UP
DAT POLY-SP REAG RBC QL: NEGATIVE — SIGNIFICANT CHANGE UP
DIFF PNL FLD: ABNORMAL
DIFF PNL FLD: NEGATIVE — SIGNIFICANT CHANGE UP
EGFR: 13 ML/MIN/1.73M2 — LOW
EGFR: 14 ML/MIN/1.73M2 — LOW
EGFR: 14 ML/MIN/1.73M2 — LOW
EGFR: 16 ML/MIN/1.73M2 — LOW
EGFR: 18 ML/MIN/1.73M2 — LOW
EGFR: 19 ML/MIN/1.73M2 — LOW
EGFR: 21 ML/MIN/1.73M2 — LOW
EGFR: 21 ML/MIN/1.73M2 — LOW
EGFR: 22 ML/MIN/1.73M2 — LOW
EGFR: 23 ML/MIN/1.73M2 — LOW
EGFR: 24 ML/MIN/1.73M2 — LOW
EGFR: 25 ML/MIN/1.73M2 — LOW
EGFR: 25 ML/MIN/1.73M2 — LOW
EGFR: 26 ML/MIN/1.73M2 — LOW
EGFR: 27 ML/MIN/1.73M2 — LOW
EGFR: 27 ML/MIN/1.73M2 — LOW
EGFR: 29 ML/MIN/1.73M2 — LOW
EGFR: 29 ML/MIN/1.73M2 — LOW
EGFR: 30 ML/MIN/1.73M2 — LOW
EGFR: 30 ML/MIN/1.73M2 — LOW
EGFR: 31 ML/MIN/1.73M2 — LOW
EGFR: 32 ML/MIN/1.73M2 — LOW
EGFR: 33 ML/MIN/1.73M2 — LOW
EGFR: 34 ML/MIN/1.73M2 — LOW
EGFR: 35 ML/MIN/1.73M2 — LOW
EGFR: 36 ML/MIN/1.73M2 — LOW
EOSINOPHIL # BLD AUTO: 0.01 K/UL — SIGNIFICANT CHANGE UP (ref 0–0.5)
EOSINOPHIL # BLD AUTO: 0.02 K/UL — SIGNIFICANT CHANGE UP (ref 0–0.5)
EOSINOPHIL # BLD AUTO: 0.03 K/UL — SIGNIFICANT CHANGE UP (ref 0–0.5)
EOSINOPHIL # BLD AUTO: 0.07 K/UL — SIGNIFICANT CHANGE UP (ref 0–0.5)
EOSINOPHIL # BLD AUTO: 0.07 K/UL — SIGNIFICANT CHANGE UP (ref 0–0.5)
EOSINOPHIL # BLD AUTO: 0.09 K/UL — SIGNIFICANT CHANGE UP (ref 0–0.5)
EOSINOPHIL # BLD AUTO: 0.12 K/UL — SIGNIFICANT CHANGE UP (ref 0–0.5)
EOSINOPHIL # BLD AUTO: 0.4 K/UL — SIGNIFICANT CHANGE UP (ref 0–0.5)
EOSINOPHIL NFR BLD AUTO: 0.1 % — SIGNIFICANT CHANGE UP (ref 0–6)
EOSINOPHIL NFR BLD AUTO: 0.2 % — SIGNIFICANT CHANGE UP (ref 0–6)
EOSINOPHIL NFR BLD AUTO: 0.4 % — SIGNIFICANT CHANGE UP (ref 0–6)
EOSINOPHIL NFR BLD AUTO: 0.9 % — SIGNIFICANT CHANGE UP (ref 0–6)
EOSINOPHIL NFR BLD AUTO: 1.9 % — SIGNIFICANT CHANGE UP (ref 0–6)
EOSINOPHIL NFR BLD AUTO: 4.8 % — SIGNIFICANT CHANGE UP (ref 0–6)
EPI CELLS # UR: 2 /HPF — SIGNIFICANT CHANGE UP
EPI CELLS # UR: 2 — SIGNIFICANT CHANGE UP
EPI CELLS # UR: 4 /HPF — SIGNIFICANT CHANGE UP
EPI CELLS # UR: 5 /HPF — SIGNIFICANT CHANGE UP
ESTIMATED AVERAGE GLUCOSE: 166 MG/DL — HIGH (ref 68–114)
FLUAV AG NPH QL: SIGNIFICANT CHANGE UP
FLUAV AG NPH QL: SIGNIFICANT CHANGE UP
FLUBV AG NPH QL: SIGNIFICANT CHANGE UP
FLUBV AG NPH QL: SIGNIFICANT CHANGE UP
GAS PNL BLDV: 125 MMOL/L — LOW (ref 136–145)
GAS PNL BLDV: 125 MMOL/L — LOW (ref 136–145)
GAS PNL BLDV: 128 MMOL/L — LOW (ref 136–145)
GAS PNL BLDV: 133 MMOL/L — LOW (ref 136–145)
GAS PNL BLDV: 135 MMOL/L — LOW (ref 136–145)
GAS PNL BLDV: 135 MMOL/L — LOW (ref 136–145)
GAS PNL BLDV: 139 MMOL/L — SIGNIFICANT CHANGE UP (ref 136–145)
GAS PNL BLDV: SIGNIFICANT CHANGE UP
GLUCOSE BLDC GLUCOMTR-MCNC: 101 MG/DL — HIGH (ref 70–99)
GLUCOSE BLDC GLUCOMTR-MCNC: 104 MG/DL — HIGH (ref 70–99)
GLUCOSE BLDC GLUCOMTR-MCNC: 105 MG/DL — HIGH (ref 70–99)
GLUCOSE BLDC GLUCOMTR-MCNC: 109 MG/DL — HIGH (ref 70–99)
GLUCOSE BLDC GLUCOMTR-MCNC: 109 MG/DL — HIGH (ref 70–99)
GLUCOSE BLDC GLUCOMTR-MCNC: 113 MG/DL — HIGH (ref 70–99)
GLUCOSE BLDC GLUCOMTR-MCNC: 117 MG/DL — HIGH (ref 70–99)
GLUCOSE BLDC GLUCOMTR-MCNC: 117 MG/DL — HIGH (ref 70–99)
GLUCOSE BLDC GLUCOMTR-MCNC: 119 MG/DL — HIGH (ref 70–99)
GLUCOSE BLDC GLUCOMTR-MCNC: 122 MG/DL — HIGH (ref 70–99)
GLUCOSE BLDC GLUCOMTR-MCNC: 123 MG/DL — HIGH (ref 70–99)
GLUCOSE BLDC GLUCOMTR-MCNC: 124 MG/DL — HIGH (ref 70–99)
GLUCOSE BLDC GLUCOMTR-MCNC: 124 MG/DL — HIGH (ref 70–99)
GLUCOSE BLDC GLUCOMTR-MCNC: 125 MG/DL — HIGH (ref 70–99)
GLUCOSE BLDC GLUCOMTR-MCNC: 126 MG/DL — HIGH (ref 70–99)
GLUCOSE BLDC GLUCOMTR-MCNC: 128 MG/DL — HIGH (ref 70–99)
GLUCOSE BLDC GLUCOMTR-MCNC: 129 MG/DL — HIGH (ref 70–99)
GLUCOSE BLDC GLUCOMTR-MCNC: 131 MG/DL — HIGH (ref 70–99)
GLUCOSE BLDC GLUCOMTR-MCNC: 133 MG/DL — HIGH (ref 70–99)
GLUCOSE BLDC GLUCOMTR-MCNC: 133 MG/DL — HIGH (ref 70–99)
GLUCOSE BLDC GLUCOMTR-MCNC: 135 MG/DL — HIGH (ref 70–99)
GLUCOSE BLDC GLUCOMTR-MCNC: 136 MG/DL — HIGH (ref 70–99)
GLUCOSE BLDC GLUCOMTR-MCNC: 137 MG/DL — HIGH (ref 70–99)
GLUCOSE BLDC GLUCOMTR-MCNC: 138 MG/DL — HIGH (ref 70–99)
GLUCOSE BLDC GLUCOMTR-MCNC: 138 MG/DL — HIGH (ref 70–99)
GLUCOSE BLDC GLUCOMTR-MCNC: 139 MG/DL — HIGH (ref 70–99)
GLUCOSE BLDC GLUCOMTR-MCNC: 139 MG/DL — HIGH (ref 70–99)
GLUCOSE BLDC GLUCOMTR-MCNC: 140 MG/DL — HIGH (ref 70–99)
GLUCOSE BLDC GLUCOMTR-MCNC: 140 MG/DL — HIGH (ref 70–99)
GLUCOSE BLDC GLUCOMTR-MCNC: 142 MG/DL — HIGH (ref 70–99)
GLUCOSE BLDC GLUCOMTR-MCNC: 144 MG/DL — HIGH (ref 70–99)
GLUCOSE BLDC GLUCOMTR-MCNC: 145 MG/DL — HIGH (ref 70–99)
GLUCOSE BLDC GLUCOMTR-MCNC: 147 MG/DL — HIGH (ref 70–99)
GLUCOSE BLDC GLUCOMTR-MCNC: 148 MG/DL — HIGH (ref 70–99)
GLUCOSE BLDC GLUCOMTR-MCNC: 148 MG/DL — HIGH (ref 70–99)
GLUCOSE BLDC GLUCOMTR-MCNC: 149 MG/DL — HIGH (ref 70–99)
GLUCOSE BLDC GLUCOMTR-MCNC: 149 MG/DL — HIGH (ref 70–99)
GLUCOSE BLDC GLUCOMTR-MCNC: 151 MG/DL — HIGH (ref 70–99)
GLUCOSE BLDC GLUCOMTR-MCNC: 153 MG/DL — HIGH (ref 70–99)
GLUCOSE BLDC GLUCOMTR-MCNC: 153 MG/DL — HIGH (ref 70–99)
GLUCOSE BLDC GLUCOMTR-MCNC: 154 MG/DL — HIGH (ref 70–99)
GLUCOSE BLDC GLUCOMTR-MCNC: 156 MG/DL — HIGH (ref 70–99)
GLUCOSE BLDC GLUCOMTR-MCNC: 157 MG/DL — HIGH (ref 70–99)
GLUCOSE BLDC GLUCOMTR-MCNC: 158 MG/DL — HIGH (ref 70–99)
GLUCOSE BLDC GLUCOMTR-MCNC: 159 MG/DL — HIGH (ref 70–99)
GLUCOSE BLDC GLUCOMTR-MCNC: 165 MG/DL — HIGH (ref 70–99)
GLUCOSE BLDC GLUCOMTR-MCNC: 165 MG/DL — HIGH (ref 70–99)
GLUCOSE BLDC GLUCOMTR-MCNC: 167 MG/DL — HIGH (ref 70–99)
GLUCOSE BLDC GLUCOMTR-MCNC: 167 MG/DL — HIGH (ref 70–99)
GLUCOSE BLDC GLUCOMTR-MCNC: 168 MG/DL — HIGH (ref 70–99)
GLUCOSE BLDC GLUCOMTR-MCNC: 169 MG/DL — HIGH (ref 70–99)
GLUCOSE BLDC GLUCOMTR-MCNC: 169 MG/DL — HIGH (ref 70–99)
GLUCOSE BLDC GLUCOMTR-MCNC: 170 MG/DL — HIGH (ref 70–99)
GLUCOSE BLDC GLUCOMTR-MCNC: 172 MG/DL — HIGH (ref 70–99)
GLUCOSE BLDC GLUCOMTR-MCNC: 172 MG/DL — HIGH (ref 70–99)
GLUCOSE BLDC GLUCOMTR-MCNC: 173 MG/DL — HIGH (ref 70–99)
GLUCOSE BLDC GLUCOMTR-MCNC: 174 MG/DL — HIGH (ref 70–99)
GLUCOSE BLDC GLUCOMTR-MCNC: 179 MG/DL — HIGH (ref 70–99)
GLUCOSE BLDC GLUCOMTR-MCNC: 180 MG/DL — HIGH (ref 70–99)
GLUCOSE BLDC GLUCOMTR-MCNC: 180 MG/DL — HIGH (ref 70–99)
GLUCOSE BLDC GLUCOMTR-MCNC: 181 MG/DL — HIGH (ref 70–99)
GLUCOSE BLDC GLUCOMTR-MCNC: 181 MG/DL — HIGH (ref 70–99)
GLUCOSE BLDC GLUCOMTR-MCNC: 182 MG/DL — HIGH (ref 70–99)
GLUCOSE BLDC GLUCOMTR-MCNC: 183 MG/DL — HIGH (ref 70–99)
GLUCOSE BLDC GLUCOMTR-MCNC: 185 MG/DL — HIGH (ref 70–99)
GLUCOSE BLDC GLUCOMTR-MCNC: 186 MG/DL — HIGH (ref 70–99)
GLUCOSE BLDC GLUCOMTR-MCNC: 187 MG/DL — HIGH (ref 70–99)
GLUCOSE BLDC GLUCOMTR-MCNC: 188 MG/DL — HIGH (ref 70–99)
GLUCOSE BLDC GLUCOMTR-MCNC: 195 MG/DL — HIGH (ref 70–99)
GLUCOSE BLDC GLUCOMTR-MCNC: 195 MG/DL — HIGH (ref 70–99)
GLUCOSE BLDC GLUCOMTR-MCNC: 196 MG/DL — HIGH (ref 70–99)
GLUCOSE BLDC GLUCOMTR-MCNC: 196 MG/DL — HIGH (ref 70–99)
GLUCOSE BLDC GLUCOMTR-MCNC: 198 MG/DL — HIGH (ref 70–99)
GLUCOSE BLDC GLUCOMTR-MCNC: 199 MG/DL — HIGH (ref 70–99)
GLUCOSE BLDC GLUCOMTR-MCNC: 201 MG/DL — HIGH (ref 70–99)
GLUCOSE BLDC GLUCOMTR-MCNC: 201 MG/DL — HIGH (ref 70–99)
GLUCOSE BLDC GLUCOMTR-MCNC: 202 MG/DL — HIGH (ref 70–99)
GLUCOSE BLDC GLUCOMTR-MCNC: 203 MG/DL — HIGH (ref 70–99)
GLUCOSE BLDC GLUCOMTR-MCNC: 208 MG/DL — HIGH (ref 70–99)
GLUCOSE BLDC GLUCOMTR-MCNC: 210 MG/DL — HIGH (ref 70–99)
GLUCOSE BLDC GLUCOMTR-MCNC: 210 MG/DL — HIGH (ref 70–99)
GLUCOSE BLDC GLUCOMTR-MCNC: 211 MG/DL — HIGH (ref 70–99)
GLUCOSE BLDC GLUCOMTR-MCNC: 216 MG/DL — HIGH (ref 70–99)
GLUCOSE BLDC GLUCOMTR-MCNC: 219 MG/DL — HIGH (ref 70–99)
GLUCOSE BLDC GLUCOMTR-MCNC: 220 MG/DL — HIGH (ref 70–99)
GLUCOSE BLDC GLUCOMTR-MCNC: 221 MG/DL — HIGH (ref 70–99)
GLUCOSE BLDC GLUCOMTR-MCNC: 231 MG/DL — HIGH (ref 70–99)
GLUCOSE BLDC GLUCOMTR-MCNC: 235 MG/DL — HIGH (ref 70–99)
GLUCOSE BLDC GLUCOMTR-MCNC: 236 MG/DL — HIGH (ref 70–99)
GLUCOSE BLDC GLUCOMTR-MCNC: 240 MG/DL — HIGH (ref 70–99)
GLUCOSE BLDC GLUCOMTR-MCNC: 243 MG/DL — HIGH (ref 70–99)
GLUCOSE BLDC GLUCOMTR-MCNC: 248 MG/DL — HIGH (ref 70–99)
GLUCOSE BLDC GLUCOMTR-MCNC: 256 MG/DL — HIGH (ref 70–99)
GLUCOSE BLDC GLUCOMTR-MCNC: 264 MG/DL — HIGH (ref 70–99)
GLUCOSE BLDC GLUCOMTR-MCNC: 266 MG/DL — HIGH (ref 70–99)
GLUCOSE BLDC GLUCOMTR-MCNC: 266 MG/DL — HIGH (ref 70–99)
GLUCOSE BLDC GLUCOMTR-MCNC: 274 MG/DL — HIGH (ref 70–99)
GLUCOSE BLDC GLUCOMTR-MCNC: 275 MG/DL — HIGH (ref 70–99)
GLUCOSE BLDC GLUCOMTR-MCNC: 282 MG/DL — HIGH (ref 70–99)
GLUCOSE BLDC GLUCOMTR-MCNC: 288 MG/DL — HIGH (ref 70–99)
GLUCOSE BLDC GLUCOMTR-MCNC: 289 MG/DL — HIGH (ref 70–99)
GLUCOSE BLDC GLUCOMTR-MCNC: 298 MG/DL — HIGH (ref 70–99)
GLUCOSE BLDC GLUCOMTR-MCNC: 339 MG/DL — HIGH (ref 70–99)
GLUCOSE BLDC GLUCOMTR-MCNC: 355 MG/DL — HIGH (ref 70–99)
GLUCOSE BLDC GLUCOMTR-MCNC: 38 MG/DL — CRITICAL LOW (ref 70–99)
GLUCOSE BLDC GLUCOMTR-MCNC: 41 MG/DL — CRITICAL LOW (ref 70–99)
GLUCOSE BLDC GLUCOMTR-MCNC: 59 MG/DL — LOW (ref 70–99)
GLUCOSE BLDC GLUCOMTR-MCNC: 63 MG/DL — LOW (ref 70–99)
GLUCOSE BLDC GLUCOMTR-MCNC: 63 MG/DL — LOW (ref 70–99)
GLUCOSE BLDC GLUCOMTR-MCNC: 66 MG/DL — LOW (ref 70–99)
GLUCOSE BLDC GLUCOMTR-MCNC: 69 MG/DL — LOW (ref 70–99)
GLUCOSE BLDC GLUCOMTR-MCNC: 76 MG/DL — SIGNIFICANT CHANGE UP (ref 70–99)
GLUCOSE BLDC GLUCOMTR-MCNC: 81 MG/DL — SIGNIFICANT CHANGE UP (ref 70–99)
GLUCOSE BLDC GLUCOMTR-MCNC: 85 MG/DL — SIGNIFICANT CHANGE UP (ref 70–99)
GLUCOSE BLDC GLUCOMTR-MCNC: 85 MG/DL — SIGNIFICANT CHANGE UP (ref 70–99)
GLUCOSE BLDC GLUCOMTR-MCNC: 88 MG/DL — SIGNIFICANT CHANGE UP (ref 70–99)
GLUCOSE BLDC GLUCOMTR-MCNC: 88 MG/DL — SIGNIFICANT CHANGE UP (ref 70–99)
GLUCOSE BLDC GLUCOMTR-MCNC: 99 MG/DL — SIGNIFICANT CHANGE UP (ref 70–99)
GLUCOSE BLDV-MCNC: 145 MG/DL — HIGH (ref 70–99)
GLUCOSE BLDV-MCNC: 154 MG/DL — HIGH (ref 70–99)
GLUCOSE BLDV-MCNC: 156 MG/DL — HIGH (ref 70–99)
GLUCOSE BLDV-MCNC: 169 MG/DL — HIGH (ref 70–99)
GLUCOSE BLDV-MCNC: 225 MG/DL — HIGH (ref 70–99)
GLUCOSE BLDV-MCNC: 76 MG/DL — SIGNIFICANT CHANGE UP (ref 70–99)
GLUCOSE BLDV-MCNC: 80 MG/DL — SIGNIFICANT CHANGE UP (ref 70–99)
GLUCOSE SERPL-MCNC: 116 MG/DL — HIGH (ref 70–99)
GLUCOSE SERPL-MCNC: 117 MG/DL — HIGH (ref 70–99)
GLUCOSE SERPL-MCNC: 120 MG/DL — HIGH (ref 70–99)
GLUCOSE SERPL-MCNC: 122 MG/DL — HIGH (ref 70–99)
GLUCOSE SERPL-MCNC: 126 MG/DL — HIGH (ref 70–99)
GLUCOSE SERPL-MCNC: 128 MG/DL — HIGH (ref 70–99)
GLUCOSE SERPL-MCNC: 129 MG/DL — HIGH (ref 70–99)
GLUCOSE SERPL-MCNC: 130 MG/DL — HIGH (ref 70–99)
GLUCOSE SERPL-MCNC: 137 MG/DL — HIGH (ref 70–99)
GLUCOSE SERPL-MCNC: 138 MG/DL — HIGH (ref 70–99)
GLUCOSE SERPL-MCNC: 140 MG/DL — HIGH (ref 70–99)
GLUCOSE SERPL-MCNC: 141 MG/DL — HIGH (ref 70–99)
GLUCOSE SERPL-MCNC: 148 MG/DL — HIGH (ref 70–99)
GLUCOSE SERPL-MCNC: 151 MG/DL — HIGH (ref 70–99)
GLUCOSE SERPL-MCNC: 151 MG/DL — HIGH (ref 70–99)
GLUCOSE SERPL-MCNC: 155 MG/DL — HIGH (ref 70–99)
GLUCOSE SERPL-MCNC: 156 MG/DL — HIGH (ref 70–99)
GLUCOSE SERPL-MCNC: 156 MG/DL — HIGH (ref 70–99)
GLUCOSE SERPL-MCNC: 160 MG/DL — HIGH (ref 70–99)
GLUCOSE SERPL-MCNC: 162 MG/DL — HIGH (ref 70–99)
GLUCOSE SERPL-MCNC: 162 MG/DL — HIGH (ref 70–99)
GLUCOSE SERPL-MCNC: 163 MG/DL — HIGH (ref 70–99)
GLUCOSE SERPL-MCNC: 167 MG/DL — HIGH (ref 70–99)
GLUCOSE SERPL-MCNC: 169 MG/DL — HIGH (ref 70–99)
GLUCOSE SERPL-MCNC: 177 MG/DL — HIGH (ref 70–99)
GLUCOSE SERPL-MCNC: 178 MG/DL — HIGH (ref 70–99)
GLUCOSE SERPL-MCNC: 191 MG/DL — HIGH (ref 70–99)
GLUCOSE SERPL-MCNC: 211 MG/DL — HIGH (ref 70–99)
GLUCOSE SERPL-MCNC: 215 MG/DL — HIGH (ref 70–99)
GLUCOSE SERPL-MCNC: 232 MG/DL — HIGH (ref 70–99)
GLUCOSE SERPL-MCNC: 237 MG/DL — HIGH (ref 70–99)
GLUCOSE SERPL-MCNC: 251 MG/DL — HIGH (ref 70–99)
GLUCOSE SERPL-MCNC: 43 MG/DL — CRITICAL LOW (ref 70–99)
GLUCOSE SERPL-MCNC: 58 MG/DL — LOW (ref 70–99)
GLUCOSE SERPL-MCNC: 74 MG/DL — SIGNIFICANT CHANGE UP (ref 70–99)
GLUCOSE SERPL-MCNC: 84 MG/DL — SIGNIFICANT CHANGE UP (ref 70–99)
GLUCOSE UR QL: NEGATIVE — SIGNIFICANT CHANGE UP
GRAN CASTS # UR COMP ASSIST: 1 /LPF — SIGNIFICANT CHANGE UP
HAPTOGLOB SERPL-MCNC: 149 MG/DL — SIGNIFICANT CHANGE UP (ref 34–200)
HCO3 BLDV-SCNC: 25 MMOL/L — SIGNIFICANT CHANGE UP (ref 22–29)
HCO3 BLDV-SCNC: 26 MMOL/L — SIGNIFICANT CHANGE UP (ref 22–29)
HCO3 BLDV-SCNC: 28 MMOL/L — SIGNIFICANT CHANGE UP (ref 22–29)
HCO3 BLDV-SCNC: 29 MMOL/L — SIGNIFICANT CHANGE UP (ref 22–29)
HCO3 BLDV-SCNC: 32 MMOL/L — HIGH (ref 22–29)
HCO3 BLDV-SCNC: 35 MMOL/L — HIGH (ref 22–29)
HCO3 BLDV-SCNC: 36 MMOL/L — HIGH (ref 22–29)
HCT VFR BLD CALC: 23 % — LOW (ref 34.5–45)
HCT VFR BLD CALC: 27.9 % — LOW (ref 34.5–45)
HCT VFR BLD CALC: 28 % — LOW (ref 34.5–45)
HCT VFR BLD CALC: 28 % — LOW (ref 34.5–45)
HCT VFR BLD CALC: 28.2 % — LOW (ref 34.5–45)
HCT VFR BLD CALC: 28.4 % — LOW (ref 34.5–45)
HCT VFR BLD CALC: 28.4 % — LOW (ref 34.5–45)
HCT VFR BLD CALC: 28.6 % — LOW (ref 34.5–45)
HCT VFR BLD CALC: 28.9 % — LOW (ref 34.5–45)
HCT VFR BLD CALC: 28.9 % — LOW (ref 34.5–45)
HCT VFR BLD CALC: 29.2 % — LOW (ref 34.5–45)
HCT VFR BLD CALC: 29.5 % — LOW (ref 34.5–45)
HCT VFR BLD CALC: 29.6 % — LOW (ref 34.5–45)
HCT VFR BLD CALC: 29.7 % — LOW (ref 34.5–45)
HCT VFR BLD CALC: 29.8 % — LOW (ref 34.5–45)
HCT VFR BLD CALC: 29.8 % — LOW (ref 34.5–45)
HCT VFR BLD CALC: 29.9 % — LOW (ref 34.5–45)
HCT VFR BLD CALC: 30.3 % — LOW (ref 34.5–45)
HCT VFR BLD CALC: 30.4 % — LOW (ref 34.5–45)
HCT VFR BLD CALC: 30.5 % — LOW (ref 34.5–45)
HCT VFR BLD CALC: 30.6 % — LOW (ref 34.5–45)
HCT VFR BLD CALC: 31 % — LOW (ref 34.5–45)
HCT VFR BLD CALC: 31.6 % — LOW (ref 34.5–45)
HCT VFR BLD CALC: 32.2 % — LOW (ref 34.5–45)
HCT VFR BLD CALC: 32.3 % — LOW (ref 34.5–45)
HCT VFR BLD CALC: 32.4 % — LOW (ref 34.5–45)
HCT VFR BLD CALC: 32.5 % — LOW (ref 34.5–45)
HCT VFR BLD CALC: 33.4 % — LOW (ref 34.5–45)
HCT VFR BLDA CALC: 22 % — LOW (ref 34.5–46.5)
HCT VFR BLDA CALC: 26 % — LOW (ref 34.5–46.5)
HCT VFR BLDA CALC: 26 % — LOW (ref 34.5–46.5)
HCT VFR BLDA CALC: 28 % — LOW (ref 34.5–46.5)
HCT VFR BLDA CALC: 28 % — LOW (ref 34.5–46.5)
HCT VFR BLDA CALC: 30 % — LOW (ref 34.5–46.5)
HCT VFR BLDA CALC: 31 % — LOW (ref 34.5–46.5)
HGB BLD CALC-MCNC: 10.2 G/DL — LOW (ref 11.7–16.1)
HGB BLD CALC-MCNC: 7.3 G/DL — LOW (ref 11.7–16.1)
HGB BLD CALC-MCNC: 8.8 G/DL — LOW (ref 11.7–16.1)
HGB BLD CALC-MCNC: 8.8 G/DL — LOW (ref 11.7–16.1)
HGB BLD CALC-MCNC: 9.3 G/DL — LOW (ref 11.7–16.1)
HGB BLD CALC-MCNC: 9.4 G/DL — LOW (ref 11.7–16.1)
HGB BLD CALC-MCNC: 9.9 G/DL — LOW (ref 11.7–16.1)
HGB BLD-MCNC: 6.5 G/DL — CRITICAL LOW (ref 11.5–15.5)
HGB BLD-MCNC: 8 G/DL — LOW (ref 11.5–15.5)
HGB BLD-MCNC: 8 G/DL — LOW (ref 11.5–15.5)
HGB BLD-MCNC: 8.1 G/DL — LOW (ref 11.5–15.5)
HGB BLD-MCNC: 8.2 G/DL — LOW (ref 11.5–15.5)
HGB BLD-MCNC: 8.4 G/DL — LOW (ref 11.5–15.5)
HGB BLD-MCNC: 8.5 G/DL — LOW (ref 11.5–15.5)
HGB BLD-MCNC: 8.6 G/DL — LOW (ref 11.5–15.5)
HGB BLD-MCNC: 8.7 G/DL — LOW (ref 11.5–15.5)
HGB BLD-MCNC: 8.8 G/DL — LOW (ref 11.5–15.5)
HGB BLD-MCNC: 9 G/DL — LOW (ref 11.5–15.5)
HGB BLD-MCNC: 9.3 G/DL — LOW (ref 11.5–15.5)
HGB BLD-MCNC: 9.5 G/DL — LOW (ref 11.5–15.5)
HGB BLD-MCNC: 9.5 G/DL — LOW (ref 11.5–15.5)
HGB BLD-MCNC: 9.7 G/DL — LOW (ref 11.5–15.5)
HYALINE CASTS # UR AUTO: 1 /LPF — SIGNIFICANT CHANGE UP (ref 0–7)
HYALINE CASTS # UR AUTO: 1 /LPF — SIGNIFICANT CHANGE UP (ref 0–7)
HYALINE CASTS # UR AUTO: 2 /LPF — SIGNIFICANT CHANGE UP (ref 0–2)
HYALINE CASTS # UR AUTO: 8 /LPF — HIGH (ref 0–2)
HYPOCHROMIA BLD QL: SIGNIFICANT CHANGE UP
IMM GRANULOCYTES NFR BLD AUTO: 0.3 % — SIGNIFICANT CHANGE UP (ref 0–1.5)
IMM GRANULOCYTES NFR BLD AUTO: 0.4 % — SIGNIFICANT CHANGE UP (ref 0–1.5)
IMM GRANULOCYTES NFR BLD AUTO: 0.6 % — SIGNIFICANT CHANGE UP (ref 0–1.5)
IMM GRANULOCYTES NFR BLD AUTO: 0.8 % — SIGNIFICANT CHANGE UP (ref 0–1.5)
INR BLD: 1.41 RATIO — HIGH (ref 0.88–1.16)
INR BLD: 1.84 RATIO — HIGH (ref 0.88–1.16)
INR BLD: 2.04 RATIO — HIGH (ref 0.88–1.16)
KETONES UR-MCNC: NEGATIVE — SIGNIFICANT CHANGE UP
LACTATE BLDV-MCNC: 0.9 MMOL/L — SIGNIFICANT CHANGE UP (ref 0.7–2)
LACTATE BLDV-MCNC: 1 MMOL/L — SIGNIFICANT CHANGE UP (ref 0.7–2)
LACTATE BLDV-MCNC: 1.2 MMOL/L — SIGNIFICANT CHANGE UP (ref 0.7–2)
LACTATE BLDV-MCNC: 1.4 MMOL/L — SIGNIFICANT CHANGE UP (ref 0.7–2)
LACTATE BLDV-MCNC: 1.6 MMOL/L — SIGNIFICANT CHANGE UP (ref 0.7–2)
LACTATE BLDV-MCNC: 1.6 MMOL/L — SIGNIFICANT CHANGE UP (ref 0.7–2)
LACTATE BLDV-MCNC: 2 MMOL/L — SIGNIFICANT CHANGE UP (ref 0.7–2)
LDH SERPL L TO P-CCNC: 454 U/L — HIGH (ref 50–242)
LEGIONELLA AG UR QL: NEGATIVE — SIGNIFICANT CHANGE UP
LEUKOCYTE ESTERASE UR-ACNC: ABNORMAL
LEUKOCYTE ESTERASE UR-ACNC: NEGATIVE — SIGNIFICANT CHANGE UP
LIDOCAIN IGE QN: 15 U/L — SIGNIFICANT CHANGE UP (ref 7–60)
LYMPHOCYTES # BLD AUTO: 0.58 K/UL — LOW (ref 1–3.3)
LYMPHOCYTES # BLD AUTO: 0.67 K/UL — LOW (ref 1–3.3)
LYMPHOCYTES # BLD AUTO: 0.78 K/UL — LOW (ref 1–3.3)
LYMPHOCYTES # BLD AUTO: 0.79 K/UL — LOW (ref 1–3.3)
LYMPHOCYTES # BLD AUTO: 0.79 K/UL — LOW (ref 1–3.3)
LYMPHOCYTES # BLD AUTO: 0.93 K/UL — LOW (ref 1–3.3)
LYMPHOCYTES # BLD AUTO: 1.01 K/UL — SIGNIFICANT CHANGE UP (ref 1–3.3)
LYMPHOCYTES # BLD AUTO: 1.56 K/UL — SIGNIFICANT CHANGE UP (ref 1–3.3)
LYMPHOCYTES # BLD AUTO: 14.4 % — SIGNIFICANT CHANGE UP (ref 13–44)
LYMPHOCYTES # BLD AUTO: 18.6 % — SIGNIFICANT CHANGE UP (ref 13–44)
LYMPHOCYTES # BLD AUTO: 7.5 % — LOW (ref 13–44)
LYMPHOCYTES # BLD AUTO: 7.8 % — LOW (ref 13–44)
LYMPHOCYTES # BLD AUTO: 8.1 % — LOW (ref 13–44)
LYMPHOCYTES # BLD AUTO: 8.6 % — LOW (ref 13–44)
LYMPHOCYTES # BLD AUTO: 9.7 % — LOW (ref 13–44)
LYMPHOCYTES # BLD AUTO: 9.9 % — LOW (ref 13–44)
MAGNESIUM SERPL-MCNC: 2.1 MG/DL — SIGNIFICANT CHANGE UP (ref 1.6–2.6)
MAGNESIUM SERPL-MCNC: 2.1 MG/DL — SIGNIFICANT CHANGE UP (ref 1.6–2.6)
MAGNESIUM SERPL-MCNC: 2.2 MG/DL — SIGNIFICANT CHANGE UP (ref 1.6–2.6)
MAGNESIUM SERPL-MCNC: 2.3 MG/DL — SIGNIFICANT CHANGE UP (ref 1.6–2.6)
MAGNESIUM SERPL-MCNC: 2.4 MG/DL — SIGNIFICANT CHANGE UP (ref 1.6–2.6)
MAGNESIUM SERPL-MCNC: 2.5 MG/DL — SIGNIFICANT CHANGE UP (ref 1.6–2.6)
MANUAL SMEAR VERIFICATION: SIGNIFICANT CHANGE UP
MCHC RBC-ENTMCNC: 22.8 PG — LOW (ref 27–34)
MCHC RBC-ENTMCNC: 23.1 PG — LOW (ref 27–34)
MCHC RBC-ENTMCNC: 23.1 PG — LOW (ref 27–34)
MCHC RBC-ENTMCNC: 23.2 PG — LOW (ref 27–34)
MCHC RBC-ENTMCNC: 23.3 PG — LOW (ref 27–34)
MCHC RBC-ENTMCNC: 23.5 PG — LOW (ref 27–34)
MCHC RBC-ENTMCNC: 23.6 PG — LOW (ref 27–34)
MCHC RBC-ENTMCNC: 23.6 PG — LOW (ref 27–34)
MCHC RBC-ENTMCNC: 23.8 PG — LOW (ref 27–34)
MCHC RBC-ENTMCNC: 23.9 PG — LOW (ref 27–34)
MCHC RBC-ENTMCNC: 24 PG — LOW (ref 27–34)
MCHC RBC-ENTMCNC: 24.3 PG — LOW (ref 27–34)
MCHC RBC-ENTMCNC: 24.3 PG — LOW (ref 27–34)
MCHC RBC-ENTMCNC: 24.5 PG — LOW (ref 27–34)
MCHC RBC-ENTMCNC: 24.6 PG — LOW (ref 27–34)
MCHC RBC-ENTMCNC: 24.6 PG — LOW (ref 27–34)
MCHC RBC-ENTMCNC: 24.7 PG — LOW (ref 27–34)
MCHC RBC-ENTMCNC: 27.5 PG — SIGNIFICANT CHANGE UP (ref 27–34)
MCHC RBC-ENTMCNC: 27.6 PG — SIGNIFICANT CHANGE UP (ref 27–34)
MCHC RBC-ENTMCNC: 27.8 GM/DL — LOW (ref 32–36)
MCHC RBC-ENTMCNC: 27.9 GM/DL — LOW (ref 32–36)
MCHC RBC-ENTMCNC: 28 GM/DL — LOW (ref 32–36)
MCHC RBC-ENTMCNC: 28.1 GM/DL — LOW (ref 32–36)
MCHC RBC-ENTMCNC: 28.1 GM/DL — LOW (ref 32–36)
MCHC RBC-ENTMCNC: 28.2 GM/DL — LOW (ref 32–36)
MCHC RBC-ENTMCNC: 28.3 GM/DL — LOW (ref 32–36)
MCHC RBC-ENTMCNC: 28.4 GM/DL — LOW (ref 32–36)
MCHC RBC-ENTMCNC: 28.5 GM/DL — LOW (ref 32–36)
MCHC RBC-ENTMCNC: 28.5 GM/DL — LOW (ref 32–36)
MCHC RBC-ENTMCNC: 28.6 GM/DL — LOW (ref 32–36)
MCHC RBC-ENTMCNC: 28.7 GM/DL — LOW (ref 32–36)
MCHC RBC-ENTMCNC: 28.7 GM/DL — LOW (ref 32–36)
MCHC RBC-ENTMCNC: 28.8 GM/DL — LOW (ref 32–36)
MCHC RBC-ENTMCNC: 29 GM/DL — LOW (ref 32–36)
MCHC RBC-ENTMCNC: 29.2 GM/DL — LOW (ref 32–36)
MCHC RBC-ENTMCNC: 29.2 GM/DL — LOW (ref 32–36)
MCHC RBC-ENTMCNC: 29.3 GM/DL — LOW (ref 32–36)
MCHC RBC-ENTMCNC: 29.5 GM/DL — LOW (ref 32–36)
MCHC RBC-ENTMCNC: 29.7 GM/DL — LOW (ref 32–36)
MCHC RBC-ENTMCNC: 29.9 GM/DL — LOW (ref 32–36)
MCHC RBC-ENTMCNC: 30.5 GM/DL — LOW (ref 32–36)
MCV RBC AUTO: 80.9 FL — SIGNIFICANT CHANGE UP (ref 80–100)
MCV RBC AUTO: 81 FL — SIGNIFICANT CHANGE UP (ref 80–100)
MCV RBC AUTO: 81.7 FL — SIGNIFICANT CHANGE UP (ref 80–100)
MCV RBC AUTO: 81.8 FL — SIGNIFICANT CHANGE UP (ref 80–100)
MCV RBC AUTO: 81.8 FL — SIGNIFICANT CHANGE UP (ref 80–100)
MCV RBC AUTO: 82.1 FL — SIGNIFICANT CHANGE UP (ref 80–100)
MCV RBC AUTO: 82.2 FL — SIGNIFICANT CHANGE UP (ref 80–100)
MCV RBC AUTO: 82.3 FL — SIGNIFICANT CHANGE UP (ref 80–100)
MCV RBC AUTO: 82.6 FL — SIGNIFICANT CHANGE UP (ref 80–100)
MCV RBC AUTO: 82.8 FL — SIGNIFICANT CHANGE UP (ref 80–100)
MCV RBC AUTO: 83 FL — SIGNIFICANT CHANGE UP (ref 80–100)
MCV RBC AUTO: 83.2 FL — SIGNIFICANT CHANGE UP (ref 80–100)
MCV RBC AUTO: 83.2 FL — SIGNIFICANT CHANGE UP (ref 80–100)
MCV RBC AUTO: 83.3 FL — SIGNIFICANT CHANGE UP (ref 80–100)
MCV RBC AUTO: 83.4 FL — SIGNIFICANT CHANGE UP (ref 80–100)
MCV RBC AUTO: 83.6 FL — SIGNIFICANT CHANGE UP (ref 80–100)
MCV RBC AUTO: 83.8 FL — SIGNIFICANT CHANGE UP (ref 80–100)
MCV RBC AUTO: 83.8 FL — SIGNIFICANT CHANGE UP (ref 80–100)
MCV RBC AUTO: 84 FL — SIGNIFICANT CHANGE UP (ref 80–100)
MCV RBC AUTO: 84.3 FL — SIGNIFICANT CHANGE UP (ref 80–100)
MCV RBC AUTO: 85 FL — SIGNIFICANT CHANGE UP (ref 80–100)
MCV RBC AUTO: 85 FL — SIGNIFICANT CHANGE UP (ref 80–100)
MCV RBC AUTO: 85.4 FL — SIGNIFICANT CHANGE UP (ref 80–100)
MCV RBC AUTO: 86.1 FL — SIGNIFICANT CHANGE UP (ref 80–100)
MCV RBC AUTO: 86.2 FL — SIGNIFICANT CHANGE UP (ref 80–100)
MCV RBC AUTO: 86.6 FL — SIGNIFICANT CHANGE UP (ref 80–100)
MCV RBC AUTO: 90.4 FL — SIGNIFICANT CHANGE UP (ref 80–100)
MCV RBC AUTO: 91.8 FL — SIGNIFICANT CHANGE UP (ref 80–100)
METHOD TYPE: SIGNIFICANT CHANGE UP
MICROCYTES BLD QL: SLIGHT — SIGNIFICANT CHANGE UP
MONOCYTES # BLD AUTO: 0.44 K/UL — SIGNIFICANT CHANGE UP (ref 0–0.9)
MONOCYTES # BLD AUTO: 0.61 K/UL — SIGNIFICANT CHANGE UP (ref 0–0.9)
MONOCYTES # BLD AUTO: 0.62 K/UL — SIGNIFICANT CHANGE UP (ref 0–0.9)
MONOCYTES # BLD AUTO: 0.73 K/UL — SIGNIFICANT CHANGE UP (ref 0–0.9)
MONOCYTES # BLD AUTO: 0.74 K/UL — SIGNIFICANT CHANGE UP (ref 0–0.9)
MONOCYTES # BLD AUTO: 0.76 K/UL — SIGNIFICANT CHANGE UP (ref 0–0.9)
MONOCYTES # BLD AUTO: 0.86 K/UL — SIGNIFICANT CHANGE UP (ref 0–0.9)
MONOCYTES # BLD AUTO: 1.13 K/UL — HIGH (ref 0–0.9)
MONOCYTES NFR BLD AUTO: 10.7 % — SIGNIFICANT CHANGE UP (ref 2–14)
MONOCYTES NFR BLD AUTO: 4.3 % — SIGNIFICANT CHANGE UP (ref 2–14)
MONOCYTES NFR BLD AUTO: 7.5 % — SIGNIFICANT CHANGE UP (ref 2–14)
MONOCYTES NFR BLD AUTO: 9.1 % — SIGNIFICANT CHANGE UP (ref 2–14)
MONOCYTES NFR BLD AUTO: 9.2 % — SIGNIFICANT CHANGE UP (ref 2–14)
MONOCYTES NFR BLD AUTO: 9.4 % — SIGNIFICANT CHANGE UP (ref 2–14)
MONOCYTES NFR BLD AUTO: 9.6 % — SIGNIFICANT CHANGE UP (ref 2–14)
MONOCYTES NFR BLD AUTO: 9.6 % — SIGNIFICANT CHANGE UP (ref 2–14)
NEUTROPHILS # BLD AUTO: 4.72 K/UL — SIGNIFICANT CHANGE UP (ref 1.8–7.4)
NEUTROPHILS # BLD AUTO: 5.51 K/UL — SIGNIFICANT CHANGE UP (ref 1.8–7.4)
NEUTROPHILS # BLD AUTO: 6.21 K/UL — SIGNIFICANT CHANGE UP (ref 1.8–7.4)
NEUTROPHILS # BLD AUTO: 6.27 K/UL — SIGNIFICANT CHANGE UP (ref 1.8–7.4)
NEUTROPHILS # BLD AUTO: 6.27 K/UL — SIGNIFICANT CHANGE UP (ref 1.8–7.4)
NEUTROPHILS # BLD AUTO: 6.87 K/UL — SIGNIFICANT CHANGE UP (ref 1.8–7.4)
NEUTROPHILS # BLD AUTO: 8.87 K/UL — HIGH (ref 1.8–7.4)
NEUTROPHILS # BLD AUTO: 9.53 K/UL — HIGH (ref 1.8–7.4)
NEUTROPHILS NFR BLD AUTO: 65.6 % — SIGNIFICANT CHANGE UP (ref 43–77)
NEUTROPHILS NFR BLD AUTO: 73.1 % — SIGNIFICANT CHANGE UP (ref 43–77)
NEUTROPHILS NFR BLD AUTO: 78.2 % — HIGH (ref 43–77)
NEUTROPHILS NFR BLD AUTO: 78.8 % — HIGH (ref 43–77)
NEUTROPHILS NFR BLD AUTO: 80.6 % — HIGH (ref 43–77)
NEUTROPHILS NFR BLD AUTO: 80.7 % — HIGH (ref 43–77)
NEUTROPHILS NFR BLD AUTO: 83.5 % — HIGH (ref 43–77)
NEUTROPHILS NFR BLD AUTO: 87 % — HIGH (ref 43–77)
NITRITE UR-MCNC: NEGATIVE — SIGNIFICANT CHANGE UP
NRBC # BLD: 0 /100 WBCS — SIGNIFICANT CHANGE UP (ref 0–0)
NT-PROBNP SERPL-SCNC: 7983 PG/ML — HIGH (ref 0–300)
NT-PROBNP SERPL-SCNC: 8208 PG/ML — HIGH (ref 0–300)
ORGANISM # SPEC MICROSCOPIC CNT: SIGNIFICANT CHANGE UP
ORGANISM # SPEC MICROSCOPIC CNT: SIGNIFICANT CHANGE UP
OSMOLALITY UR: 317 MOS/KG — SIGNIFICANT CHANGE UP (ref 300–900)
OSMOLALITY UR: 548 MOS/KG — SIGNIFICANT CHANGE UP (ref 300–900)
PCO2 BLDV: 43 MMHG — HIGH (ref 39–42)
PCO2 BLDV: 52 MMHG — HIGH (ref 39–42)
PCO2 BLDV: 56 MMHG — HIGH (ref 39–42)
PCO2 BLDV: 60 MMHG — HIGH (ref 39–42)
PCO2 BLDV: 61 MMHG — HIGH (ref 39–42)
PCO2 BLDV: 62 MMHG — HIGH (ref 39–42)
PCO2 BLDV: 69 MMHG — HIGH (ref 39–42)
PH BLDV: 7.24 — LOW (ref 7.32–7.43)
PH BLDV: 7.28 — LOW (ref 7.32–7.43)
PH BLDV: 7.31 — LOW (ref 7.32–7.43)
PH BLDV: 7.33 — SIGNIFICANT CHANGE UP (ref 7.32–7.43)
PH BLDV: 7.34 — SIGNIFICANT CHANGE UP (ref 7.32–7.43)
PH BLDV: 7.37 — SIGNIFICANT CHANGE UP (ref 7.32–7.43)
PH BLDV: 7.41 — SIGNIFICANT CHANGE UP (ref 7.32–7.43)
PH UR: 5.5 — SIGNIFICANT CHANGE UP (ref 5–8)
PH UR: 6.5 — SIGNIFICANT CHANGE UP (ref 5–8)
PHOSPHATE SERPL-MCNC: 2.9 MG/DL — SIGNIFICANT CHANGE UP (ref 2.5–4.5)
PHOSPHATE SERPL-MCNC: 2.9 MG/DL — SIGNIFICANT CHANGE UP (ref 2.5–4.5)
PHOSPHATE SERPL-MCNC: 3 MG/DL — SIGNIFICANT CHANGE UP (ref 2.5–4.5)
PHOSPHATE SERPL-MCNC: 3.1 MG/DL — SIGNIFICANT CHANGE UP (ref 2.5–4.5)
PHOSPHATE SERPL-MCNC: 3.2 MG/DL — SIGNIFICANT CHANGE UP (ref 2.5–4.5)
PHOSPHATE SERPL-MCNC: 3.2 MG/DL — SIGNIFICANT CHANGE UP (ref 2.5–4.5)
PHOSPHATE SERPL-MCNC: 3.3 MG/DL — SIGNIFICANT CHANGE UP (ref 2.5–4.5)
PHOSPHATE SERPL-MCNC: 3.4 MG/DL — SIGNIFICANT CHANGE UP (ref 2.5–4.5)
PHOSPHATE SERPL-MCNC: 3.5 MG/DL — SIGNIFICANT CHANGE UP (ref 2.5–4.5)
PHOSPHATE SERPL-MCNC: 3.6 MG/DL — SIGNIFICANT CHANGE UP (ref 2.5–4.5)
PHOSPHATE SERPL-MCNC: 3.6 MG/DL — SIGNIFICANT CHANGE UP (ref 2.5–4.5)
PHOSPHATE SERPL-MCNC: 4 MG/DL — SIGNIFICANT CHANGE UP (ref 2.5–4.5)
PHOSPHATE SERPL-MCNC: 4.5 MG/DL — SIGNIFICANT CHANGE UP (ref 2.5–4.5)
PHOSPHATE SERPL-MCNC: 4.8 MG/DL — HIGH (ref 2.5–4.5)
PHOSPHATE SERPL-MCNC: 6.1 MG/DL — HIGH (ref 2.5–4.5)
PHOSPHATE SERPL-MCNC: 6.3 MG/DL — HIGH (ref 2.5–4.5)
PHOSPHATE SERPL-MCNC: 6.7 MG/DL — HIGH (ref 2.5–4.5)
PHOSPHATE SERPL-MCNC: 6.7 MG/DL — HIGH (ref 2.5–4.5)
PHOSPHATE SERPL-MCNC: 6.8 MG/DL — HIGH (ref 2.5–4.5)
PHOSPHATE SERPL-MCNC: 7.2 MG/DL — HIGH (ref 2.5–4.5)
PHOSPHATE SERPL-MCNC: 7.5 MG/DL — HIGH (ref 2.5–4.5)
PLAT MORPH BLD: NORMAL — SIGNIFICANT CHANGE UP
PLATELET # BLD AUTO: 164 K/UL — SIGNIFICANT CHANGE UP (ref 150–400)
PLATELET # BLD AUTO: 166 K/UL — SIGNIFICANT CHANGE UP (ref 150–400)
PLATELET # BLD AUTO: 168 K/UL — SIGNIFICANT CHANGE UP (ref 150–400)
PLATELET # BLD AUTO: 175 K/UL — SIGNIFICANT CHANGE UP (ref 150–400)
PLATELET # BLD AUTO: 188 K/UL — SIGNIFICANT CHANGE UP (ref 150–400)
PLATELET # BLD AUTO: 190 K/UL — SIGNIFICANT CHANGE UP (ref 150–400)
PLATELET # BLD AUTO: 191 K/UL — SIGNIFICANT CHANGE UP (ref 150–400)
PLATELET # BLD AUTO: 208 K/UL — SIGNIFICANT CHANGE UP (ref 150–400)
PLATELET # BLD AUTO: 217 K/UL — SIGNIFICANT CHANGE UP (ref 150–400)
PLATELET # BLD AUTO: 225 K/UL — SIGNIFICANT CHANGE UP (ref 150–400)
PLATELET # BLD AUTO: 226 K/UL — SIGNIFICANT CHANGE UP (ref 150–400)
PLATELET # BLD AUTO: 234 K/UL — SIGNIFICANT CHANGE UP (ref 150–400)
PLATELET # BLD AUTO: 240 K/UL — SIGNIFICANT CHANGE UP (ref 150–400)
PLATELET # BLD AUTO: 243 K/UL — SIGNIFICANT CHANGE UP (ref 150–400)
PLATELET # BLD AUTO: 248 K/UL — SIGNIFICANT CHANGE UP (ref 150–400)
PLATELET # BLD AUTO: 250 K/UL — SIGNIFICANT CHANGE UP (ref 150–400)
PLATELET # BLD AUTO: 253 K/UL — SIGNIFICANT CHANGE UP (ref 150–400)
PLATELET # BLD AUTO: 268 K/UL — SIGNIFICANT CHANGE UP (ref 150–400)
PLATELET # BLD AUTO: 275 K/UL — SIGNIFICANT CHANGE UP (ref 150–400)
PLATELET # BLD AUTO: 280 K/UL — SIGNIFICANT CHANGE UP (ref 150–400)
PLATELET # BLD AUTO: 282 K/UL — SIGNIFICANT CHANGE UP (ref 150–400)
PLATELET # BLD AUTO: 282 K/UL — SIGNIFICANT CHANGE UP (ref 150–400)
PLATELET # BLD AUTO: 285 K/UL — SIGNIFICANT CHANGE UP (ref 150–400)
PLATELET # BLD AUTO: 297 K/UL — SIGNIFICANT CHANGE UP (ref 150–400)
PLATELET # BLD AUTO: 307 K/UL — SIGNIFICANT CHANGE UP (ref 150–400)
PLATELET # BLD AUTO: 311 K/UL — SIGNIFICANT CHANGE UP (ref 150–400)
PLATELET # BLD AUTO: 329 K/UL — SIGNIFICANT CHANGE UP (ref 150–400)
PLATELET # BLD AUTO: 332 K/UL — SIGNIFICANT CHANGE UP (ref 150–400)
PLATELET # BLD AUTO: 352 K/UL — SIGNIFICANT CHANGE UP (ref 150–400)
PLATELET # BLD AUTO: 89 K/UL — LOW (ref 150–400)
PO2 BLDV: 187 MMHG — HIGH (ref 25–45)
PO2 BLDV: 21 MMHG — LOW (ref 25–45)
PO2 BLDV: 31 MMHG — SIGNIFICANT CHANGE UP (ref 25–45)
PO2 BLDV: 32 MMHG — SIGNIFICANT CHANGE UP (ref 25–45)
PO2 BLDV: 37 MMHG — SIGNIFICANT CHANGE UP (ref 25–45)
PO2 BLDV: 41 MMHG — SIGNIFICANT CHANGE UP (ref 25–45)
PO2 BLDV: 54 MMHG — HIGH (ref 25–45)
POIKILOCYTOSIS BLD QL AUTO: SLIGHT — SIGNIFICANT CHANGE UP
POLYCHROMASIA BLD QL SMEAR: SLIGHT — SIGNIFICANT CHANGE UP
POTASSIUM BLDV-SCNC: 3.9 MMOL/L — SIGNIFICANT CHANGE UP (ref 3.5–5.1)
POTASSIUM BLDV-SCNC: 4.1 MMOL/L — SIGNIFICANT CHANGE UP (ref 3.5–5.1)
POTASSIUM BLDV-SCNC: 4.2 MMOL/L — SIGNIFICANT CHANGE UP (ref 3.5–5.1)
POTASSIUM BLDV-SCNC: 4.4 MMOL/L — SIGNIFICANT CHANGE UP (ref 3.5–5.1)
POTASSIUM BLDV-SCNC: 5.3 MMOL/L — HIGH (ref 3.5–5.1)
POTASSIUM BLDV-SCNC: 5.4 MMOL/L — HIGH (ref 3.5–5.1)
POTASSIUM BLDV-SCNC: 5.9 MMOL/L — HIGH (ref 3.5–5.1)
POTASSIUM SERPL-MCNC: 3.4 MMOL/L — LOW (ref 3.5–5.3)
POTASSIUM SERPL-MCNC: 3.6 MMOL/L — SIGNIFICANT CHANGE UP (ref 3.5–5.3)
POTASSIUM SERPL-MCNC: 3.9 MMOL/L — SIGNIFICANT CHANGE UP (ref 3.5–5.3)
POTASSIUM SERPL-MCNC: 4 MMOL/L — SIGNIFICANT CHANGE UP (ref 3.5–5.3)
POTASSIUM SERPL-MCNC: 4 MMOL/L — SIGNIFICANT CHANGE UP (ref 3.5–5.3)
POTASSIUM SERPL-MCNC: 4.1 MMOL/L — SIGNIFICANT CHANGE UP (ref 3.5–5.3)
POTASSIUM SERPL-MCNC: 4.2 MMOL/L — SIGNIFICANT CHANGE UP (ref 3.5–5.3)
POTASSIUM SERPL-MCNC: 4.3 MMOL/L — SIGNIFICANT CHANGE UP (ref 3.5–5.3)
POTASSIUM SERPL-MCNC: 4.4 MMOL/L — SIGNIFICANT CHANGE UP (ref 3.5–5.3)
POTASSIUM SERPL-MCNC: 4.5 MMOL/L — SIGNIFICANT CHANGE UP (ref 3.5–5.3)
POTASSIUM SERPL-MCNC: 4.5 MMOL/L — SIGNIFICANT CHANGE UP (ref 3.5–5.3)
POTASSIUM SERPL-MCNC: 4.7 MMOL/L — SIGNIFICANT CHANGE UP (ref 3.5–5.3)
POTASSIUM SERPL-MCNC: 4.7 MMOL/L — SIGNIFICANT CHANGE UP (ref 3.5–5.3)
POTASSIUM SERPL-MCNC: 5.1 MMOL/L — SIGNIFICANT CHANGE UP (ref 3.5–5.3)
POTASSIUM SERPL-MCNC: 5.2 MMOL/L — SIGNIFICANT CHANGE UP (ref 3.5–5.3)
POTASSIUM SERPL-MCNC: 5.4 MMOL/L — HIGH (ref 3.5–5.3)
POTASSIUM SERPL-MCNC: 5.5 MMOL/L — HIGH (ref 3.5–5.3)
POTASSIUM SERPL-MCNC: 5.7 MMOL/L — HIGH (ref 3.5–5.3)
POTASSIUM SERPL-MCNC: 5.8 MMOL/L — HIGH (ref 3.5–5.3)
POTASSIUM SERPL-MCNC: 6.3 MMOL/L — CRITICAL HIGH (ref 3.5–5.3)
POTASSIUM SERPL-MCNC: 7 MMOL/L — CRITICAL HIGH (ref 3.5–5.3)
POTASSIUM SERPL-SCNC: 3.4 MMOL/L — LOW (ref 3.5–5.3)
POTASSIUM SERPL-SCNC: 3.6 MMOL/L — SIGNIFICANT CHANGE UP (ref 3.5–5.3)
POTASSIUM SERPL-SCNC: 3.9 MMOL/L — SIGNIFICANT CHANGE UP (ref 3.5–5.3)
POTASSIUM SERPL-SCNC: 4 MMOL/L — SIGNIFICANT CHANGE UP (ref 3.5–5.3)
POTASSIUM SERPL-SCNC: 4 MMOL/L — SIGNIFICANT CHANGE UP (ref 3.5–5.3)
POTASSIUM SERPL-SCNC: 4.1 MMOL/L — SIGNIFICANT CHANGE UP (ref 3.5–5.3)
POTASSIUM SERPL-SCNC: 4.2 MMOL/L — SIGNIFICANT CHANGE UP (ref 3.5–5.3)
POTASSIUM SERPL-SCNC: 4.3 MMOL/L — SIGNIFICANT CHANGE UP (ref 3.5–5.3)
POTASSIUM SERPL-SCNC: 4.4 MMOL/L — SIGNIFICANT CHANGE UP (ref 3.5–5.3)
POTASSIUM SERPL-SCNC: 4.5 MMOL/L — SIGNIFICANT CHANGE UP (ref 3.5–5.3)
POTASSIUM SERPL-SCNC: 4.5 MMOL/L — SIGNIFICANT CHANGE UP (ref 3.5–5.3)
POTASSIUM SERPL-SCNC: 4.7 MMOL/L — SIGNIFICANT CHANGE UP (ref 3.5–5.3)
POTASSIUM SERPL-SCNC: 4.7 MMOL/L — SIGNIFICANT CHANGE UP (ref 3.5–5.3)
POTASSIUM SERPL-SCNC: 5.1 MMOL/L — SIGNIFICANT CHANGE UP (ref 3.5–5.3)
POTASSIUM SERPL-SCNC: 5.2 MMOL/L — SIGNIFICANT CHANGE UP (ref 3.5–5.3)
POTASSIUM SERPL-SCNC: 5.4 MMOL/L — HIGH (ref 3.5–5.3)
POTASSIUM SERPL-SCNC: 5.5 MMOL/L — HIGH (ref 3.5–5.3)
POTASSIUM SERPL-SCNC: 5.7 MMOL/L — HIGH (ref 3.5–5.3)
POTASSIUM SERPL-SCNC: 5.8 MMOL/L — HIGH (ref 3.5–5.3)
POTASSIUM SERPL-SCNC: 6.3 MMOL/L — CRITICAL HIGH (ref 3.5–5.3)
POTASSIUM SERPL-SCNC: 7 MMOL/L — CRITICAL HIGH (ref 3.5–5.3)
POTASSIUM UR-SCNC: 36 MMOL/L — SIGNIFICANT CHANGE UP
PROT ?TM UR-MCNC: 257 MG/DL — HIGH (ref 0–12)
PROT ?TM UR-MCNC: 34 MG/DL — HIGH (ref 0–12)
PROT SERPL-MCNC: 6.6 G/DL — SIGNIFICANT CHANGE UP (ref 6–8.3)
PROT SERPL-MCNC: 6.8 G/DL — SIGNIFICANT CHANGE UP (ref 6–8.3)
PROT SERPL-MCNC: 7 G/DL — SIGNIFICANT CHANGE UP (ref 6–8.3)
PROT SERPL-MCNC: 7 G/DL — SIGNIFICANT CHANGE UP (ref 6–8.3)
PROT SERPL-MCNC: 7.1 G/DL — SIGNIFICANT CHANGE UP (ref 6–8.3)
PROT SERPL-MCNC: 7.2 G/DL — SIGNIFICANT CHANGE UP (ref 6–8.3)
PROT SERPL-MCNC: 7.3 G/DL — SIGNIFICANT CHANGE UP (ref 6–8.3)
PROT SERPL-MCNC: 7.4 G/DL — SIGNIFICANT CHANGE UP (ref 6–8.3)
PROT SERPL-MCNC: 7.7 G/DL — SIGNIFICANT CHANGE UP (ref 6–8.3)
PROT UR-MCNC: 100 — SIGNIFICANT CHANGE UP
PROT UR-MCNC: ABNORMAL
PROT/CREAT UR-RTO: 0.2 RATIO — SIGNIFICANT CHANGE UP (ref 0–0.2)
PROT/CREAT UR-RTO: 3.7 RATIO — HIGH (ref 0–0.2)
PROTHROM AB SERPL-ACNC: 16.3 SEC — HIGH (ref 10.5–13.4)
PROTHROM AB SERPL-ACNC: 21.5 SEC — HIGH (ref 10.5–13.4)
PROTHROM AB SERPL-ACNC: 23.6 SEC — HIGH (ref 10.5–13.4)
RBC # BLD: 2.8 M/UL — LOW (ref 3.8–5.2)
RBC # BLD: 3.12 M/UL — LOW (ref 3.8–5.2)
RBC # BLD: 3.31 M/UL — LOW (ref 3.8–5.2)
RBC # BLD: 3.34 M/UL — LOW (ref 3.8–5.2)
RBC # BLD: 3.42 M/UL — LOW (ref 3.8–5.2)
RBC # BLD: 3.48 M/UL — LOW (ref 3.8–5.2)
RBC # BLD: 3.49 M/UL — LOW (ref 3.8–5.2)
RBC # BLD: 3.53 M/UL — LOW (ref 3.8–5.2)
RBC # BLD: 3.54 M/UL — LOW (ref 3.8–5.2)
RBC # BLD: 3.55 M/UL — LOW (ref 3.8–5.2)
RBC # BLD: 3.55 M/UL — LOW (ref 3.8–5.2)
RBC # BLD: 3.56 M/UL — LOW (ref 3.8–5.2)
RBC # BLD: 3.58 M/UL — LOW (ref 3.8–5.2)
RBC # BLD: 3.62 M/UL — LOW (ref 3.8–5.2)
RBC # BLD: 3.62 M/UL — LOW (ref 3.8–5.2)
RBC # BLD: 3.64 M/UL — LOW (ref 3.8–5.2)
RBC # BLD: 3.65 M/UL — LOW (ref 3.8–5.2)
RBC # BLD: 3.66 M/UL — LOW (ref 3.8–5.2)
RBC # BLD: 3.68 M/UL — LOW (ref 3.8–5.2)
RBC # BLD: 3.73 M/UL — LOW (ref 3.8–5.2)
RBC # BLD: 3.73 M/UL — LOW (ref 3.8–5.2)
RBC # BLD: 3.77 M/UL — LOW (ref 3.8–5.2)
RBC # BLD: 3.77 M/UL — LOW (ref 3.8–5.2)
RBC # BLD: 3.88 M/UL — SIGNIFICANT CHANGE UP (ref 3.8–5.2)
RBC # BLD: 3.95 M/UL — SIGNIFICANT CHANGE UP (ref 3.8–5.2)
RBC # BLD: 3.98 M/UL — SIGNIFICANT CHANGE UP (ref 3.8–5.2)
RBC # FLD: 16.5 % — HIGH (ref 10.3–14.5)
RBC # FLD: 16.6 % — HIGH (ref 10.3–14.5)
RBC # FLD: 16.6 % — HIGH (ref 10.3–14.5)
RBC # FLD: 16.7 % — HIGH (ref 10.3–14.5)
RBC # FLD: 16.8 % — HIGH (ref 10.3–14.5)
RBC # FLD: 16.9 % — HIGH (ref 10.3–14.5)
RBC # FLD: 17 % — HIGH (ref 10.3–14.5)
RBC # FLD: 17.1 % — HIGH (ref 10.3–14.5)
RBC # FLD: 17.2 % — HIGH (ref 10.3–14.5)
RBC # FLD: 17.3 % — HIGH (ref 10.3–14.5)
RBC # FLD: 17.4 % — HIGH (ref 10.3–14.5)
RBC # FLD: 17.5 % — HIGH (ref 10.3–14.5)
RBC # FLD: 17.6 % — HIGH (ref 10.3–14.5)
RBC BLD AUTO: ABNORMAL
RBC CASTS # UR COMP ASSIST: 1 /HPF — SIGNIFICANT CHANGE UP (ref 0–4)
RBC CASTS # UR COMP ASSIST: 1362 /HPF — HIGH (ref 0–4)
RBC CASTS # UR COMP ASSIST: 4 /HPF — SIGNIFICANT CHANGE UP (ref 0–4)
RBC CASTS # UR COMP ASSIST: 6 /HPF — HIGH (ref 0–4)
RETICS #: 58.2 K/UL — SIGNIFICANT CHANGE UP (ref 25–125)
RETICS/RBC NFR: 1.6 % — SIGNIFICANT CHANGE UP (ref 0.5–2.5)
RH IG SCN BLD-IMP: POSITIVE — SIGNIFICANT CHANGE UP
RH IG SCN BLD-IMP: POSITIVE — SIGNIFICANT CHANGE UP
RSV RNA NPH QL NAA+NON-PROBE: SIGNIFICANT CHANGE UP
RSV RNA NPH QL NAA+NON-PROBE: SIGNIFICANT CHANGE UP
SAO2 % BLDV: 33.2 % — LOW (ref 67–88)
SAO2 % BLDV: 45.3 % — LOW (ref 67–88)
SAO2 % BLDV: 46.3 % — LOW (ref 67–88)
SAO2 % BLDV: 68.9 % — SIGNIFICANT CHANGE UP (ref 67–88)
SAO2 % BLDV: 70.8 % — SIGNIFICANT CHANGE UP (ref 67–88)
SAO2 % BLDV: 89.8 % — HIGH (ref 67–88)
SAO2 % BLDV: 99 % — HIGH (ref 67–88)
SARS-COV-2 RNA SPEC QL NAA+PROBE: SIGNIFICANT CHANGE UP
SODIUM SERPL-SCNC: 127 MMOL/L — LOW (ref 135–145)
SODIUM SERPL-SCNC: 129 MMOL/L — LOW (ref 135–145)
SODIUM SERPL-SCNC: 129 MMOL/L — LOW (ref 135–145)
SODIUM SERPL-SCNC: 130 MMOL/L — LOW (ref 135–145)
SODIUM SERPL-SCNC: 131 MMOL/L — LOW (ref 135–145)
SODIUM SERPL-SCNC: 132 MMOL/L — LOW (ref 135–145)
SODIUM SERPL-SCNC: 133 MMOL/L — LOW (ref 135–145)
SODIUM SERPL-SCNC: 134 MMOL/L — LOW (ref 135–145)
SODIUM SERPL-SCNC: 135 MMOL/L — SIGNIFICANT CHANGE UP (ref 135–145)
SODIUM SERPL-SCNC: 136 MMOL/L — SIGNIFICANT CHANGE UP (ref 135–145)
SODIUM SERPL-SCNC: 137 MMOL/L — SIGNIFICANT CHANGE UP (ref 135–145)
SODIUM SERPL-SCNC: 138 MMOL/L — SIGNIFICANT CHANGE UP (ref 135–145)
SODIUM SERPL-SCNC: 139 MMOL/L — SIGNIFICANT CHANGE UP (ref 135–145)
SODIUM SERPL-SCNC: 139 MMOL/L — SIGNIFICANT CHANGE UP (ref 135–145)
SODIUM SERPL-SCNC: 140 MMOL/L — SIGNIFICANT CHANGE UP (ref 135–145)
SODIUM SERPL-SCNC: 141 MMOL/L — SIGNIFICANT CHANGE UP (ref 135–145)
SODIUM SERPL-SCNC: 141 MMOL/L — SIGNIFICANT CHANGE UP (ref 135–145)
SODIUM UR-SCNC: 14 MMOL/L — SIGNIFICANT CHANGE UP
SODIUM UR-SCNC: 16 MMOL/L — SIGNIFICANT CHANGE UP
SODIUM UR-SCNC: 20 MMOL/L — SIGNIFICANT CHANGE UP
SP GR SPEC: 1.02 — SIGNIFICANT CHANGE UP (ref 1.01–1.02)
SPECIMEN SOURCE: SIGNIFICANT CHANGE UP
SPECIMEN SOURCE: SIGNIFICANT CHANGE UP
SURGICAL PATHOLOGY STUDY: SIGNIFICANT CHANGE UP
TROPONIN T, HIGH SENSITIVITY RESULT: 112 NG/L — HIGH (ref 0–51)
TROPONIN T, HIGH SENSITIVITY RESULT: 117 NG/L — HIGH (ref 0–51)
TROPONIN T, HIGH SENSITIVITY RESULT: 124 NG/L — HIGH (ref 0–51)
TROPONIN T, HIGH SENSITIVITY RESULT: 68 NG/L — HIGH (ref 0–51)
TROPONIN T, HIGH SENSITIVITY RESULT: 69 NG/L — HIGH (ref 0–51)
TSH SERPL-MCNC: 3.56 UIU/ML — SIGNIFICANT CHANGE UP (ref 0.27–4.2)
TSH SERPL-MCNC: 5.57 UIU/ML — HIGH (ref 0.27–4.2)
UROBILINOGEN FLD QL: ABNORMAL
UROBILINOGEN FLD QL: NEGATIVE — SIGNIFICANT CHANGE UP
UUN UR-MCNC: 309 MG/DL — SIGNIFICANT CHANGE UP
UUN UR-MCNC: 787 MG/DL — SIGNIFICANT CHANGE UP
UUN UR-MCNC: 847 MG/DL — SIGNIFICANT CHANGE UP
WBC # BLD: 10.19 K/UL — SIGNIFICANT CHANGE UP (ref 3.8–10.5)
WBC # BLD: 11.57 K/UL — HIGH (ref 3.8–10.5)
WBC # BLD: 12 K/UL — HIGH (ref 3.8–10.5)
WBC # BLD: 5.23 K/UL — SIGNIFICANT CHANGE UP (ref 3.8–10.5)
WBC # BLD: 5.28 K/UL — SIGNIFICANT CHANGE UP (ref 3.8–10.5)
WBC # BLD: 5.38 K/UL — SIGNIFICANT CHANGE UP (ref 3.8–10.5)
WBC # BLD: 5.44 K/UL — SIGNIFICANT CHANGE UP (ref 3.8–10.5)
WBC # BLD: 5.48 K/UL — SIGNIFICANT CHANGE UP (ref 3.8–10.5)
WBC # BLD: 5.81 K/UL — SIGNIFICANT CHANGE UP (ref 3.8–10.5)
WBC # BLD: 5.89 K/UL — SIGNIFICANT CHANGE UP (ref 3.8–10.5)
WBC # BLD: 6.12 K/UL — SIGNIFICANT CHANGE UP (ref 3.8–10.5)
WBC # BLD: 6.44 K/UL — SIGNIFICANT CHANGE UP (ref 3.8–10.5)
WBC # BLD: 6.46 K/UL — SIGNIFICANT CHANGE UP (ref 3.8–10.5)
WBC # BLD: 6.47 K/UL — SIGNIFICANT CHANGE UP (ref 3.8–10.5)
WBC # BLD: 6.66 K/UL — SIGNIFICANT CHANGE UP (ref 3.8–10.5)
WBC # BLD: 6.78 K/UL — SIGNIFICANT CHANGE UP (ref 3.8–10.5)
WBC # BLD: 7.06 K/UL — SIGNIFICANT CHANGE UP (ref 3.8–10.5)
WBC # BLD: 7.15 K/UL — SIGNIFICANT CHANGE UP (ref 3.8–10.5)
WBC # BLD: 7.37 K/UL — SIGNIFICANT CHANGE UP (ref 3.8–10.5)
WBC # BLD: 7.48 K/UL — SIGNIFICANT CHANGE UP (ref 3.8–10.5)
WBC # BLD: 7.71 K/UL — SIGNIFICANT CHANGE UP (ref 3.8–10.5)
WBC # BLD: 7.96 K/UL — SIGNIFICANT CHANGE UP (ref 3.8–10.5)
WBC # BLD: 8.01 K/UL — SIGNIFICANT CHANGE UP (ref 3.8–10.5)
WBC # BLD: 8.02 K/UL — SIGNIFICANT CHANGE UP (ref 3.8–10.5)
WBC # BLD: 8.2 K/UL — SIGNIFICANT CHANGE UP (ref 3.8–10.5)
WBC # BLD: 8.24 K/UL — SIGNIFICANT CHANGE UP (ref 3.8–10.5)
WBC # BLD: 8.39 K/UL — SIGNIFICANT CHANGE UP (ref 3.8–10.5)
WBC # BLD: 8.84 K/UL — SIGNIFICANT CHANGE UP (ref 3.8–10.5)
WBC # BLD: 9.25 K/UL — SIGNIFICANT CHANGE UP (ref 3.8–10.5)
WBC # BLD: 9.82 K/UL — SIGNIFICANT CHANGE UP (ref 3.8–10.5)
WBC # FLD AUTO: 10.19 K/UL — SIGNIFICANT CHANGE UP (ref 3.8–10.5)
WBC # FLD AUTO: 11.57 K/UL — HIGH (ref 3.8–10.5)
WBC # FLD AUTO: 12 K/UL — HIGH (ref 3.8–10.5)
WBC # FLD AUTO: 5.23 K/UL — SIGNIFICANT CHANGE UP (ref 3.8–10.5)
WBC # FLD AUTO: 5.28 K/UL — SIGNIFICANT CHANGE UP (ref 3.8–10.5)
WBC # FLD AUTO: 5.38 K/UL — SIGNIFICANT CHANGE UP (ref 3.8–10.5)
WBC # FLD AUTO: 5.44 K/UL — SIGNIFICANT CHANGE UP (ref 3.8–10.5)
WBC # FLD AUTO: 5.48 K/UL — SIGNIFICANT CHANGE UP (ref 3.8–10.5)
WBC # FLD AUTO: 5.81 K/UL — SIGNIFICANT CHANGE UP (ref 3.8–10.5)
WBC # FLD AUTO: 5.89 K/UL — SIGNIFICANT CHANGE UP (ref 3.8–10.5)
WBC # FLD AUTO: 6.12 K/UL — SIGNIFICANT CHANGE UP (ref 3.8–10.5)
WBC # FLD AUTO: 6.44 K/UL — SIGNIFICANT CHANGE UP (ref 3.8–10.5)
WBC # FLD AUTO: 6.46 K/UL — SIGNIFICANT CHANGE UP (ref 3.8–10.5)
WBC # FLD AUTO: 6.47 K/UL — SIGNIFICANT CHANGE UP (ref 3.8–10.5)
WBC # FLD AUTO: 6.66 K/UL — SIGNIFICANT CHANGE UP (ref 3.8–10.5)
WBC # FLD AUTO: 6.78 K/UL — SIGNIFICANT CHANGE UP (ref 3.8–10.5)
WBC # FLD AUTO: 7.06 K/UL — SIGNIFICANT CHANGE UP (ref 3.8–10.5)
WBC # FLD AUTO: 7.15 K/UL — SIGNIFICANT CHANGE UP (ref 3.8–10.5)
WBC # FLD AUTO: 7.37 K/UL — SIGNIFICANT CHANGE UP (ref 3.8–10.5)
WBC # FLD AUTO: 7.48 K/UL — SIGNIFICANT CHANGE UP (ref 3.8–10.5)
WBC # FLD AUTO: 7.71 K/UL — SIGNIFICANT CHANGE UP (ref 3.8–10.5)
WBC # FLD AUTO: 7.96 K/UL — SIGNIFICANT CHANGE UP (ref 3.8–10.5)
WBC # FLD AUTO: 8.01 K/UL — SIGNIFICANT CHANGE UP (ref 3.8–10.5)
WBC # FLD AUTO: 8.02 K/UL — SIGNIFICANT CHANGE UP (ref 3.8–10.5)
WBC # FLD AUTO: 8.2 K/UL — SIGNIFICANT CHANGE UP (ref 3.8–10.5)
WBC # FLD AUTO: 8.24 K/UL — SIGNIFICANT CHANGE UP (ref 3.8–10.5)
WBC # FLD AUTO: 8.39 K/UL — SIGNIFICANT CHANGE UP (ref 3.8–10.5)
WBC # FLD AUTO: 8.84 K/UL — SIGNIFICANT CHANGE UP (ref 3.8–10.5)
WBC # FLD AUTO: 9.25 K/UL — SIGNIFICANT CHANGE UP (ref 3.8–10.5)
WBC # FLD AUTO: 9.82 K/UL — SIGNIFICANT CHANGE UP (ref 3.8–10.5)
WBC UR QL: 12 /HPF — HIGH (ref 0–5)
WBC UR QL: 2 /HPF — SIGNIFICANT CHANGE UP (ref 0–5)
WBC UR QL: 4 /HPF — SIGNIFICANT CHANGE UP (ref 0–5)
WBC UR QL: 637 /HPF — HIGH (ref 0–5)

## 2022-01-01 PROCEDURE — 99497 ADVNCD CARE PLAN 30 MIN: CPT | Mod: 25

## 2022-01-01 PROCEDURE — 93005 ELECTROCARDIOGRAM TRACING: CPT

## 2022-01-01 PROCEDURE — 99233 SBSQ HOSP IP/OBS HIGH 50: CPT | Mod: GC

## 2022-01-01 PROCEDURE — 99232 SBSQ HOSP IP/OBS MODERATE 35: CPT

## 2022-01-01 PROCEDURE — 85027 COMPLETE CBC AUTOMATED: CPT

## 2022-01-01 PROCEDURE — 86923 COMPATIBILITY TEST ELECTRIC: CPT

## 2022-01-01 PROCEDURE — 76775 US EXAM ABDO BACK WALL LIM: CPT | Mod: 26

## 2022-01-01 PROCEDURE — 99285 EMERGENCY DEPT VISIT HI MDM: CPT | Mod: GC

## 2022-01-01 PROCEDURE — 80048 BASIC METABOLIC PNL TOTAL CA: CPT

## 2022-01-01 PROCEDURE — 99441: CPT | Mod: 95

## 2022-01-01 PROCEDURE — 93970 EXTREMITY STUDY: CPT | Mod: 26

## 2022-01-01 PROCEDURE — 36430 TRANSFUSION BLD/BLD COMPNT: CPT

## 2022-01-01 PROCEDURE — 73620 X-RAY EXAM OF FOOT: CPT | Mod: 26,RT

## 2022-01-01 PROCEDURE — 82330 ASSAY OF CALCIUM: CPT

## 2022-01-01 PROCEDURE — 85610 PROTHROMBIN TIME: CPT

## 2022-01-01 PROCEDURE — 73562 X-RAY EXAM OF KNEE 3: CPT | Mod: 26,LT

## 2022-01-01 PROCEDURE — 71250 CT THORAX DX C-: CPT | Mod: MB

## 2022-01-01 PROCEDURE — 82435 ASSAY OF BLOOD CHLORIDE: CPT

## 2022-01-01 PROCEDURE — 83735 ASSAY OF MAGNESIUM: CPT

## 2022-01-01 PROCEDURE — 72170 X-RAY EXAM OF PELVIS: CPT | Mod: 26

## 2022-01-01 PROCEDURE — 99223 1ST HOSP IP/OBS HIGH 75: CPT | Mod: GC

## 2022-01-01 PROCEDURE — 84132 ASSAY OF SERUM POTASSIUM: CPT

## 2022-01-01 PROCEDURE — 71045 X-RAY EXAM CHEST 1 VIEW: CPT | Mod: 26

## 2022-01-01 PROCEDURE — 99221 1ST HOSP IP/OBS SF/LOW 40: CPT | Mod: GC

## 2022-01-01 PROCEDURE — 99231 SBSQ HOSP IP/OBS SF/LOW 25: CPT

## 2022-01-01 PROCEDURE — 82962 GLUCOSE BLOOD TEST: CPT

## 2022-01-01 PROCEDURE — 93306 TTE W/DOPPLER COMPLETE: CPT | Mod: 26

## 2022-01-01 PROCEDURE — 83880 ASSAY OF NATRIURETIC PEPTIDE: CPT

## 2022-01-01 PROCEDURE — 73620 X-RAY EXAM OF FOOT: CPT

## 2022-01-01 PROCEDURE — 84133 ASSAY OF URINE POTASSIUM: CPT

## 2022-01-01 PROCEDURE — 83615 LACTATE (LD) (LDH) ENZYME: CPT

## 2022-01-01 PROCEDURE — 99232 SBSQ HOSP IP/OBS MODERATE 35: CPT | Mod: GC

## 2022-01-01 PROCEDURE — 73562 X-RAY EXAM OF KNEE 3: CPT

## 2022-01-01 PROCEDURE — 83935 ASSAY OF URINE OSMOLALITY: CPT

## 2022-01-01 PROCEDURE — 84484 ASSAY OF TROPONIN QUANT: CPT

## 2022-01-01 PROCEDURE — 87086 URINE CULTURE/COLONY COUNT: CPT

## 2022-01-01 PROCEDURE — 97161 PT EVAL LOW COMPLEX 20 MIN: CPT

## 2022-01-01 PROCEDURE — 93010 ELECTROCARDIOGRAM REPORT: CPT | Mod: GC

## 2022-01-01 PROCEDURE — 85730 THROMBOPLASTIN TIME PARTIAL: CPT

## 2022-01-01 PROCEDURE — 93010 ELECTROCARDIOGRAM REPORT: CPT

## 2022-01-01 PROCEDURE — P9016: CPT

## 2022-01-01 PROCEDURE — 83605 ASSAY OF LACTIC ACID: CPT

## 2022-01-01 PROCEDURE — 84295 ASSAY OF SERUM SODIUM: CPT

## 2022-01-01 PROCEDURE — 80053 COMPREHEN METABOLIC PANEL: CPT

## 2022-01-01 PROCEDURE — 84540 ASSAY OF URINE/UREA-N: CPT

## 2022-01-01 PROCEDURE — 99223 1ST HOSP IP/OBS HIGH 75: CPT

## 2022-01-01 PROCEDURE — 85018 HEMOGLOBIN: CPT

## 2022-01-01 PROCEDURE — 73551 X-RAY EXAM OF FEMUR 1: CPT

## 2022-01-01 PROCEDURE — 36415 COLL VENOUS BLD VENIPUNCTURE: CPT

## 2022-01-01 PROCEDURE — 82803 BLOOD GASES ANY COMBINATION: CPT

## 2022-01-01 PROCEDURE — 81001 URINALYSIS AUTO W/SCOPE: CPT

## 2022-01-01 PROCEDURE — 99239 HOSP IP/OBS DSCHRG MGMT >30: CPT

## 2022-01-01 PROCEDURE — 76770 US EXAM ABDO BACK WALL COMP: CPT

## 2022-01-01 PROCEDURE — 73551 X-RAY EXAM OF FEMUR 1: CPT | Mod: 26,LT

## 2022-01-01 PROCEDURE — 85014 HEMATOCRIT: CPT

## 2022-01-01 PROCEDURE — 71045 X-RAY EXAM CHEST 1 VIEW: CPT

## 2022-01-01 PROCEDURE — 71250 CT THORAX DX C-: CPT | Mod: 26

## 2022-01-01 PROCEDURE — 86900 BLOOD TYPING SEROLOGIC ABO: CPT

## 2022-01-01 PROCEDURE — 97112 NEUROMUSCULAR REEDUCATION: CPT

## 2022-01-01 PROCEDURE — 93970 EXTREMITY STUDY: CPT

## 2022-01-01 PROCEDURE — 85049 AUTOMATED PLATELET COUNT: CPT

## 2022-01-01 PROCEDURE — 76775 US EXAM ABDO BACK WALL LIM: CPT

## 2022-01-01 PROCEDURE — 72125 CT NECK SPINE W/O DYE: CPT | Mod: 26,MA

## 2022-01-01 PROCEDURE — 99285 EMERGENCY DEPT VISIT HI MDM: CPT | Mod: 25

## 2022-01-01 PROCEDURE — 86850 RBC ANTIBODY SCREEN: CPT

## 2022-01-01 PROCEDURE — 94660 CPAP INITIATION&MGMT: CPT

## 2022-01-01 PROCEDURE — 70450 CT HEAD/BRAIN W/O DYE: CPT | Mod: MA

## 2022-01-01 PROCEDURE — 93306 TTE W/DOPPLER COMPLETE: CPT

## 2022-01-01 PROCEDURE — 82947 ASSAY GLUCOSE BLOOD QUANT: CPT

## 2022-01-01 PROCEDURE — 85045 AUTOMATED RETICULOCYTE COUNT: CPT

## 2022-01-01 PROCEDURE — 99214 OFFICE O/P EST MOD 30 MIN: CPT

## 2022-01-01 PROCEDURE — 70450 CT HEAD/BRAIN W/O DYE: CPT | Mod: 26,MA

## 2022-01-01 PROCEDURE — 83690 ASSAY OF LIPASE: CPT

## 2022-01-01 PROCEDURE — 99498 ADVNCD CARE PLAN ADDL 30 MIN: CPT | Mod: 25

## 2022-01-01 PROCEDURE — 87637 SARSCOV2&INF A&B&RSV AMP PRB: CPT

## 2022-01-01 PROCEDURE — 86880 COOMBS TEST DIRECT: CPT

## 2022-01-01 PROCEDURE — 86901 BLOOD TYPING SEROLOGIC RH(D): CPT

## 2022-01-01 PROCEDURE — 99205 OFFICE O/P NEW HI 60 MIN: CPT

## 2022-01-01 PROCEDURE — 93288 INTERROG EVL PM/LDLS PM IP: CPT

## 2022-01-01 PROCEDURE — 83036 HEMOGLOBIN GLYCOSYLATED A1C: CPT

## 2022-01-01 PROCEDURE — 84145 PROCALCITONIN (PCT): CPT

## 2022-01-01 PROCEDURE — 85025 COMPLETE CBC W/AUTO DIFF WBC: CPT

## 2022-01-01 PROCEDURE — 84443 ASSAY THYROID STIM HORMONE: CPT

## 2022-01-01 PROCEDURE — 82570 ASSAY OF URINE CREATININE: CPT

## 2022-01-01 PROCEDURE — 76770 US EXAM ABDO BACK WALL COMP: CPT | Mod: 26

## 2022-01-01 PROCEDURE — 84300 ASSAY OF URINE SODIUM: CPT

## 2022-01-01 PROCEDURE — U0005: CPT

## 2022-01-01 PROCEDURE — 72170 X-RAY EXAM OF PELVIS: CPT

## 2022-01-01 PROCEDURE — 72125 CT NECK SPINE W/O DYE: CPT | Mod: MA

## 2022-01-01 PROCEDURE — 87449 NOS EACH ORGANISM AG IA: CPT

## 2022-01-01 PROCEDURE — 99203 OFFICE O/P NEW LOW 30 MIN: CPT

## 2022-01-01 PROCEDURE — 97110 THERAPEUTIC EXERCISES: CPT

## 2022-01-01 PROCEDURE — 84100 ASSAY OF PHOSPHORUS: CPT

## 2022-01-01 PROCEDURE — 97116 GAIT TRAINING THERAPY: CPT

## 2022-01-01 PROCEDURE — 83010 ASSAY OF HAPTOGLOBIN QUANT: CPT

## 2022-01-01 PROCEDURE — 92610 EVALUATE SWALLOWING FUNCTION: CPT

## 2022-01-01 PROCEDURE — 82565 ASSAY OF CREATININE: CPT

## 2022-01-01 PROCEDURE — 84156 ASSAY OF PROTEIN URINE: CPT

## 2022-01-01 PROCEDURE — 87186 SC STD MICRODIL/AGAR DIL: CPT

## 2022-01-01 PROCEDURE — U0003: CPT

## 2022-01-01 PROCEDURE — 96365 THER/PROPH/DIAG IV INF INIT: CPT

## 2022-01-01 PROCEDURE — 94640 AIRWAY INHALATION TREATMENT: CPT

## 2022-01-01 PROCEDURE — 73030 X-RAY EXAM OF SHOULDER: CPT | Mod: RT

## 2022-01-01 PROCEDURE — 97162 PT EVAL MOD COMPLEX 30 MIN: CPT

## 2022-01-01 PROCEDURE — 97530 THERAPEUTIC ACTIVITIES: CPT

## 2022-01-01 RX ORDER — INSULIN GLARGINE 100 [IU]/ML
19 INJECTION, SOLUTION SUBCUTANEOUS AT BEDTIME
Refills: 0 | Status: DISCONTINUED | OUTPATIENT
Start: 2022-01-01 | End: 2022-01-01

## 2022-01-01 RX ORDER — BUMETANIDE 0.25 MG/ML
1 INJECTION INTRAMUSCULAR; INTRAVENOUS
Refills: 0 | Status: DISCONTINUED | OUTPATIENT
Start: 2022-01-01 | End: 2022-01-01

## 2022-01-01 RX ORDER — FUROSEMIDE 40 MG
0.5 TABLET ORAL
Qty: 0 | Refills: 0 | DISCHARGE
Start: 2022-01-01

## 2022-01-01 RX ORDER — SIMVASTATIN 20 MG/1
20 TABLET, FILM COATED ORAL AT BEDTIME
Refills: 0 | Status: DISCONTINUED | OUTPATIENT
Start: 2022-01-01 | End: 2022-01-01

## 2022-01-01 RX ORDER — FAMOTIDINE 10 MG/ML
20 INJECTION INTRAVENOUS
Refills: 0 | Status: DISCONTINUED | OUTPATIENT
Start: 2022-01-01 | End: 2022-01-01

## 2022-01-01 RX ORDER — ACETAMINOPHEN 500 MG
1000 TABLET ORAL ONCE
Refills: 0 | Status: COMPLETED | OUTPATIENT
Start: 2022-01-01 | End: 2022-01-01

## 2022-01-01 RX ORDER — FUROSEMIDE 40 MG
40 TABLET ORAL ONCE
Refills: 0 | Status: COMPLETED | OUTPATIENT
Start: 2022-01-01 | End: 2022-01-01

## 2022-01-01 RX ORDER — APIXABAN 2.5 MG/1
2.5 TABLET, FILM COATED ORAL
Refills: 0 | Status: DISCONTINUED | OUTPATIENT
Start: 2022-01-01 | End: 2022-01-01

## 2022-01-01 RX ORDER — INSULIN GLARGINE 100 [IU]/ML
19 INJECTION, SOLUTION SUBCUTANEOUS
Qty: 0 | Refills: 0 | DISCHARGE
Start: 2022-01-01

## 2022-01-01 RX ORDER — SENNA PLUS 8.6 MG/1
2 TABLET ORAL AT BEDTIME
Refills: 0 | Status: DISCONTINUED | OUTPATIENT
Start: 2022-01-01 | End: 2022-01-01

## 2022-01-01 RX ORDER — ASCORBIC ACID 60 MG
1 TABLET,CHEWABLE ORAL
Qty: 0 | Refills: 0 | DISCHARGE
Start: 2022-01-01

## 2022-01-01 RX ORDER — CALCIUM GLUCONATE 100 MG/ML
1 VIAL (ML) INTRAVENOUS ONCE
Refills: 0 | Status: COMPLETED | OUTPATIENT
Start: 2022-01-01 | End: 2022-01-01

## 2022-01-01 RX ORDER — CALCITRIOL 0.5 UG/1
0.25 CAPSULE ORAL DAILY
Refills: 0 | Status: DISCONTINUED | OUTPATIENT
Start: 2022-01-01 | End: 2022-01-01

## 2022-01-01 RX ORDER — POLYETHYLENE GLYCOL 3350 17 G/17G
17 POWDER, FOR SOLUTION ORAL DAILY
Refills: 0 | Status: DISCONTINUED | OUTPATIENT
Start: 2022-01-01 | End: 2022-01-01

## 2022-01-01 RX ORDER — FUROSEMIDE 40 MG
40 TABLET ORAL
Refills: 0 | Status: DISCONTINUED | OUTPATIENT
Start: 2022-01-01 | End: 2022-01-01

## 2022-01-01 RX ORDER — DEXTROSE 50 % IN WATER 50 %
15 SYRINGE (ML) INTRAVENOUS ONCE
Refills: 0 | Status: DISCONTINUED | OUTPATIENT
Start: 2022-01-01 | End: 2022-01-01

## 2022-01-01 RX ORDER — INSULIN GLARGINE 100 [IU]/ML
15 INJECTION, SOLUTION SUBCUTANEOUS AT BEDTIME
Refills: 0 | Status: DISCONTINUED | OUTPATIENT
Start: 2022-01-01 | End: 2022-01-01

## 2022-01-01 RX ORDER — HEPARIN SODIUM 5000 [USP'U]/ML
6500 INJECTION INTRAVENOUS; SUBCUTANEOUS EVERY 6 HOURS
Refills: 0 | Status: DISCONTINUED | OUTPATIENT
Start: 2022-01-01 | End: 2022-01-01

## 2022-01-01 RX ORDER — IPRATROPIUM/ALBUTEROL SULFATE 18-103MCG
3 AEROSOL WITH ADAPTER (GRAM) INHALATION ONCE
Refills: 0 | Status: COMPLETED | OUTPATIENT
Start: 2022-01-01 | End: 2022-01-01

## 2022-01-01 RX ORDER — DEXTROSE 50 % IN WATER 50 %
25 SYRINGE (ML) INTRAVENOUS ONCE
Refills: 0 | Status: DISCONTINUED | OUTPATIENT
Start: 2022-01-01 | End: 2022-01-01

## 2022-01-01 RX ORDER — ERTAPENEM SODIUM 1 G/1
500 INJECTION, POWDER, LYOPHILIZED, FOR SOLUTION INTRAMUSCULAR; INTRAVENOUS
Qty: 0 | Refills: 0 | DISCHARGE

## 2022-01-01 RX ORDER — INSULIN GLARGINE 100 [IU]/ML
4 INJECTION, SOLUTION SUBCUTANEOUS AT BEDTIME
Refills: 0 | Status: DISCONTINUED | OUTPATIENT
Start: 2022-01-01 | End: 2022-01-01

## 2022-01-01 RX ORDER — PANTOPRAZOLE SODIUM 20 MG/1
40 TABLET, DELAYED RELEASE ORAL
Refills: 0 | Status: DISCONTINUED | OUTPATIENT
Start: 2022-01-01 | End: 2022-01-01

## 2022-01-01 RX ORDER — DICLOFENAC SODIUM 30 MG/G
4 GEL TOPICAL
Refills: 0 | Status: DISCONTINUED | OUTPATIENT
Start: 2022-01-01 | End: 2022-01-01

## 2022-01-01 RX ORDER — INSULIN LISPRO 100/ML
2 VIAL (ML) SUBCUTANEOUS
Refills: 0 | Status: DISCONTINUED | OUTPATIENT
Start: 2022-01-01 | End: 2022-01-01

## 2022-01-01 RX ORDER — METOPROLOL TARTRATE 50 MG
12.5 TABLET ORAL
Refills: 0 | Status: DISCONTINUED | OUTPATIENT
Start: 2022-01-01 | End: 2022-01-01

## 2022-01-01 RX ORDER — INSULIN LISPRO 100 [IU]/ML
100 INJECTION, SOLUTION INTRAVENOUS; SUBCUTANEOUS
Qty: 1 | Refills: 0 | Status: ACTIVE | COMMUNITY
Start: 2022-01-01

## 2022-01-01 RX ORDER — OXYCODONE HYDROCHLORIDE 5 MG/1
2.5 TABLET ORAL ONCE
Refills: 0 | Status: DISCONTINUED | OUTPATIENT
Start: 2022-01-01 | End: 2022-01-01

## 2022-01-01 RX ORDER — HYDROMORPHONE HYDROCHLORIDE 2 MG/ML
0.2 INJECTION INTRAMUSCULAR; INTRAVENOUS; SUBCUTANEOUS
Refills: 0 | Status: DISCONTINUED | OUTPATIENT
Start: 2022-01-01 | End: 2022-01-01

## 2022-01-01 RX ORDER — SODIUM CHLORIDE 9 MG/ML
1000 INJECTION, SOLUTION INTRAVENOUS
Refills: 0 | Status: DISCONTINUED | OUTPATIENT
Start: 2022-01-01 | End: 2022-01-01

## 2022-01-01 RX ORDER — POTASSIUM CHLORIDE 20 MEQ
20 PACKET (EA) ORAL ONCE
Refills: 0 | Status: COMPLETED | OUTPATIENT
Start: 2022-01-01 | End: 2022-01-01

## 2022-01-01 RX ORDER — CLOPIDOGREL BISULFATE 75 MG/1
1 TABLET, FILM COATED ORAL
Qty: 0 | Refills: 0 | DISCHARGE
Start: 2022-01-01

## 2022-01-01 RX ORDER — SOD,AMMONIUM,POTASSIUM LACTATE
1 CREAM (GRAM) TOPICAL
Refills: 0 | Status: DISCONTINUED | OUTPATIENT
Start: 2022-01-01 | End: 2022-01-01

## 2022-01-01 RX ORDER — DEXTROSE 50 % IN WATER 50 %
25 SYRINGE (ML) INTRAVENOUS ONCE
Refills: 0 | Status: COMPLETED | OUTPATIENT
Start: 2022-01-01 | End: 2022-01-01

## 2022-01-01 RX ORDER — FUROSEMIDE 40 MG
40 TABLET ORAL EVERY 12 HOURS
Refills: 0 | Status: DISCONTINUED | OUTPATIENT
Start: 2022-01-01 | End: 2022-01-01

## 2022-01-01 RX ORDER — LIRAGLUTIDE 6 MG/ML
1.2 INJECTION SUBCUTANEOUS
Qty: 0 | Refills: 0 | DISCHARGE

## 2022-01-01 RX ORDER — ONDANSETRON 8 MG/1
4 TABLET, FILM COATED ORAL ONCE
Refills: 0 | Status: COMPLETED | OUTPATIENT
Start: 2022-01-01 | End: 2022-01-01

## 2022-01-01 RX ORDER — CEFTRIAXONE 500 MG/1
1000 INJECTION, POWDER, FOR SOLUTION INTRAMUSCULAR; INTRAVENOUS EVERY 24 HOURS
Refills: 0 | Status: DISCONTINUED | OUTPATIENT
Start: 2022-01-01 | End: 2022-01-01

## 2022-01-01 RX ORDER — INSULIN LISPRO 100/ML
VIAL (ML) SUBCUTANEOUS
Refills: 0 | Status: DISCONTINUED | OUTPATIENT
Start: 2022-01-01 | End: 2022-01-01

## 2022-01-01 RX ORDER — ONDANSETRON 8 MG/1
4 TABLET, FILM COATED ORAL EVERY 6 HOURS
Refills: 0 | Status: DISCONTINUED | OUTPATIENT
Start: 2022-01-01 | End: 2022-01-01

## 2022-01-01 RX ORDER — HEPARIN SODIUM 5000 [USP'U]/ML
3000 INJECTION INTRAVENOUS; SUBCUTANEOUS EVERY 6 HOURS
Refills: 0 | Status: DISCONTINUED | OUTPATIENT
Start: 2022-01-01 | End: 2022-01-01

## 2022-01-01 RX ORDER — GLUCAGON INJECTION, SOLUTION 0.5 MG/.1ML
1 INJECTION, SOLUTION SUBCUTANEOUS ONCE
Refills: 0 | Status: DISCONTINUED | OUTPATIENT
Start: 2022-01-01 | End: 2022-01-01

## 2022-01-01 RX ORDER — FAMOTIDINE 10 MG/ML
20 INJECTION INTRAVENOUS DAILY
Refills: 0 | Status: DISCONTINUED | OUTPATIENT
Start: 2022-01-01 | End: 2022-01-01

## 2022-01-01 RX ORDER — ACETAMINOPHEN 500 MG
975 TABLET ORAL EVERY 6 HOURS
Refills: 0 | Status: DISCONTINUED | OUTPATIENT
Start: 2022-01-01 | End: 2022-01-01

## 2022-01-01 RX ORDER — HYDROMORPHONE HYDROCHLORIDE 2 MG/ML
0.5 INJECTION INTRAMUSCULAR; INTRAVENOUS; SUBCUTANEOUS
Refills: 0 | Status: DISCONTINUED | OUTPATIENT
Start: 2022-01-01 | End: 2022-01-01

## 2022-01-01 RX ORDER — BUMETANIDE 0.25 MG/ML
2 INJECTION INTRAMUSCULAR; INTRAVENOUS
Refills: 0 | Status: DISCONTINUED | OUTPATIENT
Start: 2022-01-01 | End: 2022-01-01

## 2022-01-01 RX ORDER — METOPROLOL TARTRATE 50 MG
100 TABLET ORAL DAILY
Refills: 0 | Status: DISCONTINUED | OUTPATIENT
Start: 2022-01-01 | End: 2022-01-01

## 2022-01-01 RX ORDER — INSULIN LISPRO 100/ML
VIAL (ML) SUBCUTANEOUS AT BEDTIME
Refills: 0 | Status: DISCONTINUED | OUTPATIENT
Start: 2022-01-01 | End: 2022-01-01

## 2022-01-01 RX ORDER — AMLODIPINE BESYLATE 2.5 MG/1
1 TABLET ORAL
Qty: 0 | Refills: 0 | DISCHARGE
Start: 2022-01-01

## 2022-01-01 RX ORDER — ALLOPURINOL 300 MG
1 TABLET ORAL
Qty: 0 | Refills: 0 | DISCHARGE
Start: 2022-01-01

## 2022-01-01 RX ORDER — ACETAMINOPHEN 500 MG
650 TABLET ORAL EVERY 6 HOURS
Refills: 0 | Status: DISCONTINUED | OUTPATIENT
Start: 2022-01-01 | End: 2022-01-01

## 2022-01-01 RX ORDER — FUROSEMIDE 40 MG
1 TABLET ORAL
Qty: 0 | Refills: 0 | DISCHARGE
Start: 2022-01-01

## 2022-01-01 RX ORDER — BUMETANIDE 0.25 MG/ML
1 INJECTION INTRAMUSCULAR; INTRAVENOUS ONCE
Refills: 0 | Status: COMPLETED | OUTPATIENT
Start: 2022-01-01 | End: 2022-01-01

## 2022-01-01 RX ORDER — HEPARIN SODIUM 5000 [USP'U]/ML
INJECTION INTRAVENOUS; SUBCUTANEOUS
Qty: 25000 | Refills: 0 | Status: DISCONTINUED | OUTPATIENT
Start: 2022-01-01 | End: 2022-01-01

## 2022-01-01 RX ORDER — APIXABAN 2.5 MG/1
1 TABLET, FILM COATED ORAL
Qty: 0 | Refills: 0 | DISCHARGE
Start: 2022-01-01

## 2022-01-01 RX ORDER — AMLODIPINE BESYLATE 2.5 MG/1
5 TABLET ORAL DAILY
Refills: 0 | Status: DISCONTINUED | OUTPATIENT
Start: 2022-01-01 | End: 2022-01-01

## 2022-01-01 RX ORDER — LEVOTHYROXINE SODIUM 125 MCG
50 TABLET ORAL DAILY
Refills: 0 | Status: DISCONTINUED | OUTPATIENT
Start: 2022-01-01 | End: 2022-01-01

## 2022-01-01 RX ORDER — FUROSEMIDE 40 MG
40 TABLET ORAL DAILY
Refills: 0 | Status: DISCONTINUED | OUTPATIENT
Start: 2022-01-01 | End: 2022-01-01

## 2022-01-01 RX ORDER — NYSTATIN CREAM 100000 [USP'U]/G
1 CREAM TOPICAL EVERY 8 HOURS
Refills: 0 | Status: DISCONTINUED | OUTPATIENT
Start: 2022-01-01 | End: 2022-01-01

## 2022-01-01 RX ORDER — INSULIN LISPRO 100/ML
3 VIAL (ML) SUBCUTANEOUS
Refills: 0 | Status: DISCONTINUED | OUTPATIENT
Start: 2022-01-01 | End: 2022-01-01

## 2022-01-01 RX ORDER — SODIUM CHLORIDE 9 MG/ML
500 INJECTION INTRAMUSCULAR; INTRAVENOUS; SUBCUTANEOUS ONCE
Refills: 0 | Status: COMPLETED | OUTPATIENT
Start: 2022-01-01 | End: 2022-01-01

## 2022-01-01 RX ORDER — DEXTROSE 50 % IN WATER 50 %
50 SYRINGE (ML) INTRAVENOUS ONCE
Refills: 0 | Status: COMPLETED | OUTPATIENT
Start: 2022-01-01 | End: 2022-01-01

## 2022-01-01 RX ORDER — LEVOTHYROXINE SODIUM 125 MCG
1 TABLET ORAL
Qty: 0 | Refills: 0 | DISCHARGE
Start: 2022-01-01

## 2022-01-01 RX ORDER — LANOLIN ALCOHOL/MO/W.PET/CERES
3 CREAM (GRAM) TOPICAL ONCE
Refills: 0 | Status: COMPLETED | OUTPATIENT
Start: 2022-01-01 | End: 2022-01-01

## 2022-01-01 RX ORDER — ALLOPURINOL 300 MG
100 TABLET ORAL DAILY
Refills: 0 | Status: DISCONTINUED | OUTPATIENT
Start: 2022-01-01 | End: 2022-01-01

## 2022-01-01 RX ORDER — CEFTRIAXONE 500 MG/1
1000 INJECTION, POWDER, FOR SOLUTION INTRAMUSCULAR; INTRAVENOUS ONCE
Refills: 0 | Status: COMPLETED | OUTPATIENT
Start: 2022-01-01 | End: 2022-01-01

## 2022-01-01 RX ORDER — BUMETANIDE 0.25 MG/ML
2 INJECTION INTRAMUSCULAR; INTRAVENOUS DAILY
Refills: 0 | Status: DISCONTINUED | OUTPATIENT
Start: 2022-01-01 | End: 2022-01-01

## 2022-01-01 RX ORDER — CLOPIDOGREL BISULFATE 75 MG/1
1 TABLET, FILM COATED ORAL
Qty: 0 | Refills: 0 | DISCHARGE

## 2022-01-01 RX ORDER — OXYCODONE HYDROCHLORIDE 5 MG/1
5 TABLET ORAL ONCE
Refills: 0 | Status: DISCONTINUED | OUTPATIENT
Start: 2022-01-01 | End: 2022-01-01

## 2022-01-01 RX ORDER — CLOPIDOGREL BISULFATE 75 MG/1
75 TABLET, FILM COATED ORAL
Qty: 30 | Refills: 5 | Status: DISCONTINUED | COMMUNITY
Start: 2022-01-01 | End: 2022-01-01

## 2022-01-01 RX ORDER — CALCITRIOL 0.5 UG/1
1 CAPSULE ORAL
Qty: 0 | Refills: 0 | DISCHARGE
Start: 2022-01-01

## 2022-01-01 RX ORDER — BUMETANIDE 0.25 MG/ML
0.5 INJECTION INTRAMUSCULAR; INTRAVENOUS
Qty: 20 | Refills: 0 | Status: DISCONTINUED | OUTPATIENT
Start: 2022-01-01 | End: 2022-01-01

## 2022-01-01 RX ORDER — SIMVASTATIN 20 MG/1
1 TABLET, FILM COATED ORAL
Qty: 0 | Refills: 0 | DISCHARGE
Start: 2022-01-01

## 2022-01-01 RX ORDER — AZITHROMYCIN 500 MG/1
500 TABLET, FILM COATED ORAL DAILY
Refills: 0 | Status: DISCONTINUED | OUTPATIENT
Start: 2022-01-01 | End: 2022-01-01

## 2022-01-01 RX ORDER — DEXTROSE 50 % IN WATER 50 %
12.5 SYRINGE (ML) INTRAVENOUS ONCE
Refills: 0 | Status: COMPLETED | OUTPATIENT
Start: 2022-01-01 | End: 2022-01-01

## 2022-01-01 RX ORDER — ASCORBIC ACID 60 MG
500 TABLET,CHEWABLE ORAL DAILY
Refills: 0 | Status: DISCONTINUED | OUTPATIENT
Start: 2022-01-01 | End: 2022-01-01

## 2022-01-01 RX ORDER — ACETAMINOPHEN 500 MG
650 TABLET ORAL ONCE
Refills: 0 | Status: DISCONTINUED | OUTPATIENT
Start: 2022-01-01 | End: 2022-01-01

## 2022-01-01 RX ORDER — SODIUM ZIRCONIUM CYCLOSILICATE 10 G/10G
10 POWDER, FOR SUSPENSION ORAL EVERY 8 HOURS
Refills: 0 | Status: DISCONTINUED | OUTPATIENT
Start: 2022-01-01 | End: 2022-01-01

## 2022-01-01 RX ORDER — INSULIN LISPRO 100/ML
4 VIAL (ML) SUBCUTANEOUS
Refills: 0 | Status: DISCONTINUED | OUTPATIENT
Start: 2022-01-01 | End: 2022-01-01

## 2022-01-01 RX ORDER — NYSTATIN CREAM 100000 [USP'U]/G
1 CREAM TOPICAL
Refills: 0 | Status: COMPLETED | OUTPATIENT
Start: 2022-01-01 | End: 2022-01-01

## 2022-01-01 RX ORDER — AZITHROMYCIN 500 MG/1
500 TABLET, FILM COATED ORAL ONCE
Refills: 0 | Status: COMPLETED | OUTPATIENT
Start: 2022-01-01 | End: 2022-01-01

## 2022-01-01 RX ORDER — LEVOTHYROXINE SODIUM 125 MCG
50 TABLET ORAL
Refills: 0 | Status: DISCONTINUED | OUTPATIENT
Start: 2022-01-01 | End: 2022-01-01

## 2022-01-01 RX ORDER — ACETAMINOPHEN 500 MG
975 TABLET ORAL ONCE
Refills: 0 | Status: COMPLETED | OUTPATIENT
Start: 2022-01-01 | End: 2022-01-01

## 2022-01-01 RX ORDER — CHOLECALCIFEROL (VITAMIN D3) 125 MCG
1 CAPSULE ORAL
Qty: 0 | Refills: 0 | DISCHARGE

## 2022-01-01 RX ORDER — TRAMADOL HYDROCHLORIDE 50 MG/1
25 TABLET ORAL ONCE
Refills: 0 | Status: DISCONTINUED | OUTPATIENT
Start: 2022-01-01 | End: 2022-01-01

## 2022-01-01 RX ORDER — LANOLIN ALCOHOL/MO/W.PET/CERES
3 CREAM (GRAM) TOPICAL AT BEDTIME
Refills: 0 | Status: DISCONTINUED | OUTPATIENT
Start: 2022-01-01 | End: 2022-01-01

## 2022-01-01 RX ORDER — LEVOTHYROXINE SODIUM 125 MCG
100 TABLET ORAL
Refills: 0 | Status: DISCONTINUED | OUTPATIENT
Start: 2022-01-01 | End: 2022-01-01

## 2022-01-01 RX ORDER — AMLODIPINE BESYLATE 2.5 MG/1
1 TABLET ORAL
Qty: 0 | Refills: 0 | DISCHARGE

## 2022-01-01 RX ORDER — SIMVASTATIN 20 MG/1
1 TABLET, FILM COATED ORAL
Qty: 0 | Refills: 0 | DISCHARGE

## 2022-01-01 RX ORDER — ACETAMINOPHEN 500 MG
1000 TABLET ORAL ONCE
Refills: 0 | Status: DISCONTINUED | OUTPATIENT
Start: 2022-01-01 | End: 2022-01-01

## 2022-01-01 RX ORDER — MULTIVITAMIN
TABLET ORAL
Qty: 100 | Refills: 0 | Status: ACTIVE | COMMUNITY
Start: 2022-01-01

## 2022-01-01 RX ORDER — DEXTROSE 50 % IN WATER 50 %
12.5 SYRINGE (ML) INTRAVENOUS ONCE
Refills: 0 | Status: DISCONTINUED | OUTPATIENT
Start: 2022-01-01 | End: 2022-01-01

## 2022-01-01 RX ORDER — APIXABAN 2.5 MG/1
2.5 TABLET, FILM COATED ORAL
Qty: 60 | Refills: 5 | Status: ACTIVE | COMMUNITY
Start: 2022-01-01

## 2022-01-01 RX ORDER — CALAMINE AND ZINC OXIDE AND PHENOL 160; 10 MG/ML; MG/ML
1 LOTION TOPICAL EVERY 8 HOURS
Refills: 0 | Status: DISCONTINUED | OUTPATIENT
Start: 2022-01-01 | End: 2022-01-01

## 2022-01-01 RX ORDER — AMLODIPINE BESYLATE 5 MG/1
5 TABLET ORAL DAILY
Qty: 90 | Refills: 1 | Status: ACTIVE | COMMUNITY
Start: 2022-01-01

## 2022-01-01 RX ORDER — PANTOPRAZOLE 40 MG/1
40 TABLET, DELAYED RELEASE ORAL DAILY
Qty: 30 | Refills: 3 | Status: ACTIVE | COMMUNITY
Start: 2022-01-01

## 2022-01-01 RX ORDER — INSULIN GLARGINE 100 [IU]/ML
8 INJECTION, SOLUTION SUBCUTANEOUS AT BEDTIME
Refills: 0 | Status: DISCONTINUED | OUTPATIENT
Start: 2022-01-01 | End: 2022-01-01

## 2022-01-01 RX ORDER — INSULIN HUMAN 100 [IU]/ML
10 INJECTION, SOLUTION SUBCUTANEOUS ONCE
Refills: 0 | Status: COMPLETED | OUTPATIENT
Start: 2022-01-01 | End: 2022-01-01

## 2022-01-01 RX ORDER — CLOPIDOGREL BISULFATE 75 MG/1
75 TABLET, FILM COATED ORAL DAILY
Refills: 0 | Status: DISCONTINUED | OUTPATIENT
Start: 2022-01-01 | End: 2022-01-01

## 2022-01-01 RX ADMIN — Medication 2 UNIT(S): at 12:38

## 2022-01-01 RX ADMIN — NYSTATIN CREAM 1 APPLICATION(S): 100000 CREAM TOPICAL at 05:22

## 2022-01-01 RX ADMIN — Medication 50 MICROGRAM(S): at 06:26

## 2022-01-01 RX ADMIN — INSULIN GLARGINE 15 UNIT(S): 100 INJECTION, SOLUTION SUBCUTANEOUS at 21:00

## 2022-01-01 RX ADMIN — Medication 100 MILLIGRAM(S): at 12:13

## 2022-01-01 RX ADMIN — Medication 50 MILLILITER(S): at 12:44

## 2022-01-01 RX ADMIN — Medication 100 MICROGRAM(S): at 06:49

## 2022-01-01 RX ADMIN — Medication 3 UNIT(S): at 17:59

## 2022-01-01 RX ADMIN — Medication 1 APPLICATION(S): at 17:10

## 2022-01-01 RX ADMIN — BUMETANIDE 2 MILLIGRAM(S): 0.25 INJECTION INTRAMUSCULAR; INTRAVENOUS at 17:05

## 2022-01-01 RX ADMIN — BUMETANIDE 2 MILLIGRAM(S): 0.25 INJECTION INTRAMUSCULAR; INTRAVENOUS at 05:24

## 2022-01-01 RX ADMIN — Medication 3 UNIT(S): at 08:44

## 2022-01-01 RX ADMIN — Medication 100 MILLIGRAM(S): at 05:05

## 2022-01-01 RX ADMIN — Medication 100 MILLIGRAM(S): at 12:55

## 2022-01-01 RX ADMIN — Medication 2: at 22:10

## 2022-01-01 RX ADMIN — Medication 1: at 18:02

## 2022-01-01 RX ADMIN — AMLODIPINE BESYLATE 5 MILLIGRAM(S): 2.5 TABLET ORAL at 05:04

## 2022-01-01 RX ADMIN — AMLODIPINE BESYLATE 5 MILLIGRAM(S): 2.5 TABLET ORAL at 05:02

## 2022-01-01 RX ADMIN — PANTOPRAZOLE SODIUM 40 MILLIGRAM(S): 20 TABLET, DELAYED RELEASE ORAL at 05:06

## 2022-01-01 RX ADMIN — BUMETANIDE 2 MILLIGRAM(S): 0.25 INJECTION INTRAMUSCULAR; INTRAVENOUS at 12:55

## 2022-01-01 RX ADMIN — Medication 100 MILLIGRAM(S): at 12:05

## 2022-01-01 RX ADMIN — AMLODIPINE BESYLATE 5 MILLIGRAM(S): 2.5 TABLET ORAL at 05:41

## 2022-01-01 RX ADMIN — Medication 40 MILLIGRAM(S): at 18:37

## 2022-01-01 RX ADMIN — Medication 1 TABLET(S): at 12:24

## 2022-01-01 RX ADMIN — Medication 1: at 10:01

## 2022-01-01 RX ADMIN — Medication 650 MILLIGRAM(S): at 23:49

## 2022-01-01 RX ADMIN — Medication 3: at 17:43

## 2022-01-01 RX ADMIN — Medication 0.5 MILLIGRAM(S): at 08:43

## 2022-01-01 RX ADMIN — Medication 100 MILLIGRAM(S): at 14:23

## 2022-01-01 RX ADMIN — Medication 40 MILLIGRAM(S): at 05:26

## 2022-01-01 RX ADMIN — INSULIN GLARGINE 19 UNIT(S): 100 INJECTION, SOLUTION SUBCUTANEOUS at 22:00

## 2022-01-01 RX ADMIN — INSULIN GLARGINE 15 UNIT(S): 100 INJECTION, SOLUTION SUBCUTANEOUS at 21:24

## 2022-01-01 RX ADMIN — Medication 100 MILLIGRAM(S): at 05:33

## 2022-01-01 RX ADMIN — BUMETANIDE 2 MILLIGRAM(S): 0.25 INJECTION INTRAMUSCULAR; INTRAVENOUS at 05:45

## 2022-01-01 RX ADMIN — Medication 1: at 08:25

## 2022-01-01 RX ADMIN — INSULIN GLARGINE 19 UNIT(S): 100 INJECTION, SOLUTION SUBCUTANEOUS at 21:34

## 2022-01-01 RX ADMIN — Medication 1 TABLET(S): at 13:47

## 2022-01-01 RX ADMIN — SODIUM CHLORIDE 500 MILLILITER(S): 9 INJECTION INTRAMUSCULAR; INTRAVENOUS; SUBCUTANEOUS at 23:07

## 2022-01-01 RX ADMIN — PANTOPRAZOLE SODIUM 40 MILLIGRAM(S): 20 TABLET, DELAYED RELEASE ORAL at 06:20

## 2022-01-01 RX ADMIN — APIXABAN 2.5 MILLIGRAM(S): 2.5 TABLET, FILM COATED ORAL at 04:59

## 2022-01-01 RX ADMIN — AMLODIPINE BESYLATE 5 MILLIGRAM(S): 2.5 TABLET ORAL at 06:46

## 2022-01-01 RX ADMIN — Medication 975 MILLIGRAM(S): at 00:21

## 2022-01-01 RX ADMIN — Medication 1: at 11:55

## 2022-01-01 RX ADMIN — NYSTATIN CREAM 1 APPLICATION(S): 100000 CREAM TOPICAL at 05:31

## 2022-01-01 RX ADMIN — APIXABAN 2.5 MILLIGRAM(S): 2.5 TABLET, FILM COATED ORAL at 17:05

## 2022-01-01 RX ADMIN — AMLODIPINE BESYLATE 5 MILLIGRAM(S): 2.5 TABLET ORAL at 05:26

## 2022-01-01 RX ADMIN — BUMETANIDE 2 MILLIGRAM(S): 0.25 INJECTION INTRAMUSCULAR; INTRAVENOUS at 19:49

## 2022-01-01 RX ADMIN — BUMETANIDE 2 MILLIGRAM(S): 0.25 INJECTION INTRAMUSCULAR; INTRAVENOUS at 06:01

## 2022-01-01 RX ADMIN — SIMVASTATIN 20 MILLIGRAM(S): 20 TABLET, FILM COATED ORAL at 21:24

## 2022-01-01 RX ADMIN — Medication 1000 MILLIGRAM(S): at 14:30

## 2022-01-01 RX ADMIN — Medication 100 MILLIGRAM(S): at 05:09

## 2022-01-01 RX ADMIN — Medication 50 MICROGRAM(S): at 05:22

## 2022-01-01 RX ADMIN — Medication 40 MILLIGRAM(S): at 05:06

## 2022-01-01 RX ADMIN — Medication 1 TABLET(S): at 12:20

## 2022-01-01 RX ADMIN — APIXABAN 2.5 MILLIGRAM(S): 2.5 TABLET, FILM COATED ORAL at 05:27

## 2022-01-01 RX ADMIN — SIMVASTATIN 20 MILLIGRAM(S): 20 TABLET, FILM COATED ORAL at 21:56

## 2022-01-01 RX ADMIN — SENNA PLUS 2 TABLET(S): 8.6 TABLET ORAL at 21:10

## 2022-01-01 RX ADMIN — APIXABAN 2.5 MILLIGRAM(S): 2.5 TABLET, FILM COATED ORAL at 06:26

## 2022-01-01 RX ADMIN — Medication 50 MICROGRAM(S): at 05:33

## 2022-01-01 RX ADMIN — APIXABAN 2.5 MILLIGRAM(S): 2.5 TABLET, FILM COATED ORAL at 18:17

## 2022-01-01 RX ADMIN — Medication 100 MICROGRAM(S): at 06:25

## 2022-01-01 RX ADMIN — Medication 50 MICROGRAM(S): at 05:05

## 2022-01-01 RX ADMIN — BUMETANIDE 2 MILLIGRAM(S): 0.25 INJECTION INTRAMUSCULAR; INTRAVENOUS at 05:31

## 2022-01-01 RX ADMIN — ONDANSETRON 4 MILLIGRAM(S): 8 TABLET, FILM COATED ORAL at 18:17

## 2022-01-01 RX ADMIN — Medication 3 UNIT(S): at 09:22

## 2022-01-01 RX ADMIN — Medication 1 TABLET(S): at 12:55

## 2022-01-01 RX ADMIN — Medication 40 MILLIGRAM(S): at 17:20

## 2022-01-01 RX ADMIN — HYDROMORPHONE HYDROCHLORIDE 0.5 MILLIGRAM(S): 2 INJECTION INTRAMUSCULAR; INTRAVENOUS; SUBCUTANEOUS at 08:43

## 2022-01-01 RX ADMIN — Medication 650 MILLIGRAM(S): at 05:06

## 2022-01-01 RX ADMIN — NYSTATIN CREAM 1 APPLICATION(S): 100000 CREAM TOPICAL at 17:55

## 2022-01-01 RX ADMIN — Medication 1 TABLET(S): at 12:51

## 2022-01-01 RX ADMIN — SIMVASTATIN 20 MILLIGRAM(S): 20 TABLET, FILM COATED ORAL at 22:23

## 2022-01-01 RX ADMIN — SIMVASTATIN 20 MILLIGRAM(S): 20 TABLET, FILM COATED ORAL at 20:56

## 2022-01-01 RX ADMIN — Medication 1: at 08:10

## 2022-01-01 RX ADMIN — Medication 100 MILLIGRAM(S): at 05:20

## 2022-01-01 RX ADMIN — Medication 100 MILLIGRAM(S): at 18:24

## 2022-01-01 RX ADMIN — Medication 40 MILLIGRAM(S): at 05:02

## 2022-01-01 RX ADMIN — APIXABAN 2.5 MILLIGRAM(S): 2.5 TABLET, FILM COATED ORAL at 04:47

## 2022-01-01 RX ADMIN — APIXABAN 2.5 MILLIGRAM(S): 2.5 TABLET, FILM COATED ORAL at 19:12

## 2022-01-01 RX ADMIN — BUMETANIDE 2 MILLIGRAM(S): 0.25 INJECTION INTRAMUSCULAR; INTRAVENOUS at 07:05

## 2022-01-01 RX ADMIN — Medication 50 MICROGRAM(S): at 05:26

## 2022-01-01 RX ADMIN — Medication 650 MILLIGRAM(S): at 22:39

## 2022-01-01 RX ADMIN — SIMVASTATIN 20 MILLIGRAM(S): 20 TABLET, FILM COATED ORAL at 22:34

## 2022-01-01 RX ADMIN — BUMETANIDE 2 MILLIGRAM(S): 0.25 INJECTION INTRAMUSCULAR; INTRAVENOUS at 17:14

## 2022-01-01 RX ADMIN — Medication 650 MILLIGRAM(S): at 23:41

## 2022-01-01 RX ADMIN — Medication 2: at 12:32

## 2022-01-01 RX ADMIN — Medication 1 TABLET(S): at 11:52

## 2022-01-01 RX ADMIN — Medication 1: at 21:34

## 2022-01-01 RX ADMIN — SIMVASTATIN 20 MILLIGRAM(S): 20 TABLET, FILM COATED ORAL at 22:49

## 2022-01-01 RX ADMIN — Medication 650 MILLIGRAM(S): at 23:40

## 2022-01-01 RX ADMIN — CALCITRIOL 0.25 MICROGRAM(S): 0.5 CAPSULE ORAL at 11:20

## 2022-01-01 RX ADMIN — Medication 100 MILLIGRAM(S): at 05:42

## 2022-01-01 RX ADMIN — Medication 1: at 21:57

## 2022-01-01 RX ADMIN — Medication 100 MILLIGRAM(S): at 05:26

## 2022-01-01 RX ADMIN — PANTOPRAZOLE SODIUM 40 MILLIGRAM(S): 20 TABLET, DELAYED RELEASE ORAL at 06:40

## 2022-01-01 RX ADMIN — Medication 1: at 17:55

## 2022-01-01 RX ADMIN — NYSTATIN CREAM 1 APPLICATION(S): 100000 CREAM TOPICAL at 18:38

## 2022-01-01 RX ADMIN — Medication 100 MICROGRAM(S): at 05:05

## 2022-01-01 RX ADMIN — Medication 40 MILLIGRAM(S): at 05:37

## 2022-01-01 RX ADMIN — Medication 975 MILLIGRAM(S): at 05:33

## 2022-01-01 RX ADMIN — APIXABAN 2.5 MILLIGRAM(S): 2.5 TABLET, FILM COATED ORAL at 05:42

## 2022-01-01 RX ADMIN — Medication 3 UNIT(S): at 08:19

## 2022-01-01 RX ADMIN — Medication 3 MILLIGRAM(S): at 22:09

## 2022-01-01 RX ADMIN — Medication 3 MILLIGRAM(S): at 01:45

## 2022-01-01 RX ADMIN — Medication 100 MICROGRAM(S): at 05:47

## 2022-01-01 RX ADMIN — Medication 40 MILLIGRAM(S): at 17:31

## 2022-01-01 RX ADMIN — PANTOPRAZOLE SODIUM 40 MILLIGRAM(S): 20 TABLET, DELAYED RELEASE ORAL at 05:20

## 2022-01-01 RX ADMIN — INSULIN GLARGINE 8 UNIT(S): 100 INJECTION, SOLUTION SUBCUTANEOUS at 22:21

## 2022-01-01 RX ADMIN — CEFTRIAXONE 100 MILLIGRAM(S): 500 INJECTION, POWDER, FOR SOLUTION INTRAMUSCULAR; INTRAVENOUS at 13:52

## 2022-01-01 RX ADMIN — Medication 975 MILLIGRAM(S): at 13:45

## 2022-01-01 RX ADMIN — Medication 3 UNIT(S): at 08:36

## 2022-01-01 RX ADMIN — APIXABAN 2.5 MILLIGRAM(S): 2.5 TABLET, FILM COATED ORAL at 18:04

## 2022-01-01 RX ADMIN — Medication 4 UNIT(S): at 08:30

## 2022-01-01 RX ADMIN — NYSTATIN CREAM 1 APPLICATION(S): 100000 CREAM TOPICAL at 22:44

## 2022-01-01 RX ADMIN — Medication 4 UNIT(S): at 17:56

## 2022-01-01 RX ADMIN — Medication 1 TABLET(S): at 12:33

## 2022-01-01 RX ADMIN — Medication 3 UNIT(S): at 11:55

## 2022-01-01 RX ADMIN — Medication 2: at 06:41

## 2022-01-01 RX ADMIN — Medication 650 MILLIGRAM(S): at 11:57

## 2022-01-01 RX ADMIN — PANTOPRAZOLE SODIUM 40 MILLIGRAM(S): 20 TABLET, DELAYED RELEASE ORAL at 06:46

## 2022-01-01 RX ADMIN — BUMETANIDE 2 MILLIGRAM(S): 0.25 INJECTION INTRAMUSCULAR; INTRAVENOUS at 12:48

## 2022-01-01 RX ADMIN — APIXABAN 2.5 MILLIGRAM(S): 2.5 TABLET, FILM COATED ORAL at 17:57

## 2022-01-01 RX ADMIN — Medication 975 MILLIGRAM(S): at 13:30

## 2022-01-01 RX ADMIN — Medication 4 UNIT(S): at 08:17

## 2022-01-01 RX ADMIN — Medication 400 MILLIGRAM(S): at 13:56

## 2022-01-01 RX ADMIN — INSULIN GLARGINE 15 UNIT(S): 100 INJECTION, SOLUTION SUBCUTANEOUS at 22:11

## 2022-01-01 RX ADMIN — Medication 100 MILLIGRAM(S): at 12:34

## 2022-01-01 RX ADMIN — Medication 100 MILLIGRAM(S): at 11:06

## 2022-01-01 RX ADMIN — APIXABAN 2.5 MILLIGRAM(S): 2.5 TABLET, FILM COATED ORAL at 17:12

## 2022-01-01 RX ADMIN — Medication 1: at 08:27

## 2022-01-01 RX ADMIN — Medication 100 MILLIGRAM(S): at 06:45

## 2022-01-01 RX ADMIN — Medication 1: at 22:16

## 2022-01-01 RX ADMIN — Medication 4 UNIT(S): at 17:10

## 2022-01-01 RX ADMIN — TRAMADOL HYDROCHLORIDE 25 MILLIGRAM(S): 50 TABLET ORAL at 11:24

## 2022-01-01 RX ADMIN — AMLODIPINE BESYLATE 5 MILLIGRAM(S): 2.5 TABLET ORAL at 05:09

## 2022-01-01 RX ADMIN — BUMETANIDE 1 MILLIGRAM(S): 0.25 INJECTION INTRAMUSCULAR; INTRAVENOUS at 17:48

## 2022-01-01 RX ADMIN — NYSTATIN CREAM 1 APPLICATION(S): 100000 CREAM TOPICAL at 05:03

## 2022-01-01 RX ADMIN — SIMVASTATIN 20 MILLIGRAM(S): 20 TABLET, FILM COATED ORAL at 22:00

## 2022-01-01 RX ADMIN — Medication 1 TABLET(S): at 12:11

## 2022-01-01 RX ADMIN — Medication 3 UNIT(S): at 17:06

## 2022-01-01 RX ADMIN — Medication 1: at 08:36

## 2022-01-01 RX ADMIN — Medication 3 UNIT(S): at 17:30

## 2022-01-01 RX ADMIN — Medication 40 MILLIGRAM(S): at 17:57

## 2022-01-01 RX ADMIN — NYSTATIN CREAM 1 APPLICATION(S): 100000 CREAM TOPICAL at 14:15

## 2022-01-01 RX ADMIN — Medication 4 UNIT(S): at 17:40

## 2022-01-01 RX ADMIN — AMLODIPINE BESYLATE 5 MILLIGRAM(S): 2.5 TABLET ORAL at 06:26

## 2022-01-01 RX ADMIN — Medication 1 TABLET(S): at 12:52

## 2022-01-01 RX ADMIN — Medication 4 UNIT(S): at 11:55

## 2022-01-01 RX ADMIN — APIXABAN 2.5 MILLIGRAM(S): 2.5 TABLET, FILM COATED ORAL at 05:47

## 2022-01-01 RX ADMIN — Medication 1 TABLET(S): at 11:34

## 2022-01-01 RX ADMIN — SIMVASTATIN 20 MILLIGRAM(S): 20 TABLET, FILM COATED ORAL at 22:03

## 2022-01-01 RX ADMIN — Medication 40 MILLIGRAM(S): at 01:18

## 2022-01-01 RX ADMIN — Medication 3: at 21:01

## 2022-01-01 RX ADMIN — BUMETANIDE 2 MILLIGRAM(S): 0.25 INJECTION INTRAMUSCULAR; INTRAVENOUS at 13:07

## 2022-01-01 RX ADMIN — Medication 2: at 17:42

## 2022-01-01 RX ADMIN — CLOPIDOGREL BISULFATE 75 MILLIGRAM(S): 75 TABLET, FILM COATED ORAL at 12:59

## 2022-01-01 RX ADMIN — CEFTRIAXONE 100 MILLIGRAM(S): 500 INJECTION, POWDER, FOR SOLUTION INTRAMUSCULAR; INTRAVENOUS at 14:21

## 2022-01-01 RX ADMIN — APIXABAN 2.5 MILLIGRAM(S): 2.5 TABLET, FILM COATED ORAL at 05:28

## 2022-01-01 RX ADMIN — Medication 2 UNIT(S): at 08:27

## 2022-01-01 RX ADMIN — Medication 40 MILLIGRAM(S): at 05:12

## 2022-01-01 RX ADMIN — CLOPIDOGREL BISULFATE 75 MILLIGRAM(S): 75 TABLET, FILM COATED ORAL at 11:16

## 2022-01-01 RX ADMIN — Medication 2: at 12:20

## 2022-01-01 RX ADMIN — Medication 3 UNIT(S): at 16:34

## 2022-01-01 RX ADMIN — BUMETANIDE 2 MILLIGRAM(S): 0.25 INJECTION INTRAMUSCULAR; INTRAVENOUS at 14:12

## 2022-01-01 RX ADMIN — Medication 100 MILLIGRAM(S): at 12:52

## 2022-01-01 RX ADMIN — NYSTATIN CREAM 1 APPLICATION(S): 100000 CREAM TOPICAL at 21:53

## 2022-01-01 RX ADMIN — Medication 0.5 MILLIGRAM(S): at 08:04

## 2022-01-01 RX ADMIN — Medication 50 MICROGRAM(S): at 05:20

## 2022-01-01 RX ADMIN — BUMETANIDE 2 MILLIGRAM(S): 0.25 INJECTION INTRAMUSCULAR; INTRAVENOUS at 18:01

## 2022-01-01 RX ADMIN — APIXABAN 2.5 MILLIGRAM(S): 2.5 TABLET, FILM COATED ORAL at 17:14

## 2022-01-01 RX ADMIN — Medication 3 UNIT(S): at 11:51

## 2022-01-01 RX ADMIN — Medication 50 MICROGRAM(S): at 05:37

## 2022-01-01 RX ADMIN — Medication 100 MILLIGRAM(S): at 12:11

## 2022-01-01 RX ADMIN — Medication 1 TABLET(S): at 15:11

## 2022-01-01 RX ADMIN — AMLODIPINE BESYLATE 5 MILLIGRAM(S): 2.5 TABLET ORAL at 05:27

## 2022-01-01 RX ADMIN — APIXABAN 2.5 MILLIGRAM(S): 2.5 TABLET, FILM COATED ORAL at 06:22

## 2022-01-01 RX ADMIN — NYSTATIN CREAM 1 APPLICATION(S): 100000 CREAM TOPICAL at 17:12

## 2022-01-01 RX ADMIN — AMLODIPINE BESYLATE 5 MILLIGRAM(S): 2.5 TABLET ORAL at 04:58

## 2022-01-01 RX ADMIN — INSULIN GLARGINE 15 UNIT(S): 100 INJECTION, SOLUTION SUBCUTANEOUS at 22:03

## 2022-01-01 RX ADMIN — Medication 650 MILLIGRAM(S): at 01:30

## 2022-01-01 RX ADMIN — Medication 4 UNIT(S): at 17:54

## 2022-01-01 RX ADMIN — NYSTATIN CREAM 1 APPLICATION(S): 100000 CREAM TOPICAL at 22:33

## 2022-01-01 RX ADMIN — APIXABAN 2.5 MILLIGRAM(S): 2.5 TABLET, FILM COATED ORAL at 05:05

## 2022-01-01 RX ADMIN — Medication 40 MILLIGRAM(S): at 05:27

## 2022-01-01 RX ADMIN — Medication 1: at 08:14

## 2022-01-01 RX ADMIN — Medication 50 MICROGRAM(S): at 05:23

## 2022-01-01 RX ADMIN — AMLODIPINE BESYLATE 5 MILLIGRAM(S): 2.5 TABLET ORAL at 05:33

## 2022-01-01 RX ADMIN — Medication 1 TABLET(S): at 11:16

## 2022-01-01 RX ADMIN — SIMVASTATIN 20 MILLIGRAM(S): 20 TABLET, FILM COATED ORAL at 21:51

## 2022-01-01 RX ADMIN — APIXABAN 2.5 MILLIGRAM(S): 2.5 TABLET, FILM COATED ORAL at 17:46

## 2022-01-01 RX ADMIN — BUMETANIDE 2 MILLIGRAM(S): 0.25 INJECTION INTRAMUSCULAR; INTRAVENOUS at 06:05

## 2022-01-01 RX ADMIN — Medication 4 UNIT(S): at 08:09

## 2022-01-01 RX ADMIN — Medication 100 MILLIGRAM(S): at 13:19

## 2022-01-01 RX ADMIN — Medication 100 MILLIGRAM(S): at 11:34

## 2022-01-01 RX ADMIN — BUMETANIDE 1 MILLIGRAM(S): 0.25 INJECTION INTRAMUSCULAR; INTRAVENOUS at 05:26

## 2022-01-01 RX ADMIN — AMLODIPINE BESYLATE 5 MILLIGRAM(S): 2.5 TABLET ORAL at 05:47

## 2022-01-01 RX ADMIN — Medication 3 UNIT(S): at 12:47

## 2022-01-01 RX ADMIN — PANTOPRAZOLE SODIUM 40 MILLIGRAM(S): 20 TABLET, DELAYED RELEASE ORAL at 06:29

## 2022-01-01 RX ADMIN — PANTOPRAZOLE SODIUM 40 MILLIGRAM(S): 20 TABLET, DELAYED RELEASE ORAL at 05:04

## 2022-01-01 RX ADMIN — Medication 2 UNIT(S): at 17:07

## 2022-01-01 RX ADMIN — Medication 1: at 08:48

## 2022-01-01 RX ADMIN — Medication 40 MILLIGRAM(S): at 06:28

## 2022-01-01 RX ADMIN — CEFTRIAXONE 100 MILLIGRAM(S): 500 INJECTION, POWDER, FOR SOLUTION INTRAMUSCULAR; INTRAVENOUS at 01:18

## 2022-01-01 RX ADMIN — PANTOPRAZOLE SODIUM 40 MILLIGRAM(S): 20 TABLET, DELAYED RELEASE ORAL at 06:09

## 2022-01-01 RX ADMIN — Medication 40 MILLIGRAM(S): at 17:41

## 2022-01-01 RX ADMIN — Medication 975 MILLIGRAM(S): at 06:03

## 2022-01-01 RX ADMIN — Medication 100 MILLIGRAM(S): at 05:47

## 2022-01-01 RX ADMIN — Medication 3 UNIT(S): at 09:10

## 2022-01-01 RX ADMIN — Medication 1 TABLET(S): at 11:06

## 2022-01-01 RX ADMIN — Medication 1: at 11:51

## 2022-01-01 RX ADMIN — PANTOPRAZOLE SODIUM 40 MILLIGRAM(S): 20 TABLET, DELAYED RELEASE ORAL at 05:42

## 2022-01-01 RX ADMIN — Medication 3 UNIT(S): at 17:35

## 2022-01-01 RX ADMIN — HYDROMORPHONE HYDROCHLORIDE 0.5 MILLIGRAM(S): 2 INJECTION INTRAMUSCULAR; INTRAVENOUS; SUBCUTANEOUS at 12:42

## 2022-01-01 RX ADMIN — Medication 1 TABLET(S): at 12:13

## 2022-01-01 RX ADMIN — APIXABAN 2.5 MILLIGRAM(S): 2.5 TABLET, FILM COATED ORAL at 18:38

## 2022-01-01 RX ADMIN — INSULIN GLARGINE 19 UNIT(S): 100 INJECTION, SOLUTION SUBCUTANEOUS at 21:36

## 2022-01-01 RX ADMIN — Medication 3 MILLILITER(S): at 09:20

## 2022-01-01 RX ADMIN — CEFTRIAXONE 100 MILLIGRAM(S): 500 INJECTION, POWDER, FOR SOLUTION INTRAMUSCULAR; INTRAVENOUS at 15:56

## 2022-01-01 RX ADMIN — NYSTATIN CREAM 1 APPLICATION(S): 100000 CREAM TOPICAL at 05:47

## 2022-01-01 RX ADMIN — Medication 650 MILLIGRAM(S): at 06:01

## 2022-01-01 RX ADMIN — PANTOPRAZOLE SODIUM 40 MILLIGRAM(S): 20 TABLET, DELAYED RELEASE ORAL at 05:44

## 2022-01-01 RX ADMIN — Medication 4 UNIT(S): at 17:44

## 2022-01-01 RX ADMIN — NYSTATIN CREAM 1 APPLICATION(S): 100000 CREAM TOPICAL at 05:26

## 2022-01-01 RX ADMIN — PANTOPRAZOLE SODIUM 40 MILLIGRAM(S): 20 TABLET, DELAYED RELEASE ORAL at 05:26

## 2022-01-01 RX ADMIN — APIXABAN 2.5 MILLIGRAM(S): 2.5 TABLET, FILM COATED ORAL at 17:45

## 2022-01-01 RX ADMIN — Medication 3 UNIT(S): at 12:57

## 2022-01-01 RX ADMIN — SIMVASTATIN 20 MILLIGRAM(S): 20 TABLET, FILM COATED ORAL at 22:12

## 2022-01-01 RX ADMIN — APIXABAN 2.5 MILLIGRAM(S): 2.5 TABLET, FILM COATED ORAL at 06:45

## 2022-01-01 RX ADMIN — Medication 50 MICROGRAM(S): at 04:59

## 2022-01-01 RX ADMIN — Medication 100 MILLIGRAM(S): at 05:21

## 2022-01-01 RX ADMIN — Medication 975 MILLIGRAM(S): at 13:00

## 2022-01-01 RX ADMIN — Medication 100 MILLIGRAM(S): at 12:20

## 2022-01-01 RX ADMIN — BUMETANIDE 2.5 MG/HR: 0.25 INJECTION INTRAMUSCULAR; INTRAVENOUS at 19:55

## 2022-01-01 RX ADMIN — Medication 40 MILLIGRAM(S): at 04:59

## 2022-01-01 RX ADMIN — Medication 50 MICROGRAM(S): at 05:01

## 2022-01-01 RX ADMIN — Medication 25 GRAM(S): at 08:00

## 2022-01-01 RX ADMIN — Medication 3 MILLILITER(S): at 09:31

## 2022-01-01 RX ADMIN — Medication 20 MILLIEQUIVALENT(S): at 08:08

## 2022-01-01 RX ADMIN — Medication 100 MILLIGRAM(S): at 05:40

## 2022-01-01 RX ADMIN — APIXABAN 2.5 MILLIGRAM(S): 2.5 TABLET, FILM COATED ORAL at 05:02

## 2022-01-01 RX ADMIN — OXYCODONE HYDROCHLORIDE 5 MILLIGRAM(S): 5 TABLET ORAL at 22:24

## 2022-01-01 RX ADMIN — Medication 3 UNIT(S): at 08:13

## 2022-01-01 RX ADMIN — Medication 40 MILLIGRAM(S): at 17:44

## 2022-01-01 RX ADMIN — TRAMADOL HYDROCHLORIDE 25 MILLIGRAM(S): 50 TABLET ORAL at 12:45

## 2022-01-01 RX ADMIN — AMLODIPINE BESYLATE 5 MILLIGRAM(S): 2.5 TABLET ORAL at 04:47

## 2022-01-01 RX ADMIN — Medication 40 MILLIGRAM(S): at 05:47

## 2022-01-01 RX ADMIN — NYSTATIN CREAM 1 APPLICATION(S): 100000 CREAM TOPICAL at 21:44

## 2022-01-01 RX ADMIN — Medication 100 MILLIGRAM(S): at 04:58

## 2022-01-01 RX ADMIN — Medication 0.5 MILLIGRAM(S): at 18:47

## 2022-01-01 RX ADMIN — Medication 40 MILLIGRAM(S): at 16:11

## 2022-01-01 RX ADMIN — INSULIN GLARGINE 19 UNIT(S): 100 INJECTION, SOLUTION SUBCUTANEOUS at 21:56

## 2022-01-01 RX ADMIN — PANTOPRAZOLE SODIUM 40 MILLIGRAM(S): 20 TABLET, DELAYED RELEASE ORAL at 05:21

## 2022-01-01 RX ADMIN — Medication 40 MILLIGRAM(S): at 17:38

## 2022-01-01 RX ADMIN — APIXABAN 2.5 MILLIGRAM(S): 2.5 TABLET, FILM COATED ORAL at 17:39

## 2022-01-01 RX ADMIN — APIXABAN 2.5 MILLIGRAM(S): 2.5 TABLET, FILM COATED ORAL at 05:12

## 2022-01-01 RX ADMIN — PANTOPRAZOLE SODIUM 40 MILLIGRAM(S): 20 TABLET, DELAYED RELEASE ORAL at 05:41

## 2022-01-01 RX ADMIN — BUMETANIDE 2 MILLIGRAM(S): 0.25 INJECTION INTRAMUSCULAR; INTRAVENOUS at 05:05

## 2022-01-01 RX ADMIN — INSULIN GLARGINE 19 UNIT(S): 100 INJECTION, SOLUTION SUBCUTANEOUS at 22:07

## 2022-01-01 RX ADMIN — Medication 3 UNIT(S): at 17:15

## 2022-01-01 RX ADMIN — Medication 1: at 17:39

## 2022-01-01 RX ADMIN — PANTOPRAZOLE SODIUM 40 MILLIGRAM(S): 20 TABLET, DELAYED RELEASE ORAL at 05:12

## 2022-01-01 RX ADMIN — FAMOTIDINE 20 MILLIGRAM(S): 10 INJECTION INTRAVENOUS at 13:10

## 2022-01-01 RX ADMIN — NYSTATIN CREAM 1 APPLICATION(S): 100000 CREAM TOPICAL at 22:07

## 2022-01-01 RX ADMIN — AMLODIPINE BESYLATE 5 MILLIGRAM(S): 2.5 TABLET ORAL at 06:22

## 2022-01-01 RX ADMIN — Medication 1: at 17:06

## 2022-01-01 RX ADMIN — Medication 3 UNIT(S): at 17:29

## 2022-01-01 RX ADMIN — PANTOPRAZOLE SODIUM 40 MILLIGRAM(S): 20 TABLET, DELAYED RELEASE ORAL at 05:47

## 2022-01-01 RX ADMIN — Medication 40 MILLIGRAM(S): at 17:09

## 2022-01-01 RX ADMIN — SIMVASTATIN 20 MILLIGRAM(S): 20 TABLET, FILM COATED ORAL at 22:09

## 2022-01-01 RX ADMIN — INSULIN GLARGINE 8 UNIT(S): 100 INJECTION, SOLUTION SUBCUTANEOUS at 21:49

## 2022-01-01 RX ADMIN — Medication 100 MILLIGRAM(S): at 12:24

## 2022-01-01 RX ADMIN — Medication 40 MILLIGRAM(S): at 18:02

## 2022-01-01 RX ADMIN — Medication 12.5 MILLIGRAM(S): at 05:44

## 2022-01-01 RX ADMIN — INSULIN GLARGINE 4 UNIT(S): 100 INJECTION, SOLUTION SUBCUTANEOUS at 22:22

## 2022-01-01 RX ADMIN — APIXABAN 2.5 MILLIGRAM(S): 2.5 TABLET, FILM COATED ORAL at 18:01

## 2022-01-01 RX ADMIN — BUMETANIDE 2 MILLIGRAM(S): 0.25 INJECTION INTRAMUSCULAR; INTRAVENOUS at 05:23

## 2022-01-01 RX ADMIN — SIMVASTATIN 20 MILLIGRAM(S): 20 TABLET, FILM COATED ORAL at 22:13

## 2022-01-01 RX ADMIN — INSULIN GLARGINE 19 UNIT(S): 100 INJECTION, SOLUTION SUBCUTANEOUS at 22:27

## 2022-01-01 RX ADMIN — Medication 3 MILLILITER(S): at 05:26

## 2022-01-01 RX ADMIN — Medication 40 MILLIGRAM(S): at 06:26

## 2022-01-01 RX ADMIN — SIMVASTATIN 20 MILLIGRAM(S): 20 TABLET, FILM COATED ORAL at 21:11

## 2022-01-01 RX ADMIN — Medication 100 MILLIGRAM(S): at 12:56

## 2022-01-01 RX ADMIN — APIXABAN 2.5 MILLIGRAM(S): 2.5 TABLET, FILM COATED ORAL at 17:10

## 2022-01-01 RX ADMIN — Medication 1 TABLET(S): at 11:49

## 2022-01-01 RX ADMIN — Medication 1: at 17:52

## 2022-01-01 RX ADMIN — Medication 100 MILLIGRAM(S): at 12:12

## 2022-01-01 RX ADMIN — Medication 2: at 17:10

## 2022-01-01 RX ADMIN — Medication 1 TABLET(S): at 13:19

## 2022-01-01 RX ADMIN — Medication 50 MICROGRAM(S): at 04:47

## 2022-01-01 RX ADMIN — Medication 4 UNIT(S): at 09:31

## 2022-01-01 RX ADMIN — Medication 100 MILLIGRAM(S): at 12:51

## 2022-01-01 RX ADMIN — AMLODIPINE BESYLATE 5 MILLIGRAM(S): 2.5 TABLET ORAL at 05:12

## 2022-01-01 RX ADMIN — PANTOPRAZOLE SODIUM 40 MILLIGRAM(S): 20 TABLET, DELAYED RELEASE ORAL at 05:28

## 2022-01-01 RX ADMIN — Medication 1: at 11:54

## 2022-01-01 RX ADMIN — BUMETANIDE 2 MILLIGRAM(S): 0.25 INJECTION INTRAMUSCULAR; INTRAVENOUS at 07:06

## 2022-01-01 RX ADMIN — Medication 50 MICROGRAM(S): at 05:42

## 2022-01-01 RX ADMIN — AMLODIPINE BESYLATE 5 MILLIGRAM(S): 2.5 TABLET ORAL at 05:28

## 2022-01-01 RX ADMIN — Medication 1 TABLET(S): at 12:12

## 2022-01-01 RX ADMIN — Medication 650 MILLIGRAM(S): at 22:13

## 2022-01-01 RX ADMIN — Medication 10 MILLIGRAM(S): at 05:31

## 2022-01-01 RX ADMIN — Medication 50 MICROGRAM(S): at 05:09

## 2022-01-01 RX ADMIN — SIMVASTATIN 20 MILLIGRAM(S): 20 TABLET, FILM COATED ORAL at 22:22

## 2022-01-01 RX ADMIN — Medication 50 MICROGRAM(S): at 06:29

## 2022-01-01 RX ADMIN — Medication 975 MILLIGRAM(S): at 11:17

## 2022-01-01 RX ADMIN — BUMETANIDE 2 MILLIGRAM(S): 0.25 INJECTION INTRAMUSCULAR; INTRAVENOUS at 05:02

## 2022-01-01 RX ADMIN — HEPARIN SODIUM 0 UNIT(S)/HR: 5000 INJECTION INTRAVENOUS; SUBCUTANEOUS at 05:38

## 2022-01-01 RX ADMIN — HEPARIN SODIUM 1400 UNIT(S)/HR: 5000 INJECTION INTRAVENOUS; SUBCUTANEOUS at 22:29

## 2022-01-01 RX ADMIN — APIXABAN 2.5 MILLIGRAM(S): 2.5 TABLET, FILM COATED ORAL at 05:09

## 2022-01-01 RX ADMIN — Medication 4 UNIT(S): at 18:02

## 2022-01-01 RX ADMIN — Medication 1: at 17:56

## 2022-01-01 RX ADMIN — Medication 40 MILLIGRAM(S): at 05:33

## 2022-01-01 RX ADMIN — INSULIN GLARGINE 8 UNIT(S): 100 INJECTION, SOLUTION SUBCUTANEOUS at 21:23

## 2022-01-01 RX ADMIN — Medication 100 MILLIGRAM(S): at 06:27

## 2022-01-01 RX ADMIN — CALAMINE AND ZINC OXIDE AND PHENOL 1 APPLICATION(S): 160; 10 LOTION TOPICAL at 16:42

## 2022-01-01 RX ADMIN — AMLODIPINE BESYLATE 5 MILLIGRAM(S): 2.5 TABLET ORAL at 06:29

## 2022-01-01 RX ADMIN — APIXABAN 2.5 MILLIGRAM(S): 2.5 TABLET, FILM COATED ORAL at 09:10

## 2022-01-01 RX ADMIN — Medication 40 MILLIGRAM(S): at 06:46

## 2022-01-01 RX ADMIN — HEPARIN SODIUM 1100 UNIT(S)/HR: 5000 INJECTION INTRAVENOUS; SUBCUTANEOUS at 07:10

## 2022-01-01 RX ADMIN — INSULIN GLARGINE 8 UNIT(S): 100 INJECTION, SOLUTION SUBCUTANEOUS at 21:28

## 2022-01-01 RX ADMIN — BUMETANIDE 2.5 MG/HR: 0.25 INJECTION INTRAMUSCULAR; INTRAVENOUS at 06:32

## 2022-01-01 RX ADMIN — Medication 3 UNIT(S): at 08:10

## 2022-01-01 RX ADMIN — Medication 40 MILLIGRAM(S): at 05:42

## 2022-01-01 RX ADMIN — Medication 100 MILLIGRAM(S): at 05:01

## 2022-01-01 RX ADMIN — APIXABAN 2.5 MILLIGRAM(S): 2.5 TABLET, FILM COATED ORAL at 17:23

## 2022-01-01 RX ADMIN — Medication 3 UNIT(S): at 12:15

## 2022-01-01 RX ADMIN — Medication 25 GRAM(S): at 23:10

## 2022-01-01 RX ADMIN — Medication 650 MILLIGRAM(S): at 15:55

## 2022-01-01 RX ADMIN — BUMETANIDE 2 MILLIGRAM(S): 0.25 INJECTION INTRAMUSCULAR; INTRAVENOUS at 13:33

## 2022-01-01 RX ADMIN — APIXABAN 2.5 MILLIGRAM(S): 2.5 TABLET, FILM COATED ORAL at 17:33

## 2022-01-01 RX ADMIN — Medication 40 MILLIGRAM(S): at 15:15

## 2022-01-01 RX ADMIN — Medication 100 MILLIGRAM(S): at 13:00

## 2022-01-01 RX ADMIN — AMLODIPINE BESYLATE 5 MILLIGRAM(S): 2.5 TABLET ORAL at 05:20

## 2022-01-01 RX ADMIN — APIXABAN 2.5 MILLIGRAM(S): 2.5 TABLET, FILM COATED ORAL at 17:31

## 2022-01-01 RX ADMIN — Medication 50 MICROGRAM(S): at 05:28

## 2022-01-01 RX ADMIN — SIMVASTATIN 20 MILLIGRAM(S): 20 TABLET, FILM COATED ORAL at 21:13

## 2022-01-01 RX ADMIN — Medication 100 MILLIGRAM(S): at 12:08

## 2022-01-01 RX ADMIN — Medication 100 MILLIGRAM(S): at 12:02

## 2022-01-01 RX ADMIN — Medication 100 MILLIGRAM(S): at 05:32

## 2022-01-01 RX ADMIN — NYSTATIN CREAM 1 APPLICATION(S): 100000 CREAM TOPICAL at 22:48

## 2022-01-01 RX ADMIN — Medication 3: at 08:50

## 2022-01-01 RX ADMIN — Medication 3 UNIT(S): at 08:15

## 2022-01-01 RX ADMIN — Medication 50 MICROGRAM(S): at 05:27

## 2022-01-01 RX ADMIN — Medication 40 MILLIGRAM(S): at 18:17

## 2022-01-01 RX ADMIN — Medication 100 MILLIGRAM(S): at 04:47

## 2022-01-01 RX ADMIN — Medication 650 MILLIGRAM(S): at 00:49

## 2022-01-01 RX ADMIN — Medication 2: at 17:46

## 2022-01-01 RX ADMIN — PANTOPRAZOLE SODIUM 40 MILLIGRAM(S): 20 TABLET, DELAYED RELEASE ORAL at 05:02

## 2022-01-01 RX ADMIN — AMLODIPINE BESYLATE 5 MILLIGRAM(S): 2.5 TABLET ORAL at 05:22

## 2022-01-01 RX ADMIN — Medication 40 MILLIGRAM(S): at 13:02

## 2022-01-01 RX ADMIN — APIXABAN 2.5 MILLIGRAM(S): 2.5 TABLET, FILM COATED ORAL at 05:04

## 2022-01-01 RX ADMIN — Medication 1 TABLET(S): at 12:57

## 2022-01-01 RX ADMIN — APIXABAN 2.5 MILLIGRAM(S): 2.5 TABLET, FILM COATED ORAL at 17:56

## 2022-01-01 RX ADMIN — NYSTATIN CREAM 1 APPLICATION(S): 100000 CREAM TOPICAL at 14:12

## 2022-01-01 RX ADMIN — SIMVASTATIN 20 MILLIGRAM(S): 20 TABLET, FILM COATED ORAL at 21:09

## 2022-01-01 RX ADMIN — Medication 1: at 17:19

## 2022-01-01 RX ADMIN — Medication 4 UNIT(S): at 12:21

## 2022-01-01 RX ADMIN — BUMETANIDE 2 MILLIGRAM(S): 0.25 INJECTION INTRAMUSCULAR; INTRAVENOUS at 06:22

## 2022-01-01 RX ADMIN — POLYETHYLENE GLYCOL 3350 17 GRAM(S): 17 POWDER, FOR SOLUTION ORAL at 13:01

## 2022-01-01 RX ADMIN — Medication 3 UNIT(S): at 17:56

## 2022-01-01 RX ADMIN — Medication 1: at 17:15

## 2022-01-01 RX ADMIN — SIMVASTATIN 20 MILLIGRAM(S): 20 TABLET, FILM COATED ORAL at 21:29

## 2022-01-01 RX ADMIN — Medication 1 TABLET(S): at 12:59

## 2022-01-01 RX ADMIN — Medication 40 MILLIGRAM(S): at 15:55

## 2022-01-01 RX ADMIN — APIXABAN 2.5 MILLIGRAM(S): 2.5 TABLET, FILM COATED ORAL at 23:37

## 2022-01-01 RX ADMIN — Medication 50 MICROGRAM(S): at 05:41

## 2022-01-01 RX ADMIN — Medication 100 MICROGRAM(S): at 06:21

## 2022-01-01 RX ADMIN — Medication 100 MILLIGRAM(S): at 05:27

## 2022-01-01 RX ADMIN — BUMETANIDE 2 MILLIGRAM(S): 0.25 INJECTION INTRAMUSCULAR; INTRAVENOUS at 05:29

## 2022-01-01 RX ADMIN — Medication 650 MILLIGRAM(S): at 15:38

## 2022-01-01 RX ADMIN — INSULIN GLARGINE 8 UNIT(S): 100 INJECTION, SOLUTION SUBCUTANEOUS at 22:24

## 2022-01-01 RX ADMIN — Medication 4 UNIT(S): at 12:34

## 2022-01-01 RX ADMIN — Medication 100 MILLIGRAM(S): at 12:59

## 2022-01-01 RX ADMIN — Medication 2: at 13:21

## 2022-01-01 RX ADMIN — Medication 100 MILLIGRAM(S): at 05:38

## 2022-01-01 RX ADMIN — Medication 1: at 12:15

## 2022-01-01 RX ADMIN — Medication 1: at 12:08

## 2022-01-01 RX ADMIN — APIXABAN 2.5 MILLIGRAM(S): 2.5 TABLET, FILM COATED ORAL at 05:26

## 2022-01-01 RX ADMIN — Medication 650 MILLIGRAM(S): at 22:09

## 2022-01-01 RX ADMIN — APIXABAN 2.5 MILLIGRAM(S): 2.5 TABLET, FILM COATED ORAL at 16:12

## 2022-01-01 RX ADMIN — Medication 50 MICROGRAM(S): at 06:09

## 2022-01-01 RX ADMIN — APIXABAN 2.5 MILLIGRAM(S): 2.5 TABLET, FILM COATED ORAL at 17:49

## 2022-01-01 RX ADMIN — NYSTATIN CREAM 1 APPLICATION(S): 100000 CREAM TOPICAL at 13:04

## 2022-01-01 RX ADMIN — Medication 3 MILLILITER(S): at 22:39

## 2022-01-01 RX ADMIN — Medication 100 MILLIGRAM(S): at 12:33

## 2022-01-01 RX ADMIN — APIXABAN 2.5 MILLIGRAM(S): 2.5 TABLET, FILM COATED ORAL at 06:29

## 2022-01-01 RX ADMIN — NYSTATIN CREAM 1 APPLICATION(S): 100000 CREAM TOPICAL at 22:40

## 2022-01-01 RX ADMIN — Medication 1 TABLET(S): at 11:25

## 2022-01-01 RX ADMIN — Medication 3 UNIT(S): at 12:09

## 2022-01-01 RX ADMIN — Medication 4 UNIT(S): at 09:09

## 2022-01-01 RX ADMIN — AMLODIPINE BESYLATE 5 MILLIGRAM(S): 2.5 TABLET ORAL at 05:37

## 2022-01-01 RX ADMIN — Medication 1 TABLET(S): at 12:05

## 2022-01-01 RX ADMIN — Medication 3 UNIT(S): at 12:36

## 2022-01-01 RX ADMIN — Medication 3 UNIT(S): at 12:08

## 2022-01-01 RX ADMIN — PANTOPRAZOLE SODIUM 40 MILLIGRAM(S): 20 TABLET, DELAYED RELEASE ORAL at 06:38

## 2022-01-01 RX ADMIN — INSULIN GLARGINE 19 UNIT(S): 100 INJECTION, SOLUTION SUBCUTANEOUS at 22:13

## 2022-01-01 RX ADMIN — INSULIN GLARGINE 15 UNIT(S): 100 INJECTION, SOLUTION SUBCUTANEOUS at 23:32

## 2022-01-01 RX ADMIN — APIXABAN 2.5 MILLIGRAM(S): 2.5 TABLET, FILM COATED ORAL at 05:33

## 2022-01-01 RX ADMIN — Medication 3: at 17:06

## 2022-01-01 RX ADMIN — Medication 1: at 12:12

## 2022-01-01 RX ADMIN — Medication 1: at 08:30

## 2022-01-01 RX ADMIN — NYSTATIN CREAM 1 APPLICATION(S): 100000 CREAM TOPICAL at 13:33

## 2022-01-01 RX ADMIN — Medication 4 UNIT(S): at 13:06

## 2022-01-01 RX ADMIN — Medication 1: at 17:35

## 2022-01-01 RX ADMIN — APIXABAN 2.5 MILLIGRAM(S): 2.5 TABLET, FILM COATED ORAL at 05:37

## 2022-01-01 RX ADMIN — INSULIN GLARGINE 8 UNIT(S): 100 INJECTION, SOLUTION SUBCUTANEOUS at 21:57

## 2022-01-01 RX ADMIN — Medication 20 MILLIEQUIVALENT(S): at 12:15

## 2022-01-01 RX ADMIN — Medication 3 UNIT(S): at 08:31

## 2022-01-01 RX ADMIN — SIMVASTATIN 20 MILLIGRAM(S): 20 TABLET, FILM COATED ORAL at 22:07

## 2022-01-01 RX ADMIN — Medication 40 MILLIGRAM(S): at 04:46

## 2022-01-01 RX ADMIN — Medication 40 MILLIGRAM(S): at 05:41

## 2022-01-01 RX ADMIN — Medication 40 MILLIGRAM(S): at 06:40

## 2022-01-01 RX ADMIN — SIMVASTATIN 20 MILLIGRAM(S): 20 TABLET, FILM COATED ORAL at 22:11

## 2022-01-01 RX ADMIN — Medication 400 MILLIGRAM(S): at 12:55

## 2022-01-01 RX ADMIN — PANTOPRAZOLE SODIUM 40 MILLIGRAM(S): 20 TABLET, DELAYED RELEASE ORAL at 05:03

## 2022-01-01 RX ADMIN — INSULIN HUMAN 10 UNIT(S): 100 INJECTION, SOLUTION SUBCUTANEOUS at 13:10

## 2022-01-01 RX ADMIN — Medication 1: at 12:06

## 2022-01-01 RX ADMIN — Medication 100 MILLIGRAM(S): at 11:16

## 2022-01-01 RX ADMIN — Medication 1 TABLET(S): at 12:48

## 2022-01-01 RX ADMIN — Medication 100 MILLIGRAM(S): at 05:02

## 2022-01-01 RX ADMIN — Medication 40 MILLIGRAM(S): at 06:22

## 2022-01-01 RX ADMIN — INSULIN GLARGINE 19 UNIT(S): 100 INJECTION, SOLUTION SUBCUTANEOUS at 22:48

## 2022-01-01 RX ADMIN — Medication 3: at 12:23

## 2022-01-01 RX ADMIN — PANTOPRAZOLE SODIUM 40 MILLIGRAM(S): 20 TABLET, DELAYED RELEASE ORAL at 05:38

## 2022-01-01 RX ADMIN — Medication 100 MILLIGRAM(S): at 12:48

## 2022-01-01 RX ADMIN — Medication 100 MILLIGRAM(S): at 11:52

## 2022-01-01 RX ADMIN — Medication 500 MILLIGRAM(S): at 12:59

## 2022-01-01 RX ADMIN — Medication 100 MILLIGRAM(S): at 12:40

## 2022-01-01 RX ADMIN — PANTOPRAZOLE SODIUM 40 MILLIGRAM(S): 20 TABLET, DELAYED RELEASE ORAL at 04:47

## 2022-01-01 RX ADMIN — NYSTATIN CREAM 1 APPLICATION(S): 100000 CREAM TOPICAL at 05:59

## 2022-01-01 RX ADMIN — Medication 40 MILLIGRAM(S): at 05:21

## 2022-01-01 RX ADMIN — NYSTATIN CREAM 1 APPLICATION(S): 100000 CREAM TOPICAL at 05:24

## 2022-01-01 RX ADMIN — Medication 100 MILLIGRAM(S): at 11:39

## 2022-01-01 RX ADMIN — AZITHROMYCIN 255 MILLIGRAM(S): 500 TABLET, FILM COATED ORAL at 17:01

## 2022-01-01 RX ADMIN — ONDANSETRON 4 MILLIGRAM(S): 8 TABLET, FILM COATED ORAL at 20:56

## 2022-01-01 RX ADMIN — Medication 1 APPLICATION(S): at 13:29

## 2022-01-01 RX ADMIN — Medication 40 MILLIGRAM(S): at 18:36

## 2022-01-01 RX ADMIN — BUMETANIDE 2 MILLIGRAM(S): 0.25 INJECTION INTRAMUSCULAR; INTRAVENOUS at 20:54

## 2022-01-01 RX ADMIN — Medication 100 MILLIGRAM(S): at 13:26

## 2022-01-01 RX ADMIN — Medication 2: at 16:35

## 2022-01-01 RX ADMIN — Medication 40 MILLIGRAM(S): at 14:06

## 2022-01-01 RX ADMIN — APIXABAN 2.5 MILLIGRAM(S): 2.5 TABLET, FILM COATED ORAL at 17:19

## 2022-01-01 RX ADMIN — DICLOFENAC SODIUM 4 GRAM(S): 30 GEL TOPICAL at 06:41

## 2022-01-01 RX ADMIN — AMLODIPINE BESYLATE 5 MILLIGRAM(S): 2.5 TABLET ORAL at 05:01

## 2022-01-01 RX ADMIN — Medication 100 MILLIGRAM(S): at 05:12

## 2022-01-01 RX ADMIN — APIXABAN 2.5 MILLIGRAM(S): 2.5 TABLET, FILM COATED ORAL at 17:53

## 2022-01-01 RX ADMIN — Medication 4 UNIT(S): at 08:45

## 2022-01-01 RX ADMIN — INSULIN GLARGINE 15 UNIT(S): 100 INJECTION, SOLUTION SUBCUTANEOUS at 22:49

## 2022-01-01 RX ADMIN — Medication 100 MILLIGRAM(S): at 11:49

## 2022-01-01 RX ADMIN — NYSTATIN CREAM 1 APPLICATION(S): 100000 CREAM TOPICAL at 05:45

## 2022-01-01 RX ADMIN — NYSTATIN CREAM 1 APPLICATION(S): 100000 CREAM TOPICAL at 15:49

## 2022-01-01 RX ADMIN — Medication 100 MILLIGRAM(S): at 05:28

## 2022-01-01 RX ADMIN — Medication 1 TABLET(S): at 11:39

## 2022-01-01 RX ADMIN — PANTOPRAZOLE SODIUM 40 MILLIGRAM(S): 20 TABLET, DELAYED RELEASE ORAL at 05:25

## 2022-01-01 RX ADMIN — NYSTATIN CREAM 1 APPLICATION(S): 100000 CREAM TOPICAL at 12:07

## 2022-01-01 RX ADMIN — Medication 650 MILLIGRAM(S): at 22:33

## 2022-01-01 RX ADMIN — NYSTATIN CREAM 1 APPLICATION(S): 100000 CREAM TOPICAL at 06:02

## 2022-01-01 RX ADMIN — Medication 2: at 13:05

## 2022-01-01 RX ADMIN — Medication 100 MILLIGRAM(S): at 06:29

## 2022-01-01 RX ADMIN — Medication 1: at 08:53

## 2022-01-01 RX ADMIN — Medication 100 GRAM(S): at 12:43

## 2022-01-01 RX ADMIN — Medication 650 MILLIGRAM(S): at 22:18

## 2022-01-01 RX ADMIN — SENNA PLUS 2 TABLET(S): 8.6 TABLET ORAL at 22:03

## 2022-01-01 RX ADMIN — Medication 4 UNIT(S): at 17:33

## 2022-01-01 RX ADMIN — Medication 3: at 17:32

## 2022-01-01 RX ADMIN — Medication 2: at 12:37

## 2022-01-01 RX ADMIN — Medication 1: at 12:47

## 2022-01-01 RX ADMIN — PANTOPRAZOLE SODIUM 40 MILLIGRAM(S): 20 TABLET, DELAYED RELEASE ORAL at 04:58

## 2022-01-01 RX ADMIN — APIXABAN 2.5 MILLIGRAM(S): 2.5 TABLET, FILM COATED ORAL at 17:41

## 2022-01-01 RX ADMIN — Medication 1 TABLET(S): at 12:02

## 2022-01-01 RX ADMIN — CALCITRIOL 0.25 MICROGRAM(S): 0.5 CAPSULE ORAL at 13:02

## 2022-01-01 RX ADMIN — APIXABAN 2.5 MILLIGRAM(S): 2.5 TABLET, FILM COATED ORAL at 18:03

## 2022-01-01 RX ADMIN — APIXABAN 2.5 MILLIGRAM(S): 2.5 TABLET, FILM COATED ORAL at 05:25

## 2022-01-01 RX ADMIN — APIXABAN 2.5 MILLIGRAM(S): 2.5 TABLET, FILM COATED ORAL at 06:20

## 2022-01-01 RX ADMIN — BUMETANIDE 2 MILLIGRAM(S): 0.25 INJECTION INTRAMUSCULAR; INTRAVENOUS at 14:14

## 2022-01-01 RX ADMIN — APIXABAN 2.5 MILLIGRAM(S): 2.5 TABLET, FILM COATED ORAL at 05:41

## 2022-01-01 RX ADMIN — PANTOPRAZOLE SODIUM 40 MILLIGRAM(S): 20 TABLET, DELAYED RELEASE ORAL at 06:26

## 2022-01-01 RX ADMIN — BUMETANIDE 2 MILLIGRAM(S): 0.25 INJECTION INTRAMUSCULAR; INTRAVENOUS at 15:54

## 2022-01-01 RX ADMIN — Medication 100 MILLIGRAM(S): at 05:25

## 2022-01-01 RX ADMIN — OXYCODONE HYDROCHLORIDE 2.5 MILLIGRAM(S): 5 TABLET ORAL at 12:59

## 2022-01-01 RX ADMIN — INSULIN GLARGINE 19 UNIT(S): 100 INJECTION, SOLUTION SUBCUTANEOUS at 22:32

## 2022-01-01 RX ADMIN — Medication 650 MILLIGRAM(S): at 23:07

## 2022-01-01 RX ADMIN — Medication 100 MILLIGRAM(S): at 05:22

## 2022-01-01 RX ADMIN — PANTOPRAZOLE SODIUM 40 MILLIGRAM(S): 20 TABLET, DELAYED RELEASE ORAL at 06:22

## 2022-01-01 RX ADMIN — Medication 4 UNIT(S): at 08:40

## 2022-01-01 RX ADMIN — Medication 650 MILLIGRAM(S): at 22:43

## 2022-01-01 RX ADMIN — Medication 50 MICROGRAM(S): at 05:03

## 2022-01-01 RX ADMIN — Medication 500 MILLIGRAM(S): at 11:16

## 2022-01-01 RX ADMIN — Medication 100 MILLIGRAM(S): at 06:21

## 2022-01-01 RX ADMIN — Medication 400 MILLIGRAM(S): at 06:11

## 2022-01-01 RX ADMIN — Medication 50 MICROGRAM(S): at 05:12

## 2022-01-01 RX ADMIN — SIMVASTATIN 20 MILLIGRAM(S): 20 TABLET, FILM COATED ORAL at 21:40

## 2022-01-01 RX ADMIN — Medication 1 TABLET(S): at 14:23

## 2022-01-01 RX ADMIN — BUMETANIDE 2 MILLIGRAM(S): 0.25 INJECTION INTRAMUSCULAR; INTRAVENOUS at 05:44

## 2022-01-01 RX ADMIN — Medication 4 UNIT(S): at 12:12

## 2022-01-01 RX ADMIN — Medication 4 UNIT(S): at 12:33

## 2022-01-01 RX ADMIN — Medication 40 MILLIGRAM(S): at 05:22

## 2022-01-01 RX ADMIN — SIMVASTATIN 20 MILLIGRAM(S): 20 TABLET, FILM COATED ORAL at 21:03

## 2022-01-01 RX ADMIN — AMLODIPINE BESYLATE 5 MILLIGRAM(S): 2.5 TABLET ORAL at 05:23

## 2022-01-01 RX ADMIN — Medication 2: at 17:58

## 2022-01-01 RX ADMIN — Medication 40 MILLIGRAM(S): at 05:09

## 2022-01-01 RX ADMIN — APIXABAN 2.5 MILLIGRAM(S): 2.5 TABLET, FILM COATED ORAL at 05:21

## 2022-01-01 RX ADMIN — Medication 1 TABLET(S): at 13:33

## 2022-01-01 RX ADMIN — Medication 100 MILLIGRAM(S): at 11:25

## 2022-01-01 RX ADMIN — Medication 50 MICROGRAM(S): at 06:27

## 2022-01-01 RX ADMIN — SIMVASTATIN 20 MILLIGRAM(S): 20 TABLET, FILM COATED ORAL at 21:19

## 2022-01-01 RX ADMIN — APIXABAN 2.5 MILLIGRAM(S): 2.5 TABLET, FILM COATED ORAL at 05:22

## 2022-01-01 RX ADMIN — Medication 100 MILLIGRAM(S): at 06:22

## 2022-01-01 RX ADMIN — Medication 975 MILLIGRAM(S): at 23:51

## 2022-01-01 RX ADMIN — Medication 100 MILLIGRAM(S): at 13:48

## 2022-01-01 RX ADMIN — Medication 1 TABLET(S): at 11:41

## 2022-01-01 RX ADMIN — INSULIN GLARGINE 15 UNIT(S): 100 INJECTION, SOLUTION SUBCUTANEOUS at 21:39

## 2022-01-01 RX ADMIN — INSULIN GLARGINE 15 UNIT(S): 100 INJECTION, SOLUTION SUBCUTANEOUS at 21:56

## 2022-01-01 RX ADMIN — Medication 40 MILLIGRAM(S): at 12:08

## 2022-01-01 RX ADMIN — Medication 650 MILLIGRAM(S): at 23:08

## 2022-01-01 RX ADMIN — Medication 1: at 12:34

## 2022-01-01 RX ADMIN — BUMETANIDE 1 MILLIGRAM(S): 0.25 INJECTION INTRAMUSCULAR; INTRAVENOUS at 19:38

## 2022-01-01 RX ADMIN — PANTOPRAZOLE SODIUM 40 MILLIGRAM(S): 20 TABLET, DELAYED RELEASE ORAL at 05:33

## 2022-01-01 RX ADMIN — Medication 650 MILLIGRAM(S): at 05:01

## 2022-01-01 RX ADMIN — Medication 1: at 22:04

## 2022-01-01 RX ADMIN — Medication 1 TABLET(S): at 12:40

## 2022-01-01 RX ADMIN — APIXABAN 2.5 MILLIGRAM(S): 2.5 TABLET, FILM COATED ORAL at 05:32

## 2022-01-01 RX ADMIN — CEFTRIAXONE 1000 MILLIGRAM(S): 500 INJECTION, POWDER, FOR SOLUTION INTRAMUSCULAR; INTRAVENOUS at 17:00

## 2022-01-01 RX ADMIN — APIXABAN 2.5 MILLIGRAM(S): 2.5 TABLET, FILM COATED ORAL at 18:06

## 2022-01-01 RX ADMIN — Medication 1 APPLICATION(S): at 05:22

## 2022-01-01 RX ADMIN — Medication 100 MILLIGRAM(S): at 13:33

## 2022-01-01 RX ADMIN — Medication 2: at 13:22

## 2022-01-01 RX ADMIN — Medication 50 MICROGRAM(S): at 05:51

## 2022-01-01 RX ADMIN — SIMVASTATIN 20 MILLIGRAM(S): 20 TABLET, FILM COATED ORAL at 21:01

## 2022-01-01 RX ADMIN — Medication 1: at 22:12

## 2022-02-17 PROBLEM — M25.511 SHOULDER PAIN, RIGHT: Status: ACTIVE | Noted: 2022-01-01

## 2022-02-17 PROBLEM — M19.011 ARTHRITIS OF RIGHT SHOULDER REGION: Status: ACTIVE | Noted: 2022-01-01

## 2022-02-17 NOTE — HISTORY OF PRESENT ILLNESS
[de-identified] : Ms. PERRI MOREIRA is a 89 year woman presents today for right shoulder pain. Patient states she fell out of bed on 2/12/22 at rehab.  She has been in rehab for approximately 3 months.  She reports she has a long history of chronic right shoulder pain with limited range of motion.  She is currently feeling about at her baseline.  The only concerning factor is that she has some ecchymosis in her axilla and around her right breast.  Her pain is manageable and no more than prior to her fall.  She denies any numbness or tingling in the right arm or hand\par

## 2022-02-17 NOTE — PHYSICAL EXAM
[de-identified] : Ms. PERRI MOREIRA is sitting comfortably in the exam room \par Right shoulder\par -Skin is intact, no swelling, mild ecchymosis in axilla\par -FE: 90, ER: Neutral, IR: Belly, ABD: 45\par -Able to make a fist\par -Sensation is intact median, radial, ulnar, axillary nerves\par -Motor is intact median, radial, ulnar, axillary nerves\par -Hand is warm and well-perfused, Palpable radial and ulnar pulses [de-identified] : X-rays of the right shoulder were taken in the office today including AP scapular Y and axillary.  X-rays show advanced glenohumeral arthritis with medialization of the humeral head into the glenoid.  There is significant AC arthritis as well.

## 2022-02-17 NOTE — DISCUSSION/SUMMARY
[de-identified] : 89-year-old right-hand-dominant woman with chronic advanced right glenohumeral arthritis\par -Patient explained she is at her baseline as far as range of motion and pain.\par -Weightbearing as tolerated right upper extremity\par -Physical therapy for strengthening and range of motion exercises\par -Tylenol for pain\par -Follow-up as needed \par -All the patient's questions and concerns were addressed during this visit

## 2022-03-02 PROBLEM — Z86.79 HISTORY OF AORTIC VALVE STENOSIS: Status: RESOLVED | Noted: 2022-01-01 | Resolved: 2022-01-01

## 2022-03-02 PROBLEM — I48.91 ATRIAL FIBRILLATION: Status: ACTIVE | Noted: 2022-01-01

## 2022-03-02 PROBLEM — K21.9 GERD (GASTROESOPHAGEAL REFLUX DISEASE): Status: ACTIVE | Noted: 2022-01-01

## 2022-03-02 PROBLEM — K21.9 CHRONIC GERD: Status: ACTIVE | Noted: 2022-01-01

## 2022-03-02 PROBLEM — Z23 ENCOUNTER FOR IMMUNIZATION: Status: ACTIVE | Noted: 2022-01-01

## 2022-03-02 PROBLEM — S80.00XA CONTUSION, KNEE: Status: ACTIVE | Noted: 2022-01-01

## 2022-03-02 PROBLEM — E78.5 HLD (HYPERLIPIDEMIA): Status: ACTIVE | Noted: 2022-01-01

## 2022-03-02 PROBLEM — Z95.0 CARDIAC PACEMAKER: Status: ACTIVE | Noted: 2022-01-01

## 2022-03-02 NOTE — ED ADULT NURSE NOTE - OBJECTIVE STATEMENT
89y old female PMH multiple falls recent d/c from rehab for falls, DM, Glaucoma, 89y old female PMH multiple falls recent d/c from rehab for falls, DM, Glaucoma, osteoporosis BIB EMS fall on Eliquis and Plavix. A&Ox3. Patient states that she fell last night when she was going to the bathroom she lost her balance and landed on her left knee. Patient received x-ray on left knee with unknown results. Then today when taking off jacket patient lost balance and fell, hitting the back of her on a dresser and her back. She denies LOC, one sided weakness, chest pain, sob, ha, n/v/d, abdominal pain, f/c, urinary symptoms, hematuria. Patient appears upset, skin warm, PERRL, EOM intact, sensory intact, equal strength b/l in all upper and lower extremities, wounds present on b/l feet dressed w/ no blood or drainage present. IV inserted, call bell within reach.

## 2022-03-02 NOTE — ED PROVIDER NOTE - PROGRESS NOTE DETAILS
Attending MD Ray : Amarilis remains unable to ambulate 2/2 weakness. WIll admit. Rebekah, PGY-3: patient unable to walk at this time, admit to medicine for PT and possible rehab placement. Pt states she has a new PCP--Abel[facility] Physician partner.

## 2022-03-02 NOTE — PHYSICAL EXAM
[Alert] : alert [No Acute Distress] : in no acute distress [Normal Outer Ear/Nose] : the ears and nose were normal in appearance [Normal Appearance] : the appearance of the neck was normal [Supple] : the neck was supple [Edema] : edema was not present [Normal] : normal affect and normal mood [de-identified] : bilateral wax, unable to adequately view TMs [de-identified] : 2 cm right heel is soft dark eschar-unstageable. No surrounding erythema or drainage [de-identified] : palpable patellar tenderness left. Knee ROM intact [de-identified] : scattered bruising

## 2022-03-02 NOTE — ED PROVIDER NOTE - NSTIMEPROVIDERCAREINITIATE_GEN_ER
02-Mar-2022 18:54 Consent (Ear)/Introductory Paragraph: The rationale for Mohs was explained to the patient and consent was obtained. The risks, benefits and alternatives to therapy were discussed in detail. Specifically, the risks of ear deformity, infection, scarring, bleeding, prolonged wound healing, incomplete removal, allergy to anesthesia, nerve injury and recurrence were addressed. Prior to the procedure, the treatment site was clearly identified and confirmed by the patient. All components of Universal Protocol/PAUSE Rule completed.

## 2022-03-02 NOTE — ED PROVIDER NOTE - PHYSICAL EXAMINATION
[Const] well-appearing, resting comfortably, no acute distress  [HEENT] PERRL, EOMI, moist mucus membranes  [Neck] Supple, trachea midline  [CV] +S1/S2, no m/r/g appreciated  [Lungs] Clear to auscultations bilaterally, no adventitious lung sounds  [Abd] soft, non-tender, nondistended in all 4 quadrants  [MSK] 5/5 upper extremity and lower extremity str bilaterally, left pelvis TTP, left knee TTP  [Skin] warm, dry, well-perfused  [Neuro] A&Ox3, gait yet not assessed 2/2 multiple falls

## 2022-03-02 NOTE — ED PROVIDER NOTE - OBJECTIVE STATEMENT
89 y/o F w/ hx osteomyelitis, CHF, DM, anemia, CKD, a-fib on Eliquis, HTN, hypothyroidism, TAVR on plavix, pacemaker, presents with head trauma, left knee pain, left pelvic pain and syncopal episode. Patient was recently discharged from 3-month stay at rehab facility for recurrent falls. Today was at PCP for left knee XR, results unknown. Upon returning home this afternoon, patient fell back, hit head on dresser, and now has left knee and left pelvic pain. She is unsure why she passed out, does not recall events, denies prodromal symptoms prior such as dizziness, shortness of breath, diaphoresis, chest pain. at this time denies cp pressure palpitations, urinary symptoms, fevers/chills or recent infection.     meds: tylenol, allopurinol, amlodipine, eliquis, insulin, protonix, simvastatin, plavix  allergies: bactrim, flagyl, zosyn, sulfa, macrobid  surg: TAVR, hip surgery, pacemaker placement

## 2022-03-02 NOTE — HISTORY OF PRESENT ILLNESS
[Any fall with injury in past year] : Patient reported fall with injury in the past year [FreeTextEntry1] : Mrs. Briones is a 89 year old woman presenting to [facility]. The patient has a complex medical history which includes diabetes, hypothyroidism, atrial fibrillation, arrhythmia status post pacemaker and aortic stenosis status post TAVR in September 2021. In late November or of this year she had a fall in her home with trauma to the right ankle. The patient did not receive local care and ultimately was hospitalized at [facility] with cellulitis. She was discharged to subacute rehabilitation at [facility] with a PICC line for IV antibiotics. She returned to [facility] on December 12, and on December 20  had I&D of a right ankle abscess with a right fibular bone biopsy. She was discharged back to subacute rehabilitation at [facility] to receive a total of 6 weeks of intravenous Invanz-completed 1/31. She was discharged from rehabilitation one week ago today. She is receiving home-based physical therapy, and nursing visits for wound care. She developed a right heel pressure ulcer.\par Of note the patient had a fall onto her left knee yesterday and is experiencing palpable pain but no pain with ambulation.\par The patient does not have a list of her medications or discharge paperwork from the nursing home. Meds were partially reconciled from review of hospital discharge

## 2022-03-02 NOTE — ED PROVIDER NOTE - CLINICAL SUMMARY MEDICAL DECISION MAKING FREE TEXT BOX
89 y/o F w/ hx osteomyelitis, CHF, DM, anemia, CKD, a-fib on Eliquis, HTN, hypothyroidism, TAVR on plavix, pacemaker presents with head trauma, left knee pain, left pelvis pain. Obtain CT, labwork, likely admission given syncope and high risk (and recurrent) falls however patient does not want to stay. Dispo pending. 89 y/o F w/ hx osteomyelitis, CHF, DM, anemia, CKD, a-fib on Eliquis, HTN, hypothyroidism, TAVR on plavix, pacemaker presents with head trauma, left knee pain, left pelvis pain. Obtain CT, labwork, likely admission given syncope and high risk (and recurrent) falls however patient does not want to stay. Dispo pending.    Attending MD Ray : Patient here able to state to me that she did not lose consciousness, and fell while taking of her shirt and thinks she fell backwards from imbalance. Did hit head, not complaining of pain elsewhere. Physical exam nonfocal for evidence of trauma. Will obtain CTH and will assess patient's ability to ambulate. Patient likely to be admitted 2/2 recurrent falls.

## 2022-03-02 NOTE — ED ADULT NURSE NOTE - NSIMPLEMENTINTERV_GEN_ALL_ED
Implemented All Fall with Harm Risk Interventions:  [place] to call system. Call bell, personal items and telephone within reach. Instruct patient to call for assistance. Room bathroom lighting operational. Non-slip footwear when patient is off stretcher. Physically safe environment: no spills, clutter or unnecessary equipment. Stretcher in lowest position, wheels locked, appropriate side rails in place. Provide visual cue, wrist band, yellow gown, etc. Monitor gait and stability. Monitor for mental status changes and reorient to person, place, and time. Review medications for side effects contributing to fall risk. Reinforce activity limits and safety measures with patient and family. Provide visual clues: red socks.

## 2022-03-02 NOTE — REVIEW OF SYSTEMS
[Feeling Tired] : feeling tired [Loss Of Hearing] : hearing loss [Incontinence] : incontinence [As Noted in HPI] : as noted in HPI [Negative] : Heme/Lymph

## 2022-03-03 NOTE — DISCHARGE NOTE PROVIDER - CARE PROVIDER_API CALL
Abelardo Gordon)  Geriatric Medicine; [facility] Medicine; Internal Medicine  [address] [place] [address] [address]  [place] [address]  Phone: [phone]  Fax: [phone]  Follow Up Time:    Abelardo Gordon)  Geriatric Medicine; HospicePalliative Medicine; Internal Medicine  [address] [facility], [address]  [place] [address]  Phone: [phone]  Fax: [phone]  Follow Up Time:     Neo Albrecht (DPM)  Podiatric Medicine and Surgery  [address] [place] [address]  Phone: [phone]  Fax: [phone]  Follow Up Time:    Abelardo Gordon)  Geriatric Medicine; Hospice\[facility]\""liative Medicine; Internal Medicine  [address] [facility], [address]  [place] [address]  Phone: [phone]  Fax: [phone]  Follow Up Time: 1-3 days    Neo Albrecht (DPM)  Podiatric Medicine and Surgery  [address] [place] [address]  Phone: [phone]  Fax: [phone]  Follow Up Time: 1 week   Abelardo Gordon)  Geriatric Medicine; [facility]; Internal Medicine  [address] [facility], [address]  [place] [address]  Phone: [phone]  Fax: [phone]  Follow Up Time: 1-3 days    Neo Albrecht (DPM)  Podiatric Medicine and Surgery  [address] [place] [address]  Phone: [phone]  Fax: [phone]  Follow Up Time: 1 week    Agapito Woodard  Specialist  [address] [place] [address]  Phone: [phone]  Fax: [phone]  Follow Up Time: 1-3 days

## 2022-03-03 NOTE — DISCHARGE NOTE PROVIDER - NSDCCPCAREPLAN_GEN_ALL_CORE_FT
PRINCIPAL DISCHARGE DIAGNOSIS  Diagnosis: Weakness  Assessment and Plan of Treatment: stable, follow up with home PT, and PCP .      SECONDARY DISCHARGE DIAGNOSES  Diagnosis: Fall  Assessment and Plan of Treatment: stable     PRINCIPAL DISCHARGE DIAGNOSIS  Diagnosis: Weakness  Assessment and Plan of Treatment: stable, follow up with home PT, and PCP .      SECONDARY DISCHARGE DIAGNOSES  Diagnosis: Hypertension  Assessment and Plan of Treatment: controlled, cont current home meds    Diagnosis: Diabetes  Assessment and Plan of Treatment: controlled, cont current home regimens    Diagnosis: Chronic atrial fibrillation  Assessment and Plan of Treatment: controlled, cont current home meds and Eliquis    Diagnosis: Fall  Assessment and Plan of Treatment: stable     PRINCIPAL DISCHARGE DIAGNOSIS  Diagnosis: Weakness  Assessment and Plan of Treatment: stable, follow up with home PT, and PCP .      SECONDARY DISCHARGE DIAGNOSES  Diagnosis: Pressure ulcer  Assessment and Plan of Treatment: - Recommend R foot XR: findings can be followed up outpt  - Dry sterile dressing applied to right heel with betadine, recommend daily dressing changes by home nurse  - Bilateral heels should be strictly off-loaded while patient resting in bed with the use of pillows or zflows  - Pt to f/u in pod office within 7 days    Diagnosis: Hypertension  Assessment and Plan of Treatment: controlled, cont current home meds    Diagnosis: Diabetes  Assessment and Plan of Treatment: controlled, cont current home regimens    Diagnosis: Chronic atrial fibrillation  Assessment and Plan of Treatment: controlled, cont current home meds and Eliquis    Diagnosis: Fall  Assessment and Plan of Treatment: stable     PRINCIPAL DISCHARGE DIAGNOSIS  Diagnosis: Weakness  Assessment and Plan of Treatment: stable, follow up with home PT, and PCP .      SECONDARY DISCHARGE DIAGNOSES  Diagnosis: Pressure ulcer  Assessment and Plan of Treatment: - Recommend R foot XR: findings can be followed up outpt  - Dry sterile dressing applied to right heel with betadine, recommend daily dressing changes by home nurse  - Bilateral heels should be strictly off-loaded while patient resting in bed with the use of pillows or zflows  - Pt to f/u in pod office within 7 days    Diagnosis: Hypertension  Assessment and Plan of Treatment: controlled, cont current home meds    Diagnosis: Diabetes  Assessment and Plan of Treatment: controlled, cont current home regimens    Diagnosis: Chronic atrial fibrillation  Assessment and Plan of Treatment: controlled, cont current home meds and Eliquis, follow up with Card.    Diagnosis: Fall  Assessment and Plan of Treatment: stable     PRINCIPAL DISCHARGE DIAGNOSIS  Diagnosis: Weakness  Assessment and Plan of Treatment: stable, follow up with home PT, and PCP .      SECONDARY DISCHARGE DIAGNOSES  Diagnosis: S/P TAVR (transcatheter aortic valve replacement)  Assessment and Plan of Treatment: stable, cont Plavix, follow up with TAVR team and card    Diagnosis: Pressure ulcer  Assessment and Plan of Treatment: - Recommend R foot XR: findings can be followed up outpt  - Dry sterile dressing applied to right heel with betadine, recommend daily dressing changes by home nurse  - Bilateral heels should be strictly off-loaded while patient resting in bed with the use of pillows or zflows  - Pt to f/u in pod office within 7 days    Diagnosis: Hypertension  Assessment and Plan of Treatment: controlled, cont current home meds    Diagnosis: Diabetes  Assessment and Plan of Treatment: controlled, cont current home regimens    Diagnosis: Chronic atrial fibrillation  Assessment and Plan of Treatment: controlled, cont current home meds and Eliquis, follow up with Card.    Diagnosis: Fall  Assessment and Plan of Treatment: stable

## 2022-03-03 NOTE — DISCHARGE NOTE PROVIDER - CARE PROVIDERS DIRECT ADDRESSES
,[email or web address]

## 2022-03-03 NOTE — DISCHARGE NOTE PROVIDER - NSDCFUADDINST_GEN_ALL_CORE_FT
Podiatry Discharge Instructions:  Follow up: Please follow up with Dr. Neo Albrecht within 1 week of discharge from the hospital, please call [phone] for appointment and discuss that you recently were seen in the hospital.  Wound Care: Please apply betadine to right heel wound and dress with sterile 4x4 gauze, abd pad, and raymond, daily. Pt should off-load both feet while resting in bed at all times using pillows or ZFLOWS  Weight bearing: Please weight bear as tolerated   Podiatry Discharge Instructions:  Follow up: Please follow up with Dr. Neo Albrecht within 1 week of discharge from the hospital, please call [phone] for appointment and discuss that you recently were seen in the hospital.    sacral/buttock injury - cleanse with incontinence cleanser, pat dry, apply Triad ointment BID and PRN for incontinent episodes  Left forearm- cleanse with NS, pat dry, apply adaptic daily  Left posterior upper thigh, Left lateral lower leg, Right anterior lower leg wounds - cleanse with NS, pat dry, apply allevyn foam dressings every other day  [facility] at [facility] [address] [place] [address] [phone] within one week.    Repeat CBC, BMP in 2-3 days and follow up with primary doctor.

## 2022-03-03 NOTE — PHYSICAL THERAPY INITIAL EVALUATION ADULT - ADDITIONAL COMMENTS
pt lives in apt with , no steps to enter. Pt has been at rehab x3 months, recently discharged home. Pt states she walks with rolling walker with assist from .

## 2022-03-03 NOTE — PHYSICAL THERAPY INITIAL EVALUATION ADULT - PERTINENT HX OF CURRENT PROBLEM, REHAB EVAL
89 yo F p/w head trauma, left knee pain, left pelvic pain and syncopal episode. Pt was recently discharged from 3-month stay at rehab facility for recurrent falls. Today was at PCP for left knee XR, results unknown. Upon returning home this afternoon, pt fell back, hit head on dresser, and now has left knee and left pelvic pain. She is unsure why she passed out, does not recall events, denies any prodromal symptoms. CT Head/ C-spine 3/2: Neg. XRay Pelvis/ Femur/ Chest 3/2: No acute fracture

## 2022-03-03 NOTE — H&P ADULT - ASSESSMENT
89F with PMH of CKD, CHF, AS s/p TAVR 2021, T2DM, hypothyroidism, Afib on Eliquis, recent admission 12/2021 for septic arthritis s/p prolonged Iv antibiotics p/w mechanical fall at home

## 2022-03-03 NOTE — PHYSICAL THERAPY INITIAL EVALUATION ADULT - DISCHARGE DISPOSITION, PT EVAL
TBD pending OOB assist for functional mobilty. home with home PT to increase strength, balance and safety./home w/ home PT

## 2022-03-03 NOTE — H&P ADULT - HISTORY OF PRESENT ILLNESS
89F with PMH of CH, T2DM, CKD 3, a-fib on Eliquis, HTN, hypothyroidism, TAVR on plavix, pacemaker who presents with mechanical fall at home. Pt states she had just arrived home from PCP appt when she stepped backwards and fell, no LOC but reports head and L knee trauma when she hit her dresser. She reports two falls since recent discharge from 3-months at rehab, usually in the setting of getting out of bed to use the bathroom at night. At baseline ambulates with walker and has several hours of HHA assistance. She denies dizziness, CP, shortness of breath, palpitations, dysuria, melena, fevers/chills or recent infection.  In the ER she is afebrile and hemodynamically stable. Labs with sCr 1.6, BNP 4700, UA negative for infection. Trauma series including CTH and L knee xray negative for acute bleed or fractures. Admitted for further management.

## 2022-03-03 NOTE — H&P ADULT - PROBLEM SELECTOR PLAN 1
Trauma series negative for acute fractures or dislocation; CTH negative for bleed  Infectious workup so far negative  PPM interrogated, no events to explain fall  PT eval pending  Fall precautions

## 2022-03-03 NOTE — CONSULT NOTE ADULT - ASSESSMENT
89 y/o F with right heel wound  - pt seen and evaluated  - afebrile, no leukocytosis  - R foot plantar heel wound with well adhered eschar, periphery to subq w/ granular base, no bogginess, no erythema, no malodor, negative probe to bone  - No culture indicated, no clinical signs of infection  - Recommend R foot XR: findings can be followed up outpt  - Dry sterile dressing applied to right heel with betadine, recommend daily dressing changes by home nurse  - Bilateral heels should be strictly off-loaded while patient resting in bed with the use of pillows or zflows  - Pt to f/u in pod office within 7 days  - Wound care and f/u info listed in discharge note provider  - Discussed with attending

## 2022-03-03 NOTE — H&P ADULT - NSHPREVIEWOFSYSTEMS_GEN_ALL_CORE
CONSTITUTIONAL: No weakness, fevers or chills; no weight loss or weight gain  EYES: No visual changes; No blurry vision  ENT: No vertigo or throat pain   NECK: No pain or stiffness  RESPIRATORY: No cough, wheezing, hemoptysis; No shortness of breath  CARDIOVASCULAR: No chest pain or palpitations, no RHODES, no orthopnea or PND  GASTROINTESTINAL: No abdominal or epigastric pain. No nausea, vomiting, or hematemesis; No diarrhea or constipation. No melena or hematochezia.  GENITOURINARY: No dysuria, frequency or hematuria  NEUROLOGICAL: No numbness or weakness, no syncope  SKIN: No itching, rashes  PSYCH: No insomnia, no depression  IMMUNOLOGY: No gum bleeding, no lymphadenopathy  ENDOCRINE: No polydipsia, no heat or cold intolerance  RHEUM: No joint swelling, no rash  MUSCULOSKELETAL: +fall

## 2022-03-03 NOTE — DISCHARGE NOTE PROVIDER - NSDCMRMEDTOKEN_GEN_ALL_CORE_FT
acetaminophen 325 mg oral tablet: 2 tab(s) orally every 6 hours, As needed, Temp greater or equal to 38C (100.4F), Moderate Pain (4 - 6)  allopurinol 100 mg oral tablet: 1 tab(s) orally once a day  amLODIPine 5 mg oral tablet: 1 tab(s) orally once a day    Note:pt has not started yet but has been home from rehab for 4 days. Pt unsure if given at rehab.  apixaban 2.5 mg oral tablet: 1 tab(s) orally 2 times a day  ascorbic acid 500 mg oral tablet: 1 tab(s) orally once a day  biotin: 1 tab(s) orally once a day  calcitriol 0.25 mcg oral capsule: 1 cap(s) orally once a day    Note:pt has not started yet but has been home from rehab for 4 days. Pt unsure if given at rehab.  furosemide 40 mg oral tablet: 1 tab(s) orally once a day  HumaLOG KwikPen 100 units/mL injectable solution: Inject Via sliding scale  Lantus Solostar Pen 100 units/mL subcutaneous solution: 20 unit(s) subcutaneous once a day (at bedtime)  levothyroxine 50 mcg (0.05 mg) oral tablet: 1 tab(s) orally once a day  metoprolol succinate 100 mg oral tablet, extended release: 1 tab(s) orally once a day  Multiple Vitamins oral tablet: 1 tab(s) orally once a day  pantoprazole 40 mg oral delayed release tablet: 1 tab(s) orally once a day (before a meal)  Plavix 75 mg oral tablet: 1 tab(s) orally once a day  simvastatin 20 mg oral tablet: 1 tab(s) orally once a day (at bedtime)  Victoza 18 mg/3 mL subcutaneous solution: 1.2 milligram(s) subcutaneous once a day  Vitamin D3: 1 tab(s) orally once a day   acetaminophen 325 mg oral tablet: 2 tab(s) orally every 6 hours, As needed, Temp greater or equal to 38C (100.4F), Moderate Pain (4 - 6)  allopurinol 100 mg oral tablet: 1 tab(s) orally once a day  apixaban 2.5 mg oral tablet: 1 tab(s) orally 2 times a day  ascorbic acid 500 mg oral tablet: 1 tab(s) orally once a day  calcitriol 0.25 mcg oral capsule: 1 cap(s) orally once a day  clopidogrel 75 mg oral tablet: 1 tab(s) orally once a day  furosemide 40 mg oral tablet: 1 tab(s) orally once a day  HumaLOG KwikPen 100 units/mL injectable solution: Inject Via sliding scale  Lantus Solostar Pen 100 units/mL subcutaneous solution: 20 unit(s) subcutaneous once a day (at bedtime)  levothyroxine 50 mcg (0.05 mg) oral tablet: 1 tab(s) orally once a day  metoprolol succinate 100 mg oral tablet, extended release: 1 tab(s) orally once a day  Multiple Vitamins oral tablet: 1 tab(s) orally once a day  pantoprazole 40 mg oral delayed release tablet: 1 tab(s) orally once a day (before a meal)  simvastatin 20 mg oral tablet: 1 tab(s) orally once a day (at bedtime)  Victoza 18 mg/3 mL subcutaneous solution: 1.2 milligram(s) subcutaneous once a day  Vitamin D3: 1 tab(s) orally once a day   acetaminophen 325 mg oral tablet: 2 tab(s) orally every 6 hours, As needed, Temp greater or equal to 38C (100.4F), Moderate Pain (4 - 6)  allopurinol 100 mg oral tablet: 1 tab(s) orally once a day  apixaban 2.5 mg oral tablet: 1 tab(s) orally 2 times a day  ascorbic acid 500 mg oral tablet: 1 tab(s) orally once a day  calcitriol 0.25 mcg oral capsule: 1 cap(s) orally once a day  clopidogrel 75 mg oral tablet: 1 tab(s) orally once a day  furosemide 40 mg oral tablet: 1 tab(s) orally once a day  Lantus Solostar Pen 100 units/mL subcutaneous solution: 20 unit(s) subcutaneous once a day (at bedtime)  levothyroxine 50 mcg (0.05 mg) oral tablet: 1 tab(s) orally once a day  metoprolol succinate 100 mg oral tablet, extended release: 1 tab(s) orally once a day  Multiple Vitamins oral tablet: 1 tab(s) orally once a day  pantoprazole 40 mg oral delayed release tablet: 1 tab(s) orally once a day (before a meal)  simvastatin 20 mg oral tablet: 1 tab(s) orally once a day (at bedtime)  Victoza 18 mg/3 mL subcutaneous solution: 1.2 milligram(s) subcutaneous once a day  Vitamin D3: 1 tab(s) orally once a day   acetaminophen 325 mg oral tablet: 3 tab(s) orally every 8 hours, As Needed - 6)  allopurinol 100 mg oral tablet: 1 tab(s) orally once a day  apixaban 2.5 mg oral tablet: 1 tab(s) orally 2 times a day  ascorbic acid 500 mg oral tablet: 1 tab(s) orally once a day  calcitriol 0.25 mcg oral capsule: 1 cap(s) orally once a day  clopidogrel 75 mg oral tablet: 1 tab(s) orally once a day - may resume in discussion with cardiologist  furosemide 40 mg oral tablet: 1 tab(s) orally once a day  Lantus Solostar Pen 100 units/mL subcutaneous solution: 20 unit(s) subcutaneous once a day (at bedtime)  levothyroxine 50 mcg (0.05 mg) oral tablet: 1 tab(s) orally once a day  metoprolol succinate 100 mg oral tablet, extended release: 1 tab(s) orally once a day  Multiple Vitamins oral tablet: 1 tab(s) orally once a day  pantoprazole 40 mg oral delayed release tablet: 1 tab(s) orally once a day (before a meal)  simvastatin 20 mg oral tablet: 1 tab(s) orally once a day (at bedtime)  Victoza 18 mg/3 mL subcutaneous solution: 1.2 milligram(s) subcutaneous once a day  Vitamin D3: 1 tab(s) orally once a day

## 2022-03-03 NOTE — H&P ADULT - PROBLEM SELECTOR PLAN 3
AS s/p TAVR 2021  Not in acute exacerbation  Cont home lasix 40mg PO daily  F/u TTE  Will reach out to cardiologist about discontinuing plavix given recurrent falls to decrease risk of bleed

## 2022-03-03 NOTE — H&P ADULT - NSHPSOCIALHISTORY_GEN_ALL_CORE
Denies current tobacco, ETOH or illicit drug use; lives at home with , has aides. Ambulates with walker. Independent ADLs.

## 2022-03-03 NOTE — H&P ADULT - NSHPPHYSICALEXAM_GEN_ALL_CORE
T(C): 36.4 (03-03-22 @ 06:09), Max: 36.7 (03-03-22 @ 01:42)  T(F): 97.5 (03-03-22 @ 06:09), Max: 98.1 (03-03-22 @ 01:42)  HR: 60 (03-03-22 @ 06:09) (60 - 72)  BP: 146/71 (03-03-22 @ 06:09) (113/45 - 158/70)  RR: 16 (03-03-22 @ 06:09) (11 - 18)  SpO2: 99% (03-03-22 @ 06:09) (94% - 99%)  Wt(kg): --    GENERAL: Well appearing elderly woman, in NAD  EYES: EOMI, conjunctiva and sclera clear  ENT: moist mucosa, no pharyngeal erythema  NECK: supple, no JVD  LUNG: Clear to auscultation bilaterally; No rales, rhonchi, wheezing, or rubs.   CVS: Regular rate and rhythm; + murmurs, no rubs, or gallops  ABDOMEN: Soft, non tender, nondistended. +Bowel sounds.  EXTREMITIES:  2+ edema, no cyanosis  NEURO:  Alert & Oriented X3,  No focal deficits  PSYCH: Normal affect, normal mood  MUSCULOSKELETAL: FROM, L knee TTP, no swelling or warmth, LLE 4/5 strength  Skin: warm and dry, diffuse ecchymoses b/l UE, +open wounds b/l shins, b/l heel ulcers

## 2022-03-03 NOTE — DISCHARGE NOTE PROVIDER - PROVIDER TOKENS
PROVIDER:[TOKEN:[[id number]]] PROVIDER:[TOKEN:[[id number]]],PROVIDER:[TOKEN:[[id number]]] PROVIDER:[TOKEN:[[id number]],FOLLOWUP:[1-3 days]],PROVIDER:[TOKEN:[[id number]],FOLLOWUP:[1 week]] PROVIDER:[TOKEN:[[id number]],FOLLOWUP:[1-3 days]],PROVIDER:[TOKEN:[[id number]],FOLLOWUP:[1 week]],PROVIDER:[TOKEN:[[id number]],FOLLOWUP:[1-3 days]]

## 2022-03-03 NOTE — H&P ADULT - NSHPADDITIONALINFOADULT_GEN_ALL_CORE
[facility] Division of Hospital Medicine  Amanda Vasquez MD  Pager (M-F, 8A-5P): [phone]  Other Times:  [phone]

## 2022-03-03 NOTE — PHYSICAL THERAPY INITIAL EVALUATION ADULT - GAIT DEVIATIONS NOTED, PT EVAL
decreased tres/decreased velocity of limb motion/decreased stride length/decreased weight-shifting ability

## 2022-03-03 NOTE — H&P ADULT - PROBLEM SELECTOR PLAN 2
Xray of L knee with no fracture, dislocation, or joint effusion  Apply voltaren gel to knee; tylenol 975mg QID X 5 days

## 2022-03-03 NOTE — DISCHARGE NOTE PROVIDER - HOSPITAL COURSE
89 y/o F w/ hx osteomyelitis, CHF, DM, anemia, CKD, a-fib on Eliquis, HTN, hypothyroidism, TAVR on plavix, pacemaker, presents with head trauma, left knee pain, left pelvic pain and syncopal episode. Patient was recently discharged from 3-month stay at rehab facility for recurrent falls. Today was at PCP for left knee XR, results unknown. Upon returning home this afternoon, patient fell back, hit head on dresser, and now has left knee and left pelvic pain.  89 y/o F w/ hx osteomyelitis, CHF, DM, anemia, CKD, a-fib on Eliquis, HTN, hypothyroidism, TAVR on plavix, pacemaker, presents with head trauma, left knee pain, left pelvic pain and syncopal episode. Patient was recently discharged from 3-month stay at rehab facility for recurrent falls. Today was at PCP for left knee XR, results unknown. Upon returning home this afternoon, patient fell back, hit head on dresser, and now has left knee and left pelvic pain. No fracture.  Chronic wounds - outpatient follow up.  Home care set up.  Patient advised to see cardiologist within 3-5 days regarding possible discontinuation of the Plavix.  Cleared by PT for home with home PT.

## 2022-03-03 NOTE — DISCHARGE NOTE PROVIDER - NSDCFUSCHEDAPPT_GEN_ALL_CORE_FT
PERRI MOREIRA ; 05/02/2022 ; [facility] [address] [place] [address] PERRI MOREIRA ; 04/11/2022 ; \[facility]\"" Geriatric[facility]  PERRI MOREIRA ; 05/02/2022 ; \[facility]\"" Geriatrics [address] [facility]

## 2022-03-04 NOTE — CONSULT NOTE ADULT - SUBJECTIVE AND OBJECTIVE BOX
Podiatry pager #: [phone]    Patient is a 89y old  Female who presents with a chief complaint of Fall (03 Mar 2022 14:59)      HPI:  89F with PMH of CH, T2DM, CKD 3, a-fib on Eliquis, HTN, hypothyroidism, TAVR on plavix, pacemaker who presents with mechanical fall at home. Pt states she had just arrived home from PCP appt when she stepped backwards and fell, no LOC but reports head and L knee trauma when she hit her dresser. She reports two falls since recent discharge from 3-months at rehab, usually in the setting of getting out of bed to use the bathroom at night. At baseline ambulates with walker and has several hours of HHA assistance. She denies dizziness, CP, shortness of breath, palpitations, dysuria, melena, fevers/chills or recent infection.  Podiatry consulted for evaluation of right heel wound. Pt states that she has home nursing to change dressings daily, never had pain or noticed drainage from right heel wound. Pt ambulates with custom molded shoe and walker for assistance.       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 975 milliGRAM(s) Oral every 6 hours  allopurinol 100 milliGRAM(s) Oral daily  apixaban 2.5 milliGRAM(s) Oral two times a day  ascorbic acid 500 milliGRAM(s) Oral daily  bisacodyl 5 milliGRAM(s) Oral at bedtime  calcitriol   Capsule 0.25 MICROGram(s) Oral daily  clopidogrel Tablet 75 milliGRAM(s) Oral daily  dextrose 40% Gel 15 Gram(s) Oral once  dextrose 5%. 1000 milliLiter(s) (50 mL/Hr) IV Continuous <Continuous>  dextrose 50% Injectable 25 Gram(s) IV Push once  diclofenac sodium 1% Gel 4 Gram(s) Topical two times a day  furosemide    Tablet 40 milliGRAM(s) Oral daily  glucagon  Injectable 1 milliGRAM(s) IntraMuscular once  insulin lispro (ADMELOG) corrective regimen sliding scale   SubCutaneous three times a day before meals  levothyroxine 50 MICROGram(s) Oral daily  melatonin 3 milliGRAM(s) Oral at bedtime  metoprolol succinate  milliGRAM(s) Oral daily  multivitamin 1 Tablet(s) Oral daily  pantoprazole    Tablet 40 milliGRAM(s) Oral before breakfast  polyethylene glycol 3350 17 Gram(s) Oral daily  senna 2 Tablet(s) Oral at bedtime  simvastatin 20 milliGRAM(s) Oral at bedtime    MEDICATIONS  (PRN):      Allergies    Bactrim (Unknown)  Flagyl (Hives; Pruritus)  Macrobid (Other)  sulfa drugs (Hives)  Zosyn (Other)    Intolerances        VITALS:    Vital Signs Last 24 Hrs  T(C): 36.3 (03 Mar 2022 16:12), Max: 36.7 (03 Mar 2022 01:42)  T(F): 97.4 (03 Mar 2022 16:12), Max: 98.1 (03 Mar 2022 01:42)  HR: 65 (03 Mar 2022 16:12) (60 - 72)  BP: 137/73 (03 Mar 2022 16:12) (96/54 - 158/70)  BP(mean): --  RR: 18 (03 Mar 2022 16:12) (11 - 18)  SpO2: 99% (03 Mar 2022 16:12) (91% - 99%)    LABS:                          8.6    6.78  )-----------( 297      ( 03 Mar 2022 10:17 )             28.2       03-03    141  |  101  |  54<H>  ----------------------------<  160<H>  3.4<L>   |  30  |  1.39<H>    Ca    10.6<H>      03 Mar 2022 10:17    TPro  7.7  /  Alb  3.8  /  TBili  0.3  /  DBili  x   /  AST  28  /  ALT  15  /  AlkPhos  91  03-02      CAPILLARY BLOOD GLUCOSE      POCT Blood Glucose.: 220 mg/dL (03 Mar 2022 13:05)  POCT Blood Glucose.: 167 mg/dL (03 Mar 2022 09:48)  POCT Blood Glucose.: 179 mg/dL (03 Mar 2022 06:54)      PT/INR - ( 02 Mar 2022 19:39 )   PT: 16.3 sec;   INR: 1.41 ratio         PTT - ( 02 Mar 2022 19:39 )  PTT:32.1 sec    LOWER EXTREMITY PHYSICAL EXAM:  Vascular: DP/PT 1/4, B/L, CFT <3 seconds B/L, Temperature gradient WNL, B/L.   Neuro: Epicritic sensation diminished to the level of _, B/L.  Musculoskeletal/Ortho: unremarkable  Skin: R foot plantar heel wound with well adhered eschar, periphery to subq w/ granular base, no bogginess, no erythema, no malodor, negative probe to bone    RADIOLOGY & ADDITIONAL STUDIES:    
Wound Surgery Consult Note:    HPI:  89F with PMH of CH, T2DM, CKD 3, a-fib on Eliquis, HTN, hypothyroidism, TAVR on plavix, pacemaker who presents with mechanical fall at home. Pt states she had just arrived home from PCP appt when she stepped backwards and fell, no LOC but reports head and L knee trauma when she hit her dresser. She reports two falls since recent discharge from 3-months at rehab, usually in the setting of getting out of bed to use the bathroom at night. At baseline ambulates with walker and has several hours of HHA assistance. She denies dizziness, CP, shortness of breath, palpitations, dysuria, melena, fevers/chills or recent infection.  In the ER she is afebrile and hemodynamically stable. Labs with sCr 1.6, BNP 4700, UA negative for infection. Trauma series including CTH and L knee xray negative for acute bleed or fractures. Admitted for further management. (03 Mar 2022 09:31)    Request for wound care consult for multiple area of skin damage received from nursing. Ms. Briones stated that she bumped into something as she fell and that is what caused the wounds on her legs, arm and thigh. All wounds are superficial and were treated and dressed.    PAST MEDICAL & SURGICAL HISTORY:  Diabetes 2  Dyslipidemia  H/O: Total Hysterectomy, BSO   Hypothyroidism  HTN (Hypertension)  S/P Tonsillectomy  Murmur, Cardiac  Gout  Dyslipidemia  Spinal Stenosis lumbar L4-5  right Rotator Cuff (Capsule) tear- no surgical intervention  Gastritis  right Hip total Replacement Arthroplasty   H/O: ZOHAIB, BSO,  secondary to cancer of endometrium  S/P bilateral Cataract Extraction and ocular lens implant  S/P Tonsillectomy  S/P TAVR (transcatheter aortic valve replacement)    MEDICATIONS  (STANDING):  acetaminophen     Tablet .. 975 milliGRAM(s) Oral every 6 hours  allopurinol 100 milliGRAM(s) Oral daily  apixaban 2.5 milliGRAM(s) Oral two times a day  ascorbic acid 500 milliGRAM(s) Oral daily  bisacodyl 5 milliGRAM(s) Oral at bedtime  calcitriol   Capsule 0.25 MICROGram(s) Oral daily  clopidogrel Tablet 75 milliGRAM(s) Oral daily  dextrose 40% Gel 15 Gram(s) Oral once  dextrose 5%. 1000 milliLiter(s) (50 mL/Hr) IV Continuous <Continuous>  dextrose 50% Injectable 25 Gram(s) IV Push once  diclofenac sodium 1% Gel 4 Gram(s) Topical two times a day  furosemide    Tablet 40 milliGRAM(s) Oral daily  glucagon  Injectable 1 milliGRAM(s) IntraMuscular once  insulin lispro (ADMELOG) corrective regimen sliding scale   SubCutaneous three times a day before meals  levothyroxine 50 MICROGram(s) Oral daily  melatonin 3 milliGRAM(s) Oral at bedtime  metoprolol succinate  milliGRAM(s) Oral daily  multivitamin 1 Tablet(s) Oral daily  pantoprazole    Tablet 40 milliGRAM(s) Oral before breakfast  polyethylene glycol 3350 17 Gram(s) Oral daily  potassium chloride   Powder 20 milliEquivalent(s) Oral once  senna 2 Tablet(s) Oral at bedtime  simvastatin 20 milliGRAM(s) Oral at bedtime    MEDICATIONS  (PRN):    Allergies    Bactrim (Unknown)  Flagyl (Hives; Pruritus)  Macrobid (Other)  sulfa drugs (Hives)  Zosyn (Other)    Intolerances    Vital Signs Last 24 Hrs  T(C): 36.4 (04 Mar 2022 08:20), Max: 36.4 (04 Mar 2022 08:20)  T(F): 97.6 (04 Mar 2022 08:20), Max: 97.6 (04 Mar 2022 08:20)  HR: 60 (04 Mar 2022 08:20) (60 - 65)  BP: 121/48 (04 Mar 2022 08:20) (101/58 - 137/73)  BP(mean): --  RR: 20 (04 Mar 2022 08:20) (18 - 20)  SpO2: 96% (04 Mar 2022 08:20) (96% - 99%)    Physical Exam:  General: Alert and oriented x 3   Respiratory: no SOB on room air  Gastrointestinal: soft NT/ND  Neurology: verbal, following commands  Musculoskeletal: no contractures  Vascular: BLE edema equal  Skin:  Left posterior upper thigh wound - L 2cm x W 3cm x d 0.1cm, superficially denuded with no necrotic tissue, and scant serosanguinous drainage  Right anterior lower leg wound - L 3cm x W 2cm x D 0.1cm - superficial skin loss, no necrotic tissue, skin shiny, sudarshan serous drainage, no lymphedema  Left lateral lower leg wound - L 10cm x W 2.5cm x D 0.1cm - superficial abrasion, no necrotic tissue, scant serous drainage, no lymphedema  Left forearm skin tear - L 2cm x W2cm x D 0.1cm - superficial skin tear with no loss of skin flap, scant serosanguinous drainage  No odor, erythema, increased warmth, tenderness, induration, fluctuance    LABS:      141  |  101  |  54<H>  ----------------------------<  160<H>  3.4<L>   |  30  |  1.39<H>    Ca    10.6<H>      03 Mar 2022 10:17    TPro  7.7  /  Alb  3.8  /  TBili  0.3  /  DBili  x   /  AST  28  /  ALT  15  /  AlkPhos  91                            8.6    6.78  )-----------( 297      ( 03 Mar 2022 10:17 )             28.2     PT/INR - ( 02 Mar 2022 19:39 )   PT: 16.3 sec;   INR: 1.41 ratio         PTT - ( 02 Mar 2022 19:39 )  PTT:32.1 sec  Urinalysis Basic - ( 03 Mar 2022 04:07 )    Color: Yellow / Appearance: Clear / S.019 / pH: x  Gluc: x / Ketone: Negative  / Bili: Negative / Urobili: Negative   Blood: x / Protein: 30 mg/dL / Nitrite: Negative   Leuk Esterase: Large / RBC: 1 /hpf / WBC 4 /HPF   Sq Epi: x / Non Sq Epi: 2 / Bacteria: Negative

## 2022-03-04 NOTE — CONSULT NOTE ADULT - ASSESSMENT
Impression:    Left posterior upper thigh, Right lower leg, Left lateral lower leg and Left forearm traumatic wounds  Pressure Injury Prophylaxis  Incontinence of bowel and bladder  Incontinence Dermatitis    Recommend:  1.) topical therapy: sacral/buttock injury - cleanse with incontinence cleanser, pat dry, apply Triad ointment BID and PRN for incontinent episodes  Left forearm- cleanse with NS, pat dry, apply adaptic daily  Left posterior upper thigh, Left lateral lower leg, Right anterior lower leg wounds - cleanse with NS, pat dry, apply allevyn foam dressings every other day  2.) Incontinence Management - incontinence cleanser, pads, pericare BID  3.) Maintain on an alternating air with low air loss surface  4.) Turn and reposition Q 2 hours  5.) Nutrition optimization  6.) Offload heels/feet with complete cair air fluidized boots; ensure that the soles of the feet are not resting on the foot board of the bed.  7.) Foot wound per wound care Podiatrists, Mei Ambriz/Trena/Kerri    Care as per medicine. Will not actively follow but will remain available. Please recall for new issues or deterioration.  Upon discharge [facility] as [facility] [address] [place] [address] [phone]  Thank you for this consult  Deyanira Barber, [id number], [id number]

## 2022-03-04 NOTE — PROGRESS NOTE ADULT - PROBLEM SELECTOR PLAN 9
Present on admission; wounds on b/l LE and heel ulcers  Wound care consult done - outpatient follow up.

## 2022-03-04 NOTE — DISCHARGE NOTE NURSING/CASE MANAGEMENT/SOCIAL WORK - NSDCPEELIQUISFU_GEN_ALL_CORE
Go for blood tests as directed. Your doctor will do lab tests at regular visits to monitor the effects of this medicine. Please follow up with your doctor and keep your health care provider appointments. H Plasty Text: Given the location of the defect, shape of the defect and the proximity to free margins a H-plasty was deemed most appropriate for repair.  Using a sterile surgical marker, the appropriate advancement arms of the H-plasty were drawn incorporating the defect and placing the expected incisions within the relaxed skin tension lines where possible. The area thus outlined was incised deep to adipose tissue with a #15 scalpel blade. The skin margins were undermined to an appropriate distance in all directions utilizing iris scissors.  The opposing advancement arms were then advanced into place in opposite direction and anchored with interrupted buried subcutaneous sutures.

## 2022-03-04 NOTE — PROGRESS NOTE ADULT - PROBLEM SELECTOR PLAN 1
Trauma series negative for acute fractures or dislocation; CTH negative for bleed  Infectious workup negative  PPM interrogated, no events to explain fall  PT eval - home with home PT  Fall precautions

## 2022-03-04 NOTE — DISCHARGE NOTE NURSING/CASE MANAGEMENT/SOCIAL WORK - PATIENT PORTAL LINK FT
You can access the FollowMyHealth Patient Portal offered by [facility] by registering at the following website: [email or web address]. By joining [facility]’s FollowMyHealth portal, you will also be able to view your health information using other applications (apps) compatible with our system.

## 2022-03-04 NOTE — PROGRESS NOTE ADULT - PROBLEM SELECTOR PLAN 3
AS s/p TAVR 2021  Not in acute exacerbation  Cont home lasix 40mg PO daily  F/u TTE done  Follow up with cardiologist in 2-3 days about discontinuing plavix given recurrent falls

## 2022-03-04 NOTE — PROGRESS NOTE ADULT - PROBLEM SELECTOR PLAN 2
Xray of L knee with no fracture, dislocation, or joint effusion  Apply voltaren gel to knee; tylenol 975mg TID X 5 days

## 2022-03-04 NOTE — DISCHARGE NOTE NURSING/CASE MANAGEMENT/SOCIAL WORK - NSDCPEFALRISK_GEN_ALL_CORE
For information on Fall & Injury Prevention, visit: [email or web address] OR  [email or web address] OR  https://www.cdc.gov/steadi/patient.html

## 2022-03-04 NOTE — PROGRESS NOTE ADULT - SUBJECTIVE AND OBJECTIVE BOX
Patient is a 89y old  Female who presents with a chief complaint of Fall (04 Mar 2022 11:49)      INTERVAL History of Present Illness/OVERNIGHT EVENTS: apparently ready for discharge 3/3 but home care not set up.  no overnight issues.  eager to go home.    MEDICATIONS  (STANDING):  acetaminophen     Tablet .. 975 milliGRAM(s) Oral every 6 hours  allopurinol 100 milliGRAM(s) Oral daily  apixaban 2.5 milliGRAM(s) Oral two times a day  ascorbic acid 500 milliGRAM(s) Oral daily  bisacodyl 5 milliGRAM(s) Oral at bedtime  calcitriol   Capsule 0.25 MICROGram(s) Oral daily  clopidogrel Tablet 75 milliGRAM(s) Oral daily  dextrose 40% Gel 15 Gram(s) Oral once  dextrose 5%. 1000 milliLiter(s) (50 mL/Hr) IV Continuous <Continuous>  dextrose 50% Injectable 25 Gram(s) IV Push once  diclofenac sodium 1% Gel 4 Gram(s) Topical two times a day  furosemide    Tablet 40 milliGRAM(s) Oral daily  glucagon  Injectable 1 milliGRAM(s) IntraMuscular once  insulin lispro (ADMELOG) corrective regimen sliding scale   SubCutaneous three times a day before meals  levothyroxine 50 MICROGram(s) Oral daily  melatonin 3 milliGRAM(s) Oral at bedtime  metoprolol succinate  milliGRAM(s) Oral daily  multivitamin 1 Tablet(s) Oral daily  pantoprazole    Tablet 40 milliGRAM(s) Oral before breakfast  polyethylene glycol 3350 17 Gram(s) Oral daily  senna 2 Tablet(s) Oral at bedtime  simvastatin 20 milliGRAM(s) Oral at bedtime    MEDICATIONS  (PRN):      Allergies    Bactrim (Unknown)  Flagyl (Hives; Pruritus)  Macrobid (Other)  sulfa drugs (Hives)  Zosyn (Other)    Intolerances        REVIEW OF SYSTEMS:  Negative unless otherwise specified above.    Vital Signs Last 24 Hrs  T(C): 36.4 (04 Mar 2022 08:20), Max: 36.4 (04 Mar 2022 08:20)  T(F): 97.6 (04 Mar 2022 08:20), Max: 97.6 (04 Mar 2022 08:20)  HR: 60 (04 Mar 2022 08:20) (60 - 65)  BP: 121/48 (04 Mar 2022 08:20) (101/58 - 137/73)  BP(mean): --  RR: 20 (04 Mar 2022 08:20) (18 - 20)  SpO2: 96% (04 Mar 2022 08:20) (96% - 99%)        PHYSICAL EXAM:  GENERAL: No apparent distress, appears stated age  HEAD:  Atraumatic, Normocephalic  EYES: Conjunctiva and sclera clear, no discharge  ENMT: Moist mucous membranes, no nasal discharge  NECK: Supple, no JVD  CHEST/LUNG: Clear to auscultation bilaterally, no wheeze or rales  HEART: Regular rhythm, no rubs or gallops  ABDOMEN: Soft, Nontender, Nondistended; Bowel sounds present  EXTREMITIES:  No clubbing, cyanosis or edema, bunions  SKIN: No rash, no new discoloration, +ecchymoses, dressings over heel and leg wounds  NERVOUS SYSTEM:  Alert & Oriented; Bilateral Lower extremity mobile, sensation to light touch intact      LABS:      Ca    10.6       03 Mar 2022 10:17      PT/INR - ( 02 Mar 2022 19:39 )   PT: 16.3 sec;   INR: 1.41 ratio         PTT - ( 02 Mar 2022 19:39 )  PTT:32.1 sec  Urinalysis Basic - ( 03 Mar 2022 04:07 )    Color: Yellow / Appearance: Clear / S.019 / pH: x  Gluc: x / Ketone: Negative  / Bili: Negative / Urobili: Negative   Blood: x / Protein: 30 mg/dL / Nitrite: Negative   Leuk Esterase: Large / RBC: 1 /hpf / WBC 4 /HPF   Sq Epi: x / Non Sq Epi: 2 / Bacteria: Negative      CAPILLARY BLOOD GLUCOSE      POCT Blood Glucose.: 173 mg/dL (04 Mar 2022 09:04)  POCT Blood Glucose.: 240 mg/dL (04 Mar 2022 05:59)  POCT Blood Glucose.: 181 mg/dL (03 Mar 2022 18:10)  POCT Blood Glucose.: 169 mg/dL (03 Mar 2022 17:24)      RADIOLOGY & ADDITIONAL TESTS:      Images reviewed personally    Consultant Notes Reviewed and Care Discussed with relevant Consultants.

## 2022-03-30 PROBLEM — R06.00 DYSPNEA: Status: ACTIVE | Noted: 2022-01-01

## 2022-03-30 PROBLEM — L89.610 PRESSURE INJURY OF RIGHT HEEL, UNSTAGEABLE: Status: ACTIVE | Noted: 2022-01-01

## 2022-03-30 PROBLEM — I50.30 (HFPEF) HEART FAILURE WITH PRESERVED EJECTION FRACTION: Status: ACTIVE | Noted: 2022-01-01

## 2022-03-30 NOTE — PHYSICAL EXAM
[Respiration, Rhythm And Depth] : normal respiratory rhythm and effort [Auscultation Breath Sounds / Voice Sounds] : lungs were clear to auscultation bilaterally [Normal S1, S2] : normal S1 and S2 [Heart Rate And Rhythm] : heart rate was normal and rhythm regular [No Spinal Tenderness] : no spinal tenderness [Normal] : normal affect and normal mood [de-identified] : 2+ bilateral lower extremity edema [de-identified] : ild presacral edema [de-identified] : right heel bandaged

## 2022-03-30 NOTE — REVIEW OF SYSTEMS
[Recent Weight Loss (___ Lbs)] : recent [unfilled] ~Ulb weight loss [Lower Ext Edema] : lower extremity edema [As Noted in HPI] : as noted in HPI [Skin Wound] : skin wound [Negative] : Heme/Lymph [Wheezing] : no wheezing [Cough] : no cough

## 2022-03-30 NOTE — HISTORY OF PRESENT ILLNESS
[Any fall with injury in past year] : Patient reported fall with injury in the past year [FreeTextEntry1] : Mrs. Briones is being seen for acute visit. Patient states she experienced dyspnea yesterday morning. It made her anxious and she took an additional dose of Lasix 20 mg. She denies associated palpitations, chest pain, diaphoresis/sweats. Episode lasted about an hour and she's been fine since. Had a similar episode the night before last as well which resolved spontaneously. The patient, although she is a poor sleeper has no difficulty lying in bed with one or 2 pillows without experiencing dyspnea.\par The patient admits that most of her meals are ordered in. Although she does not use additional salt she is not sure of the salt content of her food.\par Of note the patient had an overnight stay at [facility] March 3 through March 4 following a fall at home. Skeletal survey was negative for fracture. Labs were performed and reviewed. echocardiogram revealed hyperdynamic left ventricle, moderate pulmonary hypertension-report in chart. Labs ordered last visit earlier this month were noted to be done as a home draw later this week. The patient has an appointment with her cardiologist tomorrow

## 2022-04-29 NOTE — H&P ADULT - ASSESSMENT
89yo F with PMH oh HFpEF (EF 75% 03/2022), Afib (on Eliquis), HTN, DM2, CKD3, hypothyroidism, AS s/p TAVR (09/2021), s/p PPM BIBEMS for hypoxia of 88% on RA. Admitted for CHF exacerbation with superimposed bacterial PNA.  89yo F with PMH on HFpEF (EF 75% 03/2022), Afib (on Eliquis), HTN, DM2, CKD3, hypothyroidism, AS s/p TAVR (09/2021), s/p PPM BIBEMS for hypoxia of 88% on RA. Admitted for CHF exacerbation with superimposed bacterial PNA.

## 2022-04-29 NOTE — DISCHARGE NOTE PROVIDER - NSDCCPCAREPLAN_GEN_ALL_CORE_FT
PRINCIPAL DISCHARGE DIAGNOSIS  Diagnosis: Acute on chronic heart failure with preserved ejection fraction (HFpEF)  Assessment and Plan of Treatment: You came to the hospital with difficult breathing. A CT scan of the chest showed that there was fluid build up in the lungs. You have a diagnosis of heart failure. The fluid build up occurs because the heart is not pumping correctly. We treated you with diuretic medication called Lasix through the IV. An echocardiogram (ultrasound of the heart) showed adequate heart function, but valve abnormalities and high pressures in the pulmonary system (these results were not significantly changed from the last time you were in the hospital). You began to feel better and did not require supplemental oxygen anymore by discharge.  --  You are instructed to follow up with your outpatient cardiologist within 1 week of discharge.      SECONDARY DISCHARGE DIAGNOSES  Diagnosis: Acute kidney injury superimposed on CKD  Assessment and Plan of Treatment: Your kidney levels began to deteriorate in the hospital. This was likely a result from the heart failure. An ultrasound of the kidney and bladder system did not reveal any abnormalities.    Diagnosis: Wound of right foot  Assessment and Plan of Treatment: You came to the hospital with a wound on the Right foot. You were seen by the Podiatry team. Your wound was cared for by the nursing staff.   --  Please follow up with Podiatrist Dr. Márquez within 1 week of discharge from the hospital. Please call [phone] for appointment and discuss that you recently were seen in the hospital.    Diagnosis: Hypothyroidism  Assessment and Plan of Treatment: Your TSH was found to be elevated to 5.57. Your home Synthroid regimen was recently increased, so we did not change your dose during this hospitalization. Please continue to take your home dose of Synthroid.   --  Please follow up with the provider who manages your hypothyroidism.    Diagnosis: Asymptomatic bacteriuria  Assessment and Plan of Treatment: You were noted to have bacteria growing in your urine. However, because you did not complain of any symptoms, you did not require treatment with antibiotics. If you have feelings of pain while urinating or urinating much more frequently, you may go to an urgent care to test your urine to see if you need antibiotics.

## 2022-04-29 NOTE — H&P ADULT - PROBLEM SELECTOR PLAN 7
- Home Synthroid 50mcg on weekdays, 100mcg on weekends   - Continue home Synthroid 50mcg daily   - F/u TSH - Continue home Amlodipine 5mg daily

## 2022-04-29 NOTE — H&P ADULT - NSHPREVIEWOFSYSTEMS_GEN_ALL_CORE
CONSTITUTIONAL: No weakness, fevers or chills  EYES/ENT: No visual changes;  No vertigo or throat pain   NECK: No pain or stiffness  RESPIRATORY: No cough, wheezing, hemoptysis; + shortness of breath  CARDIOVASCULAR: No chest pain or palpitations, + LE swelling   GASTROINTESTINAL: No abdominal or epigastric pain. No nausea, vomiting, or hematemesis; No diarrhea or constipation. No melena or hematochezia.  GENITOURINARY: No dysuria, frequency or hematuria  NEUROLOGICAL: No numbness or weakness  ALLERGY: No hives.   HEME: No bleeding.  SKIN: No itching, rashes.

## 2022-04-29 NOTE — ED PROVIDER NOTE - NS ED ROS FT
Gen: Denies fever, weight loss  CV: Denies chest pain, palpitations  Skin: Denies rash, erythema, color changes  Resp: Denies cough  Endo: Denies sensitivity to heat, cold, increased urination  GI: Denies diarrhea, constipation, nausea, vomiting  Msk: Denies back pain, extremity pain  : Denies dysuria, increased frequency  Neuro: Denies LOC, weakness

## 2022-04-29 NOTE — H&P ADULT - PROBLEM SELECTOR PLAN 4
- SCr 1.87 on admission (baseline CKD3 1.4-1.6 in 03/2022)  - Likely 2/2 CHF exacerbation   - Monitor BMP daily   - Avoid nephrotoxic agents - R foot plantar heel wound with well adhered eschar, negative probe to bone  - Appreciate podiatry recs  - R foot xray: no gas, no OM   - Dry sterile dressing applied to right heel with betadine, recommend daily dressing changes

## 2022-04-29 NOTE — H&P ADULT - PROBLEM SELECTOR PLAN 9
Home meds: Continue home Allopurinol 100mg daily, Simvastatin 20mg qhs, Protonix 40mg daily   DVT ppx: Eliquis   Diet: DASH/Renal   Dispo: pending PT recs  DNR/DNI

## 2022-04-29 NOTE — H&P ADULT - NSHPPHYSICALEXAM_GEN_ALL_CORE
GENERAL: NAD, well-developed, lying down almost completely flat on stretcher   HEAD:  Atraumatic, Normocephalic  EYES: EOMI, PERRLA, conjunctiva and sclera clear  ENMT: Moist mucous membranes  NECK: Supple, +JVD up to mandible   CHEST/LUNG: Adequate inspiratory effort. Crackles heard in b/l bases with some expiratory wheeze. Increased WOB after speaking   HEART: Regular rate and rhythm; No murmurs, rubs, or gallops. +hepatojugular reflux   ABDOMEN: Soft, Nontender, Nondistended; Bowel sounds present  EXTREMITIES:  2+ Peripheral Pulses, 3+ pitting edema with chronic skin changes in LLE. Bandaged wound on RLE with 2+ pitting edema   NERVOUS SYSTEM:  Alert & Oriented X4, Good concentration  PSYCH: Normal Affect. Laying in bed comfortably; not agitated

## 2022-04-29 NOTE — DISCHARGE NOTE PROVIDER - NSDCMRMEDTOKEN_GEN_ALL_CORE_FT
acetaminophen 325 mg oral tablet: 3 tab(s) orally every 8 hours, As Needed - 6)  allopurinol 100 mg oral tablet: 1 tab(s) orally once a day  apixaban 2.5 mg oral tablet: 1 tab(s) orally 2 times a day  ascorbic acid 500 mg oral tablet: 1 tab(s) orally once a day  calcitriol 0.25 mcg oral capsule: 1 cap(s) orally once a day  clopidogrel 75 mg oral tablet: 1 tab(s) orally once a day - may resume in discussion with cardiologist  furosemide 40 mg oral tablet: 1 tab(s) orally once a day  Lantus Solostar Pen 100 units/mL subcutaneous solution: 20 unit(s) subcutaneous once a day (at bedtime)  levothyroxine 50 mcg (0.05 mg) oral tablet: 1 tab(s) orally once a day  metoprolol succinate 100 mg oral tablet, extended release: 1 tab(s) orally once a day  Multiple Vitamins oral tablet: 1 tab(s) orally once a day  pantoprazole 40 mg oral delayed release tablet: 1 tab(s) orally once a day (before a meal)  simvastatin 20 mg oral tablet: 1 tab(s) orally once a day (at bedtime)  Victoza 18 mg/3 mL subcutaneous solution: 1.2 milligram(s) subcutaneous once a day  Vitamin D3: 1 tab(s) orally once a day   acetaminophen 325 mg oral tablet: 3 tab(s) orally every 8 hours, As Needed - 6)  allopurinol 100 mg oral tablet: 1 tab(s) orally once a day  amLODIPine 5 mg oral tablet: 1 tab(s) orally once a day  apixaban 2.5 mg oral tablet: 1 tab(s) orally 2 times a day  ascorbic acid 500 mg oral tablet: 1 tab(s) orally once a day  calcitriol 0.25 mcg oral capsule: 1 cap(s) orally once a day  furosemide 40 mg oral tablet: 0.5 tab(s) orally once a day  Lantus Solostar Pen 100 units/mL subcutaneous solution: 20 unit(s) subcutaneous once a day (at bedtime)  levothyroxine 50 mcg (0.05 mg) oral tablet: 1 tab(s) orally once a day  metoprolol succinate 100 mg oral tablet, extended release: 1 tab(s) orally once a day  Multiple Vitamins oral tablet: 1 tab(s) orally once a day  pantoprazole 40 mg oral delayed release tablet: 1 tab(s) orally once a day (before a meal)  simvastatin 20 mg oral tablet: 1 tab(s) orally once a day (at bedtime)  Vitamin D3: 1 tab(s) orally once a day   acetaminophen 325 mg oral tablet: 3 tab(s) orally every 8 hours, As Needed - 6)  allopurinol 100 mg oral tablet: 1 tab(s) orally once a day  amLODIPine 5 mg oral tablet: 1 tab(s) orally once a day  apixaban 2.5 mg oral tablet: 1 tab(s) orally 2 times a day  ascorbic acid 500 mg oral tablet: 1 tab(s) orally once a day  calcitriol 0.25 mcg oral capsule: 1 cap(s) orally once a day  furosemide 40 mg oral tablet: 1 tab(s) orally once a day  insulin glargine 100 units/mL subcutaneous solution: 19 unit(s) subcutaneous once a day (at bedtime)  levothyroxine 100 mcg (0.1 mg) oral tablet: 1 tab(s) orally Saturday and Sunday  levothyroxine 50 mcg (0.05 mg) oral tablet: 1 tab(s) orally Monday through Friday  metoprolol succinate 100 mg oral tablet, extended release: 1 tab(s) orally once a day  Multiple Vitamins oral tablet: 1 tab(s) orally once a day  pantoprazole 40 mg oral delayed release tablet: 1 tab(s) orally once a day (before a meal)  simvastatin 20 mg oral tablet: 1 tab(s) orally once a day (at bedtime)  Vitamin D3: 1 tab(s) orally once a day

## 2022-04-29 NOTE — H&P ADULT - PROBLEM SELECTOR PLAN 5
- Patient on home Lantus 20U qhs and ISS TID premeal   - Continue Lantus 15U qhs and low ISS TID premeal with fingersticks   - A1c 7.4 in 03/2022 - SCr 1.87 on admission (baseline CKD3 1.4-1.6 in 03/2022)  - Likely 2/2 CHF exacerbation   - Monitor BMP daily   - Avoid nephrotoxic agents

## 2022-04-29 NOTE — H&P ADULT - PROBLEM SELECTOR PLAN 8
Home meds: Continue home Allopurinol 100mg daily, Simvastatin 20mg qhs, Protonix 40mg daily   DVT ppx: Eliquis   Diet: DASH/Renal   Dispo: pending PT recs  DNR/DNI - Home Synthroid 50mcg on weekdays, 100mcg on weekends   - Continue home Synthroid 50mcg daily   - F/u TSH

## 2022-04-29 NOTE — H&P ADULT - CONVERSATION DETAILS
Patient states that she has discussed goals of care previously. Confirms that she continues to be DNR/DNI.

## 2022-04-29 NOTE — DISCHARGE NOTE PROVIDER - NSDCCPTREATMENT_GEN_ALL_CORE_FT
PRINCIPAL PROCEDURE  Procedure: Transthoracic echo  Findings and Treatment: Mitral Valve: There is dystrophic mitral annular and  leaflet calcification. Moderate mitral regurgitation. The  peak/mean diastolic mitral gradients= 13/4mmHg (HR= 61bpm)  Aortic Root: Aortic Root: 3.1 cm.  LVOT diameter: 2 cm.  Aortic Valve: There is a transcatheter aortic valve  replacement seen.  The valve is well seated with normal function.  The peak/mean gradients= 14/7mmHg with a DVI= 0.47. Peak  transaortic valve gradient equals 14 mm Hg, mean  transaortic valve gradient equals 7 mm Hg, estimated aortic  valve area equals 1.4 sqcm (by continuity equation), aortic  valve velocity time integral equals 44 cm. There is no  central or paravalvular aortic regurgitation seen. Peak  left ventricular outflow tract gradient equals 3 mm Hg,  mean gradient is equal to 2 mm Hg, LVOT velocity time  integral equals 21 cm.  Left Atrium: Severely dilated left atrium.  LA volume index  = 58 cc/m2.  Left Ventricle: Normal left ventricular systolic function.  No segmental wall motion abnormalities.  The LVEF= 60-65%.  Normal left ventricular size with mild concentric  hypertrophy.  Right Heart: Severe right atrial enlargement.  RA area= 26cm2, RA volume= 91ml. A device wire is noted in  the right heart.  The right ventricle is not visualized on axis for accurate  size measurement.  The right ventricular global function is normal. There is  at least moderate tricuspid regurgitation.  Suboptimal  visualization of the tricuspid valve and right ventricle.  Normal pulmonic valve. Minimal pulmonic regurgitation.  Pericardium/PleuraNormal pericardium with no pericardial  effusion. Right pleural effusion.  Hemodynamic: The estimated right atrial pressure is  elevated. Estimated right ventricular systolic pressure  equals 56 mm Hg, assuming right atrial pressure equals 15  mm Hg, consistent with moderate pulmonary hypertension.       PRINCIPAL PROCEDURE  Procedure: Transthoracic echo  Findings and Treatment: Mitral Valve: There is dystrophic mitral annular and leaflet calcification. Moderate mitral regurgitation. The peak/mean diastolic mitral gradients= 13/4mmHg (HR= 61bpm)  Aortic Root: Aortic Root: 3.1 cm.  LVOT diameter: 2 cm.  Aortic Valve: There is a transcatheter aortic valve replacement seen.  The valve is well seated with normal function.  The peak/mean gradients= 14/7mmHg with a DVI= 0.47. Peak transaortic valve gradient equals 14 mm Hg, mean transaortic valve gradient equals 7 mm Hg, estimated aortic valve area equals 1.4 sqcm (by continuity equation), aortic valve velocity time integral equals 44 cm. There is no central or paravalvular aortic regurgitation seen. Peak left ventricular outflow tract gradient equals 3 mm Hg, mean gradient is equal to 2 mm Hg, LVOT velocity time integral equals 21 cm.  Left Atrium: Severely dilated left atrium.  LA volume index = 58 cc/m2.  Left Ventricle: Normal left ventricular systolic function. No segmental wall motion abnormalities.  The LVEF= 60-65%. Normal left ventricular size with mild concentric hypertrophy.  Right Heart: Severe right atrial enlargement.  RA area= 26cm2, RA volume= 91ml. A device wire is noted in the right heart. The right ventricle is not visualized on axis for accurate size measurement.  The right ventricular global function is normal. There is at least moderate tricuspid regurgitation.  Suboptimal visualization of the tricuspid valve and right ventricle.  Normal pulmonic valve. Minimal pulmonic regurgitation.  Pericardium/PleuraNormal pericardium with no pericardial effusion. Right pleural effusion.  Hemodynamic: The estimated right atrial pressure is elevated. Estimated right ventricular systolic pressure equals 56 mm Hg, assuming right atrial pressure equals 15 mm Hg, consistent with moderate pulmonary hypertension.

## 2022-04-29 NOTE — H&P ADULT - PROBLEM SELECTOR PLAN 3
- Continue home Eliquis 2.5mg BID   - EKG on admission was ventricularly paced rhythm at 65 bpm   - Monitor on tele

## 2022-04-29 NOTE — DISCHARGE NOTE PROVIDER - NSDCFUSCHEDAPPT_GEN_ALL_CORE_FT
Abelardo Gordon  [facility] Physician [facility]  [facility]iatric[facility] [address] [place] [address]   Scheduled Appointment: 05/02/2022

## 2022-04-29 NOTE — H&P ADULT - NSHPLABSRESULTS_GEN_ALL_CORE
9.5    7.71  )-----------( 280      ( 2022 12:17 )             33.4     Auto Eosinophil # 0.07  / Auto Eosinophil % 0.9   / Auto Neutrophil # 6.21  / Auto Neutrophil % 80.6  / BANDS % x            138  |  100  |  54<H>  ----------------------------<  167<H>  4.1   |  23  |  1.87<H>    Ca    10.5      2022 12:17  Mg     2.2       TPro  7.4  /  Alb  3.6  /  TBili  0.6  /  DBili  x   /  AST  19  /  ALT  39  /  AlkPhos  121<H>      PT/INR - ( 2022 12:17 )   PT: 21.5 sec;   INR: 1.84 ratio         PTT - ( 2022 12:17 )  PTT:34.5 sec      Urinalysis Basic - ( 2022 12:53 )    Color: Yellow / Appearance: Clear / S.017 / pH: x  Gluc: x / Ketone: Negative  / Bili: Negative / Urobili: Negative   Blood: x / Protein: 30 mg/dL / Nitrite: Negative   Leuk Esterase: Moderate / RBC: 4 /hpf / WBC 12 /HPF   Sq Epi: x / Non Sq Epi: 2 /hpf / Bacteria: Many        IMAGING:     CT Chest:   FINDINGS:    Lungs/Airways/Pleura: Moderate right and small left pleural effusions   with partial lower lobe compressive atelectasis. There is mild   interlobular septal thickening with groundglass opacity likely due to   pulmonary edema.    Mediastinum/Lymph nodes: There are several subcentimeter mediastinal   nodes, possibly reactive.    Heart and Vessels: Cardiomegaly. Left chest wall dual chamber pacemaker   with right atrial and right ventricular leads in place. Status post TAVR.   Extensive mitral annular calcification. Coronary arterial calcification   with possible stenting. No pericardial effusion.    Upper Abdomen: Extensive visceral artery calcification. Numerous   hypodense and hyperdense left renal cysts. Partially imaged large   lobulated hypodense pancreatic body/tail mass containing numerous small   calcifications, unchanged since . Cholelithiasis. Trace perihepatic   ascites.    Osseous structures and Soft Tissues: Degenerative changes without   aggressive osseous lesion. Age-indeterminate T7 and T11 compression   deformities.    IMPRESSION:  Moderate right and small left pleural effusions with pulmonary edema in   the setting of cardiomegaly. Partial lower lobe compressive atelectasis;   superimposed consolidation is difficult to exclude on this noncontrast CT.

## 2022-04-29 NOTE — ED ADULT NURSE NOTE - OBJECTIVE STATEMENT
89 y/o female with PMH of DM, a-fib on Eliquis, HTN, hypothyroidism, TAVR on plavix, pacemaker presenting with SOB; 88% on RA byt EMS. Reports SOB for past couple of days; worse with lying down and with exertion. TAVR performed at [facility] on 9/2021 and states that she has had intermittent SOB since procedure but worsening over the past couple of days. Also reports increased LE swelling with no pain The right leg more swollen than the left which is normal as per pt, . No chest pain, palpitations, fevers/chills, n/v/d, cough, congestion, sore throat, rashes or changes in urination IVL placed by EMS patent pt on room air 90 placed on 4L nc in the Ed now Pt has a unstaged heel wound with back eschar on the right heel joshua

## 2022-04-29 NOTE — ED ADULT NURSE REASSESSMENT NOTE - NS ED NURSE REASSESS COMMENT FT1
pt received from NINO Navarro in [place]. pt. A&Ox4, breathing on 2L NC with coarse crackles, skin scab noted on R heel of foot, bilat pitting edema +3, NSR on cardiac monitor, pt repositioned, turned, all safety measures in place, denies any discomfort/pain, pt pending tele bed placement for CHF/Pneumonia, all needs met at this time.

## 2022-04-29 NOTE — DISCHARGE NOTE PROVIDER - NSDCFUADDINST_GEN_ALL_CORE_FT
Podiatry Discharge Instructions:  Follow up: Please follow up with Dr. Márquez within 1 week of discharge from the hospital, please call [phone] for appointment and discuss that you recently were seen in the hospital.  Wound Care: Please apply betadine to the right foot heel wound followed by 4x4 gauze and raymond daily.   Weight bearing: Please weight bear as tolerated in a surgical shoe.  Antibiotics: Please continue as instructed.

## 2022-04-29 NOTE — ED PROVIDER NOTE - PHYSICAL EXAMINATION
Gen: Elevated BMI, NAD, mild dyspnea   HEENT: EOMI, no nasal discharge, mucous membranes mildly dry, no oropharyngeal edema/erythema/exudates   CV: RRR, +S1/S2, no M/R/G, equal b/l radial pulses 2+  Resp: B/l rales/crackles, SPO2 >95% on 4L NC; 87% on RA, tachypneic to high mid/high 20s   GI: Abdomen soft non-distended, NTTP, no masses/organomegaly   MSK/Skin: Significant b/l LE pitting edema; no calf tenderness. RLE heel pressure ulcer; no drainage or acute signs of infection. No CVA tenderness  Neuro: A&Ox4, moving all 4 extremities spontaneously, gross sensation intact in UE and LE BL  Psych: appropriate mood

## 2022-04-29 NOTE — DISCHARGE NOTE PROVIDER - CARE PROVIDERS DIRECT ADDRESSES
[email or web address]

## 2022-04-29 NOTE — DISCHARGE NOTE PROVIDER - CARE PROVIDER_API CALL
Abelardo Gordon)  Geriatric Medicine; [facility] Medicine; Internal Medicine  [address] [place] [address] [address]  [place] [address]  Phone: [phone]  Fax: [phone]  Established Patient  Follow Up Time: 2 weeks   Abelardo Gordon)  Geriatric Medicine; [facility]; [facility] Medicine  [address] [facility], [address]  [place] [address]  Phone: [phone]  Fax: [phone]  Established Patient  Follow Up Time: 2 weeks    Agapito Woodard  Specialist  [address] [place] [address]  Phone: [phone]  Fax: ([phone]  Established Patient  Follow Up Time: 1 week    Katina Márquez (DPM)  Surgery  [address] [facility], [place] [address]  Phone: [phone]  Fax: [phone]  Follow Up Time: 1 week   Abelardo Gordon)  [facility]iatric Medicine; [facility]; [facility] Medicine  [address] [facility], [address]  [place] [address]  Phone: [phone]  Fax: [phone]  Established Patient  Follow Up Time: 1 week    Agapito Woodard  Specialist  [address] [place] [address]  Phone: [phone]  Fax: ([phone]  Established Patient  Follow Up Time: 1 week    Katina Márquez (DPM)  Surgery  [address] [facility], [place] [address]  Phone: [phone]  Fax: [phone]  Follow Up Time: 1 week

## 2022-04-29 NOTE — ED PROVIDER NOTE - NSICDXPASTMEDICALHX_GEN_ALL_CORE_FT
PAST MEDICAL HISTORY:  COVID-19 vaccine series completed     Diabetes 2     Dyslipidemia     Dyslipidemia     Gastritis     Gout     H/O: Total Hysterectomy, BSO 1988     HTN (Hypertension)     Hypothyroidism     Murmur, Cardiac     Pacemaker     right Rotator Cuff (Capsule) tear- no surgical intervention     S/P Tonsillectomy     Spinal Stenosis lumbar L4-5

## 2022-04-29 NOTE — CONSULT NOTE ADULT - ASSESSMENT
89 y/o F with right heel wound  - pt seen and evaluated  - afebrile, no leukocytosis  - R foot plantar heel wound with well adhered eschar, no wet conversion, no bogginess, no erythema, no malodor, negative probe to bone  - No culture indicated, no clinical signs of infection  - R foot xray (resident read): no gas, no OM   - Dry sterile dressing applied to right heel with betadine, recommend daily dressing changes  - Ordered zlows to offload bilateral heels at all times   - Patient is stable for discharge from podiatry standpoint   - F.u information and instructions can be found in the f/u section of d/c provider note   - Will follow while in house   - Discussed with attending

## 2022-04-29 NOTE — DISCHARGE NOTE PROVIDER - NSDCFUADDAPPT_GEN_ALL_CORE_FT
Please call to schedule follow up appointment with cardiology in 1 week and with your primary care provider within 2 weeks.     Please follow up with Dr. Márquez within 1 week of discharge from the hospital, please call [phone] for appointment and discuss that you recently were seen in the hospital. Please call to schedule follow up appointment with cardiology in 1 week and with your primary care provider within 1 week.     Please follow up with Dr. Márquez within 1 week of discharge from the hospital, please call [phone] for appointment and discuss that you recently were seen in the hospital.

## 2022-04-29 NOTE — ED PROVIDER NOTE - PROGRESS NOTE DETAILS
Clint, PGY-2  Cardiology consulted for first trop at 124. Report no AC at this time and no aspirin; more suspicious for CHF. Rpt trop to be drawn Clint, PGY-2  Podiatry consulted for foot ulcer and will see patient

## 2022-04-29 NOTE — H&P ADULT - PROBLEM SELECTOR PLAN 2
- HFpEF (EF 70% in 03/2022), AS s/p TAVR (09/2021)  - Patient with increased SOB, RHODES, LE edema, +JVD likely in CHF exacerbation   - Continue home Toprol XL 100mg daily   - Continue Lasix 40mg IVP daily   - F/u TTE   - Appreciate cards recs - HFpEF (EF 75% in 03/2022), AS s/p TAVR (09/2021)  - Patient with increased SOB, RHODES, LE edema, +JVD likely in CHF exacerbation   - Continue home Toprol XL 100mg daily   - Continue Lasix 40mg IVP daily   - F/u TTE   - Appreciate cards recs - HFpEF (EF 75% in 03/2022), AS s/p TAVR (09/2021)  - Patient with increased SOB, RHODES, LE edema, +JVD likely in CHF exacerbation   - Continue home Toprol XL 100mg daily   - Continue Lasix 40mg IVP daily   - Strict I/Os, daily weights   - F/u TTE   - Appreciate cards recs

## 2022-04-29 NOTE — DISCHARGE NOTE PROVIDER - HOSPITAL COURSE
89yo F with PMH on HFpEF (EF 75% 03/2022), Afib (on Eliquis), HTN, DM2, CKD3, hypothyroidism, AS s/p TAVR (09/2021), s/p PPM BIBEMS for hypoxia of 88% on RA. Admitted for CHF exacerbation. Pro-BNP >800 and CT chest showing moderate right and small left pleural effusions with pulmonary edema in the setting of cardiomegaly. Patient received ceftriaxone and azithromycin in the ED for possible consolidation, but patient was monitored off abx as patient did not have fevers, leukocytosis, or other clinical signs of infection. Patient was assess with POCUS regularly and diuresed with Lasix IV. Home Toprol XL 100mg was continued. Echo showed EF 65% with mod pulm HTN and mod TR (no significant changes from prior). Due to history of Afib, patient was monitored on tele and continued on home Eliquis dose. Patient had wound of right foot on admission, seen by podiatry and provided outpatient follow up. Patient also found to have TSH elevated to 5.57, but outpatient synthroid regimen was recently increased, so dose increased was held off during this admission. Patient also found to have asymptomatic bacteruria, so abx were not started. Course c/b MARICARMEN likely 2/2 cardiorenal syndrome. Kidney bladder ultrasound showed no hydronephrosis.     Physical therapy recommended home PT. Patient hemodynamically stable for discharge. Daughter Claudia, was instructed to make cardiologist appointment for patient within 1 week of discharge.     new meds? ____   89yo F with PMH on HFpEF (EF 75% 03/2022), Afib (on Eliquis), HTN, DM2, CKD3, hypothyroidism, AS s/p TAVR (09/2021), s/p PPM BIBEMS for hypoxia of 88% on RA. Admitted for CHF exacerbation. Pro-BNP >800 and CT chest showing moderate right and small left pleural effusions with pulmonary edema in the setting of cardiomegaly. Patient received ceftriaxone and azithromycin in the ED for possible consolidation, but patient was monitored off abx as patient did not have fevers, leukocytosis, or other clinical signs of infection. Patient was assess with POCUS regularly and diuresed with Lasix IV. Home Toprol XL 100mg was continued. Echo showed EF 65% with mod pulm HTN and mod TR (no significant changes from prior). Due to history of Afib, patient was monitored on tele and continued on home Eliquis dose. Patient had wound of right foot on admission, seen by podiatry and provided outpatient follow up. Patient also found to have TSH elevated to 5.57, but outpatient synthroid regimen was recently increased, so dose increased was held off during this admission. Patient also found to have asymptomatic bacteruria, so abx were not started. Course c/b MARICARMEN likely 2/2 cardiorenal syndrome. Kidney bladder ultrasound showed no hydronephrosis. Creatinine improved over hospitalization.     Physical therapy recommended home PT. Patient hemodynamically stable for discharge. Patient will be discharged home with care around the clock arranged by her . Daughter Claudia, was instructed to make cardiologist appointment for patient within 1 week of discharge.    89yo F with PMH on HFpEF (EF 75% 03/2022), Afib (on Eliquis), HTN, DM2, CKD3, hypothyroidism, AS s/p TAVR (09/2021), s/p PPM BIBEMS for hypoxia of 88% on RA. Admitted for CHF exacerbation. Pro-BNP >800 and CT chest showing moderate right and small left pleural effusions with pulmonary edema in the setting of cardiomegaly. Patient received ceftriaxone and azithromycin in the ED for possible consolidation, but patient was monitored off abx as patient did not have fevers, leukocytosis, or other clinical signs of infection. Patient was assess with POCUS regularly and diuresed with Lasix IV. Home Toprol XL 100mg was continued. Echo showed EF 65% with mod pulm HTN and mod TR (no significant changes from prior). Patient was on PO 40mg lasix BID at time of discharge. Due to history of Afib, patient was monitored on tele and continued on home Eliquis dose. Patient had wound of right foot on admission, seen by podiatry and provided outpatient follow up. Patient also found to have TSH elevated to 5.57, but outpatient synthroid regimen was recently increased, so dose increased was held off during this admission. Patient also found to have asymptomatic bacteruria, so abx were not started. Course c/b MARICARMEN likely 2/2 cardiorenal syndrome. Kidney bladder ultrasound showed no hydronephrosis. Creatinine improved over hospitalization.     Physical therapy recommended subacute rehab but patient did not want , so was set with home PT. Patient hemodynamically stable for discharge. Patient will be discharged home with care around the clock arranged by her . Daughter Claudia, was instructed to make cardiologist appointment for patient within 1 week of discharge.    89yo F with PMH on HFpEF (EF 75% 03/2022), Afib (on Eliquis), HTN, DM2, CKD3, hypothyroidism, AS s/p TAVR (09/2021), s/p PPM BIBEMS for hypoxia of 88% on RA. Admitted for CHF exacerbation. Pro-BNP >800 and CT chest showing moderate right and small left pleural effusions with pulmonary edema in the setting of cardiomegaly. Patient received ceftriaxone and azithromycin in the ED for possible consolidation, but patient was monitored off abx as patient did not have fevers, leukocytosis, or other clinical signs of infection. Patient was assess with POCUS regularly and diuresed with Lasix IV. Home Toprol XL 100mg was continued. Echo showed EF 65% with mod pulm HTN and mod TR (no significant changes from prior). Patient was on PO 40mg lasix BID at time of discharge. Due to history of Afib, patient was monitored on tele and continued on home Eliquis dose. Patient had wound of right foot on admission, seen by podiatry and provided outpatient follow up. Patient also found to have TSH elevated to 5.57, but outpatient synthroid regimen was recently increased, so dose increased was held off during this admission. Patient also found to have asymptomatic bacteruria, so abx were not started. Course c/b MARICARMEN likely 2/2 cardiorenal syndrome. Kidney bladder ultrasound showed no hydronephrosis. Creatinine improved over hospitalization. Patient was discharged on diuretic regimen of Bumex 2mg PO BID.     Physical therapy recommended subacute rehab but patient did not want , so was set with home PT. Patient hemodynamically stable for discharge. Patient will be discharged home with care around the clock arranged by her . Daughter Claudia, was instructed to make cardiologist appointment for patient within 1 week of discharge.

## 2022-04-29 NOTE — ED ADULT NURSE REASSESSMENT NOTE - NS ED NURSE REASSESS COMMENT FT1
[id number] pt to CT pt given meds as ordered and pt admitted Pt ate Lunch with  at the bedside pending further orders Trav

## 2022-04-29 NOTE — ED PROVIDER NOTE - CLINICAL SUMMARY MEDICAL DECISION MAKING FREE TEXT BOX
89 y/o female with pmhx of CH, T2DM, CKD 3, a-fib on Eliquis, HTN, hypothyroidism, TAVR on plavix, pacemaker presenting with SOB; 88% on RA by EMS; worse with lying down and with exertion; B/l rales/crackles, SPO2 >95% on 4L NC; 87% on RA; b/l LE pitting edema c/f CHF vs. low suspicion of ACS; Trop/BNP/EKG and CXR with basic labs and will consider diuresis pending labs

## 2022-04-29 NOTE — H&P ADULT - NSHPSOCIALHISTORY_GEN_ALL_CORE
Patient lives in [facility] with  and aides who are present almost 24/7. Endorses prior 1 pack year smoking history 50 years ago. Denies alcohol or other illicit drug use. Ambulates with walker at baseline.

## 2022-04-29 NOTE — DISCHARGE NOTE PROVIDER - PROVIDER TOKENS
PROVIDER:[TOKEN:[[id number]],FOLLOWUP:[2 weeks],ESTABLISHEDPATIENT:[T]] PROVIDER:[TOKEN:[[id number]],FOLLOWUP:[2 weeks],ESTABLISHEDPATIENT:[T]],PROVIDER:[TOKEN:[[id number]],FOLLOWUP:[1 week],ESTABLISHEDPATIENT:[T]],PROVIDER:[TOKEN:[[id number]],FOLLOWUP:[1 week]] PROVIDER:[TOKEN:[[id number]],FOLLOWUP:[1 week],ESTABLISHEDPATIENT:[T]],PROVIDER:[TOKEN:[[id number]],FOLLOWUP:[1 week],ESTABLISHEDPATIENT:[T]],PROVIDER:[TOKEN:[[id number]],FOLLOWUP:[1 week]]

## 2022-04-29 NOTE — H&P ADULT - PROBLEM SELECTOR PLAN 6
- Continue home Amlodipine 5mg daily - Patient on home Lantus 20U qhs and ISS TID premeal   - Continue Lantus 15U qhs and low ISS TID premeal with fingersticks   - A1c 7.4 in 03/2022

## 2022-04-29 NOTE — CONSULT NOTE ADULT - SUBJECTIVE AND OBJECTIVE BOX
DATE OF SERVICE: 04-29-22 @ 17:40    CHIEF COMPLAINT:Patient is a 89y old  Female who presents with a chief complaint of     HISTORY OF PRESENT ILLNESS: Ms. Briones is a 89 yo female with PMH of HTN, HLD, CAD s/p CABG, AS s/p TAVR in 9/2021 at [facility], DM, CKD, AF on Eliquis, PPM who presents with 3 days of progressive shortness of breath, + orthopnea, increased LE edema and decreased oxygen saturation on pulse oximeter at home. Her baseline exercise tolerance is very limited. Ambulated within her apartment with a walker. Has to take rest breaks when walking down long hallway. Does report episode of vomiting 2 weeks ago and diarrhea this past weekend.       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 650 milliGRAM(s) Oral every 6 hours PRN      FAMILY HISTORY:      Non-contributory    SOCIAL HISTORY:    Not an active smoker    Allergies    Bactrim (Unknown)  Flagyl (Hives; Pruritus)  Macrobid (Other)  sulfa drugs (Hives)  Zosyn (Other)    Intolerances    	    REVIEW OF SYSTEMS:  CONSTITUTIONAL: No fever  EYES: No eye pain, visual disturbances, or discharge  ENMT:  No difficulty hearing, tinnitus  NECK: No pain or stiffness  RESPIRATORY: + SOB  CARDIOVASCULAR: + LE edema  GASTROINTESTINAL:  + diarrhea within last several days  GENITOURINARY: No dysuria, hematuria  NEUROLOGICAL: No stroke like symptoms  SKIN: No burning or lesions   ENDOCRINE: No heat or cold intolerance  MUSCULOSKELETAL: No joint pain or swelling  PSYCHIATRIC: No  anxiety, mood swings  HEME/LYMPH: No bleeding gums  ALLERGY AND IMMUNOLOGIC: No hives or eczema	    All other ROS negative    PHYSICAL EXAM:  T(C): 36.6 (04-29-22 @ 11:15), Max: 36.6 (04-29-22 @ 11:15)  HR: 71 (04-29-22 @ 14:39) (61 - 87)  BP: 139/58 (04-29-22 @ 14:39) (135/60 - 142/74)  RR: 18 (04-29-22 @ 14:39) (18 - 24)  SpO2: 98% (04-29-22 @ 14:39) (90% - 98%)  Wt(kg): --  I&O's Summary      Appearance: Normal	  HEENT:   Normal oral mucosa, EOMI	  Cardiovascular:  S1 S2, No JVD,    Respiratory: exp wheezing B/L upper lobes, right lower lung diminished	  Psychiatry: Alert  Gastrointestinal:  Soft, Non-tender, + BS	  Skin: No rashes   Neurologic: Non-focal  Extremities:  +1 LE edema  Vascular: Peripheral pulses palpable    	    	  	  CARDIAC MARKERS:  Labs personally reviewed by me                                  9.5    7.71  )-----------( 280      ( 29 Apr 2022 12:17 )             33.4     04-29    138  |  100  |  54<H>  ----------------------------<  167<H>  4.1   |  23  |  1.87<H>    Ca    10.5      29 Apr 2022 12:17  Mg     2.2     04-29    TPro  7.4  /  Alb  3.6  /  TBili  0.6  /  DBili  x   /  AST  19  /  ALT  39  /  AlkPhos  121<H>  04-29          EKG: Personally reviewed by me - V-paced at 65 bpm  Radiology: Personally reviewed by me -     Xray Chest 1 View- PORTABLE-Urgent (Xray Chest 1 View- PORTABLE-Urgent .) (04.29.22 @ 12:52)   IMPRESSION:  Opacification of the right lower lung which may represent pneumonia or   atelectasis.  Small right pleural effusion.    CT Chest No Cont (04.29.22 @ 16:44)   Moderate right and small left pleural effusions with pulmonary edema in   the setting of cardiomegaly. Partial lower lobe compressive atelectasis;   superimposed consolidation is difficult to exclude on this noncontrast CT.    Transthoracic Echocardiogram (03.03.22 @ 10:13)   Conclusions:  1. Mitral annular calcification, otherwise normal mitral  valve. Mild-moderate mitral regurgitation. Mean transmitral  valve gradient equals 4 mm Hg, consistent with mild mitral  stenosis.  2. Transcatheteraortic valve replacement. Peak transaortic  valve gradient equals 18 mm Hg, mean transaortic valve  gradient equals 9 mm Hg, which is probably normal in the  presence of a transcatheter aortic valve replacement.  3. Moderately dilated left atrium.  LA volume index = 44  cc/m2.  4. Normal left ventricular internal dimensions and wall  thicknesses.  5. Endocardium not well visualized; Hyperdynamic left  ventricular systolic function.  6. A device wire is noted in the right heart.  7. The right ventricle is not well visualized; grossly  normal right ventricular systolic function.  8. Normal tricuspid valve. Moderate tricuspid  regurgitation.  9. Estimated pulmonary artery systolic pressure equals 52  mm Hg, assuming right atrial pressure equals 8 mm Hg,  consistent with moderate pulmonary pressures.  10. Echo-free space is noted in the liver consistent with  cyst, however, dedicated imaging recommended for further  evaluation if clinically indicated.  *** No previous Echo exam.      Assessment /Plan:     Ms. Briones is a 89 yo female with PMH of HTN, HLD, CAD s/p CABG, AS s/p TAVR in 9/2021 at [facility], DM, CKD, AF on Eliquis, PPM who presents with 3 days of progressive shortness of breath, + orthopnea, increased LE edema and decreased oxygen saturation on pulse oximeter at home.    Problem/Plan -1  Problem: HFpEF  Plan:  - Followed by Dr. Agapito Woodard for outpatient cardiology  - CT Chest reveals moderate right and small left pleural effusions, cardiomegaly, possibly superimposed consolidation  - proBNP 8208  - Physical exam with LE edema, +JVD, +orthopnea and shortness of breath with minimal exertion such as talking during interview  - On lasix 40mg PO daily at home  - Will repeat echo  - Strict I&Os  - Please start IV Diuresis with Lasix 40mg IVP daily    Problem/Plan -2  Problem: Aortic Stenosis  Plan:  - s/p TAVR in September 2021  - minimal murmur on ausculation  - Will acquire TTE to assess function of valve  - c/w Plavix    Problem/Plan -3  Problem: CAD  Plan:  - Hx CABG at [facility]  - currently denies chest pain  - EKG without ischemic changes  - Troponin elevated likely i/s/o ADHF exacerbation  - c/w simvastatin 20mg PO daily    Problem/Plan -4  Problem: AF  Plan:  - EKG V-Paced, rate controlled  - c/w Metoprolol and Eliquis  - monitor on tele    Problem/Plan -5  Problem: HLD  Plan:  - c/w simvastatin     Problem/Plan -6  Problem: DM  Plan:  - Most recent HgbA1C is 7.6 on 3.4.22  - ISS     Differential diagnosis and plan of care discussed with patient after the evaluation. Counseling on diet, nutritional counseling, weight management, exercise and medication compliance was done.   Advanced care planning/advanced directives discussed with patient/family. DNR status including forceful chest compressions to attempt to restart the heart, ventilator support/artificial breathing, electric shock, artificial nutrition, health care proxy, Molst form all discussed with pt. Pt wishes to consider. More than fifteen minutes spent on discussing advanced directives.       [facility]  Giles Gan DO [facility]  Cardiovascular Medicine  [address] [place] [address], [address]  Office [phone]  Cell [phone]

## 2022-04-29 NOTE — CONSULT NOTE ADULT - SUBJECTIVE AND OBJECTIVE BOX
Podiatry pager #: [phone]/ [phone]    Patient is a 89y old  Female who presents with a chief complaint of right heel wound    HPI:  89 y/o female with pmhx of CH, T2DM, CKD 3, a-fib on Eliquis, HTN, hypothyroidism, TAVR on plavix, pacemaker presenting with SOB; 88% on RA byt EMS. Reports SOB for past couple of days; worse with lying down and with exertion. TAVR performed at [place] on 9/2021 and states that she has had intermittent SOB since procedure but worsening over the past couple of days. Also reports increased LE swelling with no pain, hemoptysis. No chest pain, palpitations, fevers/chills, n/v/d, cough, congestion, sore throat, rashes or changes in urination.    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    S/P bilateral Cataract Extraction and ocular lens implant    S/P Tonsillectomy    S/P TAVR (transcatheter aortic valve replacement)        MEDICATIONS  (STANDING):    MEDICATIONS  (PRN):      Allergies    Bactrim (Unknown)  Flagyl (Hives; Pruritus)  Macrobid (Other)  sulfa drugs (Hives)  Zosyn (Other)    Intolerances        VITALS:    Vital Signs Last 24 Hrs  T(C): 36.6 (29 Apr 2022 11:15), Max: 36.6 (29 Apr 2022 11:15)  T(F): 97.8 (29 Apr 2022 11:15), Max: 97.8 (29 Apr 2022 11:15)  HR: 61 (29 Apr 2022 11:15) (61 - 87)  BP: 135/60 (29 Apr 2022 11:15) (135/60 - 142/74)  BP(mean): --  RR: 22 (29 Apr 2022 11:20) (22 - 24)  SpO2: 98% (29 Apr 2022 11:20) (90% - 98%)    LABS:                          9.5    7.71  )-----------( 280      ( 29 Apr 2022 12:17 )             33.4       04-29    138  |  100  |  54<H>  ----------------------------<  167<H>  4.1   |  23  |  1.87<H>    Ca    10.5      29 Apr 2022 12:17  Mg     2.2     04-29    TPro  7.4  /  Alb  3.6  /  TBili  0.6  /  DBili  x   /  AST  19  /  ALT  39  /  AlkPhos  121<H>  04-29      CAPILLARY BLOOD GLUCOSE          PT/INR - ( 29 Apr 2022 12:17 )   PT: 21.5 sec;   INR: 1.84 ratio         PTT - ( 29 Apr 2022 12:17 )  PTT:34.5 sec    LOWER EXTREMITY PHYSICAL EXAM:    Vascular: DP/PT nonpalpable 2/2 to edema, B/L, CFT <3 seconds B/L, Temperature gradient WNL, B/L.   Neuro: Epicritic sensation diminished to the level of ankle, B/L.  Musculoskeletal/Ortho: unremarkable  Skin: R foot plantar heel wound with well adhered eschar, no wet conversion, no bogginess, no erythema, no malodor, negative probe to bone    RADIOLOGY & ADDITIONAL STUDIES:

## 2022-04-29 NOTE — ED PROVIDER NOTE - ATTENDING CONTRIBUTION TO CARE
PMD Abelardo Gordon PCP/BARNEY,   89 y female pmh DM, HTN, AS sp TAVR,CHF,PSH Cataract,Afib on ac,Hypothyrodism,PPM, THR. Pt comes to ed via EMS for c/o sob, Pt noted to have sat of 88%ra. PMD Abelardo Gordon PCP/BARNEY,  [facility]  89 y female pmh DM, HTN, AS sp TAVR,CHF,PSH Cataract,Afib on ac,Hypothyrodism,PPM, THR. Pt comes to ed via EMS for c/o sob, Pt noted to have sat of 88%ra. Pt states has had dif breathing past few days. PT reports chronic sob/aguila improved after tavr, 9/2021 ([facility]) but over past several days has c/o shortness of breath, orthopnea, aguila. Associated w worsening pedal edema. No fever, cp, cough, nvdc, abd pain. hemoptysis. PMD Abelardo Gordon PCP/BARNEY,  [facility]  89 y female pmh DM, HTN, AS sp TAVR,CHF,PSH Cataract,Afib on ac,Hypothyrodism,PPM, THR. Pt comes to ed via EMS for c/o sob, Pt noted to have sat of 88%ra. Pt states has had dif breathing past few days. PT reports chronic sob/aguila improved after tavr, 9/2021 ([facility]) but over past several days has c/o shortness of breath, orthopnea, aguila. Associated w worsening pedal edema. No fever, cp, cough, nvdc, abd pain. hemoptysis. PE WDWN female awake alert looking mildy dyspenic, Heent normocephalic atraumatic neck supple chest scant khushbu rales left>rt base. abd soft +Bs, Neuro gcs 15 speech fleunt power 5/5 all extr, MSK khushbu pedal edema L>R,   Wilfrid Massey MD, Facep

## 2022-04-29 NOTE — H&P ADULT - PROBLEM SELECTOR PLAN 1
- Likely 2/2 CHF and superimposed bacterial PNA  - CT Chest on 4/29 shows moderate right and small left pleural effusions with pulmonary edema in the setting of cardiomegaly. Partial lower lobe compressive atelectasis; superimposed consolidation is difficult to exclude on this noncontrast CT.  - Bedside POCUS on 4/29 shows RLL consolidation   - Although Procal 0.1, no leukocytosis, no fevers, will empirically treat for CAP due to imagining studies   - Continue Lasix 40mg IVP daily   - Continue Ceftriaxone and Azithromycin for CAP (4/19-   - Currently saturating 98% on 2L NC  - Wean as tolerated - Likely 2/2 CHF and superimposed bacterial PNA  - Trop 124 -> 117 likely 2/2 CHF   - pro-BNP >8000  - CT Chest on 4/29 shows moderate right and small left pleural effusions with pulmonary edema in the setting of cardiomegaly. Partial lower lobe compressive atelectasis; superimposed consolidation is difficult to exclude on this noncontrast CT.  - Bedside POCUS on 4/29 shows RLL consolidation   - Although Procal 0.1, no leukocytosis, no fevers, will empirically treat for CAP due to imagining studies   - Continue Lasix 40mg IVP daily   - Continue Ceftriaxone and Azithromycin for CAP (4/19-   - Currently saturating 98% on 2L NC  - Wean as tolerated

## 2022-04-29 NOTE — H&P ADULT - NSICDXPASTMEDICALHX_GEN_ALL_CORE_FT
PAST MEDICAL HISTORY:  (HFpEF) heart failure with preserved ejection fraction     Diabetes 2     Dyslipidemia     Dyslipidemia     Gastritis     Gout     HTN (Hypertension)     Hypothyroidism     Pacemaker     S/P Tonsillectomy     Spinal Stenosis lumbar L4-5     Stage 3 chronic kidney disease

## 2022-04-29 NOTE — H&P ADULT - ATTENDING COMMENTS
89 y/o F with history notable for HFpEF, afib, AS s/p TAVR, CKD3 presenting with subacute SOB PND Orthopnea, found to be hypoxic on RA with worsening LE edema and elevated BNP c/w acute HF exacerbation with significant pleural effusions. Also noted to have type 2 NSTEMI, MARICARMEN on CKD, asymptomatic pyuria.  #Acute hypoxic respiratory failure  #Acute on Chronic HF exacerbation  #Type 2 NSTEMI  #MARICARMEN on CKD  #afib unknown duration/chronicity  -Cards Following, Lasix 40 IV daily  -Continue 2L NC as needed, wean as tolerated  -Cr baseline per patient 1.6, near baseline, ctm  -continue metop, apixaban  -No leukocytosis, procal wnl, no dysuria. S/P CTX + azithro, will hold abx and monitor (CT with atelectactatic lung next to pleural effusion)   Remainder as above

## 2022-04-29 NOTE — H&P ADULT - HISTORY OF PRESENT ILLNESS
89yo F with PMH oh HFpEF (EF 75% 03/2022), Afib (on Eliquis), HTN, DM2, CKD3, hypothyroidism, AS s/p TAVR (09/2021), s/p PPM BIBEMS for hypoxia of 88% on RA. Patient reports SOB and RHODES for the past 3 days. Patient has never felt similar symptoms before. Patient usually sleeps with one pillow at night, but has required two pillows the past two nights because of orthopnea. Also endorses increased leg swelling which is acute on chronic from her chronic LLE lymphedema. No reported weight changes. Patient is usually able to walk around her apartment with a walker, but has been feeling more SOB on exertion.  at bedside notes that physical therapy visits her in the apartment and has said her oxygen saturation is low sometimes. One episode of diarrhea last week, last BM was yesterday and it was normal. Denies fevers, chills, chest pain, palpitations, n/v, cough, congestion, dysuria, increased urinary frequency, constipation. Had recent fall in 03/2022 and was hospitalized.     Upon arrival to ED, vitals T 98F, /74, HR 87, RR 24, 98% on 3L NC   S/p Ceftriaxone, Azithromycin, and Lasix 40mg IVP x1   89yo F with PMH of HFpEF (EF 75% 03/2022), Afib (on Eliquis), HTN, DM2, CKD3, hypothyroidism, AS s/p TAVR (09/2021), s/p PPM BIBEMS for hypoxia of 88% on RA. Patient reports SOB and RHODES for the past 3 days. Patient has never felt similar symptoms before. Patient usually sleeps with one pillow at night, but has required two pillows the past two nights because of orthopnea. Also endorses increased leg swelling which is acute on chronic from her chronic LLE lymphedema. No reported weight changes. Patient is usually able to walk around her apartment with a walker, but has been feeling more SOB on exertion.  at bedside notes that physical therapy visits her in the apartment and has said her oxygen saturation is low sometimes. One episode of diarrhea last week, last BM was yesterday and it was normal. Denies fevers, chills, chest pain, palpitations, n/v, cough, congestion, dysuria, increased urinary frequency, constipation. Had recent fall in 03/2022 and was hospitalized.     Upon arrival to ED, vitals T 98F, /74, HR 87, RR 24, 98% on 3L NC   S/p Ceftriaxone, Azithromycin, and Lasix 40mg IVP x1

## 2022-04-29 NOTE — ED PROVIDER NOTE - OBJECTIVE STATEMENT
89 y/o female with pmhx of CH, T2DM, CKD 3, a-fib on Eliquis, HTN, hypothyroidism, TAVR on plavix, pacemaker presenting with SOB; 88% on RA byt EMS. Reports SOB for past couple of days; worse with lying down and with exertion. TAVR performed at [facility] on 9/2021 and states that she has had intermittent SOB since procedure but worsening over the past couple of days. Also reports increased LE swelling with no pain, hemoptysis. No chest pain, palpitations, fevers/chills, n/v/d, cough, congestion, sore throat, rashes or changes in urination.

## 2022-04-29 NOTE — H&P ADULT - NSICDXPASTSURGICALHX_GEN_ALL_CORE_FT
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

## 2022-04-30 NOTE — PROGRESS NOTE ADULT - PROBLEM SELECTOR PLAN 6
- Patient on home Lantus 20U qhs and ISS TID premeal   - Continue Lantus 15U qhs and low ISS TID premeal with fingersticks   - A1c 7.4 in 03/2022

## 2022-04-30 NOTE — PHYSICAL THERAPY INITIAL EVALUATION ADULT - PERTINENT HX OF CURRENT PROBLEM, REHAB EVAL
Pt is 89F admitted 4/29/22 PMHx HFpEF (EF 75% 03/2022), Afib (on Eliquis), HTN, DM2, CKD3, hypothyroidism, AS s/p TAVR (09/2021), s/p PPM BIBEMS for hypoxia of 88% on RA. Had recent fall in 03/2022 and was hospitalized.  Pt now admitted for CHF exacerbation.

## 2022-04-30 NOTE — PROGRESS NOTE ADULT - SUBJECTIVE AND OBJECTIVE BOX
Date of Service:  04-30-22 @ 11:48    Patient is a 89y old  Female who presents with a chief complaint of SOB (30 Apr 2022 07:16)      INTERVAL HISTORY: feels ok; continues to have SOB    TELEMETRY Personally reviewed: VPaced 60's; underlying AFIB    REVIEW OF SYSTEMS:   CONSTITUTIONAL: generalized weakness  EYES/ENT: No visual changes; No throat pain  Neck: No pain or stiffness  Respiratory: No cough, +wheezing, +shortness of breath  CARDIOVASCULAR: no chest pain or palpitations  GASTROINTESTINAL: No abdominal pain, no nausea, vomiting or hematemesis  GENITOURINARY: No dysuria, frequency or hematuria  NEUROLOGICAL: No stroke like symptoms  SKIN: No rashes    	  MEDICATIONS:  amLODIPine   Tablet 5 milliGRAM(s) Oral daily  furosemide   Injectable 40 milliGRAM(s) IV Push daily  metoprolol succinate  milliGRAM(s) Oral daily        PHYSICAL EXAM:  T(C): 36.3 (04-30-22 @ 04:32), Max: 36.6 (04-29-22 @ 19:54)  HR: 60 (04-30-22 @ 04:32) (60 - 71)  BP: 144/69 (04-30-22 @ 04:32) (116/52 - 144/69)  RR: 18 (04-30-22 @ 04:32) (18 - 18)  SpO2: 95% (04-30-22 @ 04:32) (92% - 98%)  Wt(kg): --  I&O's Summary        Appearance: In no distress	  HEENT:    PERRL, EOMI	  Cardiovascular:  S1 S2, No JVD  Respiratory: leftupper and left lower lung- expiratory wheezing; right upper and lower-diminished	  Gastrointestinal:  Soft, Non-tender, + BS	  Vascularature: +1 BLLE edema  Psychiatric: Appropriate affect   Neuro: no acute focal deficits                               9.0    8.20  )-----------( 268      ( 30 Apr 2022 07:37 )             31.6     04-30    137  |  99  |  55<H>  ----------------------------<  163<H>  4.0   |  22  |  1.86<H>    Ca    10.3      30 Apr 2022 07:21  Phos  3.6     04-30  Mg     2.3     04-30    TPro  7.1  /  Alb  3.4  /  TBili  0.3  /  DBili  x   /  AST  17  /  ALT  32  /  AlkPhos  109  04-30        Labs personally reviewed      ASSESSMENT/PLAN: 	    Ms. Briones is a 89 yo female with PMH of HTN, HLD, CAD s/p CABG, AS s/p TAVR in 9/2021 at [facility], DM, CKD, AF on Eliquis, PPM who presents with 3 days of progressive shortness of breath, + orthopnea, increased LE edema and decreased oxygen saturation on pulse oximeter at home.    Problem/Plan -1  Problem: HFpEF  Plan:  - Followed by Dr. Agapito Woodard for outpatient cardiology  - CT Chest reveals moderate right and small left pleural effusions, cardiomegaly, possibly superimposed consolidation  - proBNP 8208  - Physical exam with LE edema, +JVD, +orthopnea and shortness of breath   - On lasix 40mg PO daily at home  - Will repeat echo- pending  - Strict I&Os  - continue IV Diuresis with Lasix 40mg IVP daily    Problem/Plan -2  Problem: Aortic Stenosis  Plan:  - s/p TAVR in September 2021  - minimal murmur on ausculation  - Will acquire TTE to assess function of valve    Problem/Plan -3  Problem: CAD  Plan:  - Hx CABG at [facility]  - currently denies chest pain  - EKG without ischemic changes  - Troponin elevated likely i/s/o ADHF exacerbation; peaked 4/29 at 124  - c/w simvastatin 20mg PO daily    Problem/Plan -4  Problem: AF  Plan:  - EKG V-Paced, rate controlled  - c/w Metoprolol and Eliquis  - monitor on tele    Problem/Plan -5  Problem: HLD  Plan:  - c/w simvastatin     Problem/Plan -6  Problem: DM  Plan:  - Most recent HgbA1C is 7.6 on 3.4.22  - ISS       Jenise Fry MSN, FNP-BC, AGACNP-BC, EAMON  Giles Gan, DO [facility]  Cardiovascular Medicine  [address], [address]  Office: [phone]  Cell: [phone]

## 2022-04-30 NOTE — PHYSICAL THERAPY INITIAL EVALUATION ADULT - ADDITIONAL COMMENTS
Pt resides in apt with spouse, 0 steps to enter, one level within, PTA ambulatory with RW, owns wheelchair, shower chair, commode. Pt has HHA M-F 8 hours, S-S 12 hours.

## 2022-04-30 NOTE — PHYSICAL THERAPY INITIAL EVALUATION ADULT - GAIT DEVIATIONS NOTED, PT EVAL
decreased tres/increased time in double stance/decreased velocity of limb motion/decreased step length/decreased weight-shifting ability

## 2022-04-30 NOTE — PROGRESS NOTE ADULT - PROBLEM SELECTOR PLAN 5
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

## 2022-04-30 NOTE — PHYSICAL THERAPY INITIAL EVALUATION ADULT - RISK REDUCTION/PREVENTION, PT EVAL
Alert-The patient is alert, awake and responds to voice. The patient is oriented to time, place, and person. The triage nurse is able to obtain subjective information.
risk factors

## 2022-04-30 NOTE — PROGRESS NOTE ADULT - ASSESSMENT
89yo F with PMH on HFpEF (EF 75% 03/2022), Afib (on Eliquis), HTN, DM2, CKD3, hypothyroidism, AS s/p TAVR (09/2021), s/p PPM BIBEMS for hypoxia of 88% on RA. Admitted for CHF exacerbation with superimposed bacterial PNA.  89yo F with PMH on HFpEF (EF 75% 03/2022), Afib (on Eliquis), HTN, DM2, CKD3, hypothyroidism, AS s/p TAVR (09/2021), s/p PPM BIBEMS for hypoxia of 88% on RA. Admitted for CHF exacerbation.

## 2022-04-30 NOTE — PROGRESS NOTE ADULT - PROBLEM SELECTOR PLAN 3
- Continue home Eliquis 2.5mg BID   - EKG on admission was ventricularly paced rhythm at 65 bpm   - Monitor on tele - Continue home Eliquis 2.5mg BID   - EKG on admission was ventricularly paced rhythm with underlying Afibat 65 bpm   - Monitor on tele

## 2022-04-30 NOTE — PROGRESS NOTE ADULT - PROBLEM SELECTOR PLAN 4
- R foot plantar heel wound with well adhered eschar, negative probe to bone  - Appreciate podiatry recs  - R foot xray: no gas, no OM   - Dry sterile dressing applied to right heel with betadine, recommend daily dressing changes

## 2022-04-30 NOTE — PHYSICAL THERAPY INITIAL EVALUATION ADULT - GENERAL OBSERVATIONS, REHAB EVAL
received semisupine in bed, A&OX4, following commands, pleasant & eager to participate, a/w hypoxia, CHF exacerbation, +supplemental 02 via NC, BS reviewed,

## 2022-04-30 NOTE — PROGRESS NOTE ADULT - PROBLEM SELECTOR PLAN 9
Home meds: Continue home Allopurinol 100mg daily, Simvastatin 20mg qhs, Protonix 40mg daily   DVT ppx: Eliquis   Diet: DASH/Renal   Dispo: pending PT recs  DNR/DNI Home meds: Continue home Allopurinol 100mg daily, Simvastatin 20mg qhs, Protonix 40mg daily   DVT ppx: Eliquis   Diet: DASH/Renal   Dispo: pending PT recs  DNR/DNI  Communication: Left VM for daughter Claudia on 4/30

## 2022-04-30 NOTE — PROGRESS NOTE ADULT - PROBLEM SELECTOR PLAN 1
- Likely 2/2 CHF and superimposed bacterial PNA  - Trop 124 -> 117 likely 2/2 CHF   - pro-BNP >8000  - CT Chest on 4/29 shows moderate right and small left pleural effusions with pulmonary edema in the setting of cardiomegaly. Partial lower lobe compressive atelectasis; superimposed consolidation is difficult to exclude on this noncontrast CT.  - Bedside POCUS on 4/29 shows RLL consolidation   - Although Procal 0.1, no leukocytosis, no fevers, will empirically treat for CAP due to imagining studies   - Continue Lasix 40mg IVP daily   - Continue Ceftriaxone and Azithromycin for CAP (4/19-   - Currently saturating 98% on 2L NC  - Wean as tolerated - Likely 2/2 CHF and superimposed bacterial PNA  - Trop 124 -> 117 likely 2/2 CHF   - pro-BNP >8000  - CT Chest on 4/29 shows moderate right and small left pleural effusions with pulmonary edema in the setting of cardiomegaly. Partial lower lobe compressive atelectasis; superimposed consolidation is difficult to exclude on this noncontrast CT.  - S/p ceftriaxone and azithromycin in ED. Procal 0.1, no leukocytosis, no fevers, will monitor off abx    - Continue Lasix 40mg IVP daily   - Currently saturating 98% on 2L NC. Wean as tolerated

## 2022-04-30 NOTE — PROGRESS NOTE ADULT - SUBJECTIVE AND OBJECTIVE BOX
%%%%INCOMPLETE NOTE%%%%%     Dr. Joann Pretty, PGY-1    Patient is a 89y old  Female who presents with a chief complaint of SOB (2022 18:03)    24-HR EVENTS/SUBJECTIVE: No acute events overnight. Patient seen and examined at bedside this AM. BMx1 yesterday. Denies chest pain, abdominal pain, cough, n/v, sob. Breathing comfortably on room air.    MEDICATIONS  (STANDING):  allopurinol 100 milliGRAM(s) Oral daily  amLODIPine   Tablet 5 milliGRAM(s) Oral daily  apixaban 2.5 milliGRAM(s) Oral two times a day  azithromycin  IVPB 500 milliGRAM(s) IV Intermittent daily  cefTRIAXone   IVPB 1000 milliGRAM(s) IV Intermittent every 24 hours  dextrose 5%. 1000 milliLiter(s) (100 mL/Hr) IV Continuous <Continuous>  dextrose 5%. 1000 milliLiter(s) (50 mL/Hr) IV Continuous <Continuous>  dextrose 50% Injectable 25 Gram(s) IV Push once  dextrose 50% Injectable 12.5 Gram(s) IV Push once  dextrose 50% Injectable 25 Gram(s) IV Push once  furosemide   Injectable 40 milliGRAM(s) IV Push daily  glucagon  Injectable 1 milliGRAM(s) IntraMuscular once  insulin glargine Injectable (LANTUS) 15 Unit(s) SubCutaneous at bedtime  insulin lispro (ADMELOG) corrective regimen sliding scale   SubCutaneous three times a day before meals  insulin lispro (ADMELOG) corrective regimen sliding scale   SubCutaneous at bedtime  levothyroxine 50 MICROGram(s) Oral daily  metoprolol succinate  milliGRAM(s) Oral daily  multivitamin 1 Tablet(s) Oral daily  pantoprazole    Tablet 40 milliGRAM(s) Oral before breakfast  simvastatin 20 milliGRAM(s) Oral at bedtime    MEDICATIONS  (PRN):  acetaminophen     Tablet .. 650 milliGRAM(s) Oral every 6 hours PRN Temp greater or equal to 38C (100.4F), Mild Pain (1 - 3)  dextrose Oral Gel 15 Gram(s) Oral once PRN Blood Glucose LESS THAN 70 milliGRAM(s)/deciliter        Objective:     Vitals: Vital Signs Last 24 Hrs  T(C): 36.3 (22 @ 04:32), Max: 36.6 (22 @ 11:15)  T(F): 97.4 (22 @ 04:32), Max: 97.8 (22 @ 11:15)  HR: 60 (22 @ 04:32) (60 - 87)  BP: 144/69 (22 @ 04:32) (116/52 - 144/69)  BP(mean): --  RR: 18 (22 @ 04:32) (18 - 24)  SpO2: 95% (22 @ 04:32) (90% - 98%)            I&O's Summary      PHYSICAL EXAM:  GENERAL: NAD, elderly female  HEAD:  Atraumatic, Normocephalic  EYES: EOMI, PERRLA, conjunctiva and sclera clear  ENMT: Moist mucous membranes  NECK: Supple, +JVD up to mandible   CHEST/LUNG: Adequate inspiratory effort. Crackles heard in b/l bases with some expiratory wheeze. Increased WOB after speaking   HEART: Regular rate and rhythm; No murmurs, rubs, or gallops. +hepatojugular reflux   ABDOMEN: Soft, Nontender, Nondistended; Bowel sounds present  EXTREMITIES:  2+ Peripheral Pulses, 3+ pitting edema with chronic skin changes in LLE. Bandaged wound on RLE with 2+ pitting edema   NERVOUS SYSTEM:  Alert & Oriented X4, Good concentration  PSYCH: Normal Affect. Laying in bed comfortably; not agitated    LABS:                        9.5    7.71  )-----------( 280      ( 2022 12:17 )             33.4     Hgb Trend: 9.5<--      138  |  100  |  54<H>  ----------------------------<  167<H>  4.1   |  23  |  1.87<H>    Ca    10.5      2022 12:17  Mg     2.2         TPro  7.4  /  Alb  3.6  /  TBili  0.6  /  DBili  x   /  AST  19  /  ALT  39  /  AlkPhos  121<H>      Creatinine Trend: 1.87<--  PT/INR - ( 2022 12:17 )   PT: 21.5 sec;   INR: 1.84 ratio         PTT - ( 2022 12:17 )  PTT:34.5 sec              Urinalysis Basic - ( 2022 12:53 )    Color: Yellow / Appearance: Clear / S.017 / pH: x  Gluc: x / Ketone: Negative  / Bili: Negative / Urobili: Negative   Blood: x / Protein: 30 mg/dL / Nitrite: Negative   Leuk Esterase: Moderate / RBC: 4 /hpf / WBC 12 /HPF   Sq Epi: x / Non Sq Epi: 2 /hpf / Bacteria: Many        CAPILLARY BLOOD GLUCOSE      POCT Blood Glucose.: 248 mg/dL (2022 22:39)     Dr. Joann Pretty, PGY-1    Patient is a 89y old  Female who presents with a chief complaint of SOB (2022 18:03)    24-HR EVENTS/SUBJECTIVE: Patient was wheezing overnight, received another Lasix 40mg IVP x1. Patient seen and examined at bedside this AM. Reports that she feels better than yesterday. Denies chest pain, abdominal pain, n/v. Breathing comfortably on 2L NC.   Tele: Ventricular-paced with underlying Afib HR 60s, no ectopy     MEDICATIONS  (STANDING):  allopurinol 100 milliGRAM(s) Oral daily  amLODIPine   Tablet 5 milliGRAM(s) Oral daily  apixaban 2.5 milliGRAM(s) Oral two times a day  azithromycin  IVPB 500 milliGRAM(s) IV Intermittent daily  cefTRIAXone   IVPB 1000 milliGRAM(s) IV Intermittent every 24 hours  dextrose 5%. 1000 milliLiter(s) (100 mL/Hr) IV Continuous <Continuous>  dextrose 5%. 1000 milliLiter(s) (50 mL/Hr) IV Continuous <Continuous>  dextrose 50% Injectable 25 Gram(s) IV Push once  dextrose 50% Injectable 12.5 Gram(s) IV Push once  dextrose 50% Injectable 25 Gram(s) IV Push once  furosemide   Injectable 40 milliGRAM(s) IV Push daily  glucagon  Injectable 1 milliGRAM(s) IntraMuscular once  insulin glargine Injectable (LANTUS) 15 Unit(s) SubCutaneous at bedtime  insulin lispro (ADMELOG) corrective regimen sliding scale   SubCutaneous three times a day before meals  insulin lispro (ADMELOG) corrective regimen sliding scale   SubCutaneous at bedtime  levothyroxine 50 MICROGram(s) Oral daily  metoprolol succinate  milliGRAM(s) Oral daily  multivitamin 1 Tablet(s) Oral daily  pantoprazole    Tablet 40 milliGRAM(s) Oral before breakfast  simvastatin 20 milliGRAM(s) Oral at bedtime    MEDICATIONS  (PRN):  acetaminophen     Tablet .. 650 milliGRAM(s) Oral every 6 hours PRN Temp greater or equal to 38C (100.4F), Mild Pain (1 - 3)  dextrose Oral Gel 15 Gram(s) Oral once PRN Blood Glucose LESS THAN 70 milliGRAM(s)/deciliter        Objective:     Vitals: Vital Signs Last 24 Hrs  T(C): 36.3 (30-22 @ 04:32), Max: 36.6 (22 @ 11:15)  T(F): 97.4 (22 @ 04:32), Max: 97.8 (22 @ 11:15)  HR: 60 (22 @ 04:32) (60 - 87)  BP: 144/69 (22 @ 04:32) (116/52 - 144/69)  BP(mean): --  RR: 18 (22 @ 04:32) (18 - 24)  SpO2: 95% (22 @ 04:32) (90% - 98%)            I&O's Summary      PHYSICAL EXAM:  GENERAL: NAD, elderly female  HEAD:  Atraumatic, Normocephalic  EYES: EOMI, PERRLA, conjunctiva and sclera clear  ENMT: Moist mucous membranes  NECK: Supple, +JVD up to mandible   CHEST/LUNG: Adequate inspiratory effort. Crackles heard in b/l bases with some expiratory wheeze. On 2L NC.   HEART: Regular rate and rhythm; No murmurs, rubs, or gallops. +hepatojugular reflux   ABDOMEN: Soft, Nontender, Nondistended; Bowel sounds present  EXTREMITIES:  2+ Peripheral Pulses, 2+ pitting edema with chronic skin changes in LLE. Bandaged wound on RLE with 2+ pitting edema   NERVOUS SYSTEM:  Alert & Oriented X4, Good concentration  PSYCH: Normal Affect. Laying in bed comfortably; not agitated    LABS:                        9.5    7.71  )-----------( 280      ( 2022 12:17 )             33.4     Hgb Trend: 9.5<--      138  |  100  |  54<H>  ----------------------------<  167<H>  4.1   |  23  |  1.87<H>    Ca    10.5      2022 12:17  Mg     2.2         TPro  7.4  /  Alb  3.6  /  TBili  0.6  /  DBili  x   /  AST  19  /  ALT  39  /  AlkPhos  121<H>      Creatinine Trend: 1.87<--  PT/INR - ( 2022 12:17 )   PT: 21.5 sec;   INR: 1.84 ratio         PTT - ( 2022 12:17 )  PTT:34.5 sec              Urinalysis Basic - ( 2022 12:53 )    Color: Yellow / Appearance: Clear / S.017 / pH: x  Gluc: x / Ketone: Negative  / Bili: Negative / Urobili: Negative   Blood: x / Protein: 30 mg/dL / Nitrite: Negative   Leuk Esterase: Moderate / RBC: 4 /hpf / WBC 12 /HPF   Sq Epi: x / Non Sq Epi: 2 /hpf / Bacteria: Many        CAPILLARY BLOOD GLUCOSE      POCT Blood Glucose.: 248 mg/dL (2022 22:39)

## 2022-04-30 NOTE — PHYSICAL THERAPY INITIAL EVALUATION ADULT - PRECAUTIONS/LIMITATIONS, REHAB EVAL
CT CHEST: Moderate right and small left pleural effusions with pulmonary edema in the setting of cardiomegaly. Partial lower lobe compressive atelectasis; superimposed consolidation is difficult to exclude on this noncontrast CT./fall precautions

## 2022-04-30 NOTE — PROGRESS NOTE ADULT - PROBLEM SELECTOR PLAN 8
- Home Synthroid 50mcg on weekdays, 100mcg on weekends   - Continue home Synthroid 50mcg daily   - F/u TSH

## 2022-04-30 NOTE — PROGRESS NOTE ADULT - PROBLEM SELECTOR PLAN 2
- HFpEF (EF 75% in 03/2022), AS s/p TAVR (09/2021)  - Patient with increased SOB, RHODES, LE edema, +JVD likely in CHF exacerbation   - Continue home Toprol XL 100mg daily   - Continue Lasix 40mg IVP daily   - Strict I/Os, daily weights   - F/u TTE   - Appreciate cards recs

## 2022-05-01 NOTE — SWALLOW BEDSIDE ASSESSMENT ADULT - SLP PERTINENT HISTORY OF CURRENT PROBLEM
89yo F with PMH on HFpEF (EF 75% 03/2022), Afib (on Eliquis), HTN, DM2, CKD3, hypothyroidism, AS s/p TAVR (09/2021), s/p PPM BIBEMS for hypoxia of 88% on RA. Admitted for CHF exacerbation with superimposed bacterial PNA.

## 2022-05-01 NOTE — PROGRESS NOTE ADULT - PROBLEM SELECTOR PLAN 1
- Likely 2/2 CHF and superimposed bacterial PNA  - Trop 124 -> 117 likely 2/2 CHF   - pro-BNP >8000  - CT Chest on 4/29 shows moderate right and small left pleural effusions with pulmonary edema in the setting of cardiomegaly. Partial lower lobe compressive atelectasis; superimposed consolidation is difficult to exclude on this noncontrast CT.  - S/p ceftriaxone and azithromycin in ED. Procal 0.1, no leukocytosis, no fevers, will monitor off abx    - Continue Lasix 40mg IVP daily   - Currently saturating 98% on 2L NC. Wean as tolerated - Likely 2/2 CHF and superimposed bacterial PNA  - Trop 124 -> 117 likely 2/2 CHF   - pro-BNP >8000  - CT Chest on 4/29 shows moderate right and small left pleural effusions with pulmonary edema in the setting of cardiomegaly. Partial lower lobe compressive atelectasis; superimposed consolidation is difficult to exclude on this noncontrast CT.  - S/p ceftriaxone and azithromycin in ED. Procal 0.1, no leukocytosis, no fevers, will monitor off abx    - Will discuss switching to oral furosemide 40 po daily tomorrow  - Currently saturating 98% on 2L NC. Wean as tolerated - Likely 2/2 CHF and superimposed bacterial PNA  - Trop 124 -> 117 likely 2/2 CHF   - pro-BNP >8000  - CT Chest on 4/29 shows moderate right and small left pleural effusions with pulmonary edema in the setting of cardiomegaly. Partial lower lobe compressive atelectasis; superimposed consolidation is difficult to exclude on this noncontrast CT.  - S/p ceftriaxone and azithromycin in ED. Procal 0.1, no leukocytosis, no fevers, will monitor off abx    - Will discuss switching to oral furosemide 40 po daily tomorrow  - Currently saturating 98% on 2L NC. Wean as tolerated  - Pulm c/s tomorrow regarding thoracentesis

## 2022-05-01 NOTE — SWALLOW BEDSIDE ASSESSMENT ADULT - SWALLOW EVAL: RECOMMENDED DIET
Cm attempted to contact pt's  for follow up but was unable to reach by phone or leave a message. Cm attempted to contact pt's son, Christi Russo, with voice mail message left requesting return call. Regular solids and thin liquids.

## 2022-05-01 NOTE — PROGRESS NOTE ADULT - PROBLEM SELECTOR PLAN 3
- Continue home Eliquis 2.5mg BID   - EKG on admission was ventricularly paced rhythm with underlying Afibat 65 bpm   - Monitor on tele

## 2022-05-01 NOTE — PROGRESS NOTE ADULT - SUBJECTIVE AND OBJECTIVE BOX
INCOMPLETE NOTE PROGRESS NOTE:     Patient is a 89y old  Female who presents with a chief complaint of SOB (01 May 2022 07:21)      SUBJECTIVE / OVERNIGHT EVENTS:  No acute events  Had some sob overnight resolved with nebs    She feels overwhelmed and depressed by her current illness.    ADDITIONAL REVIEW OF SYSTEMS:  No fevers, chest pain, shortness of breath, abdominal pain, lower extremity swelling or pain, dysuria, or constipation.    MEDICATIONS  (STANDING):  albuterol/ipratropium for Nebulization. 3 milliLiter(s) Nebulizer once  allopurinol 100 milliGRAM(s) Oral daily  amLODIPine   Tablet 5 milliGRAM(s) Oral daily  apixaban 2.5 milliGRAM(s) Oral two times a day  dextrose 5%. 1000 milliLiter(s) (50 mL/Hr) IV Continuous <Continuous>  dextrose 5%. 1000 milliLiter(s) (100 mL/Hr) IV Continuous <Continuous>  dextrose 50% Injectable 25 Gram(s) IV Push once  dextrose 50% Injectable 12.5 Gram(s) IV Push once  dextrose 50% Injectable 25 Gram(s) IV Push once  furosemide   Injectable 40 milliGRAM(s) IV Push daily  glucagon  Injectable 1 milliGRAM(s) IntraMuscular once  insulin glargine Injectable (LANTUS) 15 Unit(s) SubCutaneous at bedtime  insulin lispro (ADMELOG) corrective regimen sliding scale   SubCutaneous three times a day before meals  insulin lispro (ADMELOG) corrective regimen sliding scale   SubCutaneous at bedtime  levothyroxine 50 MICROGram(s) Oral daily  metoprolol succinate  milliGRAM(s) Oral daily  multivitamin 1 Tablet(s) Oral daily  pantoprazole    Tablet 40 milliGRAM(s) Oral before breakfast  simvastatin 20 milliGRAM(s) Oral at bedtime    MEDICATIONS  (PRN):  acetaminophen     Tablet .. 650 milliGRAM(s) Oral every 6 hours PRN Temp greater or equal to 38C (100.4F), Mild Pain (1 - 3)  dextrose Oral Gel 15 Gram(s) Oral once PRN Blood Glucose LESS THAN 70 milliGRAM(s)/deciliter      CAPILLARY BLOOD GLUCOSE      POCT Blood Glucose.: 149 mg/dL (01 May 2022 07:50)  POCT Blood Glucose.: 275 mg/dL (2022 21:15)  POCT Blood Glucose.: 274 mg/dL (2022 16:39)  POCT Blood Glucose.: 142 mg/dL (2022 12:04)    I&O's Summary    2022 07:01  -  01 May 2022 07:00  --------------------------------------------------------  IN: 365 mL / OUT: 800 mL / NET: -435 mL        PHYSICAL EXAM:  Vital Signs Last 24 Hrs  T(C): 36.6 (2022 20:51), Max: 36.6 (2022 20:51)  T(F): 97.9 (2022 20:51), Max: 97.9 (2022 20:51)  HR: 61 (2022 20:51) (59 - 61)  BP: 139/78 (2022 20:51) (130/71 - 146/70)  BP(mean): --  RR: 18 (2022 20:51) (18 - 18)  SpO2: 91% (2022 20:51) (90% - 94%)    CONSTITUTIONAL: NAD, well-developed  CARDIOVASCULAR: Normal s1 s2. No JVP  RESPIRATORY: Crackles in left lower lobe.  EXTREMITIES: Trace edema  ABDOMEN: Nontender to palpation, normoactive bowel sounds, no rebound/guarding; No hepatosplenomegaly  MUSCLOSKELETAL: no clubbing or cyanosis of digits; no joint swelling or tenderness to palpation  PSYCH: AOX3, depressed affect    LABS:                        9.0    8.20  )-----------( 268      ( 2022 07:37 )             31.6     05-01    136  |  97  |  61<H>  ----------------------------<  178<H>  3.9   |  24  |  1.99<H>    Ca    10.4      01 May 2022 07:49  Phos  3.5     05-  Mg     2.1     05-    TPro  7.1  /  Alb  3.4  /  TBili  0.3  /  DBili  x   /  AST  17  /  ALT  32  /  AlkPhos  109  04-30    PT/INR - ( 2022 12:17 )   PT: 21.5 sec;   INR: 1.84 ratio         PTT - ( 2022 12:17 )  PTT:34.5 sec      Urinalysis Basic - ( 2022 12:53 )    Color: Yellow / Appearance: Clear / S.017 / pH: x  Gluc: x / Ketone: Negative  / Bili: Negative / Urobili: Negative   Blood: x / Protein: 30 mg/dL / Nitrite: Negative   Leuk Esterase: Moderate / RBC: 4 /hpf / WBC 12 /HPF   Sq Epi: x / Non Sq Epi: 2 /hpf / Bacteria: Many    Tele- afib v pace 70-80      RADIOLOGY & ADDITIONAL TESTS:  Results Reviewed: Labs pending  Imaging Personally Reviewed:  Electrocardiogram Personally Reviewed:    COORDINATION OF CARE:  Care Discussed with Consultants/Other Providers [Y/N]:  Prior or Outpatient Records Reviewed [Y/N]:

## 2022-05-01 NOTE — PROGRESS NOTE ADULT - PROBLEM SELECTOR PLAN 5
- SCr 1.87 on admission (baseline CKD3 1.4-1.6 in 03/2022)  - FeUrea: 28.3% suggesting pre-renal disease   - Monitor BMP daily   - Avoid nephrotoxic agents -Rising Cr. Consider decrease in diuretics

## 2022-05-01 NOTE — PROGRESS NOTE ADULT - SUBJECTIVE AND OBJECTIVE BOX
Date of Service:  05-01-22 @ 11:29    Patient is a 89y old  Female who presents with a chief complaint of SOB (01 May 2022 07:21)      INTERVAL HISTORY: feels unwell; upset about feeling unwell    TELEMETRY Personally reviewed: V pacing/AFIB    REVIEW OF SYSTEMS:   CONSTITUTIONAL: generalized weakness  EYES/ENT: No visual changes; No throat pain  Neck: No pain or stiffness  Respiratory: No cough, wheezing, + shortness of breath  CARDIOVASCULAR: no chest pain or palpitations  GASTROINTESTINAL: No abdominal pain, no nausea, vomiting or hematemesis  GENITOURINARY: No dysuria, frequency or hematuria  NEUROLOGICAL: No stroke like symptoms; states she feels confused   SKIN: No rashes    	  MEDICATIONS:  amLODIPine   Tablet 5 milliGRAM(s) Oral daily  furosemide   Injectable 40 milliGRAM(s) IV Push daily  metoprolol succinate  milliGRAM(s) Oral daily        PHYSICAL EXAM:  T(C): 36.6 (04-30-22 @ 20:51), Max: 36.6 (04-30-22 @ 20:51)  HR: 61 (04-30-22 @ 20:51) (59 - 61)  BP: 139/78 (04-30-22 @ 20:51) (130/71 - 146/70)  RR: 18 (04-30-22 @ 20:51) (18 - 18)  SpO2: 91% (04-30-22 @ 20:51) (90% - 94%)  Wt(kg): --  I&O's Summary    30 Apr 2022 07:01  -  01 May 2022 07:00  --------------------------------------------------------  IN: 365 mL / OUT: 800 mL / NET: -435 mL    01 May 2022 07:01  -  01 May 2022 11:29  --------------------------------------------------------  IN: 120 mL / OUT: 0 mL / NET: 120 mL          Appearance: In no distress	  HEENT:    PERRL, EOMI	  Cardiovascular:  S1 S2  Respiratory: Lungs clear to auscultation	  Gastrointestinal:  Soft, Non-tender, + BS	  Vascularature:  No edema of LE  Psychiatric: Appropriate affect   Neuro: no acute focal deficits; a & o x 4                              9.0    8.20  )-----------( 268      ( 30 Apr 2022 07:37 )             31.6     05-01    136  |  97  |  61<H>  ----------------------------<  178<H>  3.9   |  24  |  1.99<H>    Ca    10.4      01 May 2022 07:49  Phos  3.5     05-01  Mg     2.1     05-01    TPro  7.1  /  Alb  3.4  /  TBili  0.3  /  DBili  x   /  AST  17  /  ALT  32  /  AlkPhos  109  04-30        Labs personally reviewed      ASSESSMENT/PLAN: 	    Ms. Briones is a 89 yo female with PMH of HTN, HLD, CAD s/p CABG, AS s/p TAVR in 9/2021 at [facility], DM, CKD, AF on Eliquis, PPM who presents with 3 days of progressive shortness of breath, + orthopnea, increased LE edema and decreased oxygen saturation on pulse oximeter at home.    Problem/Plan -1  Problem: HFpEF  Plan:  - Followed by Dr. Agapito Woodard for outpatient cardiology  - CT Chest reveals moderate right and small left pleural effusions, cardiomegaly, possibly superimposed consolidation  - proBNP 8208  - Physical exam +JVD, +orthopnea and shortness of breath   - On lasix 40mg PO daily at home  - Strict I&Os  - continue IV Diuresis with Lasix 40mg IVP daily  -ECHO  The right ventricular global function is normal.  The estimated right atrial pressure is elevated.  Estimated right ventricular systolic pressure equals 56mm Hg, assuming right atrial pressure equals 15 mm Hg, consistent with moderate pulmonary hypertension. There is at least moderate tricuspid regurgitation.  Suboptimal visualization of the tricuspid valve and right  ventricle. Right pleural effusion.      Problem/Plan -2  Problem: Aortic Stenosis  Plan:  - s/p TAVR in September 2021  - minimal murmur on ausculation  - aortic valve is well seated and has good flow  -moderate tricuspid regurg    Problem/Plan -3  Problem: CAD  Plan:  - Hx CABG at [facility]  - currently denies chest pain  - EKG without ischemic changes  - Troponin elevated likely i/s/o ADHF exacerbation; peaked 4/29 at 124  - c/w simvastatin 20mg PO daily    Problem/Plan -4  Problem: AF  Plan:  - EKG V-Paced, rate controlled  - c/w Metoprolol and Eliquis  - monitor on tele    Problem/Plan -5  Problem: HLD  Plan:  - c/w simvastatin     Problem/Plan -6  Problem: DM  Plan:  - Most recent HgbA1C is 7.6 on 3.4.22  - ISS         Jenise Fry MSN, FNP-BC, AGACNP-BC, EAMON  Giles Gan, DO [facility]  Cardiovascular Medicine  [address] [place] [address], [address]  Office: [phone]  Cell: [phone] Date of Service:  05-01-22 @ 11:29    Patient is a 89y old  Female who presents with a chief complaint of SOB (01 May 2022 07:21)      INTERVAL HISTORY: feels unwell; upset about feeling unwell    TELEMETRY Personally reviewed: V pacing/AFIB    REVIEW OF SYSTEMS:   CONSTITUTIONAL: generalized weakness  EYES/ENT: No visual changes; No throat pain  Neck: No pain or stiffness  Respiratory: No cough, wheezing, + shortness of breath  CARDIOVASCULAR: no chest pain or palpitations  GASTROINTESTINAL: No abdominal pain, no nausea, vomiting or hematemesis  GENITOURINARY: No dysuria, frequency or hematuria  NEUROLOGICAL: No stroke like symptoms; states she feels confused   SKIN: No rashes    	  MEDICATIONS:  amLODIPine   Tablet 5 milliGRAM(s) Oral daily  furosemide   Injectable 40 milliGRAM(s) IV Push daily  metoprolol succinate  milliGRAM(s) Oral daily        PHYSICAL EXAM:  T(C): 36.6 (04-30-22 @ 20:51), Max: 36.6 (04-30-22 @ 20:51)  HR: 61 (04-30-22 @ 20:51) (59 - 61)  BP: 139/78 (04-30-22 @ 20:51) (130/71 - 146/70)  RR: 18 (04-30-22 @ 20:51) (18 - 18)  SpO2: 91% (04-30-22 @ 20:51) (90% - 94%)  Wt(kg): --  I&O's Summary    30 Apr 2022 07:01  -  01 May 2022 07:00  --------------------------------------------------------  IN: 365 mL / OUT: 800 mL / NET: -435 mL    01 May 2022 07:01  -  01 May 2022 11:29  --------------------------------------------------------  IN: 120 mL / OUT: 0 mL / NET: 120 mL          Appearance: In no distress	  HEENT:    PERRL, EOMI	  Cardiovascular:  S1 S2  Respiratory: Lungs clear to auscultation	  Gastrointestinal:  Soft, Non-tender, + BS	  Vascularature:  No edema of LE  Psychiatric: Appropriate affect   Neuro: no acute focal deficits; a & o x 4                              9.0    8.20  )-----------( 268      ( 30 Apr 2022 07:37 )             31.6     05-01    136  |  97  |  61<H>  ----------------------------<  178<H>  3.9   |  24  |  1.99<H>    Ca    10.4      01 May 2022 07:49  Phos  3.5     05-01  Mg     2.1     05-01    TPro  7.1  /  Alb  3.4  /  TBili  0.3  /  DBili  x   /  AST  17  /  ALT  32  /  AlkPhos  109  04-30        Labs personally reviewed    < from: Transthoracic Echocardiogram (04.30.22 @ 10:54) >  OBSERVATIONS:  Mitral Valve: There is dystrophic mitral annular and  leaflet calcification. Moderate mitral regurgitation. The  peak/mean diastolic mitral gradients= 13/4mmHg (HR= 61bpm)  Aortic Root: Aortic Root: 3.1 cm.  LVOT diameter: 2 cm.  Aortic Valve: There is a transcatheter aortic valve  replacement seen.  The valve is well seated with normal function.  The peak/mean gradients= 14/7mmHg with a DVI= 0.47. Peak  transaortic valve gradient equals 14 mm Hg, mean  transaortic valve gradient equals 7 mm Hg, estimated aortic  valve area equals 1.4 sqcm (by continuity equation), aortic  valve velocity time integral equals 44 cm. There is no  central or paravalvular aortic regurgitation seen. Peak  left ventricular outflow tract gradient equals 3 mm Hg,  mean gradient is equal to 2 mm Hg, LVOT velocity time  integral equals 21 cm.  Left Atrium: Severely dilated left atrium.  LA volume index  = 58 cc/m2.  Left Ventricle: Normal left ventricular systolic function.  No segmental wall motion abnormalities.  The LVEF= 60-65%.  Normal left ventricular size with mild concentric  hypertrophy.  Right Heart: Severe right atrial enlargement.  RA area= 26cm2, RA volume= 91ml. A device wire is noted in  theright heart.  The right ventricle is not visualized on axis for accurate  size measurement.  The right ventricular global function is normal. There is  at least moderate tricuspid regurgitation.  Suboptimal  visualization of the tricuspid valve and right ventricle.  Normal pulmonic valve. Minimal pulmonic regurgitation.  Pericardium/PleuraNormal pericardium with no pericardial  effusion. Right pleural effusion.  Hemodynamic: The estimated right atrial pressure is  elevated. Estimated right ventricular systolic pressure  equals 56 mm Hg, assuming right atrial pressure equals 15  mm Hg, consistent with moderate pulmonary hypertension.    < end of copied text >        ASSESSMENT/PLAN: 	    Ms. Briones is a 89 yo female with PMH of HTN, HLD, CAD s/p CABG, AS s/p TAVR in 9/2021 at [facility], DM, CKD, AF on Eliquis, PPM who presents with 3 days of progressive shortness of breath, + orthopnea, increased LE edema and decreased oxygen saturation on pulse oximeter at home.    Problem/Plan -1  Problem: HFpEF  Plan:  - Followed by Dr. Agapito Woodard for outpatient cardiology  - CT Chest reveals moderate right and small left pleural effusions, cardiomegaly, possibly superimposed consolidation  - proBNP 8208  - Physical exam +JVD, +orthopnea and shortness of breath   - On lasix 40mg PO daily at home  - BMP reviewed from OP cardiologist records on 12/3/21 it was 1.1, and 5 months ago it was 1.2-1.4, here 2 months ago it was 1.4-1.6  - continue IV Diuresis with Lasix 40mg IVP daily, improving. Consider renal eval if Cr does not trend down  -ECHO Left Ventricle: Normal left ventricular systolic function. No segmental wall motion abnormalities.  The LVEF= 60-65%. Moderate mitral regurgitation,  There is a transcatheter aortic valve  replacement seen. Moderate pulmonary hypertension.      Problem/Plan -2  Problem: Aortic Stenosis  Plan:  - s/p TAVR in September 2021  - minimal murmur on ausculation  - aortic valve is well seated and has good flow  -moderate tricuspid regurg    Problem/Plan -3  Problem: CAD  Plan:  - Hx CABG at [facility]  - currently denies chest pain  - EKG without ischemic changes  - Troponin elevated likely i/s/o ADHF exacerbation; peaked 4/29 at 124  - c/w simvastatin 20mg PO daily    Problem/Plan -4  Problem: AF  Plan:  - EKG V-Paced, rate controlled  - c/w Metoprolol and Eliquis  - monitor on tele    Problem/Plan -5  Problem: HLD  Plan:  - c/w simvastatin     Problem/Plan -6  Problem: DM  Plan:  - Most recent HgbA1C is 7.6 on 3.4.22  - ISS         Jenise Fry MSN, FNP-BC, AGACNP-BC, EAMON  iGles Gan, DO [facility]  Cardiovascular Medicine  [address] [place] [address], [address]  Office: [phone]  Cell: [phone]

## 2022-05-01 NOTE — SWALLOW BEDSIDE ASSESSMENT ADULT - SLP GENERAL OBSERVATIONS
Patient encountered awake and alert, upright in bed, +2L/NC, A&Ox4. Able to follow commands and make wants/needs known. +Reduced breath support for speech 2/2 SOB at baseline.

## 2022-05-01 NOTE — SWALLOW BEDSIDE ASSESSMENT ADULT - SWALLOW EVAL: DIAGNOSIS
Patient admitted with CHF exacerbation with ?superimposed bacterial PNA (monitoring off abx given no leukocytosis, no fevers). Patient presents with mild SOB at baseline, but overall swallow function is grossly intact.

## 2022-05-01 NOTE — PROGRESS NOTE ADULT - PROBLEM SELECTOR PLAN 10
Home meds: Continue home Allopurinol 100mg daily, Simvastatin 20mg qhs, Protonix 40mg daily   DVT ppx: Eliquis   Diet: DASH/Renal   Dispo: pending PT recs  DNR/DNI  Communication: Left VM for daughter Claudia on 4/30

## 2022-05-01 NOTE — PROGRESS NOTE ADULT - PROBLEM SELECTOR PLAN 9
Home meds: Continue home Allopurinol 100mg daily, Simvastatin 20mg qhs, Protonix 40mg daily   DVT ppx: Eliquis   Diet: DASH/Renal   Dispo: pending PT recs  DNR/DNI  Communication: Left VM for daughter Claudia on 4/30 E coli in urine. Asymptomatic. No fever or wbc, monitor both.

## 2022-05-01 NOTE — SWALLOW BEDSIDE ASSESSMENT ADULT - COMMENTS
CT Chest on 4/29 shows moderate right and small left pleural effusions with pulmonary edema in the setting of cardiomegaly. Partial lower lobe compressive atelectasis; superimposed consolidation is difficult to exclude on this noncontrast CT.  Bedside POCUS on 4/29 shows RLL consolidation   S/p ceftriaxone and azithromycin in ED. Procal 0.1, no leukocytosis, no fevers, will monitor off abx    4/30: Patient was wheezing overnight, received another Lasix 40mg IVP x1.    **Swallow hx: patient unknown to this service pt encouraged to take small single sips 2/2 SOB at baseline

## 2022-05-01 NOTE — PROGRESS NOTE ADULT - ASSESSMENT
89yo F with PMH on HFpEF (EF 75% 03/2022), Afib (on Eliquis), HTN, DM2, CKD3, hypothyroidism, AS s/p TAVR (09/2021), s/p PPM BIBEMS for hypoxia of 88% on RA. Admitted for CHF exacerbation.

## 2022-05-01 NOTE — PROGRESS NOTE ADULT - ATTENDING COMMENTS
89yo F with PMH on HFpEF (EF 75% 03/2022), Afib (on Eliquis), HTN, DM2, CKD3, hypothyroidism, AS s/p TAVR (09/2021), s/p PPM BIBEMS for hypoxia of 88% on RA. Admitted for CHF exacerbation.     #ADHF: c.w IV diurses, trial of CPAP while diuresis and patient still tachypneic with bilateral crackles ?cardiac wheeze, and difficulty speaking in full sentences. If she doesn't tolerate that is ok. monitor I/O's, daily weights, monitor elytes. Titrate O2 ot keep SPO2> 92% f/u Cardiology    #Afib: V-paced, c/w BB and eliquis

## 2022-05-01 NOTE — SWALLOW BEDSIDE ASSESSMENT ADULT - SWALLOW EVAL: RECOMMENDED FEEDING/EATING TECHNIQUES
rest breaks between bites/sips 2/2 SOB/maintain upright posture during/after eating for 30 mins/small sips/bites

## 2022-05-01 NOTE — SWALLOW BEDSIDE ASSESSMENT ADULT - ASR SWALLOW ASPIRATION MONITOR
Monitor for s/s aspiration/laryngeal penetration. If noted:  D/C p.o. intake, provide non-oral nutrition/hydration/meds, and contact this service @ [phone]/change of breathing pattern/cough/gurgly voice/fever/pneumonia/throat clearing/upper respiratory infection

## 2022-05-01 NOTE — PROGRESS NOTE ADULT - PROBLEM SELECTOR PLAN 2
- HFpEF (EF 75% in 03/2022), AS s/p TAVR (09/2021)  - Patient with increased SOB, RHODES, LE edema, +JVD likely in CHF exacerbation   - Continue home Toprol XL 100mg daily   - Continue Lasix 40mg IVP daily   - Strict I/Os, daily weights   - F/u TTE   - Appreciate cards recs - HFpEF (EF 75% in 03/2022), AS s/p TAVR (09/2021)  - Patient with increased SOB, RHODES, LE edema, +JVD likely in CHF exacerbation   - Continue home Toprol XL 100mg daily   - Consider switch to 40 PO daily tomorrow   - Strict I/Os, daily weights  - Appreciate cards recs

## 2022-05-02 NOTE — PROGRESS NOTE ADULT - SUBJECTIVE AND OBJECTIVE BOX
%%%%INCOMPLETE NOTE%%%%%     Dr. Joann Pretty, PGY-1    Patient is a 89y old  Female who presents with a chief complaint of SOB (01 May 2022 11:28)    24-HR EVENTS/SUBJECTIVE: No acute events overnight. Patient seen and examined at bedside this AM. BMx1 yesterday. Denies chest pain, abdominal pain, cough, n/v, sob. Breathing comfortably on room air.    MEDICATIONS  (STANDING):  albuterol/ipratropium for Nebulization. 3 milliLiter(s) Nebulizer once  allopurinol 100 milliGRAM(s) Oral daily  amLODIPine   Tablet 5 milliGRAM(s) Oral daily  apixaban 2.5 milliGRAM(s) Oral two times a day  dextrose 5%. 1000 milliLiter(s) (100 mL/Hr) IV Continuous <Continuous>  dextrose 5%. 1000 milliLiter(s) (50 mL/Hr) IV Continuous <Continuous>  dextrose 50% Injectable 25 Gram(s) IV Push once  dextrose 50% Injectable 12.5 Gram(s) IV Push once  dextrose 50% Injectable 25 Gram(s) IV Push once  furosemide   Injectable 40 milliGRAM(s) IV Push daily  glucagon  Injectable 1 milliGRAM(s) IntraMuscular once  insulin glargine Injectable (LANTUS) 15 Unit(s) SubCutaneous at bedtime  insulin lispro (ADMELOG) corrective regimen sliding scale   SubCutaneous three times a day before meals  insulin lispro (ADMELOG) corrective regimen sliding scale   SubCutaneous at bedtime  levothyroxine 50 MICROGram(s) Oral daily  metoprolol succinate  milliGRAM(s) Oral daily  multivitamin 1 Tablet(s) Oral daily  pantoprazole    Tablet 40 milliGRAM(s) Oral before breakfast  simvastatin 20 milliGRAM(s) Oral at bedtime    MEDICATIONS  (PRN):  acetaminophen     Tablet .. 650 milliGRAM(s) Oral every 6 hours PRN Temp greater or equal to 38C (100.4F), Mild Pain (1 - 3)  dextrose Oral Gel 15 Gram(s) Oral once PRN Blood Glucose LESS THAN 70 milliGRAM(s)/deciliter        Objective:     Vitals: Vital Signs Last 24 Hrs  T(C): 36.9 (05-02-22 @ 04:14), Max: 36.9 (05-02-22 @ 04:14)  T(F): 98.5 (05-02-22 @ 04:14), Max: 98.5 (05-02-22 @ 04:14)  HR: 61 (05-02-22 @ 04:50) (59 - 85)  BP: 123/71 (05-02-22 @ 04:14) (123/71 - 134/75)  BP(mean): --  RR: 19 (05-02-22 @ 04:14) (18 - 19)  SpO2: 94% (05-02-22 @ 04:14) (93% - 95%)            I&O's Summary    01 May 2022 07:01  -  02 May 2022 07:00  --------------------------------------------------------  IN: 845 mL / OUT: 400 mL / NET: 445 mL        PHYSICAL EXAM:  GENERAL: NAD, elderly female  HEAD:  Atraumatic, Normocephalic  EYES: EOMI, PERRLA, conjunctiva and sclera clear  ENMT: Moist mucous membranes  NECK: Supple, +JVD up to mandible   CHEST/LUNG: Adequate inspiratory effort. Crackles heard in b/l bases with some expiratory wheeze. On 2L NC.   HEART: Regular rate and rhythm; No murmurs, rubs, or gallops. +hepatojugular reflux   ABDOMEN: Soft, Nontender, Nondistended; Bowel sounds present  EXTREMITIES:  2+ Peripheral Pulses, 2+ pitting edema with chronic skin changes in LLE. Bandaged wound on RLE with 2+ pitting edema   NERVOUS SYSTEM:  Alert & Oriented X4, Good concentration  PSYCH: Normal Affect. Laying in bed comfortably; not agitated    LABS:                        8.7    11.57 )-----------( 166      ( 02 May 2022 06:00 )             30.6                         9.0    8.20  )-----------( 268      ( 30 Apr 2022 07:37 )             31.6                         9.5    7.71  )-----------( 280      ( 29 Apr 2022 12:17 )             33.4     Hgb Trend: 8.7<--, 9.0<--, 9.5<--  05-02    134<L>  |  96  |  59<H>  ----------------------------<  141<H>  3.9   |  25  |  2.00<H>  05-01    136  |  97  |  61<H>  ----------------------------<  178<H>  3.9   |  24  |  1.99<H>  04-30    137  |  99  |  55<H>  ----------------------------<  163<H>  4.0   |  22  |  1.86<H>    Ca    10.6<H>      02 May 2022 06:00  Ca    10.4      01 May 2022 07:49  Ca    10.3      30 Apr 2022 07:21  Phos  3.5     05-02  Mg     2.1     05-02    TPro  7.1  /  Alb  3.4  /  TBili  0.3  /  DBili  x   /  AST  17  /  ALT  32  /  AlkPhos  109  04-30  TPro  7.4  /  Alb  3.6  /  TBili  0.6  /  DBili  x   /  AST  19  /  ALT  39  /  AlkPhos  121<H>  04-29    Creatinine Trend: 2.00<--, 1.99<--, 1.86<--, 1.87<--                    CAPILLARY BLOOD GLUCOSE      POCT Blood Glucose.: 157 mg/dL (01 May 2022 21:25)  POCT Blood Glucose.: 201 mg/dL (01 May 2022 16:57)  POCT Blood Glucose.: 174 mg/dL (01 May 2022 11:58)  POCT Blood Glucose.: 149 mg/dL (01 May 2022 07:50)     Dr. Joann Pretty, PGY-1    Patient is a 89y old  Female who presents with a chief complaint of SOB (01 May 2022 11:28)    24-HR EVENTS/SUBJECTIVE: No acute events overnight. Patient seen and examined at bedside this AM. BMx1 two days ago. Patient "did not care" for CPAP machine yesterday, only had it on for 2 hours. Denies chest pain, abdominal pain, n/v, sob. Breathing comfortably on 2L NC.   Tele: V-paced Afib HR 60-70s     MEDICATIONS  (STANDING):  albuterol/ipratropium for Nebulization. 3 milliLiter(s) Nebulizer once  allopurinol 100 milliGRAM(s) Oral daily  amLODIPine   Tablet 5 milliGRAM(s) Oral daily  apixaban 2.5 milliGRAM(s) Oral two times a day  dextrose 5%. 1000 milliLiter(s) (100 mL/Hr) IV Continuous <Continuous>  dextrose 5%. 1000 milliLiter(s) (50 mL/Hr) IV Continuous <Continuous>  dextrose 50% Injectable 25 Gram(s) IV Push once  dextrose 50% Injectable 12.5 Gram(s) IV Push once  dextrose 50% Injectable 25 Gram(s) IV Push once  furosemide   Injectable 40 milliGRAM(s) IV Push daily  glucagon  Injectable 1 milliGRAM(s) IntraMuscular once  insulin glargine Injectable (LANTUS) 15 Unit(s) SubCutaneous at bedtime  insulin lispro (ADMELOG) corrective regimen sliding scale   SubCutaneous three times a day before meals  insulin lispro (ADMELOG) corrective regimen sliding scale   SubCutaneous at bedtime  levothyroxine 50 MICROGram(s) Oral daily  metoprolol succinate  milliGRAM(s) Oral daily  multivitamin 1 Tablet(s) Oral daily  pantoprazole    Tablet 40 milliGRAM(s) Oral before breakfast  simvastatin 20 milliGRAM(s) Oral at bedtime    MEDICATIONS  (PRN):  acetaminophen     Tablet .. 650 milliGRAM(s) Oral every 6 hours PRN Temp greater or equal to 38C (100.4F), Mild Pain (1 - 3)  dextrose Oral Gel 15 Gram(s) Oral once PRN Blood Glucose LESS THAN 70 milliGRAM(s)/deciliter        Objective:     Vitals: Vital Signs Last 24 Hrs  T(C): 36.9 (05-02-22 @ 04:14), Max: 36.9 (05-02-22 @ 04:14)  T(F): 98.5 (05-02-22 @ 04:14), Max: 98.5 (05-02-22 @ 04:14)  HR: 61 (05-02-22 @ 04:50) (59 - 85)  BP: 123/71 (05-02-22 @ 04:14) (123/71 - 134/75)  BP(mean): --  RR: 19 (05-02-22 @ 04:14) (18 - 19)  SpO2: 94% (05-02-22 @ 04:14) (93% - 95%)            I&O's Summary    01 May 2022 07:01  -  02 May 2022 07:00  --------------------------------------------------------  IN: 845 mL / OUT: 400 mL / NET: 445 mL        PHYSICAL EXAM:  GENERAL: NAD, elderly female  HEAD:  Atraumatic, Normocephalic  EYES: EOMI, PERRLA, conjunctiva and sclera clear  ENMT: Moist mucous membranes  NECK: Supple, No JVD  CHEST/LUNG: Adequate inspiratory effort. Mild expiratory wheezes heard in b/l upper lobes. On 2L NC.   HEART: Regular rate and rhythm; No murmurs, rubs, or gallops. +hepatojugular reflux   ABDOMEN: Soft, Nontender, Nondistended; Bowel sounds present  EXTREMITIES:  2+ Peripheral Pulses. 2+ pitting edema with chronic skin changes in LLE. Bandaged wound on RLE with no edema   NERVOUS SYSTEM:  Alert & Oriented X4, Good concentration  PSYCH: Normal Affect. Laying in bed comfortably; not agitated    LABS:                        8.7    11.57 )-----------( 166      ( 02 May 2022 06:00 )             30.6                         9.0    8.20  )-----------( 268      ( 30 Apr 2022 07:37 )             31.6                         9.5    7.71  )-----------( 280      ( 29 Apr 2022 12:17 )             33.4     Hgb Trend: 8.7<--, 9.0<--, 9.5<--  05-02    134<L>  |  96  |  59<H>  ----------------------------<  141<H>  3.9   |  25  |  2.00<H>  05-01    136  |  97  |  61<H>  ----------------------------<  178<H>  3.9   |  24  |  1.99<H>  04-30    137  |  99  |  55<H>  ----------------------------<  163<H>  4.0   |  22  |  1.86<H>    Ca    10.6<H>      02 May 2022 06:00  Ca    10.4      01 May 2022 07:49  Ca    10.3      30 Apr 2022 07:21  Phos  3.5     05-02  Mg     2.1     05-02    TPro  7.1  /  Alb  3.4  /  TBili  0.3  /  DBili  x   /  AST  17  /  ALT  32  /  AlkPhos  109  04-30  TPro  7.4  /  Alb  3.6  /  TBili  0.6  /  DBili  x   /  AST  19  /  ALT  39  /  AlkPhos  121<H>  04-29    Creatinine Trend: 2.00<--, 1.99<--, 1.86<--, 1.87<--                    CAPILLARY BLOOD GLUCOSE      POCT Blood Glucose.: 157 mg/dL (01 May 2022 21:25)  POCT Blood Glucose.: 201 mg/dL (01 May 2022 16:57)  POCT Blood Glucose.: 174 mg/dL (01 May 2022 11:58)  POCT Blood Glucose.: 149 mg/dL (01 May 2022 07:50)

## 2022-05-02 NOTE — PROGRESS NOTE ADULT - PROBLEM SELECTOR PLAN 5
-Rising Cr. Consider decrease in diuretics -Rising Cr. Consider decrease in diuretics. Will switch to oral Lasix

## 2022-05-02 NOTE — PROGRESS NOTE ADULT - PROBLEM SELECTOR PLAN 3
- Continue home Eliquis 2.5mg BID   - EKG on admission was ventricularly paced rhythm with underlying Afib at 65 bpm   - Monitor on tele

## 2022-05-02 NOTE — PATIENT PROFILE ADULT - FALL HARM RISK - HARM RISK INTERVENTIONS

## 2022-05-02 NOTE — PROGRESS NOTE ADULT - PROBLEM SELECTOR PLAN 9
- E coli in urine. Asymptomatic. No fever or wbc.  - Continue to monitor - E coli in urine. Asymptomatic. No fever or wbc.  - Continue to monitor  - WBC 11 today, if fevers, will start Ceftriaxone

## 2022-05-02 NOTE — PROGRESS NOTE ADULT - PROBLEM SELECTOR PLAN 10
Home meds: Continue home Allopurinol 100mg daily, Simvastatin 20mg qhs, Protonix 40mg daily   DVT ppx: Eliquis   Diet: DASH/Renal   Dispo: PT recs home with home PT   DNR/DNI  Communication: Left VM for daughter Claudia on 4/30 Home meds: Continue home Allopurinol 100mg daily, Simvastatin 20mg qhs, Protonix 40mg daily   DVT ppx: Eliquis   Diet: DASH/Renal   Dispo: PT recs home with home PT   DNR/DNI  Communication: Updated daughter Claudia on 5/2

## 2022-05-02 NOTE — PROGRESS NOTE ADULT - PROBLEM SELECTOR PLAN 8
- Home Synthroid 50mcg on weekdays, 100mcg on weekends   - Continue home Synthroid 50mcg daily   - TSH 5.57 (pt's synthroid regimen was recently increased, will hold off on increasing aguila during hospitalization)

## 2022-05-02 NOTE — PROGRESS NOTE ADULT - SUBJECTIVE AND OBJECTIVE BOX
Date of Service   05-02-22 @ 15:42    Patient is a 89y old  Female who presents with a chief complaint of SOB (02 May 2022 07:15)      INTERVAL HISTORY: pt feels ok     TELEMETRY Personally reviewed: Carlos Wilson 50-70's     REVIEW OF SYSTEMS:   CONSTITUTIONAL: No weakness  EYES/ENT: No visual changes; No throat pain  Neck: No pain or stiffness  Respiratory: No cough, wheezing, No shortness of breath  CARDIOVASCULAR: no chest pain or palpitations  GASTROINTESTINAL: No abdominal pain, no nausea, vomiting or hematemesis  GENITOURINARY: No dysuria, frequency or hematuria  NEUROLOGICAL: No stroke like symptoms  SKIN: No rashes    	  MEDICATIONS:  amLODIPine   Tablet 5 milliGRAM(s) Oral daily  furosemide   Injectable 40 milliGRAM(s) IV Push daily  metoprolol succinate  milliGRAM(s) Oral daily        PHYSICAL EXAM:  T(C): 36.4 (05-02-22 @ 11:53), Max: 36.9 (05-02-22 @ 04:14)  HR: 61 (05-02-22 @ 11:53) (59 - 85)  BP: 123/60 (05-02-22 @ 11:53) (123/60 - 134/75)  RR: 20 (05-02-22 @ 11:53) (18 - 20)  SpO2: 93% (05-02-22 @ 11:53) (93% - 95%)  Wt(kg): --  I&O's Summary    01 May 2022 07:01  -  02 May 2022 07:00  --------------------------------------------------------  IN: 845 mL / OUT: 400 mL / NET: 445 mL    02 May 2022 07:01  -  02 May 2022 15:42  --------------------------------------------------------  IN: 435 mL / OUT: 0 mL / NET: 435 mL          Appearance: In no distress	  HEENT:    PERRL, EOMI	  Cardiovascular:  S1 S2, No JVD  Respiratory: Lungs clear to auscultation	  Gastrointestinal:  Soft, Non-tender, + BS	  Vascularature:  No edema of LE  Psychiatric: Appropriate affect   Neuro: no acute focal deficits                               8.7    11.57 )-----------( 166      ( 02 May 2022 06:00 )             30.6     05-02    134<L>  |  96  |  59<H>  ----------------------------<  141<H>  3.9   |  25  |  2.00<H>    Ca    10.6<H>      02 May 2022 06:00  Phos  3.5     05-02  Mg     2.1     05-02          Labs personally reviewed      ASSESSMENT/PLAN: 	    Ms. Briones is a 89 yo female with PMH of HTN, HLD, CAD s/p CABG, AS s/p TAVR in 9/2021 at [facility], DM, CKD, AF on Eliquis, PPM who presents with 3 days of progressive shortness of breath, + orthopnea, increased LE edema and decreased oxygen saturation on pulse oximeter at home.    Problem/Plan -1  Problem: HFpEF  Plan:  - Followed by Dr. Agapito Woodard for outpatient cardiology  - CT Chest reveals moderate right and small left pleural effusions, cardiomegaly, possibly superimposed consolidation  - proBNP 8208  - Physical exam +JVD, +orthopnea and shortness of breath   - On lasix 40mg PO daily at home  - BMP reviewed from OP cardiologist records on 12/3/21 it was 1.1, and 5 months ago it was 1.2-1.4, here 2 months ago it was 1.4-1.6  - ECHO Left Ventricle: Normal left ventricular systolic function. No segmental wall motion abnormalities.  The LVEF= 60-65%. Moderate mitral regurgitation,  There is a transcatheter aortic valve  replacement seen. Moderate pulmonary hypertension.  - continue IV Diuresis with Lasix 40mg IVP daily, improving. Consider renal eval if Cr does not trend down    Problem/Plan -2  Problem: Aortic Stenosis  Plan:  - s/p TAVR in September 2021  - minimal murmur on ausculation  - aortic valve is well seated and has good flow  -moderate tricuspid regurg    Problem/Plan -3  Problem: CAD  Plan:  - Hx CABG at [facility]  - currently denies chest pain  - EKG without ischemic changes  - Troponin elevated likely i/s/o ADHF exacerbation; peaked 4/29 at 124  - c/w simvastatin 20mg PO daily    Problem/Plan -4  Problem: AF  Plan:  - EKG V-Paced, rate controlled  - c/w Metoprolol and Eliquis  - monitor on tele    Problem/Plan -5  Problem: HLD  Plan:  - c/w simvastatin     Problem/Plan -6  Problem: DM  Plan:  - Most recent HgbA1C is 7.6 on 3.4.22  - ISS     Jacob Diaz [facility]   Giles Gan DO [facility]  Cardiovascular Medicine  [address] [place] [address], [address]  Office: [phone]   Date of Service   05-02-22 @ 15:42    Patient is a 89y old  Female who presents with a chief complaint of SOB (02 May 2022 07:15)      INTERVAL HISTORY: pt feels ok     TELEMETRY Personally reviewed: Carlos Wilson 50-70's     REVIEW OF SYSTEMS:   CONSTITUTIONAL: No weakness  EYES/ENT: No visual changes; No throat pain  Neck: No pain or stiffness  Respiratory: No cough, wheezing, No shortness of breath  CARDIOVASCULAR: no chest pain or palpitations  GASTROINTESTINAL: No abdominal pain, no nausea, vomiting or hematemesis  GENITOURINARY: No dysuria, frequency or hematuria  NEUROLOGICAL: No stroke like symptoms  SKIN: No rashes    	  MEDICATIONS:  amLODIPine   Tablet 5 milliGRAM(s) Oral daily  furosemide   Injectable 40 milliGRAM(s) IV Push daily  metoprolol succinate  milliGRAM(s) Oral daily        PHYSICAL EXAM:  T(C): 36.4 (05-02-22 @ 11:53), Max: 36.9 (05-02-22 @ 04:14)  HR: 61 (05-02-22 @ 11:53) (59 - 85)  BP: 123/60 (05-02-22 @ 11:53) (123/60 - 134/75)  RR: 20 (05-02-22 @ 11:53) (18 - 20)  SpO2: 93% (05-02-22 @ 11:53) (93% - 95%)  Wt(kg): --  I&O's Summary    01 May 2022 07:01  -  02 May 2022 07:00  --------------------------------------------------------  IN: 845 mL / OUT: 400 mL / NET: 445 mL    02 May 2022 07:01  -  02 May 2022 15:42  --------------------------------------------------------  IN: 435 mL / OUT: 0 mL / NET: 435 mL          Appearance: In no distress	  HEENT:    PERRL, EOMI	  Cardiovascular:  S1 S2, No JVD  Respiratory: Lungs clear to auscultation	  Gastrointestinal:  Soft, Non-tender, + BS	  Vascularature:  No edema of LE  Psychiatric: Appropriate affect   Neuro: no acute focal deficits                               8.7    11.57 )-----------( 166      ( 02 May 2022 06:00 )             30.6     05-02    134<L>  |  96  |  59<H>  ----------------------------<  141<H>  3.9   |  25  |  2.00<H>    Ca    10.6<H>      02 May 2022 06:00  Phos  3.5     05-02  Mg     2.1     05-02          Labs personally reviewed      ASSESSMENT/PLAN: 	    Ms. Briones is a 89 yo female with PMH of HTN, HLD, CAD s/p CABG, AS s/p TAVR in 9/2021 at [facility], DM, CKD, AF on Eliquis, PPM who presents with 3 days of progressive shortness of breath, + orthopnea, increased LE edema and decreased oxygen saturation on pulse oximeter at home.    Problem/Plan -1  Problem: HFpEF  Plan:  - Followed by Dr. Agapito Woodard for outpatient cardiology  - CT Chest reveals moderate right and small left pleural effusions, cardiomegaly, possibly superimposed consolidation  - On lasix 40mg PO daily at home  - BMP reviewed from OP cardiologist records on 12/3/21 it was 1.1, and 5 months ago it was 1.2-1.4, here 2 months ago it was 1.4-1.6  - ECHO Left Ventricle: Normal left ventricular systolic function. No segmental wall motion abnormalities.  The LVEF= 60-65%. Moderate mitral regurgitation, There is a transcatheter aortic valve replacement seen. Moderate pulmonary hypertension.  - continue IV Diuresis with Lasix 40mg IVP daily, improving. Consider renal eval if Cr does not trend down  - agree with switching to home dose Lasix 40mg PO qd on 5/2    Problem/Plan -2  Problem: Aortic Stenosis  Plan:  - s/p TAVR in September 2021  - minimal murmur on ausculation  - aortic valve is well seated and has good flow  -moderate tricuspid regurg    Problem/Plan -3  Problem: CAD  Plan:  - Hx CABG at [facility]  - currently denies chest pain  - EKG without ischemic changes  - Troponin elevated likely i/s/o ADHF exacerbation; peaked 4/29 at 124  - c/w simvastatin 20mg PO daily    Problem/Plan -4  Problem: AF  Plan:  - EKG V-Paced, rate controlled  - c/w Metoprolol and Eliquis  - monitor on tele    Problem/Plan -5  Problem: HLD  Plan:  - c/w simvastatin     Problem/Plan -6  Problem: DM  Plan:  - Most recent HgbA1C is 7.6 on 3.4.22  - ISS         Jacob Diaz [facility]   Glies Gan DO [facility]  Cardiovascular Medicine  [address] [place] [address], [address]  Office: [phone]

## 2022-05-02 NOTE — PROGRESS NOTE ADULT - SUBJECTIVE AND OBJECTIVE BOX
Patient is a 89y old  Female who presents with a chief complaint of SOB (02 May 2022 15:42)       INTERVAL HPI/OVERNIGHT EVENTS:  Patient seen and evaluated at bedside.  Pt is resting comfortable in NAD.   Allergies    Bactrim (Unknown)  Flagyl (Hives; Pruritus)  Macrobid (Other)  sulfa drugs (Hives)  Zosyn (Other)    Intolerances        Vital Signs Last 24 Hrs  T(C): 36.4 (02 May 2022 11:53), Max: 36.9 (02 May 2022 04:14)  T(F): 97.6 (02 May 2022 11:53), Max: 98.5 (02 May 2022 04:14)  HR: 62 (02 May 2022 15:58) (59 - 85)  BP: 123/60 (02 May 2022 11:53) (123/60 - 134/75)  BP(mean): --  RR: 20 (02 May 2022 11:53) (18 - 20)  SpO2: 93% (02 May 2022 15:58) (93% - 95%)    LABS:                        8.7    11.57 )-----------( 166      ( 02 May 2022 06:00 )             30.6     05-02    134<L>  |  96  |  59<H>  ----------------------------<  141<H>  3.9   |  25  |  2.00<H>    Ca    10.6<H>      02 May 2022 06:00  Phos  3.5     05-02  Mg     2.1     05-02          CAPILLARY BLOOD GLUCOSE      POCT Blood Glucose.: 133 mg/dL (02 May 2022 17:00)  POCT Blood Glucose.: 139 mg/dL (02 May 2022 12:07)  POCT Blood Glucose.: 117 mg/dL (02 May 2022 08:17)  POCT Blood Glucose.: 157 mg/dL (01 May 2022 21:25)      Lower Extremity Physical Exam:  Vascular: DP/PT nonpalpable 2/2 to edema, B/L, CFT <3 seconds B/L, Temperature gradient WNL, B/L.   Neuro: Epicritic sensation diminished to the level of ankle, B/L.  Musculoskeletal/Ortho: unremarkable  Skin: R foot plantar heel wound with well adhered eschar, no wet conversion, no bogginess, no erythema, no malodor, negative probe to bone, small scab noted plantar aspect of left heel      RADIOLOGY  < from: Xray Foot AP + Lateral, Right (04.29.22 @ 12:56) >  ACC: [id number] EXAM:  XR FOOT 2 VIEWS RT                          PROCEDURE DATE:  04/29/2022          INTERPRETATION:  CLINICAL INDICATION: right heel ulcer    EXAM:  Frontal and lateral right foot from 4/29/2022 at 1256. No similar prior   studiesavailable for comparison.    IMPRESSION:  Nonspecific prominent dorsal soft tissue swelling.    Posterior heel soft tissue ulceration. No tracking gas collections beyond   this region. Underlying plantar and posterior calcaneal enthesopathic   changenoted otherwise no definite radiographic evidence for   osteomyelitis. If there is continued concern MRI suggested to further   assess as warranted.    No acute fractures or dislocations.    Tarsometatarsal alignment maintained without evidence for aLisfranc   injury. Advanced subtalar arthrosis. Prominent hallux valgus deformity   with associated bunion. Congenitally fused 5th DIP joint. Preserved   remaining joint spaces and no joint margin erosions.    Navicular bone laterally rotated on talar head with medial talar head   uncovering. Associated pes planus.    Chronic periosteal reaction along 3rd and 4th metatarsal shafts.    Generalized osteopenia otherwise no discrete lytic or blastic lesions.    Vascular calcifications.    --- End of Report ---            MELONIE IRIZARRY MD; Attending Radiologist  This document has been electronically signed. Apr 29 2022  4:22PM    < end of copied text >

## 2022-05-02 NOTE — PROGRESS NOTE ADULT - ATTENDING COMMENTS
89yo F with PMH on HFpEF (EF 75% 03/2022), Afib (on Eliquis), HTN, DM2, CKD3, hypothyroidism, AS s/p TAVR (09/2021), s/p PPM BIBEMS for hypoxia of 88% on RA. Admitted for CHF exacerbation.     #ADHF: c/w IV diuretics, trial of CPAP last night, patient only tolerated 2 hours.  Repeat CXR with improving pleural effusions, but Pt still with evidence of volume overload .Check urine sodium (I/O's not clearly accurate) and will consider increasing lasix to BID while closely monitoring renal function. monitor I/O's, daily weights, monitor elytes. Titrate O2 ot keep SPO2> 92% f/u Cardiology    #Hypoxic Resp failure 2/2 ADHF Vs less likely PNA (WBC increasing, but no cough or fever)    #Asymptomatic bacteria: pt with urine cx  growing E. coli, but she does not have symptoms. No clear benefit for treating. Will monitor. If she develops furher evidence of infection, will consider urinary as a source.  #Afib: V-paced, c/w BB and eliquis .    #MARICARMEN: Cr=1.66 on admission, slowly increased to 2. Can be 2/2 cardiorenal vs diuresis, will workup further if not improving.

## 2022-05-02 NOTE — PROGRESS NOTE ADULT - ASSESSMENT
89 y/o F with right heel wound  - pt seen and evaluated  - R foot plantar heel wound with well adhered eschar, no wet conversion, no bogginess, no erythema, no malodor, negative probe to bone  - No culture indicated, no clinical signs of infection  - R foot xray negative for osteo   - Dry sterile dressing applied to right heel with betadine, recommend daily dressing changes  - Ordered zlows to offload bilateral heels at all times   - Patient is stable from podiatry standpoint   - F.u information and instructions can be found in the f/u section of d/c provider note

## 2022-05-02 NOTE — PROGRESS NOTE ADULT - PROBLEM SELECTOR PLAN 1
- Likely 2/2 CHF and superimposed bacterial PNA  - Trop 124 -> 117 likely 2/2 CHF   - pro-BNP >8000  - CT Chest on 4/29 shows moderate right and small left pleural effusions with pulmonary edema in the setting of cardiomegaly. Partial lower lobe compressive atelectasis; superimposed consolidation is difficult to exclude on this noncontrast CT.  - S/p ceftriaxone and azithromycin in ED. Procal 0.1, no leukocytosis, no fevers, will monitor off abx    - Will discuss switching to oral furosemide 40 po daily tomorrow  - Currently saturating 98% on 2L NC. Wean as tolerated  - Pulm c/s tomorrow regarding thoracentesis - Likely 2/2 CHF and superimposed bacterial PNA  - Trop 124 -> 117 likely 2/2 CHF   - pro-BNP >8000  - CT Chest on 4/29 shows moderate right and small left pleural effusions with pulmonary edema in the setting of cardiomegaly. Partial lower lobe compressive atelectasis; superimposed consolidation is difficult to exclude on this noncontrast CT.  - S/p ceftriaxone and azithromycin in ED. Procal 0.1, no leukocytosis, no fevers, will monitor off abx    - Switch from IV to Furosemide 40 PO daily   - Currently saturating 98% on 2L NC. Wean as tolerated  - Pulm consulted for possible thoracentesis - Likely 2/2 CHF and superimposed bacterial PNA  - Trop 124 -> 117 likely 2/2 CHF   - pro-BNP >8000  - CT Chest on 4/29 shows moderate right and small left pleural effusions with pulmonary edema in the setting of cardiomegaly. Partial lower lobe compressive atelectasis; superimposed consolidation is difficult to exclude on this noncontrast CT.  - S/p ceftriaxone and azithromycin in ED. Procal 0.1, no leukocytosis, no fevers, will monitor off abx    - Consider switch from IV to Furosemide 40 PO daily   - Currently saturating 98% on 2L NC. Wean as tolerated  - Pulm consulted for possible thoracentesis - Likely 2/2 CHF and superimposed bacterial PNA  - Trop 124 -> 117 likely 2/2 CHF   - pro-BNP >8000  - CT Chest on 4/29 shows moderate right and small left pleural effusions with pulmonary edema in the setting of cardiomegaly. Partial lower lobe compressive atelectasis; superimposed consolidation is difficult to exclude on this noncontrast CT.  - S/p ceftriaxone and azithromycin in ED. Procal 0.1, no leukocytosis, no fevers, will monitor off abx    - Consider switch from IV to Furosemide 40 PO daily   - Currently saturating 98% on 2L NC. Wean as tolerated  - Repeat CXR with improving pleural effusions.

## 2022-05-02 NOTE — PROGRESS NOTE ADULT - PROBLEM SELECTOR PLAN 2
- HFpEF (EF 75% in 03/2022), AS s/p TAVR (09/2021)  - Patient with increased SOB, RHODES, LE edema, +JVD likely in CHF exacerbation   - Continue home Toprol XL 100mg daily   - Consider switch to 40 PO daily tomorrow   - Strict I/Os, daily weights  - Appreciate cards recs - HFpEF (EF 75% in 03/2022), AS s/p TAVR (09/2021)  - Patient with increased SOB, RHODES, LE edema, +JVD likely in CHF exacerbation   - Continue home Toprol XL 100mg daily   - Switch from IV to Furosemide 40 PO daily   - Strict I/Os, daily weights  - Appreciate cards recs  - TTE shows EF 65% with mold pulm HTN and mod TR (no significant change from prior) - HFpEF (EF 75% in 03/2022), AS s/p TAVR (09/2021)  - Patient with increased SOB, RHODES, LE edema, +JVD likely in CHF exacerbation   - Continue home Toprol XL 100mg daily   - Consider switch from IV to Furosemide 40 PO daily   - Strict I/Os, daily weights  - Appreciate cards recs  - TTE shows EF 65% with mold pulm HTN and mod TR (no significant change from prior)

## 2022-05-03 PROBLEM — R74.01 TRANSAMINITIS: Status: ACTIVE | Noted: 2022-01-01

## 2022-05-03 NOTE — DIETITIAN INITIAL EVALUATION ADULT - ORAL INTAKE PTA/DIET HISTORY
visited pt at bedside this morning. uninterested in conversation, limited interview. checks blood glucose levels multiple times day, monitors carbohydrate intake. Lantus noted per outpatient medications. pt refusing oral supplements at this time.

## 2022-05-03 NOTE — CHART NOTE - NSCHARTNOTEFT_GEN_A_CORE
POCUS 5/3/22 showed   -bilateral b- lines throughout  -Mod right pleural effusions  -No pleural effusions appreciated on left

## 2022-05-03 NOTE — DIETITIAN INITIAL EVALUATION ADULT - PERTINENT LABORATORY DATA
05-03    132<L>  |  95<L>  |  65<H>  ----------------------------<  237<H>  4.3   |  23  |  2.18<H>    Ca    10.6<H>      03 May 2022 05:36  Phos  4.5     05-03  Mg     2.3     05-03    POCT Blood Glucose.: 195 mg/dL (05-03-22 @ 08:30)  A1C with Estimated Average Glucose Result: 7.4 % (03-04-22 @ 09:15)  A1C with Estimated Average Glucose Result: 7.3 % (12-13-21 @ 10:22)

## 2022-05-03 NOTE — PROGRESS NOTE ADULT - PROBLEM SELECTOR PLAN 9
- E coli in urine. Asymptomatic. No fever or wbc.  - Continue to monitor  - If fevers, will start Ceftriaxone

## 2022-05-03 NOTE — PROGRESS NOTE ADULT - PROBLEM SELECTOR PLAN 5
-Rising Cr. Consider decrease in diuretics. Will switch to oral Lasix -Rising Cr. Consider decrease in diuretics. Consider switch to oral Lasix

## 2022-05-03 NOTE — PROGRESS NOTE ADULT - SUBJECTIVE AND OBJECTIVE BOX
DATE OF SERVICE: 05-03-22 @ 09:56    Patient is a 89y old  Female who presents with a chief complaint of SOB (03 May 2022 07:49)      INTERVAL HISTORY: Feels ok. Breathing much better.    REVIEW OF SYSTEMS:  CONSTITUTIONAL: No weakness  EYES/ENT: No visual changes;  No throat pain   NECK: No pain or stiffness  RESPIRATORY: No cough, wheezing; No shortness of breath  CARDIOVASCULAR: No chest pain or palpitations  GASTROINTESTINAL: No abdominal  pain. No nausea, vomiting, or hematemesis  GENITOURINARY: No dysuria, frequency or hematuria  NEUROLOGICAL: No stroke like symptoms  SKIN: No rashes    TELEMETRY Personally reviewed: AF/ V-Paced 60s  	  MEDICATIONS:  amLODIPine   Tablet 5 milliGRAM(s) Oral daily  furosemide   Injectable 40 milliGRAM(s) IV Push daily  metoprolol succinate  milliGRAM(s) Oral daily        PHYSICAL EXAM:  T(C): 36.8 (05-03-22 @ 04:00), Max: 36.9 (05-02-22 @ 20:24)  HR: 66 (05-03-22 @ 06:39) (58 - 75)  BP: 117/68 (05-03-22 @ 04:00) (114/56 - 123/60)  RR: 19 (05-03-22 @ 04:00) (19 - 20)  SpO2: 95% (05-03-22 @ 06:39) (90% - 96%)  Wt(kg): --  I&O's Summary    02 May 2022 07:01  -  03 May 2022 07:00  --------------------------------------------------------  IN: 795 mL / OUT: 350 mL / NET: 445 mL    03 May 2022 07:01  -  03 May 2022 09:56  --------------------------------------------------------  IN: 120 mL / OUT: 0 mL / NET: 120 mL          Appearance: In no distress	  HEENT:    PERRL, EOMI	  Cardiovascular:  S1 S2, No JVD  Respiratory: Lungs clear to auscultation	  Gastrointestinal:  Soft, Non-tender, + BS	  Vascularature:  LLE +edema (chronic)   Psychiatric: Appropriate affect   Neuro: no acute focal deficits                               8.1    9.82  )-----------( 248      ( 03 May 2022 05:36 )             28.4     05-03    132<L>  |  95<L>  |  65<H>  ----------------------------<  237<H>  4.3   |  23  |  2.18<H>    Ca    10.6<H>      03 May 2022 05:36  Phos  4.5     05-03  Mg     2.3     05-03          Labs personally reviewed      ASSESSMENT/PLAN: 	      Ms. Briones is a 89 yo female with PMH of HTN, HLD, CAD s/p CABG, AS s/p TAVR in 9/2021 at [facility], DM, CKD, AF on Eliquis, PPM who presents with 3 days of progressive shortness of breath, + orthopnea, increased LE edema and decreased oxygen saturation on pulse oximeter at home.    Problem/Plan -1  Problem: HFpEF  Plan:  - Followed by Dr. Agapito Woodard for outpatient cardiology  - CT Chest reveals moderate right and small left pleural effusions, cardiomegaly, possibly superimposed consolidation  - On lasix 40mg PO daily at home  - BMP reviewed from OP cardiologist records on 12/3/21 it was 1.1, and 5 months ago it was 1.2-1.4, here 2 months ago it was 1.4-1.6  - ECHO Left Ventricle: Normal left ventricular systolic function. No segmental wall motion abnormalities.  The LVEF= 60-65%. Moderate mitral regurgitation, There is a transcatheter aortic valve replacement seen. Moderate pulmonary hypertension.  - continue IV Diuresis with Lasix 40mg IVP daily, improving. Consider renal eval if Cr does not trend down  - agree with switching to home dose Lasix 40mg PO qd on 5/2  - 5/2 Creatinine uptrending. Renal c/s appreciated.   - Patient appears euvolemic. Would hold diuretic until Cr downtrends.    Problem/Plan -2  Problem: Aortic Stenosis  Plan:  - s/p TAVR in September 2021  - minimal murmur on ausculation  - aortic valve is well seated and has good flow  -moderate tricuspid regurg    Problem/Plan -3  Problem: CAD  Plan:  - Hx CABG at [facility]  - currently denies chest pain  - EKG without ischemic changes  - Troponin elevated likely i/s/o ADHF exacerbation; peaked 4/29 at 124  - c/w simvastatin 20mg PO daily    Problem/Plan -4  Problem: AF  Plan:  - EKG V-Paced, rate controlled  - c/w Metoprolol and Eliquis  - monitor on tele    Problem/Plan -5  Problem: HLD  Plan:  - c/w simvastatin     Problem/Plan -6  Problem: DM  Plan:  - Most recent HgbA1C is 7.6 on 3.4.22  - ISS         HERBER Mix-FACUNDO Gan DO [facility]  Cardiovascular Medicine  [address] [place] [address], [address]  Office: [phone]  Cell: [phone] DATE OF SERVICE: 05-03-22 @ 09:56    Patient is a 89y old  Female who presents with a chief complaint of SOB (03 May 2022 07:49)      INTERVAL HISTORY: Feels ok. Breathing much better.    REVIEW OF SYSTEMS:  CONSTITUTIONAL: No weakness  EYES/ENT: No visual changes;  No throat pain   NECK: No pain or stiffness  RESPIRATORY: No cough, wheezing; No shortness of breath  CARDIOVASCULAR: No chest pain or palpitations  GASTROINTESTINAL: No abdominal  pain. No nausea, vomiting, or hematemesis  GENITOURINARY: No dysuria, frequency or hematuria  NEUROLOGICAL: No stroke like symptoms  SKIN: No rashes    TELEMETRY Personally reviewed: AF/ V-Paced 60s  	  MEDICATIONS:  amLODIPine   Tablet 5 milliGRAM(s) Oral daily  furosemide   Injectable 40 milliGRAM(s) IV Push daily  metoprolol succinate  milliGRAM(s) Oral daily        PHYSICAL EXAM:  T(C): 36.8 (05-03-22 @ 04:00), Max: 36.9 (05-02-22 @ 20:24)  HR: 66 (05-03-22 @ 06:39) (58 - 75)  BP: 117/68 (05-03-22 @ 04:00) (114/56 - 123/60)  RR: 19 (05-03-22 @ 04:00) (19 - 20)  SpO2: 95% (05-03-22 @ 06:39) (90% - 96%)  Wt(kg): --  I&O's Summary    02 May 2022 07:01  -  03 May 2022 07:00  --------------------------------------------------------  IN: 795 mL / OUT: 350 mL / NET: 445 mL    03 May 2022 07:01  -  03 May 2022 09:56  --------------------------------------------------------  IN: 120 mL / OUT: 0 mL / NET: 120 mL          Appearance: In no distress	  HEENT:    PERRL, EOMI	  Cardiovascular:  S1 S2, No JVD  Respiratory: Lungs clear to auscultation	  Gastrointestinal:  Soft, Non-tender, + BS	  Vascularature:  LLE +edema (chronic)   Psychiatric: Appropriate affect   Neuro: no acute focal deficits                               8.1    9.82  )-----------( 248      ( 03 May 2022 05:36 )             28.4     05-03    132<L>  |  95<L>  |  65<H>  ----------------------------<  237<H>  4.3   |  23  |  2.18<H>    Ca    10.6<H>      03 May 2022 05:36  Phos  4.5     05-03  Mg     2.3     05-03          Labs personally reviewed      ASSESSMENT/PLAN: 	      Ms. Briones is a 89 yo female with PMH of HTN, HLD, CAD s/p CABG, AS s/p TAVR in 9/2021 at [facility], DM, CKD, AF on Eliquis, PPM who presents with 3 days of progressive shortness of breath, + orthopnea, increased LE edema and decreased oxygen saturation on pulse oximeter at home.    Problem/Plan -1  Problem: HFpEF  Plan:  - Followed by Dr. Agapito Woodard for outpatient cardiology  - CT Chest reveals moderate right and small left pleural effusions, cardiomegaly, possibly superimposed consolidation  - On lasix 40mg PO daily at home  - BMP reviewed from OP cardiologist records on 12/3/21 it was 1.1, and 5 months ago it was 1.2-1.4, here 2 months ago it was 1.4-1.6  - ECHO Left Ventricle: Normal left ventricular systolic function. No segmental wall motion abnormalities.  The LVEF= 60-65%. Moderate mitral regurgitation, There is a transcatheter aortic valve replacement seen. Moderate pulmonary hypertension.  - continue IV Diuresis with Lasix 40mg IVP daily, improving. Consider renal eval if Cr does not trend down  - agree with switching to home dose Lasix 40mg PO qd on 5/2  - 5/3 Creatinine uptrending. Renal c/s appreciated.   - Patient appears euvolemic. Would hold diuretic until Cr downtrends.    Problem/Plan -2  Problem: Aortic Stenosis  Plan:  - s/p TAVR in September 2021  - minimal murmur on ausculation  - aortic valve is well seated and has good flow  -moderate tricuspid regurg    Problem/Plan -3  Problem: CAD  Plan:  - Hx CABG at [facility]  - currently denies chest pain  - EKG without ischemic changes  - Troponin elevated likely i/s/o ADHF exacerbation; peaked 4/29 at 124  - c/w simvastatin 20mg PO daily    Problem/Plan -4  Problem: AF  Plan:  - EKG V-Paced, rate controlled  - c/w Metoprolol and Eliquis  - monitor on tele    Problem/Plan -5  Problem: HLD  Plan:  - c/w simvastatin     Problem/Plan -6  Problem: DM  Plan:  - Most recent HgbA1C is 7.6 on 3.4.22  - ISS         HERBER Mix-FACUNDO Gan DO [facility]  Cardiovascular Medicine  [address] [place] [address], [address]  Office: [phone]  Cell: [phone] DATE OF SERVICE: 05-03-22 @ 09:56    Patient is a 89y old  Female who presents with a chief complaint of SOB (03 May 2022 07:49)      INTERVAL HISTORY: Feels ok. Breathing much better.    REVIEW OF SYSTEMS:  CONSTITUTIONAL: No weakness  EYES/ENT: No visual changes;  No throat pain   NECK: No pain or stiffness  RESPIRATORY: No cough, wheezing; No shortness of breath  CARDIOVASCULAR: No chest pain or palpitations  GASTROINTESTINAL: No abdominal  pain. No nausea, vomiting, or hematemesis  GENITOURINARY: No dysuria, frequency or hematuria  NEUROLOGICAL: No stroke like symptoms  SKIN: No rashes    TELEMETRY Personally reviewed: AF/ V-Paced 60s  	  MEDICATIONS:  amLODIPine   Tablet 5 milliGRAM(s) Oral daily  furosemide   Injectable 40 milliGRAM(s) IV Push daily  metoprolol succinate  milliGRAM(s) Oral daily        PHYSICAL EXAM:  T(C): 36.8 (05-03-22 @ 04:00), Max: 36.9 (05-02-22 @ 20:24)  HR: 66 (05-03-22 @ 06:39) (58 - 75)  BP: 117/68 (05-03-22 @ 04:00) (114/56 - 123/60)  RR: 19 (05-03-22 @ 04:00) (19 - 20)  SpO2: 95% (05-03-22 @ 06:39) (90% - 96%)  Wt(kg): --  I&O's Summary    02 May 2022 07:01  -  03 May 2022 07:00  --------------------------------------------------------  IN: 795 mL / OUT: 350 mL / NET: 445 mL    03 May 2022 07:01  -  03 May 2022 09:56  --------------------------------------------------------  IN: 120 mL / OUT: 0 mL / NET: 120 mL          Appearance: In no distress	  HEENT:    PERRL, EOMI	  Cardiovascular:  S1 S2, No JVD  Respiratory: Lungs clear to auscultation	  Gastrointestinal:  Soft, Non-tender, + BS	  Vascularature:  LLE +edema (chronic)   Psychiatric: Appropriate affect   Neuro: no acute focal deficits                               8.1    9.82  )-----------( 248      ( 03 May 2022 05:36 )             28.4     05-03    132<L>  |  95<L>  |  65<H>  ----------------------------<  237<H>  4.3   |  23  |  2.18<H>    Ca    10.6<H>      03 May 2022 05:36  Phos  4.5     05-03  Mg     2.3     05-03          Labs personally reviewed      ASSESSMENT/PLAN: 	      Ms. Briones is a 89 yo female with PMH of HTN, HLD, CAD s/p CABG, AS s/p TAVR in 9/2021 at [facility], DM, CKD, AF on Eliquis, PPM who presents with 3 days of progressive shortness of breath, + orthopnea, increased LE edema and decreased oxygen saturation on pulse oximeter at home.    Problem/Plan -1  Problem: HFpEF  Plan:  - Followed by Dr. Agapito Woodard for outpatient cardiology  - CT Chest reveals moderate right and small left pleural effusions, cardiomegaly, possibly superimposed consolidation  - On lasix 40mg PO daily at home  - BMP reviewed from OP cardiologist records on 12/3/21 it was 1.1, and 5 months ago it was 1.2-1.4, here 2 months ago it was 1.4-1.6  - ECHO Left Ventricle: Normal left ventricular systolic function. No segmental wall motion abnormalities.  The LVEF= 60-65%. Moderate mitral regurgitation, There is a transcatheter aortic valve replacement seen. Moderate pulmonary hypertension.  - continue IV Diuresis with Lasix 40mg IVP daily, improving. Consider renal eval if Cr does not trend down  - agree with switching to home dose Lasix 40mg PO qd on 5/2  - 5/3 Creatinine uptrending.  - Patient appears close to euvolemia, but not euvolemic yet, although much improved since admission, c/w IV Lasix for now, will closely monitor.    Problem/Plan -2  Problem: Aortic Stenosis  Plan:  - s/p TAVR in September 2021  - minimal murmur on ausculation  - aortic valve is well seated and has good flow  -moderate tricuspid regurg    Problem/Plan -3  Problem: CAD  Plan:  - Hx CABG at [facility]  - currently denies chest pain  - EKG without ischemic changes  - Troponin elevated likely i/s/o ADHF exacerbation; peaked 4/29 at 124  - c/w simvastatin 20mg PO daily    Problem/Plan -4  Problem: AF  Plan:  - EKG V-Paced, rate controlled  - c/w Metoprolol and Eliquis  - monitor on tele    Problem/Plan -5  Problem: HLD  Plan:  - c/w simvastatin     Problem/Plan -6  Problem: DM  Plan:  - Most recent HgbA1C is 7.6 on 3.4.22  - ISS         Ira [employer] AG-NP   Giles Gan DO [facility]  Cardiovascular Medicine  [address], [address]  Office: [phone]  Cell: [phone]

## 2022-05-03 NOTE — PROGRESS NOTE ADULT - PROBLEM SELECTOR PLAN 1
- Likely 2/2 CHF and superimposed bacterial PNA  - Trop 124 -> 117 likely 2/2 CHF   - pro-BNP >8000  - CT Chest on 4/29 shows moderate right and small left pleural effusions with pulmonary edema in the setting of cardiomegaly. Partial lower lobe compressive atelectasis; superimposed consolidation is difficult to exclude on this noncontrast CT.  - S/p ceftriaxone and azithromycin in ED. Procal 0.1, no leukocytosis, no fevers, will monitor off abx    - Consider switch from IV to Furosemide 40 PO daily   - Currently saturating 98% on 2L NC. Wean as tolerated  - Repeat CXR with improving pleural effusions. - Likely 2/2 CHF and superimposed bacterial PNA  - Trop 124 -> 117 likely 2/2 CHF   - pro-BNP >8000  - CT Chest on 4/29 shows moderate right and small left pleural effusions with pulmonary edema in the setting of cardiomegaly. Partial lower lobe compressive atelectasis; superimposed consolidation is difficult to exclude on this noncontrast CT.  - S/p ceftriaxone and azithromycin in ED. Procal 0.1, no leukocytosis, no fevers, will monitor off abx    - Consider switch from IV to Furosemide 40 PO daily due to rising Cr   - Currently saturating 98% on 2L NC. Wean as tolerated  - Repeat CXR with improving pleural effusions.

## 2022-05-03 NOTE — PROGRESS NOTE ADULT - PROBLEM SELECTOR PLAN 10
Home meds: Continue home Allopurinol 100mg daily, Simvastatin 20mg qhs, Protonix 40mg daily   DVT ppx: Eliquis   Diet: DASH/Renal   Dispo: PT recs home with home PT   DNR/DNI  Communication: Updated daughter Claudia on 5/2

## 2022-05-03 NOTE — PROGRESS NOTE ADULT - PROBLEM SELECTOR PLAN 8
- Home Synthroid 50mcg on weekdays, 100mcg on weekends   - Continue home Synthroid 50mcg daily   - TSH 5.57 (pt's synthroid regimen was recently increased, will hold off on increasing dose during this hospitalization)

## 2022-05-03 NOTE — DIETITIAN INITIAL EVALUATION ADULT - REASON FOR ADMISSION
Heart failure  .  Per chart: "89yo F with PMH on HFpEF (EF 75% 03/2022), Afib (on Eliquis), HTN, DM2, CKD3, hypothyroidism, AS s/p TAVR (09/2021), s/p PPM BIBEMS for hypoxia of 88% on RA. Admitted for CHF exacerbation with superimposed bacterial PNA."

## 2022-05-03 NOTE — DIETITIAN INITIAL EVALUATION ADULT - NS FNS DIET ORDER
Diet, Consistent Carbohydrate Renal/No Snacks (04-29-22 @ 18:18) [Active]  .  S/p swallow bedside assessment on 5/1 with recommendation: "Regular solids and thin liquids."

## 2022-05-03 NOTE — DIETITIAN INITIAL EVALUATION ADULT - NSICDXPASTSURGICALHX_GEN_ALL_CORE_FT
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

## 2022-05-03 NOTE — PROGRESS NOTE ADULT - SUBJECTIVE AND OBJECTIVE BOX
%%%%INCOMPLETE NOTE%%%%%     Dr. Joann Pretty, PGY-1    Patient is a 89y old  Female who presents with a chief complaint of SOB (02 May 2022 16:05)    24-HR EVENTS/SUBJECTIVE: No acute events overnight. Patient seen and examined at bedside this AM. BMx1 yesterday. Denies chest pain, abdominal pain, cough, n/v, sob. Breathing comfortably on room air.    MEDICATIONS  (STANDING):  albuterol/ipratropium for Nebulization. 3 milliLiter(s) Nebulizer once  allopurinol 100 milliGRAM(s) Oral daily  amLODIPine   Tablet 5 milliGRAM(s) Oral daily  apixaban 2.5 milliGRAM(s) Oral two times a day  dextrose 5%. 1000 milliLiter(s) (100 mL/Hr) IV Continuous <Continuous>  dextrose 5%. 1000 milliLiter(s) (50 mL/Hr) IV Continuous <Continuous>  dextrose 50% Injectable 25 Gram(s) IV Push once  dextrose 50% Injectable 12.5 Gram(s) IV Push once  dextrose 50% Injectable 25 Gram(s) IV Push once  furosemide   Injectable 40 milliGRAM(s) IV Push daily  glucagon  Injectable 1 milliGRAM(s) IntraMuscular once  insulin glargine Injectable (LANTUS) 15 Unit(s) SubCutaneous at bedtime  insulin lispro (ADMELOG) corrective regimen sliding scale   SubCutaneous three times a day before meals  insulin lispro (ADMELOG) corrective regimen sliding scale   SubCutaneous at bedtime  levothyroxine 50 MICROGram(s) Oral daily  metoprolol succinate  milliGRAM(s) Oral daily  multivitamin 1 Tablet(s) Oral daily  pantoprazole    Tablet 40 milliGRAM(s) Oral before breakfast  simvastatin 20 milliGRAM(s) Oral at bedtime    MEDICATIONS  (PRN):  acetaminophen     Tablet .. 650 milliGRAM(s) Oral every 6 hours PRN Temp greater or equal to 38C (100.4F), Mild Pain (1 - 3)  dextrose Oral Gel 15 Gram(s) Oral once PRN Blood Glucose LESS THAN 70 milliGRAM(s)/deciliter        Objective:     Vitals: Vital Signs Last 24 Hrs  T(C): 36.8 (05-03-22 @ 04:00), Max: 36.9 (05-02-22 @ 20:24)  T(F): 98.2 (05-03-22 @ 04:00), Max: 98.5 (05-02-22 @ 20:24)  HR: 66 (05-03-22 @ 06:39) (58 - 75)  BP: 117/68 (05-03-22 @ 04:00) (114/56 - 123/60)  BP(mean): --  RR: 19 (05-03-22 @ 04:00) (19 - 20)  SpO2: 95% (05-03-22 @ 06:39) (90% - 96%)            I&O's Summary    02 May 2022 07:01  -  03 May 2022 07:00  --------------------------------------------------------  IN: 795 mL / OUT: 350 mL / NET: 445 mL        PHYSICAL EXAM:  GENERAL: NAD, elderly female  HEAD:  Atraumatic, Normocephalic  EYES: EOMI, PERRLA, conjunctiva and sclera clear  ENMT: Moist mucous membranes  NECK: Supple, No JVD  CHEST/LUNG: Adequate inspiratory effort. Mild expiratory wheezes heard in b/l upper lobes. On 2L NC.   HEART: Regular rate and rhythm; No murmurs, rubs, or gallops. +hepatojugular reflux   ABDOMEN: Soft, Nontender, Nondistended; Bowel sounds present  EXTREMITIES:  2+ Peripheral Pulses. 2+ pitting edema with chronic skin changes in LLE. Bandaged wound on RLE with no edema   NERVOUS SYSTEM:  Alert & Oriented X4, Good concentration  PSYCH: Normal Affect. Laying in bed comfortably; not agitated    LABS:                        8.1    9.82  )-----------( 248      ( 03 May 2022 05:36 )             28.4                         8.7    11.57 )-----------( 166      ( 02 May 2022 06:00 )             30.6     Hgb Trend: 8.1<--, 8.7<--, 9.0<--, 9.5<--  05-03    132<L>  |  95<L>  |  65<H>  ----------------------------<  237<H>  4.3   |  23  |  2.18<H>  05-02    134<L>  |  96  |  59<H>  ----------------------------<  141<H>  3.9   |  25  |  2.00<H>  05-01    136  |  97  |  61<H>  ----------------------------<  178<H>  3.9   |  24  |  1.99<H>    Ca    10.6<H>      03 May 2022 05:36  Ca    10.6<H>      02 May 2022 06:00  Ca    10.4      01 May 2022 07:49  Phos  4.5     05-03  Mg     2.3     05-03      Creatinine Trend: 2.18<--, 2.00<--, 1.99<--, 1.86<--, 1.87<--                    CAPILLARY BLOOD GLUCOSE      POCT Blood Glucose.: 289 mg/dL (02 May 2022 21:43)  POCT Blood Glucose.: 133 mg/dL (02 May 2022 17:00)  POCT Blood Glucose.: 139 mg/dL (02 May 2022 12:07)  POCT Blood Glucose.: 117 mg/dL (02 May 2022 08:17)     Dr. Joann Pretty, PGY-1    Patient is a 89y old  Female who presents with a chief complaint of SOB (02 May 2022 16:05)    24-HR EVENTS/SUBJECTIVE: No acute events overnight. Patient seen and examined at bedside this AM. Denies chest pain, abdominal pain, cough, n/v, sob. Breathing comfortably on 3L NC. RN attempted to wean patient off nasal cannula, but patient desaturated to 86% at rest on room air.     MEDICATIONS  (STANDING):  albuterol/ipratropium for Nebulization. 3 milliLiter(s) Nebulizer once  allopurinol 100 milliGRAM(s) Oral daily  amLODIPine   Tablet 5 milliGRAM(s) Oral daily  apixaban 2.5 milliGRAM(s) Oral two times a day  dextrose 5%. 1000 milliLiter(s) (100 mL/Hr) IV Continuous <Continuous>  dextrose 5%. 1000 milliLiter(s) (50 mL/Hr) IV Continuous <Continuous>  dextrose 50% Injectable 25 Gram(s) IV Push once  dextrose 50% Injectable 12.5 Gram(s) IV Push once  dextrose 50% Injectable 25 Gram(s) IV Push once  furosemide   Injectable 40 milliGRAM(s) IV Push daily  glucagon  Injectable 1 milliGRAM(s) IntraMuscular once  insulin glargine Injectable (LANTUS) 15 Unit(s) SubCutaneous at bedtime  insulin lispro (ADMELOG) corrective regimen sliding scale   SubCutaneous three times a day before meals  insulin lispro (ADMELOG) corrective regimen sliding scale   SubCutaneous at bedtime  levothyroxine 50 MICROGram(s) Oral daily  metoprolol succinate  milliGRAM(s) Oral daily  multivitamin 1 Tablet(s) Oral daily  pantoprazole    Tablet 40 milliGRAM(s) Oral before breakfast  simvastatin 20 milliGRAM(s) Oral at bedtime    MEDICATIONS  (PRN):  acetaminophen     Tablet .. 650 milliGRAM(s) Oral every 6 hours PRN Temp greater or equal to 38C (100.4F), Mild Pain (1 - 3)  dextrose Oral Gel 15 Gram(s) Oral once PRN Blood Glucose LESS THAN 70 milliGRAM(s)/deciliter        Objective:     Vitals: Vital Signs Last 24 Hrs  T(C): 36.8 (05-03-22 @ 04:00), Max: 36.9 (05-02-22 @ 20:24)  T(F): 98.2 (05-03-22 @ 04:00), Max: 98.5 (05-02-22 @ 20:24)  HR: 66 (05-03-22 @ 06:39) (58 - 75)  BP: 117/68 (05-03-22 @ 04:00) (114/56 - 123/60)  BP(mean): --  RR: 19 (05-03-22 @ 04:00) (19 - 20)  SpO2: 95% (05-03-22 @ 06:39) (90% - 96%)            I&O's Summary    02 May 2022 07:01  -  03 May 2022 07:00  --------------------------------------------------------  IN: 795 mL / OUT: 350 mL / NET: 445 mL        PHYSICAL EXAM:  GENERAL: NAD, elderly female  HEAD:  Atraumatic, Normocephalic  EYES: EOMI, PERRLA, conjunctiva and sclera clear  ENMT: Moist mucous membranes  NECK: Supple, No JVD  CHEST/LUNG: Adequate inspiratory effort. Mild crackles heard in b/l lower lobes. On 3L NC.   HEART: Regular rate and rhythm; No murmurs, rubs, or gallops.   ABDOMEN: Soft, Nontender, Nondistended; Bowel sounds present  EXTREMITIES:  2+ Peripheral Pulses. 2+ pitting edema with chronic skin changes in LLE. Bandaged wound on RLE with no edema   NERVOUS SYSTEM:  Alert & Oriented X4, Good concentration  PSYCH: Normal Affect. Laying in bed comfortably; not agitated    LABS:                        8.1    9.82  )-----------( 248      ( 03 May 2022 05:36 )             28.4                         8.7    11.57 )-----------( 166      ( 02 May 2022 06:00 )             30.6     Hgb Trend: 8.1<--, 8.7<--, 9.0<--, 9.5<--  05-03    132<L>  |  95<L>  |  65<H>  ----------------------------<  237<H>  4.3   |  23  |  2.18<H>  05-02    134<L>  |  96  |  59<H>  ----------------------------<  141<H>  3.9   |  25  |  2.00<H>  05-01    136  |  97  |  61<H>  ----------------------------<  178<H>  3.9   |  24  |  1.99<H>    Ca    10.6<H>      03 May 2022 05:36  Ca    10.6<H>      02 May 2022 06:00  Ca    10.4      01 May 2022 07:49  Phos  4.5     05-03  Mg     2.3     05-03      Creatinine Trend: 2.18<--, 2.00<--, 1.99<--, 1.86<--, 1.87<--                    CAPILLARY BLOOD GLUCOSE      POCT Blood Glucose.: 289 mg/dL (02 May 2022 21:43)  POCT Blood Glucose.: 133 mg/dL (02 May 2022 17:00)  POCT Blood Glucose.: 139 mg/dL (02 May 2022 12:07)  POCT Blood Glucose.: 117 mg/dL (02 May 2022 08:17)     Dr. Joann Pretty, PGY-1    Patient is a 89y old  Female who presents with a chief complaint of SOB (02 May 2022 16:05)    24-HR EVENTS/SUBJECTIVE: No acute events overnight. Patient seen and examined at bedside this AM. Denies chest pain, abdominal pain, cough, n/v, sob. Breathing comfortably on 3L NC. RN attempted to wean patient off nasal cannula, but patient desaturated to 86% at rest on room air.   Tele: Afib V-paced in HR 60s    MEDICATIONS  (STANDING):  albuterol/ipratropium for Nebulization. 3 milliLiter(s) Nebulizer once  allopurinol 100 milliGRAM(s) Oral daily  amLODIPine   Tablet 5 milliGRAM(s) Oral daily  apixaban 2.5 milliGRAM(s) Oral two times a day  dextrose 5%. 1000 milliLiter(s) (100 mL/Hr) IV Continuous <Continuous>  dextrose 5%. 1000 milliLiter(s) (50 mL/Hr) IV Continuous <Continuous>  dextrose 50% Injectable 25 Gram(s) IV Push once  dextrose 50% Injectable 12.5 Gram(s) IV Push once  dextrose 50% Injectable 25 Gram(s) IV Push once  furosemide   Injectable 40 milliGRAM(s) IV Push daily  glucagon  Injectable 1 milliGRAM(s) IntraMuscular once  insulin glargine Injectable (LANTUS) 15 Unit(s) SubCutaneous at bedtime  insulin lispro (ADMELOG) corrective regimen sliding scale   SubCutaneous three times a day before meals  insulin lispro (ADMELOG) corrective regimen sliding scale   SubCutaneous at bedtime  levothyroxine 50 MICROGram(s) Oral daily  metoprolol succinate  milliGRAM(s) Oral daily  multivitamin 1 Tablet(s) Oral daily  pantoprazole    Tablet 40 milliGRAM(s) Oral before breakfast  simvastatin 20 milliGRAM(s) Oral at bedtime    MEDICATIONS  (PRN):  acetaminophen     Tablet .. 650 milliGRAM(s) Oral every 6 hours PRN Temp greater or equal to 38C (100.4F), Mild Pain (1 - 3)  dextrose Oral Gel 15 Gram(s) Oral once PRN Blood Glucose LESS THAN 70 milliGRAM(s)/deciliter        Objective:     Vitals: Vital Signs Last 24 Hrs  T(C): 36.8 (05-03-22 @ 04:00), Max: 36.9 (05-02-22 @ 20:24)  T(F): 98.2 (05-03-22 @ 04:00), Max: 98.5 (05-02-22 @ 20:24)  HR: 66 (05-03-22 @ 06:39) (58 - 75)  BP: 117/68 (05-03-22 @ 04:00) (114/56 - 123/60)  BP(mean): --  RR: 19 (05-03-22 @ 04:00) (19 - 20)  SpO2: 95% (05-03-22 @ 06:39) (90% - 96%)            I&O's Summary    02 May 2022 07:01  -  03 May 2022 07:00  --------------------------------------------------------  IN: 795 mL / OUT: 350 mL / NET: 445 mL        PHYSICAL EXAM:  GENERAL: NAD, elderly female  HEAD:  Atraumatic, Normocephalic  EYES: EOMI, PERRLA, conjunctiva and sclera clear  ENMT: Moist mucous membranes  NECK: Supple, No JVD  CHEST/LUNG: Adequate inspiratory effort. Mild crackles heard in b/l lower lobes. On 3L NC.   HEART: Regular rate and rhythm; No murmurs, rubs, or gallops.   ABDOMEN: Soft, Nontender, Nondistended; Bowel sounds present  EXTREMITIES:  2+ Peripheral Pulses. 2+ pitting edema with chronic skin changes in LLE. Bandaged wound on RLE with no edema   NERVOUS SYSTEM:  Alert & Oriented X4, Good concentration  PSYCH: Normal Affect. Laying in bed comfortably; not agitated    LABS:                        8.1    9.82  )-----------( 248      ( 03 May 2022 05:36 )             28.4                         8.7    11.57 )-----------( 166      ( 02 May 2022 06:00 )             30.6     Hgb Trend: 8.1<--, 8.7<--, 9.0<--, 9.5<--  05-03    132<L>  |  95<L>  |  65<H>  ----------------------------<  237<H>  4.3   |  23  |  2.18<H>  05-02    134<L>  |  96  |  59<H>  ----------------------------<  141<H>  3.9   |  25  |  2.00<H>  05-01    136  |  97  |  61<H>  ----------------------------<  178<H>  3.9   |  24  |  1.99<H>    Ca    10.6<H>      03 May 2022 05:36  Ca    10.6<H>      02 May 2022 06:00  Ca    10.4      01 May 2022 07:49  Phos  4.5     05-03  Mg     2.3     05-03      Creatinine Trend: 2.18<--, 2.00<--, 1.99<--, 1.86<--, 1.87<--                    CAPILLARY BLOOD GLUCOSE      POCT Blood Glucose.: 289 mg/dL (02 May 2022 21:43)  POCT Blood Glucose.: 133 mg/dL (02 May 2022 17:00)  POCT Blood Glucose.: 139 mg/dL (02 May 2022 12:07)  POCT Blood Glucose.: 117 mg/dL (02 May 2022 08:17)

## 2022-05-03 NOTE — PROGRESS NOTE ADULT - PROBLEM SELECTOR PLAN 6
Brennen Trammell MD at [address] [place] [address] 6    T4 meningioma     Medications  Current Outpatient Prescriptions on File Prior to Visit   Medication Sig Dispense Refill    lidocaine (XYLOCAINE) 5 % ointment Apply topically as needed. 10 g 0    Magnesium Sulfate POWD Epsom Salt warm water soaks. 1 g 0    naproxen (NAPROSYN) 500 MG tablet Take 1 tablet by mouth 2 times daily (with meals) for 10 days 20 tablet 0    Dulaglutide (TRULICITY) 3.75 UW/9.2MJ SOPN Inject 0.75mg once weekly 2 mL 5    metFORMIN (GLUCOPHAGE) 500 MG tablet Take 2 tablets by mouth 2 times daily (with meals) Indications: Impaired Fasting Glucose 120 tablet 5    Eyelid Cleansers (AVENOVA) 0.01 % SOLN Apply 1 drop to eye 4 times daily  0    Docusate Calcium (STOOL SOFTENER PO) Take by mouth ~3x/week.  linaclotide (LINZESS) 145 MCG capsule Take 145 mcg by mouth daily as needed       albuterol (PROVENTIL HFA;VENTOLIN HFA) 108 (90 BASE) MCG/ACT inhaler Inhale 2 puffs into the lungs every 6 hours as needed for Wheezing 1 Inhaler 0    ezetimibe (ZETIA) 10 MG tablet Take 10 mg by mouth daily.  MULTIPLE VITAMIN PO Take 1 tablet by mouth daily.  rosuvastatin (CRESTOR) 40 MG tablet Take 40 mg by mouth every evening. Indications: High Amount of Fats in the Blood       No current facility-administered medications on file prior to visit.       Allergies  Allergies   Allergen Reactions    Cipro Xr      Renal failure     Social History  Social History     Social History    Marital status: Single     Spouse name: N/A    Number of children: N/A    Years of education: N/A     Occupational History    Works [employer] History Main Topics    Smoking - Patient on home Lantus 20U qhs and ISS TID premeal   - Continue Lantus 15U qhs and low ISS TID premeal with fingersticks   - A1c 7.4 in 03/2022

## 2022-05-03 NOTE — PROGRESS NOTE ADULT - ATTENDING COMMENTS
89yo F with PMH on HFpEF (EF 75% 03/2022), Afib (on Eliquis), HTN, DM2, CKD3, hypothyroidism, AS s/p TAVR (09/2021), s/p PPM BIBEMS for hypoxia of 88% on RA. Admitted for CHF exacerbation.     #ADHF: c/w IV diuretics Repeat CXR with improving pleural effusions. POCUS yesterday with evidence of pulm edema, still tachypneic, hypoxemic, and crackles on exam, will POCUS to assess for Pulm edema today. urine sodium low (I/O's not clearly accurate). closely monitoring renal function. monitor I/O's, daily weights, monitor elytes. Titrate O2 ot keep SPO2> 92% f/u Cardiology recs    #Hypoxic Resp failure 2/2 ADHF Vs Atalectasis Vs less likely PNA (No fever, cough or leukocytosis)    #Asymptomatic bacteria: pt with urine cx  growing E. coli, but she does not have symptoms. No clear benefit for treating. Will monitor. If she develops furher evidence of infection, will consider urinary as a source.  #Afib: V-paced, c/w BB and eliquis .    #MARICARMEN: Cr=1.66 on admission, slowly increased to 2. Can be 2/2 cardiorenal vs diuresis, will workup with FEura and renal US. 89yo F with PMH on HFpEF (EF 75% 03/2022), Afib (on Eliquis), HTN, DM2, CKD3, hypothyroidism, AS s/p TAVR (09/2021), s/p PPM BIBEMS for hypoxia of 88% on RA. Admitted for CHF exacerbation.     #ADHF: c/w IV diuretics Repeat CXR with improving pleural effusions. POCUS yesterday with evidence of pulm edema, POCUS today still with bilat diffuse b-lines. still tachypneic, hypoxemic (on 2L NC), and crackles on exam,. urine sodium low (I/O's not clearly accurate). closely monitoring renal function. monitor I/O's, daily weights, monitor elytes. Titrate O2 ot keep SPO2> 92% f/u Cardiology recs    #Hypoxic Resp failure 2/2 ADHF Vs Atalectasis Vs less likely PNA (No fever, cough or leukocytosis)    #Asymptomatic bacteria: pt with urine cx  growing E. coli, but she does not have symptoms. No clear benefit for treating. Will monitor. If she develops furher evidence of infection, will consider urinary as a source.  #Afib: V-paced, c/w BB and eliquis .    #MARICARMEN: Cr=1.66 on admission, slowly increased to 2. Can be 2/2 cardiorenal vs diuresis, will workup with Feura and renal US.

## 2022-05-03 NOTE — DIETITIAN INITIAL EVALUATION ADULT - ADD RECOMMEND
1) Will continue to monitor PO intake, weight, labs, skin, GI status and diet 2) D/c Renal Restriction due to poor PO intake. This will allow more options as pt with increased needs. Monitor electrolytes for need to reinstate. 3) Continue Multivitamin to aid in wound healing. consider addition of Vitamin C to also aid in wound healing if no contraindications. 4) Nutrition risk placed in chart.

## 2022-05-03 NOTE — PROGRESS NOTE ADULT - PROBLEM SELECTOR PLAN 2
- HFpEF (EF 75% in 03/2022), AS s/p TAVR (09/2021)  - Patient with increased SOB, RHODES, LE edema, +JVD likely in CHF exacerbation   - Continue home Toprol XL 100mg daily   - Consider switch from IV to Furosemide 40 PO daily   - Strict I/Os, daily weights  - Appreciate cards recs  - TTE shows EF 65% with mold pulm HTN and mod TR (no significant change from prior)

## 2022-05-04 NOTE — PROGRESS NOTE ADULT - SUBJECTIVE AND OBJECTIVE BOX
%%%%INCOMPLETE NOTE%%%%%     Dr. Joann Pretty, PGY-1    Patient is a 89y old  Female who presents with a chief complaint of Heart failure  .  Per chart: "89yo F with PMH on HFpEF (EF 75% 2022), Afib (on Eliquis), HTN, DM2, CKD3, hypothyroidism, AS s/p TAVR (2021), s/p PPM BIBEMS for hypoxia of 88% on RA. Admitted for CHF exacerbation with superimposed bacterial PNA." (03 May 2022 10:56)    24-HR EVENTS/SUBJECTIVE: No acute events overnight. Patient seen and examined at bedside this AM. BMx1 yesterday. Denies chest pain, abdominal pain, cough, n/v, sob. Breathing comfortably on room air.    MEDICATIONS  (STANDING):  allopurinol 100 milliGRAM(s) Oral daily  amLODIPine   Tablet 5 milliGRAM(s) Oral daily  apixaban 2.5 milliGRAM(s) Oral two times a day  dextrose 5%. 1000 milliLiter(s) (100 mL/Hr) IV Continuous <Continuous>  dextrose 5%. 1000 milliLiter(s) (50 mL/Hr) IV Continuous <Continuous>  dextrose 50% Injectable 25 Gram(s) IV Push once  dextrose 50% Injectable 12.5 Gram(s) IV Push once  dextrose 50% Injectable 25 Gram(s) IV Push once  furosemide   Injectable 40 milliGRAM(s) IV Push daily  glucagon  Injectable 1 milliGRAM(s) IntraMuscular once  insulin glargine Injectable (LANTUS) 15 Unit(s) SubCutaneous at bedtime  insulin lispro (ADMELOG) corrective regimen sliding scale   SubCutaneous three times a day before meals  insulin lispro (ADMELOG) corrective regimen sliding scale   SubCutaneous at bedtime  levothyroxine 50 MICROGram(s) Oral daily  metoprolol succinate  milliGRAM(s) Oral daily  multivitamin 1 Tablet(s) Oral daily  pantoprazole    Tablet 40 milliGRAM(s) Oral before breakfast  simvastatin 20 milliGRAM(s) Oral at bedtime    MEDICATIONS  (PRN):  acetaminophen     Tablet .. 650 milliGRAM(s) Oral every 6 hours PRN Temp greater or equal to 38C (100.4F), Mild Pain (1 - 3)  dextrose Oral Gel 15 Gram(s) Oral once PRN Blood Glucose LESS THAN 70 milliGRAM(s)/deciliter        Objective:     Vitals: Vital Signs Last 24 Hrs  T(C): 36.5 (22 @ 04:00), Max: 36.5 (22 @ 04:00)  T(F): 97.7 (22 @ 04:00), Max: 97.7 (22 @ 04:00)  HR: 60 (22 @ 04:00) (60 - 80)  BP: 126/76 (22 @ 04:00) (118/70 - 126/76)  BP(mean): --  RR: 19 (22 @ 04:00) (18 - 19)  SpO2: 94% (22 @ 04:00) (86% - 96%)            I&O's Summary    03 May 2022 07:01  -  04 May 2022 07:00  --------------------------------------------------------  IN: 360 mL / OUT: 350 mL / NET: 10 mL        PHYSICAL EXAM:  GENERAL: NAD, elderly female  HEAD:  Atraumatic, Normocephalic  EYES: EOMI, PERRLA, conjunctiva and sclera clear  ENMT: Moist mucous membranes  NECK: Supple, No JVD  CHEST/LUNG: Adequate inspiratory effort. Mild crackles heard in b/l lower lobes. On 3L NC.   HEART: Regular rate and rhythm; No murmurs, rubs, or gallops.   ABDOMEN: Soft, Nontender, Nondistended; Bowel sounds present  EXTREMITIES:  2+ Peripheral Pulses. 2+ pitting edema with chronic skin changes in LLE. Bandaged wound on RLE with no edema   NERVOUS SYSTEM:  Alert & Oriented X4, Good concentration  PSYCH: Normal Affect. Laying in bed comfortably; not agitated    LABS:                        8.1    7.15  )-----------( 253      ( 04 May 2022 06:26 )             27.9                         8.1    9.82  )-----------( 248      ( 03 May 2022 05:36 )             28.4                         8.7    11.57 )-----------( 166      ( 02 May 2022 06:00 )             30.6     Hgb Trend: 8.1<--, 8.1<--, 8.7<--, 9.0<--, 9.5<--  05-04    132<L>  |  95<L>  |  72<H>  ----------------------------<  232<H>  4.3   |  24  |  2.17<H>  05-03    132<L>  |  95<L>  |  65<H>  ----------------------------<  237<H>  4.3   |  23  |  2.18<H>  05-02    134<L>  |  96  |  59<H>  ----------------------------<  141<H>  3.9   |  25  |  2.00<H>    Ca    10.4      04 May 2022 06:25  Ca    10.6<H>      03 May 2022 05:36  Ca    10.6<H>      02 May 2022 06:00  Phos  4.0     05-04  Mg     2.4     05-04      Creatinine Trend: 2.17<--, 2.18<--, 2.00<--, 1.99<--, 1.86<--, 1.87<--                Urinalysis Basic - ( 03 May 2022 10:59 )    Color: Yellow / Appearance: Clear / S.022 / pH: x  Gluc: x / Ketone: Negative  / Bili: Negative / Urobili: Negative   Blood: x / Protein: 30 mg/dL / Nitrite: Negative   Leuk Esterase: Negative / RBC: 6 /hpf / WBC 2 /HPF   Sq Epi: x / Non Sq Epi: 5 /hpf / Bacteria: 0.0        CAPILLARY BLOOD GLUCOSE      POCT Blood Glucose.: 282 mg/dL (03 May 2022 21:35)  POCT Blood Glucose.: 266 mg/dL (03 May 2022 17:02)  POCT Blood Glucose.: 195 mg/dL (03 May 2022 11:37)  POCT Blood Glucose.: 195 mg/dL (03 May 2022 08:30)     %%%%INCOMPLETE NOTE%%%%%     Dr. Joann Pretty, PGY-1    Patient is a 89y old  Female who presents with a chief complaint of Heart failure  .  Per chart: "89yo F with PMH on HFpEF (EF 75% 2022), Afib (on Eliquis), HTN, DM2, CKD3, hypothyroidism, AS s/p TAVR (2021), s/p PPM BIBEMS for hypoxia of 88% on RA. Admitted for CHF exacerbation with superimposed bacterial PNA." (03 May 2022 10:56)    24-HR EVENTS/SUBJECTIVE: POCUS at bedside yesterday showed diffuse b/l b-lines. Received Lasix 40mg IVP x2 yesterday. Patient seen and examined at bedside this AM. Denies chest pain, abdominal pain, cough, n/v, sob. Breathing comfortably on room air.    MEDICATIONS  (STANDING):  allopurinol 100 milliGRAM(s) Oral daily  amLODIPine   Tablet 5 milliGRAM(s) Oral daily  apixaban 2.5 milliGRAM(s) Oral two times a day  dextrose 5%. 1000 milliLiter(s) (100 mL/Hr) IV Continuous <Continuous>  dextrose 5%. 1000 milliLiter(s) (50 mL/Hr) IV Continuous <Continuous>  dextrose 50% Injectable 25 Gram(s) IV Push once  dextrose 50% Injectable 12.5 Gram(s) IV Push once  dextrose 50% Injectable 25 Gram(s) IV Push once  furosemide   Injectable 40 milliGRAM(s) IV Push daily  glucagon  Injectable 1 milliGRAM(s) IntraMuscular once  insulin glargine Injectable (LANTUS) 15 Unit(s) SubCutaneous at bedtime  insulin lispro (ADMELOG) corrective regimen sliding scale   SubCutaneous three times a day before meals  insulin lispro (ADMELOG) corrective regimen sliding scale   SubCutaneous at bedtime  levothyroxine 50 MICROGram(s) Oral daily  metoprolol succinate  milliGRAM(s) Oral daily  multivitamin 1 Tablet(s) Oral daily  pantoprazole    Tablet 40 milliGRAM(s) Oral before breakfast  simvastatin 20 milliGRAM(s) Oral at bedtime    MEDICATIONS  (PRN):  acetaminophen     Tablet .. 650 milliGRAM(s) Oral every 6 hours PRN Temp greater or equal to 38C (100.4F), Mild Pain (1 - 3)  dextrose Oral Gel 15 Gram(s) Oral once PRN Blood Glucose LESS THAN 70 milliGRAM(s)/deciliter        Objective:     Vitals: Vital Signs Last 24 Hrs  T(C): 36.5 (22 @ 04:00), Max: 36.5 (22 @ 04:00)  T(F): 97.7 (22 @ 04:00), Max: 97.7 (22 @ 04:00)  HR: 60 (22 @ 04:00) (60 - 80)  BP: 126/76 (22 @ 04:00) (118/70 - 126/76)  BP(mean): --  RR: 19 (22 @ 04:00) (18 - 19)  SpO2: 94% (22 @ 04:00) (86% - 96%)            I&O's Summary    03 May 2022 07:01  -  04 May 2022 07:00  --------------------------------------------------------  IN: 360 mL / OUT: 350 mL / NET: 10 mL        PHYSICAL EXAM:  GENERAL: NAD, elderly female  HEAD:  Atraumatic, Normocephalic  EYES: EOMI, PERRLA, conjunctiva and sclera clear  ENMT: Moist mucous membranes  NECK: Supple, No JVD  CHEST/LUNG: Adequate inspiratory effort. Mild crackles heard in b/l lower lobes. On 3L NC.   HEART: Regular rate and rhythm; No murmurs, rubs, or gallops.   ABDOMEN: Soft, Nontender, Nondistended; Bowel sounds present  EXTREMITIES:  2+ Peripheral Pulses. 2+ pitting edema with chronic skin changes in LLE. Bandaged wound on RLE with no edema   NERVOUS SYSTEM:  Alert & Oriented X4, Good concentration  PSYCH: Normal Affect. Laying in bed comfortably; not agitated    LABS:                        8.1    7.15  )-----------( 253      ( 04 May 2022 06:26 )             27.9                         8.1    9.82  )-----------( 248      ( 03 May 2022 05:36 )             28.4                         8.7    11.57 )-----------( 166      ( 02 May 2022 06:00 )             30.6     Hgb Trend: 8.1<--, 8.1<--, 8.7<--, 9.0<--, 9.5<--  05-04    132<L>  |  95<L>  |  72<H>  ----------------------------<  232<H>  4.3   |  24  |  2.17<H>  05-    132<L>  |  95<L>  |  65<H>  ----------------------------<  237<H>  4.3   |  23  |  2.18<H>  05-    134<L>  |  96  |  59<H>  ----------------------------<  141<H>  3.9   |  25  |  2.00<H>    Ca    10.4      04 May 2022 06:25  Ca    10.6<H>      03 May 2022 05:36  Ca    10.6<H>      02 May 2022 06:00  Phos  4.0     05-04  Mg     2.4     05-04      Creatinine Trend: 2.17<--, 2.18<--, 2.00<--, 1.99<--, 1.86<--, 1.87<--                Urinalysis Basic - ( 03 May 2022 10:59 )    Color: Yellow / Appearance: Clear / S.022 / pH: x  Gluc: x / Ketone: Negative  / Bili: Negative / Urobili: Negative   Blood: x / Protein: 30 mg/dL / Nitrite: Negative   Leuk Esterase: Negative / RBC: 6 /hpf / WBC 2 /HPF   Sq Epi: x / Non Sq Epi: 5 /hpf / Bacteria: 0.0        CAPILLARY BLOOD GLUCOSE      POCT Blood Glucose.: 282 mg/dL (03 May 2022 21:35)  POCT Blood Glucose.: 266 mg/dL (03 May 2022 17:02)  POCT Blood Glucose.: 195 mg/dL (03 May 2022 11:37)  POCT Blood Glucose.: 195 mg/dL (03 May 2022 08:30)     Dr. Joann Pretty, PGY-1    Patient is a 89y old  Female who presents with a chief complaint of Heart failure  .  Per chart: "89yo F with PMH on HFpEF (EF 75% 2022), Afib (on Eliquis), HTN, DM2, CKD3, hypothyroidism, AS s/p TAVR (2021), s/p PPM BIBEMS for hypoxia of 88% on RA. Admitted for CHF exacerbation with superimposed bacterial PNA." (03 May 2022 10:56)    24-HR EVENTS/SUBJECTIVE: POCUS at bedside yesterday showed diffuse b/l B-lines. Received Lasix 40mg IVP x2 yesterday. Patient seen and examined at bedside this AM. Denies chest pain, abdominal pain, n/v, sob. Breathing comfortably on 1L NC. Will give an extra Lasix 40mg IVP again this afternoon.    MEDICATIONS  (STANDING):  allopurinol 100 milliGRAM(s) Oral daily  amLODIPine   Tablet 5 milliGRAM(s) Oral daily  apixaban 2.5 milliGRAM(s) Oral two times a day  dextrose 5%. 1000 milliLiter(s) (100 mL/Hr) IV Continuous <Continuous>  dextrose 5%. 1000 milliLiter(s) (50 mL/Hr) IV Continuous <Continuous>  dextrose 50% Injectable 25 Gram(s) IV Push once  dextrose 50% Injectable 12.5 Gram(s) IV Push once  dextrose 50% Injectable 25 Gram(s) IV Push once  furosemide   Injectable 40 milliGRAM(s) IV Push daily  glucagon  Injectable 1 milliGRAM(s) IntraMuscular once  insulin glargine Injectable (LANTUS) 15 Unit(s) SubCutaneous at bedtime  insulin lispro (ADMELOG) corrective regimen sliding scale   SubCutaneous three times a day before meals  insulin lispro (ADMELOG) corrective regimen sliding scale   SubCutaneous at bedtime  levothyroxine 50 MICROGram(s) Oral daily  metoprolol succinate  milliGRAM(s) Oral daily  multivitamin 1 Tablet(s) Oral daily  pantoprazole    Tablet 40 milliGRAM(s) Oral before breakfast  simvastatin 20 milliGRAM(s) Oral at bedtime    MEDICATIONS  (PRN):  acetaminophen     Tablet .. 650 milliGRAM(s) Oral every 6 hours PRN Temp greater or equal to 38C (100.4F), Mild Pain (1 - 3)  dextrose Oral Gel 15 Gram(s) Oral once PRN Blood Glucose LESS THAN 70 milliGRAM(s)/deciliter        Objective:     Vitals: Vital Signs Last 24 Hrs  T(C): 36.5 (22 @ 04:00), Max: 36.5 (22 @ 04:00)  T(F): 97.7 (22 @ 04:00), Max: 97.7 (22 @ 04:00)  HR: 60 (22 @ 04:00) (60 - 80)  BP: 126/76 (22 @ 04:00) (118/70 - 126/76)  BP(mean): --  RR: 19 (22 @ 04:00) (18 - 19)  SpO2: 94% (22 @ 04:00) (86% - 96%)            I&O's Summary    03 May 2022 07:01  -  04 May 2022 07:00  --------------------------------------------------------  IN: 360 mL / OUT: 350 mL / NET: 10 mL        PHYSICAL EXAM:  GENERAL: NAD, elderly female  HEAD:  Atraumatic, Normocephalic  EYES: EOMI, PERRLA, conjunctiva and sclera clear  ENMT: Moist mucous membranes  NECK: Supple, No JVD  CHEST/LUNG: Adequate inspiratory effort. Mild crackles heard in b/l lower lobes. On 3L NC.   HEART: Regular rate and rhythm; No murmurs, rubs, or gallops.   ABDOMEN: Soft, Nontender, Nondistended; Bowel sounds present  EXTREMITIES:  2+ Peripheral Pulses. 2+ pitting edema with chronic skin changes in LLE. Bandaged wound on RLE with no edema   NERVOUS SYSTEM:  Alert & Oriented X4, Good concentration  PSYCH: Normal Affect. Laying in bed comfortably; not agitated    LABS:                        8.1    7.15  )-----------( 253      ( 04 May 2022 06:26 )             27.9                         8.1    9.82  )-----------( 248      ( 03 May 2022 05:36 )             28.4                         8.7    11.57 )-----------( 166      ( 02 May 2022 06:00 )             30.6     Hgb Trend: 8.1<--, 8.1<--, 8.7<--, 9.0<--, 9.5<--  05-04    132<L>  |  95<L>  |  72<H>  ----------------------------<  232<H>  4.3   |  24  |  2.17<H>  05-03    132<L>  |  95<L>  |  65<H>  ----------------------------<  237<H>  4.3   |  23  |  2.18<H>  05-02    134<L>  |  96  |  59<H>  ----------------------------<  141<H>  3.9   |  25  |  2.00<H>    Ca    10.4      04 May 2022 06:25  Ca    10.6<H>      03 May 2022 05:36  Ca    10.6<H>      02 May 2022 06:00  Phos  4.0     05-04  Mg     2.4     05-04      Creatinine Trend: 2.17<--, 2.18<--, 2.00<--, 1.99<--, 1.86<--, 1.87<--                Urinalysis Basic - ( 03 May 2022 10:59 )    Color: Yellow / Appearance: Clear / S.022 / pH: x  Gluc: x / Ketone: Negative  / Bili: Negative / Urobili: Negative   Blood: x / Protein: 30 mg/dL / Nitrite: Negative   Leuk Esterase: Negative / RBC: 6 /hpf / WBC 2 /HPF   Sq Epi: x / Non Sq Epi: 5 /hpf / Bacteria: 0.0        CAPILLARY BLOOD GLUCOSE      POCT Blood Glucose.: 282 mg/dL (03 May 2022 21:35)  POCT Blood Glucose.: 266 mg/dL (03 May 2022 17:02)  POCT Blood Glucose.: 195 mg/dL (03 May 2022 11:37)  POCT Blood Glucose.: 195 mg/dL (03 May 2022 08:30)

## 2022-05-04 NOTE — PROGRESS NOTE ADULT - SUBJECTIVE AND OBJECTIVE BOX
DATE OF SERVICE: 05-04-22 @ 08:46    Patient is a 89y old  Female who presents with a chief complaint of SOB (04 May 2022 07:29)      INTERVAL HISTORY: Feels ok. Weak from laying in the bed.     REVIEW OF SYSTEMS:  CONSTITUTIONAL: No weakness  EYES/ENT: No visual changes;  No throat pain   NECK: No pain or stiffness  RESPIRATORY: No cough, wheezing; No shortness of breath  CARDIOVASCULAR: No chest pain or palpitations  GASTROINTESTINAL: No abdominal  pain. No nausea, vomiting, or hematemesis  GENITOURINARY: No dysuria, frequency or hematuria  NEUROLOGICAL: No stroke like symptoms  SKIN: No rashes    TELEMETRY Personally reviewed: AF/V-PAced 50-60s  	  MEDICATIONS:  amLODIPine   Tablet 5 milliGRAM(s) Oral daily  furosemide   Injectable 40 milliGRAM(s) IV Push daily  metoprolol succinate  milliGRAM(s) Oral daily        PHYSICAL EXAM:  T(C): 36.5 (05-04-22 @ 04:00), Max: 36.5 (05-04-22 @ 04:00)  HR: 60 (05-04-22 @ 04:00) (60 - 80)  BP: 126/76 (05-04-22 @ 04:00) (118/70 - 126/76)  RR: 19 (05-04-22 @ 04:00) (18 - 19)  SpO2: 94% (05-04-22 @ 04:00) (86% - 96%)  Wt(kg): --  I&O's Summary    03 May 2022 07:01  -  04 May 2022 07:00  --------------------------------------------------------  IN: 360 mL / OUT: 350 mL / NET: 10 mL          Appearance: In no distress	  HEENT:    PERRL, EOMI	  Cardiovascular:  S1 S2, No JVD  Respiratory: RLL diminshed  Gastrointestinal:  Soft, Non-tender, + BS	  Vascularature:  L>R LE edema  Psychiatric: Appropriate affect   Neuro: no acute focal deficits                               8.1    7.15  )-----------( 253      ( 04 May 2022 06:26 )             27.9     05-04    132<L>  |  95<L>  |  72<H>  ----------------------------<  232<H>  4.3   |  24  |  2.17<H>    Ca    10.4      04 May 2022 06:25  Phos  4.0     05-04  Mg     2.4     05-04          Labs personally reviewed      ASSESSMENT/PLAN: 	      Ms. Briones is a 89 yo female with PMH of HTN, HLD, CAD s/p CABG, AS s/p TAVR in 9/2021 at , DM, CKD, AF on Eliquis, PPM who presents with 3 days of progressive shortness of breath, + orthopnea, increased LE edema and decreased oxygen saturation on pulse oximeter at home.    Problem/Plan -1  Problem: HFpEF  Plan:  - Followed by Dr. Agapito Woodard for outpatient cardiology  - CT Chest reveals moderate right and small left pleural effusions, cardiomegaly, possibly superimposed consolidation  - On lasix 40mg PO daily at home  - BMP reviewed from OP cardiologist records on 12/3/21 it was 1.1, and 5 months ago it was 1.2-1.4, here 2 months ago it was 1.4-1.6  - ECHO Left Ventricle: Normal left ventricular systolic function. No segmental wall motion abnormalities.  The LVEF= 60-65%. Moderate mitral regurgitation, There is a transcatheter aortic valve replacement seen. Moderate pulmonary hypertension.  - continue IV Diuresis with Lasix 40mg IVP daily, improving. Consider renal eval if Cr does not trend down  - agree with switching to home dose Lasix 40mg PO qd on 5/2  - 5/3 Creatinine uptrending.  - Patient appears close to euvolemia, but not euvolemic yet, although much improved since admission, c/w IV Lasix for now, will closely monitor.  - 5/3 POCUS reveals Right pleural effusion  - Consider transition to PO Lasix in setting of rising Cr  - Strict I&Os    Problem/Plan -2  Problem: Aortic Stenosis  Plan:  - s/p TAVR in September 2021  - minimal murmur on ausculation  - aortic valve is well seated and has good flow  -moderate tricuspid regurg    Problem/Plan -3  Problem: CAD  Plan:  - Hx CABG at [facility]  - currently denies chest pain  - EKG without ischemic changes  - Troponin elevated likely i/s/o ADHF exacerbation; peaked 4/29 at 124  - c/w simvastatin 20mg PO daily    Problem/Plan -4  Problem: AF  Plan:  - EKG V-Paced, rate controlled  - c/w Metoprolol and Eliquis  - monitor on tele    Problem/Plan -5  Problem: HLD  Plan:  - c/w simvastatin     Problem/Plan -6  Problem: DM  Plan:  - Most recent HgbA1C is 7.6 on 3.4.22  - ISS         HERBER Mix-FACUNDO Gan DO [facility]  Cardiovascular Medicine  [address] [place] [address], [address]  Office: [phone]  Cell: [phone] DATE OF SERVICE: 05-04-22 @ 08:46    Patient is a 89y old  Female who presents with a chief complaint of SOB (04 May 2022 07:29)      INTERVAL HISTORY: Feels ok. Weak from laying in the bed.     REVIEW OF SYSTEMS:  CONSTITUTIONAL: No weakness  EYES/ENT: No visual changes;  No throat pain   NECK: No pain or stiffness  RESPIRATORY: No cough, wheezing; No shortness of breath  CARDIOVASCULAR: No chest pain or palpitations  GASTROINTESTINAL: No abdominal  pain. No nausea, vomiting, or hematemesis  GENITOURINARY: No dysuria, frequency or hematuria  NEUROLOGICAL: No stroke like symptoms  SKIN: No rashes    TELEMETRY Personally reviewed: AF/V-PAced 50-60s  	  MEDICATIONS:  amLODIPine   Tablet 5 milliGRAM(s) Oral daily  furosemide   Injectable 40 milliGRAM(s) IV Push daily  metoprolol succinate  milliGRAM(s) Oral daily        PHYSICAL EXAM:  T(C): 36.5 (05-04-22 @ 04:00), Max: 36.5 (05-04-22 @ 04:00)  HR: 60 (05-04-22 @ 04:00) (60 - 80)  BP: 126/76 (05-04-22 @ 04:00) (118/70 - 126/76)  RR: 19 (05-04-22 @ 04:00) (18 - 19)  SpO2: 94% (05-04-22 @ 04:00) (86% - 96%)  Wt(kg): --  I&O's Summary    03 May 2022 07:01  -  04 May 2022 07:00  --------------------------------------------------------  IN: 360 mL / OUT: 350 mL / NET: 10 mL          Appearance: In no distress	  HEENT:    PERRL, EOMI	  Cardiovascular:  S1 S2, No JVD  Respiratory: RLL diminshed  Gastrointestinal:  Soft, Non-tender, + BS	  Vascularature:  L>R LE edema  Psychiatric: Appropriate affect   Neuro: no acute focal deficits                               8.1    7.15  )-----------( 253      ( 04 May 2022 06:26 )             27.9     05-04    132<L>  |  95<L>  |  72<H>  ----------------------------<  232<H>  4.3   |  24  |  2.17<H>    Ca    10.4      04 May 2022 06:25  Phos  4.0     05-04  Mg     2.4     05-04          Labs personally reviewed      ASSESSMENT/PLAN: 	      Ms. Briones is a 89 yo female with PMH of HTN, HLD, CAD s/p CABG, AS s/p TAVR in 9/2021 at [facility], DM, CKD, AF on Eliquis, PPM who presents with 3 days of progressive shortness of breath, + orthopnea, increased LE edema and decreased oxygen saturation on pulse oximeter at home.    Problem/Plan -1  Problem: HFpEF  Plan:  - Followed by Dr. Agapito Woodard for outpatient cardiology  - CT Chest reveals moderate right and small left pleural effusions, cardiomegaly, possibly superimposed consolidation  - On lasix 40mg PO daily at home  - BMP reviewed from OP cardiologist records on 12/3/21 it was 1.1, and 5 months ago it was 1.2-1.4, here 2 months ago it was 1.4-1.6  - ECHO Left Ventricle: Normal left ventricular systolic function. No segmental wall motion abnormalities.  The LVEF= 60-65%. Moderate mitral regurgitation, There is a transcatheter aortic valve replacement seen. Moderate pulmonary hypertension.  - continue IV Diuresis with Lasix 40mg IVP daily, improving. Consider renal eval if Cr does not trend down  - 5/3 Creatinine uptrending.  - Patient appears close to euvolemia, but not euvolemic yet, although much improved since admission, c/w IV Lasix for now, will closely monitor.  - 5/3 POCUS reveals Right pleural effusion  - Responding well to IV diuretics    Problem/Plan -2  Problem: Aortic Stenosis  Plan:  - s/p TAVR in September 2021  - minimal murmur on ausculation  - aortic valve is well seated and has good flow  -moderate tricuspid regurg    Problem/Plan -3  Problem: CAD  Plan:  - Hx CABG at [facility]  - currently denies chest pain  - EKG without ischemic changes  - Troponin elevated likely i/s/o ADHF exacerbation; peaked 4/29 at 124  - c/w simvastatin 20mg PO daily    Problem/Plan -4  Problem: AF  Plan:  - EKG V-Paced, rate controlled  - c/w Metoprolol and Eliquis  - monitor on tele    Problem/Plan -5  Problem: HLD  Plan:  - c/w simvastatin     Problem/Plan -6  Problem: DM  Plan:  - Most recent HgbA1C is 7.6 on 3.4.22  - ISS         HERBER Mix-NP   Giles Gan DO [facility]  Cardiovascular Medicine  [address] [place] [address], [address]  Office: [phone]  Cell: [phone]

## 2022-05-04 NOTE — PROGRESS NOTE ADULT - ASSESSMENT
89yo F with PMH on HFpEF (EF 75% 03/2022), Afib (on Eliquis), HTN, DM2, CKD3, hypothyroidism, AS s/p TAVR (09/2021), s/p PPM BIBEMS for hypoxia of 88% on RA. Admitted for CHF exacerbation.

## 2022-05-04 NOTE — PROGRESS NOTE ADULT - PROBLEM SELECTOR PLAN 1
- Likely 2/2 CHF and superimposed bacterial PNA  - Trop 124 -> 117 likely 2/2 CHF   - pro-BNP >8000  - CT Chest on 4/29 shows moderate right and small left pleural effusions with pulmonary edema in the setting of cardiomegaly. Partial lower lobe compressive atelectasis; superimposed consolidation is difficult to exclude on this noncontrast CT.  - S/p ceftriaxone and azithromycin in ED. Procal 0.1, no leukocytosis, no fevers, will monitor off abx    - Consider switch from IV to Furosemide 40 PO daily due to rising Cr   - Currently saturating 98% on 2L NC. Wean as tolerated  - Repeat CXR with improving pleural effusions. - Likely 2/2 CHF and superimposed bacterial PNA  - Trop 124 -> 117 likely 2/2 CHF   - pro-BNP >8000  - CT Chest on 4/29 shows moderate right and small left pleural effusions with pulmonary edema in the setting of cardiomegaly. Partial lower lobe compressive atelectasis; superimposed consolidation is difficult to exclude on this noncontrast CT.  - S/p ceftriaxone and azithromycin in ED. Procal 0.1, no leukocytosis, no fevers, will monitor off abx    - Continue Furosemide 40 IV  - Currently saturating 98% on 2L NC. Wean as tolerated  - Repeat CXR with improving pleural effusions.

## 2022-05-04 NOTE — PROGRESS NOTE ADULT - ATTENDING COMMENTS
89yo F with PMH on HFpEF (EF 75% 03/2022), Afib (on Eliquis), HTN, DM2, CKD3, hypothyroidism, AS s/p TAVR (09/2021), s/p PPM BIBEMS for hypoxia of 88% on RA. Admitted for CHF exacerbation.     #ADHF: c/w IV diuretics, responded well to yesterday's second dose of IV lasix, cr remained stable, and pt feels a bit better than yesterday (on 1L NC satting 94R%, was on 2L NC yesterday) from a respiratory standpoint, and in general much better since admission, but remains in a volume overloaded state. Will give a second dose of IV lasix  again today and continue to monitor.  POCUS yesterday with evidence of pulm edema. closely monitoring renal function. monitor I/O's, daily weights, monitor elytes. Titrate O2 ot keep SPO2> 92% f/u Cardiology recs    #Hypoxic Resp failure 2/2 ADHF Vs Atelectasis Vs less likely PNA (No fever, cough or leukocytosis). titrateO2 to keep >92%, ADHF management as above.     #Asymptomatic bacteria: pt with urine cx  growing E. coli, but she does not have symptoms. No clear benefit for treating. Will monitor. If she develops furher evidence of infection, will consider urinary as a source.    #Afib: V-paced, c/w BB and eliquis .    #MARICARMEN: Cr=1.66 on admission, slowly increased to 2.18 (5/3)->2.17) (5/4). Can be 2/2 cardiorenal vs diuresis, f/u renal US.

## 2022-05-04 NOTE — PROGRESS NOTE ADULT - PROBLEM SELECTOR PLAN 2
- HFpEF (EF 75% in 03/2022), AS s/p TAVR (09/2021)  - Patient with increased SOB, RHODES, LE edema, +JVD likely in CHF exacerbation   - Continue home Toprol XL 100mg daily   - Consider switch from IV to Furosemide 40 PO daily   - Strict I/Os, daily weights  - Appreciate cards recs  - TTE shows EF 65% with mold pulm HTN and mod TR (no significant change from prior) - HFpEF (EF 75% in 03/2022), AS s/p TAVR (09/2021)  - Patient with increased SOB, RHODES, LE edema, +JVD likely in CHF exacerbation   - Continue home Toprol XL 100mg daily   - Continue Furosemide 40 IV  - Strict I/Os, daily weights  - Appreciate cards recs  - TTE shows EF 65% with mold pulm HTN and mod TR (no significant change from prior)

## 2022-05-04 NOTE — PROGRESS NOTE ADULT - NUTRITIONAL ASSESSMENT
This patient has been assessed with a concern for Malnutrition and has been determined to have a diagnosis/diagnoses of Severe protein-calorie malnutrition.    This patient is being managed with:   Diet Consistent Carbohydrate Renal/No Snacks-  Entered: Apr 29 2022  6:17PM     This patient has been assessed with a concern for Malnutrition and has been determined to have a diagnosis/diagnoses of Severe protein-calorie malnutrition.    This patient is being managed with:   Diet Consistent Carbohydrate/No Snacks-  Entered: Apr 29 2022  6:17PM

## 2022-05-04 NOTE — PROGRESS NOTE ADULT - PROBLEM SELECTOR PLAN 5
-Rising Cr. Consider decrease in diuretics. Consider switch to oral Lasix - Rising Cr. Consider decrease in diuretics. Consider switch to oral Lasix  - Pending kidney bladder ultrasound

## 2022-05-05 NOTE — PROGRESS NOTE ADULT - PROBLEM SELECTOR PLAN 2
- HFpEF (EF 75% in 03/2022), AS s/p TAVR (09/2021)  - Patient with increased SOB, RHODES, LE edema, +JVD likely in CHF exacerbation   - Continue home Toprol XL 100mg daily   - Continue Furosemide 40 IV  - Strict I/Os, daily weights  - Appreciate cards recs  - TTE shows EF 65% with mold pulm HTN and mod TR (no significant change from prior) - HFpEF (EF 75% in 03/2022), AS s/p TAVR (09/2021)  - Patient initially SOB, RHODES, LE edema, +JVD likely in CHF exacerbation   - Continue home Toprol XL 100mg daily   - Continue Furosemide 40 IV, another dose lasix 40mg IVP 5/5  - Strict I/Os, daily weights  - Appreciate cards recs  - TTE shows EF 65% with mold pulm HTN and mod TR (no significant change from prior)

## 2022-05-05 NOTE — PROGRESS NOTE ADULT - SUBJECTIVE AND OBJECTIVE BOX
Date of Service   05-05-22 @ 16:52    Patient is a 89y old  Female who presents with a chief complaint of SOB (05 May 2022 07:52)      INTERVAL HISTORY: pt feels ok,  at bedside   TELEMETRY Personally reviewed: Vpaced 59- 60's     REVIEW OF SYSTEMS:   CONSTITUTIONAL: No weakness  EYES/ENT: No visual changes; No throat pain  Neck: No pain or stiffness  Respiratory: No cough, wheezing, No shortness of breath  CARDIOVASCULAR: no chest pain or palpitations  GASTROINTESTINAL: No abdominal pain, no nausea, vomiting or hematemesis  GENITOURINARY: No dysuria, frequency or hematuria  NEUROLOGICAL: No stroke like symptoms  SKIN: No rashes    	  MEDICATIONS:  amLODIPine   Tablet 5 milliGRAM(s) Oral daily  furosemide   Injectable 40 milliGRAM(s) IV Push daily  furosemide   Injectable 40 milliGRAM(s) IV Push once  metoprolol succinate  milliGRAM(s) Oral daily        PHYSICAL EXAM:  T(C): 36.2 (05-05-22 @ 11:32), Max: 36.6 (05-04-22 @ 21:33)  HR: 60 (05-05-22 @ 11:32) (59 - 60)  BP: 130/74 (05-05-22 @ 11:32) (130/74 - 133/68)  RR: 18 (05-05-22 @ 11:32) (18 - 19)  SpO2: 99% (05-05-22 @ 11:32) (91% - 99%)  Wt(kg): --  I&O's Summary    04 May 2022 07:01  -  05 May 2022 07:00  --------------------------------------------------------  IN: 480 mL / OUT: 800 mL / NET: -320 mL    05 May 2022 07:01  -  05 May 2022 16:52  --------------------------------------------------------  IN: 240 mL / OUT: 600 mL / NET: -360 mL        Weight (kg): 78.9 (05-05 @ 08:00)    Appearance: In no distress	  HEENT:    PERRL, EOMI	  Cardiovascular:  S1 S2, No JVD  Respiratory: Lungs clear to auscultation	  Gastrointestinal:  Soft, Non-tender, + BS	  Vascularature:  + edema of b/l LE  Psychiatric: Appropriate affect   Neuro: no acute focal deficits                               8.2    7.48  )-----------( 285      ( 05 May 2022 05:51 )             28.0     05-05    133<L>  |  96  |  73<H>  ----------------------------<  169<H>  4.3   |  25  |  2.13<H>    Ca    10.9<H>      05 May 2022 05:51  Phos  3.3     05-05  Mg     2.3     05-05          Labs personally reviewed      ASSESSMENT/PLAN: 	  Ms. Briones is a 89 yo female with PMH of HTN, HLD, CAD s/p CABG, AS s/p TAVR in 9/2021 at [facility], DM, CKD, AF on Eliquis, PPM who presents with 3 days of progressive shortness of breath, + orthopnea, increased LE edema and decreased oxygen saturation on pulse oximeter at home.    Problem/Plan -1  Problem: HFpEF  Plan:  - Followed by Dr. Agapito Woodard for outpatient cardiology  - CT Chest reveals moderate right and small left pleural effusions, cardiomegaly, possibly superimposed consolidation  - On lasix 40mg PO daily at home  - BMP reviewed from OP cardiologist records on 12/3/21 it was 1.1, and 5 months ago it was 1.2-1.4, here 2 months ago it was 1.4-1.6  - ECHO Left Ventricle: Normal left ventricular systolic function. No segmental wall motion abnormalities.  The LVEF= 60-65%. Moderate mitral regurgitation, There is a transcatheter aortic valve replacement seen. Moderate pulmonary hypertension.  - continue IV Diuresis with Lasix 40mg IVP daily, improving. Consider renal eval if Cr does not trend down  - 5/5 Creatinine downtrending.  - Patient appears close to euvolemia, but not euvolemic yet, although much improved since admission, c/w IV Lasix for now, will closely monitor.  - 5/3 POCUS reveals Right pleural effusion  - Responding well to IV diuretics, lasix 40mg IV   - On 2-1 L of NC     Problem/Plan -2  Problem: Aortic Stenosis  Plan:  - s/p TAVR in September 2021  - minimal murmur on ausculation  - aortic valve is well seated and has good flow  -moderate tricuspid regurg    Problem/Plan -3  Problem: CAD  Plan:  - Hx CABG at [facility]  - currently denies chest pain  - EKG without ischemic changes  - Troponin elevated likely i/s/o ADHF exacerbation; peaked 4/29 at 124  - c/w simvastatin 20mg PO daily    Problem/Plan -4  Problem: AF  Plan:  - EKG V-Paced, rate controlled  - c/w Metoprolol and Eliquis  - monitor on tele    Problem/Plan -5  Problem: HLD  Plan:  - c/w simvastatin     Problem/Plan -6  Problem: DM  Plan:  - Most recent HgbA1C is 7.6 on 3.4.22  - [facility]           Jacob JOSE-BC   Giles Gan DO [facility]  Cardiovascular Medicine  [address] [place] [address], [address]  Office: [phone]

## 2022-05-05 NOTE — PROGRESS NOTE ADULT - PROBLEM SELECTOR PLAN 1
- Likely 2/2 CHF and superimposed bacterial PNA  - Trop 124 -> 117 likely 2/2 CHF   - pro-BNP >8000  - CT Chest on 4/29 shows moderate right and small left pleural effusions with pulmonary edema in the setting of cardiomegaly. Partial lower lobe compressive atelectasis; superimposed consolidation is difficult to exclude on this noncontrast CT.  - S/p ceftriaxone and azithromycin in ED. Procal 0.1, no leukocytosis, no fevers, will monitor off abx    - Continue Furosemide 40 IV  - Currently saturating 98% on 2L NC. Wean as tolerated  - Repeat CXR with improving pleural effusions. - Likely 2/2 CHF with moderate pleural effusions - improving  - Trop 124 -> 117  - pro-BNP >8000  - CT Chest on 4/29 shows moderate right and small left pleural effusions with pulmonary edema in the setting of cardiomegaly. Partial lower lobe compressive atelectasis; superimposed consolidation is difficult to exclude on this noncontrast CT.  - S/p ceftriaxone and azithromycin in ED. Monitor off abx    - lasix as below  - Currently saturating 98% on 2L NC. Wean as tolerated  - POCUS (5/5) with improving B lines, however still present, with mod pleural effusion right

## 2022-05-05 NOTE — PROGRESS NOTE ADULT - NUTRITIONAL ASSESSMENT
This patient has been assessed with a concern for Malnutrition and has been determined to have a diagnosis/diagnoses of Severe protein-calorie malnutrition.    This patient is being managed with:   Diet Regular-  Consistent Carbohydrate {No Snacks} (CSTCHO)  DASH/TLC {Sodium & Cholesterol Restricted} (DASH)  1500mL Fluid Restriction ([id number])  Entered: May  4 2022  7:31AM

## 2022-05-05 NOTE — PROGRESS NOTE ADULT - ATTENDING COMMENTS
89 yo F with MHx on HFpEF (EF 75% 03/2022), Afib (on Eliquis), HTN, DM2, CKD3, hypothyroidism, AS s/p TAVR (09/2021), s/p PPM BIBEMS for hypoxia of 88% on RA. Admitted for CHF exacerbation.     #ADHF: Appears more comfortable today, and she walked with PT. but still on O2 at rest, desatted to mid 80's without O2. c/w IV diuretics, responded well to yesterday's second dose of IV lasix again yesterday. cr remained stable, and pt feels  better than yesterday from a respiratory standpoint, and in general much better since admission, but remains in a volume overloaded state. Will POCUS today and decide on giving a second dose of IV lasix  again today and continue to monitor. closely monitoring renal function. monitor I/O's, daily weights, monitor elytes. Titrate O2 ot keep SPO2> 92% f/u Cardiology recs    #Hypoxic Resp failure 2/2 ADHF Vs Atelectasis Vs less likely PNA (No fever, cough or leukocytosis). titrateO2 to keep >92%, ADHF management as above. OOB with assistance, Encourage incentive spirometry.     #Asymptomatic bacteria: pt with urine cx  growing E. coli, but she does not have symptoms. No clear benefit for treating. Will monitor. If she develops further evidence of infection, will consider urinary as a source.    #Afib: V-paced, c/w BB and eliquis .    #MARICARMEN: Cr=1.66 on admission, slowly increased to 2.18 (5/3)->2.17) (5/4)-->2.13 (5/5). Can be 2/2 cardiorenal vs diuresis, f/u renal US.

## 2022-05-05 NOTE — PROGRESS NOTE ADULT - PROBLEM SELECTOR PLAN 10
Home meds: Continue home Allopurinol 100mg daily, Simvastatin 20mg qhs, Protonix 40mg daily   DVT ppx: Eliquis   Diet: DASH/Renal   Dispo: PT recs home with home PT   DNR/DNI  Communication: Updated daughter Claudia on 5/2 Home meds: Continue home Allopurinol 100mg daily, Simvastatin 20mg qhs, Protonix 40mg daily   DVT ppx: Eliquis   Diet: DASH/Renal   Dispo: PT recs home with home PT   DNR/DNI

## 2022-05-05 NOTE — PROGRESS NOTE ADULT - PROBLEM SELECTOR PLAN 5
- Rising Cr. Consider decrease in diuretics. Consider switch to oral Lasix  - Pending kidney bladder ultrasound - stable  - kidney bladder ultrasound no hydro

## 2022-05-05 NOTE — PROGRESS NOTE ADULT - SUBJECTIVE AND OBJECTIVE BOX
Patient is a 89y old  Female who presents with a chief complaint of SOB (04 May 2022 08:46)      SUBJECTIVE / OVERNIGHT EVENTS:  No acute events over night. Denies any fevers/chills, headache, CP, SOB, abd pain, N/V/D, constipation, or leg swelling.       MEDICATIONS  (STANDING):  allopurinol 100 milliGRAM(s) Oral daily  amLODIPine   Tablet 5 milliGRAM(s) Oral daily  apixaban 2.5 milliGRAM(s) Oral two times a day  dextrose 5%. 1000 milliLiter(s) (100 mL/Hr) IV Continuous <Continuous>  dextrose 5%. 1000 milliLiter(s) (50 mL/Hr) IV Continuous <Continuous>  dextrose 50% Injectable 25 Gram(s) IV Push once  dextrose 50% Injectable 12.5 Gram(s) IV Push once  dextrose 50% Injectable 25 Gram(s) IV Push once  furosemide   Injectable 40 milliGRAM(s) IV Push daily  glucagon  Injectable 1 milliGRAM(s) IntraMuscular once  insulin glargine Injectable (LANTUS) 15 Unit(s) SubCutaneous at bedtime  insulin lispro (ADMELOG) corrective regimen sliding scale   SubCutaneous three times a day before meals  insulin lispro (ADMELOG) corrective regimen sliding scale   SubCutaneous at bedtime  levothyroxine 50 MICROGram(s) Oral daily  metoprolol succinate  milliGRAM(s) Oral daily  multivitamin 1 Tablet(s) Oral daily  pantoprazole    Tablet 40 milliGRAM(s) Oral before breakfast  simvastatin 20 milliGRAM(s) Oral at bedtime    MEDICATIONS  (PRN):  acetaminophen     Tablet .. 650 milliGRAM(s) Oral every 6 hours PRN Temp greater or equal to 38C (100.4F), Mild Pain (1 - 3)  dextrose Oral Gel 15 Gram(s) Oral once PRN Blood Glucose LESS THAN 70 milliGRAM(s)/deciliter      REVIEW OF SYSTEMS:  Constitutional: [-] fevers, [ -] chills, [- ] weight loss, [- ] weight gain  HEENT: [ -] vision problems, [- ] eye pain, [ -] nasal congestion, [- ] rhinorrhea, [- ] sore throat, [- ] dysphagia  CV: [- ] chest pain, [- ] orthopnea, [- ] palpitations  Resp: [- ] cough, [- ] dyspnea, [- ] wheezing, [ -] hemoptysis  GI: [- ] nausea, [- ] vomiting, [- ] diarrhea, [ -] constipation, [- ] abdominal pain  : [- ] dysuria [- ] nocturia [- ] hematuria [ -] increased urinary frequency  Musculoskeletal: [- ] back pain [ -] myalgias [- ] arthralgias [- ] fracture  Skin: [- ] rash [ -] itch  Neurological: [ -] headache [- ] dizziness [- ] syncope [- ] weakness [- ] numbness  Psychiatric: [- ] anxiety [- ] depression  Endocrine: [- ] diabetes [ -] thyroid problem  Hematologic/Lymphatic: [- ] anemia [- ] bleeding problem  Allergic/Immunologic: [ -] itchy eyes [ -] nasal discharge [- ] hives [ -] angioedema  [ ] Unable to assess ROS because ________    OBJECTIVE:  Vital Signs Last 24 Hrs  T(C): 36.5 (05 May 2022 04:02), Max: 36.8 (04 May 2022 11:30)  T(F): 97.7 (05 May 2022 04:02), Max: 98.3 (04 May 2022 11:30)  HR: 60 (05 May 2022 04:02) (59 - 65)  BP: 133/68 (05 May 2022 04:02) (114/60 - 138/77)  BP(mean): --  RR: 19 (05 May 2022 04:02) (18 - 19)  SpO2: 94% (05 May 2022 04:02) (91% - 94%)  PHYSICAL EXAM:  GENERAL: NAD, well-developed  HEAD:  Atraumatic, Normocephalic  EYES: conjunctiva and sclera clear  NECK: Supple, No JVD  CHEST/LUNG: Clear to auscultation bilaterally; No wheeze  HEART: Regular rate and rhythm; No murmurs, rubs, or gallops  ABDOMEN: Soft, Nontender, Nondistended; Bowel sounds present  EXTREMITIES:  No clubbing, cyanosis, or edema  PSYCH: AAOx3  NEUROLOGY: non-focal  SKIN: No rashes or lesions    CAPILLARY BLOOD GLUCOSE      POCT Blood Glucose.: 339 mg/dL (04 May 2022 21:32)  POCT Blood Glucose.: 243 mg/dL (04 May 2022 17:19)  POCT Blood Glucose.: 196 mg/dL (04 May 2022 11:51)  POCT Blood Glucose.: 198 mg/dL (04 May 2022 07:59)    I&O's Summary    04 May 2022 07:01  -  05 May 2022 07:00  --------------------------------------------------------  IN: 480 mL / OUT: 800 mL / NET: -320 mL              LABS:                        8.2    7.48  )-----------( 285      ( 05 May 2022 05:51 )             28.0     05-05    133<L>  |  96  |  73<H>  ----------------------------<  169<H>  4.3   |  25  |  2.13<H>    Ca    10.9<H>      05 May 2022 05:51  Phos  3.3     05-05  Mg     2.3     05-05            Urinalysis Basic - ( 03 May 2022 10:59 )    Color: Yellow / Appearance: Clear / S.022 / pH: x  Gluc: x / Ketone: Negative  / Bili: Negative / Urobili: Negative   Blood: x / Protein: 30 mg/dL / Nitrite: Negative   Leuk Esterase: Negative / RBC: 6 /hpf / WBC 2 /HPF   Sq Epi: x / Non Sq Epi: 5 /hpf / Bacteria: 0.0            RADIOLOGY & ADDITIONAL TESTS:       Patient is a 89y old  Female who presents with a chief complaint of SOB (04 May 2022 08:46)      SUBJECTIVE / OVERNIGHT EVENTS:  No acute events over night.   reports feeling better today  no SOB    Tele: 92-95%      MEDICATIONS  (STANDING):  allopurinol 100 milliGRAM(s) Oral daily  amLODIPine   Tablet 5 milliGRAM(s) Oral daily  apixaban 2.5 milliGRAM(s) Oral two times a day  dextrose 5%. 1000 milliLiter(s) (100 mL/Hr) IV Continuous <Continuous>  dextrose 5%. 1000 milliLiter(s) (50 mL/Hr) IV Continuous <Continuous>  dextrose 50% Injectable 25 Gram(s) IV Push once  dextrose 50% Injectable 12.5 Gram(s) IV Push once  dextrose 50% Injectable 25 Gram(s) IV Push once  furosemide   Injectable 40 milliGRAM(s) IV Push daily  glucagon  Injectable 1 milliGRAM(s) IntraMuscular once  insulin glargine Injectable (LANTUS) 15 Unit(s) SubCutaneous at bedtime  insulin lispro (ADMELOG) corrective regimen sliding scale   SubCutaneous three times a day before meals  insulin lispro (ADMELOG) corrective regimen sliding scale   SubCutaneous at bedtime  levothyroxine 50 MICROGram(s) Oral daily  metoprolol succinate  milliGRAM(s) Oral daily  multivitamin 1 Tablet(s) Oral daily  pantoprazole    Tablet 40 milliGRAM(s) Oral before breakfast  simvastatin 20 milliGRAM(s) Oral at bedtime    MEDICATIONS  (PRN):  acetaminophen     Tablet .. 650 milliGRAM(s) Oral every 6 hours PRN Temp greater or equal to 38C (100.4F), Mild Pain (1 - 3)  dextrose Oral Gel 15 Gram(s) Oral once PRN Blood Glucose LESS THAN 70 milliGRAM(s)/deciliter      OBJECTIVE:  Vital Signs Last 24 Hrs  T(C): 36.5 (05 May 2022 04:02), Max: 36.8 (04 May 2022 11:30)  T(F): 97.7 (05 May 2022 04:02), Max: 98.3 (04 May 2022 11:30)  HR: 60 (05 May 2022 04:02) (59 - 65)  BP: 133/68 (05 May 2022 04:02) (114/60 - 138/77)  BP(mean): --  RR: 19 (05 May 2022 04:02) (18 - 19)  SpO2: 94% (05 May 2022 04:02) (91% - 94%)    PHYSICAL EXAM:  GENERAL: no acute distress, on 2L NC  CHEST/LUNG: Clear to auscultation  left side. Decrease breathe sounds RLL  HEART: No murmurs, rubs, or gallops  ABDOMEN: Soft, Nontender, Nondistended; Bowel sounds present  EXTREMITIES:  2+ edema b/l LE up to mid shin  PSYCH: AAOx3      CAPILLARY BLOOD GLUCOSE      POCT Blood Glucose.: 339 mg/dL (04 May 2022 21:32)  POCT Blood Glucose.: 243 mg/dL (04 May 2022 17:19)  POCT Blood Glucose.: 196 mg/dL (04 May 2022 11:51)  POCT Blood Glucose.: 198 mg/dL (04 May 2022 07:59)    I&O's Summary    04 May 2022 07:01  -  05 May 2022 07:00  --------------------------------------------------------  IN: 480 mL / OUT: 800 mL / NET: -320 mL              LABS:                        8.2    7.48  )-----------( 285      ( 05 May 2022 05:51 )             28.0     05-05    133<L>  |  96  |  73<H>  ----------------------------<  169<H>  4.3   |  25  |  2.13<H>    Ca    10.9<H>      05 May 2022 05:51  Phos  3.3     05-05  Mg     2.3     05-05            Urinalysis Basic - ( 03 May 2022 10:59 )    Color: Yellow / Appearance: Clear / S.022 / pH: x  Gluc: x / Ketone: Negative  / Bili: Negative / Urobili: Negative   Blood: x / Protein: 30 mg/dL / Nitrite: Negative   Leuk Esterase: Negative / RBC: 6 /hpf / WBC 2 /HPF   Sq Epi: x / Non Sq Epi: 5 /hpf / Bacteria: 0.0            RADIOLOGY & ADDITIONAL TESTS:

## 2022-05-06 NOTE — PROGRESS NOTE ADULT - PROBLEM SELECTOR PLAN 2
- HFpEF (EF 75% in 03/2022), AS s/p TAVR (09/2021)  - Patient initially SOB, RHODES, LE edema, +JVD likely in CHF exacerbation   - Continue home Toprol XL 100mg daily   - Continue Furosemide 40 IV, another dose lasix 40mg IVP 5/5  - Strict I/Os, daily weights  - Appreciate cards recs  - TTE shows EF 65% with mold pulm HTN and mod TR (no significant change from prior) - HFpEF (EF 75% in 03/2022), AS s/p TAVR (09/2021)  - Patient initially SOB, RHODES, LE edema, +JVD likely in CHF exacerbation   - Continue home Toprol XL 100mg daily   - Continue Furosemide 40 IV   - Strict I/Os, daily weights  - Appreciate cards recs  - TTE shows EF 65% with mold pulm HTN and mod TR (no significant change from prior)

## 2022-05-06 NOTE — PROGRESS NOTE ADULT - SUBJECTIVE AND OBJECTIVE BOX
%%%%INCOMPLETE NOTE%%%%%     Dr. Joann Pretty, PGY-1    Patient is a 89y old  Female who presents with a chief complaint of SOB (05 May 2022 16:51)    24-HR EVENTS/SUBJECTIVE: No acute events overnight. Patient seen and examined at bedside this AM. BMx1 yesterday. Denies chest pain, abdominal pain, cough, n/v, sob. Breathing comfortably on room air.    MEDICATIONS  (STANDING):  allopurinol 100 milliGRAM(s) Oral daily  amLODIPine   Tablet 5 milliGRAM(s) Oral daily  apixaban 2.5 milliGRAM(s) Oral two times a day  dextrose 5%. 1000 milliLiter(s) (50 mL/Hr) IV Continuous <Continuous>  dextrose 5%. 1000 milliLiter(s) (100 mL/Hr) IV Continuous <Continuous>  dextrose 50% Injectable 25 Gram(s) IV Push once  dextrose 50% Injectable 12.5 Gram(s) IV Push once  dextrose 50% Injectable 25 Gram(s) IV Push once  furosemide   Injectable 40 milliGRAM(s) IV Push daily  glucagon  Injectable 1 milliGRAM(s) IntraMuscular once  insulin glargine Injectable (LANTUS) 15 Unit(s) SubCutaneous at bedtime  insulin lispro (ADMELOG) corrective regimen sliding scale   SubCutaneous three times a day before meals  insulin lispro (ADMELOG) corrective regimen sliding scale   SubCutaneous at bedtime  levothyroxine 50 MICROGram(s) Oral daily  metoprolol succinate  milliGRAM(s) Oral daily  multivitamin 1 Tablet(s) Oral daily  pantoprazole    Tablet 40 milliGRAM(s) Oral before breakfast  simvastatin 20 milliGRAM(s) Oral at bedtime    MEDICATIONS  (PRN):  acetaminophen     Tablet .. 650 milliGRAM(s) Oral every 6 hours PRN Temp greater or equal to 38C (100.4F), Mild Pain (1 - 3)  dextrose Oral Gel 15 Gram(s) Oral once PRN Blood Glucose LESS THAN 70 milliGRAM(s)/deciliter        Objective:     Vitals: Vital Signs Last 24 Hrs  T(C): 36.3 (05-06-22 @ 04:53), Max: 36.3 (05-05-22 @ 20:27)  T(F): 97.4 (05-06-22 @ 04:53), Max: 97.4 (05-06-22 @ 04:53)  HR: 61 (05-06-22 @ 04:53) (59 - 61)  BP: 119/49 (05-06-22 @ 04:53) (119/49 - 130/74)  BP(mean): --  RR: 18 (05-06-22 @ 04:53) (18 - 19)  SpO2: 93% (05-06-22 @ 04:53) (93% - 99%)            I&O's Summary    05 May 2022 07:01  -  06 May 2022 07:00  --------------------------------------------------------  IN: 540 mL / OUT: 1250 mL / NET: -710 mL        PHYSICAL EXAM:  GENERAL: NAD, elderly female  HEAD:  Atraumatic, Normocephalic  EYES: EOMI, PERRLA, conjunctiva and sclera clear  ENMT: Moist mucous membranes  NECK: Supple, No JVD  CHEST/LUNG: Adequate inspiratory effort. Mild crackles heard in b/l lower lobes. On 3L NC.   HEART: Regular rate and rhythm; No murmurs, rubs, or gallops.   ABDOMEN: Soft, Nontender, Nondistended; Bowel sounds present  EXTREMITIES:  2+ Peripheral Pulses. 2+ pitting edema with chronic skin changes in LLE. Bandaged wound on RLE with no edema   NERVOUS SYSTEM:  Alert & Oriented X4, Good concentration  PSYCH: Normal Affect. Laying in bed comfortably; not agitated    LABS:                        8.2    7.48  )-----------( 285      ( 05 May 2022 05:51 )             28.0                         8.1    7.15  )-----------( 253      ( 04 May 2022 06:26 )             27.9     Hgb Trend: 8.2<--, 8.1<--, 8.1<--, 8.7<--, 9.0<--  05-06    136  |  98  |  77<H>  ----------------------------<  177<H>  4.3   |  27  |  2.02<H>  05-05    133<L>  |  96  |  73<H>  ----------------------------<  169<H>  4.3   |  25  |  2.13<H>  05-04    132<L>  |  95<L>  |  72<H>  ----------------------------<  232<H>  4.3   |  24  |  2.17<H>    Ca    10.9<H>      06 May 2022 06:41  Ca    10.9<H>      05 May 2022 05:51  Ca    10.4      04 May 2022 06:25  Phos  3.5     05-06  Mg     2.4     05-06      Creatinine Trend: 2.02<--, 2.13<--, 2.17<--, 2.18<--, 2.00<--, 1.99<--                    CAPILLARY BLOOD GLUCOSE      POCT Blood Glucose.: 231 mg/dL (05 May 2022 21:18)  POCT Blood Glucose.: 199 mg/dL (05 May 2022 17:14)  POCT Blood Glucose.: 148 mg/dL (05 May 2022 11:10)  POCT Blood Glucose.: 151 mg/dL (05 May 2022 08:42)     Dr. Joann Pretty, PGY-1    Patient is a 89y old  Female who presents with a chief complaint of SOB (05 May 2022 16:51)    24-HR EVENTS/SUBJECTIVE: No acute events overnight. Patient seen and examined at bedside this AM. Reports that she is urinating very frequently. Denies chest pain, abdominal pain, cough, n/v. No complaints of SOB. Breathing comfortably on 2L NC.   Tele: V-paced Afib to 60s     MEDICATIONS  (STANDING):  allopurinol 100 milliGRAM(s) Oral daily  amLODIPine   Tablet 5 milliGRAM(s) Oral daily  apixaban 2.5 milliGRAM(s) Oral two times a day  dextrose 5%. 1000 milliLiter(s) (50 mL/Hr) IV Continuous <Continuous>  dextrose 5%. 1000 milliLiter(s) (100 mL/Hr) IV Continuous <Continuous>  dextrose 50% Injectable 25 Gram(s) IV Push once  dextrose 50% Injectable 12.5 Gram(s) IV Push once  dextrose 50% Injectable 25 Gram(s) IV Push once  furosemide   Injectable 40 milliGRAM(s) IV Push daily  glucagon  Injectable 1 milliGRAM(s) IntraMuscular once  insulin glargine Injectable (LANTUS) 15 Unit(s) SubCutaneous at bedtime  insulin lispro (ADMELOG) corrective regimen sliding scale   SubCutaneous three times a day before meals  insulin lispro (ADMELOG) corrective regimen sliding scale   SubCutaneous at bedtime  levothyroxine 50 MICROGram(s) Oral daily  metoprolol succinate  milliGRAM(s) Oral daily  multivitamin 1 Tablet(s) Oral daily  pantoprazole    Tablet 40 milliGRAM(s) Oral before breakfast  simvastatin 20 milliGRAM(s) Oral at bedtime    MEDICATIONS  (PRN):  acetaminophen     Tablet .. 650 milliGRAM(s) Oral every 6 hours PRN Temp greater or equal to 38C (100.4F), Mild Pain (1 - 3)  dextrose Oral Gel 15 Gram(s) Oral once PRN Blood Glucose LESS THAN 70 milliGRAM(s)/deciliter        Objective:     Vitals: Vital Signs Last 24 Hrs  T(C): 36.3 (05-06-22 @ 04:53), Max: 36.3 (05-05-22 @ 20:27)  T(F): 97.4 (05-06-22 @ 04:53), Max: 97.4 (05-06-22 @ 04:53)  HR: 61 (05-06-22 @ 04:53) (59 - 61)  BP: 119/49 (05-06-22 @ 04:53) (119/49 - 130/74)  BP(mean): --  RR: 18 (05-06-22 @ 04:53) (18 - 19)  SpO2: 93% (05-06-22 @ 04:53) (93% - 99%)            I&O's Summary    05 May 2022 07:01  -  06 May 2022 07:00  --------------------------------------------------------  IN: 540 mL / OUT: 1250 mL / NET: -710 mL        PHYSICAL EXAM:  GENERAL: NAD, elderly female  HEAD:  Atraumatic, Normocephalic  EYES: EOMI, PERRLA, conjunctiva and sclera clear  ENMT: Moist mucous membranes  NECK: Supple, No JVD  CHEST/LUNG: Adequate inspiratory effort. Mild crackles heard in b/l lower lobes. On 2L NC.   HEART: Regular rate and rhythm; No murmurs, rubs, or gallops.   ABDOMEN: Soft, Nontender, Nondistended; Bowel sounds present  EXTREMITIES:  2+ Peripheral Pulses. 1+ pitting edema with chronic skin changes in LLE. Bandaged wound on RLE with 2+ pitting edema   NERVOUS SYSTEM:  Alert & Oriented X4, Good concentration  PSYCH: Normal Affect. Laying in bed comfortably; not agitated    LABS:                        8.2    7.48  )-----------( 285      ( 05 May 2022 05:51 )             28.0                         8.1    7.15  )-----------( 253      ( 04 May 2022 06:26 )             27.9     Hgb Trend: 8.2<--, 8.1<--, 8.1<--, 8.7<--, 9.0<--  05-06    136  |  98  |  77<H>  ----------------------------<  177<H>  4.3   |  27  |  2.02<H>  05-05    133<L>  |  96  |  73<H>  ----------------------------<  169<H>  4.3   |  25  |  2.13<H>  05-04    132<L>  |  95<L>  |  72<H>  ----------------------------<  232<H>  4.3   |  24  |  2.17<H>    Ca    10.9<H>      06 May 2022 06:41  Ca    10.9<H>      05 May 2022 05:51  Ca    10.4      04 May 2022 06:25  Phos  3.5     05-06  Mg     2.4     05-06      Creatinine Trend: 2.02<--, 2.13<--, 2.17<--, 2.18<--, 2.00<--, 1.99<--                    CAPILLARY BLOOD GLUCOSE      POCT Blood Glucose.: 231 mg/dL (05 May 2022 21:18)  POCT Blood Glucose.: 199 mg/dL (05 May 2022 17:14)  POCT Blood Glucose.: 148 mg/dL (05 May 2022 11:10)  POCT Blood Glucose.: 151 mg/dL (05 May 2022 08:42)

## 2022-05-06 NOTE — PROGRESS NOTE ADULT - SUBJECTIVE AND OBJECTIVE BOX
Date of Service   05-06-22 @ 15:39    Patient is a 89y old  Female who presents with a chief complaint of SOB (06 May 2022 07:35)      INTERVAL HISTORY: pt feels ok   TELEMETRY Personally reviewed: Vpaced 60's    REVIEW OF SYSTEMS:   CONSTITUTIONAL: No weakness  EYES/ENT: No visual changes; No throat pain  Neck: No pain or stiffness  Respiratory: No cough, wheezing, No shortness of breath  CARDIOVASCULAR: no chest pain or palpitations  GASTROINTESTINAL: No abdominal pain, no nausea, vomiting or hematemesis  GENITOURINARY: No dysuria, frequency or hematuria  NEUROLOGICAL: No stroke like symptoms  SKIN: No rashes    	  MEDICATIONS:  amLODIPine   Tablet 5 milliGRAM(s) Oral daily  furosemide   Injectable 40 milliGRAM(s) IV Push daily  furosemide   Injectable 40 milliGRAM(s) IV Push once  metoprolol succinate  milliGRAM(s) Oral daily        PHYSICAL EXAM:  T(C): 36.3 (05-06-22 @ 11:24), Max: 36.3 (05-05-22 @ 20:27)  HR: 66 (05-06-22 @ 11:24) (59 - 66)  BP: 132/79 (05-06-22 @ 11:24) (119/49 - 132/79)  RR: 18 (05-06-22 @ 11:24) (18 - 19)  SpO2: 94% (05-06-22 @ 11:24) (93% - 97%)  Wt(kg): --  I&O's Summary    05 May 2022 07:01  -  06 May 2022 07:00  --------------------------------------------------------  IN: 540 mL / OUT: 1250 mL / NET: -710 mL    06 May 2022 07:01  -  06 May 2022 15:39  --------------------------------------------------------  IN: 120 mL / OUT: 0 mL / NET: 120 mL          Appearance: In no distress	  HEENT:    PERRL, EOMI	  Cardiovascular:  S1 S2, No JVD  Respiratory: Lungs clear to auscultation	  Gastrointestinal:  Soft, Non-tender, + BS	  Vascularature:  + edema of B/L LE  Psychiatric: Appropriate affect   Neuro: no acute focal deficits                               8.2    7.48  )-----------( 285      ( 05 May 2022 05:51 )             28.0     05-06    136  |  98  |  77<H>  ----------------------------<  177<H>  4.3   |  27  |  2.02<H>    Ca    10.9<H>      06 May 2022 06:41  Phos  3.5     05-06  Mg     2.4     05-06          Labs personally reviewed      ASSESSMENT/PLAN: 	    Ms. Briones is a 89 yo female with PMH of HTN, HLD, CAD s/p CABG, AS s/p TAVR in 9/2021 at [facility], DM, CKD, AF on Eliquis, PPM who presents with 3 days of progressive shortness of breath, + orthopnea, increased LE edema and decreased oxygen saturation on pulse oximeter at home.    Problem/Plan -1  Problem: HFpEF  Plan:  - Followed by Dr. Agapito Woodard for outpatient cardiology  - CT Chest reveals moderate right and small left pleural effusions, cardiomegaly, possibly superimposed consolidation  - On lasix 40mg PO daily at home  - BMP reviewed from OP cardiologist records on 12/3/21 it was 1.1, and 5 months ago it was 1.2-1.4, here 2 months ago it was 1.4-1.6  - ECHO Left Ventricle: Normal left ventricular systolic function. No segmental wall motion abnormalities.  The LVEF= 60-65%. Moderate mitral regurgitation, There is a transcatheter aortic valve replacement seen. Moderate pulmonary hypertension.  - continue IV Diuresis with Lasix 40mg IVP daily, improving. Consider renal eval if Cr does not trend down  - 5/6 Creatinine downtrending.  - Patient appears close to euvolemia, but not euvolemic yet, although much improved since admission, c/w IV Lasix for now, will closely monitor.  - 5/3 POCUS reveals Right pleural effusion  - Responding well to IV diuretics, lasix 40mg IV   - On 2-1 L of NC     Problem/Plan -2  Problem: Aortic Stenosis  Plan:  - s/p TAVR in September 2021  - minimal murmur on ausculation  - aortic valve is well seated and has good flow  -moderate tricuspid regurg    Problem/Plan -3  Problem: CAD  Plan:  - Hx CABG at [facility]  - currently denies chest pain  - EKG without ischemic changes  - Troponin elevated likely i/s/o ADHF exacerbation; peaked 4/29 at 124  - c/w simvastatin 20mg PO daily    Problem/Plan -4  Problem: AF  Plan:  - EKG V-Paced, rate controlled  - c/w Metoprolol and Eliquis  - monitor on tele    Problem/Plan -5  Problem: HLD  Plan:  - c/w simvastatin     Problem/Plan -6  Problem: DM  Plan:  - Most recent HgbA1C is 7.6 on 3.4.22  - ISS       Jacob Diaz FNP-BC   Giles Gan DO [facility]  Cardiovascular Medicine  [address] [place] [address], [address]  Office: [phone]   Date of Service   05-06-22 @ 15:39    Patient is a 89y old  Female who presents with a chief complaint of SOB (06 May 2022 07:35)      INTERVAL HISTORY: pt feels ok   TELEMETRY Personally reviewed: Vpaced 60's    REVIEW OF SYSTEMS:   CONSTITUTIONAL: No weakness  EYES/ENT: No visual changes; No throat pain  Neck: No pain or stiffness  Respiratory: No cough, wheezing, No shortness of breath  CARDIOVASCULAR: no chest pain or palpitations  GASTROINTESTINAL: No abdominal pain, no nausea, vomiting or hematemesis  GENITOURINARY: No dysuria, frequency or hematuria  NEUROLOGICAL: No stroke like symptoms  SKIN: No rashes    	  MEDICATIONS:  amLODIPine   Tablet 5 milliGRAM(s) Oral daily  furosemide   Injectable 40 milliGRAM(s) IV Push daily  furosemide   Injectable 40 milliGRAM(s) IV Push once  metoprolol succinate  milliGRAM(s) Oral daily        PHYSICAL EXAM:  T(C): 36.3 (05-06-22 @ 11:24), Max: 36.3 (05-05-22 @ 20:27)  HR: 66 (05-06-22 @ 11:24) (59 - 66)  BP: 132/79 (05-06-22 @ 11:24) (119/49 - 132/79)  RR: 18 (05-06-22 @ 11:24) (18 - 19)  SpO2: 94% (05-06-22 @ 11:24) (93% - 97%)  Wt(kg): --  I&O's Summary    05 May 2022 07:01  -  06 May 2022 07:00  --------------------------------------------------------  IN: 540 mL / OUT: 1250 mL / NET: -710 mL    06 May 2022 07:01  -  06 May 2022 15:39  --------------------------------------------------------  IN: 120 mL / OUT: 0 mL / NET: 120 mL          Appearance: In no distress	  HEENT:    PERRL, EOMI	  Cardiovascular:  S1 S2, No JVD  Respiratory: Lungs clear to auscultation	  Gastrointestinal:  Soft, Non-tender, + BS	  Vascularature:  + edema of B/L LE  Psychiatric: Appropriate affect   Neuro: no acute focal deficits                               8.2    7.48  )-----------( 285      ( 05 May 2022 05:51 )             28.0     05-06    136  |  98  |  77<H>  ----------------------------<  177<H>  4.3   |  27  |  2.02<H>    Ca    10.9<H>      06 May 2022 06:41  Phos  3.5     05-06  Mg     2.4     05-06          Labs personally reviewed      ASSESSMENT/PLAN: 	    Ms. Briones is a 89 yo female with PMH of HTN, HLD, CAD s/p CABG, AS s/p TAVR in 9/2021 at [facility], DM, CKD, AF on Eliquis, PPM who presents with 3 days of progressive shortness of breath, + orthopnea, increased LE edema and decreased oxygen saturation on pulse oximeter at home.    Problem/Plan -1  Problem: HFpEF  Plan:  - Followed by Dr. Agapito Woodard for outpatient cardiology  - CT Chest reveals moderate right and small left pleural effusions, cardiomegaly, possibly superimposed consolidation  - On lasix 40mg PO daily at home  - BMP reviewed from OP cardiologist records on 12/3/21 it was 1.1, and 5 months ago it was 1.2-1.4, here 2 months ago it was 1.4-1.6  - ECHO Left Ventricle: Normal left ventricular systolic function. No segmental wall motion abnormalities.  The LVEF= 60-65%. Moderate mitral regurgitation, There is a transcatheter aortic valve replacement seen. Moderate pulmonary hypertension.  - continue IV Diuresis with Lasix 40mg IVP daily, improving. Consider renal eval if Cr does not trend down  - 5/6 Creatinine downtrending.  - Patient appears close to euvolemia, but not euvolemic yet, although much improved since admission, c/w IV Lasix for now, will closely monitor.  - 5/3 POCUS reveals Right pleural effusion  - Responding well to IV diuretics, lasix 40mg IV   - On 2-1 L of NC   - Consider repeating CXR and proBNP     Problem/Plan -2  Problem: Aortic Stenosis  Plan:  - s/p TAVR in September 2021  - minimal murmur on ausculation  - aortic valve is well seated and has good flow  -moderate tricuspid regurg    Problem/Plan -3  Problem: CAD  Plan:  - Hx CABG at [facility]  - currently denies chest pain  - EKG without ischemic changes  - Troponin elevated likely i/s/o ADHF exacerbation; peaked 4/29 at 124  - c/w simvastatin 20mg PO daily    Problem/Plan -4  Problem: AF  Plan:  - EKG V-Paced, rate controlled  - c/w Metoprolol and Eliquis  - monitor on tele    Problem/Plan -5  Problem: HLD  Plan:  - c/w simvastatin     Problem/Plan -6  Problem: DM  Plan:  - Most recent HgbA1C is 7.6 on 3.4.22  - [facility]       Jacob Diaz FNP-BC   Giles Gan DO [facility]  Cardiovascular Medicine  [address], [address]  Office: [phone]

## 2022-05-06 NOTE — PROGRESS NOTE ADULT - NUTRITIONAL ASSESSMENT
This patient has been assessed with a concern for Malnutrition and has been determined to have a diagnosis/diagnoses of Severe protein-calorie malnutrition.    This patient is being managed with:   Diet Regular-  Consistent Carbohydrate {No Snacks} (CSTCHO)  DASH/TLC {Sodium & Cholesterol Restricted} (DASH)  1500mL Fluid Restriction ([id number])  Entered: May  4 2022  7:31AM

## 2022-05-06 NOTE — PROGRESS NOTE ADULT - PROBLEM SELECTOR PLAN 10
Home meds: Continue home Allopurinol 100mg daily, Simvastatin 20mg qhs, Protonix 40mg daily   DVT ppx: Eliquis   Diet: DASH/Renal   Dispo: PT recs home with home PT   DNR/DNI

## 2022-05-06 NOTE — PROGRESS NOTE ADULT - ATTENDING COMMENTS
89 yo F with MHx on HFpEF (EF 75% 03/2022), Afib (on Eliquis), HTN, DM2, CKD3, hypothyroidism, AS s/p TAVR (09/2021), s/p PPM BIBEMS for hypoxia of 88% on RA. Admitted for ADHF.     #ADHF: Appears to be improving again today, and she walked with PT yesterday, and will try today as well, but still on O2 at rest, . c/w IV diuretics, responded well to the second dose of  IV lasix again yesterday. cr remained stable and downtrending, and pt continues to improve from a respiratory standpoint, and in general much better since admission, but remains in a volume overloaded state. Will give a second dose of IV lasix  again today and continue to monitor. closely monitoring renal function. monitor I/O's, daily weights, monitor elytes. Titrate O2 ot keep SPO2> 92% f/u Cardiology recs    #Hypoxic Resp failure 2/2 ADHF Vs Atelectasis Vs less likely PNA (No fever, cough or leukocytosis). titrateO2 to keep >92%, ADHF management as above. OOB with assistance, Encourage incentive spirometry.     #Asymptomatic bacteria: pt with urine cx  growing E. coli, but she does not have symptoms. No clear benefit for treating. Will monitor. If she develops further evidence of infection, will consider urinary as a source.    #Afib: V-paced, c/w BB and eliquis .    #MARICARMEN: Cr=1.66 on admission, slowly increased to 2.18 (5/3)->2.17) (5/4)-->2.13 (5/5). Can be 2/2 cardiorenal vs diuresis, f/u renal US.

## 2022-05-06 NOTE — PROGRESS NOTE ADULT - PROBLEM SELECTOR PLAN 1
- Likely 2/2 CHF with moderate pleural effusions - improving  - Trop 124 -> 117  - pro-BNP >8000  - CT Chest on 4/29 shows moderate right and small left pleural effusions with pulmonary edema in the setting of cardiomegaly. Partial lower lobe compressive atelectasis; superimposed consolidation is difficult to exclude on this noncontrast CT.  - S/p ceftriaxone and azithromycin in ED. Monitor off abx.     - lasix as below  - Currently saturating 98% on 2L NC. Wean as tolerated  - POCUS (5/5) with improving B lines, however still present, with mod pleural effusion right - Likely 2/2 CHF with moderate pleural effusions - improving  - pro-BNP >8000  - CT Chest on 4/29 shows moderate right and small left pleural effusions with pulmonary edema in the setting of cardiomegaly. Partial lower lobe compressive atelectasis; superimposed consolidation is difficult to exclude on this noncontrast CT.  - S/p ceftriaxone and azithromycin in ED. Monitor off abx.     - Lasix as below  - Currently saturating 98% on 2L NC. Wean as tolerated  - POCUS (5/5) with improving B lines, however still present, with mod pleural effusion right

## 2022-05-07 NOTE — PROGRESS NOTE ADULT - NUTRITIONAL ASSESSMENT
This patient has been assessed with a concern for Malnutrition and has been determined to have a diagnosis/diagnoses of Severe protein-calorie malnutrition.    This patient is being managed with:   Diet Regular-  Consistent Carbohydrate {No Snacks} (CSTCHO)  DASH/TLC {Sodium & Cholesterol Restricted} (DASH)  1500mL Fluid Restriction ([id number])  Entered: May  4 2022  7:31AM

## 2022-05-07 NOTE — PROGRESS NOTE ADULT - PROBLEM SELECTOR PLAN 6
- Patient on home Lantus 20U qhs and ISS TID premeal   - Continue Lantus 15U qhs and low ISS TID premeal with fingersticks   - A1c 7.4 in 03/2022 - Patient on home Lantus 20U qhs and ISS TID premeal   - Continue Lantus 15U qhs and low ISS TID premeal with fingersticks. Added Admelog 2U premeal TID   - A1c 7.4 in 03/2022

## 2022-05-07 NOTE — PROGRESS NOTE ADULT - SUBJECTIVE AND OBJECTIVE BOX
%%%%INCOMPLETE NOTE%%%%%     Dr. Joann Pretty, PGY-1    Patient is a 89y old  Female who presents with a chief complaint of SOB (06 May 2022 15:39)    24-HR EVENTS/SUBJECTIVE: No acute events overnight. Patient seen and examined at bedside this AM. BMx2 yesterday. Denies chest pain, abdominal pain, cough, n/v, sob. Breathing comfortably on room air.    MEDICATIONS  (STANDING):  allopurinol 100 milliGRAM(s) Oral daily  amLODIPine   Tablet 5 milliGRAM(s) Oral daily  apixaban 2.5 milliGRAM(s) Oral two times a day  dextrose 5%. 1000 milliLiter(s) (100 mL/Hr) IV Continuous <Continuous>  dextrose 5%. 1000 milliLiter(s) (50 mL/Hr) IV Continuous <Continuous>  dextrose 50% Injectable 25 Gram(s) IV Push once  dextrose 50% Injectable 12.5 Gram(s) IV Push once  dextrose 50% Injectable 25 Gram(s) IV Push once  furosemide   Injectable 40 milliGRAM(s) IV Push daily  glucagon  Injectable 1 milliGRAM(s) IntraMuscular once  insulin glargine Injectable (LANTUS) 15 Unit(s) SubCutaneous at bedtime  insulin lispro (ADMELOG) corrective regimen sliding scale   SubCutaneous three times a day before meals  insulin lispro (ADMELOG) corrective regimen sliding scale   SubCutaneous at bedtime  levothyroxine 50 MICROGram(s) Oral daily  metoprolol succinate  milliGRAM(s) Oral daily  multivitamin 1 Tablet(s) Oral daily  pantoprazole    Tablet 40 milliGRAM(s) Oral before breakfast  simvastatin 20 milliGRAM(s) Oral at bedtime    MEDICATIONS  (PRN):  acetaminophen     Tablet .. 650 milliGRAM(s) Oral every 6 hours PRN Temp greater or equal to 38C (100.4F), Mild Pain (1 - 3)  dextrose Oral Gel 15 Gram(s) Oral once PRN Blood Glucose LESS THAN 70 milliGRAM(s)/deciliter        Objective:     Vitals: Vital Signs Last 24 Hrs  T(C): 36.4 (05-07-22 @ 04:53), Max: 36.4 (05-07-22 @ 04:53)  T(F): 97.5 (05-07-22 @ 04:53), Max: 97.5 (05-07-22 @ 04:53)  HR: 56 (05-07-22 @ 04:53) (56 - 66)  BP: 143/74 (05-07-22 @ 04:53) (123/70 - 143/74)  BP(mean): --  RR: 18 (05-07-22 @ 04:53) (18 - 18)  SpO2: 95% (05-07-22 @ 04:53) (94% - 97%)            I&O's Summary    06 May 2022 07:01  -  07 May 2022 07:00  --------------------------------------------------------  IN: 360 mL / OUT: 650 mL / NET: -290 mL        PHYSICAL EXAM:  GENERAL: NAD, elderly female  HEAD:  Atraumatic, Normocephalic  EYES: EOMI, PERRLA, conjunctiva and sclera clear  ENMT: Moist mucous membranes  NECK: Supple, No JVD  CHEST/LUNG: Adequate inspiratory effort. Mild crackles heard in b/l lower lobes. On 2L NC.   HEART: Regular rate and rhythm; No murmurs, rubs, or gallops.   ABDOMEN: Soft, Nontender, Nondistended; Bowel sounds present  EXTREMITIES:  2+ Peripheral Pulses. 1+ pitting edema with chronic skin changes in LLE. Bandaged wound on RLE with 2+ pitting edema   NERVOUS SYSTEM:  Alert & Oriented X4, Good concentration  PSYCH: Normal Affect. Laying in bed comfortably; not agitated    LABS:                        8.2    7.48  )-----------( 285      ( 05 May 2022 05:51 )             28.0     Hgb Trend: 8.2<--, 8.1<--, 8.1<--, 8.7<--, 9.0<--  05-07    137  |  99  |  72<H>  ----------------------------<  251<H>  4.0   |  28  |  1.68<H>  05-06    136  |  98  |  77<H>  ----------------------------<  177<H>  4.3   |  27  |  2.02<H>  05-05    133<L>  |  96  |  73<H>  ----------------------------<  169<H>  4.3   |  25  |  2.13<H>    Ca    10.9<H>      07 May 2022 06:03  Ca    10.9<H>      06 May 2022 06:41  Ca    10.9<H>      05 May 2022 05:51  Phos  3.0     05-07  Mg     2.2     05-07      Creatinine Trend: 1.68<--, 2.02<--, 2.13<--, 2.17<--, 2.18<--, 2.00<--                    CAPILLARY BLOOD GLUCOSE      POCT Blood Glucose.: 355 mg/dL (06 May 2022 20:58)  POCT Blood Glucose.: 298 mg/dL (06 May 2022 16:53)  POCT Blood Glucose.: 264 mg/dL (06 May 2022 11:57)  POCT Blood Glucose.: 157 mg/dL (06 May 2022 08:14)     Dr. Joann Pretty, PGY-1    Patient is a 89y old  Female who presents with a chief complaint of SOB (06 May 2022 15:39)    24-HR EVENTS/SUBJECTIVE: No acute events overnight. Patient seen and examined at bedside this AM. BMx2 yesterday. Denies chest pain, abdominal pain, cough, n/v, sob. Breathing comfortably on 2L NC.    MEDICATIONS  (STANDING):  allopurinol 100 milliGRAM(s) Oral daily  amLODIPine   Tablet 5 milliGRAM(s) Oral daily  apixaban 2.5 milliGRAM(s) Oral two times a day  dextrose 5%. 1000 milliLiter(s) (100 mL/Hr) IV Continuous <Continuous>  dextrose 5%. 1000 milliLiter(s) (50 mL/Hr) IV Continuous <Continuous>  dextrose 50% Injectable 25 Gram(s) IV Push once  dextrose 50% Injectable 12.5 Gram(s) IV Push once  dextrose 50% Injectable 25 Gram(s) IV Push once  furosemide   Injectable 40 milliGRAM(s) IV Push daily  glucagon  Injectable 1 milliGRAM(s) IntraMuscular once  insulin glargine Injectable (LANTUS) 15 Unit(s) SubCutaneous at bedtime  insulin lispro (ADMELOG) corrective regimen sliding scale   SubCutaneous three times a day before meals  insulin lispro (ADMELOG) corrective regimen sliding scale   SubCutaneous at bedtime  levothyroxine 50 MICROGram(s) Oral daily  metoprolol succinate  milliGRAM(s) Oral daily  multivitamin 1 Tablet(s) Oral daily  pantoprazole    Tablet 40 milliGRAM(s) Oral before breakfast  simvastatin 20 milliGRAM(s) Oral at bedtime    MEDICATIONS  (PRN):  acetaminophen     Tablet .. 650 milliGRAM(s) Oral every 6 hours PRN Temp greater or equal to 38C (100.4F), Mild Pain (1 - 3)  dextrose Oral Gel 15 Gram(s) Oral once PRN Blood Glucose LESS THAN 70 milliGRAM(s)/deciliter        Objective:     Vitals: Vital Signs Last 24 Hrs  T(C): 36.4 (05-07-22 @ 04:53), Max: 36.4 (05-07-22 @ 04:53)  T(F): 97.5 (05-07-22 @ 04:53), Max: 97.5 (05-07-22 @ 04:53)  HR: 56 (05-07-22 @ 04:53) (56 - 66)  BP: 143/74 (05-07-22 @ 04:53) (123/70 - 143/74)  BP(mean): --  RR: 18 (05-07-22 @ 04:53) (18 - 18)  SpO2: 95% (05-07-22 @ 04:53) (94% - 97%)            I&O's Summary    06 May 2022 07:01  -  07 May 2022 07:00  --------------------------------------------------------  IN: 360 mL / OUT: 650 mL / NET: -290 mL        PHYSICAL EXAM:  GENERAL: NAD, elderly female  HEAD:  Atraumatic, Normocephalic  EYES: EOMI, PERRLA, conjunctiva and sclera clear  ENMT: Moist mucous membranes  NECK: Supple, No JVD  CHEST/LUNG: Adequate inspiratory effort. Mild crackles heard in b/l lower lobes. On 2L NC.   HEART: Regular rate and rhythm; No murmurs, rubs, or gallops.   ABDOMEN: Soft, Nontender, Nondistended; Bowel sounds present  EXTREMITIES:  2+ Peripheral Pulses. 1+ pitting edema with chronic skin changes in LLE. Bandaged wound on RLE with 2+ pitting edema   NERVOUS SYSTEM:  Alert & Oriented X4, Good concentration  PSYCH: Normal Affect. Laying in bed comfortably; not agitated    LABS:                        8.2    7.48  )-----------( 285      ( 05 May 2022 05:51 )             28.0     Hgb Trend: 8.2<--, 8.1<--, 8.1<--, 8.7<--, 9.0<--  05-07    137  |  99  |  72<H>  ----------------------------<  251<H>  4.0   |  28  |  1.68<H>  05-06    136  |  98  |  77<H>  ----------------------------<  177<H>  4.3   |  27  |  2.02<H>  05-05    133<L>  |  96  |  73<H>  ----------------------------<  169<H>  4.3   |  25  |  2.13<H>    Ca    10.9<H>      07 May 2022 06:03  Ca    10.9<H>      06 May 2022 06:41  Ca    10.9<H>      05 May 2022 05:51  Phos  3.0     05-07  Mg     2.2     05-07      Creatinine Trend: 1.68<--, 2.02<--, 2.13<--, 2.17<--, 2.18<--, 2.00<--                    CAPILLARY BLOOD GLUCOSE      POCT Blood Glucose.: 355 mg/dL (06 May 2022 20:58)  POCT Blood Glucose.: 298 mg/dL (06 May 2022 16:53)  POCT Blood Glucose.: 264 mg/dL (06 May 2022 11:57)  POCT Blood Glucose.: 157 mg/dL (06 May 2022 08:14)

## 2022-05-07 NOTE — PROGRESS NOTE ADULT - SUBJECTIVE AND OBJECTIVE BOX
DATE OF SERVICE: 05-07-22 @ 09:28    Patient is a 89y old  Female who presents with a chief complaint of SOB (07 May 2022 07:09)      INTERVAL HISTORY: Feels ok. Is very deconditioned and reports she cannot stand.     REVIEW OF SYSTEMS:  CONSTITUTIONAL: Generalized weakness  EYES/ENT: No visual changes;  No throat pain   NECK: No pain or stiffness  RESPIRATORY: No cough, wheezing; No shortness of breath  CARDIOVASCULAR: No chest pain or palpitations  GASTROINTESTINAL: No abdominal  pain. No nausea, vomiting, or hematemesis  GENITOURINARY: No dysuria, frequency or hematuria  NEUROLOGICAL: No stroke like symptoms  SKIN: No rashes    TELEMETRY Personally reviewed: AF/V-Paced 60s, PVCs  	  MEDICATIONS:  amLODIPine   Tablet 5 milliGRAM(s) Oral daily  furosemide   Injectable 40 milliGRAM(s) IV Push daily  metoprolol succinate  milliGRAM(s) Oral daily        PHYSICAL EXAM:  T(C): 36.4 (05-07-22 @ 04:53), Max: 36.4 (05-07-22 @ 04:53)  HR: 56 (05-07-22 @ 04:53) (56 - 66)  BP: 143/74 (05-07-22 @ 04:53) (123/70 - 143/74)  RR: 18 (05-07-22 @ 04:53) (18 - 18)  SpO2: 95% (05-07-22 @ 04:53) (94% - 97%)  Wt(kg): --  I&O's Summary    06 May 2022 07:01  -  07 May 2022 07:00  --------------------------------------------------------  IN: 360 mL / OUT: 650 mL / NET: -290 mL          Appearance: In no distress	  HEENT:    PERRL, EOMI	  Cardiovascular:  Irregular, No JVD  Respiratory: diminished  Gastrointestinal:  Soft, Non-tender, + BS	  Vascularature:  No edema of LE  Psychiatric: Appropriate affect   Neuro: no acute focal deficits           05-07    137  |  99  |  72<H>  ----------------------------<  251<H>  4.0   |  28  |  1.68<H>    Ca    10.9<H>      07 May 2022 06:03  Phos  3.0     05-07  Mg     2.2     05-07          Labs personally reviewed      ASSESSMENT/PLAN: 	  Ms. Briones is a 89 yo female with PMH of HTN, HLD, CAD s/p CABG, AS s/p TAVR in 9/2021 at [facility], DM, CKD, AF on Eliquis, PPM who presents with 3 days of progressive shortness of breath, + orthopnea, increased LE edema and decreased oxygen saturation on pulse oximeter at home.    Problem/Plan -1  Problem: HFpEF  Plan:  - Followed by Dr. Agapito Woodard for outpatient cardiology  - CT Chest reveals moderate right and small left pleural effusions, cardiomegaly, possibly superimposed consolidation  - On lasix 40mg PO daily at home  - BMP reviewed from OP cardiologist records on 12/3/21 it was 1.1, and 5 months ago it was 1.2-1.4, here 2 months ago it was 1.4-1.6  - ECHO Left Ventricle: Normal left ventricular systolic function. No segmental wall motion abnormalities.  The LVEF= 60-65%. Moderate mitral regurgitation, There is a transcatheter aortic valve replacement seen. Moderate pulmonary hypertension.  - continue IV Diuresis with Lasix 40mg IVP daily, improving. Consider renal eval if Cr does not trend down  - 5/6 Creatinine downtrending.  - Patient appears close to euvolemia, but not euvolemic yet, although much improved since admission, c/w IV Lasix for now, will closely monitor.  - 5/3 POCUS reveals Right pleural effusion  - Responding well to IV diuretics, lasix 40mg IV   - On 2-1 L of NC   - Consider repeating CXR and proBNP   - BNP only slighty down (8208 --> 7983)  - Weight (bed not standing) slightly increased since admission despite IV diuresis.  - Still requiring 2L NC  - Consider increase IV Lasix to BID pending CXR    Problem/Plan -2  Problem: Aortic Stenosis  Plan:  - s/p TAVR in September 2021  - minimal murmur on ausculation  - aortic valve is well seated and has good flow  -moderate tricuspid regurg    Problem/Plan -3  Problem: CAD  Plan:  - Hx CABG at [facility]  - currently denies chest pain  - EKG without ischemic changes  - Troponin elevated likely i/s/o ADHF exacerbation; peaked 4/29 at 124  - c/w simvastatin 20mg PO daily    Problem/Plan -4  Problem: AF  Plan:  - EKG V-Paced, rate controlled  - c/w Metoprolol and Eliquis  - monitor on tele    Problem/Plan -5  Problem: HLD  Plan:  - c/w simvastatin     Problem/Plan -6  Problem: DM  Plan:  - Most recent HgbA1C is 7.6 on 3.4.22  - ISS           Ira Reynolds AG-NP   Giles Gan DO [facility]  Cardiovascular Medicine  [address] [place] [address], [address]  Office: [phone]  Cell: [phone] DATE OF SERVICE: 05-07-22 @ 09:28    Patient is a 89y old  Female who presents with a chief complaint of SOB (07 May 2022 07:09)      INTERVAL HISTORY: Feels ok. Is very deconditioned and reports she cannot stand.     REVIEW OF SYSTEMS:  CONSTITUTIONAL: Generalized weakness  EYES/ENT: No visual changes;  No throat pain   NECK: No pain or stiffness  RESPIRATORY: No cough, wheezing; No shortness of breath  CARDIOVASCULAR: No chest pain or palpitations  GASTROINTESTINAL: No abdominal  pain. No nausea, vomiting, or hematemesis  GENITOURINARY: No dysuria, frequency or hematuria  NEUROLOGICAL: No stroke like symptoms  SKIN: No rashes    TELEMETRY Personally reviewed: AF/V-Paced 60s, PVCs  	  MEDICATIONS:  amLODIPine   Tablet 5 milliGRAM(s) Oral daily  furosemide   Injectable 40 milliGRAM(s) IV Push daily  metoprolol succinate  milliGRAM(s) Oral daily        PHYSICAL EXAM:  T(C): 36.4 (05-07-22 @ 04:53), Max: 36.4 (05-07-22 @ 04:53)  HR: 56 (05-07-22 @ 04:53) (56 - 66)  BP: 143/74 (05-07-22 @ 04:53) (123/70 - 143/74)  RR: 18 (05-07-22 @ 04:53) (18 - 18)  SpO2: 95% (05-07-22 @ 04:53) (94% - 97%)  Wt(kg): --  I&O's Summary    06 May 2022 07:01  -  07 May 2022 07:00  --------------------------------------------------------  IN: 360 mL / OUT: 650 mL / NET: -290 mL          Appearance: In no distress	  HEENT:    PERRL, EOMI	  Cardiovascular:  Irregular, No JVD  Respiratory: diminished  Gastrointestinal:  Soft, Non-tender, + BS	  Vascularature:  No edema of LE  Psychiatric: Appropriate affect   Neuro: no acute focal deficits           05-07    137  |  99  |  72<H>  ----------------------------<  251<H>  4.0   |  28  |  1.68<H>    Ca    10.9<H>      07 May 2022 06:03  Phos  3.0     05-07  Mg     2.2     05-07          Labs personally reviewed      ASSESSMENT/PLAN: 	  Ms. Briones is a 89 yo female with PMH of HTN, HLD, CAD s/p CABG, AS s/p TAVR in 9/2021 at , DM, CKD, AF on Eliquis, PPM who presents with 3 days of progressive shortness of breath, + orthopnea, increased LE edema and decreased oxygen saturation on pulse oximeter at home.    Problem/Plan -1  Problem: HFpEF  Plan:  - Followed by Dr. Agapito Woodard for outpatient cardiology  - CT Chest reveals moderate right and small left pleural effusions, cardiomegaly, possibly superimposed consolidation  - On lasix 40mg PO daily at home  - BMP reviewed from OP cardiologist records on 12/3/21 it was 1.1, and 5 months ago it was 1.2-1.4, here 2 months ago it was 1.4-1.6  - ECHO Left Ventricle: Normal left ventricular systolic function. No segmental wall motion abnormalities.  The LVEF= 60-65%. Moderate mitral regurgitation, There is a transcatheter aortic valve replacement seen. Moderate pulmonary hypertension.  - continue IV Diuresis with Lasix 40mg IVP daily, improving. Consider renal eval if Cr does not trend down  - 5/6 Creatinine downtrending.  - Patient appears close to euvolemia, but not euvolemic yet, although much improved since admission, c/w IV Lasix for now, will closely monitor.  - 5/3 POCUS reveals Right pleural effusion  - Responding well to IV diuretics, lasix 40mg IV   - On 2-1 L of NC   - Consider repeating CXR and proBNP   - BNP only slighty down (8208 --> 7983)  - Weight (bed not standing) slightly increased since admission despite IV diuresis.  - Still requiring 2L NC  - Consider increase IV Lasix to BID pending CXR    Problem/Plan -2  Problem: Aortic Stenosis  Plan:  - s/p TAVR in September 2021  - minimal murmur on ausculation  - aortic valve is well seated and has good flow  -moderate tricuspid regurg    Problem/Plan -3  Problem: CAD  Plan:  - Hx CABG at [facility]  - currently denies chest pain  - EKG without ischemic changes  - Troponin elevated likely i/s/o ADHF exacerbation; peaked 4/29 at 124  - c/w simvastatin 20mg PO daily    Problem/Plan -4  Problem: AF  Plan:  - EKG V-Paced, rate controlled  - c/w Metoprolol and Eliquis  - monitor on tele    Problem/Plan -5  Problem: HLD  Plan:  - c/w simvastatin     Problem/Plan -6  Problem: DM  Plan:  - Most recent HgbA1C is 7.6 on 3.4.22  - ISS           Ira Reynolds AG-NP   Giles Gan DO [facility]  Cardiovascular Medicine  [address] [place] [address], [address]  Office: [phone]  Cell: [phone]

## 2022-05-07 NOTE — PROGRESS NOTE ADULT - PROBLEM SELECTOR PLAN 1
- Likely 2/2 CHF with moderate pleural effusions - improving  - pro-BNP >8000  - CT Chest on 4/29 shows moderate right and small left pleural effusions with pulmonary edema in the setting of cardiomegaly. Partial lower lobe compressive atelectasis; superimposed consolidation is difficult to exclude on this noncontrast CT.  - S/p ceftriaxone and azithromycin in ED. Monitor off abx.     - Lasix as below  - Currently saturating 98% on 2L NC. Wean as tolerated  - POCUS (5/5) with improving B lines, however still present, with mod pleural effusion right

## 2022-05-07 NOTE — PROGRESS NOTE ADULT - PROBLEM SELECTOR PLAN 2
- HFpEF (EF 75% in 03/2022), AS s/p TAVR (09/2021)  - Patient initially SOB, RHODES, LE edema, +JVD likely in CHF exacerbation   - Continue home Toprol XL 100mg daily   - Continue Furosemide 40 IV   - Strict I/Os, daily weights  - Appreciate cards recs  - TTE shows EF 65% with mold pulm HTN and mod TR (no significant change from prior) - HFpEF (EF 75% in 03/2022), AS s/p TAVR (09/2021)  - Patient initially SOB, RHODES, LE edema, +JVD likely in CHF exacerbation   - Continue home Toprol XL 100mg daily   - Continue Furosemide 40 IV with extra Lasix as needed   - Strict I/Os, daily weights  - Appreciate cards recs  - TTE shows EF 65% with mold pulm HTN and mod TR (no significant change from prior)

## 2022-05-07 NOTE — PROGRESS NOTE ADULT - PROBLEM SELECTOR PLAN 5
- Likely 2/2 cardiorenal syndrome as Cr improving with diuresis   - Kidney bladder ultrasound shows no hydro

## 2022-05-08 NOTE — PROGRESS NOTE ADULT - SUBJECTIVE AND OBJECTIVE BOX
Date of Service   05-08-22 @ 12:02    Patient is a 89y old  Female who presents with a chief complaint of SOB (08 May 2022 07:09)      INTERVAL HISTORY: pt feels ok   TELEMETRY Personally reviewed:  Sandy 50-80 's     REVIEW OF SYSTEMS:   CONSTITUTIONAL: No weakness  EYES/ENT: No visual changes; No throat pain  Neck: No pain or stiffness  Respiratory: No cough, wheezing, No shortness of breath  CARDIOVASCULAR: no chest pain or palpitations  GASTROINTESTINAL: No abdominal pain, no nausea, vomiting or hematemesis  GENITOURINARY: No dysuria, frequency or hematuria  NEUROLOGICAL: No stroke like symptoms  SKIN: No rashes    	  MEDICATIONS:  amLODIPine   Tablet 5 milliGRAM(s) Oral daily  furosemide   Injectable 40 milliGRAM(s) IV Push once  metoprolol succinate  milliGRAM(s) Oral daily        PHYSICAL EXAM:  T(C): 36.4 (05-08-22 @ 05:02), Max: 36.4 (05-08-22 @ 05:02)  HR: 62 (05-08-22 @ 06:42) (60 - 66)  BP: 135/74 (05-08-22 @ 06:42) (124/59 - 135/76)  RR: 18 (05-08-22 @ 05:02) (18 - 20)  SpO2: 95% (05-08-22 @ 05:02) (94% - 95%)  Wt(kg): --  I&O's Summary    07 May 2022 07:01  -  08 May 2022 07:00  --------------------------------------------------------  IN: 537 mL / OUT: 300 mL / NET: 237 mL          Appearance: In no distress	  HEENT:    PERRL, EOMI	  Cardiovascular:  S1 S2, No JVD  Respiratory: Lungs clear to auscultation	  Gastrointestinal:  Soft, Non-tender, + BS	  Vascularature:  No edema of LE  Psychiatric: Appropriate affect   Neuro: no acute focal deficits           05-08    135  |  97  |  73<H>  ----------------------------<  215<H>  4.2   |  26  |  1.84<H>    Ca    10.8<H>      08 May 2022 10:21  Phos  2.9     05-08  Mg     2.2     05-08          Labs personally reviewed  < from: Xray Chest 1 View- PORTABLE-Routine (Xray Chest 1 View- PORTABLE-Routine in AM.) (05.07.22 @ 08:44) >  IMPRESSION:    Mild pulmonary vascular congestion with small bilateral pleural effusions    --- End of Report ---      < end of copied text >      ASSESSMENT/PLAN: 	    Ms. Briones is a 89 yo female with PMH of HTN, HLD, CAD s/p CABG, AS s/p TAVR in 9/2021 at [facility], DM, CKD, AF on Eliquis, PPM who presents with 3 days of progressive shortness of breath, + orthopnea, increased LE edema and decreased oxygen saturation on pulse oximeter at home.    Problem/Plan -1  Problem: HFpEF  Plan:  - Followed by Dr. Agapito Woodard for outpatient cardiology  - CT Chest reveals moderate right and small left pleural effusions, cardiomegaly, possibly superimposed consolidation  - On lasix 40mg PO daily at home  - BMP reviewed from OP cardiologist records on 12/3/21 it was 1.1, and 5 months ago it was 1.2-1.4, here 2 months ago it was 1.4-1.6  - ECHO Left Ventricle: Normal left ventricular systolic function. No segmental wall motion abnormalities.  The LVEF= 60-65%. Moderate mitral regurgitation, There is a transcatheter aortic valve replacement seen. Moderate pulmonary hypertension.  - continue IV Diuresis with Lasix 40mg IVP daily, improving. Consider renal eval if Cr does not trend down  - 5/6 Creatinine downtrending.  - Patient appears close to euvolemia, but not euvolemic yet, although much improved since admission, c/w IV Lasix for now, will closely monitor.  - 5/3 POCUS reveals Right pleural effusion  - Responding well to IV diuretics, lasix 40mg IV   - On 2-1 L of NC   - Consider repeating CXR and proBNP   - BNP only slighty down (8208 --> 7983)  - Weight (bed not standing) slightly increased since admission despite IV diuresis.  - Still requiring 2L NC  - Consider increase IV Lasix to BID   - CXR shows Mild pulmonary vascular congestion with small bilateral pleural effusions    Problem/Plan -2  Problem: Aortic Stenosis  Plan:  - s/p TAVR in September 2021  - minimal murmur on ausculation  - aortic valve is well seated and has good flow  - moderate tricuspid regurg    Problem/Plan -3  Problem: CAD  Plan:  - Hx CABG at [facility]  - currently denies chest pain  - EKG without ischemic changes  - Troponin elevated likely i/s/o ADHF exacerbation; peaked 4/29 at 124  - c/w simvastatin 20mg PO daily    Problem/Plan -4  Problem: AF  Plan:  - rate controlled  - c/w Metoprolol and Eliquis  - monitor on tele    Problem/Plan -5  Problem: HLD  Plan:  - c/w simvastatin     Problem/Plan -6  Problem: DM  Plan:  - Most recent HgbA1C is 7.6 on 3.4.22  - ISS       Jacob Diaz FN-BC   Giles Gan DO Located within [facility]  Cardiovascular Medicine  [address] [place] [address], [address]  Office: [phone]   Date of Service   05-08-22 @ 12:02    Patient is a 89y old  Female who presents with a chief complaint of SOB (08 May 2022 07:09)      INTERVAL HISTORY: pt feels ok   TELEMETRY Personally reviewed:  Sandy 50-80 's     REVIEW OF SYSTEMS:   CONSTITUTIONAL: No weakness  EYES/ENT: No visual changes; No throat pain  Neck: No pain or stiffness  Respiratory: No cough, wheezing, No shortness of breath  CARDIOVASCULAR: no chest pain or palpitations  GASTROINTESTINAL: No abdominal pain, no nausea, vomiting or hematemesis  GENITOURINARY: No dysuria, frequency or hematuria  NEUROLOGICAL: No stroke like symptoms  SKIN: No rashes    	  MEDICATIONS:  amLODIPine   Tablet 5 milliGRAM(s) Oral daily  furosemide   Injectable 40 milliGRAM(s) IV Push once  metoprolol succinate  milliGRAM(s) Oral daily        PHYSICAL EXAM:  T(C): 36.4 (05-08-22 @ 05:02), Max: 36.4 (05-08-22 @ 05:02)  HR: 62 (05-08-22 @ 06:42) (60 - 66)  BP: 135/74 (05-08-22 @ 06:42) (124/59 - 135/76)  RR: 18 (05-08-22 @ 05:02) (18 - 20)  SpO2: 95% (05-08-22 @ 05:02) (94% - 95%)  Wt(kg): --  I&O's Summary    07 May 2022 07:01  -  08 May 2022 07:00  --------------------------------------------------------  IN: 537 mL / OUT: 300 mL / NET: 237 mL          Appearance: In no distress	  HEENT:    PERRL, EOMI	  Cardiovascular:  S1 S2, No JVD  Respiratory: Lungs clear to auscultation	  Gastrointestinal:  Soft, Non-tender, + BS	  Vascularature:  No edema of LE  Psychiatric: Appropriate affect   Neuro: no acute focal deficits           05-08    135  |  97  |  73<H>  ----------------------------<  215<H>  4.2   |  26  |  1.84<H>    Ca    10.8<H>      08 May 2022 10:21  Phos  2.9     05-08  Mg     2.2     05-08          Labs personally reviewed  < from: Xray Chest 1 View- PORTABLE-Routine (Xray Chest 1 View- PORTABLE-Routine in AM.) (05.07.22 @ 08:44) >  IMPRESSION:    Mild pulmonary vascular congestion with small bilateral pleural effusions    --- End of Report ---      < end of copied text >      ASSESSMENT/PLAN: 	    Ms. Briones is a 89 yo female with PMH of HTN, HLD, CAD s/p CABG, AS s/p TAVR in 9/2021 at [facility], DM, CKD, AF on Eliquis, PPM who presents with 3 days of progressive shortness of breath, + orthopnea, increased LE edema and decreased oxygen saturation on pulse oximeter at home.    Problem/Plan -1  Problem: HFpEF  Plan:  - Followed by Dr. Agapito Woodard for outpatient cardiology  - CT Chest reveals moderate right and small left pleural effusions, cardiomegaly, possibly superimposed consolidation  - On lasix 40mg PO daily at home  - BMP reviewed from OP cardiologist records on 12/3/21 it was 1.1, and 5 months ago it was 1.2-1.4, here 2 months ago it was 1.4-1.6  - ECHO Left Ventricle: Normal left ventricular systolic function. No segmental wall motion abnormalities.  The LVEF= 60-65%. Moderate mitral regurgitation, There is a transcatheter aortic valve replacement seen. Moderate pulmonary hypertension.  - continue IV Diuresis with Lasix 40mg IVP daily, improving. Consider renal eval if Cr does not trend down  - 5/6 Creatinine downtrending.  - Patient appears close to euvolemia, but not euvolemic yet, although much improved since admission, c/w IV Lasix for now, will closely monitor.  - 5/3 POCUS reveals Right pleural effusion  - Responding well to IV diuretics, lasix 40mg IV   - On 2-1 L of NC   - Consider repeating CXR and proBNP   - BNP only slighty down (8208 --> 7983)  - Weight (bed not standing) slightly increased since admission despite IV diuresis.  - Still requiring 2L NC  - Continue Lasix 40mg, responding well, consider increase to BID dosing given CXR shows Mild pulmonary vascular congestion with small bilateral pleural effusions    Problem/Plan -2  Problem: Aortic Stenosis  Plan:  - s/p TAVR in September 2021  - minimal murmur on ausculation  - aortic valve is well seated and has good flow  - moderate tricuspid regurg    Problem/Plan -3  Problem: CAD  Plan:  - Hx CABG at [facility]  - currently denies chest pain  - EKG without ischemic changes  - Troponin elevated likely i/s/o ADHF exacerbation; peaked 4/29 at 124  - c/w simvastatin 20mg PO daily    Problem/Plan -4  Problem: AF  Plan:  - rate controlled  - c/w Metoprolol and Eliquis  - monitor on tele    Problem/Plan -5  Problem: HLD  Plan:  - c/w simvastatin     Problem/Plan -6  Problem: DM  Plan:  - Most recent HgbA1C is 7.6 on 3.4.22  - ISS       Jacob Diaz FNP-BC   Giles Gan DO [facility]  Cardiovascular Medicine  [address] [place] [address], [address]  Office: [phone]

## 2022-05-08 NOTE — PROGRESS NOTE ADULT - PROBLEM SELECTOR PLAN 6
- Patient on home Lantus 20U qhs and ISS TID premeal   - Continue Lantus 15U qhs and low ISS TID premeal with fingersticks. Added Admelog 2U premeal TID   - A1c 7.4 in 03/2022

## 2022-05-08 NOTE — PROGRESS NOTE ADULT - ATTENDING COMMENTS
89 yo F with MHx on HFpEF (EF 75% 03/2022), Afib (on Eliquis), HTN, DM2, CKD3, hypothyroidism, AS s/p TAVR (09/2021), s/p PPM BIBEMS for hypoxemia . Admitted for ADHF.     #ADHF:  c/w IV diuretics, has been gettig BID for the past several days. Overall improving, but still with overloaded state. Will discuss with cardiology whether metolazone is indicated .  continue to monitor respiratory status. closely monitoring renal function. monitor I/O's, daily weights, monitor elytes. Titrate O2 ot keep SPO2> 92% f/u Cardiology recs      #Hypoxic Resp failure 2/2 ADHF Vs Atelectasis Vs less likely PNA (No fever, cough or leukocytosis). titrateO2 to keep >92%, ADHF management as above. OOB with assistance, Encourage incentive spirometry.     DMII: Increased lantus to 19U and Admelog to 3U TID premeal, hold if NPO. c/w corrective sliding scale. Goal -180.    #Afib: V-paced, c/w BB and eliquis .    #MARICARMEN: Cr=1.66 on admission, slowly increased to 2.18 (5/3)->2.17) (5/4)-->2.13 (5/5)-->1.68-->1.84. Can be 2/2 cardiorenal vs diuresis,. renal US, no hydro.

## 2022-05-08 NOTE — PROGRESS NOTE ADULT - PROBLEM SELECTOR PLAN 10
Home meds: Continue home Allopurinol 100mg daily, Simvastatin 20mg qhs, Protonix 40mg daily   DVT ppx: Eliquis   Diet: DASH/Renal   Dispo: PT recs home with home PT   Communication: Updated daughter Claudia on 5/7   DNR/DNI

## 2022-05-08 NOTE — PROGRESS NOTE ADULT - NUTRITIONAL ASSESSMENT
This patient has been assessed with a concern for Malnutrition and has been determined to have a diagnosis/diagnoses of Severe protein-calorie malnutrition.    This patient is being managed with:   Diet Regular-  Consistent Carbohydrate {No Snacks} (CSTCHO)  DASH/TLC {Sodium & Cholesterol Restricted} (DASH)  1500mL Fluid Restriction ([id number])  Entered: May  4 2022  7:31AM

## 2022-05-08 NOTE — PROGRESS NOTE ADULT - PROBLEM SELECTOR PLAN 2
- HFpEF (EF 75% in 03/2022), AS s/p TAVR (09/2021)  - Patient initially SOB, RHODES, LE edema, +JVD likely in CHF exacerbation   - Continue home Toprol XL 100mg daily   - Continue Furosemide 40 IV with extra Lasix as needed   - Strict I/Os, daily weights  - Appreciate cards recs  - TTE shows EF 65% with mold pulm HTN and mod TR (no significant change from prior) - HFpEF (EF 75% in 03/2022), AS s/p TAVR (09/2021)  - Patient initially SOB, RHODES, LE edema, +JVD likely in CHF exacerbation   - Continue home Toprol XL 100mg daily   - Continue Furosemide 40 IV BID   - Strict I/Os, daily weights  - Appreciate cards recs  - TTE shows EF 65% with mold pulm HTN and mod TR (no significant change from prior)

## 2022-05-08 NOTE — PROGRESS NOTE ADULT - PROBLEM SELECTOR PLAN 8
- Home Synthroid 50mcg on weekdays, 100mcg on weekends   - Continue home regimen   - TSH 5.57 (pt's synthroid regimen was recently increased, will hold off on increasing dose during this hospitalization)

## 2022-05-08 NOTE — PROGRESS NOTE ADULT - SUBJECTIVE AND OBJECTIVE BOX
%%%%INCOMPLETE NOTE%%%%%     Dr. Joann Pretty, PGY-1    Patient is a 89y old  Female who presents with a chief complaint of SOB (07 May 2022 09:28)    24-HR EVENTS/SUBJECTIVE: No acute events overnight. Patient seen and examined at bedside this AM. BMx1 yesterday. Denies chest pain, abdominal pain, cough, n/v, sob. Breathing comfortably on room air.    MEDICATIONS  (STANDING):  allopurinol 100 milliGRAM(s) Oral daily  amLODIPine   Tablet 5 milliGRAM(s) Oral daily  apixaban 2.5 milliGRAM(s) Oral two times a day  dextrose 5%. 1000 milliLiter(s) (100 mL/Hr) IV Continuous <Continuous>  dextrose 5%. 1000 milliLiter(s) (50 mL/Hr) IV Continuous <Continuous>  dextrose 50% Injectable 25 Gram(s) IV Push once  dextrose 50% Injectable 12.5 Gram(s) IV Push once  dextrose 50% Injectable 25 Gram(s) IV Push once  furosemide   Injectable 40 milliGRAM(s) IV Push daily  glucagon  Injectable 1 milliGRAM(s) IntraMuscular once  insulin glargine Injectable (LANTUS) 15 Unit(s) SubCutaneous at bedtime  insulin lispro (ADMELOG) corrective regimen sliding scale   SubCutaneous three times a day before meals  insulin lispro (ADMELOG) corrective regimen sliding scale   SubCutaneous at bedtime  insulin lispro Injectable (ADMELOG) 2 Unit(s) SubCutaneous three times a day before meals  levothyroxine 100 MICROGram(s) Oral <User Schedule>  levothyroxine 50 MICROGram(s) Oral <User Schedule>  metoprolol succinate  milliGRAM(s) Oral daily  multivitamin 1 Tablet(s) Oral daily  pantoprazole    Tablet 40 milliGRAM(s) Oral before breakfast  simvastatin 20 milliGRAM(s) Oral at bedtime    MEDICATIONS  (PRN):  acetaminophen     Tablet .. 650 milliGRAM(s) Oral every 6 hours PRN Temp greater or equal to 38C (100.4F), Mild Pain (1 - 3)  dextrose Oral Gel 15 Gram(s) Oral once PRN Blood Glucose LESS THAN 70 milliGRAM(s)/deciliter        Objective:     Vitals: Vital Signs Last 24 Hrs  T(C): 36.4 (05-08-22 @ 05:02), Max: 36.8 (05-07-22 @ 11:44)  T(F): 97.6 (05-08-22 @ 05:02), Max: 98.2 (05-07-22 @ 11:44)  HR: 62 (05-08-22 @ 06:42) (60 - 66)  BP: 135/74 (05-08-22 @ 06:42) (124/59 - 135/76)  BP(mean): --  RR: 18 (05-08-22 @ 05:02) (18 - 20)  SpO2: 95% (05-08-22 @ 05:02) (94% - 98%)            I&O's Summary    07 May 2022 07:01  -  08 May 2022 07:00  --------------------------------------------------------  IN: 537 mL / OUT: 300 mL / NET: 237 mL        PHYSICAL EXAM:  GENERAL: NAD, elderly female  HEAD:  Atraumatic, Normocephalic  EYES: EOMI, PERRLA, conjunctiva and sclera clear  ENMT: Moist mucous membranes  NECK: Supple, No JVD  CHEST/LUNG: Adequate inspiratory effort. Mild crackles heard in b/l lower lobes. On 2L NC.   HEART: Regular rate and rhythm; No murmurs, rubs, or gallops.   ABDOMEN: Soft, Nontender, Nondistended; Bowel sounds present  EXTREMITIES:  2+ Peripheral Pulses. 1+ pitting edema with chronic skin changes in LLE. Bandaged wound on RLE with 2+ pitting edema   NERVOUS SYSTEM:  Alert & Oriented X4, Good concentration  PSYCH: Normal Affect. Laying in bed comfortably; not agitated    LABS:    Hgb Trend: 8.2<--, 8.1<--, 8.1<--, 8.7<--  05-07    137  |  99  |  72<H>  ----------------------------<  251<H>  4.0   |  28  |  1.68<H>  05-06    136  |  98  |  77<H>  ----------------------------<  177<H>  4.3   |  27  |  2.02<H>    Ca    10.9<H>      07 May 2022 06:03  Ca    10.9<H>      06 May 2022 06:41  Phos  3.0     05-07  Mg     2.2     05-07      Creatinine Trend: 1.68<--, 2.02<--, 2.13<--, 2.17<--, 2.18<--, 2.00<--                    CAPILLARY BLOOD GLUCOSE      POCT Blood Glucose.: 201 mg/dL (07 May 2022 21:52)  POCT Blood Glucose.: 117 mg/dL (07 May 2022 17:04)  POCT Blood Glucose.: 221 mg/dL (07 May 2022 12:45)  POCT Blood Glucose.: 266 mg/dL (07 May 2022 08:19)     Dr. Joann Pretty, PGY-1    Patient is a 89y old  Female who presents with a chief complaint of SOB (07 May 2022 09:28)    24-HR EVENTS/SUBJECTIVE: No acute events overnight. Patient seen and examined at bedside this AM. Reports that she did not sleep well last night. Endorses arm pain (per RN, patient ripped out IV and had to try multiple times to insert another one). Patient states she feels short of breath. Denies chest pain, abdominal pain, cough, n/v. Breathing comfortably on 2L NC.     MEDICATIONS  (STANDING):  allopurinol 100 milliGRAM(s) Oral daily  amLODIPine   Tablet 5 milliGRAM(s) Oral daily  apixaban 2.5 milliGRAM(s) Oral two times a day  dextrose 5%. 1000 milliLiter(s) (100 mL/Hr) IV Continuous <Continuous>  dextrose 5%. 1000 milliLiter(s) (50 mL/Hr) IV Continuous <Continuous>  dextrose 50% Injectable 25 Gram(s) IV Push once  dextrose 50% Injectable 12.5 Gram(s) IV Push once  dextrose 50% Injectable 25 Gram(s) IV Push once  furosemide   Injectable 40 milliGRAM(s) IV Push daily  glucagon  Injectable 1 milliGRAM(s) IntraMuscular once  insulin glargine Injectable (LANTUS) 15 Unit(s) SubCutaneous at bedtime  insulin lispro (ADMELOG) corrective regimen sliding scale   SubCutaneous three times a day before meals  insulin lispro (ADMELOG) corrective regimen sliding scale   SubCutaneous at bedtime  insulin lispro Injectable (ADMELOG) 2 Unit(s) SubCutaneous three times a day before meals  levothyroxine 100 MICROGram(s) Oral <User Schedule>  levothyroxine 50 MICROGram(s) Oral <User Schedule>  metoprolol succinate  milliGRAM(s) Oral daily  multivitamin 1 Tablet(s) Oral daily  pantoprazole    Tablet 40 milliGRAM(s) Oral before breakfast  simvastatin 20 milliGRAM(s) Oral at bedtime    MEDICATIONS  (PRN):  acetaminophen     Tablet .. 650 milliGRAM(s) Oral every 6 hours PRN Temp greater or equal to 38C (100.4F), Mild Pain (1 - 3)  dextrose Oral Gel 15 Gram(s) Oral once PRN Blood Glucose LESS THAN 70 milliGRAM(s)/deciliter        Objective:     Vitals: Vital Signs Last 24 Hrs  T(C): 36.4 (05-08-22 @ 05:02), Max: 36.8 (05-07-22 @ 11:44)  T(F): 97.6 (05-08-22 @ 05:02), Max: 98.2 (05-07-22 @ 11:44)  HR: 62 (05-08-22 @ 06:42) (60 - 66)  BP: 135/74 (05-08-22 @ 06:42) (124/59 - 135/76)  BP(mean): --  RR: 18 (05-08-22 @ 05:02) (18 - 20)  SpO2: 95% (05-08-22 @ 05:02) (94% - 98%)            I&O's Summary    07 May 2022 07:01  -  08 May 2022 07:00  --------------------------------------------------------  IN: 537 mL / OUT: 300 mL / NET: 237 mL        PHYSICAL EXAM:  GENERAL: NAD, elderly female  HEAD:  Atraumatic, Normocephalic  EYES: EOMI, PERRLA, conjunctiva and sclera clear  ENMT: Moist mucous membranes  NECK: Supple, No JVD  CHEST/LUNG: Adequate inspiratory effort. Mild crackles heard in b/l lower lobes. On 2L NC.   HEART: Regular rate and rhythm; No murmurs, rubs, or gallops.   ABDOMEN: Soft, Nontender, Nondistended; Bowel sounds present  EXTREMITIES:  2+ Peripheral Pulses. 2+ pitting b/l LE edema with chronic skin changes in LLE. Bandaged wound on RLE with 2+ pitting edema   NERVOUS SYSTEM:  Alert & Oriented X2-3, poor concentration   PSYCH: Normal Affect. Laying in bed comfortably; not agitated    LABS:    Hgb Trend: 8.2<--, 8.1<--, 8.1<--, 8.7<--  05-07    137  |  99  |  72<H>  ----------------------------<  251<H>  4.0   |  28  |  1.68<H>  05-06    136  |  98  |  77<H>  ----------------------------<  177<H>  4.3   |  27  |  2.02<H>    Ca    10.9<H>      07 May 2022 06:03  Ca    10.9<H>      06 May 2022 06:41  Phos  3.0     05-07  Mg     2.2     05-07      Creatinine Trend: 1.68<--, 2.02<--, 2.13<--, 2.17<--, 2.18<--, 2.00<--                    CAPILLARY BLOOD GLUCOSE      POCT Blood Glucose.: 201 mg/dL (07 May 2022 21:52)  POCT Blood Glucose.: 117 mg/dL (07 May 2022 17:04)  POCT Blood Glucose.: 221 mg/dL (07 May 2022 12:45)  POCT Blood Glucose.: 266 mg/dL (07 May 2022 08:19)     Dr. Joann Pretty, PGY-1    Patient is a 89y old  Female who presents with a chief complaint of SOB (07 May 2022 09:28)    24-HR EVENTS/SUBJECTIVE: No acute events overnight. Patient seen and examined at bedside this AM. Reports that she did not sleep well last night. Endorses arm pain (per RN, patient ripped out IV and had to try multiple times to insert another one). Patient states she feels short of breath. Denies chest pain, abdominal pain, cough, n/v. Breathing comfortably on 2L NC.   Tele: Afib Vpacer 60-70s.    MEDICATIONS  (STANDING):  allopurinol 100 milliGRAM(s) Oral daily  amLODIPine   Tablet 5 milliGRAM(s) Oral daily  apixaban 2.5 milliGRAM(s) Oral two times a day  dextrose 5%. 1000 milliLiter(s) (100 mL/Hr) IV Continuous <Continuous>  dextrose 5%. 1000 milliLiter(s) (50 mL/Hr) IV Continuous <Continuous>  dextrose 50% Injectable 25 Gram(s) IV Push once  dextrose 50% Injectable 12.5 Gram(s) IV Push once  dextrose 50% Injectable 25 Gram(s) IV Push once  furosemide   Injectable 40 milliGRAM(s) IV Push daily  glucagon  Injectable 1 milliGRAM(s) IntraMuscular once  insulin glargine Injectable (LANTUS) 15 Unit(s) SubCutaneous at bedtime  insulin lispro (ADMELOG) corrective regimen sliding scale   SubCutaneous three times a day before meals  insulin lispro (ADMELOG) corrective regimen sliding scale   SubCutaneous at bedtime  insulin lispro Injectable (ADMELOG) 2 Unit(s) SubCutaneous three times a day before meals  levothyroxine 100 MICROGram(s) Oral <User Schedule>  levothyroxine 50 MICROGram(s) Oral <User Schedule>  metoprolol succinate  milliGRAM(s) Oral daily  multivitamin 1 Tablet(s) Oral daily  pantoprazole    Tablet 40 milliGRAM(s) Oral before breakfast  simvastatin 20 milliGRAM(s) Oral at bedtime    MEDICATIONS  (PRN):  acetaminophen     Tablet .. 650 milliGRAM(s) Oral every 6 hours PRN Temp greater or equal to 38C (100.4F), Mild Pain (1 - 3)  dextrose Oral Gel 15 Gram(s) Oral once PRN Blood Glucose LESS THAN 70 milliGRAM(s)/deciliter        Objective:     Vitals: Vital Signs Last 24 Hrs  T(C): 36.4 (05-08-22 @ 05:02), Max: 36.8 (05-07-22 @ 11:44)  T(F): 97.6 (05-08-22 @ 05:02), Max: 98.2 (05-07-22 @ 11:44)  HR: 62 (05-08-22 @ 06:42) (60 - 66)  BP: 135/74 (05-08-22 @ 06:42) (124/59 - 135/76)  BP(mean): --  RR: 18 (05-08-22 @ 05:02) (18 - 20)  SpO2: 95% (05-08-22 @ 05:02) (94% - 98%)            I&O's Summary    07 May 2022 07:01  -  08 May 2022 07:00  --------------------------------------------------------  IN: 537 mL / OUT: 300 mL / NET: 237 mL        PHYSICAL EXAM:  GENERAL: NAD, elderly female  HEAD:  Atraumatic, Normocephalic  EYES: EOMI, PERRLA, conjunctiva and sclera clear  ENMT: Moist mucous membranes  NECK: Supple, No JVD  CHEST/LUNG: Adequate inspiratory effort. Mild crackles heard in b/l lower lobes. On 2L NC.   HEART: Regular rate and rhythm; No murmurs, rubs, or gallops.   ABDOMEN: Soft, Nontender, Nondistended; Bowel sounds present  EXTREMITIES:  2+ Peripheral Pulses. 2+ pitting b/l LE edema with chronic skin changes in LLE. Bandaged wound on RLE with 2+ pitting edema   NERVOUS SYSTEM:  Alert & Oriented X2-3, poor concentration   PSYCH: Normal Affect. Laying in bed comfortably; not agitated    LABS:    Hgb Trend: 8.2<--, 8.1<--, 8.1<--, 8.7<--  05-07    137  |  99  |  72<H>  ----------------------------<  251<H>  4.0   |  28  |  1.68<H>  05-06    136  |  98  |  77<H>  ----------------------------<  177<H>  4.3   |  27  |  2.02<H>    Ca    10.9<H>      07 May 2022 06:03  Ca    10.9<H>      06 May 2022 06:41  Phos  3.0     05-07  Mg     2.2     05-07      Creatinine Trend: 1.68<--, 2.02<--, 2.13<--, 2.17<--, 2.18<--, 2.00<--                    CAPILLARY BLOOD GLUCOSE      POCT Blood Glucose.: 201 mg/dL (07 May 2022 21:52)  POCT Blood Glucose.: 117 mg/dL (07 May 2022 17:04)  POCT Blood Glucose.: 221 mg/dL (07 May 2022 12:45)  POCT Blood Glucose.: 266 mg/dL (07 May 2022 08:19)

## 2022-05-09 NOTE — PROGRESS NOTE ADULT - SUBJECTIVE AND OBJECTIVE BOX
Patient is a 89y old  Female who presents with a chief complaint of SOB (09 May 2022 16:27)       INTERVAL HPI/OVERNIGHT EVENTS:  Patient seen and evaluated at bedside.  Pt is resting comfortable in NAD.     Allergies    Bactrim (Unknown)  Flagyl (Hives; Pruritus)  Macrobid (Other)  sulfa drugs (Hives)  Zosyn (Other)    Intolerances        Vital Signs Last 24 Hrs  T(C): 36.4 (09 May 2022 04:13), Max: 36.4 (09 May 2022 04:13)  T(F): 97.5 (09 May 2022 04:13), Max: 97.5 (09 May 2022 04:13)  HR: 60 (09 May 2022 12:45) (59 - 60)  BP: 117/73 (09 May 2022 12:45) (117/73 - 122/71)  BP(mean): --  RR: 18 (09 May 2022 12:45) (18 - 19)  SpO2: 93% (09 May 2022 12:45) (93% - 97%)    LABS:                        8.0    5.89  )-----------( 282      ( 09 May 2022 06:09 )             28.4     05-09    136  |  98  |  72<H>  ----------------------------<  116<H>  4.3   |  26  |  1.76<H>    Ca    10.7<H>      09 May 2022 06:08  Phos  3.3     05-09  Mg     2.2     05-09          CAPILLARY BLOOD GLUCOSE      POCT Blood Glucose.: 81 mg/dL (09 May 2022 17:09)  POCT Blood Glucose.: 113 mg/dL (09 May 2022 11:59)  POCT Blood Glucose.: 135 mg/dL (09 May 2022 08:31)  POCT Blood Glucose.: 148 mg/dL (08 May 2022 21:38)      Lower Extremity Physical Exam:  Vascular: DP/PT nonpalpable 2/2 to edema, B/L, CFT <3 seconds B/L, Temperature gradient WNL, B/L.   Neuro: Epicritic sensation diminished to the level of ankle, B/L.  Musculoskeletal/Ortho: unremarkable  Skin: R foot plantar heel wound to subQ, no erythema, no malodor, negative probe to bone, small scab noted plantar aspect of left heel

## 2022-05-09 NOTE — PROGRESS NOTE ADULT - PROBLEM SELECTOR PLAN 6
- Patient on home Lantus 20U qhs and ISS TID premeal   - Continue Lantus 19U qhs and low ISS TID premeal with fingersticks. Added Admelog 3U premeal TID   - A1c 7.4 in 03/2022

## 2022-05-09 NOTE — PROGRESS NOTE ADULT - PROBLEM SELECTOR PLAN 2
- HFpEF (EF 75% in 03/2022), AS s/p TAVR (09/2021)  - Patient initially SOB, RHODES, LE edema, +JVD likely in CHF exacerbation   - Continue home Toprol XL 100mg daily   - Continue Furosemide 40 IV BID   - Strict I/Os, daily weights  - Appreciate cards recs  - TTE shows EF 65% with mold pulm HTN and mod TR (no significant change from prior)

## 2022-05-09 NOTE — PROGRESS NOTE ADULT - SUBJECTIVE AND OBJECTIVE BOX
Ez Pulliam, PGY1    DATE OF SERVICE: 05-09-22 @ 06:53    Patient is a 89y old  Female who presents with a chief complaint of SOB (08 May 2022 12:02)      SUBJECTIVE / OVERNIGHT EVENTS: No acute events overnight. Patient seen and examined at bedside this AM.     MEDICATIONS  (STANDING):  allopurinol 100 milliGRAM(s) Oral daily  amLODIPine   Tablet 5 milliGRAM(s) Oral daily  apixaban 2.5 milliGRAM(s) Oral two times a day  dextrose 5%. 1000 milliLiter(s) (100 mL/Hr) IV Continuous <Continuous>  dextrose 5%. 1000 milliLiter(s) (50 mL/Hr) IV Continuous <Continuous>  dextrose 50% Injectable 25 Gram(s) IV Push once  dextrose 50% Injectable 12.5 Gram(s) IV Push once  dextrose 50% Injectable 25 Gram(s) IV Push once  furosemide   Injectable 40 milliGRAM(s) IV Push two times a day  glucagon  Injectable 1 milliGRAM(s) IntraMuscular once  insulin glargine Injectable (LANTUS) 19 Unit(s) SubCutaneous at bedtime  insulin lispro (ADMELOG) corrective regimen sliding scale   SubCutaneous three times a day before meals  insulin lispro (ADMELOG) corrective regimen sliding scale   SubCutaneous at bedtime  insulin lispro Injectable (ADMELOG) 3 Unit(s) SubCutaneous three times a day before meals  levothyroxine 50 MICROGram(s) Oral <User Schedule>  levothyroxine 100 MICROGram(s) Oral <User Schedule>  metoprolol succinate  milliGRAM(s) Oral daily  multivitamin 1 Tablet(s) Oral daily  pantoprazole    Tablet 40 milliGRAM(s) Oral before breakfast  simvastatin 20 milliGRAM(s) Oral at bedtime    MEDICATIONS  (PRN):  acetaminophen     Tablet .. 650 milliGRAM(s) Oral every 6 hours PRN Temp greater or equal to 38C (100.4F), Mild Pain (1 - 3)  dextrose Oral Gel 15 Gram(s) Oral once PRN Blood Glucose LESS THAN 70 milliGRAM(s)/deciliter      Vital Signs Last 24 Hrs  T(C): 36.4 (09 May 2022 04:13), Max: 36.4 (08 May 2022 12:20)  T(F): 97.5 (09 May 2022 04:13), Max: 97.6 (08 May 2022 12:20)  HR: 59 (09 May 2022 04:13) (59 - 61)  BP: 122/71 (09 May 2022 04:13) (120/59 - 124/69)  BP(mean): --  RR: 19 (09 May 2022 04:13) (18 - 19)  SpO2: 97% (09 May 2022 04:13) (95% - 97%)  CAPILLARY BLOOD GLUCOSE      POCT Blood Glucose.: 148 mg/dL (08 May 2022 21:38)  POCT Blood Glucose.: 211 mg/dL (08 May 2022 17:13)  POCT Blood Glucose.: 236 mg/dL (08 May 2022 12:36)  POCT Blood Glucose.: 196 mg/dL (08 May 2022 08:07)    I&O's Summary    07 May 2022 07:01  -  08 May 2022 07:00  --------------------------------------------------------  IN: 537 mL / OUT: 300 mL / NET: 237 mL    08 May 2022 07:01  -  09 May 2022 06:53  --------------------------------------------------------  IN: 600 mL / OUT: 1250 mL / NET: -650 mL        PHYSICAL EXAM:  GENERAL: NAD, elderly female  HEAD:  Atraumatic, Normocephalic  EYES: EOMI, PERRLA, conjunctiva and sclera clear  ENMT: Moist mucous membranes  NECK: Supple, No JVD  CHEST/LUNG: Adequate inspiratory effort. Mild crackles heard in b/l lower lobes. On 2L NC.   HEART: Regular rate and rhythm; No murmurs, rubs, or gallops.   ABDOMEN: Soft, Nontender, Nondistended; Bowel sounds present  EXTREMITIES:  2+ Peripheral Pulses. 2+ pitting b/l LE edema with chronic skin changes in LLE. Bandaged wound on RLE with 2+ pitting edema   NERVOUS SYSTEM:  Alert & Oriented X2-3, poor concentration   PSYCH: Normal Affect. Laying in bed comfortably; not agitated    LABS:    05-09    136  |  98  |  72<H>  ----------------------------<  116<H>  4.3   |  26  |  1.76<H>    Ca    10.7<H>      09 May 2022 06:08  Phos  3.3     05-09  Mg     2.2     05-09                RADIOLOGY & ADDITIONAL TESTS:    Imaging Personally Reviewed:    Consultant(s) Notes Reviewed:      Care Discussed with Consultants/Other Providers:   Ez Pulliam, PGY1    DATE OF SERVICE: 05-09-22 @ 06:53    Patient is a 89y old  Female who presents with a chief complaint of SOB (08 May 2022 12:02)      SUBJECTIVE / OVERNIGHT EVENTS: No acute events overnight. Patient seen and examined at bedside this AM. Patient notes that her breathing feels like its improving. No chest pain. No shortness of breath. No abdominal pain, nausea or vomiting.      TELE reviewed: aflutter 55-60    MEDICATIONS  (STANDING):  allopurinol 100 milliGRAM(s) Oral daily  amLODIPine   Tablet 5 milliGRAM(s) Oral daily  apixaban 2.5 milliGRAM(s) Oral two times a day  dextrose 5%. 1000 milliLiter(s) (100 mL/Hr) IV Continuous <Continuous>  dextrose 5%. 1000 milliLiter(s) (50 mL/Hr) IV Continuous <Continuous>  dextrose 50% Injectable 25 Gram(s) IV Push once  dextrose 50% Injectable 12.5 Gram(s) IV Push once  dextrose 50% Injectable 25 Gram(s) IV Push once  furosemide   Injectable 40 milliGRAM(s) IV Push two times a day  glucagon  Injectable 1 milliGRAM(s) IntraMuscular once  insulin glargine Injectable (LANTUS) 19 Unit(s) SubCutaneous at bedtime  insulin lispro (ADMELOG) corrective regimen sliding scale   SubCutaneous three times a day before meals  insulin lispro (ADMELOG) corrective regimen sliding scale   SubCutaneous at bedtime  insulin lispro Injectable (ADMELOG) 3 Unit(s) SubCutaneous three times a day before meals  levothyroxine 50 MICROGram(s) Oral <User Schedule>  levothyroxine 100 MICROGram(s) Oral <User Schedule>  metoprolol succinate  milliGRAM(s) Oral daily  multivitamin 1 Tablet(s) Oral daily  pantoprazole    Tablet 40 milliGRAM(s) Oral before breakfast  simvastatin 20 milliGRAM(s) Oral at bedtime    MEDICATIONS  (PRN):  acetaminophen     Tablet .. 650 milliGRAM(s) Oral every 6 hours PRN Temp greater or equal to 38C (100.4F), Mild Pain (1 - 3)  dextrose Oral Gel 15 Gram(s) Oral once PRN Blood Glucose LESS THAN 70 milliGRAM(s)/deciliter      Vital Signs Last 24 Hrs  T(C): 36.4 (09 May 2022 04:13), Max: 36.4 (08 May 2022 12:20)  T(F): 97.5 (09 May 2022 04:13), Max: 97.6 (08 May 2022 12:20)  HR: 59 (09 May 2022 04:13) (59 - 61)  BP: 122/71 (09 May 2022 04:13) (120/59 - 124/69)  BP(mean): --  RR: 19 (09 May 2022 04:13) (18 - 19)  SpO2: 97% (09 May 2022 04:13) (95% - 97%)  CAPILLARY BLOOD GLUCOSE      POCT Blood Glucose.: 148 mg/dL (08 May 2022 21:38)  POCT Blood Glucose.: 211 mg/dL (08 May 2022 17:13)  POCT Blood Glucose.: 236 mg/dL (08 May 2022 12:36)  POCT Blood Glucose.: 196 mg/dL (08 May 2022 08:07)    I&O's Summary    07 May 2022 07:01  -  08 May 2022 07:00  --------------------------------------------------------  IN: 537 mL / OUT: 300 mL / NET: 237 mL    08 May 2022 07:01  -  09 May 2022 06:53  --------------------------------------------------------  IN: 600 mL / OUT: 1250 mL / NET: -650 mL        PHYSICAL EXAM:  GENERAL: NAD, elderly female  HEAD:  Atraumatic, Normocephalic  EYES: EOMI, PERRLA, conjunctiva and sclera clear  ENMT: Moist mucous membranes  NECK: Supple, appreciable JVD above clavicle  CHEST/LUNG: Adequate inspiratory effort. Mild crackles heard in b/l lower lobes. On 2L NC.   HEART: Regular rate and rhythm; No murmurs, rubs, or gallops.   ABDOMEN: Soft, Nontender, Nondistended; Bowel sounds present  EXTREMITIES:  2+ Peripheral Pulses. 2+ pitting b/l LE edema with chronic skin changes in LLE. Bandaged wound on RLE with 2+ pitting edema   NERVOUS SYSTEM:  Alert & Oriented X2-3, poor concentration   PSYCH: Normal Affect. Laying in bed comfortably; not agitated    LABS:    05-09    136  |  98  |  72<H>  ----------------------------<  116<H>  4.3   |  26  |  1.76<H>    Ca    10.7<H>      09 May 2022 06:08  Phos  3.3     05-09  Mg     2.2     05-09                RADIOLOGY & ADDITIONAL TESTS:    Imaging Personally Reviewed:    Consultant(s) Notes Reviewed:      Care Discussed with Consultants/Other Providers:

## 2022-05-09 NOTE — PROGRESS NOTE ADULT - PROBLEM SELECTOR PLAN 10
Home meds: Continue home Allopurinol 100mg daily, Simvastatin 20mg qhs, Protonix 40mg daily   DVT ppx: Eliquis   Diet: DASH/Renal   Dispo: PT recs home with home PT   Communication: Updated daughter Claudia on 5/7   DNR/DNI Home meds: Continue home Allopurinol 100mg daily, Simvastatin 20mg qhs, Protonix 40mg daily   DVT ppx: Eliquis   Diet: DASH/Renal   Dispo: PT recs home with home PT   Communication: Updated daughter Claudia on   DNR/DNI

## 2022-05-09 NOTE — PROGRESS NOTE ADULT - ASSESSMENT
89 y/o F with right heel wound  - pt seen and evaluated  - R foot plantar heel wound to subQ, no erythema, no malodor, negative probe to bone  - R foot xray negative for osteo   - Dry sterile dressing applied to right heel with betadine, recommend daily dressing changes  - Ordered zlows to offload bilateral heels at all times   - Patient is stable from podiatry standpoint   - F.u information and instructions can be found in the f/u section of d/c provider note

## 2022-05-09 NOTE — PROGRESS NOTE ADULT - SUBJECTIVE AND OBJECTIVE BOX
Date of Service   05-09-22 @ 16:27    Patient is a 89y old  Female who presents with a chief complaint of SOB (09 May 2022 06:52)      INTERVAL HISTORY: pt feels ok, breathing better   TELEMETRY Personally reviewed: Afib 60's     REVIEW OF SYSTEMS:   CONSTITUTIONAL: No weakness  EYES/ENT: No visual changes; No throat pain  Neck: No pain or stiffness  Respiratory: No cough, wheezing, No shortness of breath  CARDIOVASCULAR: no chest pain or palpitations  GASTROINTESTINAL: No abdominal pain, no nausea, vomiting or hematemesis  GENITOURINARY: No dysuria, frequency or hematuria  NEUROLOGICAL: No stroke like symptoms  SKIN: No rashes    	  MEDICATIONS:  amLODIPine   Tablet 5 milliGRAM(s) Oral daily  furosemide   Injectable 40 milliGRAM(s) IV Push two times a day  metoprolol succinate  milliGRAM(s) Oral daily        PHYSICAL EXAM:  T(C): 36.4 (05-09-22 @ 04:13), Max: 36.4 (05-09-22 @ 04:13)  HR: 60 (05-09-22 @ 12:45) (59 - 60)  BP: 117/73 (05-09-22 @ 12:45) (117/73 - 122/71)  RR: 18 (05-09-22 @ 12:45) (18 - 19)  SpO2: 93% (05-09-22 @ 12:45) (93% - 97%)  Wt(kg): --  I&O's Summary    08 May 2022 07:01  -  09 May 2022 07:00  --------------------------------------------------------  IN: 600 mL / OUT: 1250 mL / NET: -650 mL    09 May 2022 07:01  -  09 May 2022 16:27  --------------------------------------------------------  IN: 480 mL / OUT: 0 mL / NET: 480 mL          Appearance: In no distress	  HEENT:    PERRL, EOMI	  Cardiovascular:  S1 S2, No JVD  Respiratory: Lungs clear to auscultation	  Gastrointestinal:  Soft, Non-tender, + BS	  Vascularature:  + edema of  B/L LE  Psychiatric: Appropriate affect   Neuro: no acute focal deficits                               8.0    5.89  )-----------( 282      ( 09 May 2022 06:09 )             28.4     05-09    136  |  98  |  72<H>  ----------------------------<  116<H>  4.3   |  26  |  1.76<H>    Ca    10.7<H>      09 May 2022 06:08  Phos  3.3     05-09  Mg     2.2     05-09          Labs personally reviewed      ASSESSMENT/PLAN: 	  Ms. Briones is a 89 yo female with PMH of HTN, HLD, CAD s/p CABG, AS s/p TAVR in 9/2021 at [facility], DM, CKD, AF on Eliquis, PPM who presents with 3 days of progressive shortness of breath, + orthopnea, increased LE edema and decreased oxygen saturation on pulse oximeter at home.    Problem/Plan -1  Problem: HFpEF  - Followed by Dr. Agapito Woodard for outpatient cardiology  - CT Chest reveals moderate right and small left pleural effusions, cardiomegaly, possibly superimposed consolidation  - On lasix 40mg PO daily at home  - BMP reviewed from OP cardiologist records on 12/3/21 it was 1.1, and 5 months ago it was 1.2-1.4, here 2 months ago it was 1.4-1.6  - ECHO Left Ventricle: Normal left ventricular systolic function. No segmental wall motion abnormalities.  The LVEF= 60-65%. Moderate mitral regurgitation, There is a transcatheter aortic valve replacement seen. Moderate pulmonary hypertension.  - 5/6 Creatinine downtrending.  - Patient appears close to euvolemia, but not euvolemic yet, although much improved since admission, c/w IV Lasix for now, will closely monitor.  - 5/3 POCUS reveals Right pleural effusion  - On 2-1 L of NC   - Consider repeating CXR and proBNP   - BNP only slighty down (8208 --> 7983)  - Weight (bed not standing) slightly increased since admission despite IV diuresis.  - Still requiring 2L NC  - Continue Lasix 40mg, responding well, consider increase to BID dosing given CXR shows Mild pulmonary vascular congestion with small bilateral pleural effusions  - Responding well to IV diuretics, lasix 40mg IV  BID     Problem/Plan -2  Problem: Aortic Stenosis  - s/p TAVR in September 2021  - minimal murmur on ausculation  - aortic valve is well seated and has good flow  - moderate tricuspid regurg    Problem/Plan -3  Problem: CAD  - Hx CABG at [facility]  - currently denies chest pain  - EKG without ischemic changes  - Troponin elevated likely i/s/o ADHF exacerbation; peaked 4/29 at 124  - c/w simvastatin 20mg PO daily    Problem/Plan -4  Problem: AF  - rate controlled  - c/w Metoprolol and Eliquis  - monitor on tele    Problem/Plan -5  Problem: HLD  - c/w simvastatin     Problem/Plan -6  Problem: DM  - Most recent HgbA1C is 7.6 on 3.4.22  - ISS         Jacob JOSE-BC   Giles Gan DO [facility]  Cardiovascular Medicine  [address] [place] [address], [address]  Office: [phone]

## 2022-05-09 NOTE — PROGRESS NOTE ADULT - NUTRITIONAL ASSESSMENT
This patient has been assessed with a concern for Malnutrition and has been determined to have a diagnosis/diagnoses of Severe protein-calorie malnutrition.    This patient is being managed with:   Diet Regular-  Consistent Carbohydrate {No Snacks} (CSTCHO)  DASH/TLC {Sodium & Cholesterol Restricted} (DASH)  1500mL Fluid Restriction ([id number])  Entered: May  4 2022  7:31AM

## 2022-05-10 NOTE — PROGRESS NOTE ADULT - NUTRITIONAL ASSESSMENT
This patient has been assessed with a concern for Malnutrition and has been determined to have a diagnosis/diagnoses of Severe protein-calorie malnutrition.    This patient is being managed with:   Diet Regular-  Consistent Carbohydrate {No Snacks} (CSTCHO)  DASH/TLC {Sodium & Cholesterol Restricted} (DASH)  1500mL Fluid Restriction ([id number])  Entered: May  4 2022  7:31AM

## 2022-05-10 NOTE — PROGRESS NOTE ADULT - SUBJECTIVE AND OBJECTIVE BOX
Ez Pulliam, PGY1    DATE OF SERVICE: 05-10-22 @ 06:57    Patient is a 89y old  Female who presents with a chief complaint of SOB (09 May 2022 17:08)      SUBJECTIVE / OVERNIGHT EVENTS: No acute events overnight.     MEDICATIONS  (STANDING):  allopurinol 100 milliGRAM(s) Oral daily  amLODIPine   Tablet 5 milliGRAM(s) Oral daily  apixaban 2.5 milliGRAM(s) Oral two times a day  dextrose 5%. 1000 milliLiter(s) (100 mL/Hr) IV Continuous <Continuous>  dextrose 5%. 1000 milliLiter(s) (50 mL/Hr) IV Continuous <Continuous>  dextrose 50% Injectable 25 Gram(s) IV Push once  dextrose 50% Injectable 12.5 Gram(s) IV Push once  dextrose 50% Injectable 25 Gram(s) IV Push once  furosemide   Injectable 40 milliGRAM(s) IV Push two times a day  glucagon  Injectable 1 milliGRAM(s) IntraMuscular once  insulin glargine Injectable (LANTUS) 19 Unit(s) SubCutaneous at bedtime  insulin lispro (ADMELOG) corrective regimen sliding scale   SubCutaneous three times a day before meals  insulin lispro (ADMELOG) corrective regimen sliding scale   SubCutaneous at bedtime  insulin lispro Injectable (ADMELOG) 3 Unit(s) SubCutaneous three times a day before meals  levothyroxine 50 MICROGram(s) Oral <User Schedule>  levothyroxine 100 MICROGram(s) Oral <User Schedule>  metoprolol succinate  milliGRAM(s) Oral daily  multivitamin 1 Tablet(s) Oral daily  pantoprazole    Tablet 40 milliGRAM(s) Oral before breakfast  simvastatin 20 milliGRAM(s) Oral at bedtime    MEDICATIONS  (PRN):  acetaminophen     Tablet .. 650 milliGRAM(s) Oral every 6 hours PRN Temp greater or equal to 38C (100.4F), Mild Pain (1 - 3)  dextrose Oral Gel 15 Gram(s) Oral once PRN Blood Glucose LESS THAN 70 milliGRAM(s)/deciliter      Vital Signs Last 24 Hrs  T(C): 36.7 (10 May 2022 04:03), Max: 36.7 (10 May 2022 04:03)  T(F): 98 (10 May 2022 04:03), Max: 98 (10 May 2022 04:03)  HR: 59 (10 May 2022 04:03) (59 - 61)  BP: 126/78 (10 May 2022 04:03) (117/73 - 127/72)  BP(mean): --  RR: 18 (10 May 2022 04:03) (17 - 18)  SpO2: 96% (10 May 2022 04:03) (93% - 96%)  CAPILLARY BLOOD GLUCOSE      POCT Blood Glucose.: 138 mg/dL (09 May 2022 22:23)  POCT Blood Glucose.: 81 mg/dL (09 May 2022 17:09)  POCT Blood Glucose.: 113 mg/dL (09 May 2022 11:59)  POCT Blood Glucose.: 135 mg/dL (09 May 2022 08:31)    I&O's Summary    08 May 2022 07:01  -  09 May 2022 07:00  --------------------------------------------------------  IN: 600 mL / OUT: 1250 mL / NET: -650 mL    09 May 2022 07:01  -  10 May 2022 06:57  --------------------------------------------------------  IN: 720 mL / OUT: 350 mL / NET: 370 mL        PHYSICAL EXAM:  GENERAL: NAD, elderly female  HEAD:  Atraumatic, Normocephalic  EYES: EOMI, PERRLA, conjunctiva and sclera clear  ENMT: Moist mucous membranes  NECK: Supple, appreciable JVD above clavicle  CHEST/LUNG: Adequate inspiratory effort. Mild crackles heard in b/l lower lobes. On 2L NC.   HEART: Regular rate and rhythm; No murmurs, rubs, or gallops.   ABDOMEN: Soft, Nontender, Nondistended; Bowel sounds present  EXTREMITIES:  2+ Peripheral Pulses. 2+ pitting b/l LE edema with chronic skin changes in LLE. Bandaged wound on RLE with 2+ pitting edema   NERVOUS SYSTEM:  Alert & Oriented X2-3, poor concentration   PSYCH: Normal Affect. Laying in bed comfortably; not agitated    LABS:                        8.0    5.89  )-----------( 282      ( 09 May 2022 06:09 )             28.4     05-09    136  |  98  |  72<H>  ----------------------------<  116<H>  4.3   |  26  |  1.76<H>    Ca    10.7<H>      09 May 2022 06:08  Phos  3.3     05-09  Mg     2.2     05-09                RADIOLOGY & ADDITIONAL TESTS:    Imaging Personally Reviewed:    Consultant(s) Notes Reviewed:      Care Discussed with Consultants/Other Providers:   Ez Pulliam, PGY1    DATE OF SERVICE: 05-10-22 @ 06:57    Patient is a 89y old  Female who presents with a chief complaint of SOB (09 May 2022 17:08)      SUBJECTIVE / OVERNIGHT EVENTS: No acute events overnight. Patient notes that her breathing is improving. No chest pain, dyspnea, abdominal pain or N/V.     MEDICATIONS  (STANDING):  allopurinol 100 milliGRAM(s) Oral daily  amLODIPine   Tablet 5 milliGRAM(s) Oral daily  apixaban 2.5 milliGRAM(s) Oral two times a day  dextrose 5%. 1000 milliLiter(s) (100 mL/Hr) IV Continuous <Continuous>  dextrose 5%. 1000 milliLiter(s) (50 mL/Hr) IV Continuous <Continuous>  dextrose 50% Injectable 25 Gram(s) IV Push once  dextrose 50% Injectable 12.5 Gram(s) IV Push once  dextrose 50% Injectable 25 Gram(s) IV Push once  furosemide   Injectable 40 milliGRAM(s) IV Push two times a day  glucagon  Injectable 1 milliGRAM(s) IntraMuscular once  insulin glargine Injectable (LANTUS) 19 Unit(s) SubCutaneous at bedtime  insulin lispro (ADMELOG) corrective regimen sliding scale   SubCutaneous three times a day before meals  insulin lispro (ADMELOG) corrective regimen sliding scale   SubCutaneous at bedtime  insulin lispro Injectable (ADMELOG) 3 Unit(s) SubCutaneous three times a day before meals  levothyroxine 50 MICROGram(s) Oral <User Schedule>  levothyroxine 100 MICROGram(s) Oral <User Schedule>  metoprolol succinate  milliGRAM(s) Oral daily  multivitamin 1 Tablet(s) Oral daily  pantoprazole    Tablet 40 milliGRAM(s) Oral before breakfast  simvastatin 20 milliGRAM(s) Oral at bedtime    MEDICATIONS  (PRN):  acetaminophen     Tablet .. 650 milliGRAM(s) Oral every 6 hours PRN Temp greater or equal to 38C (100.4F), Mild Pain (1 - 3)  dextrose Oral Gel 15 Gram(s) Oral once PRN Blood Glucose LESS THAN 70 milliGRAM(s)/deciliter      Vital Signs Last 24 Hrs  T(C): 36.7 (10 May 2022 04:03), Max: 36.7 (10 May 2022 04:03)  T(F): 98 (10 May 2022 04:03), Max: 98 (10 May 2022 04:03)  HR: 59 (10 May 2022 04:03) (59 - 61)  BP: 126/78 (10 May 2022 04:03) (117/73 - 127/72)  BP(mean): --  RR: 18 (10 May 2022 04:03) (17 - 18)  SpO2: 96% (10 May 2022 04:03) (93% - 96%)  CAPILLARY BLOOD GLUCOSE      POCT Blood Glucose.: 138 mg/dL (09 May 2022 22:23)  POCT Blood Glucose.: 81 mg/dL (09 May 2022 17:09)  POCT Blood Glucose.: 113 mg/dL (09 May 2022 11:59)  POCT Blood Glucose.: 135 mg/dL (09 May 2022 08:31)    I&O's Summary    08 May 2022 07:01  -  09 May 2022 07:00  --------------------------------------------------------  IN: 600 mL / OUT: 1250 mL / NET: -650 mL    09 May 2022 07:01  -  10 May 2022 06:57  --------------------------------------------------------  IN: 720 mL / OUT: 350 mL / NET: 370 mL        PHYSICAL EXAM:  GENERAL: NAD, elderly female  HEAD:  Atraumatic, Normocephalic  EYES: EOMI, PERRLA, conjunctiva and sclera clear  ENMT: Moist mucous membranes  NECK: Supple, appreciable JVD above clavicle  CHEST/LUNG: Adequate inspiratory effort. Mild crackles heard in b/l lower lobes. On 1L NC.   HEART: Regular rate and rhythm; No murmurs, rubs, or gallops.   ABDOMEN: Soft, Nontender, Nondistended; Bowel sounds present  EXTREMITIES:  2+ Peripheral Pulses. 1+ pitting b/l LE edema with chronic skin changes in LLE.   NERVOUS SYSTEM:  Alert & Oriented X2-3  PSYCH: Normal Affect. Laying in bed comfortably; not agitated    LABS:                        8.0    5.89  )-----------( 282      ( 09 May 2022 06:09 )             28.4     05-09    136  |  98  |  72<H>  ----------------------------<  116<H>  4.3   |  26  |  1.76<H>    Ca    10.7<H>      09 May 2022 06:08  Phos  3.3     05-09  Mg     2.2     05-09                RADIOLOGY & ADDITIONAL TESTS:    Imaging Personally Reviewed:    Consultant(s) Notes Reviewed:      Care Discussed with Consultants/Other Providers:

## 2022-05-10 NOTE — PROGRESS NOTE ADULT - SUBJECTIVE AND OBJECTIVE BOX
DATE OF SERVICE: 05-10-22 @ 10:03    Patient is a 89y old  Female who presents with a chief complaint of SOB (10 May 2022 06:56)      INTERVAL HISTORY: Feels ok.     REVIEW OF SYSTEMS:  CONSTITUTIONAL: No weakness  EYES/ENT: No visual changes;  No throat pain   NECK: No pain or stiffness  RESPIRATORY: No cough, wheezing; No shortness of breath  CARDIOVASCULAR: No chest pain or palpitations  GASTROINTESTINAL: No abdominal  pain. No nausea, vomiting, or hematemesis  GENITOURINARY: No dysuria, frequency or hematuria  NEUROLOGICAL: No stroke like symptoms  SKIN: No rashes    TELEMETRY Personally reviewed: V-Paced at 50-60s  	  MEDICATIONS:  amLODIPine   Tablet 5 milliGRAM(s) Oral daily  furosemide   Injectable 40 milliGRAM(s) IV Push two times a day  metoprolol succinate  milliGRAM(s) Oral daily        PHYSICAL EXAM:  T(C): 36.7 (05-10-22 @ 04:03), Max: 36.7 (05-10-22 @ 04:03)  HR: 59 (05-10-22 @ 04:03) (59 - 61)  BP: 126/78 (05-10-22 @ 04:03) (117/73 - 127/72)  RR: 18 (05-10-22 @ 04:03) (17 - 18)  SpO2: 96% (05-10-22 @ 04:03) (93% - 96%)  Wt(kg): --  I&O's Summary    09 May 2022 07:01  -  10 May 2022 07:00  --------------------------------------------------------  IN: 720 mL / OUT: 350 mL / NET: 370 mL    10 May 2022 07:01  -  10 May 2022 10:03  --------------------------------------------------------  IN: 360 mL / OUT: 0 mL / NET: 360 mL          Appearance: In no distress	  HEENT:    PERRL, EOMI	  Cardiovascular:  S1 S2, No JVD  Respiratory: Lungs clear to auscultation	  Gastrointestinal:  Soft, Non-tender, + BS	  Vascularature:  + LE edema (L>R) improving  Psychiatric: Appropriate affect   Neuro: no acute focal deficits                               9.0    5.38  )-----------( 352      ( 10 May 2022 09:02 )             32.2     05-10    138  |  98  |  68<H>  ----------------------------<  84  4.2   |  30  |  1.60<H>    Ca    11.1<H>      10 May 2022 09:02  Phos  3.5     05-10  Mg     2.2     05-10          Labs personally reviewed      ASSESSMENT/PLAN: 	    Ms. Briones is a 89 yo female with PMH of HTN, HLD, CAD s/p CABG, AS s/p TAVR in 9/2021 at [facility], DM, CKD, AF on Eliquis, PPM who presents with 3 days of progressive shortness of breath, + orthopnea, increased LE edema and decreased oxygen saturation on pulse oximeter at home.    Problem/Plan -1  Problem: HFpEF  - Followed by Dr. Agapito Woodard for outpatient cardiology  - CT Chest reveals moderate right and small left pleural effusions, cardiomegaly, possibly superimposed consolidation  - On lasix 40mg PO daily at home  - BMP reviewed from OP cardiologist records on 12/3/21 it was 1.1, and 5 months ago it was 1.2-1.4, here 2 months ago it was 1.4-1.6  - ECHO Left Ventricle: Normal left ventricular systolic function. No segmental wall motion abnormalities.  The LVEF= 60-65%. Moderate mitral regurgitation, There is a transcatheter aortic valve replacement seen. Moderate pulmonary hypertension.  - 5/6 Creatinine downtrending.  - Patient appears close to euvolemia, but not euvolemic yet, although much improved since admission, c/w IV Lasix for now, will closely monitor.  - 5/3 POCUS reveals Right pleural effusion  - On 2-1 L of NC   - Consider repeating CXR and proBNP   - BNP only slighty down (8208 --> 7983)  - Weight (bed not standing) slightly increased since admission despite IV diuresis.  - Still requiring 2L NC  - Continue Lasix 40mg, responding well, consider increase to BID dosing given CXR shows Mild pulmonary vascular congestion with small bilateral pleural effusions  - Responding well to IV diuretics, lasix 40mg IV  BID     Problem/Plan -2  Problem: Aortic Stenosis  - s/p TAVR in September 2021  - minimal murmur on ausculation  - aortic valve is well seated and has good flow  - moderate tricuspid regurg    Problem/Plan -3  Problem: CAD  - Hx CABG at [facility]  - currently denies chest pain  - EKG without ischemic changes  - Troponin elevated likely i/s/o ADHF exacerbation; peaked 4/29 at 124  - c/w simvastatin 20mg PO daily    Problem/Plan -4  Problem: AF  - rate controlled  - c/w Metoprolol and Eliquis  - monitor on tele    Problem/Plan -5  Problem: HLD  - c/w simvastatin     Problem/Plan -6  Problem: DM  - Most recent HgbA1C is 7.6 on 3.4.22  - ISS           HERBER Mix-FACUNDO Gan DO [facility]  Cardiovascular Medicine  [address] [place] [address], [address]  Office: [phone]  Cell: [phone] DATE OF SERVICE: 05-10-22 @ 10:03    Patient is a 89y old  Female who presents with a chief complaint of SOB (10 May 2022 06:56)      INTERVAL HISTORY: Feels ok.     REVIEW OF SYSTEMS:  CONSTITUTIONAL: No weakness  EYES/ENT: No visual changes;  No throat pain   NECK: No pain or stiffness  RESPIRATORY: No cough, wheezing; No shortness of breath  CARDIOVASCULAR: No chest pain or palpitations  GASTROINTESTINAL: No abdominal  pain. No nausea, vomiting, or hematemesis  GENITOURINARY: No dysuria, frequency or hematuria  NEUROLOGICAL: No stroke like symptoms  SKIN: No rashes    TELEMETRY Personally reviewed: V-Paced at 50-60s  	  MEDICATIONS:  amLODIPine   Tablet 5 milliGRAM(s) Oral daily  furosemide   Injectable 40 milliGRAM(s) IV Push two times a day  metoprolol succinate  milliGRAM(s) Oral daily        PHYSICAL EXAM:  T(C): 36.7 (05-10-22 @ 04:03), Max: 36.7 (05-10-22 @ 04:03)  HR: 59 (05-10-22 @ 04:03) (59 - 61)  BP: 126/78 (05-10-22 @ 04:03) (117/73 - 127/72)  RR: 18 (05-10-22 @ 04:03) (17 - 18)  SpO2: 96% (05-10-22 @ 04:03) (93% - 96%)  Wt(kg): --  I&O's Summary    09 May 2022 07:01  -  10 May 2022 07:00  --------------------------------------------------------  IN: 720 mL / OUT: 350 mL / NET: 370 mL    10 May 2022 07:01  -  10 May 2022 10:03  --------------------------------------------------------  IN: 360 mL / OUT: 0 mL / NET: 360 mL          Appearance: In no distress	  HEENT:    PERRL, EOMI	  Cardiovascular:  S1 S2, No JVD  Respiratory: Lungs clear to auscultation	  Gastrointestinal:  Soft, Non-tender, + BS	  Vascularature:  + LE edema (L>R) improving  Psychiatric: Appropriate affect   Neuro: no acute focal deficits                               9.0    5.38  )-----------( 352      ( 10 May 2022 09:02 )             32.2     05-10    138  |  98  |  68<H>  ----------------------------<  84  4.2   |  30  |  1.60<H>    Ca    11.1<H>      10 May 2022 09:02  Phos  3.5     05-10  Mg     2.2     05-10          Labs personally reviewed      ASSESSMENT/PLAN: 	    Ms. Briones is a 89 yo female with PMH of HTN, HLD, CAD s/p CABG, AS s/p TAVR in 9/2021 at [facility], DM, CKD, AF on Eliquis, PPM who presents with 3 days of progressive shortness of breath, + orthopnea, increased LE edema and decreased oxygen saturation on pulse oximeter at home.    Problem/Plan -1  Problem: HFpEF  - Followed by Dr. Agapito Woodard for outpatient cardiology  - CT Chest reveals moderate right and small left pleural effusions, cardiomegaly, possibly superimposed consolidation  - On lasix 40mg PO daily at home  - BMP reviewed from OP cardiologist records on 12/3/21 it was 1.1, and 5 months ago it was 1.2-1.4, here 2 months ago it was 1.4-1.6  - ECHO Left Ventricle: Normal left ventricular systolic function. No segmental wall motion abnormalities.  The LVEF= 60-65%. Moderate mitral regurgitation, There is a transcatheter aortic valve replacement seen. Moderate pulmonary hypertension.  - 5/6 Creatinine downtrending.  - Patient appears close to euvolemia, but not euvolemic yet, although much improved since admission, c/w IV Lasix for now, will closely monitor.  - 5/3 POCUS reveals Right pleural effusion  - On 2-1 L of NC   - Consider repeating CXR and proBNP   - BNP only slighty down (8208 --> 7983)  - Weight (bed not standing) slightly increased since admission despite IV diuresis.  - Still requiring 2L NC  - Continue Lasix 40mg, responding well,  CXR shows Mild pulmonary vascular congestion with small bilateral pleural effusions  - Responding well to IV diuretics, lasix 40mg IV   - consider repeat CXR and switching to PO if pulm edema has resolved, oxygenation has improved     Problem/Plan -2  Problem: Aortic Stenosis  - s/p TAVR in September 2021  - minimal murmur on ausculation  - aortic valve is well seated and has good flow  - moderate tricuspid regurg    Problem/Plan -3  Problem: CAD  - Hx CABG at [facility]  - currently denies chest pain  - EKG without ischemic changes  - Troponin elevated likely i/s/o ADHF exacerbation; peaked 4/29 at 124  - c/w simvastatin 20mg PO daily    Problem/Plan -4  Problem: AF  - rate controlled  - c/w Metoprolol and Eliquis  - monitor on tele    Problem/Plan -5  Problem: HLD  - c/w simvastatin     Problem/Plan -6  Problem: DM  - Most recent HgbA1C is 7.6 on 3.4.22  - ISS           HERBER Mix-NP   Giles Gan DO [facility]  Cardiovascular Medicine  [address] [place] [address], [address]  Office: [phone]  Cell: [phone]

## 2022-05-10 NOTE — PROGRESS NOTE ADULT - PROBLEM SELECTOR PLAN 2
- HFpEF (EF 75% in 03/2022), AS s/p TAVR (09/2021)  - Patient initially SOB, RHODES, LE edema, +JVD likely in CHF exacerbation   - Continue home Toprol XL 100mg daily   - Continue Furosemide 40 IV BID   - Strict I/Os, daily weights  - Appreciate cards recs  - TTE shows EF 65% with mold pulm HTN and mod TR (no significant change from prior) - HFpEF (EF 75% in 03/2022), AS s/p TAVR (09/2021)  - Patient initially SOB, RHODES, LE edema, +JVD likely in CHF exacerbation   - Continue home Toprol XL 100mg daily   - decrease Furosemide to 40 IV daily  - Strict I/Os, daily weights  - Appreciate cards recs  - TTE shows EF 65% with mold pulm HTN and mod TR (no significant change from prior)

## 2022-05-10 NOTE — PROGRESS NOTE ADULT - ASSESSMENT
89yo F with PMH on HFpEF (EF 75% 03/2022), Afib (on Eliquis), HTN, DM2, CKD3, hypothyroidism, AS s/p TAVR (09/2021), s/p PPM BIBEMS for hypoxia of 88% on RA. Admitted for CHF exacerbation.  89yo F with PMH on HFpEF (EF 75% 03/2022), Afib (on Eliquis), HTN, DM2, CKD3, hypothyroidism, AS s/p TAVR (09/2021), s/p PPM BIBEMS for hypoxia of 88% on RA. Admitted for CHF exacerbation. Gradually improving.

## 2022-05-10 NOTE — PROGRESS NOTE ADULT - PROBLEM SELECTOR PLAN 1
- Likely 2/2 CHF with moderate pleural effusions - improving  - pro-BNP >8000  - CT Chest on 4/29 shows moderate right and small left pleural effusions with pulmonary edema in the setting of cardiomegaly. Partial lower lobe compressive atelectasis; superimposed consolidation is difficult to exclude on this noncontrast CT.  - S/p ceftriaxone and azithromycin in ED. Monitor off abx.     - Lasix as below  - Currently saturating 98% on 2L NC. Wean as tolerated  - POCUS (5/5) with improving B lines, however still present, with mod pleural effusion right - Likely 2/2 CHF with moderate pleural effusions - improving  - pro-BNP >8000  - CT Chest on 4/29 shows moderate right and small left pleural effusions with pulmonary edema in the setting of cardiomegaly. Partial lower lobe compressive atelectasis; superimposed consolidation is difficult to exclude on this noncontrast CT.  - S/p ceftriaxone and azithromycin in ED. Monitor off abx.     - Lasix as below  - Currently saturating 98% on 1L NC. Wean as tolerated  - POCUS (5/5) with improving B lines, however still present, with mod pleural effusion right

## 2022-05-10 NOTE — PROGRESS NOTE ADULT - PROBLEM SELECTOR PLAN 10
Home meds: Continue home Allopurinol 100mg daily, Simvastatin 20mg qhs, Protonix 40mg daily   DVT ppx: Eliquis   Diet: DASH/Renal   Dispo: PT recs home with home PT   Communication: Updated daughter Claudia on   DNR/DNI Home meds: Continue home Allopurinol 100mg daily, Simvastatin 20mg qhs, Protonix 40mg daily   DVT ppx: Eliquis   Diet: DASH/Renal   Dispo: PT recs home with home PT   Communication: Updated daughter Claudia on 5/10  DNR/DNI Home meds: Continue home Allopurinol 100mg daily, Simvastatin 20mg qhs, Protonix 40mg daily   DVT ppx: Eliquis   Diet: DASH/Renal   Dispo: PT recs home with home PT   Communication: called daughter Claudia on 5/10 but no answer  DNR/DNI

## 2022-05-11 NOTE — PROGRESS NOTE ADULT - PROBLEM SELECTOR PLAN 10
Home meds: Continue home Allopurinol 100mg daily, Simvastatin 20mg qhs, Protonix 40mg daily   DVT ppx: Eliquis   Diet: DASH/Renal   Dispo: PT recs home with home PT; patient has home care and prefers no JEREMIAS  Communication: called daughter Claudia on 5/10 but no answer  DNR/DNI Home meds: Continue home Allopurinol 100mg daily, Simvastatin 20mg qhs, Protonix 40mg daily   DVT ppx: Eliquis   Diet: DASH/Renal   Dispo: PT recs home with home PT; patient has home care and prefers no JEREMIAS  Communication: called  5/11  DNR/DNI

## 2022-05-11 NOTE — PROGRESS NOTE ADULT - PROBLEM SELECTOR PLAN 1
- Likely 2/2 CHF with moderate pleural effusions - improving  - pro-BNP >8000  - CT Chest on 4/29 shows moderate right and small left pleural effusions with pulmonary edema in the setting of cardiomegaly. Partial lower lobe compressive atelectasis; superimposed consolidation is difficult to exclude on this noncontrast CT.  - S/p ceftriaxone and azithromycin in ED. Monitor off abx.     - Lasix as below  - Currently saturating 98% on 1L NC. Wean as tolerated  - POCUS (5/5) with improving B lines, however still present, with mod pleural effusion right - Likely 2/2 CHF with moderate pleural effusions - improving  - pro-BNP >8000  - CT Chest on 4/29 shows moderate right and small left pleural effusions with pulmonary edema in the setting of cardiomegaly. Partial lower lobe compressive atelectasis; superimposed consolidation is difficult to exclude on this noncontrast CT.  - S/p ceftriaxone and azithromycin in ED. Monitor off abx.     - Lasix as below  - Currently saturating well on RA  - POCUS (5/5) with improving B lines, however still present, with mod pleural effusion right

## 2022-05-11 NOTE — PROGRESS NOTE ADULT - SUBJECTIVE AND OBJECTIVE BOX
Date of Service   05-11-22 @ 13:05    Patient is a 89y old  Female who presents with a chief complaint of SOB (11 May 2022 06:58)      INTERVAL HISTORY: pt feels ok   TELEMETRY Personally reviewed: paced 60's     REVIEW OF SYSTEMS:   CONSTITUTIONAL: No weakness  EYES/ENT: No visual changes; No throat pain  Neck: No pain or stiffness  Respiratory: No cough, wheezing, No shortness of breath  CARDIOVASCULAR: no chest pain or palpitations  GASTROINTESTINAL: No abdominal pain, no nausea, vomiting or hematemesis  GENITOURINARY: No dysuria, frequency or hematuria  NEUROLOGICAL: No stroke like symptoms  SKIN: No rashes    	  MEDICATIONS:  amLODIPine   Tablet 5 milliGRAM(s) Oral daily  furosemide   Injectable 40 milliGRAM(s) IV Push daily  metoprolol succinate  milliGRAM(s) Oral daily        PHYSICAL EXAM:  T(C): 36.3 (05-11-22 @ 04:32), Max: 36.3 (05-10-22 @ 21:16)  HR: 60 (05-11-22 @ 04:32) (60 - 60)  BP: 127/73 (05-11-22 @ 04:32) (127/73 - 135/65)  RR: 18 (05-11-22 @ 04:32) (18 - 18)  SpO2: 97% (05-11-22 @ 04:32) (91% - 97%)  Wt(kg): --  I&O's Summary    10 May 2022 07:01  -  11 May 2022 07:00  --------------------------------------------------------  IN: 600 mL / OUT: 300 mL / NET: 300 mL    11 May 2022 07:01  -  11 May 2022 13:05  --------------------------------------------------------  IN: 240 mL / OUT: 700 mL / NET: -460 mL          Appearance: In no distress	  HEENT:    PERRL, EOMI	  Cardiovascular:  S1 S2, No JVD  Respiratory: Lungs clear to auscultation	  Gastrointestinal:  Soft, Non-tender, + BS	  Vascularature:  No edema of LE  Psychiatric: Appropriate affect   Neuro: no acute focal deficits                               8.4    5.28  )-----------( 311      ( 11 May 2022 08:36 )             29.7     05-11    138  |  99  |  66<H>  ----------------------------<  126<H>  4.2   |  29  |  1.50<H>    Ca    10.8<H>      11 May 2022 08:36  Phos  3.0     05-11  Mg     2.2     05-11          Labs personally reviewed      ASSESSMENT/PLAN: 	  Ms. Briones is a 89 yo female with PMH of HTN, HLD, CAD s/p CABG, AS s/p TAVR in 9/2021 at [facility], DM, CKD, AF on Eliquis, PPM who presents with 3 days of progressive shortness of breath, + orthopnea, increased LE edema and decreased oxygen saturation on pulse oximeter at home.    Problem/Plan -1  Problem: HFpEF  - Followed by Dr. Agapito Woodard for outpatient cardiology  - CT Chest reveals moderate right and small left pleural effusions, cardiomegaly, possibly superimposed consolidation  - On lasix 40mg PO daily at home  - BMP reviewed from OP cardiologist records on 12/3/21 it was 1.1, and 5 months ago it was 1.2-1.4, here 2 months ago it was 1.4-1.6  - ECHO Left Ventricle: Normal left ventricular systolic function. No segmental wall motion abnormalities.  The LVEF= 60-65%. Moderate mitral regurgitation, There is a transcatheter aortic valve replacement seen. Moderate pulmonary hypertension.  - 5/6 Creatinine downtrending.  - Patient appears close to euvolemia, but not euvolemic yet, although much improved since admission, c/w IV Lasix for now, will closely monitor.  - 5/3 POCUS reveals Right pleural effusion  - On 2-1 L of NC   - Consider repeating CXR and proBNP   - BNP only slighty down (8208 --> 7983)  - Weight (bed not standing) slightly increased since admission despite IV diuresis.  - Continue Lasix 40mg, responding well,  CXR shows Mild pulmonary vascular congestion with small bilateral pleural effusions  - Responding well to IV diuretics, lasix 40mg IV   - consider repeat CXR and switching to PO if pulm edema has resolved, oxygenation has improved     Problem/Plan -2  Problem: Aortic Stenosis  - s/p TAVR in September 2021  - minimal murmur on ausculation  - aortic valve is well seated and has good flow  - moderate tricuspid regurg    Problem/Plan -3  Problem: CAD  - Hx CABG at [facility]  - currently denies chest pain  - EKG without ischemic changes  - Troponin elevated likely i/s/o ADHF exacerbation; peaked 4/29 at 124  - c/w simvastatin 20mg PO daily    Problem/Plan -4  Problem: AF  - rate controlled  - c/w Metoprolol and Eliquis  - monitor on tele    Problem/Plan -5  Problem: HLD  - c/w simvastatin     Problem/Plan -6  Problem: DM  - Most recent HgbA1C is 7.6 on 3.4.22  - ISS         Jacob Diaz FNP-BC   Giles Gan DO [facility]  Cardiovascular Medicine  [address] [place] [address], [address]  Office: [phone]

## 2022-05-11 NOTE — PROGRESS NOTE ADULT - NUTRITIONAL ASSESSMENT
This patient has been assessed with a concern for Malnutrition and has been determined to have a diagnosis/diagnoses of Severe protein-calorie malnutrition.    This patient is being managed with:   Diet Regular-  Consistent Carbohydrate {No Snacks} (CSTCHO)  DASH/TLC {Sodium & Cholesterol Restricted} (DASH)  1500mL Fluid Restriction ([id number])  Entered: May  4 2022  7:31AM

## 2022-05-11 NOTE — PROGRESS NOTE ADULT - PROBLEM SELECTOR PLAN 2
- HFpEF (EF 75% in 03/2022), AS s/p TAVR (09/2021)  - Patient initially SOB, RHODES, LE edema, +JVD likely in CHF exacerbation   - Continue home Toprol XL 100mg daily   - continue Furosemide 40 IV daily; plan for PO tomorrow  - Strict I/Os, daily weights  - Appreciate cards recs  - TTE shows EF 65% with mold pulm HTN and mod TR (no significant change from prior) - HFpEF (EF 75% in 03/2022), AS s/p TAVR (09/2021)  - Patient initially SOB, RHODES, LE edema, +JVD likely in CHF exacerbation   - Continue home Toprol XL 100mg daily   - continue Furosemide 40 IV daily; plan for PO tomorrow  - Strict I/Os, daily weights  - Appreciate cards recs  - will repeat CXR  - TTE shows EF 65% with mold pulm HTN and mod TR (no significant change from prior)

## 2022-05-11 NOTE — PROGRESS NOTE ADULT - SUBJECTIVE AND OBJECTIVE BOX
Ez Pulliam, PGY1    DATE OF SERVICE: 05-11-22 @ 06:58    Patient is a 89y old  Female who presents with a chief complaint of SOB (10 May 2022 10:03)      SUBJECTIVE / OVERNIGHT EVENTS: No acute events overnight.     MEDICATIONS  (STANDING):  allopurinol 100 milliGRAM(s) Oral daily  amLODIPine   Tablet 5 milliGRAM(s) Oral daily  apixaban 2.5 milliGRAM(s) Oral two times a day  dextrose 5%. 1000 milliLiter(s) (50 mL/Hr) IV Continuous <Continuous>  dextrose 5%. 1000 milliLiter(s) (100 mL/Hr) IV Continuous <Continuous>  dextrose 50% Injectable 25 Gram(s) IV Push once  dextrose 50% Injectable 12.5 Gram(s) IV Push once  dextrose 50% Injectable 25 Gram(s) IV Push once  furosemide   Injectable 40 milliGRAM(s) IV Push daily  glucagon  Injectable 1 milliGRAM(s) IntraMuscular once  insulin glargine Injectable (LANTUS) 19 Unit(s) SubCutaneous at bedtime  insulin lispro (ADMELOG) corrective regimen sliding scale   SubCutaneous three times a day before meals  insulin lispro (ADMELOG) corrective regimen sliding scale   SubCutaneous at bedtime  insulin lispro Injectable (ADMELOG) 3 Unit(s) SubCutaneous three times a day before meals  levothyroxine 50 MICROGram(s) Oral <User Schedule>  levothyroxine 100 MICROGram(s) Oral <User Schedule>  metoprolol succinate  milliGRAM(s) Oral daily  multivitamin 1 Tablet(s) Oral daily  nystatin Powder 1 Application(s) Topical two times a day  pantoprazole    Tablet 40 milliGRAM(s) Oral before breakfast  simvastatin 20 milliGRAM(s) Oral at bedtime    MEDICATIONS  (PRN):  acetaminophen     Tablet .. 650 milliGRAM(s) Oral every 6 hours PRN Temp greater or equal to 38C (100.4F), Mild Pain (1 - 3)  dextrose Oral Gel 15 Gram(s) Oral once PRN Blood Glucose LESS THAN 70 milliGRAM(s)/deciliter      Vital Signs Last 24 Hrs  T(C): 36.3 (11 May 2022 04:32), Max: 36.4 (10 May 2022 10:57)  T(F): 97.3 (11 May 2022 04:32), Max: 97.5 (10 May 2022 10:57)  HR: 60 (11 May 2022 04:32) (60 - 61)  BP: 127/73 (11 May 2022 04:32) (125/63 - 135/65)  BP(mean): --  RR: 18 (11 May 2022 04:32) (18 - 18)  SpO2: 97% (11 May 2022 04:32) (91% - 97%)  CAPILLARY BLOOD GLUCOSE      POCT Blood Glucose.: 288 mg/dL (10 May 2022 21:49)  POCT Blood Glucose.: 235 mg/dL (10 May 2022 16:30)  POCT Blood Glucose.: 187 mg/dL (10 May 2022 11:47)  POCT Blood Glucose.: 85 mg/dL (10 May 2022 08:19)    I&O's Summary    09 May 2022 07:01  -  10 May 2022 07:00  --------------------------------------------------------  IN: 720 mL / OUT: 350 mL / NET: 370 mL    10 May 2022 07:01  -  11 May 2022 06:58  --------------------------------------------------------  IN: 600 mL / OUT: 300 mL / NET: 300 mL        PHYSICAL EXAM:  GENERAL: NAD, elderly female  HEAD:  Atraumatic, Normocephalic  EYES: EOMI, PERRLA, conjunctiva and sclera clear  ENMT: Moist mucous membranes  NECK: Supple, appreciable JVD above clavicle  CHEST/LUNG: Adequate inspiratory effort. Mild crackles heard in b/l lower lobes. On 1L NC.   HEART: Regular rate and rhythm; No murmurs, rubs, or gallops.   ABDOMEN: Soft, Nontender, Nondistended; Bowel sounds present  EXTREMITIES:  2+ Peripheral Pulses. 1+ pitting b/l LE edema with chronic skin changes in LLE.   NERVOUS SYSTEM:  Alert & Oriented X2-3  PSYCH: Normal Affect. Laying in bed comfortably; not agitated    LABS:                        9.0    5.38  )-----------( 352      ( 10 May 2022 09:02 )             32.2     05-10    138  |  98  |  68<H>  ----------------------------<  84  4.2   |  30  |  1.60<H>    Ca    11.1<H>      10 May 2022 09:02  Phos  3.5     05-10  Mg     2.2     05-10                RADIOLOGY & ADDITIONAL TESTS:    Imaging Personally Reviewed:    Consultant(s) Notes Reviewed:      Care Discussed with Consultants/Other Providers:   Ez Pulliam, PGY1    DATE OF SERVICE: 05-11-22 @ 06:58    Patient is a 89y old  Female who presents with a chief complaint of SOB (10 May 2022 10:03)      SUBJECTIVE / OVERNIGHT EVENTS: No acute events overnight. Patient feels like breathing is much improved. Plans to work with PT today. Denies chest pain, dyspnea, abd pain. n/v.     Tele reviewed: afib vpaced 50s-60s  MEDICATIONS  (STANDING):  allopurinol 100 milliGRAM(s) Oral daily  amLODIPine   Tablet 5 milliGRAM(s) Oral daily  apixaban 2.5 milliGRAM(s) Oral two times a day  dextrose 5%. 1000 milliLiter(s) (50 mL/Hr) IV Continuous <Continuous>  dextrose 5%. 1000 milliLiter(s) (100 mL/Hr) IV Continuous <Continuous>  dextrose 50% Injectable 25 Gram(s) IV Push once  dextrose 50% Injectable 12.5 Gram(s) IV Push once  dextrose 50% Injectable 25 Gram(s) IV Push once  furosemide   Injectable 40 milliGRAM(s) IV Push daily  glucagon  Injectable 1 milliGRAM(s) IntraMuscular once  insulin glargine Injectable (LANTUS) 19 Unit(s) SubCutaneous at bedtime  insulin lispro (ADMELOG) corrective regimen sliding scale   SubCutaneous three times a day before meals  insulin lispro (ADMELOG) corrective regimen sliding scale   SubCutaneous at bedtime  insulin lispro Injectable (ADMELOG) 3 Unit(s) SubCutaneous three times a day before meals  levothyroxine 50 MICROGram(s) Oral <User Schedule>  levothyroxine 100 MICROGram(s) Oral <User Schedule>  metoprolol succinate  milliGRAM(s) Oral daily  multivitamin 1 Tablet(s) Oral daily  nystatin Powder 1 Application(s) Topical two times a day  pantoprazole    Tablet 40 milliGRAM(s) Oral before breakfast  simvastatin 20 milliGRAM(s) Oral at bedtime    MEDICATIONS  (PRN):  acetaminophen     Tablet .. 650 milliGRAM(s) Oral every 6 hours PRN Temp greater or equal to 38C (100.4F), Mild Pain (1 - 3)  dextrose Oral Gel 15 Gram(s) Oral once PRN Blood Glucose LESS THAN 70 milliGRAM(s)/deciliter      Vital Signs Last 24 Hrs  T(C): 36.3 (11 May 2022 04:32), Max: 36.4 (10 May 2022 10:57)  T(F): 97.3 (11 May 2022 04:32), Max: 97.5 (10 May 2022 10:57)  HR: 60 (11 May 2022 04:32) (60 - 61)  BP: 127/73 (11 May 2022 04:32) (125/63 - 135/65)  BP(mean): --  RR: 18 (11 May 2022 04:32) (18 - 18)  SpO2: 97% (11 May 2022 04:32) (91% - 97%)  CAPILLARY BLOOD GLUCOSE      POCT Blood Glucose.: 288 mg/dL (10 May 2022 21:49)  POCT Blood Glucose.: 235 mg/dL (10 May 2022 16:30)  POCT Blood Glucose.: 187 mg/dL (10 May 2022 11:47)  POCT Blood Glucose.: 85 mg/dL (10 May 2022 08:19)    I&O's Summary    09 May 2022 07:01  -  10 May 2022 07:00  --------------------------------------------------------  IN: 720 mL / OUT: 350 mL / NET: 370 mL    10 May 2022 07:01  -  11 May 2022 06:58  --------------------------------------------------------  IN: 600 mL / OUT: 300 mL / NET: 300 mL        PHYSICAL EXAM:  GENERAL: NAD, elderly female  HEAD:  Atraumatic, Normocephalic  EYES: EOMI, PERRLA, conjunctiva and sclera clear  ENMT: Moist mucous membranes  NECK: Supple, appreciable JVD above clavicle  CHEST/LUNG: Adequate inspiratory effort. Mild crackles heard in b/l lower lobes. On room air.   HEART: Regular rate and rhythm; No murmurs, rubs, or gallops.   ABDOMEN: Soft, Nontender, Nondistended; Bowel sounds present  EXTREMITIES:  2+ Peripheral Pulses. 1+ pitting b/l LE edema with chronic skin changes in LLE.   NERVOUS SYSTEM:  Alert & Oriented X2-3  PSYCH: Normal Affect. Laying in bed comfortably; not agitated    LABS:                        9.0    5.38  )-----------( 352      ( 10 May 2022 09:02 )             32.2     05-10    138  |  98  |  68<H>  ----------------------------<  84  4.2   |  30  |  1.60<H>    Ca    11.1<H>      10 May 2022 09:02  Phos  3.5     05-10  Mg     2.2     05-10                RADIOLOGY & ADDITIONAL TESTS:    Imaging Personally Reviewed:    Consultant(s) Notes Reviewed:      Care Discussed with Consultants/Other Providers:

## 2022-05-11 NOTE — PROGRESS NOTE ADULT - ASSESSMENT
89yo F with PMH on HFpEF (EF 75% 03/2022), Afib (on Eliquis), HTN, DM2, CKD3, hypothyroidism, AS s/p TAVR (09/2021), s/p PPM BIBEMS for hypoxia of 88% on RA. Admitted for CHF exacerbation. Gradually improving.

## 2022-05-12 NOTE — PROGRESS NOTE ADULT - PROBLEM SELECTOR PLAN 6
- Patient on home Lantus 20U qhs and ISS TID premeal   - Continue Lantus 19U qhs and low ISS TID premeal with fingersticks. Added Admelog 3U premeal TID   - A1c 7.4 in 03/2022 - Patient on home Lantus 20U qhs and ISS TID premeal   - Continue Lantus 19U qhs and low ISS TID premeal with fingersticks. Added Admelog 4U premeal TID   - A1c 7.4 in 03/2022

## 2022-05-12 NOTE — PROGRESS NOTE ADULT - PROBLEM SELECTOR PLAN 1
- Likely 2/2 CHF with moderate pleural effusions - improving  - pro-BNP >8000  - CT Chest on 4/29 shows moderate right and small left pleural effusions with pulmonary edema in the setting of cardiomegaly. Partial lower lobe compressive atelectasis; superimposed consolidation is difficult to exclude on this noncontrast CT.  - S/p ceftriaxone and azithromycin in ED. Monitor off abx.     - Lasix as below  - Currently saturating well on RA  - POCUS (5/5) with improving B lines, however still present, with mod pleural effusion right - Likely 2/2 CHF with moderate pleural effusions - improving  - pro-BNP >8000  - CT Chest on 4/29 shows moderate right and small left pleural effusions with pulmonary edema in the setting of cardiomegaly. Partial lower lobe compressive atelectasis; superimposed consolidation is difficult to exclude on this noncontrast CT.  - S/p ceftriaxone and azithromycin in ED. Monitor off abx.     - Lasix as below  - Currently saturating well on RA

## 2022-05-12 NOTE — PROGRESS NOTE ADULT - SUBJECTIVE AND OBJECTIVE BOX
Ez Pulliam, PGY1    DATE OF SERVICE: 05-12-22 @ 06:59    Patient is a 89y old  Female who presents with a chief complaint of SOB (11 May 2022 13:04)      SUBJECTIVE / OVERNIGHT EVENTS: No acute events overnight.     MEDICATIONS  (STANDING):  allopurinol 100 milliGRAM(s) Oral daily  amLODIPine   Tablet 5 milliGRAM(s) Oral daily  apixaban 2.5 milliGRAM(s) Oral two times a day  dextrose 5%. 1000 milliLiter(s) (50 mL/Hr) IV Continuous <Continuous>  dextrose 5%. 1000 milliLiter(s) (100 mL/Hr) IV Continuous <Continuous>  dextrose 50% Injectable 25 Gram(s) IV Push once  dextrose 50% Injectable 12.5 Gram(s) IV Push once  dextrose 50% Injectable 25 Gram(s) IV Push once  furosemide    Tablet 40 milliGRAM(s) Oral daily  glucagon  Injectable 1 milliGRAM(s) IntraMuscular once  insulin glargine Injectable (LANTUS) 19 Unit(s) SubCutaneous at bedtime  insulin lispro (ADMELOG) corrective regimen sliding scale   SubCutaneous three times a day before meals  insulin lispro (ADMELOG) corrective regimen sliding scale   SubCutaneous at bedtime  insulin lispro Injectable (ADMELOG) 4 Unit(s) SubCutaneous three times a day before meals  levothyroxine 50 MICROGram(s) Oral <User Schedule>  levothyroxine 100 MICROGram(s) Oral <User Schedule>  metoprolol succinate  milliGRAM(s) Oral daily  multivitamin 1 Tablet(s) Oral daily  nystatin Powder 1 Application(s) Topical two times a day  pantoprazole    Tablet 40 milliGRAM(s) Oral before breakfast  simvastatin 20 milliGRAM(s) Oral at bedtime    MEDICATIONS  (PRN):  acetaminophen     Tablet .. 650 milliGRAM(s) Oral every 6 hours PRN Temp greater or equal to 38C (100.4F), Mild Pain (1 - 3)  dextrose Oral Gel 15 Gram(s) Oral once PRN Blood Glucose LESS THAN 70 milliGRAM(s)/deciliter      Vital Signs Last 24 Hrs  T(C): 36.5 (12 May 2022 05:11), Max: 36.5 (12 May 2022 05:11)  T(F): 97.7 (12 May 2022 05:11), Max: 97.7 (12 May 2022 05:11)  HR: 60 (12 May 2022 05:11) (60 - 60)  BP: 124/68 (12 May 2022 05:11) (123/69 - 132/85)  BP(mean): --  RR: 18 (12 May 2022 05:11) (17 - 19)  SpO2: 96% (12 May 2022 05:11) (94% - 97%)  CAPILLARY BLOOD GLUCOSE      POCT Blood Glucose.: 203 mg/dL (11 May 2022 21:45)  POCT Blood Glucose.: 101 mg/dL (11 May 2022 17:19)  POCT Blood Glucose.: 104 mg/dL (11 May 2022 11:55)  POCT Blood Glucose.: 124 mg/dL (11 May 2022 08:10)    I&O's Summary    10 May 2022 07:01  -  11 May 2022 07:00  --------------------------------------------------------  IN: 600 mL / OUT: 300 mL / NET: 300 mL    11 May 2022 07:01  -  12 May 2022 06:59  --------------------------------------------------------  IN: 780 mL / OUT: 1075 mL / NET: -295 mL        PHYSICAL EXAM:  GENERAL: NAD, elderly female  HEAD:  Atraumatic, Normocephalic  EYES: EOMI, PERRLA, conjunctiva and sclera clear  ENMT: Moist mucous membranes  NECK: Supple, appreciable JVD above clavicle  CHEST/LUNG: Adequate inspiratory effort. Mild crackles heard in b/l lower lobes. On room air.   HEART: Regular rate and rhythm; No murmurs, rubs, or gallops.   ABDOMEN: Soft, Nontender, Nondistended; Bowel sounds present  EXTREMITIES:  2+ Peripheral Pulses. 1+ pitting b/l LE edema with chronic skin changes in LLE.   NERVOUS SYSTEM:  Alert & Oriented X2-3  PSYCH: Normal Affect. Laying in bed comfortably; not agitated    LABS:                        8.6    5.44  )-----------( 329      ( 12 May 2022 06:21 )             30.3     05-12    135  |  98  |  71<H>  ----------------------------<  211<H>  4.7   |  29  |  1.48<H>    Ca    11.0<H>      12 May 2022 06:21  Phos  3.1     05-12  Mg     2.2     05-12                RADIOLOGY & ADDITIONAL TESTS:    Imaging Personally Reviewed:    Consultant(s) Notes Reviewed:      Care Discussed with Consultants/Other Providers:   Ez Pulliam, PGY1    DATE OF SERVICE: 05-12-22 @ 06:59    Patient is a 89y old  Female who presents with a chief complaint of SOB (11 May 2022 13:04)      SUBJECTIVE / OVERNIGHT EVENTS: No acute events overnight. Patient feels like breathing is OK. No chest pain, abdominal pain, or n/v.     MEDICATIONS  (STANDING):  allopurinol 100 milliGRAM(s) Oral daily  amLODIPine   Tablet 5 milliGRAM(s) Oral daily  apixaban 2.5 milliGRAM(s) Oral two times a day  dextrose 5%. 1000 milliLiter(s) (50 mL/Hr) IV Continuous <Continuous>  dextrose 5%. 1000 milliLiter(s) (100 mL/Hr) IV Continuous <Continuous>  dextrose 50% Injectable 25 Gram(s) IV Push once  dextrose 50% Injectable 12.5 Gram(s) IV Push once  dextrose 50% Injectable 25 Gram(s) IV Push once  furosemide    Tablet 40 milliGRAM(s) Oral daily  glucagon  Injectable 1 milliGRAM(s) IntraMuscular once  insulin glargine Injectable (LANTUS) 19 Unit(s) SubCutaneous at bedtime  insulin lispro (ADMELOG) corrective regimen sliding scale   SubCutaneous three times a day before meals  insulin lispro (ADMELOG) corrective regimen sliding scale   SubCutaneous at bedtime  insulin lispro Injectable (ADMELOG) 4 Unit(s) SubCutaneous three times a day before meals  levothyroxine 50 MICROGram(s) Oral <User Schedule>  levothyroxine 100 MICROGram(s) Oral <User Schedule>  metoprolol succinate  milliGRAM(s) Oral daily  multivitamin 1 Tablet(s) Oral daily  nystatin Powder 1 Application(s) Topical two times a day  pantoprazole    Tablet 40 milliGRAM(s) Oral before breakfast  simvastatin 20 milliGRAM(s) Oral at bedtime    MEDICATIONS  (PRN):  acetaminophen     Tablet .. 650 milliGRAM(s) Oral every 6 hours PRN Temp greater or equal to 38C (100.4F), Mild Pain (1 - 3)  dextrose Oral Gel 15 Gram(s) Oral once PRN Blood Glucose LESS THAN 70 milliGRAM(s)/deciliter      Vital Signs Last 24 Hrs  T(C): 36.5 (12 May 2022 05:11), Max: 36.5 (12 May 2022 05:11)  T(F): 97.7 (12 May 2022 05:11), Max: 97.7 (12 May 2022 05:11)  HR: 60 (12 May 2022 05:11) (60 - 60)  BP: 124/68 (12 May 2022 05:11) (123/69 - 132/85)  BP(mean): --  RR: 18 (12 May 2022 05:11) (17 - 19)  SpO2: 96% (12 May 2022 05:11) (94% - 97%)  CAPILLARY BLOOD GLUCOSE      POCT Blood Glucose.: 203 mg/dL (11 May 2022 21:45)  POCT Blood Glucose.: 101 mg/dL (11 May 2022 17:19)  POCT Blood Glucose.: 104 mg/dL (11 May 2022 11:55)  POCT Blood Glucose.: 124 mg/dL (11 May 2022 08:10)    I&O's Summary    10 May 2022 07:01  -  11 May 2022 07:00  --------------------------------------------------------  IN: 600 mL / OUT: 300 mL / NET: 300 mL    11 May 2022 07:01  -  12 May 2022 06:59  --------------------------------------------------------  IN: 780 mL / OUT: 1075 mL / NET: -295 mL        PHYSICAL EXAM:  GENERAL: NAD, elderly female  HEAD:  Atraumatic, Normocephalic  EYES: EOMI, PERRLA, conjunctiva and sclera clear  ENMT: Moist mucous membranes  NECK: Supple, no JVD appreciated  CHEST/LUNG: Adequate inspiratory effort. few crackles bilaterally. Breathing comfortably On room air.   HEART: Regular rate and rhythm; No murmurs, rubs, or gallops.   ABDOMEN: Soft, Nontender, Nondistended; Bowel sounds present  EXTREMITIES:  2+ Peripheral Pulses. 1+ pitting b/l LE edema with chronic skin changes in LLE.   NERVOUS SYSTEM:  Alert & Oriented X2-3  PSYCH: Normal Affect. Laying in bed comfortably; not agitated    LABS:                        8.6    5.44  )-----------( 329      ( 12 May 2022 06:21 )             30.3     05-12    135  |  98  |  71<H>  ----------------------------<  211<H>  4.7   |  29  |  1.48<H>    Ca    11.0<H>      12 May 2022 06:21  Phos  3.1     05-12  Mg     2.2     05-12                RADIOLOGY & ADDITIONAL TESTS:    Imaging Personally Reviewed:    Consultant(s) Notes Reviewed:      Care Discussed with Consultants/Other Providers:

## 2022-05-12 NOTE — PROGRESS NOTE ADULT - NUTRITIONAL ASSESSMENT
This patient has been assessed with a concern for Malnutrition and has been determined to have a diagnosis/diagnoses of Severe protein-calorie malnutrition.    This patient is being managed with:   Diet Regular-  Consistent Carbohydrate {No Snacks} (CSTCHO)  DASH/TLC {Sodium & Cholesterol Restricted} (DASH)  1500mL Fluid Restriction ([id number])  Entered: May  4 2022  7:31AM

## 2022-05-12 NOTE — PROGRESS NOTE ADULT - PROBLEM SELECTOR PLAN 2
- HFpEF (EF 75% in 03/2022), AS s/p TAVR (09/2021)  - Patient initially SOB, RHODES, LE edema, +JVD likely in CHF exacerbation   - Continue home Toprol XL 100mg daily   - Furosemide 40 PO daily  - Strict I/Os, daily weights  - Appreciate cards recs  - will repeat CXR  - TTE shows EF 65% with mold pulm HTN and mod TR (no significant change from prior) - HFpEF (EF 75% in 03/2022), AS s/p TAVR (09/2021)  - Patient initially SOB, RHODES, LE edema, +JVD likely in CHF exacerbation   - Continue home Toprol XL 100mg daily   - Furosemide 40 PO daily  - Strict I/Os, daily weights  - Appreciate cards recs  - TTE shows EF 65% with mold pulm HTN and mod TR (no significant change from prior)

## 2022-05-12 NOTE — PROGRESS NOTE ADULT - PROBLEM SELECTOR PLAN 10
Home meds: Continue home Allopurinol 100mg daily, Simvastatin 20mg qhs, Protonix 40mg daily   DVT ppx: Eliquis   Diet: DASH/Renal   Dispo: PT recs home with home PT; patient has home care and prefers no JEREMIAS  Communication: called  5/11  DNR/DNI Home meds: Continue home Allopurinol 100mg daily, Simvastatin 20mg qhs, Protonix 40mg daily   DVT ppx: Eliquis   Diet: DASH/Renal   Dispo: PT recs home with home PT; patient has home care and prefers no JEREMIAS  Communication: called daughter 5/12 with no answer; called  5/12 1:25PM- comfortable with patient going home tomorrow, has care set up  DNR/DNI

## 2022-05-12 NOTE — PROGRESS NOTE ADULT - SUBJECTIVE AND OBJECTIVE BOX
Date of Service   05-12-22 @ 11:33    Patient is a 89y old  Female who presents with a chief complaint of SOB (12 May 2022 06:58)      INTERVAL HISTORY: pt feels ok   TELEMETRY Personally reviewed: VPACED 60-70'S     REVIEW OF SYSTEMS:   CONSTITUTIONAL: No weakness  EYES/ENT: No visual changes; No throat pain  Neck: No pain or stiffness  Respiratory: No cough, wheezing, No shortness of breath  CARDIOVASCULAR: no chest pain or palpitations  GASTROINTESTINAL: No abdominal pain, no nausea, vomiting or hematemesis  GENITOURINARY: No dysuria, frequency or hematuria  NEUROLOGICAL: No stroke like symptoms  SKIN: No rashes    	  MEDICATIONS:  amLODIPine   Tablet 5 milliGRAM(s) Oral daily  furosemide    Tablet 40 milliGRAM(s) Oral daily  metoprolol succinate  milliGRAM(s) Oral daily        PHYSICAL EXAM:  T(C): 36.5 (05-12-22 @ 05:11), Max: 36.5 (05-12-22 @ 05:11)  HR: 64 (05-12-22 @ 11:20) (60 - 64)  BP: 124/68 (05-12-22 @ 05:11) (123/69 - 132/85)  RR: 18 (05-12-22 @ 05:11) (17 - 19)  SpO2: 93% (05-12-22 @ 11:28) (90% - 100%)  Wt(kg): --  I&O's Summary    11 May 2022 07:01  -  12 May 2022 07:00  --------------------------------------------------------  IN: 780 mL / OUT: 1075 mL / NET: -295 mL          Appearance: In no distress	  HEENT:    PERRL, EOMI	  Cardiovascular:  S1 S2, No JVD  Respiratory: Lungs clear to auscultation	  Gastrointestinal:  Soft, Non-tender, + BS	  Vascularature:  No edema of LE  Psychiatric: Appropriate affect   Neuro: no acute focal deficits                               8.6    5.44  )-----------( 329      ( 12 May 2022 06:21 )             30.3     05-12    135  |  98  |  71<H>  ----------------------------<  211<H>  4.7   |  29  |  1.48<H>    Ca    11.0<H>      12 May 2022 06:21  Phos  3.1     05-12  Mg     2.2     05-12          Labs personally reviewed      ASSESSMENT/PLAN: 	  Ms. Briones is a 89 yo female with PMH of HTN, HLD, CAD s/p CABG, AS s/p TAVR in 9/2021 at [facility], DM, CKD, AF on Eliquis, PPM who presents with 3 days of progressive shortness of breath, + orthopnea, increased LE edema and decreased oxygen saturation on pulse oximeter at home.    Problem/Plan -1  Problem: HFpEF  - Followed by Dr. Agapito Woodard for outpatient cardiology  - CT Chest reveals moderate right and small left pleural effusions, cardiomegaly, possibly superimposed consolidation  - On lasix 40mg PO daily at home  - BMP reviewed from OP cardiologist records on 12/3/21 it was 1.1, and 5 months ago it was 1.2-1.4, here 2 months ago it was 1.4-1.6  - ECHO Left Ventricle: Normal left ventricular systolic function. No segmental wall motion abnormalities.  The LVEF= 60-65%. Moderate mitral regurgitation, There is a transcatheter aortic valve replacement seen. Moderate pulmonary hypertension.  - 5/6 Creatinine downtrending.  - Patient appears close to euvolemia, but not euvolemic yet, although much improved since admission, c/w IV Lasix for now, will closely monitor.  - 5/3 POCUS reveals Right pleural effusion  - BNP only slighty down (8208 --> 7983)  - Weight (bed not standing) slightly increased since admission despite IV diuresis.  - Continue Lasix 40mg, responding well,  CXR shows Mild pulmonary vascular congestion with small bilateral pleural effusions  - Consider repeating CXR and proBNP   - Switched Lasix PO      Problem/Plan -2  Problem: Aortic Stenosis  - s/p TAVR in September 2021  - minimal murmur on ausculation  - aortic valve is well seated and has good flow  - moderate tricuspid regurg    Problem/Plan -3  Problem: CAD  - Hx CABG at [facility]  - currently denies chest pain  - EKG without ischemic changes  - Troponin elevated likely i/s/o ADHF exacerbation; peaked 4/29 at 124  - c/w simvastatin 20mg PO daily    Problem/Plan -4  Problem: AF  - rate controlled  - c/w Metoprolol and Eliquis  - monitor on tele    Problem/Plan -5  Problem: HLD  - c/w simvastatin     Problem/Plan -6  Problem: DM  - Most recent HgbA1C is 7.6 on 3.4.22  - ISS       Jacob Diaz FNP-BC   Giles Gan DO [facility]  Cardiovascular Medicine  [address] [place] [address], [address]  Office: [phone]   Date of Service   05-12-22 @ 11:33    Patient is a 89y old  Female who presents with a chief complaint of SOB (12 May 2022 06:58)      INTERVAL HISTORY: pt feels ok   TELEMETRY Personally reviewed: VPACED 60-70'S     REVIEW OF SYSTEMS:   CONSTITUTIONAL: No weakness  EYES/ENT: No visual changes; No throat pain  Neck: No pain or stiffness  Respiratory: No cough, wheezing, No shortness of breath  CARDIOVASCULAR: no chest pain or palpitations  GASTROINTESTINAL: No abdominal pain, no nausea, vomiting or hematemesis  GENITOURINARY: No dysuria, frequency or hematuria  NEUROLOGICAL: No stroke like symptoms  SKIN: No rashes    	  MEDICATIONS:  amLODIPine   Tablet 5 milliGRAM(s) Oral daily  furosemide    Tablet 40 milliGRAM(s) Oral daily  metoprolol succinate  milliGRAM(s) Oral daily        PHYSICAL EXAM:  T(C): 36.5 (05-12-22 @ 05:11), Max: 36.5 (05-12-22 @ 05:11)  HR: 64 (05-12-22 @ 11:20) (60 - 64)  BP: 124/68 (05-12-22 @ 05:11) (123/69 - 132/85)  RR: 18 (05-12-22 @ 05:11) (17 - 19)  SpO2: 93% (05-12-22 @ 11:28) (90% - 100%)  Wt(kg): --  I&O's Summary    11 May 2022 07:01  -  12 May 2022 07:00  --------------------------------------------------------  IN: 780 mL / OUT: 1075 mL / NET: -295 mL          Appearance: In no distress	  HEENT:    PERRL, EOMI	  Cardiovascular:  S1 S2, No JVD  Respiratory: Lungs clear to auscultation	  Gastrointestinal:  Soft, Non-tender, + BS	  Vascularature:  No edema of LE  Psychiatric: Appropriate affect   Neuro: no acute focal deficits                               8.6    5.44  )-----------( 329      ( 12 May 2022 06:21 )             30.3     05-12    135  |  98  |  71<H>  ----------------------------<  211<H>  4.7   |  29  |  1.48<H>    Ca    11.0<H>      12 May 2022 06:21  Phos  3.1     05-12  Mg     2.2     05-12          Labs personally reviewed      ASSESSMENT/PLAN: 	  Ms. Briones is a 89 yo female with PMH of HTN, HLD, CAD s/p CABG, AS s/p TAVR in 9/2021 at [facility], DM, CKD, AF on Eliquis, PPM who presents with 3 days of progressive shortness of breath, + orthopnea, increased LE edema and decreased oxygen saturation on pulse oximeter at home.    Problem/Plan -1  Problem: HFpEF  - Followed by Dr. Agapito Woodard for outpatient cardiology  - CT Chest reveals moderate right and small left pleural effusions, cardiomegaly, possibly superimposed consolidation  - On lasix 40mg PO daily at home  - BMP reviewed from OP cardiologist records on 12/3/21 it was 1.1, and 5 months ago it was 1.2-1.4, here 2 months ago it was 1.4-1.6  - ECHO Left Ventricle: Normal left ventricular systolic function. No segmental wall motion abnormalities.  The LVEF= 60-65%. Moderate mitral regurgitation, There is a transcatheter aortic valve replacement seen. Moderate pulmonary hypertension.  - 5/6 Creatinine downtrending.  - Patient appears close to euvolemia, but not euvolemic yet, although much improved since admission, c/w IV Lasix for now, will closely monitor.  - 5/3 POCUS reveals Right pleural effusion  - BNP only slighty down (8208 --> 7983)  - Weight (bed not standing) slightly increased since admission despite IV diuresis.  - CXR shows much improved pulm vasc congestion  - Switched Lasix PO, rec Lasix 40mg PO BID with close OP follow up with PCP for repeat BMP within a week    Problem/Plan -2  Problem: Aortic Stenosis  - s/p TAVR in September 2021  - minimal murmur on ausculation  - aortic valve is well seated and has good flow  - moderate tricuspid regurg    Problem/Plan -3  Problem: CAD  - Hx CABG at [facility]  - currently denies chest pain  - EKG without ischemic changes  - Troponin elevated likely i/s/o ADHF exacerbation; peaked 4/29 at 124  - c/w simvastatin 20mg PO daily    Problem/Plan -4  Problem: AF  - rate controlled  - c/w Metoprolol and Eliquis  - monitor on tele    Problem/Plan -5  Problem: HLD  - c/w simvastatin     Problem/Plan -6  Problem: DM  - Most recent HgbA1C is 7.6 on 3.4.22  - ISS       Jacob Diaz FNP-BC   Giles Gan DO [facility]  Cardiovascular Medicine  [address] [place] [address], [address]  Office: [phone]

## 2022-05-12 NOTE — CHART NOTE - NSCHARTNOTEFT_GEN_A_CORE
Nutrition Follow Up Note  Patient seen for: Nutrition Follow Up    Chart reviewed, events noted. Pt is a 89 yo F with PMH: HFpEF, A.Fib, HTN, DM2, CKD3, hypothyroidism, AS s/p TAVR, s/p PPM. Admitted for hypoxia of 88% on RA. Admitted for CHF exacerbation. Per MD note , CHF with moderate pleural effusions improving. Continues on diuretics as prescribed daily. Noted with MARICARMEN superimposed on CKD, likely 2/2 cardiorenal syndrome and Cr improving.     Source: [x] Patient       [x] EMR        [x] RN        [] Family at bedside       [x] Other: interdisciplinary medical team    Diet Order:   Diet, Regular:   Consistent Carbohydrate {No Snacks} (CSTCHO)  DASH/TLC {Sodium & Cholesterol Restricted} (DASH)  1500mL Fluid Restriction ([id number]) (22)    PO intake :   [] >75%  Adequate    [] 50-75%  Fair       [] <50%  Poor    Nutrition-related concerns:  -Diagnosed with severe protein calorie malnutrition on 5/3/22.   -Per nursing flow sheet, pt now consuming ~51-75% of most meals and occasionally consumes food from home brought in by family. Prescribed 1.5L fluid restriction in setting of CHF.   -Last BM: (): x 2. Not on apparent bowel regimen at this time  -Antihyperglycemic regimen: Insulin Lispro 4u 3x daily at meals; Lantus 19u at bedtime; Insulin Sliding Scale. A1c 7.4%.  -Continues on Synthroid as prescribed in setting of hypothyroidism.   -Per Podiatrist note , "R foot xray negative for osteo".   -Pt continues on multivitamin as prescribed to aid in prevention of micronutrient deficiencies and aid in integrity of skin.     Weights:   Daily Weight in k.7 (05-10), Weight in k.5 (-), Weight in k.4 (-), Weight in k.3 (), Weight in k.9 ()  -Pt at risk for wt fluctuations/fluid shifts in setting of diuretic therapy.     MEDICATIONS  (STANDING):  allopurinol  amLODIPine   Tablet  dextrose 5%.  dextrose 5%.  dextrose 50% Injectable  dextrose 50% Injectable  dextrose 50% Injectable  furosemide    Tablet  glucagon  Injectable  insulin glargine Injectable (LANTUS)  insulin lispro (ADMELOG) corrective regimen sliding scale  insulin lispro (ADMELOG) corrective regimen sliding scale  insulin lispro Injectable (ADMELOG)  levothyroxine  levothyroxine  metoprolol succinate ER  multivitamin  pantoprazole    Tablet  simvastatin    Pertinent Labs:  @ 06:21: Na 135, BUN 71<H>, Cr 1.48<H>, <H>, K+ 4.7, Phos 3.1, Mg 2.2, Alk Phos --, ALT/SGPT --, AST/SGOT --, HbA1c --   @ 08:36: Na 138, BUN 66<H>, Cr 1.50<H>, <H>, K+ 4.2, Phos 3.0, Mg 2.2, Alk Phos --, ALT/SGPT --, AST/SGOT --, HbA1c --    A1C with Estimated Average Glucose Result: 7.4 % (22 @ 09:15)  A1C with Estimated Average Glucose Result: 7.3 % (21 @ 10:22)    Finger Sticks:  POCT Blood Glucose.: 203 mg/dL ( @ 21:45)  POCT Blood Glucose.: 101 mg/dL ( @ 17:19)  POCT Blood Glucose.: 104 mg/dL ( @ 11:55)  POCT Blood Glucose.: 124 mg/dL ( @ 08:10)    Skin per nursing documentation: right heel diabetic ulcer; left knee scab  Edema: 3+ left leg; right leg    based on IBW 121lbs/54.8kg  Estimated Energy Needs: (25-30kcal/kg): 1370-1644kcal  Estimated Protein Needs: (1.2-1.4g protein/kg): 66-77g protein   Estimated Fluid Needs: defer to team    Previous Nutrition Diagnosis: acute severe protein calorie malnutrition; increased nutrient needs  Nutrition Diagnosis is: [x] ongoing  [] resolved [] not applicable     Nutrition Care Plan:  [x] In Progress       Recommendations:      1.     Monitoring and Evaluation:   Continue to monitor nutritional intake, tolerance to diet prescription, weights, labs, skin integrity    RD remains available upon request and will follow up per protocol Nutrition Follow Up Note  Patient seen for: Nutrition Follow Up    Chart reviewed, events noted. Pt is a 89 yo F with PMH: HFpEF, A.Fib, HTN, DM2, CKD3, hypothyroidism, AS s/p TAVR, s/p PPM. Admitted for hypoxia of 88% on RA. Admitted for CHF exacerbation. Per MD note , CHF with moderate pleural effusions improving. Continues on diuretics as prescribed daily. Noted with MARICARMEN superimposed on CKD, likely 2/2 cardiorenal syndrome and Cr improving.     Source: [x] Patient       [x] EMR        [x] RN        [] Family at bedside       [x] Other: interdisciplinary medical team    Diet Order:   Diet, Regular:   Consistent Carbohydrate {No Snacks} (CSTCHO)  DASH/TLC {Sodium & Cholesterol Restricted} (DASH)  1500mL Fluid Restriction ([id number]) (22)    PO intake :   [] >75%  Adequate    [] 50-75%  Fair       [] <50%  Poor    Nutrition-related concerns:  -Diagnosed with severe protein calorie malnutrition on 5/3/22.   -Per nursing flow sheet, pt now consuming ~51-75% of most meals and occasionally consumes food from home brought in by family. Prescribed 1.5L fluid restriction in setting of CHF. Observed pt at breakfast meal. Consumed 1 hard boiled egg, half mini bagel, coffee. States "I like things that taste good", and acknowledged ? change in taste buds throughout the years. Endorsed enjoyment of fruit, eggs, milk. Pt educated on daily menus and dining services available to place food preferences.   -Last BM: (): x 2. Not on apparent bowel regimen at this time. Pt reports she has been moving bowels. Denies N/VC/diarrhea.   -Antihyperglycemic regimen: Insulin Lispro 4u 3x daily at meals; Lantus 19u at bedtime; Insulin Sliding Scale. A1c 7.4%.  -Continues on Synthroid as prescribed in setting of hypothyroidism.   -Per Podiatrist note , "R foot xray negative for osteo".   -Pt continues on multivitamin as prescribed to aid in prevention of micronutrient deficiencies and aid in integrity of skin.     Weights:   Daily Weight in k.7 (05-10), Weight in k.5 (-), Weight in k.4 (), Weight in k.3 (), Weight in k.9 ()  -Pt at risk for wt fluctuations/fluid shifts in setting of diuretic therapy.     MEDICATIONS  (STANDING):  allopurinol  amLODIPine   Tablet  dextrose 5%.  dextrose 5%.  dextrose 50% Injectable  dextrose 50% Injectable  dextrose 50% Injectable  furosemide    Tablet  glucagon  Injectable  insulin glargine Injectable (LANTUS)  insulin lispro (ADMELOG) corrective regimen sliding scale  insulin lispro (ADMELOG) corrective regimen sliding scale  insulin lispro Injectable (ADMELOG)  levothyroxine  levothyroxine  metoprolol succinate ER  multivitamin  pantoprazole    Tablet  simvastatin    Pertinent Labs:  @ 06:21: Na 135, BUN 71<H>, Cr 1.48<H>, <H>, K+ 4.7, Phos 3.1, Mg 2.2, Alk Phos --, ALT/SGPT --, AST/SGOT --, HbA1c --   @ 08:36: Na 138, BUN 66<H>, Cr 1.50<H>, <H>, K+ 4.2, Phos 3.0, Mg 2.2, Alk Phos --, ALT/SGPT --, AST/SGOT --, HbA1c --    A1C with Estimated Average Glucose Result: 7.4 % (22 @ 09:15)  A1C with Estimated Average Glucose Result: 7.3 % (21 @ 10:22)    Finger Sticks:  POCT Blood Glucose.: 203 mg/dL ( @ 21:45)  POCT Blood Glucose.: 101 mg/dL ( @ 17:19)  POCT Blood Glucose.: 104 mg/dL ( @ 11:55)  POCT Blood Glucose.: 124 mg/dL ( @ 08:10)    Skin per nursing documentation: right heel diabetic ulcer; left knee scab  Edema: 3+ left leg; right leg    based on IBW 121lbs/54.8kg  Estimated Energy Needs: (25-30kcal/kg): 1370-1644kcal  Estimated Protein Needs: (1.2-1.4g protein/kg): 66-77g protein   Estimated Fluid Needs: defer to team    Previous Nutrition Diagnosis: acute severe protein calorie malnutrition; increased nutrient needs  Nutrition Diagnosis is: [x] ongoing  [] resolved [] not applicable     Nutrition Care Plan:  [x] In Progress       Recommendations:      1. Recommend to consider liberalizing diet to consistent carbohydrate, low sodium (D/C DASH/TLC to allow greater availability of food options and promote nutrient/energy intake). Defer consistency to medical team, SLP.   2. Monitor and encourage PO intake. Encourage use of daily menus. Honor dietary preferences as expressed as able.   3. Monitor wt trends/labs/skin integrity/hydration status/bowel regularity.   4. Continue multivitamin as prescribed.     Monitoring and Evaluation:   Continue to monitor nutritional intake, tolerance to diet prescription, weights, labs, skin integrity    RD remains available upon request and will follow up per protocol    Alison Kleiner, RD, [facility] Pager # [phone] Nutrition Follow Up Note  Patient seen for: Nutrition Follow Up    Chart reviewed, events noted. Pt is a 89 yo F with PMH: HFpEF, A.Fib, HTN, DM2, CKD3, hypothyroidism, AS s/p TAVR, s/p PPM. Admitted for hypoxia of 88% on RA. Admitted for CHF exacerbation. Per MD note , CHF with moderate pleural effusions improving. Continues on diuretics as prescribed daily. Noted with MARICARMEN superimposed on CKD, likely 2/2 cardiorenal syndrome and Cr improving.     Source: [x] Patient       [x] EMR        [x] RN        [] Family at bedside       [x] Other: interdisciplinary medical team    Diet Order:   Diet, Regular:   Consistent Carbohydrate {No Snacks} (CSTCHO)  DASH/TLC {Sodium & Cholesterol Restricted} (DASH)  1500mL Fluid Restriction ([id number]) (22)    PO intake :   [] >75%  Adequate    [] 50-75%  Fair       [] <50%  Poor    Nutrition-related concerns:  -Diagnosed with severe protein calorie malnutrition on 5/3/22.   -Per nursing flow sheet, pt now consuming ~51-75% of most meals and occasionally consumes food from home brought in by family. Prescribed 1.5L fluid restriction in setting of CHF. Observed pt at breakfast meal. Consumed 1 hard boiled egg, half mini bagel, coffee. States "I like things that taste good", and acknowledged ? change in taste buds throughout the years. Endorsed enjoyment of fruit, eggs, milk. Pt educated on daily menus and dining services available to place food preferences.   -Last BM: (): x 2. Not on apparent bowel regimen at this time. Pt reports she has been moving bowels. Denies N/VC/diarrhea.   -Antihyperglycemic regimen: Insulin Lispro 4u 3x daily at meals; Lantus 19u at bedtime; Insulin Sliding Scale. A1c 7.4%.  -Continues on Synthroid as prescribed in setting of hypothyroidism.   -Per Podiatrist note , "R foot xray negative for osteo".   -Pt continues on multivitamin as prescribed to aid in prevention of micronutrient deficiencies and aid in integrity of skin.     Weights:   Daily Weight in k.7 (05-10), Weight in k.5 (-), Weight in k.4 (), Weight in k.3 (), Weight in k.9 ()  -Pt at risk for wt fluctuations/fluid shifts in setting of diuretic therapy.     MEDICATIONS  (STANDING):  allopurinol  amLODIPine   Tablet  dextrose 5%.  dextrose 5%.  dextrose 50% Injectable  dextrose 50% Injectable  dextrose 50% Injectable  furosemide    Tablet  glucagon  Injectable  insulin glargine Injectable (LANTUS)  insulin lispro (ADMELOG) corrective regimen sliding scale  insulin lispro (ADMELOG) corrective regimen sliding scale  insulin lispro Injectable (ADMELOG)  levothyroxine  levothyroxine  metoprolol succinate ER  multivitamin  pantoprazole    Tablet  simvastatin    Pertinent Labs:  @ 06:21: Na 135, BUN 71<H>, Cr 1.48<H>, <H>, K+ 4.7, Phos 3.1, Mg 2.2, Alk Phos --, ALT/SGPT --, AST/SGOT --, HbA1c --   @ 08:36: Na 138, BUN 66<H>, Cr 1.50<H>, <H>, K+ 4.2, Phos 3.0, Mg 2.2, Alk Phos --, ALT/SGPT --, AST/SGOT --, HbA1c --    A1C with Estimated Average Glucose Result: 7.4 % (22 @ 09:15)  A1C with Estimated Average Glucose Result: 7.3 % (21 @ 10:22)    Finger Sticks:  POCT Blood Glucose.: 203 mg/dL ( @ 21:45)  POCT Blood Glucose.: 101 mg/dL ( @ 17:19)  POCT Blood Glucose.: 104 mg/dL ( @ 11:55)  POCT Blood Glucose.: 124 mg/dL ( @ 08:10)    Skin per nursing documentation: right heel diabetic ulcer; left knee scab  Edema: 3+ left leg; right leg    based on IBW 121lbs/54.8kg  Estimated Energy Needs: (25-30kcal/kg): 1370-1644kcal  Estimated Protein Needs: (1.2-1.4g protein/kg): 66-77g protein   Estimated Fluid Needs: defer to team    Previous Nutrition Diagnosis: acute severe protein calorie malnutrition; increased nutrient needs  Nutrition Diagnosis is: [x] ongoing  [] resolved [] not applicable     Nutrition Care Plan:  [x] In Progress       Recommendations:      1. Recommend to consider liberalizing diet to consistent carbohydrate, low sodium (D/C DASH/TLC to allow greater availability of food options and promote nutrient/energy intake). Defer consistency to medical team, SLP.   2. Monitor and encourage PO intake. Encourage use of daily menus. Honor dietary preferences as expressed as able.   3. Monitor wt trends/labs/skin integrity/hydration status/bowel regularity.   4. Continue multivitamin as prescribed.   5. RD to add diet Mighty Shakes 2x daily.     Monitoring and Evaluation:   Continue to monitor nutritional intake, tolerance to diet prescription, weights, labs, skin integrity    RD remains available upon request and will follow up per protocol    Alison Kleiner, RD, [facility] Pager # [phone]

## 2022-05-13 NOTE — PROGRESS NOTE ADULT - PROBLEM SELECTOR PLAN 6
- Patient on home Lantus 20U qhs and ISS TID premeal   - Continue Lantus 19U qhs and low ISS TID premeal with fingersticks. Added Admelog 4U premeal TID   - A1c 7.4 in 03/2022

## 2022-05-13 NOTE — PROGRESS NOTE ADULT - PROBLEM SELECTOR PLAN 1
- Likely 2/2 CHF with moderate pleural effusions - improving  - pro-BNP >8000  - CT Chest on 4/29 shows moderate right and small left pleural effusions with pulmonary edema in the setting of cardiomegaly. Partial lower lobe compressive atelectasis; superimposed consolidation is difficult to exclude on this noncontrast CT.  - S/p ceftriaxone and azithromycin in ED. Monitor off abx.     - Lasix as below  - Currently saturating well on RA

## 2022-05-13 NOTE — PROGRESS NOTE ADULT - NUTRITIONAL ASSESSMENT
This patient has been assessed with a concern for Malnutrition and has been determined to have a diagnosis/diagnoses of Severe protein-calorie malnutrition.    This patient is being managed with:   Diet Regular-  Consistent Carbohydrate {No Snacks} (CSTCHO)  DASH/TLC {Sodium & Cholesterol Restricted} (DASH)  1500mL Fluid Restriction ([id number])  Entered: May  4 2022  7:31AM

## 2022-05-13 NOTE — PROGRESS NOTE ADULT - SUBJECTIVE AND OBJECTIVE BOX
Ez Pulliam, PGY1    DATE OF SERVICE: 05-13-22 @ 06:59    Patient is a 89y old  Female who presents with a chief complaint of SOB (12 May 2022 11:33)      SUBJECTIVE / OVERNIGHT EVENTS: No acute events overnight. Patient ambulated with PT yesterday with out significant desaturation. Planning DC today after discussion with  yesterday.     MEDICATIONS  (STANDING):  allopurinol 100 milliGRAM(s) Oral daily  amLODIPine   Tablet 5 milliGRAM(s) Oral daily  apixaban 2.5 milliGRAM(s) Oral two times a day  dextrose 5%. 1000 milliLiter(s) (100 mL/Hr) IV Continuous <Continuous>  dextrose 5%. 1000 milliLiter(s) (50 mL/Hr) IV Continuous <Continuous>  dextrose 50% Injectable 25 Gram(s) IV Push once  dextrose 50% Injectable 12.5 Gram(s) IV Push once  dextrose 50% Injectable 25 Gram(s) IV Push once  furosemide    Tablet 40 milliGRAM(s) Oral two times a day  glucagon  Injectable 1 milliGRAM(s) IntraMuscular once  insulin glargine Injectable (LANTUS) 19 Unit(s) SubCutaneous at bedtime  insulin lispro (ADMELOG) corrective regimen sliding scale   SubCutaneous three times a day before meals  insulin lispro (ADMELOG) corrective regimen sliding scale   SubCutaneous at bedtime  insulin lispro Injectable (ADMELOG) 4 Unit(s) SubCutaneous three times a day before meals  levothyroxine 50 MICROGram(s) Oral <User Schedule>  levothyroxine 100 MICROGram(s) Oral <User Schedule>  metoprolol succinate  milliGRAM(s) Oral daily  multivitamin 1 Tablet(s) Oral daily  pantoprazole    Tablet 40 milliGRAM(s) Oral before breakfast  simvastatin 20 milliGRAM(s) Oral at bedtime    MEDICATIONS  (PRN):  acetaminophen     Tablet .. 650 milliGRAM(s) Oral every 6 hours PRN Temp greater or equal to 38C (100.4F), Mild Pain (1 - 3)  dextrose Oral Gel 15 Gram(s) Oral once PRN Blood Glucose LESS THAN 70 milliGRAM(s)/deciliter      Vital Signs Last 24 Hrs  T(C): 36.3 (13 May 2022 04:56), Max: 36.6 (12 May 2022 13:26)  T(F): 97.4 (13 May 2022 04:56), Max: 97.9 (12 May 2022 13:26)  HR: 60 (13 May 2022 04:56) (60 - 64)  BP: 126/59 (13 May 2022 04:56) (126/59 - 142/70)  BP(mean): --  RR: 18 (13 May 2022 04:56) (17 - 19)  SpO2: 97% (13 May 2022 04:56) (90% - 100%)  CAPILLARY BLOOD GLUCOSE      POCT Blood Glucose.: 159 mg/dL (12 May 2022 22:29)  POCT Blood Glucose.: 128 mg/dL (12 May 2022 17:25)  POCT Blood Glucose.: 208 mg/dL (12 May 2022 12:01)  POCT Blood Glucose.: 186 mg/dL (12 May 2022 08:00)    I&O's Summary    11 May 2022 07:01  -  12 May 2022 07:00  --------------------------------------------------------  IN: 780 mL / OUT: 1075 mL / NET: -295 mL    12 May 2022 07:01  -  13 May 2022 06:59  --------------------------------------------------------  IN: 1080 mL / OUT: 500 mL / NET: 580 mL        PHYSICAL EXAM:  GENERAL: NAD, elderly female  HEAD:  Atraumatic, Normocephalic  EYES: EOMI, PERRLA, conjunctiva and sclera clear  ENMT: Moist mucous membranes  NECK: Supple, no JVD appreciated  CHEST/LUNG: Adequate inspiratory effort. few crackles bilaterally. Breathing comfortably On room air.   HEART: Regular rate and rhythm; No murmurs, rubs, or gallops.   ABDOMEN: Soft, Nontender, Nondistended; Bowel sounds present  EXTREMITIES:  2+ Peripheral Pulses. 1+ pitting b/l LE edema with chronic skin changes in LLE.   NERVOUS SYSTEM:  Alert & Oriented X2-3  PSYCH: Normal Affect. Laying in bed comfortably; not agitated    LABS:                        8.6    5.44  )-----------( 329      ( 12 May 2022 06:21 )             30.3     05-12    135  |  98  |  71<H>  ----------------------------<  211<H>  4.7   |  29  |  1.48<H>    Ca    11.0<H>      12 May 2022 06:21  Phos  3.1     05-12  Mg     2.2     05-12                RADIOLOGY & ADDITIONAL TESTS:    Imaging Personally Reviewed:    Consultant(s) Notes Reviewed:      Care Discussed with Consultants/Other Providers:   Ez Pulliam, PGY1    DATE OF SERVICE: 05-13-22 @ 06:59    Patient is a 89y old  Female who presents with a chief complaint of SOB (12 May 2022 11:33)      SUBJECTIVE / OVERNIGHT EVENTS: No acute events overnight. Patient ambulated with PT yesterday with out significant desaturation. Planning DC today after discussion with  yesterday. Patient denies chest pain, dyspnea, abd pain, n/v. Wants to talk with  prior to discharge.     MEDICATIONS  (STANDING):  allopurinol 100 milliGRAM(s) Oral daily  amLODIPine   Tablet 5 milliGRAM(s) Oral daily  apixaban 2.5 milliGRAM(s) Oral two times a day  dextrose 5%. 1000 milliLiter(s) (100 mL/Hr) IV Continuous <Continuous>  dextrose 5%. 1000 milliLiter(s) (50 mL/Hr) IV Continuous <Continuous>  dextrose 50% Injectable 25 Gram(s) IV Push once  dextrose 50% Injectable 12.5 Gram(s) IV Push once  dextrose 50% Injectable 25 Gram(s) IV Push once  furosemide    Tablet 40 milliGRAM(s) Oral two times a day  glucagon  Injectable 1 milliGRAM(s) IntraMuscular once  insulin glargine Injectable (LANTUS) 19 Unit(s) SubCutaneous at bedtime  insulin lispro (ADMELOG) corrective regimen sliding scale   SubCutaneous three times a day before meals  insulin lispro (ADMELOG) corrective regimen sliding scale   SubCutaneous at bedtime  insulin lispro Injectable (ADMELOG) 4 Unit(s) SubCutaneous three times a day before meals  levothyroxine 50 MICROGram(s) Oral <User Schedule>  levothyroxine 100 MICROGram(s) Oral <User Schedule>  metoprolol succinate  milliGRAM(s) Oral daily  multivitamin 1 Tablet(s) Oral daily  pantoprazole    Tablet 40 milliGRAM(s) Oral before breakfast  simvastatin 20 milliGRAM(s) Oral at bedtime    MEDICATIONS  (PRN):  acetaminophen     Tablet .. 650 milliGRAM(s) Oral every 6 hours PRN Temp greater or equal to 38C (100.4F), Mild Pain (1 - 3)  dextrose Oral Gel 15 Gram(s) Oral once PRN Blood Glucose LESS THAN 70 milliGRAM(s)/deciliter      Vital Signs Last 24 Hrs  T(C): 36.3 (13 May 2022 04:56), Max: 36.6 (12 May 2022 13:26)  T(F): 97.4 (13 May 2022 04:56), Max: 97.9 (12 May 2022 13:26)  HR: 60 (13 May 2022 04:56) (60 - 64)  BP: 126/59 (13 May 2022 04:56) (126/59 - 142/70)  BP(mean): --  RR: 18 (13 May 2022 04:56) (17 - 19)  SpO2: 97% (13 May 2022 04:56) (90% - 100%)  CAPILLARY BLOOD GLUCOSE      POCT Blood Glucose.: 159 mg/dL (12 May 2022 22:29)  POCT Blood Glucose.: 128 mg/dL (12 May 2022 17:25)  POCT Blood Glucose.: 208 mg/dL (12 May 2022 12:01)  POCT Blood Glucose.: 186 mg/dL (12 May 2022 08:00)    I&O's Summary    11 May 2022 07:01  -  12 May 2022 07:00  --------------------------------------------------------  IN: 780 mL / OUT: 1075 mL / NET: -295 mL    12 May 2022 07:01  -  13 May 2022 06:59  --------------------------------------------------------  IN: 1080 mL / OUT: 500 mL / NET: 580 mL        PHYSICAL EXAM:  GENERAL: NAD, elderly female  HEAD:  Atraumatic, Normocephalic  EYES: EOMI, PERRLA, conjunctiva and sclera clear  ENMT: Moist mucous membranes  NECK: Supple, no JVD appreciated  CHEST/LUNG: Adequate inspiratory effort. no crackles appreciated bilaterally. Breathing comfortably On room air.   HEART: Regular rate and rhythm; No murmurs, rubs, or gallops.   ABDOMEN: Soft, Nontender, Nondistended; Bowel sounds present  EXTREMITIES:  2+ Peripheral Pulses. 1+ pitting b/l LE edema with chronic skin changes in LLE.   NERVOUS SYSTEM:  Alert & Oriented X2-3  PSYCH: Normal Affect. Laying in bed comfortably; not agitated    LABS:                        8.6    5.44  )-----------( 329      ( 12 May 2022 06:21 )             30.3     05-12    135  |  98  |  71<H>  ----------------------------<  211<H>  4.7   |  29  |  1.48<H>    Ca    11.0<H>      12 May 2022 06:21  Phos  3.1     05-12  Mg     2.2     05-12                RADIOLOGY & ADDITIONAL TESTS:    Imaging Personally Reviewed:    Consultant(s) Notes Reviewed:      Care Discussed with Consultants/Other Providers:

## 2022-05-13 NOTE — PROGRESS NOTE ADULT - PROBLEM SELECTOR PLAN 10
Home meds: Continue home Allopurinol 100mg daily, Simvastatin 20mg qhs, Protonix 40mg daily   DVT ppx: Eliquis   Diet: DASH/Renal   Dispo: PT recs home with home PT; patient has home care and prefers no JEREMIAS  Communication: called daughter 5/12 with no answer; called  5/12 1:25PM- comfortable with patient going home tomorrow, has care set up  DNR/DNI

## 2022-05-13 NOTE — PROGRESS NOTE ADULT - ASSESSMENT
89yo F with PMH on HFpEF (EF 75% 03/2022), Afib (on Eliquis), HTN, DM2, CKD3, hypothyroidism, AS s/p TAVR (09/2021), s/p PPM BIBEMS for hypoxia of 88% on RA. Admitted for CHF exacerbation. Gradually improving. Pending DC to home with home care 5/13.

## 2022-05-13 NOTE — PROGRESS NOTE ADULT - PROBLEM SELECTOR PLAN 2
- HFpEF (EF 75% in 03/2022), AS s/p TAVR (09/2021)  - Patient initially SOB, RHODES, LE edema, +JVD likely in CHF exacerbation   - Continue home Toprol XL 100mg daily   - Furosemide 40 PO BID, close outpatient follow up with PCP for repeat BMP  - Strict I/Os, daily weights  - Appreciate cards recs  - TTE shows EF 65% with mold pulm HTN and mod TR (no significant change from prior)

## 2022-05-13 NOTE — PROGRESS NOTE ADULT - ATTENDING COMMENTS
89yo F w HFpEF, Afib (on Eliquis), HTN, DM2, CKD3, AS s/p TAVR (09/2021) aw hypoxia, acute diastolic CHF. Improved on IV Lasix, now off oxygen. Transitioned to po Lasix. Still dyspneic at rest, monitor over the weekend.

## 2022-05-13 NOTE — PROGRESS NOTE ADULT - SUBJECTIVE AND OBJECTIVE BOX
DATE OF SERVICE: 05-13-22 @ 09:46    Patient is a 89y old  Female who presents with a chief complaint of SOB (13 May 2022 06:58)      INTERVAL HISTORY: Feels ok.     REVIEW OF SYSTEMS:  CONSTITUTIONAL: No weakness  EYES/ENT: No visual changes;  No throat pain   NECK: No pain or stiffness  RESPIRATORY: +SOB  CARDIOVASCULAR: No chest pain or palpitations  GASTROINTESTINAL: No abdominal  pain. No nausea, vomiting, or hematemesis  GENITOURINARY: No dysuria, frequency or hematuria  NEUROLOGICAL: No stroke like symptoms  SKIN: No rashes    TELEMETRY Personally reviewed: V-Paced 59-60  	  MEDICATIONS:  amLODIPine   Tablet 5 milliGRAM(s) Oral daily  furosemide    Tablet 40 milliGRAM(s) Oral two times a day  metoprolol succinate  milliGRAM(s) Oral daily        PHYSICAL EXAM:  T(C): 36.3 (05-13-22 @ 04:56), Max: 36.6 (05-12-22 @ 13:26)  HR: 60 (05-13-22 @ 04:56) (60 - 64)  BP: 126/59 (05-13-22 @ 04:56) (126/59 - 142/70)  RR: 18 (05-13-22 @ 04:56) (17 - 19)  SpO2: 97% (05-13-22 @ 04:56) (90% - 100%)  Wt(kg): --  I&O's Summary    12 May 2022 07:01  -  13 May 2022 07:00  --------------------------------------------------------  IN: 1080 mL / OUT: 500 mL / NET: 580 mL          Appearance: In no distress	  HEENT:    PERRL, EOMI	  Cardiovascular:  S1 S2, No JVD  Respiratory: LLL crackles  Gastrointestinal:  Soft, Non-tender, + BS	  Vascularature:  dependent edema  Psychiatric: Appropriate affect   Neuro: no acute focal deficits                               8.6    5.44  )-----------( 329      ( 12 May 2022 06:21 )             30.3     05-12    135  |  98  |  71<H>  ----------------------------<  211<H>  4.7   |  29  |  1.48<H>    Ca    11.0<H>      12 May 2022 06:21  Phos  3.1     05-12  Mg     2.2     05-12          Labs personally reviewed      ASSESSMENT/PLAN: 	    Ms. Briones is a 89 yo female with PMH of HTN, HLD, CAD s/p CABG, AS s/p TAVR in 9/2021 at [facility], DM, CKD, AF on Eliquis, PPM who presents with 3 days of progressive shortness of breath, + orthopnea, increased LE edema and decreased oxygen saturation on pulse oximeter at home.    Problem/Plan -1  Problem: HFpEF  - Followed by Dr. Agapito Woodard for outpatient cardiology  - CT Chest reveals moderate right and small left pleural effusions, cardiomegaly, possibly superimposed consolidation  - On lasix 40mg PO daily at home  - BMP reviewed from OP cardiologist records on 12/3/21 it was 1.1, and 5 months ago it was 1.2-1.4, here 2 months ago it was 1.4-1.6  - ECHO Left Ventricle: Normal left ventricular systolic function. No segmental wall motion abnormalities.  The LVEF= 60-65%. Moderate mitral regurgitation, There is a transcatheter aortic valve replacement seen. Moderate pulmonary hypertension.  - 5/6 Creatinine downtrending.  - Patient appears close to euvolemia, but not euvolemic yet, although much improved since admission, c/w IV Lasix for now, will closely monitor.  - 5/3 POCUS reveals Right pleural effusion  - BNP only slighty down (8208 --> 7983)  - Weight (bed not standing) slightly increased since admission despite IV diuresis.  - CXR shows much improved pulm vasc congestion  - Switched Lasix PO, rec Lasix 40mg PO BID with close OP follow up with PCP for repeat BMP within a week  - 5/13 presents with slight work of breathing with minimal exertion, dependent edema and fine crackles on exam, oxygen 94% on RA. Continue Lasix 40mg PO BID.   - Pt does not yet appear euvolemic. Would cont BID diuresis while inpatient for 24-48 hours.     Problem/Plan -2  Problem: Aortic Stenosis  - s/p TAVR in September 2021  - minimal murmur on ausculation  - aortic valve is well seated and has good flow  - moderate tricuspid regurg    Problem/Plan -3  Problem: CAD  - Hx CABG at [facility]  - currently denies chest pain  - EKG without ischemic changes  - Troponin elevated likely i/s/o ADHF exacerbation; peaked 4/29 at 124  - c/w simvastatin 20mg PO daily    Problem/Plan -4  Problem: AF  - rate controlled  - c/w Metoprolol and Eliquis  - monitor on tele    Problem/Plan -5  Problem: HLD  - c/w simvastatin     Problem/Plan -6  Problem: DM  - Most recent HgbA1C is 7.6 on 3.4.22  - ISS           Ira Reynolds, HERBER-NP   Giles Gan DO [facility]  Cardiovascular Medicine  [address] [place] [address], [address]  Office: [phone]  Cell: [phone] DATE OF SERVICE: 05-13-22 @ 09:46    Patient is a 89y old  Female who presents with a chief complaint of SOB (13 May 2022 06:58)      INTERVAL HISTORY: Feels ok.     REVIEW OF SYSTEMS:  CONSTITUTIONAL: No weakness  EYES/ENT: No visual changes;  No throat pain   NECK: No pain or stiffness  RESPIRATORY: +SOB  CARDIOVASCULAR: No chest pain or palpitations  GASTROINTESTINAL: No abdominal  pain. No nausea, vomiting, or hematemesis  GENITOURINARY: No dysuria, frequency or hematuria  NEUROLOGICAL: No stroke like symptoms  SKIN: No rashes    TELEMETRY Personally reviewed: V-Paced 59-60  	  MEDICATIONS:  amLODIPine   Tablet 5 milliGRAM(s) Oral daily  furosemide    Tablet 40 milliGRAM(s) Oral two times a day  metoprolol succinate  milliGRAM(s) Oral daily        PHYSICAL EXAM:  T(C): 36.3 (05-13-22 @ 04:56), Max: 36.6 (05-12-22 @ 13:26)  HR: 60 (05-13-22 @ 04:56) (60 - 64)  BP: 126/59 (05-13-22 @ 04:56) (126/59 - 142/70)  RR: 18 (05-13-22 @ 04:56) (17 - 19)  SpO2: 97% (05-13-22 @ 04:56) (90% - 100%)  Wt(kg): --  I&O's Summary    12 May 2022 07:01  -  13 May 2022 07:00  --------------------------------------------------------  IN: 1080 mL / OUT: 500 mL / NET: 580 mL          Appearance: In no distress	  HEENT:    PERRL, EOMI	  Cardiovascular:  S1 S2, No JVD  Respiratory: LLL crackles  Gastrointestinal:  Soft, Non-tender, + BS	  Vascularature:  dependent edema  Psychiatric: Appropriate affect   Neuro: no acute focal deficits                               8.6    5.44  )-----------( 329      ( 12 May 2022 06:21 )             30.3     05-12    135  |  98  |  71<H>  ----------------------------<  211<H>  4.7   |  29  |  1.48<H>    Ca    11.0<H>      12 May 2022 06:21  Phos  3.1     05-12  Mg     2.2     05-12          Labs personally reviewed      ASSESSMENT/PLAN: 	    Ms. Briones is a 89 yo female with PMH of HTN, HLD, CAD s/p CABG, AS s/p TAVR in 9/2021 at [facility], DM, CKD, AF on Eliquis, PPM who presents with 3 days of progressive shortness of breath, + orthopnea, increased LE edema and decreased oxygen saturation on pulse oximeter at home.    Problem/Plan -1  Problem: HFpEF  - Followed by Dr. Agapito Woodard for outpatient cardiology  - CT Chest reveals moderate right and small left pleural effusions, cardiomegaly, possibly superimposed consolidation  - On lasix 40mg PO daily at home  - BMP reviewed from OP cardiologist records on 12/3/21 it was 1.1, and 5 months ago it was 1.2-1.4, here 2 months ago it was 1.4-1.6  - ECHO Left Ventricle: Normal left ventricular systolic function. No segmental wall motion abnormalities.  The LVEF= 60-65%. Moderate mitral regurgitation, There is a transcatheter aortic valve replacement seen. Moderate pulmonary hypertension.  - 5/6 Creatinine downtrending.  - Patient appears close to euvolemia, but not euvolemic yet, although much improved since admission, c/w IV Lasix for now, will closely monitor.  - 5/3 POCUS reveals Right pleural effusion  - BNP only slighty down (8208 --> 7983)  - Weight (bed not standing) slightly increased since admission despite IV diuresis.  - CXR shows much improved pulm vasc congestion  - Switched Lasix PO, rec Lasix 40mg PO BID with close OP follow up with PCP for repeat BMP within a week  - 5/13 presents with slight work of breathing with minimal exertion, dependent edema and fine crackles on exam, oxygen 94% on RA. Continue Lasix 40mg PO BID.   - Pt does not yet appear euvolemic. Would cont BID diuresis for 24-48 hours.     Problem/Plan -2  Problem: Aortic Stenosis  - s/p TAVR in September 2021  - minimal murmur on ausculation  - aortic valve is well seated and has good flow  - moderate tricuspid regurg    Problem/Plan -3  Problem: CAD  - Hx CABG at [facility]  - currently denies chest pain  - EKG without ischemic changes  - Troponin elevated likely i/s/o ADHF exacerbation; peaked 4/29 at 124  - c/w simvastatin 20mg PO daily    Problem/Plan -4  Problem: AF  - rate controlled  - c/w Metoprolol and Eliquis  - monitor on tele    Problem/Plan -5  Problem: HLD  - c/w simvastatin     Problem/Plan -6  Problem: DM  - Most recent HgbA1C is 7.6 on 3.4.22  - ISS           Ira Reynolds, HERBER-NP   Giles Gan DO [facility]  Cardiovascular Medicine  [address] [place] [address], [address]  Office: [phone]  Cell: [phone] DATE OF SERVICE: 05-13-22 @ 09:46    Patient is a 89y old  Female who presents with a chief complaint of SOB (13 May 2022 06:58)      INTERVAL HISTORY: Feels ok.     REVIEW OF SYSTEMS:  CONSTITUTIONAL: No weakness  EYES/ENT: No visual changes;  No throat pain   NECK: No pain or stiffness  RESPIRATORY: +SOB  CARDIOVASCULAR: No chest pain or palpitations  GASTROINTESTINAL: No abdominal  pain. No nausea, vomiting, or hematemesis  GENITOURINARY: No dysuria, frequency or hematuria  NEUROLOGICAL: No stroke like symptoms  SKIN: No rashes    TELEMETRY Personally reviewed: V-Paced 59-60  	  MEDICATIONS:  amLODIPine   Tablet 5 milliGRAM(s) Oral daily  furosemide    Tablet 40 milliGRAM(s) Oral two times a day  metoprolol succinate  milliGRAM(s) Oral daily        PHYSICAL EXAM:  T(C): 36.3 (05-13-22 @ 04:56), Max: 36.6 (05-12-22 @ 13:26)  HR: 60 (05-13-22 @ 04:56) (60 - 64)  BP: 126/59 (05-13-22 @ 04:56) (126/59 - 142/70)  RR: 18 (05-13-22 @ 04:56) (17 - 19)  SpO2: 97% (05-13-22 @ 04:56) (90% - 100%)  Wt(kg): --  I&O's Summary    12 May 2022 07:01  -  13 May 2022 07:00  --------------------------------------------------------  IN: 1080 mL / OUT: 500 mL / NET: 580 mL          Appearance: In no distress	  HEENT:    PERRL, EOMI	  Cardiovascular:  S1 S2, No JVD  Respiratory: LLL crackles  Gastrointestinal:  Soft, Non-tender, + BS	  Vascularature:  dependent edema  Psychiatric: Appropriate affect   Neuro: no acute focal deficits                               8.6    5.44  )-----------( 329      ( 12 May 2022 06:21 )             30.3     05-12    135  |  98  |  71<H>  ----------------------------<  211<H>  4.7   |  29  |  1.48<H>    Ca    11.0<H>      12 May 2022 06:21  Phos  3.1     05-12  Mg     2.2     05-12          Labs personally reviewed      ASSESSMENT/PLAN: 	    Ms. Briones is a 89 yo female with PMH of HTN, HLD, CAD s/p CABG, AS s/p TAVR in 9/2021 at [facility], DM, CKD, AF on Eliquis, PPM who presents with 3 days of progressive shortness of breath, + orthopnea, increased LE edema and decreased oxygen saturation on pulse oximeter at home.    Problem/Plan -1  Problem: HFpEF  - Followed by Dr. Agapito Woodard for outpatient cardiology  - CT Chest reveals moderate right and small left pleural effusions, cardiomegaly, possibly superimposed consolidation  - On lasix 40mg PO daily at home  - BMP reviewed from OP cardiologist records on 12/3/21 it was 1.1, and 5 months ago it was 1.2-1.4, here 2 months ago it was 1.4-1.6  - ECHO Left Ventricle: Normal left ventricular systolic function. No segmental wall motion abnormalities.  The LVEF= 60-65%. Moderate mitral regurgitation, There is a transcatheter aortic valve replacement seen. Moderate pulmonary hypertension.  - 5/6 Creatinine downtrending.  - Patient appears close to euvolemia, but not euvolemic yet, although much improved since admission, c/w IV Lasix for now, will closely monitor.  - 5/3 POCUS reveals Right pleural effusion  - BNP only slighty down (8208 --> 7983)  - Weight (bed not standing) slightly increased since admission despite IV diuresis.  - CXR shows much improved pulm vasc congestion  - Switched Lasix PO, rec Lasix 40mg PO BID with close OP follow up with PCP for repeat BMP within a week  - 5/13 presents with slight work of breathing with minimal exertion, dependent edema and fine crackles on exam, oxygen 94% on RA. Continue Lasix 40mg PO BID.   - Pt does not yet appear euvolemic but will continue Lasix 40mg PO BID until OP follow up    Problem/Plan -2  Problem: Aortic Stenosis  - s/p TAVR in September 2021  - minimal murmur on ausculation  - aortic valve is well seated and has good flow  - moderate tricuspid regurg    Problem/Plan -3  Problem: CAD  - Hx CABG at [facility]  - currently denies chest pain  - EKG without ischemic changes  - Troponin elevated likely i/s/o ADHF exacerbation; peaked 4/29 at 124  - c/w simvastatin 20mg PO daily    Problem/Plan -4  Problem: AF  - rate controlled  - c/w Metoprolol and Eliquis  - monitor on tele    Problem/Plan -5  Problem: HLD  - c/w simvastatin     Problem/Plan -6  Problem: DM  - Most recent HgbA1C is 7.6 on 3.4.22  - ISS           Ira Reynolds, HERBER-NP   Giles Gan DO [facility]  Cardiovascular Medicine  [address] [place] [address], [address]  Office: [phone]  Cell: [phone]

## 2022-05-14 NOTE — PROGRESS NOTE ADULT - NUTRITIONAL ASSESSMENT
This patient has been assessed with a concern for Malnutrition and has been determined to have a diagnosis/diagnoses of Severe protein-calorie malnutrition.    This patient is being managed with:   Diet Regular-  Consistent Carbohydrate {No Snacks} (CSTCHO)  DASH/TLC {Sodium & Cholesterol Restricted} (DASH)  1500mL Fluid Restriction ([id number])  Entered: May  4 2022  7:31AM

## 2022-05-14 NOTE — PROGRESS NOTE ADULT - SUBJECTIVE AND OBJECTIVE BOX
Patient is a 89y old  Female who presents with a chief complaint of SOB (14 May 2022 08:31)      SUBJECTIVE / OVERNIGHT EVENTS:  patient denies chest pain, shortness of breath, fevers/chills this morning.   patient resting comfortably on room air   Telemetry shows v pacing.     MEDICATIONS  (STANDING):  allopurinol 100 milliGRAM(s) Oral daily  amLODIPine   Tablet 5 milliGRAM(s) Oral daily  apixaban 2.5 milliGRAM(s) Oral two times a day  dextrose 5%. 1000 milliLiter(s) (100 mL/Hr) IV Continuous <Continuous>  dextrose 5%. 1000 milliLiter(s) (50 mL/Hr) IV Continuous <Continuous>  dextrose 50% Injectable 25 Gram(s) IV Push once  dextrose 50% Injectable 12.5 Gram(s) IV Push once  dextrose 50% Injectable 25 Gram(s) IV Push once  furosemide    Tablet 40 milliGRAM(s) Oral two times a day  glucagon  Injectable 1 milliGRAM(s) IntraMuscular once  insulin glargine Injectable (LANTUS) 19 Unit(s) SubCutaneous at bedtime  insulin lispro (ADMELOG) corrective regimen sliding scale   SubCutaneous three times a day before meals  insulin lispro (ADMELOG) corrective regimen sliding scale   SubCutaneous at bedtime  insulin lispro Injectable (ADMELOG) 4 Unit(s) SubCutaneous three times a day before meals  levothyroxine 50 MICROGram(s) Oral <User Schedule>  levothyroxine 100 MICROGram(s) Oral <User Schedule>  metoprolol succinate  milliGRAM(s) Oral daily  multivitamin 1 Tablet(s) Oral daily  pantoprazole    Tablet 40 milliGRAM(s) Oral before breakfast  simvastatin 20 milliGRAM(s) Oral at bedtime    MEDICATIONS  (PRN):  acetaminophen     Tablet .. 650 milliGRAM(s) Oral every 6 hours PRN Temp greater or equal to 38C (100.4F), Mild Pain (1 - 3)  dextrose Oral Gel 15 Gram(s) Oral once PRN Blood Glucose LESS THAN 70 milliGRAM(s)/deciliter      CAPILLARY BLOOD GLUCOSE      POCT Blood Glucose.: 147 mg/dL (14 May 2022 08:09)  POCT Blood Glucose.: 182 mg/dL (13 May 2022 21:36)  POCT Blood Glucose.: 168 mg/dL (13 May 2022 17:00)  POCT Blood Glucose.: 210 mg/dL (13 May 2022 12:29)    I&O's Summary    13 May 2022 07:01  -  14 May 2022 07:00  --------------------------------------------------------  IN: 780 mL / OUT: 1250 mL / NET: -470 mL        PHYSICAL EXAM:  Vital Signs Last 24 Hrs  T(C): 36.3 (05-14-22 @ 04:25)  T(F): 97.3 (05-14-22 @ 04:25), Max: 97.5 (05-13-22 @ 20:30)  HR: 60 (05-14-22 @ 04:25) (58 - 60)  BP: 115/64 (05-14-22 @ 04:25)  BP(mean): --  RR: 18 (05-14-22 @ 04:25) (17 - 18)  SpO2: 94% (05-14-22 @ 04:25) (91% - 97%)  Wt(kg): --    05-13 @ 07:01  -  05-14 @ 07:00  --------------------------------------------------------  IN: 780 mL / OUT: 1250 mL / NET: -470 mL    GENERAL: NAD, elderly female  HEAD:  Atraumatic, Normocephalic  EYES: EOMI, PERRLA, conjunctiva and sclera clear  ENMT: Moist mucous membranes  NECK: Supple, no JVD appreciated  CHEST/LUNG: Adequate inspiratory effort. no crackles appreciated bilaterally. Breathing comfortably On room air.   HEART: Regular rate and rhythm; No murmurs, rubs, or gallops.   ABDOMEN: Soft, Nontender, Nondistended; Bowel sounds present  EXTREMITIES:  2+ Peripheral Pulses. 1+ pitting b/l LE edema with chronic skin changes in LLE.   NERVOUS SYSTEM:  Alert & Oriented X2-3  PSYCH: Normal Affect. Laying in bed comfortably; not agitated        Personally reviewed imaging, labs, EKG  LABS:                        8.0    6.44  )-----------( 275      ( 14 May 2022 06:34 )             28.0     05-14    137  |  99  |  71<H>  ----------------------------<  162<H>  4.4   |  29  |  1.57<H>    Ca    10.6<H>      14 May 2022 06:36  Phos  3.1     05-14  Mg     2.2     05-14                RADIOLOGY & ADDITIONAL TESTS:    Imaging Personally Reviewed:    Consultant(s) Notes Reviewed:  [x] Yes, cardiology     Care Discussed with Consultants/Other Providers:

## 2022-05-14 NOTE — PROGRESS NOTE ADULT - ATTENDING COMMENTS
89F with PMH of HFpEF, Afib (on Eliquis), HTN, DM2, CKD3, AS s/p TAVR (09/2021) who presents with hypoxia 2/2 acute diastolic HF exacerbation. Improved on IV Lasix, now off oxygen. Transitioned to po Lasix.  No acute events. Back on 1L O2.  Cont diuresis, trend Is/Os, daily weights  Rest as documented above    [facility] Division of Hospital Medicine  Amanda Vasquez MD  Pager (M-F, 8A-5P): [phone]  Other Times:  [phone]

## 2022-05-14 NOTE — PROGRESS NOTE ADULT - PROBLEM SELECTOR PLAN 5
- Likely 2/2 cardiorenal syndrome as Cr improving with diuresis   - Kidney bladder ultrasound shows no hydro  -back around baseline Cr

## 2022-05-14 NOTE — PROGRESS NOTE ADULT - PROBLEM SELECTOR PLAN 10
Home meds: Continue home Allopurinol 100mg daily, Simvastatin 20mg qhs, Protonix 40mg daily   DVT ppx: Eliquis   Diet: DASH/Renal   Dispo: PT recs home with home PT; patient has home care and prefers no JEREMIAS. Anticipated discharge is monday   DNR/DNI

## 2022-05-14 NOTE — PROGRESS NOTE ADULT - PROBLEM SELECTOR PLAN 2
- HFpEF (EF 75% in 03/2022), AS s/p TAVR (09/2021)  - Patient initially SOB, RHODES, LE edema, +JVD likely in CHF exacerbation   - Continue home Toprol XL 100mg daily   - Furosemide 40 PO BID, close outpatient follow up with PCP for repeat BMP  - Strict I/Os, daily weights  - Appreciate cards recs : -negative 470cc over past 24 hours   - TTE shows EF 65% with mold pulm HTN and mod TR (no significant change from prior)

## 2022-05-14 NOTE — PROGRESS NOTE ADULT - SUBJECTIVE AND OBJECTIVE BOX
DATE OF SERVICE: 05-14-22 @ 08:31    Patient is a 89y old  Female who presents with a chief complaint of SOB (13 May 2022 09:46)      INTERVAL HISTORY: Feeling better.     REVIEW OF SYSTEMS:  CONSTITUTIONAL: No weakness  EYES/ENT: No visual changes;  No throat pain   NECK: No pain or stiffness  RESPIRATORY: No cough, wheezing; No shortness of breath  CARDIOVASCULAR: No chest pain or palpitations  GASTROINTESTINAL: No abdominal  pain. No nausea, vomiting, or hematemesis  GENITOURINARY: No dysuria, frequency or hematuria  NEUROLOGICAL: No stroke like symptoms  SKIN: No rashes    TELEMETRY Personally reviewed: AV Paved vs V Paced at 60 bpm  	  MEDICATIONS:  amLODIPine   Tablet 5 milliGRAM(s) Oral daily  furosemide    Tablet 40 milliGRAM(s) Oral two times a day  metoprolol succinate  milliGRAM(s) Oral daily      PHYSICAL EXAM:  T(C): 36.3 (05-14-22 @ 04:25), Max: 36.4 (05-13-22 @ 20:30)  HR: 60 (05-14-22 @ 04:25) (58 - 60)  BP: 115/64 (05-14-22 @ 04:25) (115/64 - 133/72)  RR: 18 (05-14-22 @ 04:25) (17 - 18)  SpO2: 94% (05-14-22 @ 04:25) (91% - 97%)  Wt(kg): --  I&O's Summary    13 May 2022 07:01  -  14 May 2022 07:00  --------------------------------------------------------  IN: 780 mL / OUT: 1250 mL / NET: -470 mL          Appearance: In no distress	  HEENT:    PERRL, EOMI	  Cardiovascular:  S1 S2, No JVD  Respiratory: Lungs clear to auscultation	  Gastrointestinal:  Soft, Non-tender, + BS	  Vascularature:  No edema of LE  Psychiatric: Appropriate affect   Neuro: no acute focal deficits                               8.0    6.44  )-----------( 275      ( 14 May 2022 06:34 )             28.0     05-14    137  |  99  |  71<H>  ----------------------------<  162<H>  4.4   |  29  |  1.57<H>    Ca    10.6<H>      14 May 2022 06:36  Phos  3.1     05-14  Mg     2.2     05-14          Labs personally reviewed      ASSESSMENT/PLAN: 	    Ms. Briones is a 89 yo female with PMH of HTN, HLD, CAD s/p CABG, AS s/p TAVR in 9/2021 at [facility], DM, CKD, AF on Eliquis, PPM who presents with 3 days of progressive shortness of breath, + orthopnea, increased LE edema and decreased oxygen saturation on pulse oximeter at home.    Problem/Plan -1  Problem: HFpEF  - Followed by Dr. Agapito Woodard for outpatient cardiology  - CT Chest reveals moderate right and small left pleural effusions, cardiomegaly, possibly superimposed consolidation  - On lasix 40mg PO daily at home  - BMP reviewed from OP cardiologist records on 12/3/21 it was 1.1, and 5 months ago it was 1.2-1.4, here 2 months ago it was 1.4-1.6  - ECHO Left Ventricle: Normal left ventricular systolic function. No segmental wall motion abnormalities.  The LVEF= 60-65%. Moderate mitral regurgitation, There is a transcatheter aortic valve replacement seen. Moderate pulmonary hypertension.  - 5/6 Creatinine downtrending.  - Patient appears close to euvolemia, but not euvolemic yet, although much improved since admission, c/w IV Lasix for now, will closely monitor.  - 5/3 POCUS reveals Right pleural effusion  - BNP only slighty down (8208 --> 7983)  - Weight (bed not standing) slightly increased since admission despite IV diuresis.  - CXR shows much improved pulm vasc congestion  - Switched Lasix PO, rec Lasix 40mg PO BID with close OP follow up with PCP for repeat BMP within a week  - 5/13 presents with slight work of breathing with minimal exertion, dependent edema and fine crackles on exam, oxygen 94% on RA. Continue Lasix 40mg PO BID.   - Pt does not yet appear euvolemic but will continue Lasix 40mg PO BID until OP follow up    Problem/Plan -2  Problem: Aortic Stenosis  - s/p TAVR in September 2021  - minimal murmur on ausculation  - aortic valve is well seated and has good flow  - moderate tricuspid regurg    Problem/Plan -3  Problem: CAD  - Hx CABG at [facility]  - currently denies chest pain  - EKG without ischemic changes  - Troponin elevated likely i/s/o ADHF exacerbation; peaked 4/29 at 124  - c/w simvastatin 20mg PO daily    Problem/Plan -4  Problem: AF  - rate controlled  - c/w Metoprolol and Eliquis  - monitor on tele    Problem/Plan -5  Problem: HLD  - c/w simvastatin     Problem/Plan -6  Problem: DM  - Most recent HgbA1C is 7.6 on 3.4.22  - ISS               HERBER Mix-NP   Giles Gan DO [facility]  Cardiovascular Medicine  [address] [place] [address], [address]  Office: [phone]  Cell: [phone]

## 2022-05-14 NOTE — PROGRESS NOTE ADULT - ASSESSMENT
89yo F with PMH on HFpEF (EF 75% 03/2022), Afib (on Eliquis), HTN, DM2, CKD3, hypothyroidism, AS s/p TAVR (09/2021), s/p PPM BIBEMS for hypoxia of 88% on RA. Admitted for CHF exacerbation. Gradually improving. Pending DC to home with home care

## 2022-05-14 NOTE — PROGRESS NOTE ADULT - PROBLEM SELECTOR PLAN 1
- Likely 2/2 CHF with moderate pleural effusions - improving  - CT Chest on 4/29 shows moderate right and small left pleural effusions with pulmonary edema in the setting of cardiomegaly. Partial lower lobe compressive atelectasis; superimposed consolidation is difficult to exclude on this noncontrast CT.  - S/p ceftriaxone and azithromycin in ED. Monitor off abx. no evidence of infection    - Lasix 40mg PO BID   - Currently saturating well on RA

## 2022-05-15 NOTE — PROGRESS NOTE ADULT - ATTENDING COMMENTS
89F with PMH of HFpEF, Afib (on Eliquis), HTN, DM2, CKD3, AS s/p TAVR (09/2021) who presents with hypoxia 2/2 acute diastolic HF exacerbation. Improved on IV Lasix, now off oxygen. Transitioned to po Lasix.  No acute events. Sitting in bed at bedside during todays visit. No longer requiring O2.  Cont diuresis, trend Is/Os, daily weights  Anticipate discharge planning for tomorrow  Rest as documented above    [facility] Division of Hospital Medicine  Amanda Vasquez MD  Pager (M-F, 8A-5P): [phone]  Other Times:  [phone] .

## 2022-05-15 NOTE — PROGRESS NOTE ADULT - SUBJECTIVE AND OBJECTIVE BOX
Ez Pulliam, PGY1    DATE OF SERVICE: 05-15-22 @ 07:00    Patient is a 89y old  Female who presents with a chief complaint of SOB (14 May 2022 08:44)      SUBJECTIVE / OVERNIGHT EVENTS: No acute events overnight. Patient on NC overnight.     Tele reviewed: paced rhythm 55-60    MEDICATIONS  (STANDING):  allopurinol 100 milliGRAM(s) Oral daily  amLODIPine   Tablet 5 milliGRAM(s) Oral daily  apixaban 2.5 milliGRAM(s) Oral two times a day  dextrose 5%. 1000 milliLiter(s) (100 mL/Hr) IV Continuous <Continuous>  dextrose 5%. 1000 milliLiter(s) (50 mL/Hr) IV Continuous <Continuous>  dextrose 50% Injectable 25 Gram(s) IV Push once  dextrose 50% Injectable 12.5 Gram(s) IV Push once  dextrose 50% Injectable 25 Gram(s) IV Push once  furosemide    Tablet 40 milliGRAM(s) Oral two times a day  glucagon  Injectable 1 milliGRAM(s) IntraMuscular once  insulin glargine Injectable (LANTUS) 19 Unit(s) SubCutaneous at bedtime  insulin lispro (ADMELOG) corrective regimen sliding scale   SubCutaneous at bedtime  insulin lispro (ADMELOG) corrective regimen sliding scale   SubCutaneous three times a day before meals  insulin lispro Injectable (ADMELOG) 4 Unit(s) SubCutaneous three times a day before meals  levothyroxine 50 MICROGram(s) Oral <User Schedule>  levothyroxine 100 MICROGram(s) Oral <User Schedule>  metoprolol succinate  milliGRAM(s) Oral daily  multivitamin 1 Tablet(s) Oral daily  pantoprazole    Tablet 40 milliGRAM(s) Oral before breakfast  simvastatin 20 milliGRAM(s) Oral at bedtime    MEDICATIONS  (PRN):  acetaminophen     Tablet .. 650 milliGRAM(s) Oral every 6 hours PRN Temp greater or equal to 38C (100.4F), Mild Pain (1 - 3)  dextrose Oral Gel 15 Gram(s) Oral once PRN Blood Glucose LESS THAN 70 milliGRAM(s)/deciliter      Vital Signs Last 24 Hrs  T(C): 36.5 (15 May 2022 04:44), Max: 37 (14 May 2022 20:47)  T(F): 97.7 (15 May 2022 04:44), Max: 98.6 (14 May 2022 20:47)  HR: 60 (15 May 2022 04:44) (59 - 76)  BP: 127/62 (15 May 2022 04:44) (122/60 - 133/64)  BP(mean): --  RR: 18 (15 May 2022 04:44) (18 - 18)  SpO2: 94% (15 May 2022 04:44) (94% - 96%)  CAPILLARY BLOOD GLUCOSE      POCT Blood Glucose.: 170 mg/dL (14 May 2022 21:53)  POCT Blood Glucose.: 216 mg/dL (14 May 2022 16:46)  POCT Blood Glucose.: 210 mg/dL (14 May 2022 12:06)  POCT Blood Glucose.: 147 mg/dL (14 May 2022 08:09)    I&O's Summary    14 May 2022 07:01  -  15 May 2022 07:00  --------------------------------------------------------  IN: 720 mL / OUT: 1100 mL / NET: -380 mL        PHYSICAL EXAM:  GENERAL: NAD, elderly female  HEAD:  Atraumatic, Normocephalic  EYES: EOMI, PERRLA, conjunctiva and sclera clear  ENMT: Moist mucous membranes  NECK: Supple, no JVD appreciated  CHEST/LUNG: Adequate inspiratory effort. no crackles appreciated bilaterally. Breathing comfortably On room air.   HEART: Regular rate and rhythm; No murmurs, rubs, or gallops.   ABDOMEN: Soft, Nontender, Nondistended; Bowel sounds present  EXTREMITIES:  2+ Peripheral Pulses. 1+ pitting b/l LE edema with chronic skin changes in LLE.   NERVOUS SYSTEM:  Alert & Oriented X2-3  PSYCH: Normal Affect. Laying in bed comfortably; not agitated    LABS:                        8.2    7.06  )-----------( 282      ( 15 May 2022 06:34 )             28.6     05-14    137  |  99  |  71<H>  ----------------------------<  162<H>  4.4   |  29  |  1.57<H>    Ca    10.6<H>      14 May 2022 06:36  Phos  3.1     05-14  Mg     2.2     05-14                RADIOLOGY & ADDITIONAL TESTS:    Imaging Personally Reviewed:    Consultant(s) Notes Reviewed:      Care Discussed with Consultants/Other Providers:   Ez Pulliam, PGY1    DATE OF SERVICE: 05-15-22 @ 07:00    Patient is a 89y old  Female who presents with a chief complaint of SOB (14 May 2022 08:44)      SUBJECTIVE / OVERNIGHT EVENTS: No acute events overnight. Patient on NC overnight. On RA this morning. Feels like she had some wheezes overnight. No chest pain. No dyspnea. Feels like she is regaining strength but very tired moving from bed to chair.     Tele reviewed: V-paced rhythm 55-60    MEDICATIONS  (STANDING):  allopurinol 100 milliGRAM(s) Oral daily  amLODIPine   Tablet 5 milliGRAM(s) Oral daily  apixaban 2.5 milliGRAM(s) Oral two times a day  dextrose 5%. 1000 milliLiter(s) (100 mL/Hr) IV Continuous <Continuous>  dextrose 5%. 1000 milliLiter(s) (50 mL/Hr) IV Continuous <Continuous>  dextrose 50% Injectable 25 Gram(s) IV Push once  dextrose 50% Injectable 12.5 Gram(s) IV Push once  dextrose 50% Injectable 25 Gram(s) IV Push once  furosemide    Tablet 40 milliGRAM(s) Oral two times a day  glucagon  Injectable 1 milliGRAM(s) IntraMuscular once  insulin glargine Injectable (LANTUS) 19 Unit(s) SubCutaneous at bedtime  insulin lispro (ADMELOG) corrective regimen sliding scale   SubCutaneous at bedtime  insulin lispro (ADMELOG) corrective regimen sliding scale   SubCutaneous three times a day before meals  insulin lispro Injectable (ADMELOG) 4 Unit(s) SubCutaneous three times a day before meals  levothyroxine 50 MICROGram(s) Oral <User Schedule>  levothyroxine 100 MICROGram(s) Oral <User Schedule>  metoprolol succinate  milliGRAM(s) Oral daily  multivitamin 1 Tablet(s) Oral daily  pantoprazole    Tablet 40 milliGRAM(s) Oral before breakfast  simvastatin 20 milliGRAM(s) Oral at bedtime    MEDICATIONS  (PRN):  acetaminophen     Tablet .. 650 milliGRAM(s) Oral every 6 hours PRN Temp greater or equal to 38C (100.4F), Mild Pain (1 - 3)  dextrose Oral Gel 15 Gram(s) Oral once PRN Blood Glucose LESS THAN 70 milliGRAM(s)/deciliter      Vital Signs Last 24 Hrs  T(C): 36.5 (15 May 2022 04:44), Max: 37 (14 May 2022 20:47)  T(F): 97.7 (15 May 2022 04:44), Max: 98.6 (14 May 2022 20:47)  HR: 60 (15 May 2022 04:44) (59 - 76)  BP: 127/62 (15 May 2022 04:44) (122/60 - 133/64)  BP(mean): --  RR: 18 (15 May 2022 04:44) (18 - 18)  SpO2: 94% (15 May 2022 04:44) (94% - 96%)  CAPILLARY BLOOD GLUCOSE      POCT Blood Glucose.: 170 mg/dL (14 May 2022 21:53)  POCT Blood Glucose.: 216 mg/dL (14 May 2022 16:46)  POCT Blood Glucose.: 210 mg/dL (14 May 2022 12:06)  POCT Blood Glucose.: 147 mg/dL (14 May 2022 08:09)    I&O's Summary    14 May 2022 07:01  -  15 May 2022 07:00  --------------------------------------------------------  IN: 720 mL / OUT: 1100 mL / NET: -380 mL        PHYSICAL EXAM:  GENERAL: NAD, elderly female  HEAD:  Atraumatic, Normocephalic  EYES: EOMI, PERRLA, conjunctiva and sclera clear  ENMT: Moist mucous membranes  NECK: Supple, no JVD appreciated  CHEST/LUNG: Adequate inspiratory effort. slight expiratory crackles bilaterally. Breathing comfortably On room air.   HEART: Regular rate and rhythm; No murmurs, rubs, or gallops.   ABDOMEN: Soft, Nontender, Nondistended; Bowel sounds present  EXTREMITIES:  2+ Peripheral Pulses. 1+ pitting b/l LE edema with chronic skin changes in LLE.   NERVOUS SYSTEM:  Alert & Oriented X2-3  PSYCH: Normal Affect. Laying in bed comfortably; not agitated    LABS:                        8.2    7.06  )-----------( 282      ( 15 May 2022 06:34 )             28.6     05-14    137  |  99  |  71<H>  ----------------------------<  162<H>  4.4   |  29  |  1.57<H>    Ca    10.6<H>      14 May 2022 06:36  Phos  3.1     05-14  Mg     2.2     05-14                RADIOLOGY & ADDITIONAL TESTS:    Imaging Personally Reviewed:    Consultant(s) Notes Reviewed:      Care Discussed with Consultants/Other Providers:   Ez Pulliam, PGY1    DATE OF SERVICE: 05-15-22 @ 07:00    Patient is a 89y old  Female who presents with a chief complaint of SOB (14 May 2022 08:44)      SUBJECTIVE / OVERNIGHT EVENTS: No acute events overnight. Patient on NC overnight but was not requiring O2. Documented nasal cannula in flowsheet was without supplemental oxygen requirement. On RA this morning.     Feels like she had some wheezes overnight. No chest pain. No dyspnea. Feels like she is regaining strength but very tired moving from bed to chair.     Tele reviewed: V-paced rhythm 55-60    MEDICATIONS  (STANDING):  allopurinol 100 milliGRAM(s) Oral daily  amLODIPine   Tablet 5 milliGRAM(s) Oral daily  apixaban 2.5 milliGRAM(s) Oral two times a day  dextrose 5%. 1000 milliLiter(s) (100 mL/Hr) IV Continuous <Continuous>  dextrose 5%. 1000 milliLiter(s) (50 mL/Hr) IV Continuous <Continuous>  dextrose 50% Injectable 25 Gram(s) IV Push once  dextrose 50% Injectable 12.5 Gram(s) IV Push once  dextrose 50% Injectable 25 Gram(s) IV Push once  furosemide    Tablet 40 milliGRAM(s) Oral two times a day  glucagon  Injectable 1 milliGRAM(s) IntraMuscular once  insulin glargine Injectable (LANTUS) 19 Unit(s) SubCutaneous at bedtime  insulin lispro (ADMELOG) corrective regimen sliding scale   SubCutaneous at bedtime  insulin lispro (ADMELOG) corrective regimen sliding scale   SubCutaneous three times a day before meals  insulin lispro Injectable (ADMELOG) 4 Unit(s) SubCutaneous three times a day before meals  levothyroxine 50 MICROGram(s) Oral <User Schedule>  levothyroxine 100 MICROGram(s) Oral <User Schedule>  metoprolol succinate  milliGRAM(s) Oral daily  multivitamin 1 Tablet(s) Oral daily  pantoprazole    Tablet 40 milliGRAM(s) Oral before breakfast  simvastatin 20 milliGRAM(s) Oral at bedtime    MEDICATIONS  (PRN):  acetaminophen     Tablet .. 650 milliGRAM(s) Oral every 6 hours PRN Temp greater or equal to 38C (100.4F), Mild Pain (1 - 3)  dextrose Oral Gel 15 Gram(s) Oral once PRN Blood Glucose LESS THAN 70 milliGRAM(s)/deciliter      Vital Signs Last 24 Hrs  T(C): 36.5 (15 May 2022 04:44), Max: 37 (14 May 2022 20:47)  T(F): 97.7 (15 May 2022 04:44), Max: 98.6 (14 May 2022 20:47)  HR: 60 (15 May 2022 04:44) (59 - 76)  BP: 127/62 (15 May 2022 04:44) (122/60 - 133/64)  BP(mean): --  RR: 18 (15 May 2022 04:44) (18 - 18)  SpO2: 94% (15 May 2022 04:44) (94% - 96%)  CAPILLARY BLOOD GLUCOSE      POCT Blood Glucose.: 170 mg/dL (14 May 2022 21:53)  POCT Blood Glucose.: 216 mg/dL (14 May 2022 16:46)  POCT Blood Glucose.: 210 mg/dL (14 May 2022 12:06)  POCT Blood Glucose.: 147 mg/dL (14 May 2022 08:09)    I&O's Summary    14 May 2022 07:01  -  15 May 2022 07:00  --------------------------------------------------------  IN: 720 mL / OUT: 1100 mL / NET: -380 mL        PHYSICAL EXAM:  GENERAL: NAD, elderly female  HEAD:  Atraumatic, Normocephalic  EYES: EOMI, PERRLA, conjunctiva and sclera clear  ENMT: Moist mucous membranes  NECK: Supple, no JVD appreciated  CHEST/LUNG: Adequate inspiratory effort. slight expiratory crackles bilaterally. Breathing comfortably On room air.   HEART: Regular rate and rhythm; No murmurs, rubs, or gallops.   ABDOMEN: Soft, Nontender, Nondistended; Bowel sounds present  EXTREMITIES:  2+ Peripheral Pulses. 1+ pitting b/l LE edema with chronic skin changes in LLE.   NERVOUS SYSTEM:  Alert & Oriented X2-3  PSYCH: Normal Affect. Laying in bed comfortably; not agitated    LABS:                        8.2    7.06  )-----------( 282      ( 15 May 2022 06:34 )             28.6     05-14    137  |  99  |  71<H>  ----------------------------<  162<H>  4.4   |  29  |  1.57<H>    Ca    10.6<H>      14 May 2022 06:36  Phos  3.1     05-14  Mg     2.2     05-14                RADIOLOGY & ADDITIONAL TESTS:    Imaging Personally Reviewed:    Consultant(s) Notes Reviewed:      Care Discussed with Consultants/Other Providers:

## 2022-05-15 NOTE — PROGRESS NOTE ADULT - SUBJECTIVE AND OBJECTIVE BOX
Date of Service   05-15-22 @ 10:41    Patient is a 89y old  Female who presents with a chief complaint of SOB (15 May 2022 07:00)      INTERVAL HISTORY: pt feels ok   TELEMETRY Personally reviewed: PACED     REVIEW OF SYSTEMS:   CONSTITUTIONAL: No weakness  EYES/ENT: No visual changes; No throat pain  Neck: No pain or stiffness  Respiratory: No cough, + wheezing, No shortness of breath  CARDIOVASCULAR: no chest pain or palpitations  GASTROINTESTINAL: No abdominal pain, no nausea, vomiting or hematemesis  GENITOURINARY: No dysuria, frequency or hematuria  NEUROLOGICAL: No stroke like symptoms  SKIN: No rashes    	  MEDICATIONS:  amLODIPine   Tablet 5 milliGRAM(s) Oral daily  furosemide    Tablet 40 milliGRAM(s) Oral two times a day  metoprolol succinate  milliGRAM(s) Oral daily        PHYSICAL EXAM:  T(C): 36.5 (05-15-22 @ 04:44), Max: 37 (05-14-22 @ 20:47)  HR: 60 (05-15-22 @ 04:44) (59 - 76)  BP: 127/62 (05-15-22 @ 04:44) (122/60 - 133/64)  RR: 18 (05-15-22 @ 04:44) (18 - 18)  SpO2: 94% (05-15-22 @ 04:44) (94% - 96%)  Wt(kg): --  I&O's Summary    14 May 2022 07:01  -  15 May 2022 07:00  --------------------------------------------------------  IN: 720 mL / OUT: 1100 mL / NET: -380 mL          Appearance: In no distress	  HEENT:    PERRL, EOMI	  Cardiovascular:  S1 S2, No JVD  Respiratory:  wheezing b/l upper lungs   Gastrointestinal:  Soft, Non-tender, + BS	  Vascularature:  No edema of LE  Psychiatric: Appropriate affect   Neuro: no acute focal deficits                               8.2    7.06  )-----------( 282      ( 15 May 2022 06:34 )             28.6     05-15    136  |  99  |  67<H>  ----------------------------<  128<H>  4.5   |  28  |  1.59<H>    Ca    11.0<H>      15 May 2022 06:34  Phos  3.1     05-15  Mg     2.2     05-15          Labs personally reviewed      ASSESSMENT/PLAN: 	  Ms. Briones is a 89 yo female with PMH of HTN, HLD, CAD s/p CABG, AS s/p TAVR in 9/2021 at [facility], DM, CKD, AF on Eliquis, PPM who presents with 3 days of progressive shortness of breath, + orthopnea, increased LE edema and decreased oxygen saturation on pulse oximeter at home.    Problem/Plan -1  Problem: HFpEF  - Followed by Dr. Agapito Woodard for outpatient cardiology  - CT Chest reveals moderate right and small left pleural effusions, cardiomegaly, possibly superimposed consolidation  - On lasix 40mg PO daily at home  - BMP reviewed from OP cardiologist records on 12/3/21 it was 1.1, and 5 months ago it was 1.2-1.4, here 2 months ago it was 1.4-1.6  - ECHO Left Ventricle: Normal left ventricular systolic function. No segmental wall motion abnormalities.  The LVEF= 60-65%. Moderate mitral regurgitation, There is a transcatheter aortic valve replacement seen. Moderate pulmonary hypertension.  - Patient appears close to euvolemia, but not euvolemic yet, although much improved since admission, c/w IV Lasix for now, will closely monitor.  - 5/3 POCUS reveals Right pleural effusion  - BNP only slighty down (8208 --> 7983)  - Weight (bed not standing) slightly increased since admission despite IV diuresis.  - CXR shows much improved pulm vasc congestion  - Switched Lasix PO, rec Lasix 40mg PO BID with close OP follow up with PCP for repeat BMP within a week  - Pt does not yet appear euvolemic but will continue Lasix 40mg PO BID until OP follow up  - pt reports hx of Lymphedema and says her legs have been chronically edematous but doesnt use stockings     Problem/Plan -2  Problem: Aortic Stenosis  - s/p TAVR in September 2021  - minimal murmur on ausculation  - aortic valve is well seated and has good flow  - moderate tricuspid regurg    Problem/Plan -3  Problem: CAD  - Hx CABG at [facility]  - currently denies chest pain  - EKG without ischemic changes  - Troponin elevated likely i/s/o ADHF exacerbation; peaked 4/29 at 124  - c/w simvastatin 20mg PO daily    Problem/Plan -4  Problem: AF  - rate controlled  - c/w Metoprolol and Eliquis  - monitor on tele    Problem/Plan -5  Problem: HLD  - c/w simvastatin     Problem/Plan -6  Problem: DM  - Most recent HgbA1C is 7.6 on 3.4.22  - ISS         Jacob Diaz [facility]   Giles Gan DO [facility]  Cardiovascular Medicine  [address] [place] [address], [address]  Office: [phone]

## 2022-05-15 NOTE — PROGRESS NOTE ADULT - ASSESSMENT
89yo F with PMH on HFpEF (EF 75% 03/2022), Afib (on Eliquis), HTN, DM2, CKD3, hypothyroidism, AS s/p TAVR (09/2021), s/p PPM BIBEMS for hypoxia of 88% on RA. Admitted for CHF exacerbation. Gradually improving. Pending DC to home with home care 89yo F with PMH on HFpEF (EF 75% 03/2022), Afib (on Eliquis), HTN, DM2, CKD3, hypothyroidism, AS s/p TAVR (09/2021), s/p PPM BIBEMS for hypoxia of 88% on RA. Admitted for CHF exacerbation. Gradually improving. Pending DC to home with home care.

## 2022-05-15 NOTE — PROGRESS NOTE ADULT - NUTRITIONAL ASSESSMENT
This patient has been assessed with a concern for Malnutrition and has been determined to have a diagnosis/diagnoses of Severe protein-calorie malnutrition.    This patient is being managed with:   Diet Regular-  Consistent Carbohydrate {No Snacks} (CSTCHO)  DASH/TLC {Sodium & Cholesterol Restricted} (DASH)  1500mL Fluid Restriction ([id number])  Entered: May  4 2022  7:31AM

## 2022-05-15 NOTE — PROGRESS NOTE ADULT - PROBLEM SELECTOR PLAN 1
- Likely 2/2 CHF with moderate pleural effusions - improving  - CT Chest on 4/29 shows moderate right and small left pleural effusions with pulmonary edema in the setting of cardiomegaly. Partial lower lobe compressive atelectasis; superimposed consolidation is difficult to exclude on this noncontrast CT.  - S/p ceftriaxone and azithromycin in ED. Monitor off abx. no evidence of infection    - Lasix 40mg PO BID   - Currently saturating well on RA - Likely 2/2 CHF with moderate pleural effusions - improving  - CT Chest on 4/29 shows moderate right and small left pleural effusions with pulmonary edema in the setting of cardiomegaly. Partial lower lobe compressive atelectasis; superimposed consolidation is difficult to exclude on this noncontrast CT.  - S/p ceftriaxone and azithromycin in ED. Monitor off abx. no evidence of infection    - Lasix 40mg PO BID   - Currently saturating well (89+) on RA

## 2022-05-16 NOTE — CONSULT NOTE ADULT - SUBJECTIVE AND OBJECTIVE BOX
HPI:  89yo F with PMH of HFpEF (EF 75% 03/2022), Afib (on Eliquis), HTN, DM2, CKD3, hypothyroidism, AS s/p TAVR (09/2021), s/p PPM BIBEMS for hypoxia of 88% on RA. Patient reports SOB and RHODES for the past 3 days. Patient has never felt similar symptoms before. Patient usually sleeps with one pillow at night, but has required two pillows the past two nights because of orthopnea. Also endorses increased leg swelling which is acute on chronic from her chronic LLE lymphedema. No reported weight changes. Patient is usually able to walk around her apartment with a walker, but has been feeling more SOB on exertion.  at bedside notes that physical therapy visits her in the apartment and has said her oxygen saturation is low sometimes. One episode of diarrhea last week, last BM was yesterday and it was normal. Denies fevers, chills, chest pain, palpitations, n/v, cough, congestion, dysuria, increased urinary frequency, constipation. Had recent fall in 03/2022 and was hospitalized.     Upon arrival to ED, vitals T 98F, /74, HR 87, RR 24, 98% on 3L NC   S/p Ceftriaxone, Azithromycin, and Lasix 40mg IVP x1   (29 Apr 2022 18:03)    PERTINENT PM/SXH:   Diabetes 2    Dyslipidemia    H/O: Total Hysterectomy, BSO 1988    Hypothyroidism    HTN (Hypertension)    S/P bilateral  Cataract Extraction and ocular lens implant 1988    S/P Tonsillectomy    Murmur, Cardiac    Gout    Dyslipidemia    Spinal Stenosis lumbar L4-5    right Rotator Cuff (Capsule) tear- no surgical intervention    Gastritis    Pacemaker    COVID-19 vaccine series completed    Stage 3 chronic kidney disease    (HFpEF) heart failure with preserved ejection fraction      right Hip total Replacement Arthroplasty 2008    H/O: ZOHAIB, BSO, 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    S/P bilateral Cataract Extraction and ocular lens implant    S/P Tonsillectomy    S/P TAVR (transcatheter aortic valve replacement)    S/P total left hip arthroplasty      FAMILY HISTORY:  No pertinent family history in first degree relatives      ITEMS NOT CHECKED ARE NOT PRESENT    SOCIAL HISTORY:   Significant other/partner[ ]  Children[ ]  Nondenominational/Spirituality:  Substance hx:  [ ]   Tobacco hx:  [ ]   Alcohol hx: [ ]   Home Opioid hx:  [ ] I-Stop Reference No:  Living Situation: [ ]Home  [ ]Long term care  [ ]Rehab [ ]Other    ADVANCE DIRECTIVES:    DNR  MOLST  [ ]  Living Will  [ ]   DECISION MAKER(s):  [ ] Health Care Proxy(s)  [ ] Surrogate(s)  [ ] Guardian           Name(s): Phone Number(s):    BASELINE (I)ADL(s) (prior to admission):  [place]: [ ]Total  [ ] Moderate [ ]Dependent    Allergies    Bactrim (Unknown)  Flagyl (Hives; Pruritus)  Macrobid (Other)  sulfa drugs (Hives)  Zosyn (Other)    Intolerances    MEDICATIONS  (STANDING):  allopurinol 100 milliGRAM(s) Oral daily  amLODIPine   Tablet 5 milliGRAM(s) Oral daily  apixaban 2.5 milliGRAM(s) Oral two times a day  buMETAnide 1 milliGRAM(s) Oral two times a day  dextrose 5%. 1000 milliLiter(s) (100 mL/Hr) IV Continuous <Continuous>  dextrose 5%. 1000 milliLiter(s) (50 mL/Hr) IV Continuous <Continuous>  dextrose 50% Injectable 25 Gram(s) IV Push once  dextrose 50% Injectable 12.5 Gram(s) IV Push once  dextrose 50% Injectable 25 Gram(s) IV Push once  glucagon  Injectable 1 milliGRAM(s) IntraMuscular once  insulin glargine Injectable (LANTUS) 19 Unit(s) SubCutaneous at bedtime  insulin lispro (ADMELOG) corrective regimen sliding scale   SubCutaneous three times a day before meals  insulin lispro (ADMELOG) corrective regimen sliding scale   SubCutaneous at bedtime  insulin lispro Injectable (ADMELOG) 4 Unit(s) SubCutaneous three times a day before meals  levothyroxine 50 MICROGram(s) Oral <User Schedule>  levothyroxine 100 MICROGram(s) Oral <User Schedule>  metoprolol succinate  milliGRAM(s) Oral daily  multivitamin 1 Tablet(s) Oral daily  pantoprazole    Tablet 40 milliGRAM(s) Oral before breakfast  simvastatin 20 milliGRAM(s) Oral at bedtime    MEDICATIONS  (PRN):  acetaminophen     Tablet .. 650 milliGRAM(s) Oral every 6 hours PRN Temp greater or equal to 38C (100.4F), Mild Pain (1 - 3)  dextrose Oral Gel 15 Gram(s) Oral once PRN Blood Glucose LESS THAN 70 milliGRAM(s)/deciliter    PRESENT SYMPTOMS: [ ]  Due to Covid 19 infection data obtained from nursing assessment.  Source if other than patient:  [ ]Family   [ ]Team     Pain: [ ]yes [ ]no  QOL impact -   Location -                    Aggravating factors -  Quality -  Radiation -  Timing-  Severity (0-10 scale):  Minimal acceptable level (0-10 scale):     CPOT:    https://www.[facility].org/getattachment/[email or web address]/Critical-Care-Pain-Observation-Tool-(CPOT)      PAIN AD Score:     http://geriatrictoolkit.[facility]/cog/painad.pdf (press ctrl +  left click to view)    Dyspnea:                           [ ]Mild [ ]Moderate [ ]Severe  Anxiety:                             [ ]Mild [ ]Moderate [ ]Severe  Fatigue:                             [ ]Mild [ ]Moderate [ ]Severe  Nausea:                             [ ]Mild [ ]Moderate [ ]Severe  Loss of appetite:              [ ]Mild [ ]Moderate [ ]Severe  Constipation:                    [ ]Mild [ ]Moderate [ ]Severe    Other Symptoms:  [ ]All other review of systems negative     Palliative Performance Status Version 2:         %    http://npcrc.org/files/news/palliative_performance_scale_ppsv2.pdf  PHYSICAL EXAM: DUE TO COVID-19 INFECTION  physical exam findings from the clinician caring for this patient directly are used.   Vital Signs Last 24 Hrs  T(C): 36.3 (16 May 2022 12:25), Max: 36.6 (15 May 2022 20:30)  T(F): 97.4 (16 May 2022 12:25), Max: 97.9 (15 May 2022 20:30)  HR: 60 (16 May 2022 12:25) (60 - 82)  BP: 138/75 (16 May 2022 12:25) (137/75 - 140/60)  BP(mean): --  RR: 18 (16 May 2022 12:25) (18 - 18)  SpO2: 91% (16 May 2022 12:25) (86% - 96%) I&O's Summary    15 May 2022 07:01  -  16 May 2022 07:00  --------------------------------------------------------  IN: 720 mL / OUT: 500 mL / NET: 220 mL    16 May 2022 07:01  -  16 May 2022 14:43  --------------------------------------------------------  IN: 240 mL / OUT: 0 mL / NET: 240 mL      GENERAL:  [ ]Alert  [ ]Oriented x   [ ]Lethargic  [ ]Cachexia  [ ]Unarousable  [ ]Verbal  [ ]Non-Verbal  Behavioral:   [ ] Anxiety  [ ] Delirium [ ] Agitation [ ] Other  HEENT:  [ ]Normal   [ ]Dry mouth   [ ]ET Tube/Trach  [ ]Oral lesions  PULMONARY:   [ ]Clear [ ]Tachypnea  [ ]Audible excessive secretions   [ ]Rhonchi        [ ]Right [ ]Left [ ]Bilateral  [ ]Crackles        [ ]Right [ ]Left [ ]Bilateral  [ ]Wheezing     [ ]Right [ ]Left [ ]Bilateral  [ ]Diminished breath sounds [ ]right [ ]left [ ]bilateral  CARDIOVASCULAR:    [ ]Regular [ ]Irregular [ ]Tachy  [ ]Jerel [ ]Murmur [ ]Other  GASTROINTESTINAL:  [ ]Soft  [ ]Distended   [ ]+BS  [ ]Non tender [ ]Tender  [ ]PEG [ ]OGT/ NGT  Last BM:   GENITOURINARY:  [ ]Normal [ ] Incontinent   [ ]Oliguria/Anuria   [ ]Heredia  MUSCULOSKELETAL:   [ ]Normal   [ ]Weakness  [ ]Bed/Wheelchair bound [ ]Edema  NEUROLOGIC:   [ ]No focal deficits  [ ]Cognitive impairment  [ ]Dysphagia [ ]Dysarthria [ ]Paresis [ ]Other   SKIN:   [ ]Normal    [ ]Rash  [ ]Pressure ulcer(s)       Present on admission [ ]y [ ]n    CRITICAL CARE:  [ ] Shock Present  [ ]Septic [ ]Cardiogenic [ ]Neurologic [ ]Hypovolemic  [ ]  Vasopressors [ ]  Inotropes   [ ]Respiratory failure present [ ]Mechanical ventilation [ ]Non-invasive ventilatory support [ ]High flow     [ ]Acute  [ ]Chronic [ ]Hypoxic  [ ]Hypercarbic [ ]Other  [ ]Other organ failure     LABS:                        8.5    7.37  )-----------( 250      ( 16 May 2022 06:30 )             29.9   05-16    138  |  99  |  65<H>  ----------------------------<  120<H>  4.3   |  28  |  1.51<H>    Ca    10.8<H>      16 May 2022 06:29  Phos  2.9     05-16  Mg     2.4     05-16                  RADIOLOGY & ADDITIONAL STUDIES:    PROTEIN CALORIE MALNUTRITION PRESENT: [ ]mild [ ]moderate [ ]severe [ ]underweight [ ]morbid obesity  https://www.andeal.org/vault/2440/web/files/ONC/Table_Clinical%20Characteristics%20to%20Document%20Malnutrition-White%20JV%20et%20al%202012.pdf    Height (cm): 157.5 (04-29-22 @ 10:59), 157.5 (03-02-22 @ 18:50), 157.5 (12-20-21 @ 18:32)  Weight (kg): 78.9 (05-05-22 @ 08:00), 74.8 (12-20-21 @ 18:32)  BMI (kg/m2): 31.8 (05-05-22 @ 08:00), 30.2 (04-29-22 @ 10:59), 30.2 (03-02-22 @ 18:50)    [ ]PPSV2 < or = to 30% [ ]significant weight loss  [ ]poor nutritional intake  [ ]anasarca      [ ]Artificial Nutrition      REFERRALS:   [ ]Chaplaincy  [ ]Hospice  [ ]Child Life  [ ]Social Work  [ ]Case management [ ]Holistic Therapy     Goals of Care Document:  HPI:  89yo F with PMH of HFpEF (EF 75% 03/2022), Afib (on Eliquis), HTN, DM2, CKD3, hypothyroidism, AS s/p TAVR (09/2021), s/p PPM BIBEMS for hypoxia of 88% on RA. Patient reports SOB and RHODES for the past 3 days. Patient has never felt similar symptoms before. Patient usually sleeps with one pillow at night, but has required two pillows the past two nights because of orthopnea. Also endorses increased leg swelling which is acute on chronic from her chronic LLE lymphedema. No reported weight changes. Patient is usually able to walk around her apartment with a walker, but has been feeling more SOB on exertion.  at bedside notes that physical therapy visits her in the apartment and has said her oxygen saturation is low sometimes. One episode of diarrhea last week, last BM was yesterday and it was normal. Denies fevers, chills, chest pain, palpitations, n/v, cough, congestion, dysuria, increased urinary frequency, constipation. Had recent fall in 03/2022 and was hospitalized.     Upon arrival to ED, vitals T 98F, /74, HR 87, RR 24, 98% on 3L NC   S/p Ceftriaxone, Azithromycin, and Lasix 40mg IVP x1   (29 Apr 2022 18:03)    PERTINENT PM/SXH:   Diabetes 2    Dyslipidemia    H/O: Total Hysterectomy, BSO 1988    Hypothyroidism    HTN (Hypertension)    S/P bilateral  Cataract Extraction and ocular lens implant 1988    S/P Tonsillectomy    Murmur, Cardiac    Gout    Dyslipidemia    Spinal Stenosis lumbar L4-5    right Rotator Cuff (Capsule) tear- no surgical intervention    Gastritis    Pacemaker    COVID-19 vaccine series completed    Stage 3 chronic kidney disease    (HFpEF) heart failure with preserved ejection fraction      right Hip total Replacement Arthroplasty 2008    H/O: ZOHAIB, BSO, 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    S/P bilateral Cataract Extraction and ocular lens implant    S/P Tonsillectomy    S/P TAVR (transcatheter aortic valve replacement)    S/P total left hip arthroplasty      FAMILY HISTORY:  No pertinent family history in first degree relatives      ITEMS NOT CHECKED ARE NOT PRESENT    SOCIAL HISTORY:   Significant other/partner[x ]  Children[x ]  Buddhist/Spirituality:  Substance hx:  [ ]   Tobacco hx:  [ ]   Alcohol hx: [ ]   Home Opioid hx:  [ ] I-Stop Reference No:  Living Situation: [x ]Home  [ ]Long term care  [ ]Rehab [ ]Other    ADVANCE DIRECTIVES:    DNR  MOLST  [ ]  Living Will  [ ]   DECISION MAKER(s):  [ ] Health Care Proxy(s)  [x ] Surrogate(s)  [ ] Guardian           Name(s): Phone Number(s):  spouse, He, number per EMR    BASELINE (I)ADL(s) (prior to admission):  [place]: [ ]Total  [x ] Moderate [ ]Dependent    Allergies    Bactrim (Unknown)  Flagyl (Hives; Pruritus)  Macrobid (Other)  sulfa drugs (Hives)  Zosyn (Other)    Intolerances    MEDICATIONS  (STANDING):  allopurinol 100 milliGRAM(s) Oral daily  amLODIPine   Tablet 5 milliGRAM(s) Oral daily  apixaban 2.5 milliGRAM(s) Oral two times a day  buMETAnide 1 milliGRAM(s) Oral two times a day  dextrose 5%. 1000 milliLiter(s) (100 mL/Hr) IV Continuous <Continuous>  dextrose 5%. 1000 milliLiter(s) (50 mL/Hr) IV Continuous <Continuous>  dextrose 50% Injectable 25 Gram(s) IV Push once  dextrose 50% Injectable 12.5 Gram(s) IV Push once  dextrose 50% Injectable 25 Gram(s) IV Push once  glucagon  Injectable 1 milliGRAM(s) IntraMuscular once  insulin glargine Injectable (LANTUS) 19 Unit(s) SubCutaneous at bedtime  insulin lispro (ADMELOG) corrective regimen sliding scale   SubCutaneous three times a day before meals  insulin lispro (ADMELOG) corrective regimen sliding scale   SubCutaneous at bedtime  insulin lispro Injectable (ADMELOG) 4 Unit(s) SubCutaneous three times a day before meals  levothyroxine 50 MICROGram(s) Oral <User Schedule>  levothyroxine 100 MICROGram(s) Oral <User Schedule>  metoprolol succinate  milliGRAM(s) Oral daily  multivitamin 1 Tablet(s) Oral daily  pantoprazole    Tablet 40 milliGRAM(s) Oral before breakfast  simvastatin 20 milliGRAM(s) Oral at bedtime    MEDICATIONS  (PRN):  acetaminophen     Tablet .. 650 milliGRAM(s) Oral every 6 hours PRN Temp greater or equal to 38C (100.4F), Mild Pain (1 - 3)  dextrose Oral Gel 15 Gram(s) Oral once PRN Blood Glucose LESS THAN 70 milliGRAM(s)/deciliter    PRESENT SYMPTOMS: [ ]  Due to Covid 19 infection data obtained from nursing assessment.  Source if other than patient:  [ ]Family   [ ]Team     Pain: [ ]yes [ x]no  QOL impact -   Location -                    Aggravating factors -  Quality -  Radiation -  Timing-  Severity (0-10 scale):  Minimal acceptable level (0-10 scale):     CPOT:    https://www.[facility].org/getattachment/[email or web address]/Critical-Care-Pain-Observation-Tool-(CPOT)      PAIN AD Score:     http://[email or web address]/cog/painad.pdf (press ctrl +  left click to view)    Dyspnea:                           [ x]Mild [ ]Moderate [ ]Severe  Anxiety:                             [ ]Mild [ ]Moderate [ ]Severe  Fatigue:                             [ x]Mild [ ]Moderate [ ]Severe  Nausea:                             [ ]Mild [ ]Moderate [ ]Severe  Loss of appetite:              [ ]Mild [ ]Moderate [ ]Severe  Constipation:                    [ ]Mild [ ]Moderate [ ]Severe    Other Symptoms:  [x ]All other review of systems negative     Palliative Performance Status Version 2:      40-50   %    http://npcrc.org/files/news/palliative_performance_scale_ppsv2.pdf  PHYSICAL EXAM: DUE TO COVID-19 INFECTION  physical exam findings from the clinician caring for this patient directly are used.   Vital Signs Last 24 Hrs  T(C): 36.3 (16 May 2022 12:25), Max: 36.6 (15 May 2022 20:30)  T(F): 97.4 (16 May 2022 12:25), Max: 97.9 (15 May 2022 20:30)  HR: 60 (16 May 2022 12:25) (60 - 82)  BP: 138/75 (16 May 2022 12:25) (137/75 - 140/60)  BP(mean): --  RR: 18 (16 May 2022 12:25) (18 - 18)  SpO2: 91% (16 May 2022 12:25) (86% - 96%) I&O's Summary    15 May 2022 07:01  -  16 May 2022 07:00  --------------------------------------------------------  IN: 720 mL / OUT: 500 mL / NET: 220 mL    16 May 2022 07:01  -  16 May 2022 14:43  --------------------------------------------------------  IN: 240 mL / OUT: 0 mL / NET: 240 mL      GENERAL:  [x ]Alert  [ x]Oriented x 3  [ ]Lethargic  [ ]Cachexia  [ ]Unarousable  [ ]Verbal  [ ]Non-Verbal  Behavioral:   [ ] Anxiety  [ ] Delirium [ ] Agitation [ ] Other  HEENT:  [ ]Normal   [ ]Dry mouth   [ ]ET Tube/Trach  [ ]Oral lesions  PULMONARY: on nasal cannula  [ ]Clear [x ]Tachypnea  [ ]Audible excessive secretions   [ ]Rhonchi        [ ]Right [ ]Left [ ]Bilateral  [ ]Crackles        [ ]Right [ ]Left [ ]Bilateral  [ ]Wheezing     [ ]Right [ ]Left [ ]Bilateral  [ ]Diminished breath sounds [ ]right [ ]left [ ]bilateral  CARDIOVASCULAR:    [x ]Regular [ ]Irregular [ ]Tachy  [ ]Jerel [ ]Murmur [ ]Other  GASTROINTESTINAL:  [ ]Soft  [ ]Distended   [ ]+BS  [ ]Non tender [ ]Tender  [ ]PEG [ ]OGT/ NGT  Last BM:   GENITOURINARY:  [x ]Normal [ ] Incontinent   [ ]Oliguria/Anuria   [ ]Heredia  MUSCULOSKELETAL:   [ ]Normal   [x ]Weakness  [ ]Bed/Wheelchair bound [ ]Edema  NEUROLOGIC:   [x ]No focal deficits  [ ]Cognitive impairment  [ ]Dysphagia [ ]Dysarthria [ ]Paresis [ ]Other   SKIN:   [ ]Normal    [ ]Rash  [ ]Pressure ulcer(s)       Present on admission [ ]y [ ]n    CRITICAL CARE:  [ ] Shock Present  [ ]Septic [ ]Cardiogenic [ ]Neurologic [ ]Hypovolemic  [ ]  Vasopressors [ ]  Inotropes   [ ]Respiratory failure present [ ]Mechanical ventilation [ ]Non-invasive ventilatory support [ ]High flow     [ ]Acute  [ ]Chronic [ ]Hypoxic  [ ]Hypercarbic [ ]Other  [ ]Other organ failure     LABS:                        8.5    7.37  )-----------( 250      ( 16 May 2022 06:30 )             29.9   05-16    138  |  99  |  65<H>  ----------------------------<  120<H>  4.3   |  28  |  1.51<H>    Ca    10.8<H>      16 May 2022 06:29  Phos  2.9     05-16  Mg     2.4     05-16      RADIOLOGY & ADDITIONAL STUDIES:  < from: Transthoracic Echocardiogram (04.30.22 @ 10:54) >    Patient name: PERRI MOREIRA  YOB: 1931   Age: 89 (F)   MR#: [id number]  Study Date: 4/30/2022  Location: [address] [place], [place] [id number]: Nori Blanton RDCS  Study quality: Technically fair  Referring Physician: Jane Schmidt MD  Blood Pressure: 144/69 mmHg  Height: 157 cm  Weight: 73 kg  BSA: 1.7 m2  Heart Rate: 62 mmHg  ------------------------------------------------------------------------  PROCEDURE: Transthoracic echocardiogram with 2-D, M-Mode  and complete spectral and color flowDoppler.  INDICATION: Unspecified combined systolic (congestive) and  diastolic (congestive) heart failure (I50.40)  ------------------------------------------------------------------------  MEASUREMENTS: (See below)  ------------------------------------------------------------------------  OBSERVATIONS:  Mitral Valve: There is dystrophic mitral annular and  leaflet calcification. Moderate mitral regurgitation. The  peak/mean diastolic mitral gradients= 13/4mmHg (HR= 61bpm)  Aortic Root: Aortic Root: 3.1 cm.  LVOT diameter: 2 cm.  Aortic Valve: There is a transcatheter aortic valve  replacement seen.  The valve is well seated with normal function.  The peak/mean gradients= 14/7mmHg with a DVI= 0.47. Peak  transaortic valve gradient equals 14 mm Hg, mean  transaortic valve gradient equals 7 mm Hg, estimated aortic  valve area equals 1.4 sqcm (by continuity equation), aortic  valve velocity time integral equals 44 cm. There is no  central or paravalvular aortic regurgitation seen. Peak  left ventricular outflow tract gradient equals 3 mm Hg,  mean gradient is equal to 2 mm Hg, LVOT velocity time  integral equals 21 cm.  Left Atrium: Severely dilated left atrium.  LA volume index  = 58 cc/m2.  Left Ventricle: Normal left ventricular systolic function.  No segmental wall motion abnormalities.  The LVEF= 60-65%.  Normal left ventricular size with mild concentric  hypertrophy.  Right Heart: Severe right atrial enlargement.  RA area= 26cm2, RA volume= 91ml. A device wire is noted in  theright heart.  The right ventricle is not visualized on axis for accurate  size measurement.  The right ventricular global function is normal. There is  at least moderate tricuspid regurgitation.  Suboptimal  visualization of the tricuspid valve and right ventricle.  Normal pulmonic valve. Minimal pulmonic regurgitation.  Pericardium/PleuraNormal pericardium with no pericardial  effusion. Right pleural effusion.  Hemodynamic: The estimated right atrial pressure is  elevated. Estimated right ventricular systolic pressure  equals 56 mm Hg, assuming right atrial pressure equals 15  mm Hg, consistent with moderate pulmonary hypertension.  ------------------------------------------------------------------------  CONCLUSIONS:  1. A device wire is noted in the right heart.  The right ventricle is not visualized on axis for accurate  size measurement.  The right ventricular global function is normal.  2. The estimated right atrial pressure is elevated.  3. Estimated right ventricular systolic pressure equals 56  mm Hg, assuming right atrial pressure equals 15 mm Hg,  consistent with moderate pulmonary hypertension.  4. There is at least moderate tricuspid regurgitation.  Suboptimal visualization of the tricuspid valve and right  ventricle.  5.Right pleural effusion.  *** Compared with echocardiogram of 3/3/2022, no  significant changes noted.  ------------------------------------------------------------------------  PROCEDURE DESCRIPTION: Transthoracic echocardiogram with  2-D, M-Mode andcomplete spectral and color flow Doppler.  ------------------------------------------------------------------------  ECHOCARDIOGRAPHIC EXAMINATION:  AORTIC ROOT:  Aortic Root (Leaflet): 3.0 cm  Aortic Root Index: 0.9  LEFT ATRIUM:  AP Dimensions: 4.7cm  LA Volume Index: 58.00 cc/sqm  LA Volume: 100.2 cc  LEFT VENTRICLE:  LVIDd: 4.0 cm  LVIDs: 1.3 cm  Fraction Short: 66 %  IVS: 1.30  ILWT: 1.1 cm  RWT: 0.55  LV Mass: 165.0 gm  LV Mass Index: 95 gm/sqm  EF (Visual Estimate): 60-65%  HR and BP:  HR: 62 bpm  BP: 144/69  BSA: 1.74  TRICUSPID VALVE:  TR Velocity: 2.5 M/s  TR Gradient: 25.0 mm Hg  ------------------------------------------------------------------------  HEMODYNAMICS:  RA Pressure Estimate: 5  RVSP: 30  LA Pressure Estimate: < 20  PAS: 56  ------------------------------------------------------------------------  COLOR FLOW and SPECIAL DOPPLER:  AORTIC VALVE:  AV Peak Velocity: 1.9 M/sec  AV Peak Gradient: 14 mm Hg  AV Mean Gradient: 7 mm Hg  Valve Area: 1.4 sqcm  LVOT:  LVOT Velocity: .8 M/sec  LVOT Diameter: 1.9 cm  LVOT Peak Gradient Rest: 2 mm Hg  LVOT Mean Gradient Rest: 2 mm Hg  ------------------------------------------------------------------------  DIASTOLIC FUNCTION:  DT:231 ms  e' Septal: 5.0 cm/s  E/e' Septal: 34.0  e' Lateral: 6.0 cm/s  E/e' Lateral: 28.3  MV E wave: 1.7 m/s  ------------------------------------------------------------------------  Confirmed on  4/30/2022 - 13:25:37 by Christine Robert M.D.  ------------------------------------------------------------------------    < end of copied text >      PROTEIN CALORIE MALNUTRITION PRESENT: [ ]mild [ ]moderate [ ]severe [ ]underweight [ ]morbid obesity  https://www.andeal.org/vault/5005/web/files/ONC/Table_Clinical%20Characteristics%20to%20Document%20Malnutrition-White%20JV%20et%20al%202012.pdf    Height (cm): 157.5 (04-29-22 @ 10:59), 157.5 (03-02-22 @ 18:50), 157.5 (12-20-21 @ 18:32)  Weight (kg): 78.9 (05-05-22 @ 08:00), 74.8 (12-20-21 @ 18:32)  BMI (kg/m2): 31.8 (05-05-22 @ 08:00), 30.2 (04-29-22 @ 10:59), 30.2 (03-02-22 @ 18:50)    [ ]PPSV2 < or = to 30% [ ]significant weight loss  [ ]poor nutritional intake  [ ]anasarca      [ ]Artificial Nutrition      REFERRALS:   [ ]Chaplaincy  [ ]Hospice  [ ]Child Life  [ ]Social Work  [x ]Case management [ ]Holistic Therapy     Goals of Care Document:

## 2022-05-16 NOTE — PROGRESS NOTE ADULT - SUBJECTIVE AND OBJECTIVE BOX
Date of Service   05-16-22 @ 15:39    Patient is a 89y old  Female who presents with a chief complaint of SOB (16 May 2022 14:42)      INTERVAL HISTORY: pt feels ok   TELEMETRY Personally reviewed:     REVIEW OF SYSTEMS:   CONSTITUTIONAL: No weakness  EYES/ENT: No visual changes; No throat pain  Neck: No pain or stiffness  Respiratory: No cough, wheezing, No shortness of breath  CARDIOVASCULAR: no chest pain or palpitations  GASTROINTESTINAL: No abdominal pain, no nausea, vomiting or hematemesis  GENITOURINARY: No dysuria, frequency or hematuria  NEUROLOGICAL: No stroke like symptoms  SKIN: No rashes    	  MEDICATIONS:  amLODIPine   Tablet 5 milliGRAM(s) Oral daily  buMETAnide 1 milliGRAM(s) Oral two times a day  metoprolol succinate  milliGRAM(s) Oral daily        PHYSICAL EXAM:  T(C): 36.3 (05-16-22 @ 12:25), Max: 36.6 (05-15-22 @ 20:30)  HR: 60 (05-16-22 @ 12:25) (60 - 82)  BP: 138/75 (05-16-22 @ 12:25) (137/75 - 140/60)  RR: 18 (05-16-22 @ 12:25) (18 - 18)  SpO2: 91% (05-16-22 @ 12:25) (86% - 96%)  Wt(kg): --  I&O's Summary    15 May 2022 07:01  -  16 May 2022 07:00  --------------------------------------------------------  IN: 720 mL / OUT: 500 mL / NET: 220 mL    16 May 2022 07:01  -  16 May 2022 15:39  --------------------------------------------------------  IN: 240 mL / OUT: 0 mL / NET: 240 mL          Appearance: In no distress	  HEENT:    PERRL, EOMI	  Cardiovascular:  S1 S2, No JVD  Respiratory: Lungs clear to auscultation	  Gastrointestinal:  Soft, Non-tender, + BS	  Vasculature:  No edema of LE  Psychiatric: Appropriate affect   Neuro: no acute focal deficits                               8.5    7.37  )-----------( 250      ( 16 May 2022 06:30 )             29.9     05-16    138  |  99  |  65<H>  ----------------------------<  120<H>  4.3   |  28  |  1.51<H>    Ca    10.8<H>      16 May 2022 06:29  Phos  2.9     05-16  Mg     2.4     05-16          Labs personally reviewed      ASSESSMENT/PLAN: 	  Ms. Briones is a 89 yo female with PMH of HTN, HLD, CAD s/p CABG, AS s/p TAVR in 9/2021 at [facility], DM, CKD, AF on Eliquis, PPM who presents with 3 days of progressive shortness of breath, + orthopnea, increased LE edema and decreased oxygen saturation on pulse oximeter at home.    Problem/Plan -1  Problem: HFpEF  - Followed by Dr. Agapito Woodard for outpatient cardiology  - CT Chest reveals moderate right and small left pleural effusions, cardiomegaly, possibly superimposed consolidation  - On lasix 40mg PO daily at home  - BMP reviewed from OP cardiologist records on 12/3/21 it was 1.1, and 5 months ago it was 1.2-1.4, here 2 months ago it was 1.4-1.6  - ECHO Left Ventricle: Normal left ventricular systolic function. No segmental wall motion abnormalities.  The LVEF= 60-65%. Moderate mitral regurgitation, There is a transcatheter aortic valve replacement seen. Moderate pulmonary hypertension.  - Patient appears close to euvolemia, but not euvolemic yet, although much improved since admission, c/w IV Lasix for now, will closely monitor.  - 5/3 POCUS reveals Right pleural effusion  - BNP only slighty down (8208 --> 7983)  - Weight (bed not standing) slightly increased since admission despite IV diuresis.  - CXR shows much improved pulm vasc congestion  - Pt does not yet appear euvolemic but will continue Lasix 40mg PO BID until OP follow up  - pt reports hx of Lymphedema and says her legs have been chronically edematous but doesnt use stockings   - started on Bumex 1mg PO BID     Problem/Plan -2  Problem: Aortic Stenosis  - s/p TAVR in September 2021  - minimal murmur on ausculation  - aortic valve is well seated and has good flow  - moderate tricuspid regurg    Problem/Plan -3  Problem: CAD  - Hx CABG at [facility]  - currently denies chest pain  - EKG without ischemic changes  - Troponin elevated likely i/s/o ADHF exacerbation; peaked 4/29 at 124  - c/w simvastatin 20mg PO daily    Problem/Plan -4  Problem: AF  - rate controlled  - c/w Metoprolol and Eliquis  - monitor on tele    Problem/Plan -5  Problem: HLD  - c/w simvastatin     Problem/Plan -6  Problem: DM  - Most recent HgbA1C is 7.6 on 3.4.22  - ISS       Jacob Diaz FNP-BC   Giles Gan DO [facility]  Cardiovascular Medicine  [address] [place] [address], [address]  Office: [phone]   Date of Service   05-16-22 @ 15:39    Patient is a 89y old  Female who presents with a chief complaint of SOB (16 May 2022 14:42)      INTERVAL HISTORY: pt feels ok   TELEMETRY Personally reviewed:     REVIEW OF SYSTEMS:   CONSTITUTIONAL: No weakness  EYES/ENT: No visual changes; No throat pain  Neck: No pain or stiffness  Respiratory: No cough, wheezing, No shortness of breath  CARDIOVASCULAR: no chest pain or palpitations  GASTROINTESTINAL: No abdominal pain, no nausea, vomiting or hematemesis  GENITOURINARY: No dysuria, frequency or hematuria  NEUROLOGICAL: No stroke like symptoms  SKIN: No rashes    	  MEDICATIONS:  amLODIPine   Tablet 5 milliGRAM(s) Oral daily  buMETAnide 1 milliGRAM(s) Oral two times a day  metoprolol succinate  milliGRAM(s) Oral daily        PHYSICAL EXAM:  T(C): 36.3 (05-16-22 @ 12:25), Max: 36.6 (05-15-22 @ 20:30)  HR: 60 (05-16-22 @ 12:25) (60 - 82)  BP: 138/75 (05-16-22 @ 12:25) (137/75 - 140/60)  RR: 18 (05-16-22 @ 12:25) (18 - 18)  SpO2: 91% (05-16-22 @ 12:25) (86% - 96%)  Wt(kg): --  I&O's Summary    15 May 2022 07:01  -  16 May 2022 07:00  --------------------------------------------------------  IN: 720 mL / OUT: 500 mL / NET: 220 mL    16 May 2022 07:01  -  16 May 2022 15:39  --------------------------------------------------------  IN: 240 mL / OUT: 0 mL / NET: 240 mL          Appearance: In no distress	  HEENT:    PERRL, EOMI	  Cardiovascular:  S1 S2, No JVD  Respiratory: + wheezing on auscultation  Gastrointestinal:  Soft, Non-tender, + BS	  Vasculature:  No edema of LE  Psychiatric: Appropriate affect   Neuro: no acute focal deficits                               8.5    7.37  )-----------( 250      ( 16 May 2022 06:30 )             29.9     05-16    138  |  99  |  65<H>  ----------------------------<  120<H>  4.3   |  28  |  1.51<H>    Ca    10.8<H>      16 May 2022 06:29  Phos  2.9     05-16  Mg     2.4     05-16          Labs personally reviewed      ASSESSMENT/PLAN: 	  Ms. Briones is a 89 yo female with PMH of HTN, HLD, CAD s/p CABG, AS s/p TAVR in 9/2021 at [facility], DM, CKD, AF on Eliquis, PPM who presents with 3 days of progressive shortness of breath, + orthopnea, increased LE edema and decreased oxygen saturation on pulse oximeter at home.    Problem/Plan -1  Problem: HFpEF  - Followed by Dr. Agapito Woodard for outpatient cardiology  - CT Chest reveals moderate right and small left pleural effusions, cardiomegaly, possibly superimposed consolidation  - On lasix 40mg PO daily at home  - BMP reviewed from OP cardiologist records on 12/3/21 it was 1.1, and 5 months ago it was 1.2-1.4, here 2 months ago it was 1.4-1.6  - ECHO Left Ventricle: Normal left ventricular systolic function. No segmental wall motion abnormalities.  The LVEF= 60-65%. Moderate mitral regurgitation, There is a transcatheter aortic valve replacement seen. Moderate pulmonary hypertension.  - Patient appears close to euvolemia, but not euvolemic yet, although much improved since admission, c/w IV Lasix for now, will closely monitor.  - 5/3 POCUS reveals Right pleural effusion  - BNP only slighty down (8208 --> 7983)  - Weight (bed not standing) slightly increased since admission despite IV diuresis.  - CXR shows much improved pulm vasc congestion  - Pt does not yet appear euvolemic but will continue Lasix 40mg PO BID until OP follow up  - pt reports hx of Lymphedema and says her legs have been chronically edematous but doesnt use stockings   - started on Bumex 1mg PO BID     Problem/Plan -2  Problem: Aortic Stenosis  - s/p TAVR in September 2021  - minimal murmur on ausculation  - aortic valve is well seated and has good flow  - moderate tricuspid regurg    Problem/Plan -3  Problem: CAD  - Hx CABG at [facility]  - currently denies chest pain  - EKG without ischemic changes  - Troponin elevated likely i/s/o ADHF exacerbation; peaked 4/29 at 124  - c/w simvastatin 20mg PO daily    Problem/Plan -4  Problem: AF  - rate controlled  - c/w Metoprolol and Eliquis  - monitor on tele    Problem/Plan -5  Problem: HLD  - c/w simvastatin     Problem/Plan -6  Problem: DM  - Most recent HgbA1C is 7.6 on 3.4.22  - ISS       Jacob Diaz FNP-BC   Giles Gan DO [facility]  Cardiovascular Medicine  [address] [place] [address], [address]  Office: [phone]

## 2022-05-16 NOTE — PROGRESS NOTE ADULT - NUTRITIONAL ASSESSMENT
This patient has been assessed with a concern for Malnutrition and has been determined to have a diagnosis/diagnoses of Severe protein-calorie malnutrition.    This patient is being managed with:   Diet Regular-  Consistent Carbohydrate {No Snacks} (CSTCHO)  DASH/TLC {Sodium & Cholesterol Restricted} (DASH)  1500mL Fluid Restriction ([id number])  Entered: May  4 2022  7:31AM

## 2022-05-16 NOTE — PROGRESS NOTE ADULT - PROBLEM SELECTOR PLAN 1
- Likely 2/2 CHF with moderate pleural effusions - improving  - CT Chest on 4/29 shows moderate right and small left pleural effusions with pulmonary edema in the setting of cardiomegaly. Partial lower lobe compressive atelectasis; superimposed consolidation is difficult to exclude on this noncontrast CT.  - S/p ceftriaxone and azithromycin in ED. Monitor off abx. no evidence of infection    - Lasix 40mg PO BID   - Currently saturating well (89+) on RA - Likely 2/2 CHF with moderate pleural effusions - improving  - CT Chest on 4/29 shows moderate right and small left pleural effusions with pulmonary edema in the setting of cardiomegaly. Partial lower lobe compressive atelectasis; superimposed consolidation is difficult to exclude on this noncontrast CT.  - S/p ceftriaxone and azithromycin in ED. Monitor off abx. no evidence of infection    - Bumex 1mg PO BID   - additional 1x 40mg IV lasix 5/16  - Currently saturating well (89+) on RA

## 2022-05-16 NOTE — PROGRESS NOTE ADULT - PROBLEM SELECTOR PLAN 2
- HFpEF (EF 75% in 03/2022), AS s/p TAVR (09/2021)  - Patient initially SOB, RHODES, LE edema, +JVD likely in CHF exacerbation   - Continue home Toprol XL 100mg daily   - Furosemide 40 PO BID, close outpatient follow up with PCP for repeat BMP  - Strict I/Os, daily weights  - Appreciate cards recs : -negative 470cc over past 24 hours   - TTE shows EF 65% with mold pulm HTN and mod TR (no significant change from prior) - HFpEF (EF 75% in 03/2022), AS s/p TAVR (09/2021)  - Patient initially SOB, RHODES, LE edema, +JVD likely in CHF exacerbation   - Continue home Toprol XL 100mg daily   - bumex 1 PO BID, close outpatient follow up with PCP for repeat BMP  - 1x lasix 40mg IV 5/16  - Strict I/Os, daily weights  - Appreciate cards recs   - TTE shows EF 65% with mold pulm HTN and mod TR (no significant change from prior)

## 2022-05-16 NOTE — PROGRESS NOTE ADULT - ASSESSMENT
89yo F with PMH on HFpEF (EF 75% 03/2022), Afib (on Eliquis), HTN, DM2, CKD3, hypothyroidism, AS s/p TAVR (09/2021), s/p PPM BIBEMS for hypoxia of 88% on RA. Admitted for CHF exacerbation. Gradually improving. Pending DC to home with home care.

## 2022-05-16 NOTE — PROGRESS NOTE ADULT - ATTENDING COMMENTS
Short of breath today, given 1 dose of IV Lasix, transitioned to Bumex. Overall prognosis guarded.     GOC discussions ongoing.

## 2022-05-16 NOTE — CONSULT NOTE ADULT - ASSESSMENT
89yo F with PMH of HFpEF (EF 75% 03/2022), Afib (on Eliquis), HTN, DM2, CKD3, hypothyroidism, AS s/p TAVR (09/2021), s/p PPM BIBEMS for hypoxia of 88% on RA. Admitted for heart failure exacerbation. palliative consulted for GOC

## 2022-05-16 NOTE — PROGRESS NOTE ADULT - PROBLEM SELECTOR PLAN 9
Strong peripheral pulses - E coli in urine. Asymptomatic. No fever or wbc.  - Continue to monitor  - If fevers, will start Ceftriaxone

## 2022-05-16 NOTE — PROGRESS NOTE ADULT - PROBLEM SELECTOR PLAN 10
Home meds: Continue home Allopurinol 100mg daily, Simvastatin 20mg qhs, Protonix 40mg daily   DVT ppx: Eliquis   Diet: DASH/Renal   Dispo: PT recs home with home PT; patient has home care and prefers no JEREMIAS.   DNR/DNI

## 2022-05-16 NOTE — PROGRESS NOTE ADULT - SUBJECTIVE AND OBJECTIVE BOX
Ez Pulliam, PGY1    DATE OF SERVICE: 05-16-22 @ 06:57    Patient is a 89y old  Female who presents with a chief complaint of SOB (15 May 2022 10:41)      SUBJECTIVE / OVERNIGHT EVENTS: No acute events overnight. Yesterday PM, discussed with patient and daughter at bedside that they want to weigh all discharge options including [facility] vs home with hospice. Wanted palliative medicine consult to further explore hospice, gather information.     MEDICATIONS  (STANDING):  allopurinol 100 milliGRAM(s) Oral daily  amLODIPine   Tablet 5 milliGRAM(s) Oral daily  apixaban 2.5 milliGRAM(s) Oral two times a day  dextrose 5%. 1000 milliLiter(s) (50 mL/Hr) IV Continuous <Continuous>  dextrose 5%. 1000 milliLiter(s) (100 mL/Hr) IV Continuous <Continuous>  dextrose 50% Injectable 25 Gram(s) IV Push once  dextrose 50% Injectable 12.5 Gram(s) IV Push once  dextrose 50% Injectable 25 Gram(s) IV Push once  furosemide    Tablet 40 milliGRAM(s) Oral two times a day  glucagon  Injectable 1 milliGRAM(s) IntraMuscular once  insulin glargine Injectable (LANTUS) 19 Unit(s) SubCutaneous at bedtime  insulin lispro (ADMELOG) corrective regimen sliding scale   SubCutaneous at bedtime  insulin lispro (ADMELOG) corrective regimen sliding scale   SubCutaneous three times a day before meals  insulin lispro Injectable (ADMELOG) 4 Unit(s) SubCutaneous three times a day before meals  levothyroxine 50 MICROGram(s) Oral <User Schedule>  levothyroxine 100 MICROGram(s) Oral <User Schedule>  metoprolol succinate  milliGRAM(s) Oral daily  multivitamin 1 Tablet(s) Oral daily  pantoprazole    Tablet 40 milliGRAM(s) Oral before breakfast  simvastatin 20 milliGRAM(s) Oral at bedtime    MEDICATIONS  (PRN):  acetaminophen     Tablet .. 650 milliGRAM(s) Oral every 6 hours PRN Temp greater or equal to 38C (100.4F), Mild Pain (1 - 3)  dextrose Oral Gel 15 Gram(s) Oral once PRN Blood Glucose LESS THAN 70 milliGRAM(s)/deciliter      Vital Signs Last 24 Hrs  T(C): 36.6 (16 May 2022 04:35), Max: 36.6 (15 May 2022 20:30)  T(F): 97.8 (16 May 2022 04:35), Max: 97.9 (15 May 2022 20:30)  HR: 60 (16 May 2022 04:35) (60 - 82)  BP: 137/75 (16 May 2022 04:35) (132/80 - 140/60)  BP(mean): --  RR: 18 (16 May 2022 04:35) (18 - 18)  SpO2: 94% (16 May 2022 04:35) (86% - 96%)  CAPILLARY BLOOD GLUCOSE      POCT Blood Glucose.: 256 mg/dL (15 May 2022 21:24)  POCT Blood Glucose.: 180 mg/dL (15 May 2022 17:07)  POCT Blood Glucose.: 179 mg/dL (15 May 2022 12:30)  POCT Blood Glucose.: 140 mg/dL (15 May 2022 07:54)    I&O's Summary    14 May 2022 07:01  -  15 May 2022 07:00  --------------------------------------------------------  IN: 720 mL / OUT: 1100 mL / NET: -380 mL    15 May 2022 07:01  -  16 May 2022 06:57  --------------------------------------------------------  IN: 720 mL / OUT: 500 mL / NET: 220 mL        PHYSICAL EXAM:  GENERAL: NAD, elderly female  HEAD:  Atraumatic, Normocephalic  EYES: EOMI, PERRLA, conjunctiva and sclera clear  ENMT: Moist mucous membranes  NECK: Supple, no JVD appreciated  CHEST/LUNG: Adequate inspiratory effort. slight expiratory crackles bilaterally. Breathing comfortably On room air.   HEART: Regular rate and rhythm; No murmurs, rubs, or gallops.   ABDOMEN: Soft, Nontender, Nondistended; Bowel sounds present  EXTREMITIES:  2+ Peripheral Pulses. 1+ pitting b/l LE edema with chronic skin changes in LLE.   NERVOUS SYSTEM:  Alert & Oriented X2-3  PSYCH: Normal Affect. Laying in bed comfortably; not agitated    LABS:                        8.5    7.37  )-----------( 250      ( 16 May 2022 06:30 )             29.9     05-15    136  |  99  |  67<H>  ----------------------------<  128<H>  4.5   |  28  |  1.59<H>    Ca    11.0<H>      15 May 2022 06:34  Phos  3.1     05-15  Mg     2.2     05-15                RADIOLOGY & ADDITIONAL TESTS:    Imaging Personally Reviewed:    Consultant(s) Notes Reviewed:      Care Discussed with Consultants/Other Providers:   Ez Pulliam, PGY1    DATE OF SERVICE: 05-16-22 @ 06:57    Patient is a 89y old  Female who presents with a chief complaint of SOB (15 May 2022 10:41)      SUBJECTIVE / OVERNIGHT EVENTS: No acute events overnight. Yesterday PM, discussed with patient and daughter at bedside that they want to weigh all discharge options including [facility] vs home with hospice. Wanted palliative medicine consult to further explore hospice, gather information.     Patient seen this AM. Slight increased work of breathing. Denies chest pain. Feels like she has not bee urinating as much on oral lasix doses. No nausea/vomiting.     MEDICATIONS  (STANDING):  allopurinol 100 milliGRAM(s) Oral daily  amLODIPine   Tablet 5 milliGRAM(s) Oral daily  apixaban 2.5 milliGRAM(s) Oral two times a day  dextrose 5%. 1000 milliLiter(s) (50 mL/Hr) IV Continuous <Continuous>  dextrose 5%. 1000 milliLiter(s) (100 mL/Hr) IV Continuous <Continuous>  dextrose 50% Injectable 25 Gram(s) IV Push once  dextrose 50% Injectable 12.5 Gram(s) IV Push once  dextrose 50% Injectable 25 Gram(s) IV Push once  furosemide    Tablet 40 milliGRAM(s) Oral two times a day  glucagon  Injectable 1 milliGRAM(s) IntraMuscular once  insulin glargine Injectable (LANTUS) 19 Unit(s) SubCutaneous at bedtime  insulin lispro (ADMELOG) corrective regimen sliding scale   SubCutaneous at bedtime  insulin lispro (ADMELOG) corrective regimen sliding scale   SubCutaneous three times a day before meals  insulin lispro Injectable (ADMELOG) 4 Unit(s) SubCutaneous three times a day before meals  levothyroxine 50 MICROGram(s) Oral <User Schedule>  levothyroxine 100 MICROGram(s) Oral <User Schedule>  metoprolol succinate  milliGRAM(s) Oral daily  multivitamin 1 Tablet(s) Oral daily  pantoprazole    Tablet 40 milliGRAM(s) Oral before breakfast  simvastatin 20 milliGRAM(s) Oral at bedtime    MEDICATIONS  (PRN):  acetaminophen     Tablet .. 650 milliGRAM(s) Oral every 6 hours PRN Temp greater or equal to 38C (100.4F), Mild Pain (1 - 3)  dextrose Oral Gel 15 Gram(s) Oral once PRN Blood Glucose LESS THAN 70 milliGRAM(s)/deciliter      Vital Signs Last 24 Hrs  T(C): 36.6 (16 May 2022 04:35), Max: 36.6 (15 May 2022 20:30)  T(F): 97.8 (16 May 2022 04:35), Max: 97.9 (15 May 2022 20:30)  HR: 60 (16 May 2022 04:35) (60 - 82)  BP: 137/75 (16 May 2022 04:35) (132/80 - 140/60)  BP(mean): --  RR: 18 (16 May 2022 04:35) (18 - 18)  SpO2: 94% (16 May 2022 04:35) (86% - 96%)  CAPILLARY BLOOD GLUCOSE      POCT Blood Glucose.: 256 mg/dL (15 May 2022 21:24)  POCT Blood Glucose.: 180 mg/dL (15 May 2022 17:07)  POCT Blood Glucose.: 179 mg/dL (15 May 2022 12:30)  POCT Blood Glucose.: 140 mg/dL (15 May 2022 07:54)    I&O's Summary    14 May 2022 07:01  -  15 May 2022 07:00  --------------------------------------------------------  IN: 720 mL / OUT: 1100 mL / NET: -380 mL    15 May 2022 07:01  -  16 May 2022 06:57  --------------------------------------------------------  IN: 720 mL / OUT: 500 mL / NET: 220 mL        PHYSICAL EXAM:  GENERAL: NAD, elderly female  HEAD:  Atraumatic, Normocephalic  EYES: EOMI, PERRLA, conjunctiva and sclera clear  ENMT: Moist mucous membranes  NECK: Supple, JVD up to below ear  CHEST/LUNG: Adequate inspiratory effort. slight expiratory crackles bilaterally and wheezes. slight increased work of breath On room air.   HEART: Regular rate and rhythm; No murmurs, rubs, or gallops.   ABDOMEN: Soft, Nontender, Nondistended; Bowel sounds present  EXTREMITIES:  2+ Peripheral Pulses. 1+ pitting b/l LE edema with chronic skin changes in LLE.   NERVOUS SYSTEM:  Alert & Oriented X2-3  PSYCH: Normal Affect. Laying in bed comfortably; not agitated    LABS:                        8.5    7.37  )-----------( 250      ( 16 May 2022 06:30 )             29.9     05-15    136  |  99  |  67<H>  ----------------------------<  128<H>  4.5   |  28  |  1.59<H>    Ca    11.0<H>      15 May 2022 06:34  Phos  3.1     05-15  Mg     2.2     05-15                RADIOLOGY & ADDITIONAL TESTS:    Imaging Personally Reviewed:    Consultant(s) Notes Reviewed:      Care Discussed with Consultants/Other Providers:

## 2022-05-16 NOTE — CONSULT NOTE ADULT - CONVERSATION DETAILS
Met with patient at bedside, introduced self and role.  Patient understands she has CHF, notes needing to be compliant with a diet but her functional limitations that she has been struggling with for past year. She states her options are to go home or to go to rehab and she is unsure what she wants to do.  We discussed what her overarching goal is- and patient states that she wants to try to get better, she was previously ambulatory able to complete ADLs and now she notes fatigue and need for help. She lives with her  who is 95, and also not in "great health", and that they "help each other." She does state they have care at home.    I asked patient if getting back to her functionality that she had a year ago was realistic- she said that even if it wasn't she would "want to try." I explained that trying would mean having a plan of care that would foster more physical therapy, and based on the options of home vs. rehab, that rehab may be able to offer that more readily, where home PT would not come with much frequency. She notes her previous rehab stay at [facility] and felt as though she spent a lot of time in bed.  I discussed with her the high potential for readmission given her disease process.    She asked me what "palliative" care meant, and states that she always felt this meant end of life, which makes her upset because she doesn't feel as though she is at end of life. I discussed the difference between palliative care and hospice care. When discussing hospice care, I explained she could go home with these services if her goals were aligned with focusing more on her comfort and quality of life, and not coming back and forth to the hospital. She states she is not interested in hospice care at this time. I did discuss home palliative care as a potential option, though it is short term, but available to her should she decide she wants to go home. Patient feels as though her children are directing her discharge planning, and she feels she should be more involved. Patient validated in these thoughts, that she has capacity to make her own decisions, and its up to her what she wants to do and who she wants to include in decision making. Patient was, however, amenable to this provider reaching out to her daughter to inform her of the above. Patient still contemplating home vs. rehab and interested in speaking with case management re: insurance coverage. She also wants to discuss options with her spouse.  Time spent: 30 minutes    ---  Outreach made separately to daughter Tawanna and son in law. Introduced self and role. CHERYL is geriatric/palliative care physician. Reviewed above conversation and family feels rehab is best choice to accomplish goal. we reviewed patients ability to make decisions, even if at times we may not agree with them. family things [facility] would be most ideal if patient chooses rehab. reviewed that patient has completed advance directives for DNR/DNI at this time. MOLST in chart completed by primary team on admission. All questions answered, ongoing availability assured.  Time spent: 20 minutes    ---    Outreach made to Dr. Gordon, PCP, and apprised of the above.

## 2022-05-16 NOTE — CONSULT NOTE ADULT - PROBLEM SELECTOR RECOMMENDATION 4
d/c planning per primary team and case management  no further role for acute inpatient palliative team identified. PCP updated by this provider today.  discussed with patient and family.  reconsult if needed. [phone]

## 2022-05-17 NOTE — PROGRESS NOTE ADULT - ASSESSMENT
89yo F with PMH on HFpEF (EF 75% 03/2022), Afib (on Eliquis), HTN, DM2, CKD3, hypothyroidism, AS s/p TAVR (09/2021), s/p PPM BIBEMS for hypoxia of 88% on RA. Admitted for CHF exacerbation. Gradually improving. Pending DC to home with home care.

## 2022-05-17 NOTE — PROGRESS NOTE ADULT - PROBLEM SELECTOR PLAN 2
- HFpEF (EF 75% in 03/2022), AS s/p TAVR (09/2021)  - Patient initially SOB, RHODES, LE edema, +JVD likely in CHF exacerbation   - Continue home Toprol XL 100mg daily   - bumex 1 PO BID, close outpatient follow up with PCP for repeat BMP  - Strict I/Os, daily weights  - Appreciate cards recs   - TTE shows EF 65% with mold pulm HTN and mod TR (no significant change from prior)

## 2022-05-17 NOTE — PROGRESS NOTE ADULT - PROBLEM SELECTOR PLAN 10
Home meds: Continue home Allopurinol 100mg daily, Simvastatin 20mg qhs, Protonix 40mg daily   DVT ppx: Eliquis   Diet: DASH/Renal   Dispo: PT recs home with home PT; patient has home care and prefers no JEREMIAS.   DNR/DNI Home meds: Continue home Allopurinol 100mg daily, Simvastatin 20mg qhs, Protonix 40mg daily   DVT ppx: Eliquis   Diet: DASH/Renal   Dispo: PT recs home with home PT; patient has home care and prefers no JEREMIAS.   DNR/DNI  Communication: updated  (dameon) by phone 5/17

## 2022-05-17 NOTE — PROGRESS NOTE ADULT - ATTENDING COMMENTS
Continue Bumex. Intermittently borderline hypoxic, appears dyspneic at rest.     Overall prognosis remains guarded, high risk of readmission after discharge.    GOC discussions ongoing, palliative following.

## 2022-05-17 NOTE — PROGRESS NOTE ADULT - SUBJECTIVE AND OBJECTIVE BOX
DATE OF SERVICE: 05-17-22 @ 21:29    Patient is a 89y old  Female who presents with a chief complaint of SOB (17 May 2022 07:02)      INTERVAL HISTORY: feels ok    MEDICATIONS:  amLODIPine   Tablet 5 milliGRAM(s) Oral daily  furosemide   Injectable 40 milliGRAM(s) IV Push every 12 hours  metoprolol succinate  milliGRAM(s) Oral daily        PHYSICAL EXAM:  T(C): 36.4 (05-17-22 @ 20:55), Max: 36.4 (05-17-22 @ 11:52)  HR: 61 (05-17-22 @ 20:55) (60 - 61)  BP: 121/58 (05-17-22 @ 20:55) (117/68 - 130/61)  RR: 18 (05-17-22 @ 20:55) (18 - 20)  SpO2: 90% (05-17-22 @ 20:55) (88% - 95%)  Wt(kg): --  I&O's Summary    16 May 2022 07:01  -  17 May 2022 07:00  --------------------------------------------------------  IN: 480 mL / OUT: 1050 mL / NET: -570 mL    17 May 2022 07:01  -  17 May 2022 21:29  --------------------------------------------------------  IN: 240 mL / OUT: 0 mL / NET: 240 mL          Appearance: In no distress	  HEENT:    PERRL, EOMI	  Cardiovascular:  S1 S2, No JVD  Respiratory: Lungs clear to auscultation	  Gastrointestinal:  Soft, Non-tender, + BS	  Vascularature:  No edema of LE  Psychiatric: Appropriate affect   Neuro: no acute focal deficits                               8.6    6.47  )-----------( 240      ( 17 May 2022 06:13 )             30.4     05-17    138  |  98  |  62<H>  ----------------------------<  140<H>  3.9   |  29  |  1.44<H>    Ca    10.7<H>      17 May 2022 06:13  Phos  3.5     05-17  Mg     2.2     05-17          Labs personally reviewed      ASSESSMENT/PLAN: 	  Ms. Briones is a 89 yo female with PMH of HTN, HLD, CAD s/p CABG, AS s/p TAVR in 9/2021 at [facility], DM, CKD, AF on Eliquis, PPM who presents with 3 days of progressive shortness of breath, + orthopnea, increased LE edema and decreased oxygen saturation on pulse oximeter at home.    Problem/Plan -1  Problem: HFpEF  - Followed by Dr. Agapito Woodard for outpatient cardiology  - CT Chest reveals moderate right and small left pleural effusions, cardiomegaly, possibly superimposed consolidation  - On lasix 40mg PO daily at home  - BMP reviewed from OP cardiologist records on 12/3/21 it was 1.1, and 5 months ago it was 1.2-1.4, here 2 months ago it was 1.4-1.6  - ECHO Left Ventricle: Normal left ventricular systolic function. No segmental wall motion abnormalities.  The LVEF= 60-65%. Moderate mitral regurgitation, There is a transcatheter aortic valve replacement seen. Moderate pulmonary hypertension.  - Patient appears close to euvolemia, but not euvolemic yet, although much improved since admission, c/w IV Lasix for now, will closely monitor.  - 5/3 POCUS reveals Right pleural effusion  - BNP only slighty down (8208 --> 7983)  - Weight (bed not standing) slightly increased since admission despite IV diuresis.  - CXR shows much improved pulm vasc congestion  - Pt does not yet appear euvolemic but will continue Lasix 40mg PO BID until OP follow up  - pt reports hx of Lymphedema and says her legs have been chronically edematous but doesnt use stockings   - Switch to Lasix 40mg IV BID as again volume overloaded    Problem/Plan -2  Problem: Aortic Stenosis  - s/p TAVR in September 2021  - minimal murmur on ausculation  - aortic valve is well seated and has good flow  - moderate tricuspid regurg    Problem/Plan -3  Problem: CAD  - Hx CABG at [facility]  - currently denies chest pain  - EKG without ischemic changes  - Troponin elevated likely i/s/o ADHF exacerbation; peaked 4/29 at 124  - c/w simvastatin 20mg PO daily    Problem/Plan -4  Problem: AF  - rate controlled  - c/w Metoprolol and Eliquis  - monitor on tele    Problem/Plan -5  Problem: HLD  - c/w simvastatin     Problem/Plan -6  Problem: DM  - Most recent HgbA1C is 7.6 on 3.4.22  - ISS           Giles Gan DO [facility]  Cardiovascular Medicine  [address] [place] [address], [address]  Office: [phone]  Cell: [phone]

## 2022-05-17 NOTE — PROGRESS NOTE ADULT - SUBJECTIVE AND OBJECTIVE BOX
Ez Pulliam, PGY1    DATE OF SERVICE: 05-17-22 @ 07:02    Patient is a 89y old  Female who presents with a chief complaint of SOB (16 May 2022 15:39)      SUBJECTIVE / OVERNIGHT EVENTS: No acute events overnight. Patient documented to be on nasal cannula overnight but no desaturation event documented by overnight nursing.     This AM.     MEDICATIONS  (STANDING):  allopurinol 100 milliGRAM(s) Oral daily  amLODIPine   Tablet 5 milliGRAM(s) Oral daily  apixaban 2.5 milliGRAM(s) Oral two times a day  buMETAnide 1 milliGRAM(s) Oral two times a day  dextrose 5%. 1000 milliLiter(s) (100 mL/Hr) IV Continuous <Continuous>  dextrose 5%. 1000 milliLiter(s) (50 mL/Hr) IV Continuous <Continuous>  dextrose 50% Injectable 25 Gram(s) IV Push once  dextrose 50% Injectable 12.5 Gram(s) IV Push once  dextrose 50% Injectable 25 Gram(s) IV Push once  glucagon  Injectable 1 milliGRAM(s) IntraMuscular once  insulin glargine Injectable (LANTUS) 19 Unit(s) SubCutaneous at bedtime  insulin lispro (ADMELOG) corrective regimen sliding scale   SubCutaneous at bedtime  insulin lispro (ADMELOG) corrective regimen sliding scale   SubCutaneous three times a day before meals  insulin lispro Injectable (ADMELOG) 4 Unit(s) SubCutaneous three times a day before meals  levothyroxine 50 MICROGram(s) Oral <User Schedule>  levothyroxine 100 MICROGram(s) Oral <User Schedule>  metoprolol succinate  milliGRAM(s) Oral daily  multivitamin 1 Tablet(s) Oral daily  pantoprazole    Tablet 40 milliGRAM(s) Oral before breakfast  potassium chloride    Tablet ER 20 milliEquivalent(s) Oral once  simvastatin 20 milliGRAM(s) Oral at bedtime    MEDICATIONS  (PRN):  acetaminophen     Tablet .. 650 milliGRAM(s) Oral every 6 hours PRN Temp greater or equal to 38C (100.4F), Mild Pain (1 - 3)  dextrose Oral Gel 15 Gram(s) Oral once PRN Blood Glucose LESS THAN 70 milliGRAM(s)/deciliter      Vital Signs Last 24 Hrs  T(C): 36.3 (17 May 2022 04:24), Max: 36.3 (16 May 2022 12:25)  T(F): 97.4 (17 May 2022 04:24), Max: 97.4 (16 May 2022 12:25)  HR: 60 (17 May 2022 04:24) (60 - 60)  BP: 117/68 (17 May 2022 04:24) (117/68 - 138/75)  BP(mean): --  RR: 18 (17 May 2022 04:24) (18 - 18)  SpO2: 95% (17 May 2022 04:24) (91% - 95%)  CAPILLARY BLOOD GLUCOSE      POCT Blood Glucose.: 202 mg/dL (16 May 2022 21:26)  POCT Blood Glucose.: 131 mg/dL (16 May 2022 16:56)  POCT Blood Glucose.: 76 mg/dL (16 May 2022 12:05)  POCT Blood Glucose.: 109 mg/dL (16 May 2022 08:16)    I&O's Summary    16 May 2022 07:01  -  17 May 2022 07:00  --------------------------------------------------------  IN: 480 mL / OUT: 1050 mL / NET: -570 mL        PHYSICAL EXAM:  GENERAL: NAD, elderly female  HEAD:  Atraumatic, Normocephalic  EYES: EOMI, PERRLA, conjunctiva and sclera clear  ENMT: Moist mucous membranes  NECK: Supple, JVD up to below ear  CHEST/LUNG: Adequate inspiratory effort. slight expiratory crackles bilaterally and wheezes. slight increased work of breath On room air.   HEART: Regular rate and rhythm; No murmurs, rubs, or gallops.   ABDOMEN: Soft, Nontender, Nondistended; Bowel sounds present  EXTREMITIES:  2+ Peripheral Pulses. 1+ pitting b/l LE edema with chronic skin changes in LLE.   NERVOUS SYSTEM:  Alert & Oriented X2-3  PSYCH: Normal Affect. Laying in bed comfortably; not agitated    LABS:                        8.6    6.47  )-----------( 240      ( 17 May 2022 06:13 )             30.4     05-17    138  |  98  |  62<H>  ----------------------------<  140<H>  3.9   |  29  |  1.44<H>    Ca    10.7<H>      17 May 2022 06:13  Phos  3.5     05-17  Mg     2.2     05-17                RADIOLOGY & ADDITIONAL TESTS:    Imaging Personally Reviewed:    Consultant(s) Notes Reviewed:      Care Discussed with Consultants/Other Providers:   Ez Pulliam, PGY1    DATE OF SERVICE: 05-17-22 @ 07:02    Patient is a 89y old  Female who presents with a chief complaint of SOB (16 May 2022 15:39)      SUBJECTIVE / OVERNIGHT EVENTS: No acute events overnight. Patient documented to be on nasal cannula overnight but no desaturation event documented by overnight nursing. Patient feels like breathing is OK on room air this AM. Slight dyspnea moving chair to bed and moving in bed to eat breakfast. No chest pain, n/v.     MEDICATIONS  (STANDING):  allopurinol 100 milliGRAM(s) Oral daily  amLODIPine   Tablet 5 milliGRAM(s) Oral daily  apixaban 2.5 milliGRAM(s) Oral two times a day  buMETAnide 1 milliGRAM(s) Oral two times a day  dextrose 5%. 1000 milliLiter(s) (100 mL/Hr) IV Continuous <Continuous>  dextrose 5%. 1000 milliLiter(s) (50 mL/Hr) IV Continuous <Continuous>  dextrose 50% Injectable 25 Gram(s) IV Push once  dextrose 50% Injectable 12.5 Gram(s) IV Push once  dextrose 50% Injectable 25 Gram(s) IV Push once  glucagon  Injectable 1 milliGRAM(s) IntraMuscular once  insulin glargine Injectable (LANTUS) 19 Unit(s) SubCutaneous at bedtime  insulin lispro (ADMELOG) corrective regimen sliding scale   SubCutaneous at bedtime  insulin lispro (ADMELOG) corrective regimen sliding scale   SubCutaneous three times a day before meals  insulin lispro Injectable (ADMELOG) 4 Unit(s) SubCutaneous three times a day before meals  levothyroxine 50 MICROGram(s) Oral <User Schedule>  levothyroxine 100 MICROGram(s) Oral <User Schedule>  metoprolol succinate  milliGRAM(s) Oral daily  multivitamin 1 Tablet(s) Oral daily  pantoprazole    Tablet 40 milliGRAM(s) Oral before breakfast  potassium chloride    Tablet ER 20 milliEquivalent(s) Oral once  simvastatin 20 milliGRAM(s) Oral at bedtime    MEDICATIONS  (PRN):  acetaminophen     Tablet .. 650 milliGRAM(s) Oral every 6 hours PRN Temp greater or equal to 38C (100.4F), Mild Pain (1 - 3)  dextrose Oral Gel 15 Gram(s) Oral once PRN Blood Glucose LESS THAN 70 milliGRAM(s)/deciliter      Vital Signs Last 24 Hrs  T(C): 36.3 (17 May 2022 04:24), Max: 36.3 (16 May 2022 12:25)  T(F): 97.4 (17 May 2022 04:24), Max: 97.4 (16 May 2022 12:25)  HR: 60 (17 May 2022 04:24) (60 - 60)  BP: 117/68 (17 May 2022 04:24) (117/68 - 138/75)  BP(mean): --  RR: 18 (17 May 2022 04:24) (18 - 18)  SpO2: 95% (17 May 2022 04:24) (91% - 95%)  CAPILLARY BLOOD GLUCOSE      POCT Blood Glucose.: 202 mg/dL (16 May 2022 21:26)  POCT Blood Glucose.: 131 mg/dL (16 May 2022 16:56)  POCT Blood Glucose.: 76 mg/dL (16 May 2022 12:05)  POCT Blood Glucose.: 109 mg/dL (16 May 2022 08:16)    I&O's Summary    16 May 2022 07:01  -  17 May 2022 07:00  --------------------------------------------------------  IN: 480 mL / OUT: 1050 mL / NET: -570 mL        PHYSICAL EXAM:  GENERAL: NAD, elderly female  HEAD:  Atraumatic, Normocephalic  EYES: EOMI, PERRLA, conjunctiva and sclera clear  ENMT: Moist mucous membranes  NECK: Supple, JVD improving   CHEST/LUNG: Adequate inspiratory effort. slight expiratory crackles bilaterally and wheezes. slight increased work of breath On room air.   HEART: Regular rate and rhythm; No murmurs, rubs, or gallops.   ABDOMEN: Soft, Nontender, Nondistended; Bowel sounds present  EXTREMITIES:  2+ Peripheral Pulses. 1+ pitting b/l LE edema with chronic skin changes in LLE.   NERVOUS SYSTEM:  Alert & Oriented X2-3  PSYCH: Normal Affect. Laying in bed comfortably; not agitated    LABS:                        8.6    6.47  )-----------( 240      ( 17 May 2022 06:13 )             30.4     05-17    138  |  98  |  62<H>  ----------------------------<  140<H>  3.9   |  29  |  1.44<H>    Ca    10.7<H>      17 May 2022 06:13  Phos  3.5     05-17  Mg     2.2     05-17                RADIOLOGY & ADDITIONAL TESTS:    Imaging Personally Reviewed:    Consultant(s) Notes Reviewed:      Care Discussed with Consultants/Other Providers:   Ez Pulliam, PGY1    DATE OF SERVICE: 05-17-22 @ 07:02    Patient is a 89y old  Female who presents with a chief complaint of SOB (16 May 2022 15:39)      SUBJECTIVE / OVERNIGHT EVENTS: No acute events overnight. Patient documented to be on nasal cannula overnight but no desaturation event documented by overnight nursing. Patient feels like breathing is OK on room air this AM. Slight dyspnea moving chair to bed and moving in bed to eat breakfast. No chest pain, n/v.     Tele reviewed: afib vpaced 50-60s    MEDICATIONS  (STANDING):  allopurinol 100 milliGRAM(s) Oral daily  amLODIPine   Tablet 5 milliGRAM(s) Oral daily  apixaban 2.5 milliGRAM(s) Oral two times a day  buMETAnide 1 milliGRAM(s) Oral two times a day  dextrose 5%. 1000 milliLiter(s) (100 mL/Hr) IV Continuous <Continuous>  dextrose 5%. 1000 milliLiter(s) (50 mL/Hr) IV Continuous <Continuous>  dextrose 50% Injectable 25 Gram(s) IV Push once  dextrose 50% Injectable 12.5 Gram(s) IV Push once  dextrose 50% Injectable 25 Gram(s) IV Push once  glucagon  Injectable 1 milliGRAM(s) IntraMuscular once  insulin glargine Injectable (LANTUS) 19 Unit(s) SubCutaneous at bedtime  insulin lispro (ADMELOG) corrective regimen sliding scale   SubCutaneous at bedtime  insulin lispro (ADMELOG) corrective regimen sliding scale   SubCutaneous three times a day before meals  insulin lispro Injectable (ADMELOG) 4 Unit(s) SubCutaneous three times a day before meals  levothyroxine 50 MICROGram(s) Oral <User Schedule>  levothyroxine 100 MICROGram(s) Oral <User Schedule>  metoprolol succinate  milliGRAM(s) Oral daily  multivitamin 1 Tablet(s) Oral daily  pantoprazole    Tablet 40 milliGRAM(s) Oral before breakfast  potassium chloride    Tablet ER 20 milliEquivalent(s) Oral once  simvastatin 20 milliGRAM(s) Oral at bedtime    MEDICATIONS  (PRN):  acetaminophen     Tablet .. 650 milliGRAM(s) Oral every 6 hours PRN Temp greater or equal to 38C (100.4F), Mild Pain (1 - 3)  dextrose Oral Gel 15 Gram(s) Oral once PRN Blood Glucose LESS THAN 70 milliGRAM(s)/deciliter      Vital Signs Last 24 Hrs  T(C): 36.3 (17 May 2022 04:24), Max: 36.3 (16 May 2022 12:25)  T(F): 97.4 (17 May 2022 04:24), Max: 97.4 (16 May 2022 12:25)  HR: 60 (17 May 2022 04:24) (60 - 60)  BP: 117/68 (17 May 2022 04:24) (117/68 - 138/75)  BP(mean): --  RR: 18 (17 May 2022 04:24) (18 - 18)  SpO2: 95% (17 May 2022 04:24) (91% - 95%)  CAPILLARY BLOOD GLUCOSE      POCT Blood Glucose.: 202 mg/dL (16 May 2022 21:26)  POCT Blood Glucose.: 131 mg/dL (16 May 2022 16:56)  POCT Blood Glucose.: 76 mg/dL (16 May 2022 12:05)  POCT Blood Glucose.: 109 mg/dL (16 May 2022 08:16)    I&O's Summary    16 May 2022 07:01  -  17 May 2022 07:00  --------------------------------------------------------  IN: 480 mL / OUT: 1050 mL / NET: -570 mL        PHYSICAL EXAM:  GENERAL: NAD, elderly female  HEAD:  Atraumatic, Normocephalic  EYES: EOMI, PERRLA, conjunctiva and sclera clear  ENMT: Moist mucous membranes  NECK: Supple, JVD improving   CHEST/LUNG: Adequate inspiratory effort. slight expiratory crackles bilaterally and wheezes. slight increased work of breath On room air.   HEART: Regular rate and rhythm; No murmurs, rubs, or gallops.   ABDOMEN: Soft, Nontender, Nondistended; Bowel sounds present  EXTREMITIES:  2+ Peripheral Pulses. 1+ pitting b/l LE edema with chronic skin changes in LLE.   NERVOUS SYSTEM:  Alert & Oriented X2-3  PSYCH: Normal Affect. Laying in bed comfortably; not agitated    LABS:                        8.6    6.47  )-----------( 240      ( 17 May 2022 06:13 )             30.4     05-17    138  |  98  |  62<H>  ----------------------------<  140<H>  3.9   |  29  |  1.44<H>    Ca    10.7<H>      17 May 2022 06:13  Phos  3.5     05-17  Mg     2.2     05-17                RADIOLOGY & ADDITIONAL TESTS:    Imaging Personally Reviewed:    Consultant(s) Notes Reviewed:      Care Discussed with Consultants/Other Providers:

## 2022-05-17 NOTE — PROGRESS NOTE ADULT - PROBLEM SELECTOR PLAN 1
- Likely 2/2 CHF with moderate pleural effusions - improving  - CT Chest on 4/29 shows moderate right and small left pleural effusions with pulmonary edema in the setting of cardiomegaly. Partial lower lobe compressive atelectasis; superimposed consolidation is difficult to exclude on this noncontrast CT.  - S/p ceftriaxone and azithromycin in ED. Monitor off abx. no evidence of infection    - Bumex 1mg PO BID   - Currently saturating well (89+) on RA - Likely 2/2 CHF with moderate pleural effusions - improving  - CT Chest on 4/29 shows moderate right and small left pleural effusions with pulmonary edema in the setting of cardiomegaly. Partial lower lobe compressive atelectasis; superimposed consolidation is difficult to exclude on this noncontrast CT.  - S/p ceftriaxone and azithromycin in ED. Monitor off abx. no evidence of infection    - Bumex 1mg PO BID   - Currently saturating well (89+) on RA  - plan to recheck ambulatory O2 today

## 2022-05-17 NOTE — PROGRESS NOTE ADULT - NUTRITIONAL ASSESSMENT
This patient has been assessed with a concern for Malnutrition and has been determined to have a diagnosis/diagnoses of Severe protein-calorie malnutrition.    This patient is being managed with:   Diet Regular-  Consistent Carbohydrate {No Snacks} (CSTCHO)  DASH/TLC {Sodium & Cholesterol Restricted} (DASH)  1500mL Fluid Restriction ([id number])  Entered: May  4 2022  7:31AM

## 2022-05-18 NOTE — PROGRESS NOTE ADULT - PROBLEM SELECTOR PLAN 10
Home meds: Continue home Allopurinol 100mg daily, Simvastatin 20mg qhs, Protonix 40mg daily   DVT ppx: Eliquis   Diet: DASH/Renal   Dispo: PT recs home with home PT; patient has home care and prefers no JEREMIAS.   DNR/DNI  Communication: updated  (dameon) by phone 5/17 Home meds: Continue home Allopurinol 100mg daily, Simvastatin 20mg qhs, Protonix 40mg daily   DVT ppx: Eliquis   Diet: DASH/Renal   Dispo: PT recs home with home PT; patient has home care and prefers no JEREMIAS.   DNR/DNI  Communication: updated  (dameon) by phone 5/18; did not answer phone, will attempt to call back later in day Home meds: Continue home Allopurinol 100mg daily, Simvastatin 20mg qhs, Protonix 40mg daily   DVT ppx: Eliquis   Diet: DASH/Renal   Dispo: PT recs home with home PT; patient has home care and prefers no JEREMIAS.   DNR/DNI  Communication: updated  (dameon) by phone 5/18

## 2022-05-18 NOTE — PHARMACOTHERAPY INTERVENTION NOTE - COMMENTS
Patient is 89F with history of T2DM (A1c 7.4% in 3/4/22) on insulin glargine 19 units at bedtime at home. Patient was on Lantus 19 units at bedtime with Ademlog 4 units TID AC. This morning the patient's FBG was 59 and 66. The patient endorsed only consuming yogurt for dinner. Recommend decreasing Lantus to 15 units. Discussed with team.    Thank you,    Nena Levi PharmD, PGY-1 Pharmacy Resident  Available on [employer] or [employer] [id number]   Patient is 89F with history of T2DM (A1c 7.4% in 3/4/22) on insulin glargine 19 units at bedtime at home. Patient is currently receiving Lantus 19 units at bedtime and Admelog 4 units TID AC. This morning the patient's FBG was 59/66. The patient endorsed only consuming yogurt for dinner. Recommend decreasing Lantus to 15 units to avoid further hypoglycemia. Discussed with team.    Thank you,    Nena Levi PharmD, PGY-1 Pharmacy Resident  Available on [employer] or [employer] [id number]

## 2022-05-18 NOTE — PROGRESS NOTE ADULT - PROBLEM SELECTOR PLAN 2
- HFpEF (EF 75% in 03/2022), AS s/p TAVR (09/2021)  - Patient initially SOB, RHODES, LE edema, +JVD likely in CHF exacerbation   - Continue home Toprol XL 100mg daily   - lasix 40mg IV BID, close outpatient follow up with PCP for repeat BMP post discharge  - Strict I/Os, daily weights  - Appreciate cards recs   - TTE shows EF 65% with mold pulm HTN and mod TR (no significant change from prior)

## 2022-05-18 NOTE — PROGRESS NOTE ADULT - PROBLEM SELECTOR PLAN 1
- Likely 2/2 CHF with moderate pleural effusions - improving  - CT Chest on 4/29 shows moderate right and small left pleural effusions with pulmonary edema in the setting of cardiomegaly. Partial lower lobe compressive atelectasis; superimposed consolidation is difficult to exclude on this noncontrast CT.  - S/p ceftriaxone and azithromycin in ED. Monitor off abx. no evidence of infection    - Lasix 40mg IV BID  - Currently saturating well (89+) on RA  - plan to recheck ambulatory O2 today

## 2022-05-18 NOTE — PROGRESS NOTE ADULT - SUBJECTIVE AND OBJECTIVE BOX
Ez Pulliam, PGY1    DATE OF SERVICE: 05-18-22 @ 07:00    Patient is a 89y old  Female who presents with a chief complaint of SOB (17 May 2022 21:29)      SUBJECTIVE / OVERNIGHT EVENTS: No acute events overnight.     Tele reviewed:    MEDICATIONS  (STANDING):  allopurinol 100 milliGRAM(s) Oral daily  amLODIPine   Tablet 5 milliGRAM(s) Oral daily  apixaban 2.5 milliGRAM(s) Oral two times a day  dextrose 5%. 1000 milliLiter(s) (100 mL/Hr) IV Continuous <Continuous>  dextrose 5%. 1000 milliLiter(s) (50 mL/Hr) IV Continuous <Continuous>  dextrose 50% Injectable 25 Gram(s) IV Push once  dextrose 50% Injectable 12.5 Gram(s) IV Push once  dextrose 50% Injectable 25 Gram(s) IV Push once  furosemide   Injectable 40 milliGRAM(s) IV Push every 12 hours  glucagon  Injectable 1 milliGRAM(s) IntraMuscular once  insulin glargine Injectable (LANTUS) 19 Unit(s) SubCutaneous at bedtime  insulin lispro (ADMELOG) corrective regimen sliding scale   SubCutaneous at bedtime  insulin lispro (ADMELOG) corrective regimen sliding scale   SubCutaneous three times a day before meals  insulin lispro Injectable (ADMELOG) 4 Unit(s) SubCutaneous three times a day before meals  levothyroxine 50 MICROGram(s) Oral <User Schedule>  levothyroxine 100 MICROGram(s) Oral <User Schedule>  metoprolol succinate  milliGRAM(s) Oral daily  multivitamin 1 Tablet(s) Oral daily  pantoprazole    Tablet 40 milliGRAM(s) Oral before breakfast  simvastatin 20 milliGRAM(s) Oral at bedtime    MEDICATIONS  (PRN):  acetaminophen     Tablet .. 650 milliGRAM(s) Oral every 6 hours PRN Temp greater or equal to 38C (100.4F), Mild Pain (1 - 3)  dextrose Oral Gel 15 Gram(s) Oral once PRN Blood Glucose LESS THAN 70 milliGRAM(s)/deciliter      Vital Signs Last 24 Hrs  T(C): 36.4 (18 May 2022 04:20), Max: 36.4 (17 May 2022 11:52)  T(F): 97.5 (18 May 2022 04:20), Max: 97.6 (17 May 2022 20:55)  HR: 53 (18 May 2022 04:20) (53 - 61)  BP: 150/73 (18 May 2022 04:20) (121/58 - 150/73)  BP(mean): --  RR: 18 (18 May 2022 04:20) (18 - 20)  SpO2: 90% (18 May 2022 04:20) (88% - 90%)  CAPILLARY BLOOD GLUCOSE      POCT Blood Glucose.: 151 mg/dL (17 May 2022 21:41)  POCT Blood Glucose.: 185 mg/dL (17 May 2022 17:02)  POCT Blood Glucose.: 180 mg/dL (17 May 2022 11:32)  POCT Blood Glucose.: 133 mg/dL (17 May 2022 07:42)    I&O's Summary    17 May 2022 07:01  -  18 May 2022 07:00  --------------------------------------------------------  IN: 240 mL / OUT: 700 mL / NET: -460 mL        PHYSICAL EXAM:  GENERAL: NAD, elderly female  HEAD:  Atraumatic, Normocephalic  EYES: EOMI, PERRLA, conjunctiva and sclera clear  ENMT: Moist mucous membranes  NECK: Supple, JVD improving   CHEST/LUNG: Adequate inspiratory effort. slight expiratory crackles bilaterally and wheezes. slight increased work of breath On room air.   HEART: Regular rate and rhythm; No murmurs, rubs, or gallops.   ABDOMEN: Soft, Nontender, Nondistended; Bowel sounds present  EXTREMITIES:  2+ Peripheral Pulses. 1+ pitting b/l LE edema with chronic skin changes in LLE.   NERVOUS SYSTEM:  Alert & Oriented X2-3  PSYCH: Normal Affect. Laying in bed comfortably; not agitated    LABS:                        9.0    9.25  )-----------( 208      ( 18 May 2022 06:36 )             30.3     05-17    138  |  98  |  62<H>  ----------------------------<  140<H>  3.9   |  29  |  1.44<H>    Ca    10.7<H>      17 May 2022 06:13  Phos  3.5     05-17  Mg     2.2     05-17                RADIOLOGY & ADDITIONAL TESTS:    Imaging Personally Reviewed:    Consultant(s) Notes Reviewed:      Care Discussed with Consultants/Other Providers:   Ez Pulliam, PGY1    DATE OF SERVICE: 05-18-22 @ 07:00    Patient is a 89y old  Female who presents with a chief complaint of SOB (17 May 2022 21:29)      SUBJECTIVE / OVERNIGHT EVENTS: No acute events overnight. Hypoglycemic to 58 this AM after only eating chobani yogurt for dinner. Feels like her breathing is slightly improved this morning. No chest pain, n/v, or abdominal pain.     Tele reviewed: mix of a and v-paced 55-65    MEDICATIONS  (STANDING):  allopurinol 100 milliGRAM(s) Oral daily  amLODIPine   Tablet 5 milliGRAM(s) Oral daily  apixaban 2.5 milliGRAM(s) Oral two times a day  dextrose 5%. 1000 milliLiter(s) (100 mL/Hr) IV Continuous <Continuous>  dextrose 5%. 1000 milliLiter(s) (50 mL/Hr) IV Continuous <Continuous>  dextrose 50% Injectable 25 Gram(s) IV Push once  dextrose 50% Injectable 12.5 Gram(s) IV Push once  dextrose 50% Injectable 25 Gram(s) IV Push once  furosemide   Injectable 40 milliGRAM(s) IV Push every 12 hours  glucagon  Injectable 1 milliGRAM(s) IntraMuscular once  insulin glargine Injectable (LANTUS) 19 Unit(s) SubCutaneous at bedtime  insulin lispro (ADMELOG) corrective regimen sliding scale   SubCutaneous at bedtime  insulin lispro (ADMELOG) corrective regimen sliding scale   SubCutaneous three times a day before meals  insulin lispro Injectable (ADMELOG) 4 Unit(s) SubCutaneous three times a day before meals  levothyroxine 50 MICROGram(s) Oral <User Schedule>  levothyroxine 100 MICROGram(s) Oral <User Schedule>  metoprolol succinate  milliGRAM(s) Oral daily  multivitamin 1 Tablet(s) Oral daily  pantoprazole    Tablet 40 milliGRAM(s) Oral before breakfast  simvastatin 20 milliGRAM(s) Oral at bedtime    MEDICATIONS  (PRN):  acetaminophen     Tablet .. 650 milliGRAM(s) Oral every 6 hours PRN Temp greater or equal to 38C (100.4F), Mild Pain (1 - 3)  dextrose Oral Gel 15 Gram(s) Oral once PRN Blood Glucose LESS THAN 70 milliGRAM(s)/deciliter      Vital Signs Last 24 Hrs  T(C): 36.4 (18 May 2022 04:20), Max: 36.4 (17 May 2022 11:52)  T(F): 97.5 (18 May 2022 04:20), Max: 97.6 (17 May 2022 20:55)  HR: 53 (18 May 2022 04:20) (53 - 61)  BP: 150/73 (18 May 2022 04:20) (121/58 - 150/73)  BP(mean): --  RR: 18 (18 May 2022 04:20) (18 - 20)  SpO2: 90% (18 May 2022 04:20) (88% - 90%)  CAPILLARY BLOOD GLUCOSE      POCT Blood Glucose.: 151 mg/dL (17 May 2022 21:41)  POCT Blood Glucose.: 185 mg/dL (17 May 2022 17:02)  POCT Blood Glucose.: 180 mg/dL (17 May 2022 11:32)  POCT Blood Glucose.: 133 mg/dL (17 May 2022 07:42)    I&O's Summary    17 May 2022 07:01  -  18 May 2022 07:00  --------------------------------------------------------  IN: 240 mL / OUT: 700 mL / NET: -460 mL        PHYSICAL EXAM:  GENERAL: NAD, elderly female  HEAD:  Atraumatic, Normocephalic  EYES: EOMI, PERRLA, conjunctiva and sclera clear  ENMT: Moist mucous membranes  NECK: Supple, JVD improving   CHEST/LUNG: Adequate inspiratory effort. slight expiratory crackles bilaterally and wheezes. slight increased work of breath On room air.   HEART: Regular rate and rhythm; No murmurs, rubs, or gallops.   ABDOMEN: Soft, Nontender, Nondistended; Bowel sounds present  EXTREMITIES:  2+ Peripheral Pulses. 1+ pitting b/l LE edema with chronic skin changes in LLE.   NERVOUS SYSTEM:  Alert & Oriented X2-3  PSYCH: Normal Affect. Laying in bed comfortably; not agitated    LABS:                        9.0    9.25  )-----------( 208      ( 18 May 2022 06:36 )             30.3     05-17    138  |  98  |  62<H>  ----------------------------<  140<H>  3.9   |  29  |  1.44<H>    Ca    10.7<H>      17 May 2022 06:13  Phos  3.5     05-17  Mg     2.2     05-17                RADIOLOGY & ADDITIONAL TESTS:    Imaging Personally Reviewed:    Consultant(s) Notes Reviewed:      Care Discussed with Consultants/Other Providers:

## 2022-05-18 NOTE — PROGRESS NOTE ADULT - NUTRITIONAL ASSESSMENT
This patient has been assessed with a concern for Malnutrition and has been determined to have a diagnosis/diagnoses of Severe protein-calorie malnutrition.    This patient is being managed with:   Diet Regular-  Consistent Carbohydrate {No Snacks} (CSTCHO)  DASH/TLC {Sodium & Cholesterol Restricted} (DASH)  1500mL Fluid Restriction ([id number])  Entered: May  4 2022  7:31AM

## 2022-05-18 NOTE — PROGRESS NOTE ADULT - SUBJECTIVE AND OBJECTIVE BOX
DATE OF SERVICE: 05-18-22 @ 15:28    Patient is a 89y old  Female who presents with a chief complaint of SOB (18 May 2022 07:00)      INTERVAL HISTORY: feels ok    MEDICATIONS:  amLODIPine   Tablet 5 milliGRAM(s) Oral daily  furosemide   Injectable 40 milliGRAM(s) IV Push every 12 hours  metoprolol succinate  milliGRAM(s) Oral daily        PHYSICAL EXAM:  T(C): 36.4 (05-18-22 @ 04:20), Max: 36.4 (05-17-22 @ 20:55)  HR: 60 (05-18-22 @ 12:00) (53 - 61)  BP: 115/62 (05-18-22 @ 12:00) (115/62 - 150/73)  RR: 18 (05-18-22 @ 12:00) (18 - 20)  SpO2: 90% (05-18-22 @ 12:00) (90% - 90%)  Wt(kg): --  I&O's Summary    17 May 2022 07:01  -  18 May 2022 07:00  --------------------------------------------------------  IN: 240 mL / OUT: 700 mL / NET: -460 mL    18 May 2022 07:01  -  18 May 2022 15:28  --------------------------------------------------------  IN: 222 mL / OUT: 0 mL / NET: 222 mL          Appearance: In no distress	  HEENT:    PERRL, EOMI	  Cardiovascular:  S1 S2, No JVD  Respiratory: Lungs clear to auscultation	  Gastrointestinal:  Soft, Non-tender, + BS	  Vascularature:  No edema of LE  Psychiatric: Appropriate affect   Neuro: no acute focal deficits                               9.0    9.25  )-----------( 208      ( 18 May 2022 06:36 )             30.3     05-18    141  |  99  |  61<H>  ----------------------------<  58<L>  4.1   |  27  |  1.43<H>    Ca    10.7<H>      18 May 2022 06:36  Phos  3.0     05-18  Mg     2.2     05-18          Labs personally reviewed      ASSESSMENT/PLAN: 	  Ms. Briones is a 89 yo female with PMH of HTN, HLD, CAD s/p CABG, AS s/p TAVR in 9/2021 at [facility], DM, CKD, AF on Eliquis, PPM who presents with 3 days of progressive shortness of breath, + orthopnea, increased LE edema and decreased oxygen saturation on pulse oximeter at home.    Problem/Plan -1  Problem: HFpEF  - Followed by Dr. Agapito Woodard for outpatient cardiology  - CT Chest reveals moderate right and small left pleural effusions, cardiomegaly, possibly superimposed consolidation  - On lasix 40mg PO daily at home  - BMP reviewed from OP cardiologist records on 12/3/21 it was 1.1, and 5 months ago it was 1.2-1.4, here 2 months ago it was 1.4-1.6  - ECHO Left Ventricle: Normal left ventricular systolic function. No segmental wall motion abnormalities.  The LVEF= 60-65%. Moderate mitral regurgitation, There is a transcatheter aortic valve replacement seen. Moderate pulmonary hypertension.  - Patient appears close to euvolemia, but not euvolemic yet, although much improved since admission, c/w IV Lasix for now, will closely monitor.  - 5/3 POCUS reveals Right pleural effusion  - BNP only slighty down (8208 --> 7983)  - Weight (bed not standing) slightly increased since admission despite IV diuresis.  - CXR shows much improved pulm vasc congestion  - Pt does not yet appear euvolemic but will continue Lasix 40mg PO BID until OP follow up  - pt reports hx of Lymphedema and says her legs have been chronically edematous but doesnt use stockings   - Switch to Lasix 40mg IV BID as again volume overloaded, responding well and Cr improving as well     Problem/Plan -2  Problem: Aortic Stenosis  - s/p TAVR in September 2021  - minimal murmur on ausculation  - aortic valve is well seated and has good flow  - moderate tricuspid regurg    Problem/Plan -3  Problem: CAD  - Hx CABG at [facility]  - currently denies chest pain  - EKG without ischemic changes  - Troponin elevated likely i/s/o ADHF exacerbation; peaked 4/29 at 124  - c/w simvastatin 20mg PO daily    Problem/Plan -4  Problem: AF  - rate controlled  - c/w Metoprolol and Eliquis  - monitor on tele    Problem/Plan -5  Problem: HLD  - c/w simvastatin              Giles Gan DO [facility]  Cardiovascular Medicine  [address] [place] [address], [address]  Office: [phone]  Cell: [phone]

## 2022-05-19 NOTE — PROGRESS NOTE ADULT - PROBLEM SELECTOR PLAN 6
- Patient on home Lantus 20U qhs and ISS TID premeal   - Continue Lantus 19U qhs and low ISS TID premeal with fingersticks. Added Admelog 4U premeal TID   - A1c 7.4 in 03/2022 - Patient on home Lantus 20U qhs and ISS TID premeal   - hypoglycemic due to less PO intake  - decrease Lantus 8U qhs and low ISS TID premeal with fingersticks. Admelog 3U premeal TID   - A1c 7.4 in 03/2022

## 2022-05-19 NOTE — CHART NOTE - NSCHARTNOTEFT_GEN_A_CORE
Nutrition Follow Up Note  Patient seen for:    Chart reviewed, events noted: As per chart, Pt is a 89 yo F with PMH: HFpEF, A.Fib, HTN, DM2, CKD3, hypothyroidism, AS s/p TAVR, s/p PPM. Admitted for hypoxia of 88% on RA. Admitted for CHF exacerbation. Per MD note , CHF with moderate pleural effusions improving. Continues on diuretics as prescribed daily. Noted with MARICARMEN superimposed on CKD, likely 2/2 cardiorenal syndrome and Cr improving. Gradually improving".       Source: [] Patient       [x] Medical record        [x] RN        [] Family at bedside       [] Other:    -If unable to interview patient: [] Trach/Vent/BiPAP  [] Disoriented/confused/inappropriate to interview    Diet Order: Diet, Regular:   Consistent Carbohydrate {Evening Snack} (CSTCHOSN)  DASH/TLC {Sodium & Cholesterol Restricted} (DASH)  1500mL Fluid Restriction ([id number]) (22 @ 10:04) [Active]    - Is current order appropriate/adequate? [x] Yes  []  No:     - PO intake :   [] >75%  Adequate    [x] 50-75%  Fair       [] <50%  Poor    - Nutrition-related concerns:    GI:  Last BM .   Bowel Regimen? [x] Yes  Senna, Miralax.       Weights:   Daily Weight in k.9 (-19), Weight in k.1 (-18), Weight in k.1 (-17), Weight in k.4 (-15), Weight in k (-14), Weight in k.8 (-13)- weight fluctuation noted, pt with edema and on Lasix.     Nutritionally Pertinent Medications:     insulin glargine Injectable (LANTUS)  insulin lispro (ADMELOG)   levothyroxine   multivitamin  pantoprazole     polyethylene glycol   senna   simvastatin   Lasix      Labs:     Cr 1.41 mg/dL<H>   BUN 61 mg/dL<H>  24Hr FS:169 mg/dL, 129 mg/dL, 38 mg/dL  HBA1c 7.4%( 3/4).        Skin per nursing documentation: right heel diabetic ulcer, as per flow sheet  Edema per nursing documentation: 2+ khushbu leg, 2+ khushbu foot, as per flow sheet    Estimated Needs:   [x] no change since previous assessment  [] recalculated:   based on IBW 121lbs/54.8kg  Estimated Energy Needs: (25-30kcal/kg): 1370-1644kcal  Estimated Protein Needs: (1.2-1.4g protein/kg): 66-77g protein   Estimated Fluid Needs: defer to team    Previous Nutrition Diagnosis: acute severe protein calorie malnutrition; increased nutrient needs    Nutrition Diagnosis is: [x] ongoing  [] resolved [] not applicable     New Nutrition Diagnosis: [x] Not applicable    Nutrition Care Plan:  [] In Progress  [x] Achieved  [] Not applicable    Nutrition Interventions:     Education Provided:       [] Yes:  [x] No: pt forgettable at times.     Recommendations:      1) Continue with Consistent Carbohydrate + DASH/ TLC.  2) Continue with multivitamin, mighty shake 2 x daily.  3)Will continue to monitor PO intake, slime, labs, skin, GI status, diet.  4) Made aware RD and Dietetic intern remain available.     Monitoring and Evaluation:   Continue to monitor nutritional intake, tolerance to diet prescription, weights, labs, skin integrity      RD remains available upon request and will follow up per protocol.  Watson Zaldivar Dietetic Intern Nutrition Follow Up Note  Patient seen for: Follow up    Chart reviewed, events noted: As per chart, Pt is a 89 yo F with PMH: HFpEF, A.Fib, HTN, DM2, CKD3, hypothyroidism, AS s/p TAVR, s/p PPM. Admitted for hypoxia of 88% on RA. Admitted for CHF exacerbation. Per MD note , CHF with moderate pleural effusions improving. Continues on diuretics as prescribed daily. Noted with MARICARMEN superimposed on CKD, likely 2/2 cardiorenal syndrome and Cr improving. Gradually improving".       Source: [x] Patient       [x] Medical record        [x] RN        [] Family at bedside       [] Other:    -If unable to interview patient: [] Trach/Vent/BiPAP  [] Disoriented/confused/inappropriate to interview    Diet Order: Diet, Regular:   Consistent Carbohydrate {Evening Snack} (CSTCHOSN)  DASH/TLC {Sodium & Cholesterol Restricted} (DASH)  1500mL Fluid Restriction ([id number]) (22 @ 10:04) [Active]    - Is current order appropriate/adequate? [x] Yes  []  No:     - PO intake :   [] >75%  Adequate    [x] 50-75%  Fair       [] <50%  Poor    - Nutrition-related concerns:    GI:  Last BM .   Bowel Regimen? [x] Yes  Senna, Miralax.       Weights:   Daily Weight in k.9 (-19), Weight in k.1 (-18), Weight in k.1 (-17), Weight in k.4 (-15), Weight in k (-14), Weight in k.8 (-13)- weight fluctuation noted, pt with edema and on Lasix.     Nutritionally Pertinent Medications:     insulin glargine Injectable (LANTUS)  insulin lispro (ADMELOG)   levothyroxine   multivitamin  pantoprazole     polyethylene glycol   senna   simvastatin   Lasix      Labs:     Cr 1.41 mg/dL<H>   BUN 61 mg/dL<H>  24Hr FS:169 mg/dL, 129 mg/dL, 38 mg/dL  HbA1c 7.4%( 3/4).        Skin per nursing documentation: right heel diabetic ulcer, as per flow sheet  Edema per nursing documentation: 2+ khushbu leg, 2+ khushbu foot, as per flow sheet    Estimated Needs:   [x] no change since previous assessment  [] recalculated:   based on IBW +10% (121lbs/54.8kg)  Estimated Energy Needs: (25-30kcal/kg): 1370-1644kcal  Estimated Protein Needs: (1.2-1.4g protein/kg): 66-77g protein   Estimated Fluid Needs: defer to team    Previous Nutrition Diagnosis: acute severe protein calorie malnutrition; increased nutrient needs    Nutrition Diagnosis is: [x] ongoing  [] resolved [] not applicable     New Nutrition Diagnosis: [x] Not applicable    Nutrition Care Plan:  [] In Progress  [x] Achieved  [] Not applicable    Nutrition Interventions:     Education Provided:       [] Yes:  [x] No: pt forgettable at times.      Recommendations:      1) Continue with Consistent Carbohydrate + DASH/ TLC.  2) Continue with multivitamin,  diet mighty shake 2 x daily.  3) Will continue to monitor PO intake, slime, labs, skin, GI status, diet.  4) Made aware RD and Dietetic intern remain available.     Monitoring and Evaluation:   Continue to monitor nutritional intake, tolerance to diet prescription, weights, labs, skin integrity      RD remains available upon request and will follow up per protocol.  Watson Zaldivar Dietetic Intern Nutrition Follow Up Note  Patient seen for: Malnutrition Follow up    Chart reviewed, events noted: As per chart, Pt is a 89 yo F with PMH: HFpEF, A.Fib, HTN, DM2, CKD3, hypothyroidism, AS s/p TAVR, s/p PPM. Admitted for hypoxia of 88% on RA. Admitted for CHF exacerbation. Per MD note , CHF with moderate pleural effusions improving. Continues on diuretics as prescribed daily. Noted with MARICARMEN superimposed on CKD, likely 2/2 cardiorenal syndrome and Cr improving. Gradually improving".       Source: [x] Patient       [x] Medical record        [x] RN        [] Family at bedside       [] Other:    -If unable to interview patient: [] Trach/Vent/BiPAP  [] Disoriented/confused/inappropriate to interview    Diet Order: Diet, Regular:   Consistent Carbohydrate {Evening Snack} (CSTCHOSN)  DASH/TLC {Sodium & Cholesterol Restricted} (DASH)  1500mL Fluid Restriction ([id number]) (22 @ 10:04) [Active]    - Is current order appropriate/adequate? [x] Yes  []  No:     - PO intake :   [] >75%  Adequate    [x] 50-75%  Fair       [] <50%  Poor    - Nutrition-related concerns:    GI:  Last BM .   Bowel Regimen? [x] Yes  Senna, Miralax.       Weights:   Daily Weight in k.9 (-19), Weight in k.1 (-18), Weight in k.1 (-17), Weight in k.4 (-15), Weight in k (-14), Weight in k.8 (-13)- weight fluctuation noted, pt with edema and on Lasix.     Nutritionally Pertinent Medications:     insulin glargine Injectable (LANTUS)  insulin lispro (ADMELOG)   levothyroxine   multivitamin  pantoprazole     polyethylene glycol   senna   simvastatin   Lasix      Labs:     Cr 1.41 mg/dL<H>   BUN 61 mg/dL<H>  24Hr FS:169 mg/dL, 129 mg/dL, 38 mg/dL  HbA1c 7.4%( 3/4).        Skin per nursing documentation: right heel diabetic ulcer, as per flow sheet  Edema per nursing documentation: 2+ khushbu leg, 2+ khushbu foot, as per flow sheet    Estimated Needs:   [x] no change since previous assessment  [] recalculated:   based on IBW +10% (121lbs/54.8kg)  Estimated Energy Needs: (25-30kcal/kg): 1370-1644kcal  Estimated Protein Needs: (1.2-1.4g protein/kg): 66-77g protein   Estimated Fluid Needs: defer to team    Previous Nutrition Diagnosis: acute severe protein calorie malnutrition; increased nutrient needs    Nutrition Diagnosis is: [x] ongoing  [] resolved [] not applicable     New Nutrition Diagnosis: [x] Not applicable    Nutrition Care Plan:  [] In Progress  [x] Achieved  [] Not applicable    Nutrition Interventions:     Education Provided:       [] Yes:  [x] No: pt forgettable at times.      Recommendations:      1) Continue with Consistent Carbohydrate + DASH/ TLC.  2) Continue with multivitamin,  diet mighty shake 2 x daily.  3) Will continue to monitor PO intake, weight, labs, skin, GI status, diet.  4) Made aware RD and Dietetic intern remain available.     Monitoring and Evaluation:   Continue to monitor nutritional intake, tolerance to diet prescription, weights, labs, skin integrity      RD remains available upon request and will follow up per protocol.  Watson Zaldivar Dietetic Intern

## 2022-05-19 NOTE — PROGRESS NOTE ADULT - SUBJECTIVE AND OBJECTIVE BOX
Ez Pulliam, PGY1    DATE OF SERVICE: 05-19-22 @ 07:05    Patient is a 89y old  Female who presents with a chief complaint of SOB (18 May 2022 15:27)      SUBJECTIVE / OVERNIGHT EVENTS: No acute events overnight. On room air throughout night.     Tele reviewed: paced rhythm 60    MEDICATIONS  (STANDING):  allopurinol 100 milliGRAM(s) Oral daily  amLODIPine   Tablet 5 milliGRAM(s) Oral daily  apixaban 2.5 milliGRAM(s) Oral two times a day  dextrose 5%. 1000 milliLiter(s) (50 mL/Hr) IV Continuous <Continuous>  dextrose 5%. 1000 milliLiter(s) (100 mL/Hr) IV Continuous <Continuous>  dextrose 50% Injectable 25 Gram(s) IV Push once  dextrose 50% Injectable 12.5 Gram(s) IV Push once  dextrose 50% Injectable 25 Gram(s) IV Push once  furosemide   Injectable 40 milliGRAM(s) IV Push every 12 hours  glucagon  Injectable 1 milliGRAM(s) IntraMuscular once  insulin glargine Injectable (LANTUS) 15 Unit(s) SubCutaneous at bedtime  insulin lispro (ADMELOG) corrective regimen sliding scale   SubCutaneous at bedtime  insulin lispro (ADMELOG) corrective regimen sliding scale   SubCutaneous three times a day before meals  insulin lispro Injectable (ADMELOG) 4 Unit(s) SubCutaneous three times a day before meals  levothyroxine 50 MICROGram(s) Oral <User Schedule>  levothyroxine 100 MICROGram(s) Oral <User Schedule>  metoprolol succinate  milliGRAM(s) Oral daily  multivitamin 1 Tablet(s) Oral daily  pantoprazole    Tablet 40 milliGRAM(s) Oral before breakfast  simvastatin 20 milliGRAM(s) Oral at bedtime    MEDICATIONS  (PRN):  acetaminophen     Tablet .. 650 milliGRAM(s) Oral every 6 hours PRN Temp greater or equal to 38C (100.4F), Mild Pain (1 - 3)  dextrose Oral Gel 15 Gram(s) Oral once PRN Blood Glucose LESS THAN 70 milliGRAM(s)/deciliter      Vital Signs Last 24 Hrs  T(C): --  T(F): --  HR: 60 (19 May 2022 04:28) (60 - 60)  BP: 135/74 (19 May 2022 04:28) (115/62 - 135/74)  BP(mean): --  RR: 18 (19 May 2022 04:28) (18 - 18)  SpO2: 93% (19 May 2022 04:28) (90% - 93%)  CAPILLARY BLOOD GLUCOSE      POCT Blood Glucose.: 139 mg/dL (18 May 2022 21:16)  POCT Blood Glucose.: 88 mg/dL (18 May 2022 17:07)  POCT Blood Glucose.: 125 mg/dL (18 May 2022 11:31)  POCT Blood Glucose.: 109 mg/dL (18 May 2022 08:52)  POCT Blood Glucose.: 66 mg/dL (18 May 2022 08:08)  POCT Blood Glucose.: 59 mg/dL (18 May 2022 07:44)    I&O's Summary    18 May 2022 07:01  -  19 May 2022 07:00  --------------------------------------------------------  IN: 222 mL / OUT: 500 mL / NET: -278 mL        PHYSICAL EXAM:  GENERAL: NAD, elderly female  HEAD:  Atraumatic, Normocephalic  EYES: EOMI, PERRLA, conjunctiva and sclera clear  ENMT: Moist mucous membranes  NECK: Supple, JVD improving   CHEST/LUNG: Adequate inspiratory effort. slight expiratory crackles bilaterally and wheezes. slight increased work of breath On room air.   HEART: Regular rate and rhythm; No murmurs, rubs, or gallops.   ABDOMEN: Soft, Nontender, Nondistended; Bowel sounds present  EXTREMITIES:  2+ Peripheral Pulses. 1+ pitting b/l LE edema with chronic skin changes in LLE.   NERVOUS SYSTEM:  Alert & Oriented X2-3  PSYCH: Normal Affect. Laying in bed comfortably; not agitated      LABS:                        8.1    6.66  )-----------( 243      ( 19 May 2022 06:42 )             28.9     05-18    141  |  99  |  61<H>  ----------------------------<  58<L>  4.1   |  27  |  1.43<H>    Ca    10.7<H>      18 May 2022 06:36  Phos  3.0     05-18  Mg     2.2     05-18                RADIOLOGY & ADDITIONAL TESTS:    Imaging Personally Reviewed:    Consultant(s) Notes Reviewed:      Care Discussed with Consultants/Other Providers:   Ez Pulliam, PGY1    DATE OF SERVICE: 05-19-22 @ 07:05    Patient is a 89y old  Female who presents with a chief complaint of SOB (18 May 2022 15:27)      SUBJECTIVE / OVERNIGHT EVENTS: No acute events overnight. On room air throughout night. Hypoglycemic this morning after not eating dinner. Felt weak. Improving after breakfast. No chest pain. Dyspnea improving. No abd pain, n/v.     Tele reviewed: paced rhythm 60    MEDICATIONS  (STANDING):  allopurinol 100 milliGRAM(s) Oral daily  amLODIPine   Tablet 5 milliGRAM(s) Oral daily  apixaban 2.5 milliGRAM(s) Oral two times a day  dextrose 5%. 1000 milliLiter(s) (50 mL/Hr) IV Continuous <Continuous>  dextrose 5%. 1000 milliLiter(s) (100 mL/Hr) IV Continuous <Continuous>  dextrose 50% Injectable 25 Gram(s) IV Push once  dextrose 50% Injectable 12.5 Gram(s) IV Push once  dextrose 50% Injectable 25 Gram(s) IV Push once  furosemide   Injectable 40 milliGRAM(s) IV Push every 12 hours  glucagon  Injectable 1 milliGRAM(s) IntraMuscular once  insulin glargine Injectable (LANTUS) 15 Unit(s) SubCutaneous at bedtime  insulin lispro (ADMELOG) corrective regimen sliding scale   SubCutaneous at bedtime  insulin lispro (ADMELOG) corrective regimen sliding scale   SubCutaneous three times a day before meals  insulin lispro Injectable (ADMELOG) 4 Unit(s) SubCutaneous three times a day before meals  levothyroxine 50 MICROGram(s) Oral <User Schedule>  levothyroxine 100 MICROGram(s) Oral <User Schedule>  metoprolol succinate  milliGRAM(s) Oral daily  multivitamin 1 Tablet(s) Oral daily  pantoprazole    Tablet 40 milliGRAM(s) Oral before breakfast  simvastatin 20 milliGRAM(s) Oral at bedtime    MEDICATIONS  (PRN):  acetaminophen     Tablet .. 650 milliGRAM(s) Oral every 6 hours PRN Temp greater or equal to 38C (100.4F), Mild Pain (1 - 3)  dextrose Oral Gel 15 Gram(s) Oral once PRN Blood Glucose LESS THAN 70 milliGRAM(s)/deciliter      Vital Signs Last 24 Hrs  T(C): --  T(F): --  HR: 60 (19 May 2022 04:28) (60 - 60)  BP: 135/74 (19 May 2022 04:28) (115/62 - 135/74)  BP(mean): --  RR: 18 (19 May 2022 04:28) (18 - 18)  SpO2: 93% (19 May 2022 04:28) (90% - 93%)  CAPILLARY BLOOD GLUCOSE      POCT Blood Glucose.: 139 mg/dL (18 May 2022 21:16)  POCT Blood Glucose.: 88 mg/dL (18 May 2022 17:07)  POCT Blood Glucose.: 125 mg/dL (18 May 2022 11:31)  POCT Blood Glucose.: 109 mg/dL (18 May 2022 08:52)  POCT Blood Glucose.: 66 mg/dL (18 May 2022 08:08)  POCT Blood Glucose.: 59 mg/dL (18 May 2022 07:44)    I&O's Summary    18 May 2022 07:01  -  19 May 2022 07:00  --------------------------------------------------------  IN: 222 mL / OUT: 500 mL / NET: -278 mL        PHYSICAL EXAM:  GENERAL: NAD, elderly female  HEAD:  Atraumatic, Normocephalic  EYES: EOMI, PERRLA, conjunctiva and sclera clear  ENMT: Moist mucous membranes  NECK: Supple, JVD improving   CHEST/LUNG: Adequate inspiratory effort. slight expiratory crackles bilaterally and wheezes. On room air.   HEART: Regular rate and rhythm; No murmurs, rubs, or gallops.   ABDOMEN: Soft, Nontender, Nondistended; Bowel sounds present  EXTREMITIES:  2+ Peripheral Pulses. 1+ pitting b/l LE edema with chronic skin changes in LLE.   NERVOUS SYSTEM:  Alert & Oriented X2-3  PSYCH: Normal Affect. Laying in bed comfortably; not agitated      LABS:                        8.1    6.66  )-----------( 243      ( 19 May 2022 06:42 )             28.9     05-18    141  |  99  |  61<H>  ----------------------------<  58<L>  4.1   |  27  |  1.43<H>    Ca    10.7<H>      18 May 2022 06:36  Phos  3.0     05-18  Mg     2.2     05-18                RADIOLOGY & ADDITIONAL TESTS:    Imaging Personally Reviewed:    Consultant(s) Notes Reviewed:      Care Discussed with Consultants/Other Providers:

## 2022-05-19 NOTE — PROVIDER CONTACT NOTE (CRITICAL VALUE NOTIFICATION) - ACTION/TREATMENT ORDERED:
Dextrose 50% 25g IVP administered Dextrose 50% 25g IVP administered @ 0800  Patient also had 15g apple juice

## 2022-05-19 NOTE — PROGRESS NOTE ADULT - NUTRITIONAL ASSESSMENT
This patient has been assessed with a concern for Malnutrition and has been determined to have a diagnosis/diagnoses of Severe protein-calorie malnutrition.    This patient is being managed with:   Diet Regular-  Consistent Carbohydrate {No Snacks} (CSTCHO)  DASH/TLC {Sodium & Cholesterol Restricted} (DASH)  1500mL Fluid Restriction ([id number])  Entered: May  4 2022  7:31AM

## 2022-05-19 NOTE — PROGRESS NOTE ADULT - PROBLEM SELECTOR PLAN 10
Home meds: Continue home Allopurinol 100mg daily, Simvastatin 20mg qhs, Protonix 40mg daily   DVT ppx: Eliquis   Diet: DASH/Renal   Dispo: PT recs home with home PT; patient has home care and prefers no JEREMIAS.   DNR/DNI  Communication: updated  (dameon) by phone 5/18 Home meds: Continue home Allopurinol 100mg daily, Simvastatin 20mg qhs, Protonix 40mg daily   DVT ppx: Eliquis   Diet: DASH/Renal   Dispo: PT recs home with home PT; patient has home care and prefers no JEREMIAS.   DNR/DNI  Communication: updated  (He) by phone 5/19

## 2022-05-19 NOTE — PROGRESS NOTE ADULT - ASSESSMENT
89yo F with PMH on HFpEF (EF 75% 03/2022), Afib (on Eliquis), HTN, DM2, CKD3, hypothyroidism, AS s/p TAVR (09/2021), s/p PPM BIBEMS for hypoxia of 88% on RA. Admitted for CHF exacerbation. Gradually improving.

## 2022-05-19 NOTE — PROGRESS NOTE ADULT - SUBJECTIVE AND OBJECTIVE BOX
DATE OF SERVICE: 05-19-22 @ 18:51    Patient is a 89y old  Female who presents with a chief complaint of SOB (19 May 2022 07:04)      INTERVAL HISTORY: Feels ok.     REVIEW OF SYSTEMS:  CONSTITUTIONAL: No weakness  EYES/ENT: No visual changes;  No throat pain   NECK: No pain or stiffness  RESPIRATORY: No cough, wheezing; No shortness of breath  CARDIOVASCULAR: No chest pain or palpitations  GASTROINTESTINAL: No abdominal  pain. No nausea, vomiting, or hematemesis  GENITOURINARY: No dysuria, frequency or hematuria  NEUROLOGICAL: No stroke like symptoms  SKIN: No rashes    TELEMETRY Personally reviewed: V-Paced 59-60  	  MEDICATIONS:  amLODIPine   Tablet 5 milliGRAM(s) Oral daily  furosemide   Injectable 40 milliGRAM(s) IV Push every 12 hours  metoprolol succinate  milliGRAM(s) Oral daily        PHYSICAL EXAM:  T(C): 36.6 (05-19-22 @ 12:35), Max: 36.6 (05-19-22 @ 12:35)  HR: 60 (05-19-22 @ 12:35) (60 - 60)  BP: 122/71 (05-19-22 @ 12:35) (122/71 - 135/74)  RR: 18 (05-19-22 @ 12:35) (18 - 18)  SpO2: 93% (05-19-22 @ 12:35) (93% - 93%)  Wt(kg): --  I&O's Summary    18 May 2022 07:01  -  19 May 2022 07:00  --------------------------------------------------------  IN: 222 mL / OUT: 500 mL / NET: -278 mL          Appearance: In no distress	  HEENT:    PERRL, EOMI	  Cardiovascular:  S1 S2, No JVD  Respiratory: Lungs clear to auscultation	  Gastrointestinal:  Soft, Non-tender, + BS	  Vascularature:  + 2B/L LE edema  Psychiatric: Appropriate affect   Neuro: no acute focal deficits                               8.1    6.66  )-----------( 243      ( 19 May 2022 06:42 )             28.9     05-19    139  |  99  |  61<H>  ----------------------------<  43<LL>  4.1   |  27  |  1.41<H>    Ca    10.4      19 May 2022 06:44  Phos  3.6     05-19  Mg     2.2     05-19          Labs personally reviewed      ASSESSMENT/PLAN: 	    Ms. Briones is a 89 yo female with PMH of HTN, HLD, CAD s/p CABG, AS s/p TAVR in 9/2021 at [facility], DM, CKD, AF on Eliquis, PPM who presents with 3 days of progressive shortness of breath, + orthopnea, increased LE edema and decreased oxygen saturation on pulse oximeter at home.    Problem/Plan -1  Problem: HFpEF  - Followed by Dr. Agapito Woodard for outpatient cardiology  - CT Chest reveals moderate right and small left pleural effusions, cardiomegaly, possibly superimposed consolidation  - On lasix 40mg PO daily at home  - BMP reviewed from OP cardiologist records on 12/3/21 it was 1.1, and 5 months ago it was 1.2-1.4, here 2 months ago it was 1.4-1.6  - ECHO Left Ventricle: Normal left ventricular systolic function. No segmental wall motion abnormalities.  The LVEF= 60-65%. Moderate mitral regurgitation, There is a transcatheter aortic valve replacement seen. Moderate pulmonary hypertension.  - Patient appears close to euvolemia, but not euvolemic yet, although much improved since admission, c/w IV Lasix for now, will closely monitor.  - 5/3 POCUS reveals Right pleural effusion  - BNP only slighty down (8208 --> 7983)  - Weight (bed not standing) slightly increased since admission despite IV diuresis.  - CXR shows much improved pulm vasc congestion  - Pt does not yet appear euvolemic but will continue Lasix 40mg PO BID until OP follow up  - pt reports hx of Lymphedema and says her legs have been chronically edematous but doesnt use stockings   - Switch to Lasix 40mg IV BID as again volume overloaded, responding well and Cr improving as well   - Cont Lasix 40mg IV BID    Problem/Plan -2  Problem: Aortic Stenosis  - s/p TAVR in September 2021  - minimal murmur on ausculation  - aortic valve is well seated and has good flow  - moderate tricuspid regurg    Problem/Plan -3  Problem: CAD  - Hx CABG at [facility]  - currently denies chest pain  - EKG without ischemic changes  - Troponin elevated likely i/s/o ADHF exacerbation; peaked 4/29 at 124  - c/w simvastatin 20mg PO daily    Problem/Plan -4  Problem: AF  - rate controlled  - c/w Metoprolol and Eliquis  - monitor on tele    Problem/Plan -5  Problem: HLD  - c/w simvastatin             HERBER Mix-FACUNDO Gan DO [facility]  Cardiovascular Medicine  [address] [place] [address], [address]  Office: [phone]  Cell: [phone]

## 2022-05-19 NOTE — PROVIDER CONTACT NOTE (CRITICAL VALUE NOTIFICATION) - ASSESSMENT
Troytick confirmed 41 then 38. Patient says she feels like she is "fading." Troytick confirmed 41 then 38 at 0755. Patient says she feels like she is "fading."

## 2022-05-20 NOTE — PROGRESS NOTE ADULT - PROBLEM SELECTOR PLAN 6
- Patient on home Lantus 20U qhs and ISS TID premeal   - hypoglycemic due to less PO intake  - decrease Lantus 8U qhs and low ISS TID premeal with fingersticks. Admelog 3U premeal TID   - A1c 7.4 in 03/2022

## 2022-05-20 NOTE — PROGRESS NOTE ADULT - PROBLEM SELECTOR PLAN 10
Home meds: Continue home Allopurinol 100mg daily, Simvastatin 20mg qhs, Protonix 40mg daily   DVT ppx: Eliquis   Diet: DASH/Renal   Dispo: PT recs home with home PT; patient has home care and prefers no JEREMIAS.   DNR/DNI  Communication: updated  (He) by phone 5/20 Home meds: Continue home Allopurinol 100mg daily, Simvastatin 20mg qhs, Protonix 40mg daily   DVT ppx: Eliquis   Diet: DASH/Renal   Dispo: PT recs home with home PT; patient has home care and prefers no JEREMIAS.   DNR/DNI  Communication: updated daughter in person 5/20

## 2022-05-20 NOTE — PROGRESS NOTE ADULT - PROBLEM SELECTOR PLAN 2
- HFpEF (EF 75% in 03/2022), AS s/p TAVR (09/2021)  - Patient initially SOB, RHODES, LE edema, +JVD likely in CHF exacerbation   - Continue home Toprol XL 100mg daily   - bumex 2mg PO BID, close outpatient follow up with PCP for repeat BMP post discharge  - Strict I/Os, daily weights  - Appreciate cards recs   - TTE shows EF 65% with mold pulm HTN and mod TR (no significant change from prior)

## 2022-05-20 NOTE — PROGRESS NOTE ADULT - PROBLEM SELECTOR PLAN 1
- Likely 2/2 CHF with moderate pleural effusions - improving  - CT Chest on 4/29 shows moderate right and small left pleural effusions with pulmonary edema in the setting of cardiomegaly. Partial lower lobe compressive atelectasis; superimposed consolidation is difficult to exclude on this noncontrast CT.  - S/p ceftriaxone and azithromycin in ED. Monitor off abx. no evidence of infection    - Bumex 2mg PO BID  - Currently saturating well (89+) on RA  - plan to recheck ambulatory O2 today

## 2022-05-20 NOTE — PROGRESS NOTE ADULT - NUTRITIONAL ASSESSMENT
This patient has been assessed with a concern for Malnutrition and has been determined to have a diagnosis/diagnoses of Severe protein-calorie malnutrition.    This patient is being managed with:   Diet Regular-  Consistent Carbohydrate {Evening Snack} (CSTCHOSN)  DASH/TLC {Sodium & Cholesterol Restricted} (DASH)  1500mL Fluid Restriction ([id number])  Entered: May 19 2022 10:05AM

## 2022-05-20 NOTE — PROGRESS NOTE ADULT - SUBJECTIVE AND OBJECTIVE BOX
DATE OF SERVICE: 05-20-22 @ 09:37    Patient is a 89y old  Female who presents with a chief complaint of SOB (20 May 2022 07:08)      INTERVAL HISTORY: Feels ok. Hasn't been able to sleep.     REVIEW OF SYSTEMS:  CONSTITUTIONAL: No weakness  EYES/ENT: No visual changes;  No throat pain   NECK: No pain or stiffness  RESPIRATORY: No cough, wheezing; No shortness of breath  CARDIOVASCULAR: No chest pain or palpitations  GASTROINTESTINAL: No abdominal  pain. No nausea, vomiting, or hematemesis  GENITOURINARY: No dysuria, frequency or hematuria  NEUROLOGICAL: No stroke like symptoms  SKIN: No rashes    TELEMETRY Personally reviewed: V-PACED 59 BPM  	  MEDICATIONS:  amLODIPine   Tablet 5 milliGRAM(s) Oral daily  buMETAnide 2 milliGRAM(s) Oral two times a day  metoprolol succinate  milliGRAM(s) Oral daily        PHYSICAL EXAM:  T(C): 36.4 (05-20-22 @ 04:20), Max: 36.6 (05-19-22 @ 12:35)  HR: 62 (05-20-22 @ 04:20) (60 - 62)  BP: 130/77 (05-20-22 @ 04:20) (115/57 - 130/77)  RR: 16 (05-20-22 @ 04:20) (16 - 18)  SpO2: 91% (05-20-22 @ 04:20) (91% - 93%)  Wt(kg): --  I&O's Summary    19 May 2022 07:01  -  20 May 2022 07:00  --------------------------------------------------------  IN: 0 mL / OUT: 100 mL / NET: -100 mL          Appearance: In no distress	  HEENT:    PERRL, EOMI	  Cardiovascular:  S1 S2, No JVD  Respiratory: Lungs clear to auscultation	  Gastrointestinal:  Soft, Non-tender, + BS	  Vascularature:  +1-2 LE edema  Psychiatric: Appropriate affect   Neuro: no acute focal deficits                               8.2    6.12  )-----------( 234      ( 20 May 2022 06:24 )             29.2     05-20    135  |  97  |  59<H>  ----------------------------<  130<H>  4.1   |  29  |  1.55<H>    Ca    10.3      20 May 2022 06:22  Phos  3.3     05-20  Mg     2.2     05-20          Labs personally reviewed      ASSESSMENT/PLAN: 	    Ms. Briones is a 89 yo female with PMH of HTN, HLD, CAD s/p CABG, AS s/p TAVR in 9/2021 at [facility], DM, CKD, AF on Eliquis, PPM who presents with 3 days of progressive shortness of breath, + orthopnea, increased LE edema and decreased oxygen saturation on pulse oximeter at home.    Problem/Plan -1  Problem: HFpEF  - Followed by Dr. Agapito Woodard for outpatient cardiology  - CT Chest reveals moderate right and small left pleural effusions, cardiomegaly, possibly superimposed consolidation  - On lasix 40mg PO daily at home  - BMP reviewed from OP cardiologist records on 12/3/21 it was 1.1, and 5 months ago it was 1.2-1.4, here 2 months ago it was 1.4-1.6  - ECHO Left Ventricle: Normal left ventricular systolic function. No segmental wall motion abnormalities.  The LVEF= 60-65%. Moderate mitral regurgitation, There is a transcatheter aortic valve replacement seen. Moderate pulmonary hypertension.  - Patient appears close to euvolemia, but not euvolemic yet, although much improved since admission, c/w IV Lasix for now, will closely monitor.  - 5/3 POCUS reveals Right pleural effusion  - BNP only slighty down (8208 --> 7983)  - Weight (bed not standing) slightly increased since admission despite IV diuresis.  - CXR shows much improved pulm vasc congestion  - Pt does not yet appear euvolemic but will continue Lasix 40mg PO BID until OP follow up  - pt reports hx of Lymphedema and says her legs have been chronically edematous but doesnt use stockings   - Switch to Lasix 40mg IV BID as again volume overloaded, responding well and Cr improving as well   - Cont Lasix 40mg IV BID  - Transitioned to Bumex 2mg PO BID    Problem/Plan -2  Problem: Aortic Stenosis  - s/p TAVR in September 2021  - minimal murmur on ausculation  - aortic valve is well seated and has good flow  - moderate tricuspid regurg    Problem/Plan -3  Problem: CAD  - Hx CABG at [facility]  - currently denies chest pain  - EKG without ischemic changes  - Troponin elevated likely i/s/o ADHF exacerbation; peaked 4/29 at 124  - c/w simvastatin 20mg PO daily    Problem/Plan -4  Problem: AF  - rate controlled  - c/w Metoprolol and Eliquis  - monitor on tele    Problem/Plan -5  Problem: HLD  - c/w simvastatin             HERBER Mix-FACUNDO Gan DO [facility]  Cardiovascular Medicine  [address] [place] [address], [address]  Office: [phone]  Cell: [phone]

## 2022-05-21 NOTE — PROGRESS NOTE ADULT - PROBLEM SELECTOR PROBLEM 1
Acute hypoxemic respiratory failure Acute on chronic heart failure with preserved ejection fraction (HFpEF)

## 2022-05-21 NOTE — PROGRESS NOTE ADULT - PROBLEM SELECTOR PLAN 6
- Patient on home Lantus 20U qhs and ISS TID premeal   - hypoglycemic due to less PO intake  - decrease Lantus 8U qhs and low ISS TID premeal with fingersticks. Admelog 3U premeal TID   - A1c 7.4 in 03/2022 - Continue home Amlodipine 5mg daily

## 2022-05-21 NOTE — PROGRESS NOTE ADULT - PROBLEM SELECTOR PLAN 4
- R foot plantar heel wound with well adhered eschar, negative probe to bone  - Appreciate podiatry recs  - R foot xray: no gas, no OM   - Dry sterile dressing applied to right heel with betadine, recommend daily dressing changes Kidney bladder ultrasound shows no hydro; Cr: 1.58   - back around baseline Cr

## 2022-05-21 NOTE — PROGRESS NOTE ADULT - PROBLEM SELECTOR PLAN 8
- Home Synthroid 50mcg on weekdays, 100mcg on weekends   - Continue home regimen   - TSH 5.57 (pt's synthroid regimen was recently increased, will hold off on increasing dose during this hospitalization) Home meds: Continue home Allopurinol 100mg daily, Simvastatin 20mg qhs, Protonix 40mg daily   DVT ppx: Eliquis   Diet: DASH/Renal   Dispo: PT recs home with home PT; patient has home care and prefers no JEREMIAS.   DNR/DNI  Communication: updated daughter in person 5/20 Home meds: Continue home Allopurinol 100mg daily, Simvastatin 20mg qhs, Protonix 40mg daily   DVT ppx: Eliquis   Diet: DASH/Renal   Dispo: PT recs home with home PT; patient has home care and prefers no JEREMIAS.   DNR/DNI  Communication: updated daughter in person 5/20, He  by phone 5/21 10:17AM

## 2022-05-21 NOTE — PROGRESS NOTE ADULT - SUBJECTIVE AND OBJECTIVE BOX
%%%%INCOMPLETE NOTE%%%%%     Dr. Alexandr Gutierrez PGY2    PERRI MOREIRA  89y  MRN: [id number]    Subjective:    Patient is a 89y old  Female who presents with a chief complaint of SOB (20 May 2022 20:24)      MEDICATIONS  (STANDING):  allopurinol 100 milliGRAM(s) Oral daily  amLODIPine   Tablet 5 milliGRAM(s) Oral daily  apixaban 2.5 milliGRAM(s) Oral two times a day  buMETAnide 2 milliGRAM(s) Oral two times a day  dextrose 5%. 1000 milliLiter(s) (100 mL/Hr) IV Continuous <Continuous>  dextrose 5%. 1000 milliLiter(s) (50 mL/Hr) IV Continuous <Continuous>  dextrose 50% Injectable 25 Gram(s) IV Push once  dextrose 50% Injectable 12.5 Gram(s) IV Push once  dextrose 50% Injectable 25 Gram(s) IV Push once  glucagon  Injectable 1 milliGRAM(s) IntraMuscular once  insulin glargine Injectable (LANTUS) 8 Unit(s) SubCutaneous at bedtime  insulin lispro (ADMELOG) corrective regimen sliding scale   SubCutaneous at bedtime  insulin lispro (ADMELOG) corrective regimen sliding scale   SubCutaneous three times a day before meals  insulin lispro Injectable (ADMELOG) 3 Unit(s) SubCutaneous three times a day before meals  levothyroxine 50 MICROGram(s) Oral <User Schedule>  levothyroxine 100 MICROGram(s) Oral <User Schedule>  metoprolol succinate  milliGRAM(s) Oral daily  multivitamin 1 Tablet(s) Oral daily  pantoprazole    Tablet 40 milliGRAM(s) Oral before breakfast  polyethylene glycol 3350 17 Gram(s) Oral daily  senna 2 Tablet(s) Oral at bedtime  simvastatin 20 milliGRAM(s) Oral at bedtime    MEDICATIONS  (PRN):  acetaminophen     Tablet .. 650 milliGRAM(s) Oral every 6 hours PRN Temp greater or equal to 38C (100.4F), Mild Pain (1 - 3)  dextrose Oral Gel 15 Gram(s) Oral once PRN Blood Glucose LESS THAN 70 milliGRAM(s)/deciliter        Objective:    Vitals: Vital Signs Last 24 Hrs  T(C): 36.3 (05-21-22 @ 04:22), Max: 36.4 (05-20-22 @ 11:35)  T(F): 97.4 (05-21-22 @ 04:22), Max: 97.6 (05-20-22 @ 11:35)  HR: 60 (05-21-22 @ 04:22) (60 - 64)  BP: 138/68 (05-21-22 @ 04:22) (123/60 - 138/73)  BP(mean): --  RR: 18 (05-21-22 @ 04:22) (18 - 18)  SpO2: 92% (05-21-22 @ 04:22) (92% - 93%)            I&O's Summary    19 May 2022 07:01  -  20 May 2022 07:00  --------------------------------------------------------  IN: 0 mL / OUT: 100 mL / NET: -100 mL        PHYSICAL EXAM:  GENERAL: NAD, well-groomed, well-developed  HEAD:  Atraumatic, Normocephalic  EYES: EOMI, PERRLA, conjunctiva and sclera clear  ENMT: No tonsillar erythema, exudates, or enlargement; Moist mucous membranes, Good dentition, No lesions  NECK: Supple, No JVD, Normal thyroid  CHEST/LUNG: Clear to auscultation bilaterally; No rales, rhonchi, wheezing, or rubs  HEART: Regular rate and rhythm; No murmurs, rubs, or gallops  ABDOMEN: Soft, Nontender, Nondistended; Bowel sounds present  EXTREMITIES:  2+ Peripheral Pulses, No clubbing, cyanosis, or edema  LYMPH: No lymphadenopathy noted  SKIN: No rashes or lesions  NERVOUS SYSTEM:  Alert & Oriented X4, Good concentration  PSYCH: Normal Affect. Speaking in Full Sentences. Laying in bed comfortably; not agitated     LABS:                        8.2    6.12  )-----------( 234      ( 20 May 2022 06:24 )             29.2                         8.1    6.66  )-----------( 243      ( 19 May 2022 06:42 )             28.9                         9.0    9.25  )-----------( 208      ( 18 May 2022 06:36 )             30.3     Hgb Trend: 8.2<--, 8.1<--, 9.0<--, 8.6<--, 8.5<--  05-20    135  |  97  |  59<H>  ----------------------------<  130<H>  4.1   |  29  |  1.55<H>  05-19    139  |  99  |  61<H>  ----------------------------<  43<LL>  4.1   |  27  |  1.41<H>  05-18    141  |  99  |  61<H>  ----------------------------<  58<L>  4.1   |  27  |  1.43<H>    Ca    10.3      20 May 2022 06:22  Ca    10.4      19 May 2022 06:44  Ca    10.7<H>      18 May 2022 06:36  Phos  3.3     05-20  Mg     2.2     05-20      Creatinine Trend: 1.55<--, 1.41<--, 1.43<--, 1.44<--, 1.51<--, 1.59<--                    CAPILLARY BLOOD GLUCOSE      POCT Blood Glucose.: 154 mg/dL (20 May 2022 21:45)  POCT Blood Glucose.: 99 mg/dL (20 May 2022 17:11)  POCT Blood Glucose.: 88 mg/dL (20 May 2022 12:03)  POCT Blood Glucose.: 122 mg/dL (20 May 2022 07:51)     Dr. Alexandr Gutierrez PGY2    PERRI MOREIRA  89y  MRN: [id number]    Subjective:    Patient is a 89y old  Female who presents with a chief complaint of SOB (20 May 2022 20:24)  Spoke with Patient at Bedside. No acute events overnight. No active complaints. Patient denies Ha/F/N/V/CP/Abd Pain/D/Dysuria    MEDICATIONS  (STANDING):  allopurinol 100 milliGRAM(s) Oral daily  amLODIPine   Tablet 5 milliGRAM(s) Oral daily  apixaban 2.5 milliGRAM(s) Oral two times a day  buMETAnide 2 milliGRAM(s) Oral two times a day  dextrose 5%. 1000 milliLiter(s) (100 mL/Hr) IV Continuous <Continuous>  dextrose 5%. 1000 milliLiter(s) (50 mL/Hr) IV Continuous <Continuous>  dextrose 50% Injectable 25 Gram(s) IV Push once  dextrose 50% Injectable 12.5 Gram(s) IV Push once  dextrose 50% Injectable 25 Gram(s) IV Push once  glucagon  Injectable 1 milliGRAM(s) IntraMuscular once  insulin glargine Injectable (LANTUS) 8 Unit(s) SubCutaneous at bedtime  insulin lispro (ADMELOG) corrective regimen sliding scale   SubCutaneous at bedtime  insulin lispro (ADMELOG) corrective regimen sliding scale   SubCutaneous three times a day before meals  insulin lispro Injectable (ADMELOG) 3 Unit(s) SubCutaneous three times a day before meals  levothyroxine 50 MICROGram(s) Oral <User Schedule>  levothyroxine 100 MICROGram(s) Oral <User Schedule>  metoprolol succinate  milliGRAM(s) Oral daily  multivitamin 1 Tablet(s) Oral daily  pantoprazole    Tablet 40 milliGRAM(s) Oral before breakfast  polyethylene glycol 3350 17 Gram(s) Oral daily  senna 2 Tablet(s) Oral at bedtime  simvastatin 20 milliGRAM(s) Oral at bedtime    MEDICATIONS  (PRN):  acetaminophen     Tablet .. 650 milliGRAM(s) Oral every 6 hours PRN Temp greater or equal to 38C (100.4F), Mild Pain (1 - 3)  dextrose Oral Gel 15 Gram(s) Oral once PRN Blood Glucose LESS THAN 70 milliGRAM(s)/deciliter        Objective:    Vitals: Vital Signs Last 24 Hrs  T(C): 36.3 (05-21-22 @ 04:22), Max: 36.4 (05-20-22 @ 11:35)  T(F): 97.4 (05-21-22 @ 04:22), Max: 97.6 (05-20-22 @ 11:35)  HR: 60 (05-21-22 @ 04:22) (60 - 64)  BP: 138/68 (05-21-22 @ 04:22) (123/60 - 138/73)  BP(mean): --  RR: 18 (05-21-22 @ 04:22) (18 - 18)  SpO2: 92% (05-21-22 @ 04:22) (92% - 93%)            I&O's Summary    19 May 2022 07:01  -  20 May 2022 07:00  --------------------------------------------------------  IN: 0 mL / OUT: 100 mL / NET: -100 mL        PHYSICAL EXAM:  GENERAL: NAD, elderly female, notably not tachypneic and anxious with conversation.   HEAD:  Atraumatic, Normocephalic  EYES: EOMI, PERRLA, conjunctiva and sclera clear  ENMT: Moist mucous membranes  NECK: Supple, JVD improving   CHEST/LUNG: Adequate inspiratory effort. slight expiratory crackles bilaterally. On room air.   HEART: Regular rate and rhythm; No murmurs, rubs, or gallops.   ABDOMEN: Soft, Nontender, Nondistended; Bowel sounds present  EXTREMITIES:  2+ Peripheral Pulses. 1+ pitting b/l LE edema with chronic skin changes in LLE.   NERVOUS SYSTEM:  Alert & Oriented X3  PSYCH: Normal Affect. Laying in bed comfortably; not agitated    LABS:                        8.2    6.12  )-----------( 234      ( 20 May 2022 06:24 )             29.2                         8.1    6.66  )-----------( 243      ( 19 May 2022 06:42 )             28.9                         9.0    9.25  )-----------( 208      ( 18 May 2022 06:36 )             30.3     Hgb Trend: 8.2<--, 8.1<--, 9.0<--, 8.6<--, 8.5<--  05-20    135  |  97  |  59<H>  ----------------------------<  130<H>  4.1   |  29  |  1.55<H>  05-19    139  |  99  |  61<H>  ----------------------------<  43<LL>  4.1   |  27  |  1.41<H>  05-18    141  |  99  |  61<H>  ----------------------------<  58<L>  4.1   |  27  |  1.43<H>    Ca    10.3      20 May 2022 06:22  Ca    10.4      19 May 2022 06:44  Ca    10.7<H>      18 May 2022 06:36  Phos  3.3     05-20  Mg     2.2     05-20      Creatinine Trend: 1.55<--, 1.41<--, 1.43<--, 1.44<--, 1.51<--, 1.59<--                    CAPILLARY BLOOD GLUCOSE      POCT Blood Glucose.: 154 mg/dL (20 May 2022 21:45)  POCT Blood Glucose.: 99 mg/dL (20 May 2022 17:11)  POCT Blood Glucose.: 88 mg/dL (20 May 2022 12:03)  POCT Blood Glucose.: 122 mg/dL (20 May 2022 07:51)

## 2022-05-21 NOTE — PROGRESS NOTE ADULT - PROBLEM SELECTOR PLAN 3
- Continue home Eliquis 2.5mg BID   - EKG on admission was ventricularly paced rhythm with underlying Afib at 65 bpm   - Monitor on tele - R foot heel wound w/ well adhered eschar, negative probe to bone; xray: no gas, no OM   - Appreciate podiatry recs  - Dry sterile dressing applied to right heel with betadine, recommend daily dressing changes

## 2022-05-21 NOTE — PROGRESS NOTE ADULT - NUTRITIONAL ASSESSMENT
This patient has been assessed with a concern for Malnutrition and has been determined to have a diagnosis/diagnoses of Severe protein-calorie malnutrition.    This patient is being managed with:   Diet Regular-  Consistent Carbohydrate {Evening Snack} (CSTCHOSN)  DASH/TLC {Sodium & Cholesterol Restricted} (DASH)  1500mL Fluid Restriction ([id number])  Entered: May 19 2022 10:05AM

## 2022-05-21 NOTE — PROGRESS NOTE ADULT - ASSESSMENT
89yo F with PMH on HFpEF (EF 75% 03/2022), Afib (on Eliquis), HTN, DM2, CKD3, hypothyroidism, AS s/p TAVR (09/2021), s/p PPM BIBEMS for hypoxia of 88% on RA. Admitted for CHF exacerbation. Gradually improving.  89F Hx HFpEF (EF 75% 03/2022), Afib (on Eliquis), HTN, DM2, CKD3, hypothyroidism, AS s/p TAVR (09/2021), s/p PPM BIBEMS for CHF exacerbation now optimized

## 2022-05-21 NOTE — PROGRESS NOTE ADULT - PROBLEM SELECTOR PLAN 7
- Continue home Amlodipine 5mg daily - Home Synthroid 50mcg on weekdays, 100mcg on weekends   - Continue home regimen   - TSH 5.57 (pt's synthroid regimen was recently increased, will hold off on increasing dose during this hospitalization)

## 2022-05-21 NOTE — PROGRESS NOTE ADULT - PROBLEM SELECTOR PLAN 1
- Likely 2/2 CHF with moderate pleural effusions - improving  - CT Chest on 4/29 shows moderate right and small left pleural effusions with pulmonary edema in the setting of cardiomegaly. Partial lower lobe compressive atelectasis; superimposed consolidation is difficult to exclude on this noncontrast CT.  - S/p ceftriaxone and azithromycin in ED. Monitor off abx. no evidence of infection    - Bumex 2mg PO BID  - Currently saturating well (89+) on RA  - plan to recheck ambulatory O2 today - HFpEF (EF 75% in 03/2022), AS s/p TAVR (09/2021)  - Continue home Toprol XL 100mg daily   - Bumex 2mg PO BID  - TTE shows EF 65% with mold pulm HTN and mod TR (no significant change from prior)  - Strict I/Os, daily weights; Appreciate cards recs

## 2022-05-21 NOTE — PROGRESS NOTE ADULT - PROBLEM SELECTOR PLAN 10
Home meds: Continue home Allopurinol 100mg daily, Simvastatin 20mg qhs, Protonix 40mg daily   DVT ppx: Eliquis   Diet: DASH/Renal   Dispo: PT recs home with home PT; patient has home care and prefers no JEREMIAS.   DNR/DNI  Communication: updated daughter in person 5/20

## 2022-05-21 NOTE — PROGRESS NOTE ADULT - PROBLEM SELECTOR PLAN 5
- Likely 2/2 cardiorenal syndrome as Cr improving with diuresis   - Kidney bladder ultrasound shows no hydro  -back around baseline Cr - Patient on home Lantus 20U qhs and ISS ACHS; but hypoglycemic dec to Lantus 8U  - hypoglycemic due to less PO intake  - A1c 7.4 in 03/2022 - Patient on home Lantus 20U qhs and ISS ACHS; but hypoglycemic dec to Lantus 8U, 3TID  - hypoglycemic due to less PO intake  - A1c 7.4 in 03/2022

## 2022-05-21 NOTE — PROGRESS NOTE ADULT - ATTENDING COMMENTS
89yo F w HFpEF, Afib (on Eliquis), HTN, DM2, CKD3, AS s/p TAVR (09/2021) aw hypoxia, acute diastolic CHF. Improved on diuresis but worsened again requiring more iv Lasix. Now stabilized, off oxygen, on po Bumex.   Patient discussing with family dispo options (home vs rehab)

## 2022-05-21 NOTE — PROGRESS NOTE ADULT - PROBLEM SELECTOR PLAN 2
- HFpEF (EF 75% in 03/2022), AS s/p TAVR (09/2021)  - Patient initially SOB, RHODES, LE edema, +JVD likely in CHF exacerbation   - Continue home Toprol XL 100mg daily   - bumex 2mg PO BID, close outpatient follow up with PCP for repeat BMP post discharge  - Strict I/Os, daily weights  - Appreciate cards recs   - TTE shows EF 65% with mold pulm HTN and mod TR (no significant change from prior) - Continue home Eliquis 2.5mg BID   - EKG V paced, rate controlled, ctm on tele

## 2022-05-22 NOTE — PROGRESS NOTE ADULT - ASSESSMENT
89F Hx HFpEF (EF 75% 03/2022), Afib (on Eliquis), HTN, DM2, CKD3, hypothyroidism, AS s/p TAVR (09/2021), s/p PPM BIBEMS for CHF exacerbation now optimized

## 2022-05-22 NOTE — PROGRESS NOTE ADULT - PROBLEM SELECTOR PLAN 8
Home meds: Continue home Allopurinol 100mg daily, Simvastatin 20mg qhs, Protonix 40mg daily   DVT ppx: Eliquis   Diet: DASH/Renal   Dispo: PT recs home with home PT; patient has home care and prefers no JEREMIAS.   DNR/DNI  Communication: updated daughter in person 5/20, He  by phone 5/21 10:17AM Home meds: Continue home Allopurinol 100mg daily, Simvastatin 20mg qhs, Protonix 40mg daily   DVT ppx: Eliquis   Diet: DASH/Renal   Dispo: PT recs home with home PT; patient has home care and prefers no JEREMIAS.   DNR/DNI  Communication: updated daughter in person 5/20, He  by phone 5/22 8:38AM

## 2022-05-22 NOTE — PROGRESS NOTE ADULT - SUBJECTIVE AND OBJECTIVE BOX
DATE OF SERVICE: 05-22-22      Patient is a 89y old  Female who presents with a chief complaint of SOB (22 May 2022 06:43)      INTERVAL HISTORY:  feels ok    	  MEDICATIONS:  amLODIPine   Tablet 5 milliGRAM(s) Oral daily  buMETAnide 2 milliGRAM(s) Oral two times a day  metoprolol succinate  milliGRAM(s) Oral daily        PHYSICAL EXAM:  T(C): 36.4 (05-22-22 @ 20:15), Max: 36.4 (05-22-22 @ 15:39)  HR: 60 (05-22-22 @ 20:15) (60 - 60)  BP: 125/77 (05-22-22 @ 20:15) (101/57 - 133/74)  RR: 18 (05-22-22 @ 20:15) (18 - 18)  SpO2: 95% (05-22-22 @ 20:15) (90% - 95%)  Wt(kg): --  I&O's Summary    21 May 2022 07:01  -  22 May 2022 07:00  --------------------------------------------------------  IN: 900 mL / OUT: 0 mL / NET: 900 mL    22 May 2022 07:01  -  22 May 2022 21:49  --------------------------------------------------------  IN: 480 mL / OUT: 500 mL / NET: -20 mL          Appearance: In no distress	  HEENT:    PERRL, EOMI	  Cardiovascular:  S1 S2, No JVD  Respiratory: Lungs clear to auscultation	  Gastrointestinal:  Soft, Non-tender, + BS	  Vascularature:  No edema of LE  Psychiatric: Appropriate affect   Neuro: no acute focal deficits                               8.5    5.23  )-----------( 225      ( 22 May 2022 06:52 )             29.6     05-22    139  |  98  |  62<H>  ----------------------------<  117<H>  4.2   |  27  |  1.78<H>    Ca    10.6<H>      22 May 2022 06:50  Phos  3.3     05-22  Mg     2.4     05-22          Labs personally reviewed      ASSESSMENT/PLAN: 	    Ms. Briones is a 89 yo female with PMH of HTN, HLD, CAD s/p CABG, AS s/p TAVR in 9/2021 at [facility], DM, CKD, AF on Eliquis, PPM who presents with 3 days of progressive shortness of breath, + orthopnea, increased LE edema and decreased oxygen saturation on pulse oximeter at home.    Problem/Plan -1  Problem: HFpEF  - Followed by Dr. Agapito Woodard for outpatient cardiology  - CT Chest reveals moderate right and small left pleural effusions, cardiomegaly, possibly superimposed consolidation  - On lasix 40mg PO daily at home  - BMP reviewed from OP cardiologist records on 12/3/21 it was 1.1, and 5 months ago it was 1.2-1.4, here 2 months ago it was 1.4-1.6  - ECHO Left Ventricle: Normal left ventricular systolic function. No segmental wall motion abnormalities.  The LVEF= 60-65%. Moderate mitral regurgitation, There is a transcatheter aortic valve replacement seen. Moderate pulmonary hypertension.  - Patient appears close to euvolemia, but not euvolemic yet, although much improved since admission, c/w IV Lasix for now, will closely monitor.  - 5/3 POCUS reveals Right pleural effusion  - BNP only slighty down (8208 --> 7983)  - Weight (bed not standing) slightly increased since admission despite IV diuresis.  - CXR shows much improved pulm vasc congestion  - Pt does not yet appear euvolemic but will continue Lasix 40mg PO BID until OP follow up  - pt reports hx of Lymphedema and says her legs have been chronically edematous but doesnt use stockings   - Switch to Lasix 40mg IV BID as again volume overloaded, responding well and Cr improving as well   - Cont Lasix 40mg IV BID  - Transitioned to Bumex 2mg PO BID, consider switching to 2mg qAM and 1mg qPM as BUN/Cr rising    Problem/Plan -2  Problem: Aortic Stenosis  - s/p TAVR in September 2021  - minimal murmur on ausculation  - aortic valve is well seated and has good flow  - moderate tricuspid regurg    Problem/Plan -3  Problem: CAD  - Hx CABG at [facility]  - currently denies chest pain  - EKG without ischemic changes  - Troponin elevated likely i/s/o ADHF exacerbation; peaked 4/29 at 124  - c/w simvastatin 20mg PO daily    Problem/Plan -4  Problem: AF  - rate controlled  - c/w Metoprolol and Eliquis  - monitor on tele    Problem/Plan -5  Problem: HLD  - c/w simvastatin             Giles Gan DO [facility]  Cardiovascular Medicine  [address], [address]  Office: [phone]  Cell: [phone]

## 2022-05-22 NOTE — PROGRESS NOTE ADULT - SUBJECTIVE AND OBJECTIVE BOX
Ez Pulliam, PGY1    DATE OF SERVICE: 05-22-22 @ 06:43    Patient is a 89y old  Female who presents with a chief complaint of CHF Exacerbation (21 May 2022 06:12)      SUBJECTIVE / OVERNIGHT EVENTS: No acute events overnight. Patient seen and examined this AM.    Tele reviewed:     MEDICATIONS  (STANDING):  allopurinol 100 milliGRAM(s) Oral daily  amLODIPine   Tablet 5 milliGRAM(s) Oral daily  apixaban 2.5 milliGRAM(s) Oral two times a day  buMETAnide 2 milliGRAM(s) Oral two times a day  dextrose 5%. 1000 milliLiter(s) (100 mL/Hr) IV Continuous <Continuous>  dextrose 5%. 1000 milliLiter(s) (50 mL/Hr) IV Continuous <Continuous>  dextrose 50% Injectable 25 Gram(s) IV Push once  dextrose 50% Injectable 12.5 Gram(s) IV Push once  dextrose 50% Injectable 25 Gram(s) IV Push once  glucagon  Injectable 1 milliGRAM(s) IntraMuscular once  insulin glargine Injectable (LANTUS) 8 Unit(s) SubCutaneous at bedtime  insulin lispro (ADMELOG) corrective regimen sliding scale   SubCutaneous three times a day before meals  insulin lispro (ADMELOG) corrective regimen sliding scale   SubCutaneous at bedtime  insulin lispro Injectable (ADMELOG) 3 Unit(s) SubCutaneous three times a day before meals  levothyroxine 50 MICROGram(s) Oral <User Schedule>  levothyroxine 100 MICROGram(s) Oral <User Schedule>  metoprolol succinate  milliGRAM(s) Oral daily  multivitamin 1 Tablet(s) Oral daily  pantoprazole    Tablet 40 milliGRAM(s) Oral before breakfast  polyethylene glycol 3350 17 Gram(s) Oral daily  senna 2 Tablet(s) Oral at bedtime  simvastatin 20 milliGRAM(s) Oral at bedtime    MEDICATIONS  (PRN):  acetaminophen     Tablet .. 650 milliGRAM(s) Oral every 6 hours PRN Temp greater or equal to 38C (100.4F), Mild Pain (1 - 3)  dextrose Oral Gel 15 Gram(s) Oral once PRN Blood Glucose LESS THAN 70 milliGRAM(s)/deciliter      Vital Signs Last 24 Hrs  T(C): 36.3 (22 May 2022 04:34), Max: 36.3 (21 May 2022 12:10)  T(F): 97.4 (22 May 2022 04:34), Max: 97.4 (21 May 2022 12:10)  HR: 60 (22 May 2022 04:34) (60 - 62)  BP: 101/57 (22 May 2022 04:34) (101/57 - 124/86)  BP(mean): --  RR: 18 (22 May 2022 04:34) (18 - 18)  SpO2: 91% (22 May 2022 04:34) (91% - 92%)  CAPILLARY BLOOD GLUCOSE      POCT Blood Glucose.: 183 mg/dL (21 May 2022 21:14)  POCT Blood Glucose.: 188 mg/dL (21 May 2022 17:03)  POCT Blood Glucose.: 172 mg/dL (21 May 2022 11:48)  POCT Blood Glucose.: 153 mg/dL (21 May 2022 08:10)    I&O's Summary    21 May 2022 07:01  -  22 May 2022 06:43  --------------------------------------------------------  IN: 900 mL / OUT: 0 mL / NET: 900 mL        PHYSICAL EXAM:  GENERAL: NAD, elderly female, notably not tachypneic and anxious with conversation.   HEAD:  Atraumatic, Normocephalic  EYES: EOMI, PERRLA, conjunctiva and sclera clear  ENMT: Moist mucous membranes  NECK: Supple, JVD improving   CHEST/LUNG: Adequate inspiratory effort. slight expiratory crackles bilaterally. On room air.   HEART: Regular rate and rhythm; No murmurs, rubs, or gallops.   ABDOMEN: Soft, Nontender, Nondistended; Bowel sounds present  EXTREMITIES:  2+ Peripheral Pulses. 1+ pitting b/l LE edema with chronic skin changes in LLE.   NERVOUS SYSTEM:  Alert & Oriented X3  PSYCH: Normal Affect. Laying in bed comfortably; not agitated    LABS:                        8.5    5.81  )-----------( 226      ( 21 May 2022 07:47 )             30.5     05-21    137  |  97  |  60<H>  ----------------------------<  156<H>  4.2   |  29  |  1.58<H>    Ca    10.2      21 May 2022 07:47  Phos  3.2     05-21  Mg     2.3     05-21                RADIOLOGY & ADDITIONAL TESTS:    Imaging Personally Reviewed:    Consultant(s) Notes Reviewed:      Care Discussed with Consultants/Other Providers:   Ez Pulliam, PGY1    DATE OF SERVICE: 05-22-22 @ 06:43    Patient is a 89y old  Female who presents with a chief complaint of CHF Exacerbation (21 May 2022 06:12)      SUBJECTIVE / OVERNIGHT EVENTS: No acute events overnight. Patient seen and examined this AM. Feels like breathing is the same, improved from 3-4 days ago. Sating well on RA. States that she has been tolerating diet. Per nursing, frequent BMs, but all are formed. No chest pain, abd pain, n/v.     Tele reviewed: Vpaced 59-60    MEDICATIONS  (STANDING):  allopurinol 100 milliGRAM(s) Oral daily  amLODIPine   Tablet 5 milliGRAM(s) Oral daily  apixaban 2.5 milliGRAM(s) Oral two times a day  buMETAnide 2 milliGRAM(s) Oral two times a day  dextrose 5%. 1000 milliLiter(s) (100 mL/Hr) IV Continuous <Continuous>  dextrose 5%. 1000 milliLiter(s) (50 mL/Hr) IV Continuous <Continuous>  dextrose 50% Injectable 25 Gram(s) IV Push once  dextrose 50% Injectable 12.5 Gram(s) IV Push once  dextrose 50% Injectable 25 Gram(s) IV Push once  glucagon  Injectable 1 milliGRAM(s) IntraMuscular once  insulin glargine Injectable (LANTUS) 8 Unit(s) SubCutaneous at bedtime  insulin lispro (ADMELOG) corrective regimen sliding scale   SubCutaneous three times a day before meals  insulin lispro (ADMELOG) corrective regimen sliding scale   SubCutaneous at bedtime  insulin lispro Injectable (ADMELOG) 3 Unit(s) SubCutaneous three times a day before meals  levothyroxine 50 MICROGram(s) Oral <User Schedule>  levothyroxine 100 MICROGram(s) Oral <User Schedule>  metoprolol succinate  milliGRAM(s) Oral daily  multivitamin 1 Tablet(s) Oral daily  pantoprazole    Tablet 40 milliGRAM(s) Oral before breakfast  polyethylene glycol 3350 17 Gram(s) Oral daily  senna 2 Tablet(s) Oral at bedtime  simvastatin 20 milliGRAM(s) Oral at bedtime    MEDICATIONS  (PRN):  acetaminophen     Tablet .. 650 milliGRAM(s) Oral every 6 hours PRN Temp greater or equal to 38C (100.4F), Mild Pain (1 - 3)  dextrose Oral Gel 15 Gram(s) Oral once PRN Blood Glucose LESS THAN 70 milliGRAM(s)/deciliter      Vital Signs Last 24 Hrs  T(C): 36.3 (22 May 2022 04:34), Max: 36.3 (21 May 2022 12:10)  T(F): 97.4 (22 May 2022 04:34), Max: 97.4 (21 May 2022 12:10)  HR: 60 (22 May 2022 04:34) (60 - 62)  BP: 101/57 (22 May 2022 04:34) (101/57 - 124/86)  BP(mean): --  RR: 18 (22 May 2022 04:34) (18 - 18)  SpO2: 91% (22 May 2022 04:34) (91% - 92%)  CAPILLARY BLOOD GLUCOSE      POCT Blood Glucose.: 183 mg/dL (21 May 2022 21:14)  POCT Blood Glucose.: 188 mg/dL (21 May 2022 17:03)  POCT Blood Glucose.: 172 mg/dL (21 May 2022 11:48)  POCT Blood Glucose.: 153 mg/dL (21 May 2022 08:10)    I&O's Summary    21 May 2022 07:01  -  22 May 2022 06:43  --------------------------------------------------------  IN: 900 mL / OUT: 0 mL / NET: 900 mL        PHYSICAL EXAM:  GENERAL: NAD, elderly female, notably not tachypneic   HEAD:  Atraumatic, Normocephalic  EYES: EOMI, PERRLA, conjunctiva and sclera clear  ENMT: Moist mucous membranes  NECK: Supple, JVD improving   CHEST/LUNG: Adequate inspiratory effort. slight expiratory crackles bilaterally. On room air.   HEART: Regular rate and rhythm; No murmurs, rubs, or gallops.   ABDOMEN: Soft, Nontender, Nondistended; Bowel sounds present  EXTREMITIES:  2+ Peripheral Pulses. 1+ pitting b/l LE edema with chronic skin changes in LLE.   NERVOUS SYSTEM:  Alert & Oriented X3  PSYCH: Normal Affect. Laying in bed comfortably; not agitated    LABS:                        8.5    5.81  )-----------( 226      ( 21 May 2022 07:47 )             30.5     05-21    137  |  97  |  60<H>  ----------------------------<  156<H>  4.2   |  29  |  1.58<H>    Ca    10.2      21 May 2022 07:47  Phos  3.2     05-21  Mg     2.3     05-21                RADIOLOGY & ADDITIONAL TESTS:    Imaging Personally Reviewed:    Consultant(s) Notes Reviewed:      Care Discussed with Consultants/Other Providers:

## 2022-05-22 NOTE — PROGRESS NOTE ADULT - NUTRITIONAL ASSESSMENT
This patient has been assessed with a concern for Malnutrition and has been determined to have a diagnosis/diagnoses of Severe protein-calorie malnutrition.    This patient is being managed with:   Diet Regular-  Consistent Carbohydrate {Evening Snack} (CSTCHOSN)  DASH/TLC {Sodium & Cholesterol Restricted} (DASH)  1500mL Fluid Restriction ([id number])  Entered: May 19 2022 10:05AM

## 2022-05-22 NOTE — PROGRESS NOTE ADULT - PROBLEM SELECTOR PLAN 4
Kidney bladder ultrasound shows no hydro; Cr: 1.58   - back around baseline Cr Kidney bladder ultrasound shows no hydro; Cr: 1.78   - back around baseline Cr

## 2022-05-22 NOTE — PROGRESS NOTE ADULT - ATTENDING COMMENTS
89F Hx HFpEF (EF 75% 03/2022), Afib (on Eliquis), HTN, DM2, CKD3, hypothyroidism, AS s/p TAVR (09/2021), s/p PPM BIBEMS for CHF exacerbation now transitioned to PO diuretics    #ADHF: now on bumex 2mg PO BID. Saturating well on RA. Need walking sat.   Dispo: Likely soon, need to assess if patient needs home O2 for ambulation.

## 2022-05-22 NOTE — PROGRESS NOTE ADULT - PROBLEM SELECTOR PLAN 3
- R foot heel wound w/ well adhered eschar, negative probe to bone; xray: no gas, no OM   - Appreciate podiatry recs  - Dry sterile dressing applied to right heel with betadine, recommend daily dressing changes

## 2022-05-22 NOTE — PROGRESS NOTE ADULT - PROBLEM SELECTOR PLAN 5
- Patient on home Lantus 20U qhs and ISS ACHS; but hypoglycemic dec to Lantus 8U, 3TID  - hypoglycemic due to less PO intake  - A1c 7.4 in 03/2022

## 2022-05-22 NOTE — PROGRESS NOTE ADULT - PROBLEM SELECTOR PLAN 1
- HFpEF (EF 75% in 03/2022), AS s/p TAVR (09/2021)  - Continue home Toprol XL 100mg daily   - Bumex 2mg PO BID  - TTE shows EF 65% with mold pulm HTN and mod TR (no significant change from prior)  - Strict I/Os, daily weights; Appreciate cards recs

## 2022-05-23 NOTE — PROGRESS NOTE ADULT - SUBJECTIVE AND OBJECTIVE BOX
PROGRESS NOTE:   Authored By: Vika Camargo MD     PGY-1, Internal Medicine  Pager: [phone], [phone]    Patient is a 89y old  Female who presents with a chief complaint of SOB (22 May 2022 16:49)      OVERNIGHT EVENTS: No acute events overnight    SUBJECTIVE: Pt seen and examined at bedside this morning.     ADDITIONAL REVIEW OF SYSTEMS:    MEDICATIONS  (STANDING):  allopurinol 100 milliGRAM(s) Oral daily  amLODIPine   Tablet 5 milliGRAM(s) Oral daily  apixaban 2.5 milliGRAM(s) Oral two times a day  buMETAnide 2 milliGRAM(s) Oral two times a day  dextrose 5%. 1000 milliLiter(s) (100 mL/Hr) IV Continuous <Continuous>  dextrose 5%. 1000 milliLiter(s) (50 mL/Hr) IV Continuous <Continuous>  dextrose 50% Injectable 25 Gram(s) IV Push once  dextrose 50% Injectable 12.5 Gram(s) IV Push once  dextrose 50% Injectable 25 Gram(s) IV Push once  glucagon  Injectable 1 milliGRAM(s) IntraMuscular once  insulin glargine Injectable (LANTUS) 8 Unit(s) SubCutaneous at bedtime  insulin lispro (ADMELOG) corrective regimen sliding scale   SubCutaneous three times a day before meals  insulin lispro (ADMELOG) corrective regimen sliding scale   SubCutaneous at bedtime  insulin lispro Injectable (ADMELOG) 3 Unit(s) SubCutaneous three times a day before meals  levothyroxine 50 MICROGram(s) Oral <User Schedule>  levothyroxine 100 MICROGram(s) Oral <User Schedule>  metoprolol succinate  milliGRAM(s) Oral daily  multivitamin 1 Tablet(s) Oral daily  pantoprazole    Tablet 40 milliGRAM(s) Oral before breakfast  polyethylene glycol 3350 17 Gram(s) Oral daily  senna 2 Tablet(s) Oral at bedtime  simvastatin 20 milliGRAM(s) Oral at bedtime    MEDICATIONS  (PRN):  acetaminophen     Tablet .. 650 milliGRAM(s) Oral every 6 hours PRN Temp greater or equal to 38C (100.4F), Mild Pain (1 - 3)  dextrose Oral Gel 15 Gram(s) Oral once PRN Blood Glucose LESS THAN 70 milliGRAM(s)/deciliter      CAPILLARY BLOOD GLUCOSE      POCT Blood Glucose.: 219 mg/dL (23 May 2022 00:29)  POCT Blood Glucose.: 165 mg/dL (22 May 2022 21:38)  POCT Blood Glucose.: 151 mg/dL (22 May 2022 16:53)  POCT Blood Glucose.: 154 mg/dL (22 May 2022 11:25)  POCT Blood Glucose.: 126 mg/dL (22 May 2022 07:59)    I&O's Summary    22 May 2022 07:01  -  23 May 2022 07:00  --------------------------------------------------------  IN: 720 mL / OUT: 1350 mL / NET: -630 mL        PHYSICAL EXAM:  Vital Signs Last 24 Hrs  T(C): 36.5 (23 May 2022 04:25), Max: 36.5 (23 May 2022 04:25)  T(F): 97.7 (23 May 2022 04:25), Max: 97.7 (23 May 2022 04:25)  HR: 63 (23 May 2022 04:25) (60 - 63)  BP: 128/74 (23 May 2022 04:25) (122/77 - 133/74)  BP(mean): --  RR: 18 (23 May 2022 04:25) (18 - 18)  SpO2: 98% (23 May 2022 04:25) (90% - 98%)    CONSTITUTIONAL: NAD, well-developed  HEENT: NC/AT, EOMI  RESPIRATORY: No increased work of breathing, CTAB, no wheezes or crackles appreciated  CARDIOVASCULAR: RRR, S1 and S2 present, no m/r/g  ABDOMEN: soft, NT, ND, bowel sounds present  EXTREMITIES: No LE edema  MUSCULOSKELETAL: no joint swelling or tenderness to palpation  NEURO: A&Ox3, moving all extremities    LABS:                        8.7    6.46  )-----------( 217      ( 23 May 2022 06:30 )             31.0     05-23    136  |  95<L>  |  63<H>  ----------------------------<  162<H>  4.3   |  27  |  1.64<H>    Ca    10.7<H>      23 May 2022 06:30  Phos  3.4     05-23  Mg     2.3     05-23                  RADIOLOGY & ADDITIONAL TESTS:    Results Reviewed:   Imaging Personally Reviewed:  Electrocardiogram Personally Reviewed:    COORDINATION OF CARE:  Care Discussed with Consultants/Other Providers [Y/N]:  Prior or Outpatient Records Reviewed [Y/N]:   PROGRESS NOTE:   Authored By: Vika Camargo MD     PGY-1, Internal Medicine  Pager: [phone], [phone]    Patient is a 89y old  Female who presents with a chief complaint of SOB (22 May 2022 16:49)      OVERNIGHT EVENTS: No acute events overnight    SUBJECTIVE: Pt seen and examined at bedside this morning. Pt complaining of some left groin pain.    ADDITIONAL REVIEW OF SYSTEMS:    MEDICATIONS  (STANDING):  allopurinol 100 milliGRAM(s) Oral daily  amLODIPine   Tablet 5 milliGRAM(s) Oral daily  apixaban 2.5 milliGRAM(s) Oral two times a day  buMETAnide 2 milliGRAM(s) Oral two times a day  dextrose 5%. 1000 milliLiter(s) (100 mL/Hr) IV Continuous <Continuous>  dextrose 5%. 1000 milliLiter(s) (50 mL/Hr) IV Continuous <Continuous>  dextrose 50% Injectable 25 Gram(s) IV Push once  dextrose 50% Injectable 12.5 Gram(s) IV Push once  dextrose 50% Injectable 25 Gram(s) IV Push once  glucagon  Injectable 1 milliGRAM(s) IntraMuscular once  insulin glargine Injectable (LANTUS) 8 Unit(s) SubCutaneous at bedtime  insulin lispro (ADMELOG) corrective regimen sliding scale   SubCutaneous three times a day before meals  insulin lispro (ADMELOG) corrective regimen sliding scale   SubCutaneous at bedtime  insulin lispro Injectable (ADMELOG) 3 Unit(s) SubCutaneous three times a day before meals  levothyroxine 50 MICROGram(s) Oral <User Schedule>  levothyroxine 100 MICROGram(s) Oral <User Schedule>  metoprolol succinate  milliGRAM(s) Oral daily  multivitamin 1 Tablet(s) Oral daily  pantoprazole    Tablet 40 milliGRAM(s) Oral before breakfast  polyethylene glycol 3350 17 Gram(s) Oral daily  senna 2 Tablet(s) Oral at bedtime  simvastatin 20 milliGRAM(s) Oral at bedtime    MEDICATIONS  (PRN):  acetaminophen     Tablet .. 650 milliGRAM(s) Oral every 6 hours PRN Temp greater or equal to 38C (100.4F), Mild Pain (1 - 3)  dextrose Oral Gel 15 Gram(s) Oral once PRN Blood Glucose LESS THAN 70 milliGRAM(s)/deciliter      CAPILLARY BLOOD GLUCOSE      POCT Blood Glucose.: 219 mg/dL (23 May 2022 00:29)  POCT Blood Glucose.: 165 mg/dL (22 May 2022 21:38)  POCT Blood Glucose.: 151 mg/dL (22 May 2022 16:53)  POCT Blood Glucose.: 154 mg/dL (22 May 2022 11:25)  POCT Blood Glucose.: 126 mg/dL (22 May 2022 07:59)    I&O's Summary    22 May 2022 07:01  -  23 May 2022 07:00  --------------------------------------------------------  IN: 720 mL / OUT: 1350 mL / NET: -630 mL        PHYSICAL EXAM:  Vital Signs Last 24 Hrs  T(C): 36.5 (23 May 2022 04:25), Max: 36.5 (23 May 2022 04:25)  T(F): 97.7 (23 May 2022 04:25), Max: 97.7 (23 May 2022 04:25)  HR: 63 (23 May 2022 04:25) (60 - 63)  BP: 128/74 (23 May 2022 04:25) (122/77 - 133/74)  BP(mean): --  RR: 18 (23 May 2022 04:25) (18 - 18)  SpO2: 98% (23 May 2022 04:25) (90% - 98%)    GENERAL: NAD, elderly female, notably not tachypneic   HEAD:  Atraumatic, Normocephalic  EYES: EOMI, left pupil smaller than right, both reactive to light, conjunctiva and sclera clear  ENMT: Moist mucous membranes  NECK: Supple, JVD improving   CHEST/LUNG: Adequate inspiratory effort. slight expiratory crackles bilaterally. On room air.   HEART: Regular rate and rhythm; No murmurs, rubs, or gallops.   ABDOMEN: Soft, Nontender, Nondistended; Bowel sounds present  EXTREMITIES:  2+ Peripheral Pulses. 1+ pitting b/l LE edema with chronic skin changes in LLE.   MSK: TTP on left inguinal crease, non erythematous, no papules or vesicles, no muscular abnormalities, could not tolerate straight leg test due to pain  NERVOUS SYSTEM:  Alert & Oriented X3  PSYCH: Normal Affect. Laying in bed comfortably; not agitated    LABS:                        8.7    6.46  )-----------( 217      ( 23 May 2022 06:30 )             31.0     05-23    136  |  95<L>  |  63<H>  ----------------------------<  162<H>  4.3   |  27  |  1.64<H>    Ca    10.7<H>      23 May 2022 06:30  Phos  3.4     05-23  Mg     2.3     05-23                  RADIOLOGY & ADDITIONAL TESTS:    Results Reviewed:   Imaging Personally Reviewed:  Electrocardiogram Personally Reviewed:    COORDINATION OF CARE:  Care Discussed with Consultants/Other Providers [Y/N]:  Prior or Outpatient Records Reviewed [Y/N]:

## 2022-05-23 NOTE — DISCHARGE NOTE NURSING/CASE MANAGEMENT/SOCIAL WORK - NSDCFUADDAPPT_GEN_ALL_CORE_FT
Please call to schedule follow up appointment with cardiology in 1 week and with your primary care provider within 1 week.     Please follow up with Dr. Márquez within 1 week of discharge from the hospital, please call [phone] for appointment and discuss that you recently were seen in the hospital.

## 2022-05-23 NOTE — DISCHARGE NOTE NURSING/CASE MANAGEMENT/SOCIAL WORK - NSDCPEFALRISK_GEN_ALL_CORE
For information on Fall & Injury Prevention, visit: [email or web address] OR  [email or web address] OR  https://www.cdc.gov/steadi/patient.html

## 2022-05-23 NOTE — PROGRESS NOTE ADULT - NUTRITIONAL ASSESSMENT
This patient has been assessed with a concern for Malnutrition and has been determined to have a diagnosis/diagnoses of Severe protein-calorie malnutrition.    This patient is being managed with:   Diet Regular-  Consistent Carbohydrate {Evening Snack} (CSTCHOSN)  DASH/TLC {Sodium & Cholesterol Restricted} (DASH)  1500mL Fluid Restriction ([id number])  Entered: May 19 2022 10:05AM

## 2022-05-23 NOTE — PROGRESS NOTE ADULT - PROBLEM SELECTOR PLAN 8
Home meds: Continue home Allopurinol 100mg daily, Simvastatin 20mg qhs, Protonix 40mg daily   DVT ppx: Eliquis   Diet: DASH/Renal   Dispo: PT recs home with home PT; patient has home care and prefers no JEREMIAS.   DNR/DNI  Communication: updated daughter in person 5/20, He  by phone 5/22 8:38AM

## 2022-05-23 NOTE — PROGRESS NOTE ADULT - ATTENDING COMMENTS
89F Hx HFpEF (EF 75% 03/2022), Afib (on Eliquis), HTN, DM2, CKD3, hypothyroidism, AS s/p TAVR (09/2021), s/p PPM BIBEMS for CHF exacerbation now transitioned to PO diuretics    #ADHF:  bumex 2mg PO qam and 1mg qpm per cardiology. Continue home Toprol XL 100mg daily. Saturating well on RA. Need walking sat.   #Left thigh pain today again, likely cramping. Better with massage. No swelling, erythema or warmth in that area.   #MARICARMEN; Cr 1.64, downtrending, stable  Dispo: Likely soon, need to assess if patient needs home O2 for ambulation. 89F Hx HFpEF (EF 75% 03/2022), Afib (on Eliquis), HTN, DM2, CKD3, hypothyroidism, AS s/p TAVR (09/2021), s/p PPM BIBEMS for CHF exacerbation now transitioned to PO diuretics    #ADHF: c/w bumex 2mg PO BID. Continue home Toprol XL 100mg daily. Saturating well on RA. Need walking sat.   #Left thigh pain today again, likely cramping. Better with massage. No swelling, erythema or warmth in that area.   #MARICARMEN; Cr 1.64, downtrending, stable  Dispo: Likely soon, need to assess if patient needs home O2 for ambulation.

## 2022-05-23 NOTE — DISCHARGE NOTE NURSING/CASE MANAGEMENT/SOCIAL WORK - PATIENT PORTAL LINK FT
You can access the FollowMyHealth Patient Portal offered by [facility] by registering at the following website: [email or web address]. By joining [facility]’s FollowMyHealth portal, you will also be able to view your health information using other applications (apps) compatible with our system.

## 2022-05-23 NOTE — PROGRESS NOTE ADULT - SUBJECTIVE AND OBJECTIVE BOX
Date of Service   05-23-22 @ 12:08    Patient is a 89y old  Female who presents with a chief complaint of SOB (23 May 2022 07:18)      INTERVAL HISTORY: pt feels ok   TELEMETRY Personally reviewed: Vpaced 60's    REVIEW OF SYSTEMS:   CONSTITUTIONAL: No weakness  EYES/ENT: No visual changes; No throat pain  Neck: No pain or stiffness  Respiratory: No cough, wheezing, No shortness of breath  CARDIOVASCULAR: no chest pain or palpitations  GASTROINTESTINAL: No abdominal pain, no nausea, vomiting or hematemesis  GENITOURINARY: No dysuria, frequency or hematuria  NEUROLOGICAL: No stroke like symptoms  SKIN: No rashes    	  MEDICATIONS:  amLODIPine   Tablet 5 milliGRAM(s) Oral daily  buMETAnide 2 milliGRAM(s) Oral two times a day  metoprolol succinate  milliGRAM(s) Oral daily        PHYSICAL EXAM:  T(C): 36.5 (05-23-22 @ 04:25), Max: 36.5 (05-23-22 @ 04:25)  HR: 63 (05-23-22 @ 04:25) (60 - 63)  BP: 128/74 (05-23-22 @ 04:25) (125/77 - 133/74)  RR: 18 (05-23-22 @ 04:25) (18 - 18)  SpO2: 98% (05-23-22 @ 04:25) (90% - 98%)  Wt(kg): --  I&O's Summary    22 May 2022 07:01  -  23 May 2022 07:00  --------------------------------------------------------  IN: 720 mL / OUT: 1350 mL / NET: -630 mL          Appearance: In no distress	  HEENT:    PERRL, EOMI	  Cardiovascular:  S1 S2, No JVD  Respiratory: Lungs clear to auscultation	  Gastrointestinal:  Soft, Non-tender, + BS	  Vasculature:  + edema of B/L LE  Psychiatric: Appropriate affect   Neuro: no acute focal deficits                               8.7    6.46  )-----------( 217      ( 23 May 2022 06:30 )             31.0     05-23    136  |  95<L>  |  63<H>  ----------------------------<  162<H>  4.3   |  27  |  1.64<H>    Ca    10.7<H>      23 May 2022 06:30  Phos  3.4     05-23  Mg     2.3     05-23          Labs personally reviewed      ASSESSMENT/PLAN: 	  Ms. Briones is a 89 yo female with PMH of HTN, HLD, CAD s/p CABG, AS s/p TAVR in 9/2021 at [facility], DM, CKD, AF on Eliquis, PPM who presents with 3 days of progressive shortness of breath, + orthopnea, increased LE edema and decreased oxygen saturation on pulse oximeter at home.    Problem/Plan -1  Problem: HFpEF  - Followed by Dr. Agapito Woodard for outpatient cardiology  - CT Chest reveals moderate right and small left pleural effusions, cardiomegaly, possibly superimposed consolidation  - On lasix 40mg PO daily at home  - BMP reviewed from OP cardiologist records on 12/3/21 it was 1.1, and 5 months ago it was 1.2-1.4, here 2 months ago it was 1.4-1.6  - ECHO Left Ventricle: Normal left ventricular systolic function. No segmental wall motion abnormalities.  The LVEF= 60-65%. Moderate mitral regurgitation, There is a transcatheter aortic valve replacement seen. Moderate pulmonary hypertension.  - Patient appears close to euvolemia, but not euvolemic yet, although much improved since admission,   - 5/3 POCUS reveals Right pleural effusion  - BNP only slighty down (8208 --> 7983)  - Weight (bed not standing) slightly increased since admission despite IV diuresis.  - CXR shows much improved pulm vasc congestion  - Pt does not yet appear euvolemic but will continue diuresing   - pt reports hx of Lymphedema and says her legs have been chronically edematous but doesnt use stockings   - Transitioned to Bumex 2mg PO BID, consider switching to 2mg qAM and 1mg qPM as BUN/Cr rising    Problem/Plan -2  Problem: Aortic Stenosis  - s/p TAVR in September 2021  - minimal murmur on ausculation  - aortic valve is well seated and has good flow  - moderate tricuspid regurg    Problem/Plan -3  Problem: CAD  - Hx CABG at [facility]  - currently denies chest pain  - EKG without ischemic changes  - Troponin elevated likely i/s/o ADHF exacerbation; peaked 4/29 at 124  - c/w simvastatin 20mg PO daily    Problem/Plan -4  Problem: AF  - rate controlled  - c/w Metoprolol and Eliquis  - monitor on tele    Problem/Plan -5  Problem: HLD  - c/w simvastatin       Jacob Diaz [facility]-BC   Giles Gan DO [facility]  Cardiovascular Medicine  [address] [place] [address], [address]  Office: [phone]   Date of Service   05-23-22 @ 12:08    Patient is a 89y old  Female who presents with a chief complaint of SOB (23 May 2022 07:18)      INTERVAL HISTORY: pt feels ok   TELEMETRY Personally reviewed: Vpaced 60's    REVIEW OF SYSTEMS:   CONSTITUTIONAL: No weakness  EYES/ENT: No visual changes; No throat pain  Neck: No pain or stiffness  Respiratory: No cough, wheezing, No shortness of breath  CARDIOVASCULAR: no chest pain or palpitations  GASTROINTESTINAL: No abdominal pain, no nausea, vomiting or hematemesis  GENITOURINARY: No dysuria, frequency or hematuria  NEUROLOGICAL: No stroke like symptoms  SKIN: No rashes    	  MEDICATIONS:  amLODIPine   Tablet 5 milliGRAM(s) Oral daily  buMETAnide 2 milliGRAM(s) Oral two times a day  metoprolol succinate  milliGRAM(s) Oral daily        PHYSICAL EXAM:  T(C): 36.5 (05-23-22 @ 04:25), Max: 36.5 (05-23-22 @ 04:25)  HR: 63 (05-23-22 @ 04:25) (60 - 63)  BP: 128/74 (05-23-22 @ 04:25) (125/77 - 133/74)  RR: 18 (05-23-22 @ 04:25) (18 - 18)  SpO2: 98% (05-23-22 @ 04:25) (90% - 98%)  Wt(kg): --  I&O's Summary    22 May 2022 07:01  -  23 May 2022 07:00  --------------------------------------------------------  IN: 720 mL / OUT: 1350 mL / NET: -630 mL          Appearance: In no distress	  HEENT:    PERRL, EOMI	  Cardiovascular:  S1 S2, No JVD  Respiratory: Lungs clear to auscultation	  Gastrointestinal:  Soft, Non-tender, + BS	  Vasculature:  + edema of B/L LE  Psychiatric: Appropriate affect   Neuro: no acute focal deficits                               8.7    6.46  )-----------( 217      ( 23 May 2022 06:30 )             31.0     05-23    136  |  95<L>  |  63<H>  ----------------------------<  162<H>  4.3   |  27  |  1.64<H>    Ca    10.7<H>      23 May 2022 06:30  Phos  3.4     05-23  Mg     2.3     05-23          Labs personally reviewed      ASSESSMENT/PLAN: 	  Ms. Briones is a 89 yo female with PMH of HTN, HLD, CAD s/p CABG, AS s/p TAVR in 9/2021 at [facility], DM, CKD, AF on Eliquis, PPM who presents with 3 days of progressive shortness of breath, + orthopnea, increased LE edema and decreased oxygen saturation on pulse oximeter at home.    Problem/Plan -1  Problem: HFpEF  - Followed by Dr. Agapito Woodard for outpatient cardiology  - CT Chest reveals moderate right and small left pleural effusions, cardiomegaly, possibly superimposed consolidation  - On lasix 40mg PO daily at home  - BMP reviewed from OP cardiologist records on 12/3/21 it was 1.1, and 5 months ago it was 1.2-1.4, here 2 months ago it was 1.4-1.6  - ECHO Left Ventricle: Normal left ventricular systolic function. No segmental wall motion abnormalities.  The LVEF= 60-65%. Moderate mitral regurgitation, There is a transcatheter aortic valve replacement seen. Moderate pulmonary hypertension.  - Patient appears close to euvolemia, but not euvolemic yet, although much improved since admission,   - 5/3 POCUS reveals Right pleural effusion  - BNP only slighty down (8208 --> 7983)  - Weight (bed not standing) slightly increased since admission despite IV diuresis.  - CXR shows much improved pulm vasc congestion  - Pt does not yet appear euvolemic but will continue diuresing   - pt reports hx of Lymphedema and says her legs have been chronically edematous but doesnt use stockings   - Transitioned to Bumex 2mg PO BID    Problem/Plan -2  Problem: Aortic Stenosis  - s/p TAVR in September 2021  - minimal murmur on ausculation  - aortic valve is well seated and has good flow  - moderate tricuspid regurg    Problem/Plan -3  Problem: CAD  - Hx CABG at [facility]  - currently denies chest pain  - EKG without ischemic changes  - Troponin elevated likely i/s/o ADHF exacerbation; peaked 4/29 at 124  - c/w simvastatin 20mg PO daily    Problem/Plan -4  Problem: AF  - rate controlled  - c/w Metoprolol and Eliquis  - monitor on tele    Problem/Plan -5  Problem: HLD  - c/w simvastatin       Jacob Diaz FNP-BC   Giles Gan DO [facility]  Cardiovascular Medicine  [address] [place] [address], [address]  Office: [phone]

## 2022-05-24 NOTE — PROGRESS NOTE ADULT - PROBLEM SELECTOR PLAN 1
- HFpEF (EF 75% in 03/2022), AS s/p TAVR (09/2021)  - Continue home Toprol XL 100mg daily   - Bumex 2mg PO BID  - TTE shows EF 65% with mold pulm HTN and mod TR (no significant change from prior)  - Strict I/Os, daily weights; Appreciate cards recs - HFpEF (EF 75% in 03/2022), AS s/p TAVR (09/2021). Pt desatting to 80s on ambulation today.   - Continue home Toprol XL 100mg daily   - Bumex 2mg PO BID--> Cr bump up this AM--> CTM  - TTE shows EF 65% with mold pulm HTN and mod TR (no significant change from prior)  - Strict I/Os, daily weights; Appreciate cards recs

## 2022-05-24 NOTE — PROGRESS NOTE ADULT - PROBLEM SELECTOR PLAN 8
Home meds: Continue home Allopurinol 100mg daily, Simvastatin 20mg qhs, Protonix 40mg daily   DVT ppx: Eliquis   Diet: DASH/Renal   Dispo: PT recs home with home PT; patient has home care and prefers no JEREMIAS.   DNR/DNI  Communication: updated daughter in person 5/20, eH  by phone 5/22 8:38AM

## 2022-05-24 NOTE — CHART NOTE - NSCHARTNOTEFT_GEN_A_CORE
89F w/ hx of HFpEF (EF 60-65% in April 2022), HTN, HLD, CAD s/p CABG, AS s/p TAVR (9/2021), DM2, CKD, AFib on Eliquis, s/p PPM, who initially presented with SOB, +orthopnea, increased LE edema, and desaturation to 88% on RA at home. Found to have pleural effusions and pulmonary edema likely 2/2 CHF exacerbation. Pt was aggressively diuresed since admission on 4/29/22, was improving and stable on Bumex 2mg PO BID.    Today, clinically worsened throughout the day. Became more lethargic, required supplemental O2 at rest to 3L NC to maintain O2 sats >90%. CXR obtained showing moderate to large R pleural effusion and small to moderate L pleural effusion with associated passive atelectasis. Pt later in evening with nausea and NBNB emesis x1. Did not receive her Apixaban and PO Bumex due to nausea/vomiting. , Given Zofran 4mg IV x1 with improvement in nausea.    VS at the time: SBP 120s/80s, HR 100s, RR 20s-30s, sating mid-90s on 3L O2NC. Pt appeared very lethargic and somewhat confused, but arousable and intermittently answering questions. Noted she felt "sick and terrible," but could not elaborate further. Denied CP, SOB, abdominal pain. Physical exam otherwise notable for decreased breath sounds on b/l lower-mid lung fields and 2+ pitting edema in b/l LEs (L>R). Labs ordered including CBC, CMP, VBG w/ lactate, troponin, coags, T+S. Results notable for K 5.4, BUN 75, worsening SCr to 2.35 (<-1.98 <-1.64), hsTrop 112 (on admission was 124->117), VBG pH 7.28, lactate 2.0, pCO2 62. EKG obtained, largely unchanged from prior, paced without obvious ischemic changes. Tele reviewed, was sinus without recent ectopy.    Concern mainly for decompensated HFpEF, less likely PE vs ACS vs infection vs other etiology. Discussed with Cardiology (Dr. Giles Gan). Diuretics changed to Bumex 2mg IV BID with STAT dose to be given. AC changed from Apixaban 2.5mg PO BID to hep gtt for now. Indwelling dejesus catheter ordered for UOP monitoring. Pro-BNP added to labs. 89F w/ hx of HFpEF (EF 60-65% in April 2022), HTN, HLD, CAD s/p CABG, AS s/p TAVR (9/2021), DM2, CKD, AFib on Eliquis, s/p PPM, who initially presented with SOB, +orthopnea, increased LE edema, and desaturation to 88% on RA at home. Found to have pleural effusions and pulmonary edema likely 2/2 CHF exacerbation. Pt was aggressively diuresed since admission on 4/29/22, was improving and eventually stable on Bumex 2mg PO BID regimen.    Today, clinically worsened throughout the day. Became more lethargic, required supplemental O2 at rest to 3L NC to maintain O2 sats >90%. CXR obtained showing moderate to large R pleural effusion and small to moderate L pleural effusion with associated passive atelectasis. Pt later in evening with nausea and NBNB emesis x1. Did not receive her Apixaban and PO Bumex due to nausea/vomiting. Given Zofran 4mg IV x1 with improvement in nausea. Pt appeared very lethargic and somewhat confused, but arousable and intermittently answering questions. Noted she felt "sick and terrible," but could not elaborate further. Denied CP, SOB, abdominal pain. VS at the time: SBP 120s/80s, HR 100s, RR 20s-30s, sating mid-90s on 3L O2NC. Physical exam otherwise notable for decreased breath sounds on b/l lower-mid lung fields and 2+ pitting edema in b/l LEs (L>R). Labs ordered including CBC, CMP, VBG w/ lactate, troponin, coags, T+S. Results notable for K 5.4, BUN 75, worsening SCr to 2.35 (<-1.98 <-1.64), hsTrop 112 (on admission was 124->117), VBG pH 7.28, lactate 2.0, pCO2 62. EKG obtained, largely unchanged from prior, paced without obvious ischemic changes. Tele reviewed, was sinus without recent ectopy.     Concern mainly for decompensated HFpEF, less likely PE vs ACS vs infection vs other etiology. Discussed with Cardiology (Dr. Giles Gan). Diuretics changed to Bumex 2mg IV BID with STAT given. AC changed from Apixaban 2.5mg PO BID to hep gtt for now. Indwelling dejesus catheter ordered for UOP monitoring. Pro-BNP added to labs. Plan to consult Heart Failure in the morning.    Discussed above with pt's  He, daughter Claudia, and grandson Wilfrid at bedside. Also discussed situation with pt's son-in-law, Dr. John Riojas (Geriatrician at [place]), over phone as requested by pt's . After discussion, informed that pt's  would want ICU care if warranted, but degree of aggressive interventions unclear. MOLST form reviewed, DNR/DNI with decision made by pt herself on this admission.     On reassessment, pt found to be slightly more awake and responsive; HR 60, /61, RR 16, O2 sat 96% on 3L O2NC. Plan as above with close monitoring overnight by covering Night Float physician, floor RN/staff, and pt's privately hired bedside aide. Attending (Dr. Evert Aaron) made aware of above.      Daren Strange, PGY-3  Medicine Team 8

## 2022-05-24 NOTE — PROGRESS NOTE ADULT - SUBJECTIVE AND OBJECTIVE BOX
PROGRESS NOTE:   Authored By: Vika Camargo MD     PGY-1, Internal Medicine  Pager: [phone], [phone]    Patient is a 89y old  Female who presents with a chief complaint of SOB (23 May 2022 12:07)      OVERNIGHT EVENTS: No acute events overnight    SUBJECTIVE: Pt seen and examined at bedside this morning.     ADDITIONAL REVIEW OF SYSTEMS:    MEDICATIONS  (STANDING):  allopurinol 100 milliGRAM(s) Oral daily  amLODIPine   Tablet 5 milliGRAM(s) Oral daily  apixaban 2.5 milliGRAM(s) Oral two times a day  buMETAnide 2 milliGRAM(s) Oral two times a day  dextrose 5%. 1000 milliLiter(s) (100 mL/Hr) IV Continuous <Continuous>  dextrose 5%. 1000 milliLiter(s) (50 mL/Hr) IV Continuous <Continuous>  dextrose 50% Injectable 25 Gram(s) IV Push once  dextrose 50% Injectable 12.5 Gram(s) IV Push once  dextrose 50% Injectable 25 Gram(s) IV Push once  glucagon  Injectable 1 milliGRAM(s) IntraMuscular once  insulin glargine Injectable (LANTUS) 8 Unit(s) SubCutaneous at bedtime  insulin lispro (ADMELOG) corrective regimen sliding scale   SubCutaneous three times a day before meals  insulin lispro (ADMELOG) corrective regimen sliding scale   SubCutaneous at bedtime  insulin lispro Injectable (ADMELOG) 3 Unit(s) SubCutaneous three times a day before meals  levothyroxine 50 MICROGram(s) Oral <User Schedule>  levothyroxine 100 MICROGram(s) Oral <User Schedule>  metoprolol succinate  milliGRAM(s) Oral daily  multivitamin 1 Tablet(s) Oral daily  pantoprazole    Tablet 40 milliGRAM(s) Oral before breakfast  polyethylene glycol 3350 17 Gram(s) Oral daily  senna 2 Tablet(s) Oral at bedtime  simvastatin 20 milliGRAM(s) Oral at bedtime    MEDICATIONS  (PRN):  acetaminophen     Tablet .. 650 milliGRAM(s) Oral every 6 hours PRN Temp greater or equal to 38C (100.4F), Mild Pain (1 - 3)  dextrose Oral Gel 15 Gram(s) Oral once PRN Blood Glucose LESS THAN 70 milliGRAM(s)/deciliter      CAPILLARY BLOOD GLUCOSE      POCT Blood Glucose.: 156 mg/dL (23 May 2022 21:56)  POCT Blood Glucose.: 181 mg/dL (23 May 2022 17:11)  POCT Blood Glucose.: 136 mg/dL (23 May 2022 11:54)  POCT Blood Glucose.: 144 mg/dL (23 May 2022 08:12)    I&O's Summary    23 May 2022 07:01  -  24 May 2022 07:00  --------------------------------------------------------  IN: 360 mL / OUT: 850 mL / NET: -490 mL        PHYSICAL EXAM:  Vital Signs Last 24 Hrs  T(C): 36.9 (23 May 2022 14:20), Max: 36.9 (23 May 2022 14:20)  T(F): 98.4 (23 May 2022 14:20), Max: 98.4 (23 May 2022 14:20)  HR: 60 (24 May 2022 04:24) (60 - 108)  BP: 120/73 (24 May 2022 04:24) (106/41 - 120/73)  BP(mean): --  RR: 18 (24 May 2022 04:24) (17 - 18)  SpO2: 96% (24 May 2022 04:24) (91% - 97%)    CONSTITUTIONAL: NAD, well-developed  HEENT: NC/AT, EOMI  RESPIRATORY: No increased work of breathing, CTAB, no wheezes or crackles appreciated  CARDIOVASCULAR: RRR, S1 and S2 present, no m/r/g  ABDOMEN: soft, NT, ND, bowel sounds present  EXTREMITIES: No LE edema  MUSCULOSKELETAL: no joint swelling or tenderness to palpation  NEURO: A&Ox3, moving all extremities    LABS:                        8.2    7.96  )-----------( 191      ( 24 May 2022 06:48 )             29.5     05-24    132<L>  |  92<L>  |  78<H>  ----------------------------<  129<H>  4.7   |  28  |  1.98<H>    Ca    10.4      24 May 2022 06:47  Phos  4.8     05-24  Mg     2.2     05-24    TPro  7.2  /  Alb  3.2<L>  /  TBili  0.3  /  DBili  x   /  AST  24  /  ALT  14  /  AlkPhos  108  05-24                RADIOLOGY & ADDITIONAL TESTS:    Results Reviewed:   Imaging Personally Reviewed:  Electrocardiogram Personally Reviewed:    COORDINATION OF CARE:  Care Discussed with Consultants/Other Providers [Y/N]:  Prior or Outpatient Records Reviewed [Y/N]:   PROGRESS NOTE:   Authored By: Vika Camargo MD     PGY-1, Internal Medicine  Pager: [phone], [phone]    Patient is a 89y old  Female who presents with a chief complaint of SOB (23 May 2022 12:07)      OVERNIGHT EVENTS: No acute events overnight    SUBJECTIVE: Pt seen and examined at bedside this morning. Pt still complaining of leg pain and that she just wants to go home.     ADDITIONAL REVIEW OF SYSTEMS:    MEDICATIONS  (STANDING):  allopurinol 100 milliGRAM(s) Oral daily  amLODIPine   Tablet 5 milliGRAM(s) Oral daily  apixaban 2.5 milliGRAM(s) Oral two times a day  buMETAnide 2 milliGRAM(s) Oral two times a day  dextrose 5%. 1000 milliLiter(s) (100 mL/Hr) IV Continuous <Continuous>  dextrose 5%. 1000 milliLiter(s) (50 mL/Hr) IV Continuous <Continuous>  dextrose 50% Injectable 25 Gram(s) IV Push once  dextrose 50% Injectable 12.5 Gram(s) IV Push once  dextrose 50% Injectable 25 Gram(s) IV Push once  glucagon  Injectable 1 milliGRAM(s) IntraMuscular once  insulin glargine Injectable (LANTUS) 8 Unit(s) SubCutaneous at bedtime  insulin lispro (ADMELOG) corrective regimen sliding scale   SubCutaneous three times a day before meals  insulin lispro (ADMELOG) corrective regimen sliding scale   SubCutaneous at bedtime  insulin lispro Injectable (ADMELOG) 3 Unit(s) SubCutaneous three times a day before meals  levothyroxine 50 MICROGram(s) Oral <User Schedule>  levothyroxine 100 MICROGram(s) Oral <User Schedule>  metoprolol succinate  milliGRAM(s) Oral daily  multivitamin 1 Tablet(s) Oral daily  pantoprazole    Tablet 40 milliGRAM(s) Oral before breakfast  polyethylene glycol 3350 17 Gram(s) Oral daily  senna 2 Tablet(s) Oral at bedtime  simvastatin 20 milliGRAM(s) Oral at bedtime    MEDICATIONS  (PRN):  acetaminophen     Tablet .. 650 milliGRAM(s) Oral every 6 hours PRN Temp greater or equal to 38C (100.4F), Mild Pain (1 - 3)  dextrose Oral Gel 15 Gram(s) Oral once PRN Blood Glucose LESS THAN 70 milliGRAM(s)/deciliter      CAPILLARY BLOOD GLUCOSE      POCT Blood Glucose.: 156 mg/dL (23 May 2022 21:56)  POCT Blood Glucose.: 181 mg/dL (23 May 2022 17:11)  POCT Blood Glucose.: 136 mg/dL (23 May 2022 11:54)  POCT Blood Glucose.: 144 mg/dL (23 May 2022 08:12)    I&O's Summary    23 May 2022 07:01  -  24 May 2022 07:00  --------------------------------------------------------  IN: 360 mL / OUT: 850 mL / NET: -490 mL        PHYSICAL EXAM:  Vital Signs Last 24 Hrs  T(C): 36.9 (23 May 2022 14:20), Max: 36.9 (23 May 2022 14:20)  T(F): 98.4 (23 May 2022 14:20), Max: 98.4 (23 May 2022 14:20)  HR: 60 (24 May 2022 04:24) (60 - 108)  BP: 120/73 (24 May 2022 04:24) (106/41 - 120/73)  BP(mean): --  RR: 18 (24 May 2022 04:24) (17 - 18)  SpO2: 96% (24 May 2022 04:24) (91% - 97%)    CONSTITUTIONAL: laying in her chair, feels weak and lethargic, overall uncomfortable   HEAD:  Atraumatic, Normocephalic  EYES: EOMI, left pupil smaller than right, both reactive to light, conjunctiva and sclera clear  ENMT: Moist mucous membranes  NECK: Supple, JVD improving   CHEST/LUNG: Adequate inspiratory effort. slight expiratory crackles bilaterally. On room air.   HEART: Regular rate and rhythm; No murmurs, rubs, or gallops.   ABDOMEN: Soft, Nontender, Nondistended; Bowel sounds present  EXTREMITIES:  2+ Peripheral Pulses. 1+ pitting b/l LE edema with chronic skin changes in LLE.   MSK: TTP on left inguinal crease, non erythematous, no papules or vesicles, no muscular abnormalities, could not tolerate straight leg test due to pain  LABS:                        8.2    7.96  )-----------( 191      ( 24 May 2022 06:48 )             29.5     05-24    132<L>  |  92<L>  |  78<H>  ----------------------------<  129<H>  4.7   |  28  |  1.98<H>    Ca    10.4      24 May 2022 06:47  Phos  4.8     05-24  Mg     2.2     05-24    TPro  7.2  /  Alb  3.2<L>  /  TBili  0.3  /  DBili  x   /  AST  24  /  ALT  14  /  AlkPhos  108  05-24                RADIOLOGY & ADDITIONAL TESTS:    Results Reviewed:   Imaging Personally Reviewed:  Electrocardiogram Personally Reviewed:    COORDINATION OF CARE:  Care Discussed with Consultants/Other Providers [Y/N]:  Prior or Outpatient Records Reviewed [Y/N]:

## 2022-05-24 NOTE — PROGRESS NOTE ADULT - NUTRITIONAL ASSESSMENT
This patient has been assessed with a concern for Malnutrition and has been determined to have a diagnosis/diagnoses of Severe protein-calorie malnutrition.    This patient is being managed with:   Diet Regular-  Consistent Carbohydrate {Evening Snack} (CSTCHOSN)  DASH/TLC {Sodium & Cholesterol Restricted} (DASH)  1500mL Fluid Restriction ([id number])  Entered: May 19 2022 10:05AM

## 2022-05-24 NOTE — CHART NOTE - NSCHARTNOTEFT_GEN_A_CORE
I have examined pt & pt requires Home Oxygen due to low oxygen saturations and heart failure with preserved ejection fraction.     Pt treated this admission for acute on chronic heart failure with preserved ejection fraction.    Pt is in a chronic stable state & has the following O2 saturations:    O2 sat RA at rest: 96%  O2 sat RA when ambulatin%  O2 sat when ambulating on oxygen 2L/min nasal cannula: 91% I have examined pt & pt requires Home Oxygen due to low oxygen saturations and heart failure with preserved ejection fraction.     Pt treated this admission for acute on chronic heart failure with preserved ejection fraction.    Pt is in a chronic stable state & has the following O2 saturations:    O2 sat RA at rest: 84% I have examined pt & pt requires Home Oxygen due to low oxygen saturations and heart failure with preserved ejection fraction.     Pt treated this admission for acute on chronic heart failure with preserved ejection fraction.    Pt is in a chronic stable state & has the following O2 saturations:    O2 sat RA at rest: 82%

## 2022-05-24 NOTE — PROGRESS NOTE ADULT - SUBJECTIVE AND OBJECTIVE BOX
Date of Service   05-24-22 @ 10:08    Patient is a 89y old  Female who presents with a chief complaint of SOB (24 May 2022 07:47)      INTERVAL HISTORY: pt feels ok, c/o pain in the left leg   TELEMETRY Personally reviewed: Vpaced 60's     REVIEW OF SYSTEMS:   CONSTITUTIONAL: No weakness  EYES/ENT: No visual changes; No throat pain  Neck: No pain or stiffness  Respiratory: No cough, wheezing, No shortness of breath  CARDIOVASCULAR: no chest pain or palpitations  GASTROINTESTINAL: No abdominal pain, no nausea, vomiting or hematemesis  GENITOURINARY: No dysuria, frequency or hematuria  NEUROLOGICAL: No stroke like symptoms  SKIN: No rashes    	  MEDICATIONS:  amLODIPine   Tablet 5 milliGRAM(s) Oral daily  buMETAnide 1 milliGRAM(s) Oral <User Schedule>  metoprolol succinate  milliGRAM(s) Oral daily        PHYSICAL EXAM:  T(C): 36.9 (05-23-22 @ 14:20), Max: 36.9 (05-23-22 @ 14:20)  HR: 60 (05-24-22 @ 04:24) (60 - 108)  BP: 120/73 (05-24-22 @ 04:24) (106/41 - 120/73)  RR: 18 (05-24-22 @ 04:24) (17 - 18)  SpO2: 96% (05-24-22 @ 04:24) (91% - 97%)  Wt(kg): --  I&O's Summary    23 May 2022 07:01  -  24 May 2022 07:00  --------------------------------------------------------  IN: 360 mL / OUT: 850 mL / NET: -490 mL          Appearance: In no distress	  HEENT:    PERRL, EOMI	  Cardiovascular:  S1 S2, No JVD  Respiratory: Lungs clear to auscultation	  Gastrointestinal:  Soft, Non-tender, + BS	  Vasculature:  No edema of LE  Psychiatric: Appropriate affect   Neuro: no acute focal deficits                               8.2    7.96  )-----------( 191      ( 24 May 2022 06:48 )             29.5     05-24    132<L>  |  92<L>  |  78<H>  ----------------------------<  129<H>  4.7   |  28  |  1.98<H>    Ca    10.4      24 May 2022 06:47  Phos  4.8     05-24  Mg     2.2     05-24    TPro  7.2  /  Alb  3.2<L>  /  TBili  0.3  /  DBili  x   /  AST  24  /  ALT  14  /  AlkPhos  108  05-24        Labs personally reviewed    ASSESSMENT/PLAN: 	  Ms. Briones is a 89 yo female with PMH of HTN, HLD, CAD s/p CABG, AS s/p TAVR in 9/2021 at [facility], DM, CKD, AF on Eliquis, PPM who presents with 3 days of progressive shortness of breath, + orthopnea, increased LE edema and decreased oxygen saturation on pulse oximeter at home.    Problem/Plan -1  Problem: HFpEF  - Followed by Dr. Agapito Woodard for outpatient cardiology  - CT Chest reveals moderate right and small left pleural effusions, cardiomegaly, possibly superimposed consolidation  - On lasix 40mg PO daily at home  - BMP reviewed from OP cardiologist records on 12/3/21 it was 1.1, and 5 months ago it was 1.2-1.4, here 2 months ago it was 1.4-1.6  - ECHO Left Ventricle: Normal left ventricular systolic function. No segmental wall motion abnormalities.  The LVEF= 60-65%. Moderate mitral regurgitation, There is a transcatheter aortic valve replacement seen. Moderate pulmonary hypertension.  - Patient appears close to euvolemia, but not euvolemic yet, although much improved since admission,   - 5/3 POCUS reveals Right pleural effusion  - BNP only slighty down (8208 --> 7983)  - CXR shows much improved pulm vasc congestion  - Pt does not yet appear euvolemic but will continue diuresing   - pt reports hx of Lymphedema and says her legs have been chronically edematous but doesnt use stockings   - Transitioned to Bumex 2mg PO in am and 1mg in PM   - Please continue with daily weight Standing only     Problem/Plan -2  Problem: Aortic Stenosis  - s/p TAVR in September 2021  - minimal murmur on ausculation  - aortic valve is well seated and has good flow  - moderate tricuspid regurg    Problem/Plan -3  Problem: CAD  - Hx CABG at [facility]  - currently denies chest pain  - EKG without ischemic changes  - Troponin elevated likely i/s/o ADHF exacerbation; peaked 4/29 at 124  - c/w simvastatin 20mg PO daily    Problem/Plan -4  Problem: AF  - rate controlled  - c/w Metoprolol and Eliquis  - monitor on tele    Problem/Plan -5  Problem: HLD  - c/w simvastatin       Jacob Diaz [facility]   Giles Gan DO [facility]  Cardiovascular Medicine  [address] [place] [address], [address]  Office: [phone]   Date of Service   05-24-22 @ 10:08    Patient is a 89y old  Female who presents with a chief complaint of SOB (24 May 2022 07:47)      INTERVAL HISTORY: pt feels ok, c/o pain in the left leg   TELEMETRY Personally reviewed: Vpaced 60's     REVIEW OF SYSTEMS:   CONSTITUTIONAL: No weakness  EYES/ENT: No visual changes; No throat pain  Neck: No pain or stiffness  Respiratory: No cough, wheezing, No shortness of breath  CARDIOVASCULAR: no chest pain or palpitations  GASTROINTESTINAL: No abdominal pain, no nausea, vomiting or hematemesis  GENITOURINARY: No dysuria, frequency or hematuria  NEUROLOGICAL: No stroke like symptoms  SKIN: No rashes    	  MEDICATIONS:  amLODIPine   Tablet 5 milliGRAM(s) Oral daily  buMETAnide 1 milliGRAM(s) Oral <User Schedule>  metoprolol succinate  milliGRAM(s) Oral daily        PHYSICAL EXAM:  T(C): 36.9 (05-23-22 @ 14:20), Max: 36.9 (05-23-22 @ 14:20)  HR: 60 (05-24-22 @ 04:24) (60 - 108)  BP: 120/73 (05-24-22 @ 04:24) (106/41 - 120/73)  RR: 18 (05-24-22 @ 04:24) (17 - 18)  SpO2: 96% (05-24-22 @ 04:24) (91% - 97%)  Wt(kg): --  I&O's Summary    23 May 2022 07:01  -  24 May 2022 07:00  --------------------------------------------------------  IN: 360 mL / OUT: 850 mL / NET: -490 mL          Appearance: In no distress	  HEENT:    PERRL, EOMI	  Cardiovascular:  S1 S2, No JVD  Respiratory: Lungs clear to auscultation	  Gastrointestinal:  Soft, Non-tender, + BS	  Vasculature:  No edema of LE  Psychiatric: Appropriate affect   Neuro: no acute focal deficits                               8.2    7.96  )-----------( 191      ( 24 May 2022 06:48 )             29.5     05-24    132<L>  |  92<L>  |  78<H>  ----------------------------<  129<H>  4.7   |  28  |  1.98<H>    Ca    10.4      24 May 2022 06:47  Phos  4.8     05-24  Mg     2.2     05-24    TPro  7.2  /  Alb  3.2<L>  /  TBili  0.3  /  DBili  x   /  AST  24  /  ALT  14  /  AlkPhos  108  05-24        Labs personally reviewed    ASSESSMENT/PLAN: 	  Ms. Briones is a 89 yo female with PMH of HTN, HLD, CAD s/p CABG, AS s/p TAVR in 9/2021 at [facility], DM, CKD, AF on Eliquis, PPM who presents with 3 days of progressive shortness of breath, + orthopnea, increased LE edema and decreased oxygen saturation on pulse oximeter at home.    Problem/Plan -1  Problem: HFpEF  - Followed by Dr. Agapito Woodard for outpatient cardiology  - CT Chest reveals moderate right and small left pleural effusions, cardiomegaly, possibly superimposed consolidation  - On lasix 40mg PO daily at home  - BMP reviewed from OP cardiologist records on 12/3/21 it was 1.1, and 5 months ago it was 1.2-1.4, here 2 months ago it was 1.4-1.6  - ECHO Left Ventricle: Normal left ventricular systolic function. No segmental wall motion abnormalities.  The LVEF= 60-65%. Moderate mitral regurgitation, There is a transcatheter aortic valve replacement seen. Moderate pulmonary hypertension.  - Patient appears close to euvolemia, but not euvolemic yet, although much improved since admission,   - 5/3 POCUS reveals Right pleural effusion  - BNP only slighty down (8208 --> 7983)  - CXR shows much improved pulm vasc congestion  - Pt does not yet appear euvolemic but will continue diuresing   - pt reports hx of Lymphedema and says her legs have been chronically edematous but doesnt use stockings   - Transitioned to Bumex 2mg PO in am and 1mg in PM     Problem/Plan -2  Problem: Aortic Stenosis  - s/p TAVR in September 2021  - minimal murmur on ausculation  - aortic valve is well seated and has good flow  - moderate tricuspid regurg    Problem/Plan -3  Problem: CAD  - Hx CABG at [facility]  - currently denies chest pain  - EKG without ischemic changes  - Troponin elevated likely i/s/o ADHF exacerbation; peaked 4/29 at 124  - c/w simvastatin 20mg PO daily    Problem/Plan -4  Problem: AF  - rate controlled  - c/w Metoprolol and Eliquis  - monitor on tele    Problem/Plan -5  Problem: HLD  - c/w simvastatin       Jacob JOSE-BC   Giles Gan DO Located within [facility]  Cardiovascular Medicine  [address] [place] [address], [address]  Office: [phone]

## 2022-05-24 NOTE — PROGRESS NOTE ADULT - ATTENDING COMMENTS
89F Hx HFpEF (EF 75% 03/2022), Afib (on Eliquis), HTN, DM2, CKD3, hypothyroidism, AS s/p TAVR (09/2021), s/p PPM BIBEMS for CHF exacerbation now transitioned to PO diuretics    #ADHF: Decrease bumex 2mg PO qam and 1mg qpm. Continue home Toprol XL 100mg daily. Saturating well on RA at rest. Desaturated with ambulation, likely deconditioning and atelectasis Vs pleural effusion (hd prior ) vs less likely increased pulm edema.. Will get walking saturation with O2. Will also obtain CXR. Likely with need to go home with ambulatory O2.   #Left thigh pain today again, likely cramping. Better with massage. No swelling, erythema or warmth in that area.   #MARICARMEN; Cr 1.98 today, increased from 1.64 yesterday. Decrease bumex as above  Dispo: Pending repeat CXR today.  if no pulmonary edema, desat likely from likely deconditioning and atelectasis, will DC with home O@, possibly tomorrow.

## 2022-05-25 NOTE — PROGRESS NOTE ADULT - NUTRITIONAL ASSESSMENT
This patient has been assessed with a concern for Malnutrition and has been determined to have a diagnosis/diagnoses of Severe protein-calorie malnutrition.    This patient is being managed with:   Diet Regular-  Consistent Carbohydrate {Evening Snack} (CSTCHOSN)  DASH/TLC {Sodium & Cholesterol Restricted} (DASH)  1500mL Fluid Restriction ([id number])  Entered: May 19 2022 10:05AM

## 2022-05-25 NOTE — PROGRESS NOTE ADULT - PROBLEM SELECTOR PLAN 3
- R foot heel wound w/ well adhered eschar, negative probe to bone; xray: no gas, no OM   - Appreciate podiatry recs  - Dry sterile dressing applied to right heel with betadine, recommend daily dressing changes - Continue home Eliquis 2.5mg BID   - EKG V paced, rate controlled, ctm on tele  -continuous pulse ox

## 2022-05-25 NOTE — PROVIDER CONTACT NOTE (CRITICAL VALUE NOTIFICATION) - ASSESSMENT
VSS on tele & cont. pulse ox, no acute distress noted, no profuse bleeding noted, no hematuria.   Pt endorses lethargy & weakness at baseline.

## 2022-05-25 NOTE — PROGRESS NOTE ADULT - PROBLEM SELECTOR PLAN 2
- Continue home Eliquis 2.5mg BID   - EKG V paced, rate controlled, ctm on tele pt's UA worsened s/p HF decompensation. Pt w/o symptoms of UTI    -will f/u Ucx to decide if treatment is appropriate  -monitor OFF abx as pt clinical status improved this AM, and cannot connect UTI as cause of her lethargy

## 2022-05-25 NOTE — PROGRESS NOTE ADULT - SUBJECTIVE AND OBJECTIVE BOX
PROGRESS NOTE:   Authored By: Vika Camargo MD     PGY-1, Internal Medicine  Pager: [phone], PSS [phone]    Patient is a 89y old  Female who presents with a chief complaint of SOB (24 May 2022 10:07)      OVERNIGHT EVENTS: Pt with decompensated HF last night, more lethargic, cards on board.   SUBJECTIVE: Pt seen and examined at bedside this morning.     ADDITIONAL REVIEW OF SYSTEMS:    MEDICATIONS  (STANDING):  allopurinol 100 milliGRAM(s) Oral daily  amLODIPine   Tablet 5 milliGRAM(s) Oral daily  buMETAnide Injectable 2 milliGRAM(s) IV Push two times a day  dextrose 5%. 1000 milliLiter(s) (100 mL/Hr) IV Continuous <Continuous>  dextrose 5%. 1000 milliLiter(s) (50 mL/Hr) IV Continuous <Continuous>  dextrose 50% Injectable 25 Gram(s) IV Push once  dextrose 50% Injectable 12.5 Gram(s) IV Push once  dextrose 50% Injectable 25 Gram(s) IV Push once  glucagon  Injectable 1 milliGRAM(s) IntraMuscular once  heparin  Infusion.  Unit(s)/Hr (14 mL/Hr) IV Continuous <Continuous>  insulin glargine Injectable (LANTUS) 8 Unit(s) SubCutaneous at bedtime  insulin lispro (ADMELOG) corrective regimen sliding scale   SubCutaneous three times a day before meals  insulin lispro (ADMELOG) corrective regimen sliding scale   SubCutaneous at bedtime  insulin lispro Injectable (ADMELOG) 3 Unit(s) SubCutaneous three times a day before meals  levothyroxine 50 MICROGram(s) Oral <User Schedule>  levothyroxine 100 MICROGram(s) Oral <User Schedule>  metoprolol succinate  milliGRAM(s) Oral daily  multivitamin 1 Tablet(s) Oral daily  pantoprazole    Tablet 40 milliGRAM(s) Oral before breakfast  polyethylene glycol 3350 17 Gram(s) Oral daily  senna 2 Tablet(s) Oral at bedtime  simvastatin 20 milliGRAM(s) Oral at bedtime    MEDICATIONS  (PRN):  acetaminophen     Tablet .. 650 milliGRAM(s) Oral every 6 hours PRN Temp greater or equal to 38C (100.4F), Mild Pain (1 - 3)  dextrose Oral Gel 15 Gram(s) Oral once PRN Blood Glucose LESS THAN 70 milliGRAM(s)/deciliter  heparin   Injectable 6500 Unit(s) IV Push every 6 hours PRN For aPTT less than 40  heparin   Injectable 3000 Unit(s) IV Push every 6 hours PRN For aPTT between 40 - 57      CAPILLARY BLOOD GLUCOSE      POCT Blood Glucose.: 145 mg/dL (24 May 2022 21:15)  POCT Blood Glucose.: 151 mg/dL (24 May 2022 17:13)  POCT Blood Glucose.: 138 mg/dL (24 May 2022 11:41)  POCT Blood Glucose.: 153 mg/dL (24 May 2022 08:07)    I&O's Summary    24 May 2022 07:01  -  25 May 2022 07:00  --------------------------------------------------------  IN: 840 mL / OUT: 0 mL / NET: 840 mL        PHYSICAL EXAM:  Vital Signs Last 24 Hrs  T(C): 36.9 (25 May 2022 04:21), Max: 36.9 (25 May 2022 04:21)  T(F): 98.4 (25 May 2022 04:21), Max: 98.4 (25 May 2022 04:21)  HR: 60 (25 May 2022 06:07) (60 - 61)  BP: 93/51 (25 May 2022 06:07) (93/51 - 104/61)  BP(mean): --  RR: 17 (25 May 2022 04:21) (16 - 22)  SpO2: 92% (25 May 2022 04:21) (82% - 96%)    CONSTITUTIONAL: NAD, well-developed  HEENT: NC/AT, EOMI  RESPIRATORY: No increased work of breathing, CTAB, no wheezes or crackles appreciated  CARDIOVASCULAR: RRR, S1 and S2 present, no m/r/g  ABDOMEN: soft, NT, ND, bowel sounds present  EXTREMITIES: No LE edema  MUSCULOSKELETAL: no joint swelling or tenderness to palpation  NEURO: A&Ox3, moving all extremities    LABS:                        8.1    12.00 )-----------( 175      ( 25 May 2022 04:54 )             28.9     05-25    129<L>  |  91<L>  |  88<H>  ----------------------------<  137<H>  5.1   |  24  |  2.48<H>    Ca    10.2      25 May 2022 04:54  Phos  6.3     05-25  Mg     2.3     05-25    TPro  6.8  /  Alb  3.0<L>  /  TBili  0.4  /  DBili  x   /  AST  35  /  ALT  21  /  AlkPhos  115  05-25    PT/INR - ( 24 May 2022 18:55 )   PT: 23.6 sec;   INR: 2.04 ratio         PTT - ( 25 May 2022 04:54 )  PTT:>200.0 sec            RADIOLOGY & ADDITIONAL TESTS:    Results Reviewed:   Imaging Personally Reviewed:  Electrocardiogram Personally Reviewed:    COORDINATION OF CARE:  Care Discussed with Consultants/Other Providers [Y/N]:  Prior or Outpatient Records Reviewed [Y/N]:   PROGRESS NOTE:   Authored By: Vika Camargo MD     PGY-1, Internal Medicine  Pager: [phone], [phone]    Patient is a 89y old  Female who presents with a chief complaint of SOB (24 May 2022 10:07)      OVERNIGHT EVENTS: Pt with decompensated HF last night, more lethargic, cards on board.   SUBJECTIVE: Pt seen and examined at bedside this morning. Pt feels better than previous day, clinically appears better as well.     ADDITIONAL REVIEW OF SYSTEMS:    MEDICATIONS  (STANDING):  allopurinol 100 milliGRAM(s) Oral daily  amLODIPine   Tablet 5 milliGRAM(s) Oral daily  buMETAnide Injectable 2 milliGRAM(s) IV Push two times a day  dextrose 5%. 1000 milliLiter(s) (100 mL/Hr) IV Continuous <Continuous>  dextrose 5%. 1000 milliLiter(s) (50 mL/Hr) IV Continuous <Continuous>  dextrose 50% Injectable 25 Gram(s) IV Push once  dextrose 50% Injectable 12.5 Gram(s) IV Push once  dextrose 50% Injectable 25 Gram(s) IV Push once  glucagon  Injectable 1 milliGRAM(s) IntraMuscular once  heparin  Infusion.  Unit(s)/Hr (14 mL/Hr) IV Continuous <Continuous>  insulin glargine Injectable (LANTUS) 8 Unit(s) SubCutaneous at bedtime  insulin lispro (ADMELOG) corrective regimen sliding scale   SubCutaneous three times a day before meals  insulin lispro (ADMELOG) corrective regimen sliding scale   SubCutaneous at bedtime  insulin lispro Injectable (ADMELOG) 3 Unit(s) SubCutaneous three times a day before meals  levothyroxine 50 MICROGram(s) Oral <User Schedule>  levothyroxine 100 MICROGram(s) Oral <User Schedule>  metoprolol succinate  milliGRAM(s) Oral daily  multivitamin 1 Tablet(s) Oral daily  pantoprazole    Tablet 40 milliGRAM(s) Oral before breakfast  polyethylene glycol 3350 17 Gram(s) Oral daily  senna 2 Tablet(s) Oral at bedtime  simvastatin 20 milliGRAM(s) Oral at bedtime    MEDICATIONS  (PRN):  acetaminophen     Tablet .. 650 milliGRAM(s) Oral every 6 hours PRN Temp greater or equal to 38C (100.4F), Mild Pain (1 - 3)  dextrose Oral Gel 15 Gram(s) Oral once PRN Blood Glucose LESS THAN 70 milliGRAM(s)/deciliter  heparin   Injectable 6500 Unit(s) IV Push every 6 hours PRN For aPTT less than 40  heparin   Injectable 3000 Unit(s) IV Push every 6 hours PRN For aPTT between 40 - 57      CAPILLARY BLOOD GLUCOSE      POCT Blood Glucose.: 145 mg/dL (24 May 2022 21:15)  POCT Blood Glucose.: 151 mg/dL (24 May 2022 17:13)  POCT Blood Glucose.: 138 mg/dL (24 May 2022 11:41)  POCT Blood Glucose.: 153 mg/dL (24 May 2022 08:07)    I&O's Summary    24 May 2022 07:01  -  25 May 2022 07:00  --------------------------------------------------------  IN: 840 mL / OUT: 0 mL / NET: 840 mL        PHYSICAL EXAM:  Vital Signs Last 24 Hrs  T(C): 36.9 (25 May 2022 04:21), Max: 36.9 (25 May 2022 04:21)  T(F): 98.4 (25 May 2022 04:21), Max: 98.4 (25 May 2022 04:21)  HR: 60 (25 May 2022 06:07) (60 - 61)  BP: 93/51 (25 May 2022 06:07) (93/51 - 104/61)  BP(mean): --  RR: 17 (25 May 2022 04:21) (16 - 22)  SpO2: 92% (25 May 2022 04:21) (82% - 96%)    CONSTITUTIONAL: laying in her chair, feels better this AM, mental status is alert and awake, minimal complaints of leg pain   HEAD:  Atraumatic, Normocephalic  EYES: EOMI, left pupil smaller than right, both reactive to light, conjunctiva and sclera clear  ENMT: Moist mucous membranes  NECK: Supple, JVD improving   CHEST/LUNG: Adequate inspiratory effort. slight expiratory crackles bilaterally. On room air.   HEART: Regular rate and rhythm; No murmurs, rubs, or gallops.   ABDOMEN: Soft, Nontender, Nondistended; Bowel sounds present  EXTREMITIES:  2+ Peripheral Pulses. 1+ pitting b/l LE edema with chronic skin changes in LLE.   MSK: TTP on left inguinal crease, non erythematous, no papules or vesicles, no muscular abnormalities. Improves after massage     LABS:                        8.1    12.00 )-----------( 175      ( 25 May 2022 04:54 )             28.9     05-25    129<L>  |  91<L>  |  88<H>  ----------------------------<  137<H>  5.1   |  24  |  2.48<H>    Ca    10.2      25 May 2022 04:54  Phos  6.3     05-25  Mg     2.3     05-25    TPro  6.8  /  Alb  3.0<L>  /  TBili  0.4  /  DBili  x   /  AST  35  /  ALT  21  /  AlkPhos  115  05-25    PT/INR - ( 24 May 2022 18:55 )   PT: 23.6 sec;   INR: 2.04 ratio         PTT - ( 25 May 2022 04:54 )  PTT:>200.0 sec            RADIOLOGY & ADDITIONAL TESTS:    Results Reviewed:   Imaging Personally Reviewed:  Electrocardiogram Personally Reviewed:    COORDINATION OF CARE:  Care Discussed with Consultants/Other Providers [Y/N]:  Prior or Outpatient Records Reviewed [Y/N]:

## 2022-05-25 NOTE — PROGRESS NOTE ADULT - PROBLEM SELECTOR PLAN 1
- HFpEF (EF 75% in 03/2022), AS s/p TAVR (09/2021). Pt desatting to 80s on ambulation today.   - Continue home Toprol XL 100mg daily   - Bumex 2mg PO BID--> Cr bump up this AM--> CTM  - TTE shows EF 65% with mold pulm HTN and mod TR (no significant change from prior)  - Strict I/Os, daily weights; Appreciate cards recs Pt had a worsening of her HF last night. Improved this AM upon IV diuresis. Pleural effusions appreciated on CXR and POCUS  - HFpEF (EF 75% in 03/2022), AS s/p TAVR (09/2021). Pt desatting to 80s w/o ambulation, pt improved to 92 with 4L NC  - pt's BP low today --> stop Toprol and replaced with metoprolol succ 12.5 BID  - Bumex 2mg IV BID--> CTM Bun/Cr as this bumped up this AM  - TTE shows EF 65% with mold pulm HTN and mod TR (no significant change from prior)  - Strict I/Os, daily weights; Appreciate cards recs

## 2022-05-25 NOTE — PROGRESS NOTE ADULT - PROBLEM SELECTOR PLAN 5
- Patient on home Lantus 20U qhs and ISS ACHS; but hypoglycemic dec to Lantus 8U, 3TID  - hypoglycemic due to less PO intake  - A1c 7.4 in 03/2022 ctm as Bun/Cr worsened this AM

## 2022-05-25 NOTE — PROGRESS NOTE ADULT - PROBLEM SELECTOR PLAN 4
Kidney bladder ultrasound shows no hydro; Cr: 1.78   - back around baseline Cr - R foot heel wound w/ well adhered eschar, negative probe to bone; xray: no gas, no OM   - Appreciate podiatry recs  - Dry sterile dressing applied to right heel with betadine, recommend daily dressing changes

## 2022-05-25 NOTE — PROGRESS NOTE ADULT - PROBLEM SELECTOR PLAN 8
Home meds: Continue home Allopurinol 100mg daily, Simvastatin 20mg qhs, Protonix 40mg daily   DVT ppx: Eliquis   Diet: DASH/Renal   Dispo: PT recs home with home PT; patient has home care and prefers no JEREMIAS.   DNR/DNI  Communication: updated daughter in person 5/20, He  by phone 5/22 8:38AM - Home Synthroid 50mcg on weekdays, 100mcg on weekends   - Continue home regimen   - TSH 5.57 (pt's synthroid regimen was recently increased, will hold off on increasing dose during this hospitalization)

## 2022-05-25 NOTE — PROGRESS NOTE ADULT - ATTENDING COMMENTS
89F Hx HFpEF (EF 75% 03/2022), Afib (on Eliquis), HTN, DM2, CKD3, hypothyroidism, AS s/p TAVR (09/2021), s/p PPM BIBEMS for CHF exacerbation, transitioned to PO, now with decompensation on 5/24, back on IV diuretics    #ADHF: Pt with decompensation last night, restarted on Bumex 2mg IV BID (from PO). POCUS this morning with bilat b-lines more than 3 per field posteriorly, 2-3 anteriorly. Moderate Right side pleural effusion, small left side pleural effusion. Pt hypoxemic to 81% On RA at rest. 92% with 4LNC.  BP soft, so will switch to metoprolol tartrate 12.5mg BID, if BP tolerates, will increase to 25mg BID (slowing HR will likely improve patients condition, so will uptitrate if tolerated).   #Hypoxic resp failure 2/2 ADHF, titrate to spo2>92%, continuous pulse-ox  #Left thigh pain today again, likely cramping. Better with massage. No swelling, erythema or warmth in that area. tylenol, heat packs, and lidocaine patch  #MARICARMEN; Cr 1.64-->1.98-->2.35-->2.48->  Likely 2/2 HF, c/w diuresis and trend cr. Bladder scan. If Cr gets worse, will discuss wit renal   #+UA, ?asymptomatic bacteruria. if fever or sxs, start ceftriaxone. f/u urine cx  #Leukocytosis: likel reactive from her decompensation last night, but could be infectious. Trend WBC, monitor for other signs of infection.   #DNR/DNI: pt filled out MOLST form while she was a&ox3 and had full capacity  Dispo: Pending improvement in volume status

## 2022-05-25 NOTE — PROGRESS NOTE ADULT - SUBJECTIVE AND OBJECTIVE BOX
Date of Service   05-25-22 @ 11:35    Patient is a 89y old  Female who presents with a chief complaint of SOB (25 May 2022 07:24)      INTERVAL HISTORY: pt feels ok,   TELEMETRY Personally reviewed: AV Paced 60's     REVIEW OF SYSTEMS:   CONSTITUTIONAL: No weakness  EYES/ENT: No visual changes; No throat pain  Neck: No pain or stiffness  Respiratory: No cough, wheezing, No shortness of breath  CARDIOVASCULAR: no chest pain or palpitations  GASTROINTESTINAL: No abdominal pain, no nausea, vomiting or hematemesis  GENITOURINARY: No dysuria, frequency or hematuria  NEUROLOGICAL: No stroke like symptoms  SKIN: No rashes    	  MEDICATIONS:  buMETAnide Injectable 2 milliGRAM(s) IV Push two times a day  metoprolol succinate  milliGRAM(s) Oral daily        PHYSICAL EXAM:  T(C): 37 (05-25-22 @ 11:32), Max: 37 (05-25-22 @ 11:32)  HR: 59 (05-25-22 @ 11:32) (59 - 60)  BP: 100/62 (05-25-22 @ 11:32) (93/51 - 104/61)  RR: 18 (05-25-22 @ 11:32) (16 - 22)  SpO2: 92% (05-25-22 @ 11:32) (82% - 96%)  Wt(kg): --  I&O's Summary    24 May 2022 07:01  -  25 May 2022 07:00  --------------------------------------------------------  IN: 840 mL / OUT: 0 mL / NET: 840 mL    25 May 2022 07:01  -  25 May 2022 11:35  --------------------------------------------------------  IN: 360 mL / OUT: 0 mL / NET: 360 mL          Appearance: In no distress	  HEENT:    PERRL, EOMI	  Cardiovascular:  S1 S2, No JVD  Respiratory: Lungs clear to auscultation	  Gastrointestinal:  Soft, Non-tender, + BS	  Vasculature:  + edema of  B/L LE  Psychiatric: Appropriate affect   Neuro: no acute focal deficits                               8.1    12.00 )-----------( 175      ( 25 May 2022 04:54 )             28.9     05-25    129<L>  |  91<L>  |  88<H>  ----------------------------<  137<H>  5.1   |  24  |  2.48<H>    Ca    10.2      25 May 2022 04:54  Phos  6.3     05-25  Mg     2.3     05-25    TPro  6.8  /  Alb  3.0<L>  /  TBili  0.4  /  DBili  x   /  AST  35  /  ALT  21  /  AlkPhos  115  05-25        Labs personally reviewed      ASSESSMENT/PLAN: 	    Ms. Briones is a 89 yo female with PMH of HTN, HLD, CAD s/p CABG, AS s/p TAVR in 9/2021 at [facility], DM, CKD, AF on Eliquis, PPM who presents with 3 days of progressive shortness of breath, + orthopnea, increased LE edema and decreased oxygen saturation on pulse oximeter at home.    Problem/Plan -1  Problem: HFpEF  - Followed by Dr. Agapito Woodard for outpatient cardiology  - CT Chest reveals moderate right and small left pleural effusions, cardiomegaly, possibly superimposed consolidation  - On lasix 40mg PO daily at home  - BMP reviewed from OP cardiologist records on 12/3/21 it was 1.1, and 5 months ago it was 1.2-1.4, here 2 months ago it was 1.4-1.6  - ECHO Left Ventricle: Normal left ventricular systolic function. No segmental wall motion abnormalities.  The LVEF= 60-65%. Moderate mitral regurgitation, There is a transcatheter aortic valve replacement seen. Moderate pulmonary hypertension.  - Patient appears close to euvolemia, but not euvolemic yet, although much improved since admission,   - 5/3 POCUS reveals Right pleural effusion  - BNP only slighty down (8208 --> 7983)  - CXR shows much improved pulm vasc congestion  - Pt does not yet appear euvolemic but will continue diuresing   - pt reports hx of Lymphedema and says her legs have been chronically edematous but doesnt use stockings   - Transitioned to Bumex 2mg PIV push BID   - HF consult     Problem/Plan -2  Problem: Aortic Stenosis  - s/p TAVR in September 2021  - minimal murmur on ausculation  - aortic valve is well seated and has good flow  - moderate tricuspid regurg    Problem/Plan -3  Problem: CAD  - Hx CABG at [facility]  - currently denies chest pain  - EKG without ischemic changes  - Troponin elevated likely i/s/o ADHF exacerbation; peaked 4/29 at 124  - c/w simvastatin 20mg PO daily    Problem/Plan -4  Problem: AF  - rate controlled  - c/w Metoprolol and Eliquis  - monitor on tele    Problem/Plan -5  Problem: HLD  - c/w simvastatin     Jacob JOSE-BC   Giles Gan DO [facility]  Cardiovascular Medicine  [address] [place] [address], [address]  Office: [phone]   Date of Service   05-25-22 @ 11:35    Patient is a 89y old  Female who presents with a chief complaint of SOB (25 May 2022 07:24)      INTERVAL HISTORY: pt feels ok,   TELEMETRY Personally reviewed: AV Paced 60's     REVIEW OF SYSTEMS:   CONSTITUTIONAL: No weakness  EYES/ENT: No visual changes; No throat pain  Neck: No pain or stiffness  Respiratory: No cough, wheezing, No shortness of breath  CARDIOVASCULAR: no chest pain or palpitations  GASTROINTESTINAL: No abdominal pain, no nausea, vomiting or hematemesis  GENITOURINARY: No dysuria, frequency or hematuria  NEUROLOGICAL: No stroke like symptoms  SKIN: No rashes    	  MEDICATIONS:  buMETAnide Injectable 2 milliGRAM(s) IV Push two times a day  metoprolol succinate  milliGRAM(s) Oral daily        PHYSICAL EXAM:  T(C): 37 (05-25-22 @ 11:32), Max: 37 (05-25-22 @ 11:32)  HR: 59 (05-25-22 @ 11:32) (59 - 60)  BP: 100/62 (05-25-22 @ 11:32) (93/51 - 104/61)  RR: 18 (05-25-22 @ 11:32) (16 - 22)  SpO2: 92% (05-25-22 @ 11:32) (82% - 96%)  Wt(kg): --  I&O's Summary    24 May 2022 07:01  -  25 May 2022 07:00  --------------------------------------------------------  IN: 840 mL / OUT: 0 mL / NET: 840 mL    25 May 2022 07:01  -  25 May 2022 11:35  --------------------------------------------------------  IN: 360 mL / OUT: 0 mL / NET: 360 mL          Appearance: In no distress	  HEENT:    PERRL, EOMI	  Cardiovascular:  S1 S2, No JVD  Respiratory: Lungs clear to auscultation	  Gastrointestinal:  Soft, Non-tender, + BS	  Vasculature:  + edema of  B/L LE  Psychiatric: Appropriate affect   Neuro: no acute focal deficits                               8.1    12.00 )-----------( 175      ( 25 May 2022 04:54 )             28.9     05-25    129<L>  |  91<L>  |  88<H>  ----------------------------<  137<H>  5.1   |  24  |  2.48<H>    Ca    10.2      25 May 2022 04:54  Phos  6.3     05-25  Mg     2.3     05-25    TPro  6.8  /  Alb  3.0<L>  /  TBili  0.4  /  DBili  x   /  AST  35  /  ALT  21  /  AlkPhos  115  05-25        Labs personally reviewed      ASSESSMENT/PLAN: 	    Ms. Briones is a 89 yo female with PMH of HTN, HLD, CAD s/p CABG, AS s/p TAVR in 9/2021 at [facility], DM, CKD, AF on Eliquis, PPM who presents with 3 days of progressive shortness of breath, + orthopnea, increased LE edema and decreased oxygen saturation on pulse oximeter at home.    Problem/Plan -1  Problem: HFpEF  - Followed by Dr. Agapito Woodard for outpatient cardiology  - CT Chest reveals moderate right and small left pleural effusions, cardiomegaly, possibly superimposed consolidation  - On lasix 40mg PO daily at home  - BMP reviewed from OP cardiologist records on 12/3/21 it was 1.1, and 5 months ago it was 1.2-1.4, here 2 months ago it was 1.4-1.6  - ECHO Left Ventricle: Normal left ventricular systolic function. No segmental wall motion abnormalities.  The LVEF= 60-65%. Moderate mitral regurgitation, There is a transcatheter aortic valve replacement seen. Moderate pulmonary hypertension.  - Patient appears close to euvolemia, but not euvolemic yet, although much improved since admission,   - 5/3 POCUS reveals Right pleural effusion  - BNP only slighty down (8208 --> 7983)  - CXR shows much improved pulm vasc congestion  - Pt does not yet appear euvolemic but will continue diuresing   - pt reports hx of Lymphedema and says her legs have been chronically edematous but doesnt use stockings   - Transitioned to Bumex 2mg PIV push BID as again hypoxic overnight with congestion  - HF consulted     Problem/Plan -2  Problem: Aortic Stenosis  - s/p TAVR in September 2021  - minimal murmur on ausculation  - aortic valve is well seated and has good flow  - moderate tricuspid regurg    Problem/Plan -3  Problem: CAD  - Hx CABG at [facility]  - currently denies chest pain  - EKG without ischemic changes  - Troponin elevated likely i/s/o ADHF exacerbation; peaked 4/29 at 124  - c/w simvastatin 20mg PO daily    Problem/Plan -4  Problem: AF  - rate controlled  - c/w Metoprolol and Eliquis  - monitor on tele    Problem/Plan -5  Problem: HLD  - c/w simvastatin     Jacob Diaz FN-BC   Giles Gan DO [facility]  Cardiovascular Medicine  [address] [place] [address], [address]  Office: [phone]

## 2022-05-25 NOTE — PROGRESS NOTE ADULT - PROBLEM SELECTOR PLAN 7
- Home Synthroid 50mcg on weekdays, 100mcg on weekends   - Continue home regimen   - TSH 5.57 (pt's synthroid regimen was recently increased, will hold off on increasing dose during this hospitalization) - held home Amlodipine 5mg daily as pt's BP dropped

## 2022-05-25 NOTE — PHARMACOTHERAPY INTERVENTION NOTE - COMMENTS
Patient is 89F with PMH significant for HFpEF, AF, HTN, T2DM, CKD3, hypothyroidism, and AS. Daytime nurse reported that patient was having frequent bowel movements overnight, described as frequent as every 2 hours. Stool was formed, not diarrhea. Patient has standing bowel regimen with senna + miralax. Upon checking EMAR patient has been refusing the bowel regimen except for 1 dose of senna last night (5/24). Recommend changing regimen to senna PRN + miralax PRN for constipation. Discussed with MD    Thank you,    Nena Levi PharmD, PGY-1 Pharmacy Resident  Available on [email or web address] or [employer] [phone]

## 2022-05-26 NOTE — PHARMACOTHERAPY INTERVENTION NOTE - COMMENTS
Patient is 89F with history of T2DM (A1c 7.4% in 3/4/22) on insulin glargine 19 units at bedtime at home. Patient is currently receiving Lantus 8 units at bedtime and Admelog 3 units TID AC. Last night, bedtime finger stick was 69 and 63 upon repeat. Thus, the Lantus dose was held. Upon further review of the EMAR and discussion with nurse, patient was been refusing to eat on several occasions and missing Admelog doses as well. Today, FBG was 124 despite not receiving Lantus last night, while pre-lunch was 137. Patient has also refused to eat lunch today. Recommend reducing Lantus to 4 units and discontinuing standing pre-meals as patient is refusing to eat. Will use correctional scale PRN to help optimize BG control. Discussed with MD    Thank you,    Nena Levi PharmD, PGY-1 Pharmacy Resident  Available on [employer] or [employer] [id number] Patient is 89F with history of T2DM (A1c 7.4% in 3/4/22) on insulin glargine 19 units at bedtime at home. Patient is currently receiving Lantus 8 units at bedtime and Admelog 3 units TID AC. Last night, bedtime fingerstick was 69 and 63 upon repeat. Thus, the Lantus dose was held. Upon further review of the EMAR and discussion with nurse, patient was been refusing to eat on several occasions and missing Admelog doses as well. Today, FBG was 124 despite not receiving Lantus last night, while pre-lunch was 137. Patient has also refused to eat lunch today. Recommend reducing Lantus to 4 units and discontinuing standing pre-meals as patient is refusing to eat. Will use correctional scale PRN to help optimize BG control. Discussed with MD.    Thank you,    Nena Levi PharmD, PGY-1 Pharmacy Resident  Available on [employer] or [employer] [phone]

## 2022-05-26 NOTE — PROGRESS NOTE ADULT - PROBLEM SELECTOR PLAN 1
Pt had a worsening of her HF last night. Improved this AM upon IV diuresis. Pleural effusions appreciated on CXR and POCUS  - HFpEF (EF 75% in 03/2022), AS s/p TAVR (09/2021). Pt desatting to 80s w/o ambulation, pt improved to 92 with 4L NC  - pt's BP low today --> stop Toprol and replaced with metoprolol succ 12.5 BID  - Bumex 2mg IV BID--> CTM Bun/Cr as this bumped up this AM  - TTE shows EF 65% with mold pulm HTN and mod TR (no significant change from prior)  - Strict I/Os, daily weights; Appreciate cards recs Pt had a worsening of her HF last night. Improved this AM upon IV diuresis. Pleural effusions appreciated on CXR and POCUS  - HFpEF (EF 75% in 03/2022), AS s/p TAVR (09/2021). Pt desatting to 80s w/o ambulation, pt improved to 92 with 4L NC  - pt's BP low today --> stop BB and BP meds  - Bumex 2mg IV BID (plus 1mg extra added yesterday during blood transfusion)--> CTM Bun/Cr as this bumped up this AM  - TTE shows EF 65% with mod pulm HTN and mod TR (no significant change from prior)  -f/u repeat TTE  - Strict I/Os, daily weights; Appreciate cards recs    #left leg abrasion:  -pt with thin skin and bleeding abrasion  -now s/p wrapping with pressure + gauze  -cont excellent nursing/wound care  -f/u repeat CBC/BMP/VBG Q8 and transfuse as needed

## 2022-05-26 NOTE — PROGRESS NOTE ADULT - PROBLEM SELECTOR PLAN 3
- Continue home Eliquis 2.5mg BID   - EKG V paced, rate controlled, ctm on tele  -continuous pulse ox - stop AC in the setting of drop in Hgb  - EKG V paced, rate controlled, ctm on tele  -continuous pulse ox

## 2022-05-26 NOTE — PROGRESS NOTE ADULT - PROBLEM SELECTOR PLAN 9
Home meds: Continue home Allopurinol 100mg daily, Simvastatin 20mg qhs, Protonix 40mg daily   DVT ppx: Eliquis   Diet: DASH/Renal   Dispo: PT recs home with home PT; patient has home care and prefers no JEREMIAS.   DNR/DNI Home meds: Continue home Allopurinol 100mg daily, Simvastatin 20mg qhs, Protonix 40mg daily   DVT ppx: no AC iso Hgb drop  Diet: DASH/Renal   Dispo: PT recs home with home PT; patient has home care and prefers no JEREMIAS. Possible palliative after we discuss with family  DNR/DNI

## 2022-05-26 NOTE — PROGRESS NOTE ADULT - PROBLEM SELECTOR PLAN 4
- R foot heel wound w/ well adhered eschar, negative probe to bone; xray: no gas, no OM   - Appreciate podiatry recs  - Dry sterile dressing applied to right heel with betadine, recommend daily dressing changes - R foot heel wound w/ well adhered eschar, negative probe to bone; xray: no gas, no OM   - Appreciate podiatry recs  - Dry sterile dressing applied to right heel with betadine, recommend daily dressing changes    #left leg abrasion:  -pt with thin skin and bleeding abrasion  -now s/p wrapping with pressure + gauze  -cont excellent nursing/wound care  -f/u repeat CBC/BMP/VBG Q8 and transfuse as needed

## 2022-05-26 NOTE — PROGRESS NOTE ADULT - PROBLEM SELECTOR PLAN 2
pt's UA worsened s/p HF decompensation. Pt w/o symptoms of UTI    -will f/u Ucx to decide if treatment is appropriate  -monitor OFF abx as pt clinical status improved this AM, and cannot connect UTI as cause of her lethargy pt's UA worsened s/p HF decompensation. Pt w/o symptoms of UTI    -will treat empirically with CTX

## 2022-05-26 NOTE — CONSULT NOTE ADULT - PROBLEM SELECTOR RECOMMENDATION 2
- hx of afib, would resume home Eliquis when feasible
reviewed disease trajectory with patient  multiple admissions  expressed concern for realistic probability for readmission despite diet and medication compliance  DNR/DNI on chart

## 2022-05-26 NOTE — CONSULT NOTE ADULT - PROBLEM SELECTOR RECOMMENDATION 9
PPS 40%, requires assistance  24/7 HHA at home, private hire  patient interested in working on functional status  dispo planning per primary team and CM: home with home care vs. JEREMIAS
- ECHO from 4/30 shows EF 60-65%. Given the rise in renal function would obtain repeat formal ECHO for non- invasive hemodynamics. Given her age/co-morbidities would hold off on RHC   - would discontinue Lopressor at this time, patient blood pressure remains liable   - appears volume overloaded with pitting edema in lower extremities, would recommend starting Bumex gtt @ 0.5 mg/hr  - recommend to consulting palliative care for GOC. Currently patient is DNR. May need to discuss home hospice with family  - will sign off at this time

## 2022-05-26 NOTE — CONSULT NOTE ADULT - PROBLEM SELECTOR RECOMMENDATION 3
- upon admission was noted to have creatinine of 1.8, now creatinine 3.0  - would start bumex gtt as stated above  - obtain formal ECHO as stated above  - pallative care consult
MOLST in chart, completed by primary team on admission.  patient not interested in hospice  [facility] narrative above

## 2022-05-26 NOTE — CONSULT NOTE ADULT - SUBJECTIVE AND OBJECTIVE BOX
NEPHROLOGY - NSN    Patient seen and examined.    HPI:  89yo F with PMH of HFpEF (EF 75% 2022), Afib (on Eliquis), HTN, DM2, CKD3, hypothyroidism, AS s/p TAVR (2021), s/p PPM BIBEMS for hypoxia of 88% on RA. Patient reports SOB and RHODES for the past 3 days. Patient has never felt similar symptoms before. Patient usually sleeps with one pillow at night, but has required two pillows the past two nights because of orthopnea. Also endorses increased leg swelling which is acute on chronic from her chronic LLE lymphedema. No reported weight changes. Patient is usually able to walk around her apartment with a walker, but has been feeling more SOB on exertion.  at bedside notes that physical therapy visits her in the apartment and has said her oxygen saturation is low sometimes. One episode of diarrhea last week, last BM was yesterday and it was normal. Denies fevers, chills, chest pain, palpitations, n/v, cough, congestion, dysuria, increased urinary frequency, constipation. Had recent fall in 2022 and was hospitalized.     Upon arrival to ED, vitals T 98F, /74, HR 87, RR 24, 98% on 3L NC   S/p Ceftriaxone, Azithromycin, and Lasix 40mg IVP x1   (2022 18:03)    Pt renal parameters having increased during her stay and now on IV bumex.  CXR did not have congestion but large effusions   There is no hematuria or bubbles in the urine.  No history of NSAIDS or nephrolithisis.  The patient urinates once or twice in the night and there is no incontinence.  No family hx or renal disease or back pain.    No recent abx use.  No alleviating or aggravating factors with respect to the kidneys.       PAST MEDICAL & SURGICAL HISTORY:  Diabetes 2      Dyslipidemia      Hypothyroidism      HTN (Hypertension)      S/P Tonsillectomy      Gout      Dyslipidemia      Spinal Stenosis lumbar L4-5      Gastritis      Pacemaker      Stage 3 chronic kidney disease      (HFpEF) heart failure with preserved ejection fraction      right Hip total Replacement Arthroplasty       H/O: ZOHAIB, BSO,  secondary to cancer of endometrium      S/P bilateral Cataract Extraction and ocular lens implant      S/P Tonsillectomy      S/P TAVR (transcatheter aortic valve replacement)      S/P total left hip arthroplasty          MEDICATIONS  (STANDING):  allopurinol 100 milliGRAM(s) Oral daily  buMETAnide Injectable 2 milliGRAM(s) IV Push two times a day  cefTRIAXone   IVPB 1000 milliGRAM(s) IV Intermittent every 24 hours  dextrose 5%. 1000 milliLiter(s) (100 mL/Hr) IV Continuous <Continuous>  dextrose 5%. 1000 milliLiter(s) (50 mL/Hr) IV Continuous <Continuous>  dextrose 50% Injectable 25 Gram(s) IV Push once  dextrose 50% Injectable 12.5 Gram(s) IV Push once  dextrose 50% Injectable 25 Gram(s) IV Push once  glucagon  Injectable 1 milliGRAM(s) IntraMuscular once  insulin glargine Injectable (LANTUS) 8 Unit(s) SubCutaneous at bedtime  insulin lispro (ADMELOG) corrective regimen sliding scale   SubCutaneous three times a day before meals  insulin lispro (ADMELOG) corrective regimen sliding scale   SubCutaneous at bedtime  insulin lispro Injectable (ADMELOG) 3 Unit(s) SubCutaneous three times a day before meals  levothyroxine 50 MICROGram(s) Oral <User Schedule>  levothyroxine 100 MICROGram(s) Oral <User Schedule>  multivitamin 1 Tablet(s) Oral daily  pantoprazole    Tablet 40 milliGRAM(s) Oral before breakfast  simvastatin 20 milliGRAM(s) Oral at bedtime      Allergies    Bactrim (Unknown)  Flagyl (Hives; Pruritus)  Macrobid (Other)  sulfa drugs (Hives)  Zosyn (Other)    Intolerances        SOCIAL HISTORY:  Denies alcohol abuse, drug abuse or tobacco usage.     FAMILY HISTORY:  No pertinent family history in first degree relatives        VITALS:  T(C): 36.7 (22 @ 11:23), Max: 36.9 (22 @ 02:45)  HR: 59 (22 @ 11:23) (59 - 60)  BP: 105/68 (22 @ 11:23) (88/58 - 108/67)  RR: 18 (22 @ 11:23) (16 - 18)  SpO2: 92% (22 @ 11:23) (90% - 92%)    REVIEW OF SYSTEMS:  Denies any nausea, vomiting, diarrhea, fever or chills. Denies chest pain, SOB, focal weakness, hematuria or dysuria. Good oral intake and denies fatigue or weakness. All other pertinent systems are reviewed and are negative.    PHYSICAL EXAM:  Constitutional: NAD  HEENT: EOMI  Neck:  No JVD, supple   Respiratory: CTA B/L  Cardiovascular: S1 and S2, RRR  Gastrointestinal: + BS, soft, NT, ND  Extremities: No peripheral edema, + peripheral pulses  Neurological: A/O x 3, CN2-12 intact  Psychiatric: Normal mood, normal affect  : No Heredia  Skin: No rashes, C/D/I  Access: Not applicable    I and O's:     @ 07:  -   @ 07:00  --------------------------------------------------------  IN: 840 mL / OUT: 0 mL / NET: 840 mL     @ 07:  -   @ 07:00  --------------------------------------------------------  IN: 480 mL / OUT: 190 mL / NET: 290 mL          LABS:                        8.7    8.02  )-----------( 164      ( 26 May 2022 04:11 )             29.8     -26    131<L>  |  91<L>  |  96<H>  ----------------------------<  156<H>  5.2   |  24  |  3.01<H>    Ca    9.8      26 May 2022 04:11  Phos  6.7       Mg     2.4         TPro  6.6  /  Alb  3.0<L>  /  TBili  0.6  /  DBili  x   /  AST  76<H>  /  ALT  45  /  AlkPhos  119        URINE:  Urinalysis Basic - ( 25 May 2022 07:55 )    Color: Orange / Appearance: Turbid / S.018 / pH: x  Gluc: x / Ketone: Negative  / Bili: Negative / Urobili: 2 mg/dL   Blood: x / Protein: 100 / Nitrite: Negative   Leuk Esterase: Large / RBC: 1362 /hpf /  /HPF   Sq Epi: x / Non Sq Epi: 4 /hpf / Bacteria: Negative      Sodium, Random Urine: 20 mmol/L ( @ 11:07)  Creatinine, Random Urine: 70 mg/dL ( @ 11:07)  Protein/Creatinine Ratio Calculation: 3.7 Ratio ( @ 11:07)  Osmolality, Random Urine: 317 mos/kg ( @ 11:07)  Potassium, Random Urine: 36 mmol/L ( @ 11:07)    RADIOLOGY & ADDITIONAL STUDIES:     NEPHROLOGY - NSN    Patient seen and examined.    HPI:  89yo F with PMH of HFpEF (EF 75% 2022), Afib (on Eliquis), HTN, DM2, CKD3, hypothyroidism, AS s/p TAVR (2021), s/p PPM BIBEMS for hypoxia of 88% on RA. Patient reports SOB and RHODES for the past 3 days. Patient has never felt similar symptoms before. Patient usually sleeps with one pillow at night, but has required two pillows the past two nights because of orthopnea. Also endorses increased leg swelling which is acute on chronic from her chronic LLE lymphedema. No reported weight changes. Patient is usually able to walk around her apartment with a walker, but has been feeling more SOB on exertion.  at bedside notes that physical therapy visits her in the apartment and has said her oxygen saturation is low sometimes. One episode of diarrhea last week, last BM was yesterday and it was normal. Denies fevers, chills, chest pain, palpitations, n/v, cough, congestion, dysuria, increased urinary frequency, constipation. Had recent fall in 2022 and was hospitalized.     Upon arrival to ED, vitals T 98F, /74, HR 87, RR 24, 98% on 3L NC   S/p Ceftriaxone, Azithromycin, and Lasix 40mg IVP x1   (2022 18:03)    Pt renal parameters having increased during her stay and now on IV bumex.  CXR did not have congestion but large effusions   There is no hematuria or bubbles in the urine.  No history of NSAIDS or nephrolithisis.  The patient urinates once or twice in the night and there is no incontinence.  No family hx or renal disease or back pain.    No recent abx use.  No alleviating or aggravating factors with respect to the kidneys.       PAST MEDICAL & SURGICAL HISTORY:  Diabetes 2      Dyslipidemia      Hypothyroidism      HTN (Hypertension)      S/P Tonsillectomy      Gout      Dyslipidemia      Spinal Stenosis lumbar L4-5      Gastritis      Pacemaker      Stage 3 chronic kidney disease      (HFpEF) heart failure with preserved ejection fraction      right Hip total Replacement Arthroplasty       H/O: ZOHAIB, BSO,  secondary to cancer of endometrium      S/P bilateral Cataract Extraction and ocular lens implant      S/P Tonsillectomy      S/P TAVR (transcatheter aortic valve replacement)      S/P total left hip arthroplasty          MEDICATIONS  (STANDING):  allopurinol 100 milliGRAM(s) Oral daily  buMETAnide Injectable 2 milliGRAM(s) IV Push two times a day  cefTRIAXone   IVPB 1000 milliGRAM(s) IV Intermittent every 24 hours  dextrose 5%. 1000 milliLiter(s) (100 mL/Hr) IV Continuous <Continuous>  dextrose 5%. 1000 milliLiter(s) (50 mL/Hr) IV Continuous <Continuous>  dextrose 50% Injectable 25 Gram(s) IV Push once  dextrose 50% Injectable 12.5 Gram(s) IV Push once  dextrose 50% Injectable 25 Gram(s) IV Push once  glucagon  Injectable 1 milliGRAM(s) IntraMuscular once  insulin glargine Injectable (LANTUS) 8 Unit(s) SubCutaneous at bedtime  insulin lispro (ADMELOG) corrective regimen sliding scale   SubCutaneous three times a day before meals  insulin lispro (ADMELOG) corrective regimen sliding scale   SubCutaneous at bedtime  insulin lispro Injectable (ADMELOG) 3 Unit(s) SubCutaneous three times a day before meals  levothyroxine 50 MICROGram(s) Oral <User Schedule>  levothyroxine 100 MICROGram(s) Oral <User Schedule>  multivitamin 1 Tablet(s) Oral daily  pantoprazole    Tablet 40 milliGRAM(s) Oral before breakfast  simvastatin 20 milliGRAM(s) Oral at bedtime      Allergies    Bactrim (Unknown)  Flagyl (Hives; Pruritus)  Macrobid (Other)  sulfa drugs (Hives)  Zosyn (Other)    Intolerances        SOCIAL HISTORY:  Denies alcohol abuse, drug abuse or tobacco usage.     FAMILY HISTORY:  No pertinent family history in first degree relatives        VITALS:  T(C): 36.7 (22 @ 11:23), Max: 36.9 (22 @ 02:45)  HR: 59 (22 @ 11:23) (59 - 60)  BP: 105/68 (22 @ 11:23) (88/58 - 108/67)  RR: 18 (22 @ 11:23) (16 - 18)  SpO2: 92% (22 @ 11:23) (90% - 92%)    REVIEW OF SYSTEMS:  Confused     PHYSICAL EXAM:  Constitutional: NAD  HEENT: EOMI  Neck:  + JVD but difficult anatomy , supple   Respiratory:  Poor effort   Cardiovascular: S1 and S2, RRR  Gastrointestinal: + BS, soft, NT, ND  Extremities: No peripheral edema, + peripheral pulses  Neurological: A/O x 2 , CN2-12 intact  Psychiatric: Normal mood, normal affect  : +  Heredia  Skin: No rashes, C/D/I  Access: Not applicable    I and O's:     @ 07:01  -   @ 07:00  --------------------------------------------------------  IN: 840 mL / OUT: 0 mL / NET: 840 mL     @ 07:  -   @ 07:00  --------------------------------------------------------  IN: 480 mL / OUT: 190 mL / NET: 290 mL          LABS:                        8.7    8.02  )-----------( 164      ( 26 May 2022 04:11 )             29.8         131<L>  |  91<L>  |  96<H>  ----------------------------<  156<H>  5.2   |  24  |  3.01<H>    Ca    9.8      26 May 2022 04:11  Phos  6.7       Mg     2.4         TPro  6.6  /  Alb  3.0<L>  /  TBili  0.6  /  DBili  x   /  AST  76<H>  /  ALT  45  /  AlkPhos  119        URINE:  Urinalysis Basic - ( 25 May 2022 07:55 )    Color: Orange / Appearance: Turbid / S.018 / pH: x  Gluc: x / Ketone: Negative  / Bili: Negative / Urobili: 2 mg/dL   Blood: x / Protein: 100 / Nitrite: Negative   Leuk Esterase: Large / RBC: 1362 /hpf /  /HPF   Sq Epi: x / Non Sq Epi: 4 /hpf / Bacteria: Negative      Sodium, Random Urine: 20 mmol/L ( @ 11:07)  Creatinine, Random Urine: 70 mg/dL ( @ 11:07)  Protein/Creatinine Ratio Calculation: 3.7 Ratio ( @ 11:07)  Osmolality, Random Urine: 317 mos/kg ( @ 11:07)  Potassium, Random Urine: 36 mmol/L ( @ 11:07)    RADIOLOGY & ADDITIONAL STUDIES:    < from: Xray Chest 1 View- PORTABLE-Routine (Xray Chest 1 View- PORTABLE-Routine .) (22 @ 14:44) >    ACC: [id number] EXAM:  XR CHEST PORTABLE ROUTINE 1V                          PROCEDURE DATE:  2022          INTERPRETATION:  TIME OF EXAM: May 24, 2022 at 2:36 PM.    CLINICAL INFORMATION: Heart failure with reduced ejection fraction.   Diuresis. Off oxygen but desaturated with ambulation.    COMPARISON:  May 11, 2022.    TECHNIQUE:   AP Portable chest x-ray.    INTERPRETATION:    Heart size and the mediastinum cannot be accurately evaluated on this   projection. Status post TAVR.  There natalie left chest wall cardiac pacemaker with intact leads with tips   in the expected region of the right atrium and right ventricle. The   generator obscures part of the left lung.  No significant pulmonary vascular congestion is apparent.  There is increased hazy opacity projecting over approximately the lower   two thirds of the right hemithorax and lower half of the left hemithorax,   suggesting increasing bilateral layering pleural effusions with likely   associated passive atelectasis.  No pneumothorax is seen.  No acute bony abnormality is noted.      IMPRESSION:  No significant pulmonary vascular congestion is apparent.    Increased hazy opacity projecting over approximately of the lower two   thirds of the right hemithorax and lower halfof the left hemithorax,   suggesting increasing moderate to large right and small to moderate left   layering pleural effusions with associated passive atelectasis.   Underlying atelectasis of other cause and/or pneumonia in the appropriate   clinical context is not excluded.    --- End of Report ---            MAGNOLIA LAGUNAS MD; Attending Radiologist  This document has been electronically signed. May 24 2022  5:03PM    < end of copied text >

## 2022-05-26 NOTE — CONSULT NOTE ADULT - SUBJECTIVE AND OBJECTIVE BOX
HPI: 89yo F with PMH of HFpEF (EF 75% 2022), Afib (on Eliquis), HTN, DM2, CKD3, hypothyroidism, AS s/p TAVR (2021), s/p PPM BIBEMS for hypoxia of 88% on RA. Patient reports SOB and RHODES for the past 3 days. Patient has never felt similar symptoms before. Patient usually sleeps with one pillow at night, but has required two pillows the past two nights because of orthopnea. Also endorses increased leg swelling which is acute on chronic from her chronic LLE lymphedema. No reported weight changes. Patient is usually able to walk around her apartment with a walker, but has been feeling more SOB on exertion.  at bedside notes that physical therapy visits her in the apartment and has said her oxygen saturation is low sometimes. One episode of diarrhea last week, last BM was yesterday and it was normal. Denies fevers, chills, chest pain, palpitations, n/v, cough, congestion, dysuria, increased urinary frequency, constipation. Had recent fall in 2022 and was hospitalized. Since being admitted she reports no changes in her symptoms. She states she can not lay flat and when she does she gets worsening shortness of breath.     PAST MEDICAL & SURGICAL HISTORY:  Diabetes 2      Dyslipidemia      Hypothyroidism      HTN (Hypertension)      S/P Tonsillectomy      Gout      Dyslipidemia      Spinal Stenosis lumbar L4-5      Gastritis      Pacemaker      Stage 3 chronic kidney disease      (HFpEF) heart failure with preserved ejection fraction      right Hip total Replacement Arthroplasty       H/O: ZOHAIB, BSO, 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      S/P bilateral Cataract Extraction and ocular lens implant      S/P Tonsillectomy      S/P TAVR (transcatheter aortic valve replacement)      S/P total left hip arthroplasty              Allergies    Bactrim (Unknown)  Flagyl (Hives; Pruritus)  Macrobid (Other)  sulfa drugs (Hives)  Zosyn (Other)    Intolerances          Home Medications:  acetaminophen 325 mg oral tablet: 3 tab(s) orally every 8 hours, As Needed - 6) (2022 18:26)  allopurinol 100 mg oral tablet: 1 tab(s) orally once a day (2022 18:26)  FAMILY HISTORY:  No pertinent family history in first degree relatives    amLODIPine 5 mg oral tablet: 1 tab(s) orally once a day (12 May 2022 16:40)  apixaban 2.5 mg oral tablet: 1 tab(s) orally 2 times a day (2022 18:26)  ascorbic acid 500 mg oral tablet: 1 tab(s) orally once a day (2022 18:26)  calcitriol 0.25 mcg oral capsule: 1 cap(s) orally once a day (2022 18:26)  levothyroxine 100 mcg (0.1 mg) oral tablet: 1 tab(s) orally Saturday and  (12 May 2022 16:40)  levothyroxine 50 mcg (0.05 mg) oral tablet: 1 tab(s) orally Monday through Friday (12 May 2022 16:40)  metoprolol succinate 100 mg oral tablet, extended release: 1 tab(s) orally once a day (2022 18:26)  Multiple Vitamins oral tablet: 1 tab(s) orally once a day (2022 18:26)  pantoprazole 40 mg oral delayed release tablet: 1 tab(s) orally once a day (before a meal) (2022 18:26)  simvastatin 20 mg oral tablet: 1 tab(s) orally once a day (at bedtime) (2022 18:26)  Vitamin D3: 1 tab(s) orally once a day (2022 18:26)      Medications:  acetaminophen     Tablet .. 650 milliGRAM(s) Oral every 6 hours PRN  allopurinol 100 milliGRAM(s) Oral daily  buMETAnide Injectable 2 milliGRAM(s) IV Push two times a day  cefTRIAXone   IVPB 1000 milliGRAM(s) IV Intermittent every 24 hours  dextrose 5%. 1000 milliLiter(s) IV Continuous <Continuous>  dextrose 5%. 1000 milliLiter(s) IV Continuous <Continuous>  dextrose 50% Injectable 25 Gram(s) IV Push once  dextrose 50% Injectable 12.5 Gram(s) IV Push once  dextrose 50% Injectable 25 Gram(s) IV Push once  dextrose Oral Gel 15 Gram(s) Oral once PRN  glucagon  Injectable 1 milliGRAM(s) IntraMuscular once  insulin glargine Injectable (LANTUS) 8 Unit(s) SubCutaneous at bedtime  insulin lispro (ADMELOG) corrective regimen sliding scale   SubCutaneous three times a day before meals  insulin lispro (ADMELOG) corrective regimen sliding scale   SubCutaneous at bedtime  insulin lispro Injectable (ADMELOG) 3 Unit(s) SubCutaneous three times a day before meals  levothyroxine 50 MICROGram(s) Oral <User Schedule>  levothyroxine 100 MICROGram(s) Oral <User Schedule>  multivitamin 1 Tablet(s) Oral daily  pantoprazole    Tablet 40 milliGRAM(s) Oral before breakfast  polyethylene glycol 3350 17 Gram(s) Oral daily PRN  senna 2 Tablet(s) Oral at bedtime PRN  simvastatin 20 milliGRAM(s) Oral at bedtime      Physical Exam:    Vitals:  Vital Signs Last 24 Hours  T(C): 36.7 (22 @ 11:23), Max: 36.9 (22 @ 02:45)  HR: 59 (22 @ 11:23) (59 - 60)  BP: 105/68 (22 @ 11:23) (88/58 - 108/67)  RR: 18 (22 @ 11:23) (16 - 18)  SpO2: 92% (22 @ 11:23) (90% - 92%)    Weight in k.4 ( @ 08:41)    I&O's Summary    25 May 2022 07:01  -  26 May 2022 07:00  --------------------------------------------------------  IN: 480 mL / OUT: 190 mL / NET: 290 mL        Tele:    General: No distress. Comfortable.  HEENT: EOM intact.  Neck: Neck supple. JVP not elevated. No masses  Chest: Clear to auscultation bilaterally  CV: Normal S1 and S2. No murmurs, rub, or gallops. Radial pulses normal.  Abdomen: Soft, non-distended, non-tender  Skin: No rashes or skin breakdown  Neurology: Alert and oriented times three. Sensation intact  Psych: Affect normal    LVAD Interrogation  VAD TYPE  Speed  Flow  Power  PI   HVAD wave form description  Assessment of driveline exit site  Programming changes:     Labs:                        8.7    8.02  )-----------( 164      ( 26 May 2022 04:11 )             29.8         131<L>  |  91<L>  |  96<H>  ----------------------------<  156<H>  5.2   |  24  |  3.01<H>    Ca    9.8      26 May 2022 04:11  Phos  6.7       Mg     2.4         TPro  6.6  /  Alb  3.0<L>  /  TBili  0.6  /  DBili  x   /  AST  76<H>  /  ALT  45  /  AlkPhos  119      PT/INR - ( 24 May 2022 18:55 )   PT: 23.6 sec;   INR: 2.04 ratio         PTT - ( 25 May 2022 16:17 )  PTT:85.3 sec                  Imaging Studies

## 2022-05-26 NOTE — CONSULT NOTE ADULT - ASSESSMENT
89yo F with PMH of HFpEF (EF 75% 03/2022), Afib (on Eliquis), HTN, DM2, CKD3, hypothyroidism, AS s/p TAVR (09/2021), s/p PPM BIBEMS for hypoxia  Lucita on top of underlying CKD stage 3 that seems to correlate with the onset of IV diuresis with BUMEX  Anemia  Nephrotic syndrome   Hyperphosphatemia  Diastolic heart failure with moderate MR;    BL effuisons and  drop in oxygenation        89yo F with PMH of HFpEF (EF 75% 03/2022), Afib (on Eliquis), HTN, DM2, CKD4, hypothyroidism, AS s/p TAVR (09/2021), s/p PPM BIBEMS for hypoxia  Lucita on top of underlying CKD stage 4 that seems to correlate with the onset of IV diuresis with BUMEX  Anemia  Nephrotic syndrome   Hyperphosphatemia  Diastolic heart failure with moderate MR;    BL effuisons and  drop in oxygenation     1 Renal- SHe has CKD stage 4 at baseline and  the predominance of proteinuria will ensue that slight changes in her volume status will have exaggerated responses with respect the kidneys.  She clearly has LE edema but the skin is not thick(as in lymphedema) and this is likely third spacing from nephrotic/subnephrotic range proteinuria   SHe has JVD that ensues elevated right heart pressure but then again she also has severe PHTN.    Cont IV Bumex and no renal objection to bumex gtt.    SHe is at the extremes of age and will not be offered HD or any form of renal replacement  2 Anemia-Can check iron stores and assess for iron or Epo products  3 Endo-Please check TSH   4 ID- IV abx   5 Pulm-Chest CT and assess to see if the right effusion can be drained for symptomatic relief    Palliation is a reasonable consult at this time    Sayed [facility]   [phone]

## 2022-05-26 NOTE — PROGRESS NOTE ADULT - SUBJECTIVE AND OBJECTIVE BOX
PROGRESS NOTE:   Authored By: Vika Camargo MD     PGY-1, Internal Medicine  Pager: [phone], [phone]    Patient is a 89y old  Female who presents with a chief complaint of SOB (25 May 2022 11:34)      OVERNIGHT EVENTS: No acute events overnight    SUBJECTIVE: Pt seen and examined at bedside this morning.     ADDITIONAL REVIEW OF SYSTEMS:    MEDICATIONS  (STANDING):  allopurinol 100 milliGRAM(s) Oral daily  buMETAnide Injectable 2 milliGRAM(s) IV Push two times a day  dextrose 5%. 1000 milliLiter(s) (100 mL/Hr) IV Continuous <Continuous>  dextrose 5%. 1000 milliLiter(s) (50 mL/Hr) IV Continuous <Continuous>  dextrose 50% Injectable 25 Gram(s) IV Push once  dextrose 50% Injectable 12.5 Gram(s) IV Push once  dextrose 50% Injectable 25 Gram(s) IV Push once  glucagon  Injectable 1 milliGRAM(s) IntraMuscular once  insulin glargine Injectable (LANTUS) 8 Unit(s) SubCutaneous at bedtime  insulin lispro (ADMELOG) corrective regimen sliding scale   SubCutaneous three times a day before meals  insulin lispro (ADMELOG) corrective regimen sliding scale   SubCutaneous at bedtime  insulin lispro Injectable (ADMELOG) 3 Unit(s) SubCutaneous three times a day before meals  levothyroxine 50 MICROGram(s) Oral <User Schedule>  levothyroxine 100 MICROGram(s) Oral <User Schedule>  metoprolol tartrate 12.5 milliGRAM(s) Oral two times a day  multivitamin 1 Tablet(s) Oral daily  pantoprazole    Tablet 40 milliGRAM(s) Oral before breakfast  simvastatin 20 milliGRAM(s) Oral at bedtime    MEDICATIONS  (PRN):  acetaminophen     Tablet .. 650 milliGRAM(s) Oral every 6 hours PRN Temp greater or equal to 38C (100.4F), Mild Pain (1 - 3)  dextrose Oral Gel 15 Gram(s) Oral once PRN Blood Glucose LESS THAN 70 milliGRAM(s)/deciliter  polyethylene glycol 3350 17 Gram(s) Oral daily PRN Constipation  senna 2 Tablet(s) Oral at bedtime PRN Constipation      CAPILLARY BLOOD GLUCOSE      POCT Blood Glucose.: 124 mg/dL (26 May 2022 08:02)  POCT Blood Glucose.: 157 mg/dL (25 May 2022 23:53)  POCT Blood Glucose.: 63 mg/dL (25 May 2022 22:45)  POCT Blood Glucose.: 63 mg/dL (25 May 2022 22:20)  POCT Blood Glucose.: 69 mg/dL (25 May 2022 22:19)  POCT Blood Glucose.: 149 mg/dL (25 May 2022 16:56)  POCT Blood Glucose.: 158 mg/dL (25 May 2022 11:47)    I&O's Summary    25 May 2022 07:01  -  26 May 2022 07:00  --------------------------------------------------------  IN: 480 mL / OUT: 190 mL / NET: 290 mL        PHYSICAL EXAM:  Vital Signs Last 24 Hrs  T(C): 36.5 (26 May 2022 04:08), Max: 37 (25 May 2022 11:32)  T(F): 97.7 (26 May 2022 04:08), Max: 98.6 (25 May 2022 11:32)  HR: 59 (26 May 2022 04:08) (59 - 60)  BP: 95/56 (26 May 2022 04:08) (88/58 - 108/67)  BP(mean): --  RR: 18 (26 May 2022 04:08) (16 - 18)  SpO2: 90% (26 May 2022 04:08) (90% - 92%)    CONSTITUTIONAL: NAD, well-developed  HEENT: NC/AT, EOMI  RESPIRATORY: No increased work of breathing, CTAB, no wheezes or crackles appreciated  CARDIOVASCULAR: RRR, S1 and S2 present, no m/r/g  ABDOMEN: soft, NT, ND, bowel sounds present  EXTREMITIES: No LE edema  MUSCULOSKELETAL: no joint swelling or tenderness to palpation  NEURO: A&Ox3, moving all extremities    LABS:                        8.7    8.02  )-----------( 164      ( 26 May 2022 04:11 )             29.8     05-    131<L>  |  91<L>  |  96<H>  ----------------------------<  156<H>  5.2   |  24  |  3.01<H>    Ca    9.8      26 May 2022 04:11  Phos  6.7       Mg     2.4         TPro  6.6  /  Alb  3.0<L>  /  TBili  0.6  /  DBili  x   /  AST  76<H>  /  ALT  45  /  AlkPhos  119      PT/INR - ( 24 May 2022 18:55 )   PT: 23.6 sec;   INR: 2.04 ratio         PTT - ( 25 May 2022 16:17 )  PTT:85.3 sec      Urinalysis Basic - ( 25 May 2022 07:55 )    Color: Orange / Appearance: Turbid / S.018 / pH: x  Gluc: x / Ketone: Negative  / Bili: Negative / Urobili: 2 mg/dL   Blood: x / Protein: 100 / Nitrite: Negative   Leuk Esterase: Large / RBC: 1362 /hpf /  /HPF   Sq Epi: x / Non Sq Epi: 4 /hpf / Bacteria: Negative          RADIOLOGY & ADDITIONAL TESTS:    Results Reviewed:   Imaging Personally Reviewed:  Electrocardiogram Personally Reviewed:    COORDINATION OF CARE:  Care Discussed with Consultants/Other Providers [Y/N]:  Prior or Outpatient Records Reviewed [Y/N]:   PROGRESS NOTE:   Authored By: Vika Camargo MD     PGY-1, Internal Medicine  Pager: [phone], [phone]    Patient is a 89y old  Female who presents with a chief complaint of SOB (25 May 2022 11:34)      OVERNIGHT EVENTS: Pt had a drop in Hgb in the setting of lower leg bleed which needed pRBC. VS stable throughout. Mental status fluctuates.     SUBJECTIVE: Pt seen and examined at bedside this morning. Pt AAOx3, lethargic, laying in her chair, wants to go home.     ADDITIONAL REVIEW OF SYSTEMS:    MEDICATIONS  (STANDING):  allopurinol 100 milliGRAM(s) Oral daily  buMETAnide Injectable 2 milliGRAM(s) IV Push two times a day  dextrose 5%. 1000 milliLiter(s) (100 mL/Hr) IV Continuous <Continuous>  dextrose 5%. 1000 milliLiter(s) (50 mL/Hr) IV Continuous <Continuous>  dextrose 50% Injectable 25 Gram(s) IV Push once  dextrose 50% Injectable 12.5 Gram(s) IV Push once  dextrose 50% Injectable 25 Gram(s) IV Push once  glucagon  Injectable 1 milliGRAM(s) IntraMuscular once  insulin glargine Injectable (LANTUS) 8 Unit(s) SubCutaneous at bedtime  insulin lispro (ADMELOG) corrective regimen sliding scale   SubCutaneous three times a day before meals  insulin lispro (ADMELOG) corrective regimen sliding scale   SubCutaneous at bedtime  insulin lispro Injectable (ADMELOG) 3 Unit(s) SubCutaneous three times a day before meals  levothyroxine 50 MICROGram(s) Oral <User Schedule>  levothyroxine 100 MICROGram(s) Oral <User Schedule>  metoprolol tartrate 12.5 milliGRAM(s) Oral two times a day  multivitamin 1 Tablet(s) Oral daily  pantoprazole    Tablet 40 milliGRAM(s) Oral before breakfast  simvastatin 20 milliGRAM(s) Oral at bedtime    MEDICATIONS  (PRN):  acetaminophen     Tablet .. 650 milliGRAM(s) Oral every 6 hours PRN Temp greater or equal to 38C (100.4F), Mild Pain (1 - 3)  dextrose Oral Gel 15 Gram(s) Oral once PRN Blood Glucose LESS THAN 70 milliGRAM(s)/deciliter  polyethylene glycol 3350 17 Gram(s) Oral daily PRN Constipation  senna 2 Tablet(s) Oral at bedtime PRN Constipation      CAPILLARY BLOOD GLUCOSE      POCT Blood Glucose.: 124 mg/dL (26 May 2022 08:02)  POCT Blood Glucose.: 157 mg/dL (25 May 2022 23:53)  POCT Blood Glucose.: 63 mg/dL (25 May 2022 22:45)  POCT Blood Glucose.: 63 mg/dL (25 May 2022 22:20)  POCT Blood Glucose.: 69 mg/dL (25 May 2022 22:19)  POCT Blood Glucose.: 149 mg/dL (25 May 2022 16:56)  POCT Blood Glucose.: 158 mg/dL (25 May 2022 11:47)    I&O's Summary    25 May 2022 07:01  -  26 May 2022 07:00  --------------------------------------------------------  IN: 480 mL / OUT: 190 mL / NET: 290 mL        PHYSICAL EXAM:  Vital Signs Last 24 Hrs  T(C): 36.5 (26 May 2022 04:08), Max: 37 (25 May 2022 11:32)  T(F): 97.7 (26 May 2022 04:08), Max: 98.6 (25 May 2022 11:32)  HR: 59 (26 May 2022 04:08) (59 - 60)  BP: 95/56 (26 May 2022 04:08) (88/58 - 108/67)  BP(mean): --  RR: 18 (26 May 2022 04:08) (16 - 18)  SpO2: 90% (26 May 2022 04:08) (90% - 92%)    CONSTITUTIONAL: laying up in her chair, lethargic, eating breakfast  HEENT: NC/AT, EOMI  RESPIRATORY: No increased work of breathing, pt on NC, lungs with some crackles but improved from last night   CARDIOVASCULAR: RRR, S1 and S2 present, diastolic murmur minimally appreciated  ABDOMEN: soft, NT, ND, bowel sounds present  EXTREMITIES: edematous, abrasion of left lower leg wrapped in gauze, still partially sanguinous   MUSCULOSKELETAL: no joint swelling or tenderness to palpation  NEURO: A&Ox3, moving all extremities    LABS:                        8.7    8.02  )-----------( 164      ( 26 May 2022 04:11 )             29.8         131<L>  |  91<L>  |  96<H>  ----------------------------<  156<H>  5.2   |  24  |  3.01<H>    Ca    9.8      26 May 2022 04:11  Phos  6.7       Mg     2.4         TPro  6.6  /  Alb  3.0<L>  /  TBili  0.6  /  DBili  x   /  AST  76<H>  /  ALT  45  /  AlkPhos  119      PT/INR - ( 24 May 2022 18:55 )   PT: 23.6 sec;   INR: 2.04 ratio         PTT - ( 25 May 2022 16:17 )  PTT:85.3 sec      Urinalysis Basic - ( 25 May 2022 07:55 )    Color: Orange / Appearance: Turbid / S.018 / pH: x  Gluc: x / Ketone: Negative  / Bili: Negative / Urobili: 2 mg/dL   Blood: x / Protein: 100 / Nitrite: Negative   Leuk Esterase: Large / RBC: 1362 /hpf /  /HPF   Sq Epi: x / Non Sq Epi: 4 /hpf / Bacteria: Negative          RADIOLOGY & ADDITIONAL TESTS:    Results Reviewed:   Imaging Personally Reviewed:  Electrocardiogram Personally Reviewed:    COORDINATION OF CARE:  Care Discussed with Consultants/Other Providers [Y/N]:  Prior or Outpatient Records Reviewed [Y/N]:

## 2022-05-26 NOTE — PROGRESS NOTE ADULT - SUBJECTIVE AND OBJECTIVE BOX
Date of Service   05-26-22 @ 14:38    Patient is a 89y old  Female who presents with a chief complaint of SOB (26 May 2022 13:57)      INTERVAL HISTORY: pt feels ok   TELEMETRY Personally reviewed: AVpaced 60's     REVIEW OF SYSTEMS:   CONSTITUTIONAL: No weakness  EYES/ENT: No visual changes; No throat pain  Neck: No pain or stiffness  Respiratory: No cough, wheezing, No shortness of breath  CARDIOVASCULAR: no chest pain or palpitations  GASTROINTESTINAL: No abdominal pain, no nausea, vomiting or hematemesis  GENITOURINARY: No dysuria, frequency or hematuria  NEUROLOGICAL: No stroke like symptoms  SKIN: No rashes    	  MEDICATIONS:  buMETAnide Injectable 2 milliGRAM(s) IV Push two times a day        PHYSICAL EXAM:  T(C): 36.7 (05-26-22 @ 11:23), Max: 36.9 (05-26-22 @ 02:45)  HR: 59 (05-26-22 @ 11:23) (59 - 60)  BP: 105/68 (05-26-22 @ 11:23) (88/58 - 108/67)  RR: 18 (05-26-22 @ 11:23) (16 - 18)  SpO2: 92% (05-26-22 @ 11:23) (90% - 92%)  Wt(kg): --  I&O's Summary    25 May 2022 07:01  -  26 May 2022 07:00  --------------------------------------------------------  IN: 480 mL / OUT: 190 mL / NET: 290 mL          Appearance: In no distress	  HEENT:    PERRL, EOMI	  Cardiovascular:  S1 S2, No JVD  Respiratory: Lungs clear to auscultation	  Gastrointestinal:  Soft, Non-tender, + BS	  Vasculature:  + edema of LE  Psychiatric: Appropriate affect   Neuro: no acute focal deficits                               8.7    8.02  )-----------( 164      ( 26 May 2022 04:11 )             29.8     05-26    131<L>  |  91<L>  |  96<H>  ----------------------------<  156<H>  5.2   |  24  |  3.01<H>    Ca    9.8      26 May 2022 04:11  Phos  6.7     05-26  Mg     2.4     05-26    TPro  6.6  /  Alb  3.0<L>  /  TBili  0.6  /  DBili  x   /  AST  76<H>  /  ALT  45  /  AlkPhos  119  05-26        Labs personally reviewed      ASSESSMENT/PLAN: 	  Ms. Briones is a 89 yo female with PMH of HTN, HLD, CAD s/p CABG, AS s/p TAVR in 9/2021 at [facility], DM, CKD, AF on Eliquis, PPM who presents with 3 days of progressive shortness of breath, + orthopnea, increased LE edema and decreased oxygen saturation on pulse oximeter at home.    Problem/Plan -1  Problem: HFpEF  - Followed by Dr. Agapito Woodard for outpatient cardiology  - CT Chest reveals moderate right and small left pleural effusions, cardiomegaly, possibly superimposed consolidation  - On lasix 40mg PO daily at home  - BMP reviewed from OP cardiologist records on 12/3/21 it was 1.1, and 5 months ago it was 1.2-1.4, here 2 months ago it was 1.4-1.6  - ECHO Left Ventricle: Normal left ventricular systolic function. No segmental wall motion abnormalities.  The LVEF= 60-65%. Moderate mitral regurgitation, There is a transcatheter aortic valve replacement seen. Moderate pulmonary hypertension.  - Patient appears close to euvolemia, but not euvolemic yet, although much improved since admission,   - 5/3 POCUS reveals Right pleural effusion  - BNP only slightly down (8208 --> 7983)  - CXR shows much improved pulm vasc congestion  - Pt does not yet appear euvolemic but will continue diuresing   - pt reports hx of Lymphedema and says her legs have been chronically edematous but doesnt use stockings   - Transitioned to Bumex 2mg PIV push BID as again hypoxic overnight with congestion  - HF consulted rec starting Bumex gtt @ 0.5 mg/hr, would discontinue Lopressor at this time,    Problem/Plan -2  Problem: Aortic Stenosis  - s/p TAVR in September 2021  - minimal murmur on ausculation  - aortic valve is well seated and has good flow  - moderate tricuspid regurg    Problem/Plan -3  Problem: CAD  - Hx CABG at [facility]  - currently denies chest pain  - EKG without ischemic changes  - Troponin elevated likely i/s/o ADHF exacerbation; peaked 4/29 at 124  - c/w simvastatin 20mg PO daily    Problem/Plan -4  Problem: AF  - rate controlled  - resume on Eliquis per primary   - Hgb  now 8.7 was 6.5 5/25  - monitor on tele    Problem/Plan -5  Problem: HLD  - c/w simvastatin     Problem/Plan -6  Problem: HTN   - BP Soft   - Amlodipine d/c;d   - metoprolol d/c'd per HF rec and soft BP   - now on Bumex               Jacob Diaz [facility]   Giles Gan DO [facility]  Cardiovascular Medicine  [address] [place] [address], [address]  Office: [phone]

## 2022-05-26 NOTE — PROGRESS NOTE ADULT - NUTRITIONAL ASSESSMENT
This patient has been assessed with a concern for Malnutrition and has been determined to have a diagnosis/diagnoses of Severe protein-calorie malnutrition.    This patient is being managed with:   Diet Regular-  Consistent Carbohydrate {Evening Snack} (CSTCHOSN)  DASH/TLC {Sodium & Cholesterol Restricted} (DASH)  1500mL Fluid Restriction ([id number])  Entered: May 19 2022 10:05AM

## 2022-05-26 NOTE — CONSULT NOTE ADULT - ASSESSMENT
89 yo female with PMH of HTN, HLD, CAD s/p CABG, AS s/p TAVR in 9/2021 at [facility], DM, CKD, AF on Eliquis, PPM who was admitted with worsening shortness of breath and lower extremity edema. Since being admitted patient has been diuresis, initially responded well however when placed on oral diuretics had worsening shortness of breath and episodes of desaturation. More recently her renal function has worsened and she is having poor response to IV diuretics. Her most recent CXR is concerning for worsening pleural effusions. On exam she is volume overloaded with pitting edema in the lower extremities. Would recommend starting Bumex gtt. She is currently not an advance therapy candidate, would recommend consult pallative care for GOC. Will sign off at this time.

## 2022-05-26 NOTE — CONSULT NOTE ADULT - NS ATTEND AMEND GEN_ALL_CORE FT
Pt care and plan discussed and reviewed with NP. Plan as outlined above edited by me to reflect our discussion.
89 YO F with a history of HFpEF, CAD s/p CABG, severe AS s/p TAVR 9/2021, pAF on eliquis and s/p PPM, and CKD IIIb (Cr 1.6) who was admitted 4/29 with decompensated heart failure with hypoxia.     Despite extensive admission there has not been significant I/O difference or difference in bed weights. Her CXR appears worse than admission. She has developed acute on chronic kidney disease over the past week.    She appears overloaded on exam. Given age with extensive comorbidities her short term prognosis is poor. She is DNR/DNI and hospice is a reasonable consideration.     REVIEW OF STUDIES  EKG 4/29: a-sensed, v-paced with 1st degree AVB    TTE 4/30: LV 4.0 cm, mild basal septal hypertrophy, LVEF 60-65%, moderate MR, well functioning TAVR, RV appears grossly moderate-severely dysfunctional  Cath: none on record    PLAN  - Augment diuretics to bumex drip at 0.5 mg/hr, adjust as needed for goal negative 2 L daily balance  - Repeat TTE for noninvasive hemodynamics   - Stop metoprolol   - Poor short term prognosis, she is DNR/DNI and appropriate candidate for hospice. Would consult palliative care    HF team will sign off but call back with questions or concerns. Case discussed with Dr. Gan and primary medical teams

## 2022-05-26 NOTE — PROGRESS NOTE ADULT - ATTENDING COMMENTS
89F Hx HFpEF (EF 75% 03/2022), Afib (on Eliquis), HTN, DM2, CKD3, hypothyroidism, AS s/p TAVR (09/2021), s/p PPM BIBEMS for CHF exacerbation, transitioned to PO, now with decompensation on 5/24, back on IV diuretics    #ADHF:  on Bumex 2mg IV BID, will likely switch to bumex gtt. Will hold BB per HF recs. currently improved fro m2 night ago when she decompensated again, but still on supplemental oxygen. f/u HF recs. f/u Cardiology recs  #acute blood loss anemia 2/2 RLE abrasion/laceration. - s/p qUPBC with hgb 8.1-->6.5--> 1UPBC-->8.7. got an extra dose of lasix. t/s, transfuse to keep hgb >8 if volume status ok. no active bleeding at this time  #Encephalopathy: pt waxes and wanes likely 2/2 hospital delirium Vs uremia Vs UTI. Continue to re-orient, c/w diuresis and renal w/u, treat for UTI as below.   #Hypoxic resp failure 2/2 ADHF, titrate to spo2>92%, continuous pulse-ox  #Left thigh pain improved today, likely cramping. Better with massage. No swelling, erythema or warmth in that area. tylenol, heat packs, and lidocaine patch  #MARICARMEN; Cr 1.64-->1.98-->2.35-->2.48->3  Likely 2/2 HF, c/w diuresis and trend cr. Renal US.  Renal Consult.   #+UA, ?asymptomatic bacteruria. Will treat for 3 days with ceftiraxone in case it is contributing to her waxing and waning mental status.   #Leukocytosis: likel reactive from her decompensation last night, but could be infectious. Trend WBC, monitor for other signs of infection.   #DNR/DNI: pt filled out [facility] form while she was a&ox3 and had full capacity. Palliative consult to be placed

## 2022-05-26 NOTE — PROGRESS NOTE ADULT - ASSESSMENT
89F Hx HFpEF (EF 75% 03/2022), Afib (on Eliquis), HTN, DM2, CKD3, hypothyroidism, AS s/p TAVR (09/2021), s/p PPM BIBEMS for CHF exacerbation now optimized  89F Hx HFpEF (EF 75% 03/2022), Afib (on Eliquis), HTN, DM2, CKD3, hypothyroidism, AS s/p TAVR (09/2021), s/p PPM BIBEMS for CHF exacerbation. Pt with fluctuating mental status, HF improving but not optimized, urine output minimal.

## 2022-05-27 NOTE — PROGRESS NOTE ADULT - SUBJECTIVE AND OBJECTIVE BOX
PROGRESS NOTE:   Authored By: Vika Camargo MD     PGY-1, Internal Medicine  Pager: [phone], [phone]    Patient is a 89y old  Female who presents with a chief complaint of SOB (26 May 2022 14:38)      OVERNIGHT EVENTS: No acute events overnight    SUBJECTIVE: Pt seen and examined at bedside this morning.     ADDITIONAL REVIEW OF SYSTEMS:    MEDICATIONS  (STANDING):  allopurinol 100 milliGRAM(s) Oral daily  buMETAnide Infusion 0.5 mG/Hr (2.5 mL/Hr) IV Continuous <Continuous>  cefTRIAXone   IVPB 1000 milliGRAM(s) IV Intermittent every 24 hours  dextrose 5%. 1000 milliLiter(s) (100 mL/Hr) IV Continuous <Continuous>  dextrose 5%. 1000 milliLiter(s) (50 mL/Hr) IV Continuous <Continuous>  dextrose 50% Injectable 25 Gram(s) IV Push once  dextrose 50% Injectable 12.5 Gram(s) IV Push once  dextrose 50% Injectable 25 Gram(s) IV Push once  glucagon  Injectable 1 milliGRAM(s) IntraMuscular once  insulin glargine Injectable (LANTUS) 4 Unit(s) SubCutaneous at bedtime  insulin lispro (ADMELOG) corrective regimen sliding scale   SubCutaneous three times a day before meals  insulin lispro (ADMELOG) corrective regimen sliding scale   SubCutaneous at bedtime  levothyroxine 50 MICROGram(s) Oral <User Schedule>  levothyroxine 100 MICROGram(s) Oral <User Schedule>  multivitamin 1 Tablet(s) Oral daily  pantoprazole    Tablet 40 milliGRAM(s) Oral before breakfast  simvastatin 20 milliGRAM(s) Oral at bedtime    MEDICATIONS  (PRN):  acetaminophen     Tablet .. 650 milliGRAM(s) Oral every 6 hours PRN Temp greater or equal to 38C (100.4F), Mild Pain (1 - 3)  dextrose Oral Gel 15 Gram(s) Oral once PRN Blood Glucose LESS THAN 70 milliGRAM(s)/deciliter  polyethylene glycol 3350 17 Gram(s) Oral daily PRN Constipation  senna 2 Tablet(s) Oral at bedtime PRN Constipation      CAPILLARY BLOOD GLUCOSE      POCT Blood Glucose.: 167 mg/dL (26 May 2022 22:14)  POCT Blood Glucose.: 154 mg/dL (26 May 2022 17:19)  POCT Blood Glucose.: 137 mg/dL (26 May 2022 11:45)  POCT Blood Glucose.: 124 mg/dL (26 May 2022 08:02)    I&O's Summary    26 May 2022 07:01  -  27 May 2022 07:00  --------------------------------------------------------  IN: 0 mL / OUT: 250 mL / NET: -250 mL        PHYSICAL EXAM:  Vital Signs Last 24 Hrs  T(C): 36.7 (27 May 2022 04:19), Max: 36.7 (26 May 2022 11:23)  T(F): 98.1 (27 May 2022 04:19), Max: 98.1 (27 May 2022 04:19)  HR: 61 (27 May 2022 04:19) (59 - 61)  BP: 102/64 (27 May 2022 04:19) (102/64 - 110/55)  BP(mean): --  RR: 18 (27 May 2022 04:19) (16 - 18)  SpO2: 93% (27 May 2022 04:19) (92% - 93%)    CONSTITUTIONAL: NAD, well-developed  HEENT: NC/AT, EOMI  RESPIRATORY: No increased work of breathing, CTAB, no wheezes or crackles appreciated  CARDIOVASCULAR: RRR, S1 and S2 present, no m/r/g  ABDOMEN: soft, NT, ND, bowel sounds present  EXTREMITIES: No LE edema  MUSCULOSKELETAL: no joint swelling or tenderness to palpation  NEURO: A&Ox3, moving all extremities    LABS:                        9.5    8.01  )-----------( 89       ( 27 May 2022 05:59 )             32.5     05-27    127<L>  |  89<L>  |  104<H>  ----------------------------<  151<H>  7.0<HH>   |  19<L>  |  3.12<H>    Ca    9.6      27 May 2022 05:59  Phos  7.5       Mg     2.3         TPro  7.2  /  Alb  2.8<L>  /  TBili  0.4  /  DBili  x   /  AST  134<H>  /  ALT  79<H>  /  AlkPhos  153<H>  05-27    PTT - ( 25 May 2022 16:17 )  PTT:85.3 sec      Urinalysis Basic - ( 25 May 2022 07:55 )    Color: Orange / Appearance: Turbid / S.018 / pH: x  Gluc: x / Ketone: Negative  / Bili: Negative / Urobili: 2 mg/dL   Blood: x / Protein: 100 / Nitrite: Negative   Leuk Esterase: Large / RBC: 1362 /hpf /  /HPF   Sq Epi: x / Non Sq Epi: 4 /hpf / Bacteria: Negative          RADIOLOGY & ADDITIONAL TESTS:    Results Reviewed:   Imaging Personally Reviewed:  Electrocardiogram Personally Reviewed:    COORDINATION OF CARE:  Care Discussed with Consultants/Other Providers [Y/N]:  Prior or Outpatient Records Reviewed [Y/N]:   PROGRESS NOTE:   Authored By: Vika Camargo MD     PGY-1, Internal Medicine  Pager: [phone], [phone]    Patient is a 89y old  Female who presents with a chief complaint of SOB (26 May 2022 14:38)      OVERNIGHT EVENTS: No acute events overnight    SUBJECTIVE: Pt seen and examined at bedside this morning. Pt with  poor mental status this AM. Alert but not oriented.     ADDITIONAL REVIEW OF SYSTEMS:    MEDICATIONS  (STANDING):  allopurinol 100 milliGRAM(s) Oral daily  buMETAnide Infusion 0.5 mG/Hr (2.5 mL/Hr) IV Continuous <Continuous>  cefTRIAXone   IVPB 1000 milliGRAM(s) IV Intermittent every 24 hours  dextrose 5%. 1000 milliLiter(s) (100 mL/Hr) IV Continuous <Continuous>  dextrose 5%. 1000 milliLiter(s) (50 mL/Hr) IV Continuous <Continuous>  dextrose 50% Injectable 25 Gram(s) IV Push once  dextrose 50% Injectable 12.5 Gram(s) IV Push once  dextrose 50% Injectable 25 Gram(s) IV Push once  glucagon  Injectable 1 milliGRAM(s) IntraMuscular once  insulin glargine Injectable (LANTUS) 4 Unit(s) SubCutaneous at bedtime  insulin lispro (ADMELOG) corrective regimen sliding scale   SubCutaneous three times a day before meals  insulin lispro (ADMELOG) corrective regimen sliding scale   SubCutaneous at bedtime  levothyroxine 50 MICROGram(s) Oral <User Schedule>  levothyroxine 100 MICROGram(s) Oral <User Schedule>  multivitamin 1 Tablet(s) Oral daily  pantoprazole    Tablet 40 milliGRAM(s) Oral before breakfast  simvastatin 20 milliGRAM(s) Oral at bedtime    MEDICATIONS  (PRN):  acetaminophen     Tablet .. 650 milliGRAM(s) Oral every 6 hours PRN Temp greater or equal to 38C (100.4F), Mild Pain (1 - 3)  dextrose Oral Gel 15 Gram(s) Oral once PRN Blood Glucose LESS THAN 70 milliGRAM(s)/deciliter  polyethylene glycol 3350 17 Gram(s) Oral daily PRN Constipation  senna 2 Tablet(s) Oral at bedtime PRN Constipation      CAPILLARY BLOOD GLUCOSE      POCT Blood Glucose.: 167 mg/dL (26 May 2022 22:14)  POCT Blood Glucose.: 154 mg/dL (26 May 2022 17:19)  POCT Blood Glucose.: 137 mg/dL (26 May 2022 11:45)  POCT Blood Glucose.: 124 mg/dL (26 May 2022 08:02)    I&O's Summary    26 May 2022 07:01  -  27 May 2022 07:00  --------------------------------------------------------  IN: 0 mL / OUT: 250 mL / NET: -250 mL        PHYSICAL EXAM:  Vital Signs Last 24 Hrs  T(C): 36.7 (27 May 2022 04:19), Max: 36.7 (26 May 2022 11:23)  T(F): 98.1 (27 May 2022 04:19), Max: 98.1 (27 May 2022 04:19)  HR: 61 (27 May 2022 04:19) (59 - 61)  BP: 102/64 (27 May 2022 04:19) (102/64 - 110/55)  BP(mean): --  RR: 18 (27 May 2022 04:19) (16 - 18)  SpO2: 93% (27 May 2022 04:19) (92% - 93%)    CONSTITUTIONAL: laying flat in bed, lethargic, refusing breakfast   HEENT: NC/AT, EOMI  RESPIRATORY: No increased work of breathing, pt on NC, lungs with crackles  CARDIOVASCULAR: RRR, S1 and S2 present, diastolic murmur minimally appreciated  ABDOMEN: soft, NT, ND, bowel sounds present  EXTREMITIES: edematous, abrasion of left lower leg wrapped in gauze,   MUSCULOSKELETAL: no joint swelling or tenderness to palpation  NEURO: A&Ox1, moving all extremities    LABS:                        9.5    8.01  )-----------( 89       ( 27 May 2022 05:59 )             32.5     05-27    127<L>  |  89<L>  |  104<H>  ----------------------------<  151<H>  7.0<HH>   |  19<L>  |  3.12<H>    Ca    9.6      27 May 2022 05:59  Phos  7.5     05-27  Mg     2.3         TPro  7.2  /  Alb  2.8<L>  /  TBili  0.4  /  DBili  x   /  AST  134<H>  /  ALT  79<H>  /  AlkPhos  153<H>      PTT - ( 25 May 2022 16:17 )  PTT:85.3 sec      Urinalysis Basic - ( 25 May 2022 07:55 )    Color: Orange / Appearance: Turbid / S.018 / pH: x  Gluc: x / Ketone: Negative  / Bili: Negative / Urobili: 2 mg/dL   Blood: x / Protein: 100 / Nitrite: Negative   Leuk Esterase: Large / RBC: 1362 /hpf /  /HPF   Sq Epi: x / Non Sq Epi: 4 /hpf / Bacteria: Negative          RADIOLOGY & ADDITIONAL TESTS:    Results Reviewed:   Imaging Personally Reviewed:  Electrocardiogram Personally Reviewed:    COORDINATION OF CARE:  Care Discussed with Consultants/Other Providers [Y/N]:  Prior or Outpatient Records Reviewed [Y/N]:

## 2022-05-27 NOTE — PHARMACOTHERAPY INTERVENTION NOTE - COMMENTS
Patient is 89F with PMH of CABG on simvastatin 20 mg daily. Since 5/26, patient's AST and ALT have been uptrending. Today, AST is 127 and ALT is 78, likely in the setting of HF. Recommend holding simvastatin until LFTs normalize. Then consider restarting Discussed with MD    Thank you,    Nena Levi PharmD, PGY-1 Pharmacy Resident  Available on [employer] or [employer] [phone]

## 2022-05-27 NOTE — PROGRESS NOTE ADULT - PROBLEM SELECTOR PLAN 4
- R foot heel wound w/ well adhered eschar, negative probe to bone; xray: no gas, no OM   - Appreciate podiatry recs  - Dry sterile dressing applied to right heel with betadine, recommend daily dressing changes    #left leg abrasion:  -pt with thin skin and bleeding abrasion  -now s/p wrapping with pressure + gauze  -cont excellent nursing/wound care  -f/u repeat CBC/BMP/VBG Q8 and transfuse as needed - R foot heel wound w/ well adhered eschar, negative probe to bone; xray: no gas, no OM   - Appreciate podiatry recs  - Dry sterile dressing applied to right heel with betadine, recommend daily dressing changes

## 2022-05-27 NOTE — PROGRESS NOTE ADULT - ASSESSMENT
89F Hx HFpEF (EF 75% 03/2022), Afib (on Eliquis), HTN, DM2, CKD3, hypothyroidism, AS s/p TAVR (09/2021), s/p PPM BIBEMS for CHF exacerbation. Pt with fluctuating mental status, HF improving but not optimized, urine output minimal.  89F Hx HFpEF (EF 75% 03/2022), Afib (on Eliquis), HTN, DM2, CKD3, hypothyroidism, AS s/p TAVR (09/2021), s/p PPM BIBEMS for CHF exacerbation. Pt with worsening mental status, HF improving but not optimized, urine output minimal and function worsening. Not a candidate for kidney temporizing measures Discussed palliative care with the pt's  this afternoon, and he is contemplating this with the family.

## 2022-05-27 NOTE — PROGRESS NOTE ADULT - PROBLEM SELECTOR PLAN 4
will continue to follow. family to decide on choice. primary team to follow up on conversations as well.  we remain available, please contact on call team if family choosing PCU and they can evaluate and discuss next steps.  [phone]

## 2022-05-27 NOTE — PROVIDER CONTACT NOTE (CRITICAL VALUE NOTIFICATION) - RECOMMENDATIONS
Notify Dr. LISA Lezama PTT>200. Follow anticoagulant Heparin  Nomogram
hold eliquis, pt refused Lopressor 12.5 mg
Dextrose 50% 25g IVP ordered STAT for blood glucose less than 100
Redraw labs

## 2022-05-27 NOTE — CHART NOTE - NSCHARTNOTEFT_GEN_A_CORE
Further goals of care discussion again today with  He and the 3 daughters. We discussed the patients worsening heart and kidney failure, which is leading to worsening dyspnea and confusion. At this time, they understand the poor prognosis and would like to care that is focused on comfort at this time. VIKASH completed and placed in the chat.

## 2022-05-27 NOTE — PROGRESS NOTE ADULT - SUBJECTIVE AND OBJECTIVE BOX
DATE OF SERVICE: 05-27-22 @ 08:36    Patient is a 89y old  Female who presents with a chief complaint of SOB (27 May 2022 07:19)      INTERVAL HISTORY: Alert. No events overnight.     REVIEW OF SYSTEMS:  CONSTITUTIONAL: No weakness  EYES/ENT: No visual changes;  No throat pain   NECK: No pain or stiffness  RESPIRATORY: No cough, wheezing; No shortness of breath  CARDIOVASCULAR: No chest pain or palpitations  GASTROINTESTINAL: No abdominal  pain. No nausea, vomiting, or hematemesis  GENITOURINARY: No dysuria, frequency or hematuria  NEUROLOGICAL: No stroke like symptoms  SKIN: No rashes    TELEMETRY Personally reviewed: A-Paced 59-60 bpm  	  MEDICATIONS:  buMETAnide Infusion 0.5 mG/Hr IV Continuous <Continuous>        PHYSICAL EXAM:  T(C): 36.7 (05-27-22 @ 04:19), Max: 36.7 (05-26-22 @ 11:23)  HR: 61 (05-27-22 @ 04:19) (59 - 61)  BP: 102/64 (05-27-22 @ 04:19) (102/64 - 110/55)  RR: 18 (05-27-22 @ 04:19) (16 - 18)  SpO2: 93% (05-27-22 @ 04:19) (92% - 93%)  Wt(kg): --  I&O's Summary    26 May 2022 07:01  -  27 May 2022 07:00  --------------------------------------------------------  IN: 0 mL / OUT: 250 mL / NET: -250 mL          Appearance: In no distress	  HEENT:    PERRL, EOMI	  Cardiovascular:  S1 S2, No JVD  Respiratory: Lungs clear to auscultation	  Gastrointestinal:  Soft, Non-tender, + BS	  Vascularature:  + LE edema  Psychiatric: Appropriate affect   Neuro: no acute focal deficits                               9.5    8.01  )-----------( 89       ( 27 May 2022 05:59 )             32.5     05-27    127<L>  |  89<L>  |  104<H>  ----------------------------<  151<H>  7.0<HH>   |  19<L>  |  3.12<H>    Ca    9.6      27 May 2022 05:59  Phos  7.5     05-27  Mg     2.3     05-27    TPro  7.2  /  Alb  2.8<L>  /  TBili  0.4  /  DBili  x   /  AST  134<H>  /  ALT  79<H>  /  AlkPhos  153<H>  05-27        Labs personally reviewed      ASSESSMENT/PLAN: 	    Ms. Briones is a 89 yo female with PMH of HTN, HLD, CAD s/p CABG, AS s/p TAVR in 9/2021 at [facility], DM, CKD, AF on Eliquis, PPM who presents with 3 days of progressive shortness of breath, + orthopnea, increased LE edema and decreased oxygen saturation on pulse oximeter at home.    Problem/Plan -1  Problem: HFpEF  - Followed by Dr. Agapito Woodard for outpatient cardiology  - CT Chest reveals moderate right and small left pleural effusions, cardiomegaly, possibly superimposed consolidation  - On lasix 40mg PO daily at home  - BMP reviewed from OP cardiologist records on 12/3/21 it was 1.1, and 5 months ago it was 1.2-1.4, here 2 months ago it was 1.4-1.6  - ECHO Left Ventricle: Normal left ventricular systolic function. No segmental wall motion abnormalities.  The LVEF= 60-65%. Moderate mitral regurgitation, There is a transcatheter aortic valve replacement seen. Moderate pulmonary hypertension.  - Patient appears close to euvolemia, but not euvolemic yet, although much improved since admission,   - 5/3 POCUS reveals Right pleural effusion  - BNP only slightly down (8208 --> 7983)  - CXR shows much improved pulm vasc congestion  - Pt does not yet appear euvolemic but will continue diuresing   - pt reports hx of Lymphedema and says her legs have been chronically edematous but doesnt use stockings   - Transitioned to Bumex 2mg PIV push BID as again hypoxic overnight with congestion  - HF consulted rec starting Bumex gtt @ 0.5 mg/hr, would discontinue Lopressor at this time    Problem/Plan -2  Problem: Aortic Stenosis  - s/p TAVR in September 2021  - minimal murmur on ausculation  - aortic valve is well seated and has good flow  - moderate tricuspid regurg    Problem/Plan -3  Problem: CAD  - Hx CABG at [facility]  - currently denies chest pain  - EKG without ischemic changes  - Troponin elevated likely i/s/o ADHF exacerbation; peaked 4/29 at 124  - c/w simvastatin 20mg PO daily    Problem/Plan -4  Problem: AF  - rate controlled  - resume on Eliquis per primary   - Hgb  now 8.7 was 6.5 5/25  - monitor on tele  - AC currently on hold    Problem/Plan -5  Problem: HLD  - c/w simvastatin     Problem/Plan -6  Problem: HTN   - BP Soft   - Amlodipine d/c;d   - metoprolol d/c'd per HF rec and soft BP   - now on Bumex     Problem/Plan -7  Problem: MARICARMEN on CKD  - Renal following  - Pt with  hyponatremia,  hyperphospatemia and hyperkalemia 5/27  - c/w calcium gluconate       Ira Reynolds, AG-NP   Giles Gan DO [facility]  Cardiovascular Medicine  [address] [place] [address], [address]  Office: [phone]  Cell: [phone] DATE OF SERVICE: 05-27-22 @ 08:36    Patient is a 89y old  Female who presents with a chief complaint of SOB (27 May 2022 07:19)      INTERVAL HISTORY: Alert. No events overnight.     REVIEW OF SYSTEMS:  CONSTITUTIONAL: No weakness  EYES/ENT: No visual changes;  No throat pain   NECK: No pain or stiffness  RESPIRATORY: No cough, wheezing; No shortness of breath  CARDIOVASCULAR: No chest pain or palpitations  GASTROINTESTINAL: No abdominal  pain. No nausea, vomiting, or hematemesis  GENITOURINARY: No dysuria, frequency or hematuria  NEUROLOGICAL: No stroke like symptoms  SKIN: No rashes    TELEMETRY Personally reviewed: A-Paced 59-60 bpm  	  MEDICATIONS:  buMETAnide Infusion 0.5 mG/Hr IV Continuous <Continuous>        PHYSICAL EXAM:  T(C): 36.7 (05-27-22 @ 04:19), Max: 36.7 (05-26-22 @ 11:23)  HR: 61 (05-27-22 @ 04:19) (59 - 61)  BP: 102/64 (05-27-22 @ 04:19) (102/64 - 110/55)  RR: 18 (05-27-22 @ 04:19) (16 - 18)  SpO2: 93% (05-27-22 @ 04:19) (92% - 93%)  Wt(kg): --  I&O's Summary    26 May 2022 07:01  -  27 May 2022 07:00  --------------------------------------------------------  IN: 0 mL / OUT: 250 mL / NET: -250 mL          Appearance: In no distress	  HEENT:    PERRL, EOMI	  Cardiovascular:  S1 S2, No JVD  Respiratory: Lungs clear to auscultation	  Gastrointestinal:  Soft, Non-tender, + BS	  Vascularature:  + LE edema  Psychiatric: Appropriate affect   Neuro: no acute focal deficits                               9.5    8.01  )-----------( 89       ( 27 May 2022 05:59 )             32.5     05-27    127<L>  |  89<L>  |  104<H>  ----------------------------<  151<H>  7.0<HH>   |  19<L>  |  3.12<H>    Ca    9.6      27 May 2022 05:59  Phos  7.5     05-27  Mg     2.3     05-27    TPro  7.2  /  Alb  2.8<L>  /  TBili  0.4  /  DBili  x   /  AST  134<H>  /  ALT  79<H>  /  AlkPhos  153<H>  05-27        Labs personally reviewed      ASSESSMENT/PLAN: 	    Ms. Briones is a 89 yo female with PMH of HTN, HLD, CAD s/p CABG, AS s/p TAVR in 9/2021 at [facility], DM, CKD, AF on Eliquis, PPM who presents with 3 days of progressive shortness of breath, + orthopnea, increased LE edema and decreased oxygen saturation on pulse oximeter at home.    Problem/Plan -1  Problem: HFpEF  - Followed by Dr. Agapito Woodard for outpatient cardiology  - CT Chest reveals moderate right and small left pleural effusions, cardiomegaly, possibly superimposed consolidation  - On lasix 40mg PO daily at home  - BMP reviewed from OP cardiologist records on 12/3/21 it was 1.1, and 5 months ago it was 1.2-1.4, here 2 months ago it was 1.4-1.6  - ECHO Left Ventricle: Normal left ventricular systolic function. No segmental wall motion abnormalities.  The LVEF= 60-65%. Moderate mitral regurgitation, There is a transcatheter aortic valve replacement seen. Moderate pulmonary hypertension.  - Patient appears close to euvolemia, but not euvolemic yet, although much improved since admission,   - 5/3 POCUS reveals Right pleural effusion  - BNP only slightly down (8208 --> 7983)  - CXR shows much improved pulm vasc congestion  - Pt does not yet appear euvolemic but will continue diuresing   - pt reports hx of Lymphedema and says her legs have been chronically edematous but doesnt use stockings   - Transitioned to Bumex 2mg PIV push BID as again hypoxic overnight with congestion  - HF consulted rec starting Bumex gtt @ 0.5 mg/hr, would discontinue Lopressor at this time  - 5/27 Pt now confused and disoriented, oliguric despite Bumex gtt with electrolyte imbalances. Pall care c/s. Patient DNR/DNI.     Problem/Plan -2  Problem: Aortic Stenosis  - s/p TAVR in September 2021  - minimal murmur on ausculation  - aortic valve is well seated and has good flow  - moderate tricuspid regurg    Problem/Plan -3  Problem: CAD  - Hx CABG at [facility]  - currently denies chest pain  - EKG without ischemic changes  - Troponin elevated likely i/s/o ADHF exacerbation; peaked 4/29 at 124  - c/w simvastatin 20mg PO daily    Problem/Plan -4  Problem: AF  - rate controlled  - resume on Eliquis per primary   - Hgb  now 8.7 was 6.5 5/25  - monitor on tele  - AC currently on hold    Problem/Plan -5  Problem: HLD  - c/w simvastatin     Problem/Plan -6  Problem: HTN   - BP Soft   - Amlodipine d/c;d   - metoprolol d/c'd per HF rec and soft BP   - now on Bumex     Problem/Plan -7  Problem: MARICARMEN on CKD  - Renal following  - Pt with  hyponatremia,  hyperphospatemia and hyperkalemia 5/27  - c/w calcium gluconate       HERBER Mix-NP   Giles Gan DO [facility]  Cardiovascular Medicine  [address] [place] [address], [address]  Office: [phone]  Cell: [phone]

## 2022-05-27 NOTE — PROGRESS NOTE ADULT - NS ATTEND BILL GEN_ALL_CORE
Attending to bill

## 2022-05-27 NOTE — PROGRESS NOTE ADULT - PROBLEM SELECTOR PLAN 9
Home meds: Continue home Allopurinol 100mg daily, Simvastatin 20mg qhs, Protonix 40mg daily   DVT ppx: no AC iso Hgb drop  Diet: DASH/Renal   Dispo: PT recs home with home PT; patient has home care and prefers no JEREMIAS. Possible palliative after we discuss with family  DNR/DNI Home meds: Continue home Allopurinol 100mg daily, Simvastatin 20mg qhs, Protonix 40mg daily   DVT ppx: no AC iso Hgb drop  Diet: DASH/Renal   Dispo: Possible palliative after we discussed with family  DNR/DNI

## 2022-05-27 NOTE — PROGRESS NOTE ADULT - NS_MD_PANP_GEN_ALL_CORE

## 2022-05-27 NOTE — PROGRESS NOTE ADULT - PROBLEM SELECTOR PLAN 1
PPS 20-30%, requires total care  deconditioned and more debilitated compared to initial consult on 5/16

## 2022-05-27 NOTE — PROVIDER CONTACT NOTE (CRITICAL VALUE NOTIFICATION) - BACKGROUND
Pt is a star CHF patient w/ increased lethargy. s/p Hep gtt non-therapeutic d/c'd this afternoon.
admitted for heart failure
Patient admitted for Heart Failure
pt admit with heart failure

## 2022-05-27 NOTE — PROGRESS NOTE ADULT - SUBJECTIVE AND OBJECTIVE BOX
Patient is a 89y old  Female who presents with a chief complaint of SOB (27 May 2022 15:17)       INTERVAL HPI/OVERNIGHT EVENTS:  Patient seen and evaluated at bedside.  Pt is resting comfortable in NAD.     Allergies    Bactrim (Unknown)  Flagyl (Hives; Pruritus)  Macrobid (Other)  sulfa drugs (Hives)  Zosyn (Other)    Intolerances        Vital Signs Last 24 Hrs  T(C): 36.3 (27 May 2022 13:26), Max: 36.7 (27 May 2022 04:19)  T(F): 97.4 (27 May 2022 13:26), Max: 98.1 (27 May 2022 04:19)  HR: 60 (27 May 2022 13:26) (60 - 61)  BP: 110/54 (27 May 2022 13:26) (102/64 - 110/54)  BP(mean): --  RR: 18 (27 May 2022 13:26) (18 - 18)  SpO2: 94% (27 May 2022 13:26) (93% - 94%)    LABS:                        8.8    8.24  )-----------( 188      ( 27 May 2022 11:17 )             29.8     05-27    130<L>  |  90<L>  |  104<H>  ----------------------------<  122<H>  6.3<HH>   |  22  |  3.24<H>    Ca    9.5      27 May 2022 11:17  Phos  7.2     05-27  Mg     2.3     05-27    TPro  7.2  /  Alb  2.9<L>  /  TBili  0.4  /  DBili  x   /  AST  127<H>  /  ALT  78<H>  /  AlkPhos  144<H>  05-27        CAPILLARY BLOOD GLUCOSE      POCT Blood Glucose.: 165 mg/dL (27 May 2022 17:24)  POCT Blood Glucose.: 105 mg/dL (27 May 2022 11:50)  POCT Blood Glucose.: 172 mg/dL (27 May 2022 07:49)  POCT Blood Glucose.: 167 mg/dL (26 May 2022 22:14)      Lower Extremity Physical Exam:  Vascular: DP/PT nonpalpable 2/2 to edema, B/L, CFT <3 seconds B/L, Temperature gradient WNL, B/L.   Neuro: Epicritic sensation diminished to the level of ankle, B/L.  Musculoskeletal/Ortho: unremarkable  Skin: R foot plantar heel wound to subQ, no erythema, no malodor, negative probe to bone, small scab noted plantar aspect of left heel

## 2022-05-27 NOTE — PROGRESS NOTE ADULT - ATTENDING COMMENTS
89F Hx HFpEF (EF 75% 03/2022), Afib (on Eliquis), HTN, DM2, CKD3, hypothyroidism, AS s/p TAVR (09/2021), s/p PPM BIBEMS for CHF exacerbation, transitioned to PO, now with decompensation on 5/24, back on IV diuretics    #ADHF:  on  bumex gtt, still with poor urine output. holding BB per HF recs. still on supplemental oxygen, appears comfortable, mild tachypnea. f/u HF recs. f/u Cardiology recs. Pt is decompensating, palliative consulted, family deciding on potentially pursuing hospice.   #acute blood loss anemia 2/2 RLE abrasion/laceration two nights ago - s/p qUPBC with hgb 8.1-->6.5--> 1UPBC-->8.7--> stable.  transfuse to keep hgb >8 if volume status ok. no active bleeding at this time.  #Encephalopathy: pt waxes and wanes likely 2/2 hospital delirium Vs uremia Vs UTI. Continue to re-orient, c/w diuresis, likely going to worsen as renal failure worsens. Palliative consulted.  #Hypoxic resp failure 2/2 ADHF, titrate to spo2>92%, continuous pulse-ox  #Left thigh pain improved today, likely cramping. Better with massage. No swelling, erythema or warmth in that area. tylenol, heat packs, and lidocaine patch  #MARICARMEN; Cr 1.64-->1.98-->2.35-->2.48->3-->3.24.  Likely 2/2 HF, c/w diuresis and trend cr. Renal US, no hydro.  Renal Consulted, not a candidate for HD.   #hyperkalemia: 2/2 renal failure, continue to temporize for now. Palliative consulted.   #Thrombocytopenia: Plates low this morning, repeat baseline, but blue top sent was 90. Will trend. Further workup pending [facility] discussion.  #+UA, ?asymptomatic bacteruria. s/p ceftriaxone yesterday as concern for perhaps playing a role in the mental status, but with thrombocytopenia, will DC the ceftriaxone in case it is playing a role and unclear benefit.     #DNR/DNI: pt filled out MOLST form while she was a&ox3 and had full capacity. Palliative consult to be placed, considering hospice care.

## 2022-05-27 NOTE — PROGRESS NOTE ADULT - NUTRITIONAL ASSESSMENT
This patient has been assessed with a concern for Malnutrition and has been determined to have a diagnosis/diagnoses of Severe protein-calorie malnutrition.    This patient is being managed with:   Diet Regular-  Consistent Carbohydrate {Evening Snack} (CSTCHOSN)  DASH/TLC {Sodium & Cholesterol Restricted} (DASH)  1500mL Fluid Restriction ([id number])  Entered: May 19 2022 10:05AM

## 2022-05-27 NOTE — PROGRESS NOTE ADULT - PROBLEM SELECTOR PLAN 2
worsening, now with worse pleural effusions  worsening renal function, not a candidate for RRT  bumex drip

## 2022-05-27 NOTE — PROGRESS NOTE ADULT - ASSESSMENT
89yo F with PMH of HFpEF (EF 75% 03/2022), Afib (on Eliquis), HTN, DM2, CKD4, hypothyroidism, AS s/p TAVR (09/2021), s/p PPM BIBEMS for hypoxia  Lucita on top of underlying CKD stage 4 and now oliguria   Anemia  Nephrotic syndrome   Hyperphosphatemia and now hyperkalemia   Diastolic heart failure with moderate MR;    B/L effuisons and  drop in oxygenation   Change in mental status and confusion   Hepatitis     1 Renal- SHe has CKD stage 4 at baseline and  the predominance of proteinuria will ensue that slight changes in her volume status will have exaggerated responses with respect the kidneys.  She clearly has LE edema but the skin is not thick(as in lymphedema) and this is likely third spacing from nephrotic/subnephrotic range proteinuria   Cont IV Bumex  gtt.    SHe is at the extremes of age and will not be offered HD or any form of renal replacement  Lokelma x 3 doses   (VBG potassium was 5.9)  2 Anemia-Can check iron stores and assess for iron or Epo products  3 GI-Trend LFT   4 ID- IV abx   5 Pulm-Chest CT with large right effusion.  Would drainage for palliation purposes be an option?    Palliation follow up   >60 minutes spent on total encounter and more then 50% of the visit was spent counseling and/or coordinating care by the attending physician   [facility]   [phone]

## 2022-05-27 NOTE — PROGRESS NOTE ADULT - ASSESSMENT
89 y/o F with right heel wound  - pt seen and evaluated  - R foot plantar heel wound to subQ, no erythema, no malodor, negative probe to bone  - R foot xray negative for osteo   - Dry sterile dressing applied to right heel with betadine, recommend daily dressing changes  - Ordered zlows to offload bilateral heels at all times   - Patient is stable from podiatry standpoint   - F.u information and instructions can be found in the f/u section of d/c provider note

## 2022-05-27 NOTE — PROGRESS NOTE ADULT - PROBLEM SELECTOR PLAN 1
Pt had a worsening of her HF last night. Improved this AM upon IV diuresis. Pleural effusions appreciated on CXR and POCUS  - HFpEF (EF 75% in 03/2022), AS s/p TAVR (09/2021). Pt desatting to 80s w/o ambulation, pt improved to 92 with 4L NC  - pt's BP low today --> stop BB and BP meds  - Bumex 2mg IV BID (plus 1mg extra added yesterday during blood transfusion)--> CTM Bun/Cr as this bumped up this AM  - TTE shows EF 65% with mod pulm HTN and mod TR (no significant change from prior)  -f/u repeat TTE  - Strict I/Os, daily weights; Appreciate cards recs    #left leg abrasion:  -pt with thin skin and bleeding abrasion  -now s/p wrapping with pressure + gauze  -cont excellent nursing/wound care  -f/u repeat CBC/BMP/VBG Q8 and transfuse as needed Pt had a worsening of her HF last night. Pleural effusions appreciated on CT chest  - HFpEF (EF 75% in 03/2022), AS s/p TAVR (09/2021). Pt desatting to 80s w/o ambulation, pt improved to 92 with 4L NC  - pt's BP low today --> stop BB and BP meds  -cont bumex drip   - TTE shows EF 65% with mod pulm HTN and mod TR (no significant change from prior)  -repeat TTE unchanged   - Strict I/Os, daily weights; Appreciate cards recs    #hyperkalemia  -pt not with minimal urine output despite cont'd diuresis  -pt with worsening potassium burden but not a candidate for hemodialysis or lokalma as per nephrology   -cont with insulin pushes as indicated  -ctm

## 2022-05-27 NOTE — PROGRESS NOTE ADULT - PROBLEM SELECTOR PLAN 3
- stop AC in the setting of drop in Hgb  - EKG V paced, rate controlled, ctm on tele  -continuous pulse ox

## 2022-05-27 NOTE — PROGRESS NOTE ADULT - NS ATTEND AMEND GEN_ALL_CORE FT
Pt care and plan discussed and reviewed with NP. Plan as outlined above edited by me to reflect our discussion.
Later in day with worsening hypoxia, CXR with congestion, will switch to IV Bumex, repeat proBNP, HF consult in AM
Pt care and plan discussed and reviewed with NP. Plan as outlined above edited by me to reflect our discussion.

## 2022-05-27 NOTE — PROGRESS NOTE ADULT - SUBJECTIVE AND OBJECTIVE BOX
NEPHROLOGY-[facility] [phone]        Patient seen and examined in bed.  SHe is confused  On Bumex gtt          MEDICATIONS  (STANDING):  allopurinol 100 milliGRAM(s) Oral daily  buMETAnide Infusion 0.5 mG/Hr (2.5 mL/Hr) IV Continuous <Continuous>  calcium gluconate IVPB 1 Gram(s) IV Intermittent once  cefTRIAXone   IVPB 1000 milliGRAM(s) IV Intermittent every 24 hours  dextrose 5%. 1000 milliLiter(s) (100 mL/Hr) IV Continuous <Continuous>  dextrose 5%. 1000 milliLiter(s) (50 mL/Hr) IV Continuous <Continuous>  dextrose 50% Injectable 25 Gram(s) IV Push once  dextrose 50% Injectable 12.5 Gram(s) IV Push once  dextrose 50% Injectable 25 Gram(s) IV Push once  dextrose 50% Injectable 50 milliLiter(s) IV Push once  glucagon  Injectable 1 milliGRAM(s) IntraMuscular once  insulin glargine Injectable (LANTUS) 4 Unit(s) SubCutaneous at bedtime  insulin lispro (ADMELOG) corrective regimen sliding scale   SubCutaneous three times a day before meals  insulin lispro (ADMELOG) corrective regimen sliding scale   SubCutaneous at bedtime  insulin regular  human recombinant 10 Unit(s) IV Push once  levothyroxine 50 MICROGram(s) Oral <User Schedule>  levothyroxine 100 MICROGram(s) Oral <User Schedule>  multivitamin 1 Tablet(s) Oral daily  pantoprazole    Tablet 40 milliGRAM(s) Oral before breakfast  simvastatin 20 milliGRAM(s) Oral at bedtime      VITAL:  T(C): , Max: 36.7 (05-26-22 @ 11:23)  T(F): , Max: 98.1 (05-27-22 @ 04:19)  HR: 61 (05-27-22 @ 04:19)  BP: 102/64 (05-27-22 @ 04:19)  BP(mean): --  RR: 18 (05-27-22 @ 04:19)  SpO2: 93% (05-27-22 @ 04:19)  Wt(kg): --    I and O's:    05-26 @ 07:01  -  05-27 @ 07:00  --------------------------------------------------------  IN: 0 mL / OUT: 250 mL / NET: -250 mL          PHYSICAL EXAM:    Constitutional: NAD  Neck:  No JVD(difficult exam)  Respiratory: poor effort   Cardiovascular: S1 and S2  Gastrointestinal: BS+, soft, NT/ND  Extremities: No peripheral edema  Neurological:    : +  Heredia  Skin: No rashes  Access: Not applicable    LABS:                        9.5    8.01  )-----------( 89       ( 27 May 2022 05:59 )             32.5     05-27    127<L>  |  89<L>  |  104<H>  ----------------------------<  151<H>  7.0<HH>   |  19<L>  |  3.12<H>    Ca    9.6      27 May 2022 05:59  Phos  7.5     05-27  Mg     2.3     05-27    TPro  7.2  /  Alb  2.8<L>  /  TBili  0.4  /  DBili  x   /  AST  134<H>  /  ALT  79<H>  /  AlkPhos  153<H>  05-27          Urine Studies:    Sodium, Random Urine: 20 mmol/L (05-26 @ 11:07)  Creatinine, Random Urine: 70 mg/dL (05-26 @ 11:07)  Protein/Creatinine Ratio Calculation: 3.7 Ratio (05-26 @ 11:07)  Osmolality, Random Urine: 317 mos/kg (05-26 @ 11:07)  Potassium, Random Urine: 36 mmol/L (05-26 @ 11:07)        RADIOLOGY & ADDITIONAL STUDIES:

## 2022-05-27 NOTE — PROGRESS NOTE ADULT - SUBJECTIVE AND OBJECTIVE BOX
SUBJECTIVE AND OBJECTIVE: Patient seen and examined, less responsive, not making eye contact.    INTERVAL HPI/OVERNIGHT EVENTS: per primary team- worsening renal failure, worsening mental status    DNR on chart:   Allergies    Bactrim (Unknown)  Flagyl (Hives; Pruritus)  Macrobid (Other)  sulfa drugs (Hives)  Zosyn (Other)    Intolerances    MEDICATIONS  (STANDING):  allopurinol 100 milliGRAM(s) Oral daily  buMETAnide Infusion 0.5 mG/Hr (2.5 mL/Hr) IV Continuous <Continuous>  cefTRIAXone   IVPB 1000 milliGRAM(s) IV Intermittent every 24 hours  dextrose 5%. 1000 milliLiter(s) (100 mL/Hr) IV Continuous <Continuous>  dextrose 5%. 1000 milliLiter(s) (50 mL/Hr) IV Continuous <Continuous>  dextrose 50% Injectable 25 Gram(s) IV Push once  dextrose 50% Injectable 12.5 Gram(s) IV Push once  dextrose 50% Injectable 25 Gram(s) IV Push once  glucagon  Injectable 1 milliGRAM(s) IntraMuscular once  insulin glargine Injectable (LANTUS) 4 Unit(s) SubCutaneous at bedtime  insulin lispro (ADMELOG) corrective regimen sliding scale   SubCutaneous three times a day before meals  insulin lispro (ADMELOG) corrective regimen sliding scale   SubCutaneous at bedtime  levothyroxine 50 MICROGram(s) Oral <User Schedule>  levothyroxine 100 MICROGram(s) Oral <User Schedule>  multivitamin 1 Tablet(s) Oral daily  pantoprazole    Tablet 40 milliGRAM(s) Oral before breakfast    MEDICATIONS  (PRN):  acetaminophen     Tablet .. 650 milliGRAM(s) Oral every 6 hours PRN Temp greater or equal to 38C (100.4F), Mild Pain (1 - 3)  dextrose Oral Gel 15 Gram(s) Oral once PRN Blood Glucose LESS THAN 70 milliGRAM(s)/deciliter  polyethylene glycol 3350 17 Gram(s) Oral daily PRN Constipation  senna 2 Tablet(s) Oral at bedtime PRN Constipation      ITEMS UNCHECKED ARE NOT PRESENT    PRESENT SYMPTOMS: [x ]Unable to obtain due to poor mentation   Source if other than patient:  [ ]Family   [ ]Team     Pain:  [ ]yes [ ]no  QOL impact -   Location -                    Aggravating factors -  Quality -  Radiation -  Timing-  Severity (0-10 scale):  Minimal acceptable level (0-10 scale):     Dyspnea:                           [ ]Mild [ ]Moderate [ ]Severe  Anxiety:                             [ ]Mild [ ]Moderate [ ]Severe  Fatigue:                             [ ]Mild [ ]Moderate [ ]Severe  Nausea:                             [ ]Mild [ ]Moderate [ ]Severe  Loss of appetite:              [ ]Mild [ ]Moderate [ ]Severe  Constipation:                    [ ]Mild [ ]Moderate [ ]Severe    CPOT:    https://www.[facility].org/getattachment/[email or web address]/Critical-Care-Pain-Observation-Tool-(CPOT)    PAIN AD Score:	0  [email or web address]/painad.pdf (Ctrl + left click to view)    Other Symptoms:  [ ]All other review of systems negative     Palliative Performance Status Version 2:      20   %      [email or web address]/files/news/palliative_performance_scale_[email or web address].pdf  PHYSICAL EXAM:  Vital Signs Last 24 Hrs  T(C): 36.3 (27 May 2022 13:26), Max: 36.7 (27 May 2022 04:19)  T(F): 97.4 (27 May 2022 13:26), Max: 98.1 (27 May 2022 04:19)  HR: 60 (27 May 2022 13:26) (60 - 61)  BP: 110/54 (27 May 2022 13:26) (102/64 - 110/55)  BP(mean): --  RR: 18 (27 May 2022 13:26) (16 - 18)  SpO2: 94% (27 May 2022 13:26) (93% - 94%) I&O's Summary    26 May 2022 07:01  -  27 May 2022 07:00  --------------------------------------------------------  IN: 0 mL / OUT: 250 mL / NET: -250 mL    27 May 2022 07:01  -  27 May 2022 15:18  --------------------------------------------------------  IN: 0 mL / OUT: 0 mL / NET: 0 mL       GENERAL:  [ ]Alert  [ ]Oriented x   [x ]Lethargic  [ ]Cachexia  [ ]Unarousable  [ ]Verbal  [ ]Non-Verbal  Behavioral:   [ ]Anxiety  [ ]Delirium [ ]Agitation [ ]Other  HEENT:  [ ]Normal   [ x]Dry mouth   [ ]ET Tube/Trach  [ ]Oral lesions  PULMONARY: even and unlabored  [ ]Clear [ ]Tachypnea  [ ]Audible excessive secretions   [ ]Rhonchi        [ ]Right [ ]Left [ ]Bilateral  [ ]Crackles        [ ]Right [ ]Left [ ]Bilateral  [ ]Wheezing     [ ]Right [ ]Left [ ]Bilateral  [ ]Diminished BS [ ] Right [ ]Left [ ]Bilateral  CARDIOVASCULAR:  on telemetry  [ ]Regular [ ]Irregular [ ]Tachy  [x ]Jerel [ ]Murmur [ ]Other  GASTROINTESTINAL:  [ x]Soft  [ ]Distended   [ ]+BS  [ x]Non tender [ ]Tender  [ ]PEG [ ]OGT/ NGT   Last BM:    GENITOURINARY:  [ ]Normal [x ]Incontinent   [ ]Oliguria/Anuria   [ x]Heredia  MUSCULOSKELETAL:   [ ]Normal   [ ]Weakness  [x ]Bed/Wheelchair bound [ x]Edema  NEUROLOGIC:   [ ]No focal deficits  [ ] Cognitive impairment  [ ] Dysphagia [ ]Dysarthria [ ] Paresis [ ]Other   SKIN:   [ ]Normal  [ ]Rash   [ ]Pressure ulcer(s) [ ]y [ ]n present on admission    CRITICAL CARE:  [ ]Shock Present  [ ]Septic [ ]Cardiogenic [ ]Neurologic [ ]Hypovolemic  [ ]Vasopressors [ ]Inotropes  [ ]Respiratory failure present [ ]Mechanical Ventilation [ ]Non-invasive ventilatory support [ ]High-Flow   [ ]Acute  [ ]Chronic [ ]Hypoxic  [ ]Hypercarbic [ ]Other  [ ]Other organ failure     LABS:                        8.8    8.24  )-----------( 188      ( 27 May 2022 11:17 )             29.8   05-27    130<L>  |  90<L>  |  104<H>  ----------------------------<  122<H>  6.3<HH>   |  22  |  3.24<H>    Ca    9.5      27 May 2022 11:17  Phos  7.2     05-27  Mg     2.3     05-27    TPro  7.2  /  Alb  2.9<L>  /  TBili  0.4  /  DBili  x   /  AST  127<H>  /  ALT  78<H>  /  AlkPhos  144<H>  05-27  PTT - ( 25 May 2022 16:17 )  PTT:85.3 sec      RADIOLOGY & ADDITIONAL STUDIES:  < from: CT Chest No Cont (05.26.22 @ 18:17) >    ACC: [id number] EXAM:  CT CHEST                          PROCEDURE DATE:  05/26/2022          INTERPRETATION:  CLINICAL INFORMATION: Right heart failure    TECHNIQUE:  A volumetric CT acquisition of the chest was obtained from   the thoracic inlet tothe upper abdomen, without the administration  of   intravenous contrast. Coronal and sagittal multiplanar reformations were   also submitted.    Comparison: 4/29/2022    FINDINGS:    Lungs/Airways/Pleura: Expiratory study with mild respiratory motion.   Increased right greater than left pleural effusions now moderate on the   right and small on the left    Mediastinum/Lymph nodes: No thoracic adenopathy.    Heart and Vessels: There is biatrial enlargement. Dual-chamber pacer   leads terminate inright atrium and right ventricle. There are coronary   artery calcifications. TAVR has been performed. The aorta is normal in   caliber.    Upper Abdomen: Left renal cysts and subcentimeter hypodensities.    Osseous structures and Soft Tissues: Diffuse qualitative osteopenia.   Stable vertebral body compression fractures including T7 and T11. There   is flank edema and mild anasarca.    IMPRESSION:    Increased right greater than left pleural effusions compared to 4/29/2022    --- End of Report ---             SHAYLEE FERREIRA M.D., Attending Radiologist  This document has been electronically signed. May 27 2022  9:53AM    < end of copied text >      Protein Calorie Malnutrition Present: [ ]mild [ ]moderate [ ]severe [ ]underweight [ ]morbid obesity  https://www.andeal.org/vault/2440/web/files/ONC/Table_Clinical%20Characteristics%20to%20Document%20Malnutrition-White%20JV%20et%20al%202012.pdf    Height (cm): 157.5 (04-29-22 @ 10:59), 157.5 (03-02-22 @ 18:50), 157.5 (12-20-21 @ 18:32)  Weight (kg): 78.9 (05-05-22 @ 08:00), 74.8 (12-20-21 @ 18:32)  BMI (kg/m2): 31.8 (05-05-22 @ 08:00), 30.2 (04-29-22 @ 10:59), 30.2 (03-02-22 @ 18:50)    [x ]PPSV2 < or = 30%  [ ]significant weight loss [ x]poor nutritional intake [ ]anasarca    [ ]Artificial Nutrition    REFERRALS:   [ ]Chaplaincy  [ ]Hospice  [ ]Child Life  [ ]Social Work  [x ]Case management [ ]Holistic Therapy     Goals of Care Document:

## 2022-05-28 NOTE — PROGRESS NOTE ADULT - NUTRITIONAL ASSESSMENT
This patient has been assessed with a concern for Malnutrition and has been determined to have a diagnosis/diagnoses of Severe protein-calorie malnutrition.    This patient is being managed with:   Diet Regular-  Consistent Carbohydrate {Evening Snack} (CSTCHOSN)  DASH/TLC {Sodium & Cholesterol Restricted} (DASH)  1500mL Fluid Restriction ([id number])  Entered: May 19 2022 10:05AM

## 2022-05-28 NOTE — PROGRESS NOTE ADULT - PROBLEM SELECTOR PROBLEM 6
INTERVAL HPI/OVERNIGHT EVENTS: fever 100.8 last night, tylenol given, vital stable this morning. off pressor since last night.    PRESSORS: [ ] YES [ x] NO  WHICH:    ANTIBIOTICS:                  DATE STARTED:  ANTIBIOTICS:                  DATE STARTED:  ANTIBIOTICS:                  DATE STARTED:    Antimicrobial:  cefepime  IVPB 1000 milliGRAM(s) IV Intermittent every 24 hours  levoFLOXacin IVPB 250 milliGRAM(s) IV Intermittent every 24 hours    Cardiovascular:  phenylephrine    Infusion 0.5 MICROgram(s)/kG/Min IV Continuous <Continuous>    Pulmonary:    Hematalogic:    Other:  acetaminophen   Tablet 650 milliGRAM(s) Oral every 6 hours PRN  acetaminophen  Suppository 650 milliGRAM(s) Rectal every 6 hours PRN  ascorbic acid 500 milliGRAM(s) Oral daily  cinacalcet 30 milliGRAM(s) Oral daily  dextrose 5%. 1000 milliLiter(s) IV Continuous <Continuous>  dextrose 50% Injectable 12.5 Gram(s) IV Push once  dextrose 50% Injectable 25 Gram(s) IV Push once  dextrose 50% Injectable 25 Gram(s) IV Push once  dextrose Gel 1 Dose(s) Oral once PRN  ferrous    sulfate Liquid 300 milliGRAM(s) Enteral Tube daily  folic acid 1 milliGRAM(s) Oral daily  glucagon  Injectable 1 milliGRAM(s) IntraMuscular once PRN  HYDROmorphone  Injectable 1 milliGRAM(s) IV Push every 4 hours PRN  insulin lispro (HumaLOG) corrective regimen sliding scale   SubCutaneous every 6 hours  levothyroxine 50 MICROGram(s) Oral daily  pantoprazole  Injectable 40 milliGRAM(s) IV Push daily  potassium chloride   Powder 40 milliEquivalent(s) Oral once  propofol Infusion 5 MICROgram(s)/kG/Min IV Continuous <Continuous>  simvastatin 20 milliGRAM(s) Oral at bedtime    acetaminophen   Tablet 650 milliGRAM(s) Oral every 6 hours PRN  acetaminophen  Suppository 650 milliGRAM(s) Rectal every 6 hours PRN  ascorbic acid 500 milliGRAM(s) Oral daily  cefepime  IVPB 1000 milliGRAM(s) IV Intermittent every 24 hours  cinacalcet 30 milliGRAM(s) Oral daily  dextrose 5%. 1000 milliLiter(s) IV Continuous <Continuous>  dextrose 50% Injectable 12.5 Gram(s) IV Push once  dextrose 50% Injectable 25 Gram(s) IV Push once  dextrose 50% Injectable 25 Gram(s) IV Push once  dextrose Gel 1 Dose(s) Oral once PRN  ferrous    sulfate Liquid 300 milliGRAM(s) Enteral Tube daily  folic acid 1 milliGRAM(s) Oral daily  glucagon  Injectable 1 milliGRAM(s) IntraMuscular once PRN  HYDROmorphone  Injectable 1 milliGRAM(s) IV Push every 4 hours PRN  insulin lispro (HumaLOG) corrective regimen sliding scale   SubCutaneous every 6 hours  levoFLOXacin IVPB 250 milliGRAM(s) IV Intermittent every 24 hours  levothyroxine 50 MICROGram(s) Oral daily  pantoprazole  Injectable 40 milliGRAM(s) IV Push daily  phenylephrine    Infusion 0.5 MICROgram(s)/kG/Min IV Continuous <Continuous>  potassium chloride   Powder 40 milliEquivalent(s) Oral once  propofol Infusion 5 MICROgram(s)/kG/Min IV Continuous <Continuous>  simvastatin 20 milliGRAM(s) Oral at bedtime    Drug Dosing Weight  Height (cm): 170 (13 Dec 2017 17:16)  Weight (kg): 57 (13 Dec 2017 17:16)  BMI (kg/m2): 19.7 (13 Dec 2017 17:16)  BSA (m2): 1.66 (13 Dec 2017 17:16)    CENTRAL LINE: [x ] YES [ ] NO  LOCATION:   DATE INSERTED:  REMOVE: [ ] YES [ ] NO  EXPLAIN:    JACOBSON: [ ] YES [x ] NO    DATE INSERTED:  REMOVE:  [ ] YES [ ] NO  EXPLAIN:    A-LINE:  [ ] YES [x ] NO  LOCATION:   DATE INSERTED:  REMOVE:  [ ] YES [ ] NO  EXPLAIN:      ICU Vital Signs Last 24 Hrs  T(C): 37.8 (21 Dec 2017 04:00), Max: 38.2 (21 Dec 2017 00:00)  T(F): 100.1 (21 Dec 2017 04:00), Max: 100.8 (21 Dec 2017 00:00)  HR: 85 (21 Dec 2017 07:00) (82 - 91)  BP: 91/50 (21 Dec 2017 07:00) (91/50 - 173/67)  BP(mean): 61 (21 Dec 2017 07:00) (57 - 95)  ABP: --  ABP(mean): --  RR: 14 (21 Dec 2017 07:00) (14 - 30)  SpO2: 100% (21 Dec 2017 07:00) (87% - 100%)      ABG - ( 21 Dec 2017 04:55 )  pH: 7.44  /  pCO2: 38    /  pO2: 67    / HCO3: 25    / Base Excess: 1.6   /  SaO2: 93                    12-20 @ 07:01  -  12-21 @ 07:00  --------------------------------------------------------  IN: 258.1 mL / OUT: 0 mL / NET: 258.1 mL        Mode: AC/ CMV (Assist Control/ Continuous Mandatory Ventilation)  RR (machine): 15  TV (machine): 400  FiO2: 50  PEEP: 8  ITime: 1  MAP: 16  PIP: 29      PHYSICAL EXAM:    GENERAL: [x]NAD, []well-groomed, []well-developed  HEAD:  [x]Atraumatic, []Normocephalic  EYES: sluggish pupillary response bilaterally, conjunctiva and sclera clear  ENMT: [x]No tonsillar erythema, exudates, or enlargement; []Moist mucous membranes, []Good dentition, []No lesions  NECK: [x]Supple, normal appearance, []No JVD; []Normal thyroid; []Trachea midline  NERVOUS SYSTEM: sedated, nonverbal, noncommunicative  CHEST/LUNG: [x]No chest deformity; []Normal percussion bilaterally; []No rales, rhonchi, wheezing   HEART: [x]Regular rate and rhythm; []No murmurs, rubs, or gallops  ABDOMEN: [x]Soft, Nontender, Nondistended; []Bowel sounds present  EXTREMITIES:  [x]2+ Peripheral Pulses, []No clubbing, cyanosis, or edema  LYMPH: [x]No lymphadenopathy noted  SKIN: [x]No rashes or lesions; []Good capillary refill      LABS:  CBC Full  -  ( 21 Dec 2017 06:32 )  WBC Count : 11.1 K/uL  Hemoglobin : 8.0 g/dL  Hematocrit : 25.6 %  Platelet Count - Automated : 101 K/uL  Mean Cell Volume : 92.0 fl  Mean Cell Hemoglobin : 28.8 pg  Mean Cell Hemoglobin Concentration : 31.4 gm/dL  Auto Neutrophil # : 8.7 K/uL  Auto Lymphocyte # : 1.3 K/uL  Auto Monocyte # : 0.6 K/uL  Auto Eosinophil # : 0.4 K/uL  Auto Basophil # : 0.0 K/uL  Auto Neutrophil % : 78.9 %  Auto Lymphocyte % : 11.7 %  Auto Monocyte % : 5.8 %  Auto Eosinophil % : 3.2 %  Auto Basophil % : 0.4 %    12-21    139  |  101  |  60<H>  ----------------------------<  171<H>  3.3<L>   |  27  |  5.18<H>    Ca    8.0<L>      21 Dec 2017 06:32  Phos  2.2     12-21  Mg     2.5     12-21      PT/INR - ( 21 Dec 2017 06:32 )   PT: 12.8 sec;   INR: 1.17 ratio         PTT - ( 21 Dec 2017 06:32 )  PTT:31.7 sec    Culture Results:   Normal Respiratory Liz present (12-18 @ 21:43)      RADIOLOGY & ADDITIONAL STUDIES REVIEWED:  yes    [ ]GOALS OF CARE DISCUSSION WITH PATIENT/FAMILY/PROXY:    CRITICAL CARE TIME SPENT: 35 minutes        Assessment and Plan:   · Assessment		  52 y/o F from [facility] ( long term care 2/2 diffculty walking possibly 2/2 polyneuropathy) w/ PMHx of anemia, CHF( diastolic dysfunction), CKD, Clostridium difficile infection, DM, Diabetic neuropathy, HTN, HLD, Hypothyroidism, Hypoparathyroidism, Osteomyelitis of jaw, and Parathyroid adenoma, GERD p/w c/o SOB and difficulty breathing x1 week. Pt also reports associated mild cough. Pt denies fever, chills, general pain, C/P, or any other complaints. NO  In ED pt is Awake , responds to pain and verbal stimuli, She in moderate respiratory distress. placed on BIPAP. ICU consulted for new BIPAP.    Admit to ICU for Acute Hypoxic and hypercapnic respiratory failure require new Bipap  2/2 Exacerbation of CHF or possibly pneumonia      Problem/Plan - 1:  ·  Problem: Acute respiratory failure.  Plan: Inubated 12/16. Pt was hypoxic on BiPAP,  failed NIPPV support  Acute respiratory failure secondary to HAP (elevated procalcitonin) and fluid overload  -on Lasix 80 Daily  -continue vent support  -HD yesterday, may trial SAT/ SBT    -c/w  maxipime 1 gm iv q24 hrs and started levaquin 250mgs iv q24hrs on 12/19 per ID  -pt need permcath  as per Dr. Escoto   -called IR for permacath today       Problem/Plan - 2:  ·  Problem: R/O Upper GI bleed.  Plan: c/w protonix, no recurrent GI Bleed.  s/p 1 PRBC, hb 8.2 after transfusion  EGD showed duodenal ulcer, no active bleeding, gastritis and esophagitis.  f/u biopsy report  monitor CBC  c/w protonix 40mg, daily.       Problem/Plan - 3:  ·  Problem: Hypothyroidism.  Plan: c/w home dose LT4 50 mcg daily  TSH wnl.      Problem/Plan - 4:  ·  Problem: CHF (congestive heart failure).  Plan: unknown EF  c/w home cardiac meds   TTE - RV systolic pressure is 49 mm Hg. Mild pulmonary  hypertension.      Problem/Plan - 5:  ·  Problem: CKD (chronic kidney disease).  Plan: getting HD via [facility].  -pt need permcath  as per Dr. Escoto   -called IR for permacath today        Problem/Plan - 6:  Problem: Diabetes mellitus. Plan: c/w Lantus 10 units daily , HSS   HbA1c 5.2.     Problem/Plan - 7:  ·  Problem: HTN (hypertension).  Plan: c/w home BP meds.      Problem/Plan - 8:  ·  Problem: Prophylactic measure.  Plan: c/w DVT ppx   IMPROVE score - 2. Type 2 diabetes mellitus

## 2022-05-28 NOTE — PROGRESS NOTE ADULT - PROBLEM SELECTOR PLAN 9
Home meds: Continue home Allopurinol 100mg daily, Simvastatin 20mg qhs, Protonix 40mg daily   DVT ppx: no AC iso Hgb drop  Diet: DASH/Renal   Dispo: Possible palliative after we discussed with family  DNR/DNI Home meds: Continue home Allopurinol 100mg daily, Simvastatin 20mg qhs, Protonix 40mg daily   DVT ppx: no AC iso Hgb drop  Diet: DASH/Renal   Dispo: Palliative Care Unit   DNR/DNI

## 2022-05-28 NOTE — PROGRESS NOTE ADULT - NUTRITIONAL ASSESSMENT
This patient has been assessed with a concern for Malnutrition and has been determined to have a diagnosis/diagnoses of Severe protein-calorie malnutrition.    This patient is being managed with:   Diet Regular-  Consistent Carbohydrate {Evening Snack} (CSTCHOSN)  DASH/TLC {Sodium & Cholesterol Restricted} (DASH)  1500mL Fluid Restriction ([id number])  Entered: May 19 2022 10:05AM

## 2022-05-28 NOTE — PROGRESS NOTE ADULT - ATTENDING COMMENTS
This is a 89F Hx HFpEF (EF 75% 03/2022), Afib (on Eliquis), HTN, DM2, CKD3, hypothyroidism, AS s/p TAVR (09/2021), s/p PPM BIBEMS for CHF exacerbation, transitioned to PO, now with decompensation on 5/24, back on IV diuretics    1. ADHF:  on  bumex 2mf iv q 12hrs with poor urine output. On O2, holding BB per HF recs. Aappears comfortable  Given her status, has a discussion on GOC. At this point she is on comfort care    2. MARICARMEN; Cr 1.64-->1.98-->2.35-->2.48->3-->3.24.  Likely 2/2 cardiorenal, on diuresis . Renal following, no plan for HD.     3. Hyperkalemia: on comfort care now. Palliative evaluated.     4. DNR/DNI: pt filled out MOLST form while she was a & ox3 and had full capacity. Family decided on comfort care at this point

## 2022-05-28 NOTE — PROGRESS NOTE ADULT - PROBLEM SELECTOR PLAN 1
Pt had a worsening of her HF last night. Pleural effusions appreciated on CT chest  - HFpEF (EF 75% in 03/2022), AS s/p TAVR (09/2021). Pt desatting to 80s w/o ambulation, pt improved to 92 with 4L NC  - pt's BP low today --> stop BB and BP meds  -cont bumex drip   - TTE shows EF 65% with mod pulm HTN and mod TR (no significant change from prior)  -repeat TTE unchanged   - Strict I/Os, daily weights; Appreciate cards recs    #hyperkalemia  -pt not with minimal urine output despite cont'd diuresis  -pt with worsening potassium burden but not a candidate for hemodialysis or lokalma as per nephrology   -cont with insulin pushes as indicated  -ctm will cont bumex IVpushes as this will help with pt comfort.     -daily labs will no longer be drawn

## 2022-05-28 NOTE — PROGRESS NOTE ADULT - SUBJECTIVE AND OBJECTIVE BOX
DATE OF SERVICE: 05-28-22 @ 13:50    Patient is a 89y old  Female who presents with a chief complaint of SOB (28 May 2022 09:10)      INTERVAL HISTORY: Arousable yet confused. Private aid at beside.     REVIEW OF SYSTEMS: Non-contributory  CONSTITUTIONAL: No weakness  EYES/ENT: No visual changes;  No throat pain   NECK: No pain or stiffness  RESPIRATORY: No cough, wheezing; No shortness of breath  CARDIOVASCULAR: No chest pain or palpitations  GASTROINTESTINAL: No abdominal  pain. No nausea, vomiting, or hematemesis  GENITOURINARY: No dysuria, frequency or hematuria  NEUROLOGICAL: No stroke like symptoms  SKIN: No rashes    TELEMETRY Personally reviewed: A- Paced at 59 bpm, PVCs  	  MEDICATIONS:  buMETAnide Injectable 2 milliGRAM(s) IV Push two times a day        PHYSICAL EXAM:  T(C): 36.2 (05-28-22 @ 04:25), Max: 36.2 (05-28-22 @ 04:25)  HR: 60 (05-28-22 @ 04:25) (60 - 60)  BP: 106/57 (05-28-22 @ 04:25) (106/57 - 106/57)  RR: 18 (05-28-22 @ 04:25) (18 - 18)  SpO2: 94% (05-28-22 @ 04:25) (94% - 94%)  Wt(kg): --  I&O's Summary    27 May 2022 07:01  -  28 May 2022 07:00  --------------------------------------------------------  IN: 0 mL / OUT: 0 mL / NET: 0 mL          Appearance: In no distress	  HEENT:    PERRL, EOMI	  Cardiovascular:  S1 S2, No JVD  Respiratory: Lungs clear to auscultation	  Gastrointestinal:  Soft, Non-tender, + BS	  Vascularature:  + LE edema  Psychiatric: Appropriate affect   Neuro: no acute focal deficits                               8.8    8.24  )-----------( 188      ( 27 May 2022 11:17 )             29.8     05-27    130<L>  |  90<L>  |  104<H>  ----------------------------<  122<H>  6.3<HH>   |  22  |  3.24<H>    Ca    9.5      27 May 2022 11:17  Phos  7.2     05-27  Mg     2.3     05-27    TPro  7.2  /  Alb  2.9<L>  /  TBili  0.4  /  DBili  x   /  AST  127<H>  /  ALT  78<H>  /  AlkPhos  144<H>  05-27        Labs personally reviewed      ASSESSMENT/PLAN: 	    Ms. Briones is a 89 yo female with PMH of HTN, HLD, CAD s/p CABG, AS s/p TAVR in 9/2021 at [facility], DM, CKD, AF on Eliquis, PPM who presents with 3 days of progressive shortness of breath, + orthopnea, increased LE edema and decreased oxygen saturation on pulse oximeter at home.    Problem/Plan -1  Problem: HFpEF  - Followed by Dr. Agapito Woodard for outpatient cardiology  - CT Chest reveals moderate right and small left pleural effusions, cardiomegaly, possibly superimposed consolidation  - On lasix 40mg PO daily at home  - BMP reviewed from OP cardiologist records on 12/3/21 it was 1.1, and 5 months ago it was 1.2-1.4, here 2 months ago it was 1.4-1.6  - ECHO Left Ventricle: Normal left ventricular systolic function. No segmental wall motion abnormalities.  The LVEF= 60-65%. Moderate mitral regurgitation, There is a transcatheter aortic valve replacement seen. Moderate pulmonary hypertension.  - Patient appears close to euvolemia, but not euvolemic yet, although much improved since admission,   - 5/3 POCUS reveals Right pleural effusion  - BNP only slightly down (8208 --> 7983)  - CXR shows much improved pulm vasc congestion  - Pt does not yet appear euvolemic but will continue diuresing   - pt reports hx of Lymphedema and says her legs have been chronically edematous but doesnt use stockings   - Transitioned to Bumex 2mg PIV push BID as again hypoxic overnight with congestion  - HF consulted rec starting Bumex gtt @ 0.5 mg/hr, would discontinue Lopressor at this time  - 5/27 Pt now confused and disoriented, oliguric despite Bumex gtt with electrolyte imbalances. Pall care c/s. Patient DNR/DNI.   - Transitioned to Bumex 2mg IVP BID.  - Palliative care supported family. Would like to move to comfort measures.     Problem/Plan -2  Problem: Aortic Stenosis  - s/p TAVR in September 2021  - minimal murmur on ausculation  - aortic valve is well seated and has good flow  - moderate tricuspid regurg    Problem/Plan -3  Problem: CAD  - Hx CABG at [facility]  - currently denies chest pain  - EKG without ischemic changes  - Troponin elevated likely i/s/o ADHF exacerbation; peaked 4/29 at 124  - c/w simvastatin 20mg PO daily    Problem/Plan -4  Problem: AF  - rate controlled  - resume on Eliquis per primary   - Hgb  now 8.7 was 6.5 5/25  - monitor on tele  - AC currently on hold    Problem/Plan -5  Problem: HLD  - c/w simvastatin     Problem/Plan -6  Problem: HTN   - BP Soft   - Amlodipine d/c;d   - metoprolol d/c'd per HF rec and soft BP   - now on Bumex     Problem/Plan -7  Problem: MARICARMEN on CKD  - Renal following  - Pt with  hyponatremia,  hyperphospatemia and hyperkalemia 5/27  - c/w calcium gluconate         HERBER Mix-NP   Giles Gan DO [facility]  Cardiovascular Medicine  [address] [place] [address], [address]  Office: [phone]  Cell: [phone]

## 2022-05-28 NOTE — PROGRESS NOTE ADULT - SUBJECTIVE AND OBJECTIVE BOX
NEPHROLOGY     Patient seen and examined with grandson at bedside, pt lethargic, not sob, comfortable on NC, appears in no acute distress.     MEDICATIONS  (STANDING):  buMETAnide Injectable 2 milliGRAM(s) IV Push two times a day    VITALS:  T(C): , Max: 36.3 (05-27-22 @ 13:26)  T(F): , Max: 97.4 (05-27-22 @ 13:26)  HR: 60 (05-28-22 @ 04:25)  BP: 106/57 (05-28-22 @ 04:25)  RR: 18 (05-28-22 @ 04:25)  SpO2: 94% (05-28-22 @ 04:25)    I and O's:    05-27 @ 07:01  -  05-28 @ 07:00  --------------------------------------------------------  IN: 0 mL / OUT: 0 mL / NET: 0 mL    PHYSICAL EXAM:  Constitutional: NAD  Neck:  No JVD(difficult exam)  Respiratory: poor effort   Cardiovascular: S1 and S2  Gastrointestinal: BS+, soft, NT/ND  Extremities: No peripheral edema  Neurological:    : +  Heredia  Skin: No rashes    LABS:                        8.8    8.24  )-----------( 188      ( 27 May 2022 11:17 )             29.8     05-27    130<L>  |  90<L>  |  104<H>  ----------------------------<  122<H>  6.3<HH>   |  22  |  3.24<H>    Ca    9.5      27 May 2022 11:17  Phos  7.2     05-27  Mg     2.3     05-27    TPro  7.2  /  Alb  2.9<L>  /  TBili  0.4  /  DBili  x   /  AST  127<H>  /  ALT  78<H>  /  AlkPhos  144<H>  05-27

## 2022-05-28 NOTE — PROGRESS NOTE ADULT - SUBJECTIVE AND OBJECTIVE BOX
PROGRESS NOTE:   Authored By: Vika Camargo MD     PGY-1, Internal Medicine  Pager: [phone], [phone]    Patient is a 89y old  Female who presents with a chief complaint of SOB (27 May 2022 21:06)      OVERNIGHT EVENTS: No acute events overnight    SUBJECTIVE: Pt seen and examined at bedside this morning.     ADDITIONAL REVIEW OF SYSTEMS:    MEDICATIONS  (STANDING):  buMETAnide Injectable 2 milliGRAM(s) IV Push two times a day    MEDICATIONS  (PRN):  bisacodyl Suppository 10 milliGRAM(s) Rectal daily PRN Constipation  HYDROmorphone  Injectable 0.5 milliGRAM(s) IV Push every 2 hours PRN Moderate pain (4-6), Severe pain (7-10), Respiratory rate greater than 22  LORazepam   Injectable 0.5 milliGRAM(s) IV Push every 4 hours PRN Anxiety  ondansetron Injectable 4 milliGRAM(s) IV Push every 6 hours PRN Nausea and/or Vomiting      CAPILLARY BLOOD GLUCOSE      POCT Blood Glucose.: 165 mg/dL (27 May 2022 17:24)  POCT Blood Glucose.: 105 mg/dL (27 May 2022 11:50)    I&O's Summary    27 May 2022 07:01  -  28 May 2022 07:00  --------------------------------------------------------  IN: 0 mL / OUT: 0 mL / NET: 0 mL        PHYSICAL EXAM:  Vital Signs Last 24 Hrs  T(C): 36.2 (28 May 2022 04:25), Max: 36.3 (27 May 2022 13:26)  T(F): 97.2 (28 May 2022 04:25), Max: 97.4 (27 May 2022 13:26)  HR: 60 (28 May 2022 04:25) (60 - 60)  BP: 106/57 (28 May 2022 04:25) (106/57 - 110/54)  BP(mean): --  RR: 18 (28 May 2022 04:25) (18 - 18)  SpO2: 94% (28 May 2022 04:25) (94% - 94%)    CONSTITUTIONAL: NAD, well-developed  HEENT: NC/AT, EOMI  RESPIRATORY: No increased work of breathing, CTAB, no wheezes or crackles appreciated  CARDIOVASCULAR: RRR, S1 and S2 present, no m/r/g  ABDOMEN: soft, NT, ND, bowel sounds present  EXTREMITIES: No LE edema  MUSCULOSKELETAL: no joint swelling or tenderness to palpation  NEURO: A&Ox3, moving all extremities    LABS:                        8.8    8.24  )-----------( 188      ( 27 May 2022 11:17 )             29.8     05-27    130<L>  |  90<L>  |  104<H>  ----------------------------<  122<H>  6.3<HH>   |  22  |  3.24<H>    Ca    9.5      27 May 2022 11:17  Phos  7.2     05-27  Mg     2.3     05-27    TPro  7.2  /  Alb  2.9<L>  /  TBili  0.4  /  DBili  x   /  AST  127<H>  /  ALT  78<H>  /  AlkPhos  144<H>  05-27                RADIOLOGY & ADDITIONAL TESTS:    Results Reviewed:   Imaging Personally Reviewed:  Electrocardiogram Personally Reviewed:    COORDINATION OF CARE:  Care Discussed with Consultants/Other Providers [Y/N]:  Prior or Outpatient Records Reviewed [Y/N]:   PROGRESS NOTE:   Authored By: Vika Camargo MD     PGY-1, Internal Medicine  Pager: [phone], [phone]    Patient is a 89y old  Female who presents with a chief complaint of SOB (27 May 2022 21:06)      OVERNIGHT EVENTS: No acute events overnight    SUBJECTIVE: Pt seen and examined at bedside this morning. Pt very somnolent this AM.     ADDITIONAL REVIEW OF SYSTEMS:    MEDICATIONS  (STANDING):  buMETAnide Injectable 2 milliGRAM(s) IV Push two times a day    MEDICATIONS  (PRN):  bisacodyl Suppository 10 milliGRAM(s) Rectal daily PRN Constipation  HYDROmorphone  Injectable 0.5 milliGRAM(s) IV Push every 2 hours PRN Moderate pain (4-6), Severe pain (7-10), Respiratory rate greater than 22  LORazepam   Injectable 0.5 milliGRAM(s) IV Push every 4 hours PRN Anxiety  ondansetron Injectable 4 milliGRAM(s) IV Push every 6 hours PRN Nausea and/or Vomiting      CAPILLARY BLOOD GLUCOSE      POCT Blood Glucose.: 165 mg/dL (27 May 2022 17:24)  POCT Blood Glucose.: 105 mg/dL (27 May 2022 11:50)    I&O's Summary    27 May 2022 07:01  -  28 May 2022 07:00  --------------------------------------------------------  IN: 0 mL / OUT: 0 mL / NET: 0 mL        PHYSICAL EXAM:  Vital Signs Last 24 Hrs  T(C): 36.2 (28 May 2022 04:25), Max: 36.3 (27 May 2022 13:26)  T(F): 97.2 (28 May 2022 04:25), Max: 97.4 (27 May 2022 13:26)  HR: 60 (28 May 2022 04:25) (60 - 60)  BP: 106/57 (28 May 2022 04:25) (106/57 - 110/54)  BP(mean): --  RR: 18 (28 May 2022 04:25) (18 - 18)  SpO2: 94% (28 May 2022 04:25) (94% - 94%)    CONSTITUTIONAL: laying flat in bed, lethargic, refusing breakfast   HEENT: NC/AT, EOMI  RESPIRATORY: No increased work of breathing, pt on NC, lungs with crackles  CARDIOVASCULAR: RRR, S1 and S2 present, diastolic murmur minimally appreciated  ABDOMEN: soft, NT, ND, bowel sounds present  EXTREMITIES: edematous, abrasion of left lower leg wrapped in gauze,   MUSCULOSKELETAL: no joint swelling or tenderness to palpation  NEURO: A&Ox1, moving all extremities    LABS:                        8.8    8.24  )-----------( 188      ( 27 May 2022 11:17 )             29.8     05-27    130<L>  |  90<L>  |  104<H>  ----------------------------<  122<H>  6.3<HH>   |  22  |  3.24<H>    Ca    9.5      27 May 2022 11:17  Phos  7.2     05-27  Mg     2.3     05-27    TPro  7.2  /  Alb  2.9<L>  /  TBili  0.4  /  DBili  x   /  AST  127<H>  /  ALT  78<H>  /  AlkPhos  144<H>  05-27                RADIOLOGY & ADDITIONAL TESTS:    Results Reviewed:   Imaging Personally Reviewed:  Electrocardiogram Personally Reviewed:    COORDINATION OF CARE:  Care Discussed with Consultants/Other Providers [Y/N]:  Prior or Outpatient Records Reviewed [Y/N]:

## 2022-05-28 NOTE — PROGRESS NOTE ADULT - ASSESSMENT
ASSESSMENT/PLAN:  89yo F with PMH of HFpEF (EF 75% 03/2022), Afib (on Eliquis), HTN, DM2, CKD4, hypothyroidism, AS s/p TAVR (09/2021), s/p PPM BIBEMS for hypoxia  Lucita on top of underlying CKD stage 4 and now oliguria   Anemia  Nephrotic syndrome   Hyperphosphatemia and now hyperkalemia as of late, no new labs   Diastolic heart failure with moderate MR;    B/L effuisons and  drop in oxygenation   Change in mental status and confusion   Hepatitis     1 Renal- She has CKD stage 4 at baseline and  the predominance of proteinuria will ensue that slight changes in her volume status will have exaggerated responses with respect the kidneys.  She clearly has LE edema but the skin is not thick(as in lymphedema) and this is likely third spacing from nephrotic/subnephrotic range proteinuria   Bumex gtt change to Bumex 2mg IV bid   She is at the extremes of age and will not be offered HD or any form of renal replacement  S/p hyperkalemia protocol yesterday  (VBG potassium was 5.9)  2 Anemia-Can check iron stores and assess for iron or Epo products  3 GI-Trend LFT   4 ID- now off IV abx   5 Pulm-Chest CT with large right effusion.  Would drainage for palliation purposes be an option?  6. Palliative following - GOC - comfort measures

## 2022-05-28 NOTE — PROGRESS NOTE ADULT - PROBLEM SELECTOR PLAN 2
pt's UA worsened s/p HF decompensation. Pt w/o symptoms of UTI    -will treat empirically with CTX pt's UA worsened s/p HF decompensation. Pt w/o symptoms of UTI    -will treat empirically with CTX for one more day

## 2022-05-28 NOTE — PROGRESS NOTE ADULT - ASSESSMENT
89F Hx HFpEF (EF 75% 03/2022), Afib (on Eliquis), HTN, DM2, CKD3, hypothyroidism, AS s/p TAVR (09/2021), s/p PPM BIBEMS for CHF exacerbation. Pt with worsening mental status, HF improving but not optimized, urine output minimal and function worsening. Not a candidate for kidney temporizing measures Discussed palliative care with the pt's  this afternoon, and he is contemplating this with the family.  89F Hx HFpEF (EF 75% 03/2022), Afib (on Eliquis), HTN, DM2, CKD3, hypothyroidism, AS s/p TAVR (09/2021), s/p PPM BIBEMS for CHF exacerbation. Pt with worsening mental status, HF improving but not optimized, urine output minimal and function worsening. Not a candidate for kidney temporizing measures Pt is currently comfort care and pending placement in the palliative care unit.

## 2022-05-29 NOTE — PROGRESS NOTE ADULT - SUBJECTIVE AND OBJECTIVE BOX
GAP TEAM PALLIATIVE CARE UNIT PROGRESS NOTE:      [  ] Patient on hospice program.    INDICATION FOR PALLIATIVE CARE UNIT SERVICES/Interval HPI:    OVERNIGHT EVENTS:    DNR on chart: Yes  Yes      Allergies    Bactrim (Unknown)  Flagyl (Hives; Pruritus)  Macrobid (Other)  sulfa drugs (Hives)  Zosyn (Other)    Intolerances    MEDICATIONS  (STANDING):  buMETAnide Injectable 2 milliGRAM(s) IV Push two times a day  nystatin Powder 1 Application(s) Topical every 8 hours    MEDICATIONS  (PRN):  acetaminophen  Suppository .. 650 milliGRAM(s) Rectal every 6 hours PRN Temp greater or equal to 38C (100.4F), Mild Pain (1 - 3)  bisacodyl Suppository 10 milliGRAM(s) Rectal daily PRN Constipation  HYDROmorphone  Injectable 0.2 milliGRAM(s) IV Push every 1 hour PRN Moderate Pain (4 - 6)  HYDROmorphone  Injectable 0.5 milliGRAM(s) IV Push every 1 hour PRN dyspnea  HYDROmorphone  Injectable 0.5 milliGRAM(s) IV Push every 2 hours PRN Severe Pain (7 - 10)  LORazepam   Injectable 0.5 milliGRAM(s) IV Push every 4 hours PRN Anxiety  ondansetron Injectable 4 milliGRAM(s) IV Push every 6 hours PRN Nausea and/or Vomiting    ITEMS UNCHECKED ARE NOT PRESENT    PRESENT SYMPTOMS: [ ]Unable to self-report  [ ]PAINADs [ ]RDOS  Source if other than patient:  [ ]Family   [ ]Team     Pain: [ ] yes [ ] no  QOL impact -   Location -                    Aggravating factors -  Quality -  Radiation -  Timing-  Severity (0-10 scale):  Minimal acceptable level (0-10 scale):     PAINAD Score:  http://geriatrictoolkit.[email or web address].[facility]/cog/painad.pdf (Ctrl +  left click to view)    Dyspnea:                           [ ]Mild [ ]Moderate [ ]Severe    RDOS:  0 to 2  minimal or no respiratory distress   3  mild distress  4 to 6 moderate distress  >7 severe distress  https://[email or web address].[email or web address]/handouts/hen/Respiratory_Distress_Observation_Scale.pdf (Ctrl +  left click to view)     Anxiety:                             [ ]Mild [ ]Moderate [ ]Severe  Fatigue:                             [ ]Mild [ ]Moderate [ ]Severe  Nausea:                             [ ]Mild [ ]Moderate [ ]Severe  Loss of appetite:              [ ]Mild [ ]Moderate [ ]Severe  Constipation:                    [ ]Mild [ ]Moderate [ ]Severe  		  Other Symptoms:  [ ]All other review of systems negative     Palliative Performance Status Version 2:         %         http://npcrc.org/files/news/palliative_performance_scale_ppsv2.pdf  PHYSICAL EXAM:   Vital Signs Last 24 Hrs  T(C): 36.6 (29 May 2022 09:01), Max: 36.6 (29 May 2022 09:01)  T(F): 97.9 (29 May 2022 09:01), Max: 97.9 (29 May 2022 09:01)  HR: 60 (29 May 2022 09:01) (60 - 60)  BP: 101/45 (29 May 2022 09:01) (101/45 - 110/53)  BP(mean): --  RR: 18 (29 May 2022 09:01) (18 - 18)  SpO2: 90% (29 May 2022 09:01) (90% - 93%) I&O's Summary    28 May 2022 07:01  -  29 May 2022 07:00  --------------------------------------------------------  IN: 0 mL / OUT: 550 mL / NET: -550 mL      GENERAL: [ ] Cachexia  [ ]Alert  [ ]Oriented x   [ ]Lethargic  [ ]Unarousable  [ ]Verbal  [ ]Non-Verbal  Behavioral:   [ ] Anxiety  [ ] Delirium [ ] Agitation [ ] Other  HEENT:  [ ]Normal   [ ]Dry mouth   [ ]ET Tube/Trach  [ ]Oral lesions  PULMONARY:   [ ]Clear [ ]Tachypnea  [ ]Audible excessive secretions   [ ]Rhonchi        [ ]Right [ ]Left [ ]Bilateral  [ ]Crackles        [ ]Right [ ]Left [ ]Bilateral  [ ]Wheezing     [ ]Right [ ]Left [ ]Bilateral  [ ]Diminished BS [ ]Right [ ]Left [ ]Bilateral    CARDIOVASCULAR:    [ ]Regular [ ]Irregular [ ]Tachy  [ ]Jerel [ ]Murmur [ ]Other  GASTROINTESTINAL:  [ ]Soft  [ ]Distended   [ ]+BS  [ ]Non tender [ ]Tender  [ ]Other [ ]PEG [ ]OGT/ NGT   Last BM:    GENITOURINARY:  [ ]Normal [ ] Incontinent   [ ]Oliguria/Anuria   [ ]Heredia  MUSCULOSKELETAL:   [ ]Normal   [ ]Weakness  [ ]Bed/Wheelchair bound [ ]Edema  NEUROLOGIC:   [ ]No focal deficits  [ ] Cognitive impairment  [ ] Dysphagia [ ]Dysarthria [ ] Paresis [ ]Other   SKIN:   [ ]Normal  [ ]Rash  [ ]Other  [ ]Pressure ulcer(s)  [ ]y [ ]n  Present on admission      CRITICAL CARE:  [ ] Shock Present  [ ]Septic [ ]Cardiogenic [ ]Neurologic [ ]Hypovolemic  [ ]  Vasopressors [ ]  Inotropes   [ ] Respiratory failure present [ ] Mechanical Ventilation [ ] Non-invasive ventilatory support [ ] High-Flow  [ ] Acute  [ ] Chronic [ ] Hypoxic  [ ] Hypercarbic [ ] Other  [ ] Other organ failure     LABS:            RADIOLOGY & ADDITIONAL STUDIES:    PROTEIN CALORIE MALNUTRITION: [ ] mild [ ] moderate [ ] severe  [ ] underweight [ ] morbid obesity    https://www.andeal.org/vault/2440/web/files/ONC/Table_Clinical%20Characteristics%20to%20Document%20Malnutrition-White%20JV%20et%20al%048908.pdf    Height (cm): 157.5 (04-29-22 @ 10:59), 157.5 (03-02-22 @ 18:50), 157.5 (12-20-21 @ 18:32)  Weight (kg): 78.9 (05-05-22 @ 08:00), 74.8 (12-20-21 @ 18:32)  BMI (kg/m2): 31.8 (05-05-22 @ 08:00), 30.2 (04-29-22 @ 10:59), 30.2 (03-02-22 @ 18:50)    [ ] PPSV2 < or = 30% [ ] significant weight loss [ ] poor nutritional intake [ ] anasarca   Artificial Nutrition [ ]     REFERRALS:   [ ]Chaplaincy  [ ] Hospice  [ ]Child Life  [ ]Social Work  [ ]Case management [ ]Holistic Therapy [ ] Physical Therapy [ ] Dietary   Goals of Care Document:     Please do not hesitate to reach out to us either via Microsoft Teams or pager [phone] for further details or queries.    More Escamilla PGY-5  Geriatric and Palliative Care Medicine Fellow   GAP TEAM PALLIATIVE CARE UNIT PROGRESS NOTE:      [  ] Patient on hospice program.    INDICATION FOR PALLIATIVE CARE UNIT SERVICES/Interval HPI: symptom focused care in the setting of end stage heart failure with secondary renal failure with worsening encephalopathy    OVERNIGHT EVENTS: Pt required no PRN medications for symptom management in 24 hrs. Per dtr Claudia, patient barely opening her eyes yesterday evening. Aide was by bedside, collateral information obtained. Grandsons (Claudia's sons) were in the room this AM, patient able to open her eyes and moan. Did not appear to be in any distress. Questions and concerns addressed.     DNR on chart: Yes  Yes      Allergies    Bactrim (Unknown)  Flagyl (Hives; Pruritus)  Macrobid (Other)  sulfa drugs (Hives)  Zosyn (Other)    Intolerances    MEDICATIONS  (STANDING):  buMETAnide Injectable 2 milliGRAM(s) IV Push two times a day  nystatin Powder 1 Application(s) Topical every 8 hours    MEDICATIONS  (PRN):  acetaminophen  Suppository .. 650 milliGRAM(s) Rectal every 6 hours PRN Temp greater or equal to 38C (100.4F), Mild Pain (1 - 3)  bisacodyl Suppository 10 milliGRAM(s) Rectal daily PRN Constipation  HYDROmorphone  Injectable 0.2 milliGRAM(s) IV Push every 1 hour PRN Moderate Pain (4 - 6)  HYDROmorphone  Injectable 0.5 milliGRAM(s) IV Push every 1 hour PRN dyspnea  HYDROmorphone  Injectable 0.5 milliGRAM(s) IV Push every 2 hours PRN Severe Pain (7 - 10)  LORazepam   Injectable 0.5 milliGRAM(s) IV Push every 4 hours PRN Anxiety  ondansetron Injectable 4 milliGRAM(s) IV Push every 6 hours PRN Nausea and/or Vomiting    ITEMS UNCHECKED ARE NOT PRESENT    PRESENT SYMPTOMS: [x ]Unable to self-report  [x ]PAINADs [x ]RDOS  Source if other than patient:  [x ]Family   [x ]Team     Pain: [ ] yes [x ] no  QOL impact -   Location -                    Aggravating factors -  Quality -  Radiation -  Timing-  Severity (0-10 scale):  Minimal acceptable level (0-10 scale):     PAINAD Score: 0  http://geriatrictoolkit.[place].[facility]/cog/painad.pdf (Ctrl +  left click to view)    Dyspnea:               no            [ ]Mild [ ]Moderate [ ]Severe    RDOS: 0  0 to 2  minimal or no respiratory distress   3  mild distress  4 to 6 moderate distress  >7 severe distress  https://[email or web address].net/handouts/hen/Respiratory_Distress_Observation_Scale.pdf (Ctrl +  left click to view)     Anxiety:                             [ ]Mild [ ]Moderate [ ]Severe  Fatigue:                             [ ]Mild [ ]Moderate [ ]Severe  Nausea:                             [ ]Mild [ ]Moderate [ ]Severe  Loss of appetite:              [ ]Mild [ ]Moderate [ ]Severe  Constipation:                    [ ]Mild [ ]Moderate [ ]Severe  		  Other Symptoms:  [ ]All other review of systems negative     Palliative Performance Status Version 2:  10    %         http://[facility].org/files/news/palliative_performance_scale_ppsv2.pdf  PHYSICAL EXAM:   Vital Signs Last 24 Hrs  T(C): 36.6 (29 May 2022 09:01), Max: 36.6 (29 May 2022 09:01)  T(F): 97.9 (29 May 2022 09:01), Max: 97.9 (29 May 2022 09:01)  HR: 60 (29 May 2022 09:01) (60 - 60)  BP: 101/45 (29 May 2022 09:01) (101/45 - 110/53)  BP(mean): --  RR: 18 (29 May 2022 09:01) (18 - 18)  SpO2: 90% (29 May 2022 09:01) (90% - 93%) I&O's Summary    28 May 2022 07:01  -  29 May 2022 07:00  --------------------------------------------------------  IN: 0 mL / OUT: 550 mL / NET: -550 mL      GENERAL: [ x] Cachexia  [ ]Alert  [ ]Oriented x   [ ]Lethargic  [x ]Unarousable  [ ]Verbal  [ ]Non-Verbal  Behavioral:   [ ] Anxiety  [ ] Delirium [ ] Agitation [ ] Other  HEENT:  [ ]Normal   [ ]Dry mouth   [ ]ET Tube/Trach  [ ]Oral lesions  PULMONARY:   [ ]Clear [ ]Tachypnea  [ ]Audible excessive secretions   [ ]Rhonchi        [ ]Right [ ]Left [ ]Bilateral  [ ]Crackles        [ ]Right [ ]Left [ ]Bilateral  [ ]Wheezing     [ ]Right [ ]Left [ ]Bilateral  [x ]Diminished BS [ ]Right [ ]Left [x ]Bilateral    CARDIOVASCULAR:    [x ]Regular [ ]Irregular [ ]Tachy  [ ]Jerel [ ]Murmur [ ]Other  GASTROINTESTINAL:  [ x]Soft  [ ]Distended   [x ]+BS  [x ]Non tender [ ]Tender  [ ]Other [ ]PEG [ ]OGT/ NGT   Last BM:    GENITOURINARY:  [ ]Normal [ ] Incontinent   [ ]Oliguria/Anuria   [ ]Heredia  MUSCULOSKELETAL:   [ ]Normal   [ ]Weakness  [x ]Bed/Wheelchair bound [ ]Edema  NEUROLOGIC:   [ ]No focal deficits  [ ] Cognitive impairment  [ ] Dysphagia [ ]Dysarthria [ ] Paresis [ ]Other   SKIN:   [ ]Normal  [ ]Rash  [x ]Other, right foot heel eschar  [ ]Pressure ulcer(s)  [ ]y [ ]n  Present on admission      CRITICAL CARE:  [ ] Shock Present  [ ]Septic [ ]Cardiogenic [ ]Neurologic [ ]Hypovolemic  [ ]  Vasopressors [ ]  Inotropes   [ ] Respiratory failure present [ ] Mechanical Ventilation [ ] Non-invasive ventilatory support [ ] High-Flow  [x ] Acute  [ ] Chronic [ x] Hypoxic  [ ] Hypercarbic [ ] Other  [x ] Other organ failure  - renal, skin    LABS: none new            RADIOLOGY & ADDITIONAL STUDIES: none new    PROTEIN CALORIE MALNUTRITION: [ ] mild [ ] moderate [ ] severe  [ ] underweight [ ] morbid obesity    https://www.andeal.org/vault/2440/web/files/ONC/Table_Clinical%20Characteristics%20to%20Document%20Malnutrition-White%20JV%20et%20al%202012.pdf    Height (cm): 157.5 (04-29-22 @ 10:59), 157.5 (03-02-22 @ 18:50), 157.5 (12-20-21 @ 18:32)  Weight (kg): 78.9 (05-05-22 @ 08:00), 74.8 (12-20-21 @ 18:32)  BMI (kg/m2): 31.8 (05-05-22 @ 08:00), 30.2 (04-29-22 @ 10:59), 30.2 (03-02-22 @ 18:50)    [x ] PPSV2 < or = 30% [ ] significant weight loss [x ] poor nutritional intake [ ] anasarca   Artificial Nutrition [ ]     REFERRALS:   [x ]Chaplaincy  [ ] Hospice  [ ]Child Life  [ x]Social Work  [ ]Case management [ ]Holistic Therapy [ ] Physical Therapy [ ] Dietary   Goals of Care Document:

## 2022-05-29 NOTE — PROGRESS NOTE ADULT - PROBLEM SELECTOR PLAN 3
DNR/DNI in place  PCU offered, GOC narrative above  family considering options. intially found to have small bilateral pleural effusions, worsening right pleural effusion  worsening renal function, not a candidate for RRT  continue with nasal cannula for support  fan for help with dyspnea initially found to have small bilateral pleural effusions, worsening right pleural effusion  worsening renal function, not a candidate for RRT  continue with nasal cannula for support  fan for help with dyspnea

## 2022-05-29 NOTE — PROGRESS NOTE ADULT - ASSESSMENT
89yo F with PMH of HFpEF (EF 75% 03/2022), Afib (on Eliquis), HTN, DM2, CKD3, hypothyroidism, AS s/p TAVR (09/2021), s/p PPM BIBEMS for hypoxia of 88% on RA. Admitted for heart failure exacerbation. palliative consulted for GOC 89yo F with PMH of HFpEF (EF 75% 03/2022), Afib (on Eliquis), CAD s/p CABG, HTN, DM2, CKD3, hypothyroidism, AS s/p TAVR (09/2021), s/p PPM BIBEMS for hypoxia of 88% on RA. Admitted for heart failure exacerbation. palliative consulted for GOC

## 2022-05-29 NOTE — PROGRESS NOTE ADULT - SUBJECTIVE AND OBJECTIVE BOX
DATE OF SERVICE: 05-29-22 @ 14:38    Patient is a 89y old  Female who presents with a chief complaint of SOB (29 May 2022 11:39)      INTERVAL HISTORY: Sleeping, appears in no acute distress.     REVIEW OF SYSTEMS:  CONSTITUTIONAL: No weakness  EYES/ENT: No visual changes;  No throat pain   NECK: No pain or stiffness  RESPIRATORY: No cough, wheezing; No shortness of breath  CARDIOVASCULAR: No chest pain or palpitations  GASTROINTESTINAL: No abdominal  pain. No nausea, vomiting, or hematemesis  GENITOURINARY: No dysuria, frequency or hematuria  NEUROLOGICAL: No stroke like symptoms  SKIN: No rashes    	  MEDICATIONS:  buMETAnide Injectable 2 milliGRAM(s) IV Push two times a day        PHYSICAL EXAM:  T(C): 36.6 (05-29-22 @ 09:01), Max: 36.6 (05-29-22 @ 09:01)  HR: 60 (05-29-22 @ 09:01) (60 - 60)  BP: 101/45 (05-29-22 @ 09:01) (101/45 - 110/53)  RR: 18 (05-29-22 @ 09:01) (18 - 18)  SpO2: 90% (05-29-22 @ 09:01) (90% - 93%)  Wt(kg): --  I&O's Summary    28 May 2022 07:01  -  29 May 2022 07:00  --------------------------------------------------------  IN: 0 mL / OUT: 550 mL / NET: -550 mL          Appearance: In no distress	  HEENT:    PERRL, EOMI	  Cardiovascular:  S1 S2, No JVD  Respiratory: Lungs clear to auscultation	  Gastrointestinal:  Soft, Non-tender, + BS	  Vascularature:  + edema  Psychiatric: Appropriate affect   Neuro: no acute focal deficits                     Labs personally reviewed      ASSESSMENT/PLAN: 	    Ms. Briones is a 89 yo female with PMH of HTN, HLD, CAD s/p CABG, AS s/p TAVR in 9/2021 at [facility], DM, CKD, AF on Eliquis, PPM who presents with 3 days of progressive shortness of breath, + orthopnea, increased LE edema and decreased oxygen saturation on pulse oximeter at home.    Problem/Plan -1  Problem: HFpEF  - Followed by Dr. Agapito Woodard for outpatient cardiology  - CT Chest reveals moderate right and small left pleural effusions, cardiomegaly, possibly superimposed consolidation  - On lasix 40mg PO daily at home  - BMP reviewed from OP cardiologist records on 12/3/21 it was 1.1, and 5 months ago it was 1.2-1.4, here 2 months ago it was 1.4-1.6  - ECHO Left Ventricle: Normal left ventricular systolic function. No segmental wall motion abnormalities.  The LVEF= 60-65%. Moderate mitral regurgitation, There is a transcatheter aortic valve replacement seen. Moderate pulmonary hypertension.  - Patient appears close to euvolemia, but not euvolemic yet, although much improved since admission,   - 5/3 POCUS reveals Right pleural effusion  - BNP only slightly down (8208 --> 7983)  - CXR shows much improved pulm vasc congestion  - Pt does not yet appear euvolemic but will continue diuresing   - pt reports hx of Lymphedema and says her legs have been chronically edematous but doesnt use stockings   - Transitioned to Bumex 2mg PIV push BID as again hypoxic overnight with congestion  - HF consulted rec starting Bumex gtt @ 0.5 mg/hr, would discontinue Lopressor at this time  - 5/27 Pt now confused and disoriented, oliguric despite Bumex gtt with electrolyte imbalances. Pall care c/s. Patient DNR/DNI.   - Transitioned to Bumex 2mg IVP BID.  - Palliative care supported family. Would like to move to comfort measures.   - Transferred to PCU now on Hospice. Comfort measures only.       HERBER Mix-FACUNDO Gan DO Located within [facility]  Cardiovascular Medicine  [address], [address]  Office: [phone]  Cell: [phone]

## 2022-05-29 NOTE — PROGRESS NOTE ADULT - PROBLEM SELECTOR PLAN 4
will continue to follow. family to decide on choice. primary team to follow up on conversations as well.  we remain available, please contact on call team if family choosing PCU and they can evaluate and discuss next steps.  [phone] PPS 10%, requires total care  skin care per protocol  frequent repositioning

## 2022-05-29 NOTE — PROGRESS NOTE ADULT - PROBLEM SELECTOR PLAN 5
DNR/DNI   Surrogate - , 3 dtrs helping with decision making process  GOC refer 5/27  hospice referral based on clinical course

## 2022-05-29 NOTE — PROGRESS NOTE ADULT - PROBLEM SELECTOR PLAN 6
emotional support provided to aide, 2 grandsons at bedside, and vicente Taylor via phone  continue care in PCU for symptom directed care

## 2022-05-29 NOTE — PROGRESS NOTE ADULT - NUTRITIONAL ASSESSMENT
This patient has been assessed with a concern for Malnutrition and has been determined to have a diagnosis/diagnoses of Severe protein-calorie malnutrition.    This patient is being managed with:   Diet Regular-  Entered: May 29 2022  8:49AM

## 2022-05-29 NOTE — PROGRESS NOTE ADULT - ATTENDING COMMENTS
Pt seen and assessed    #Dyspnea-controlled  #Encephalopathy-not tracking. Minimally engaging. HHA at the bedside  #Acute on chronic heart failure: HFpEF, recent diuretic use. Contributing to dyspnea?   #DebilityPPS 30  #Palliative encounter- Collateral obtained from the HHA.

## 2022-05-29 NOTE — PROGRESS NOTE ADULT - SUBJECTIVE AND OBJECTIVE BOX
NEPHROLOGY    Patient seen and examined with aide at bedside. Pt lethargic, arousable, comfortable on NC, currently in no acute distress.     MEDICATIONS  (STANDING):  buMETAnide Injectable 2 milliGRAM(s) IV Push two times a day  nystatin Powder 1 Application(s) Topical every 8 hours    VITALS:  T(C): , Max: 36.4 (05-28-22 @ 17:20)  T(F): , Max: 97.5 (05-28-22 @ 17:20)  HR: 60 (05-28-22 @ 17:20)  BP: 110/53 (05-28-22 @ 17:20)  RR: 18 (05-28-22 @ 17:20)  SpO2: 93% (05-28-22 @ 17:20)    I and O's:    05-28 @ 07:01  -  05-29 @ 07:00  --------------------------------------------------------  IN: 0 mL / OUT: 550 mL / NET: -550 mL    PHYSICAL EXAM:  Constitutional: NAD  Neck:  No JVD(difficult exam)  Respiratory: poor effort   Cardiovascular: S1 and S2  Gastrointestinal: BS+, soft, NT/ND  Extremities: le edema  Neurological:    : +  Heredia  Skin: No rashes    LABS:                        8.8    8.24  )-----------( 188      ( 27 May 2022 11:17 )             29.8     05-27    130<L>  |  90<L>  |  104<H>  ----------------------------<  122<H>  6.3<HH>   |  22  |  3.24<H>    Ca    9.5      27 May 2022 11:17  Phos  7.2     05-27  Mg     2.3     05-27    TPro  7.2  /  Alb  2.9<L>  /  TBili  0.4  /  DBili  x   /  AST  127<H>  /  ALT  78<H>  /  AlkPhos  144<H>  05-27

## 2022-05-29 NOTE — PROGRESS NOTE ADULT - ASSESSMENT
ASSESSMENT/PLAN:  89yo F with PMH of HFpEF (EF 75% 03/2022), Afib (on Eliquis), HTN, DM2, CKD4, hypothyroidism, AS s/p TAVR (09/2021), s/p PPM BIBEMS for hypoxia  Lucita on top of underlying CKD stage 4 and now oliguria   Anemia  Nephrotic syndrome   Hyperphosphatemia and now hyperkalemia as of late, no new labs   Diastolic heart failure with moderate MR;    B/L effuisons and  drop in oxygenation   Change in mental status and confusion   Hepatitis     1 Renal- She has CKD stage 4 at baseline and  the predominance of proteinuria will ensue that slight changes in her volume status will have exaggerated responses with respect the kidneys.  She clearly has LE edema but the skin is not thick(as in lymphedema) and this is likely third spacing from nephrotic/subnephrotic range proteinuria   Bumex gtt change to Bumex 2mg IV bid   She is at the extremes of age and will not be offered HD or any form of renal replacement  S/p hyperkalemia protocol yesterday  (VBG potassium was 5.9)  2 Anemia-Can check iron stores and assess for iron or Epo products  3 GI-Trend LFT   4 ID- now off IV abx   5 Pulm-Chest CT with large right effusion.  Would drainage for palliation purposes be an option?  6. Palliative following - GOC - comfort measures    KARL Chavez  [facility]  [phone]    ASSESSMENT/PLAN:  89yo F with PMH of HFpEF (EF 75% 03/2022), Afib (on Eliquis), HTN, DM2, CKD4, hypothyroidism, AS s/p TAVR (09/2021), s/p PPM BIBEMS for hypoxia  Lucita on top of underlying CKD stage 4 and now oliguria   Anemia  Nephrotic syndrome   Hyperphosphatemia and now hyperkalemia as of late, no new labs   Diastolic heart failure with moderate MR;    B/L effuisons and  drop in oxygenation   Change in mental status and confusion   Hepatitis     1 Renal- She has CKD stage 4 at baseline and  the predominance of proteinuria will ensue that slight changes in her volume status will have exaggerated responses with respect the kidneys.  She clearly has LE edema but the skin is not thick(as in lymphedema) and this is likely third spacing from nephrotic/subnephrotic range proteinuria   s/p Bumex gtt, now on Bumex 2mg IV bid   She is at the extremes of age and will not be offered HD or any form of renal replacement  S/p hyperkalemia protocol   (VBG potassium was 5.9)  2 Anemia-Can check iron stores and assess for iron or Epo products  3 GI-Trend LFT   4 ID- now off IV abx   5 Pulm-Chest CT with large right effusion.  Would drainage for palliation purposes be an option?  6. Palliative following - GOC - comfort measures    KARL Chavez  [facility]  [phone]

## 2022-05-29 NOTE — PROGRESS NOTE ADULT - PROBLEM SELECTOR PLAN 2
worsening, now with worse pleural effusions  worsening renal function, not a candidate for RRT  bumex drip c/w dilaudid IV 0.2-0.5 mg IV q1H PRN for moderate to severe pain  bowel regimen while on opioids

## 2022-05-29 NOTE — PROGRESS NOTE ADULT - PROBLEM SELECTOR PLAN 1
PPS 20-30%, requires total care  deconditioned and more debilitated compared to initial consult on 5/16 c/w Dilaudid IV 0.2 mg q1H PRN   c/w IV bumex 2 mg Iv BID  fan for comfort

## 2022-05-30 NOTE — PROGRESS NOTE ADULT - SUBJECTIVE AND OBJECTIVE BOX
NEPHROLOGY     Patient seen and examined, lethargic, comfortable on NC, in no acute distress.     MEDICATIONS  (STANDING):  buMETAnide Injectable 2 milliGRAM(s) IV Push two times a day  nystatin Powder 1 Application(s) Topical every 8 hours    VITALS:  T(C): , Max: 36.4 (05-30-22 @ 08:49)  T(F): , Max: 97.5 (05-30-22 @ 08:49)  HR: 64 (05-30-22 @ 08:50)  BP: 110/65 (05-30-22 @ 08:50)  RR: 18 (05-30-22 @ 08:50)  SpO2: 94% (05-30-22 @ 08:50)    I and O's:    05-29 @ 07:01  -  05-30 @ 07:00  --------------------------------------------------------  IN: 0 mL / OUT: 350 mL / NET: -350 mL    05-30 @ 07:01  -  05-30 @ 09:24  --------------------------------------------------------  IN: 0 mL / OUT: 400 mL / NET: -400 mL    PHYSICAL EXAM:  Constitutional: NAD  Neck:  No JVD(difficult exam)  Respiratory: poor effort   Cardiovascular: S1 and S2  Gastrointestinal: BS+, soft, NT/ND  Extremities: le edema  Neurological:    : +  Heredia  Skin: No rashes    LABS:  No new labs

## 2022-05-30 NOTE — PROGRESS NOTE ADULT - NSPROGADDITIONALINFOA_GEN_ALL_CORE
Please do not hesitate to reach out to us either via Microsoft Teams or pager [phone] for further details or queries.    More Escamilla PGY-5  Geriatric and Palliative Care Medicine Fellow
Please do not hesitate to reach out to us either via Microsoft Teams or pager [phone] for further details or queries.    More Escamilla PGY-5  Geriatric and Palliative Care Medicine Fellow

## 2022-05-30 NOTE — PROGRESS NOTE ADULT - ATTENDING COMMENTS
PT seen and assessed  Encephalopathy: lethargic this AM.  Team reports pt was communicative and lamenting financial concerns and reporting existential distress  Encourage family to be apprised and address psychosocial/existential concerns  Reports of spousal distress  Palliative Encounter-Continue to monitor for symptoms

## 2022-05-30 NOTE — PROGRESS NOTE ADULT - SUBJECTIVE AND OBJECTIVE BOX
GAP TEAM PALLIATIVE CARE UNIT PROGRESS NOTE:      [  ] Patient on hospice program.    INDICATION FOR PALLIATIVE CARE UNIT SERVICES/Interval HPI:    OVERNIGHT EVENTS:    DNR on chart: Yes  Yes      Allergies    Bactrim (Unknown)  Flagyl (Hives; Pruritus)  Macrobid (Other)  sulfa drugs (Hives)  Zosyn (Other)    Intolerances    MEDICATIONS  (STANDING):  buMETAnide Injectable 2 milliGRAM(s) IV Push two times a day  nystatin Powder 1 Application(s) Topical every 8 hours    MEDICATIONS  (PRN):  acetaminophen  Suppository .. 650 milliGRAM(s) Rectal every 6 hours PRN Temp greater or equal to 38C (100.4F), Mild Pain (1 - 3)  bisacodyl Suppository 10 milliGRAM(s) Rectal daily PRN Constipation  calamine/zinc oxide Lotion 1 Application(s) Topical every 8 hours PRN Itching  HYDROmorphone  Injectable 0.2 milliGRAM(s) IV Push every 1 hour PRN Moderate Pain (4 - 6)  HYDROmorphone  Injectable 0.5 milliGRAM(s) IV Push every 1 hour PRN dyspnea  HYDROmorphone  Injectable 0.5 milliGRAM(s) IV Push every 2 hours PRN Severe Pain (7 - 10)  LORazepam   Injectable 0.5 milliGRAM(s) IV Push every 4 hours PRN Anxiety  ondansetron Injectable 4 milliGRAM(s) IV Push every 6 hours PRN Nausea and/or Vomiting    ITEMS UNCHECKED ARE NOT PRESENT    PRESENT SYMPTOMS: [ ]Unable to self-report  [ ]PAINADs [ ]RDOS  Source if other than patient:  [ ]Family   [ ]Team     Pain: [ ] yes [ ] no  QOL impact -   Location -                    Aggravating factors -  Quality -  Radiation -  Timing-  Severity (0-10 scale):  Minimal acceptable level (0-10 scale):     PAINAD Score:  http://geriatrictoolkit.[employer].edu/cog/painad.pdf (Ctrl +  left click to view)    Dyspnea:                           [ ]Mild [ ]Moderate [ ]Severe    RDOS:  0 to 2  minimal or no respiratory distress   3  mild distress  4 to 6 moderate distress  >7 severe distress  [email or web address]_Distress_Observation_Scale.pdf (Ctrl +  left click to view)     Anxiety:                             [ ]Mild [ ]Moderate [ ]Severe  Fatigue:                             [ ]Mild [ ]Moderate [ ]Severe  Nausea:                             [ ]Mild [ ]Moderate [ ]Severe  Loss of appetite:              [ ]Mild [ ]Moderate [ ]Severe  Constipation:                    [ ]Mild [ ]Moderate [ ]Severe  		  Other Symptoms:  [ ]All other review of systems negative     Palliative Performance Status Version 2:         %         [email or web address]/[email or web address]_[email or web address]  PHYSICAL EXAM:   Vital Signs Last 24 Hrs  T(C): 36.4 (30 May 2022 08:50), Max: 36.4 (30 May 2022 08:49)  T(F): 97.5 (30 May 2022 08:50), Max: 97.5 (30 May 2022 08:49)  HR: 64 (30 May 2022 08:50) (64 - 64)  BP: 110/65 (30 May 2022 08:50) (110/65 - 110/65)  BP(mean): --  RR: 18 (30 May 2022 08:50) (18 - 18)  SpO2: 94% (30 May 2022 08:50) (90% - 94%) I&O's Summary    29 May 2022 07:01  -  30 May 2022 07:00  --------------------------------------------------------  IN: 0 mL / OUT: 350 mL / NET: -350 mL    30 May 2022 07:01  -  30 May 2022 11:07  --------------------------------------------------------  IN: 0 mL / OUT: 400 mL / NET: -400 mL      GENERAL: [ ] Cachexia  [ ]Alert  [ ]Oriented x   [ ]Lethargic  [ ]Unarousable  [ ]Verbal  [ ]Non-Verbal  Behavioral:   [ ] Anxiety  [ ] Delirium [ ] Agitation [ ] Other  HEENT:  [ ]Normal   [ ]Dry mouth   [ ]ET Tube/Trach  [ ]Oral lesions  PULMONARY:   [ ]Clear [ ]Tachypnea  [ ]Audible excessive secretions   [ ]Rhonchi        [ ]Right [ ]Left [ ]Bilateral  [ ]Crackles        [ ]Right [ ]Left [ ]Bilateral  [ ]Wheezing     [ ]Right [ ]Left [ ]Bilateral  [ ]Diminished BS [ ]Right [ ]Left [ ]Bilateral    CARDIOVASCULAR:    [ ]Regular [ ]Irregular [ ]Tachy  [ ]Jerel [ ]Murmur [ ]Other  GASTROINTESTINAL:  [ ]Soft  [ ]Distended   [ ]+BS  [ ]Non tender [ ]Tender  [ ]Other [ ]PEG [ ]OGT/ NGT   Last BM:    GENITOURINARY:  [ ]Normal [ ] Incontinent   [ ]Oliguria/Anuria   [ ]Heredia  MUSCULOSKELETAL:   [ ]Normal   [ ]Weakness  [ ]Bed/Wheelchair bound [ ]Edema  NEUROLOGIC:   [ ]No focal deficits  [ ] Cognitive impairment  [ ] Dysphagia [ ]Dysarthria [ ] Paresis [ ]Other   SKIN:   [ ]Normal  [ ]Rash  [ ]Other  [ ]Pressure ulcer(s)  [ ]y [ ]n  Present on admission      CRITICAL CARE:  [ ] Shock Present  [ ]Septic [ ]Cardiogenic [ ]Neurologic [ ]Hypovolemic  [ ]  Vasopressors [ ]  Inotropes   [ ] Respiratory failure present [ ] Mechanical Ventilation [ ] Non-invasive ventilatory support [ ] High-Flow  [ ] Acute  [ ] Chronic [ ] Hypoxic  [ ] Hypercarbic [ ] Other  [ ] Other organ failure     LABS:            RADIOLOGY & ADDITIONAL STUDIES:    PROTEIN CALORIE MALNUTRITION: [ ] mild [ ] moderate [ ] severe  [ ] underweight [ ] morbid obesity    https://www.andeal.org/vault/2440/web/files/ONC/Table_Clinical%20Characteristics%20to%20Document%20Malnutrition-White%20JV%20et%20al%290146.pdf    Height (cm): 157.5 (04-29-22 @ 10:59), 157.5 (03-02-22 @ 18:50), 157.5 (12-20-21 @ 18:32)  Weight (kg): 78.9 (05-05-22 @ 08:00), 74.8 (12-20-21 @ 18:32)  BMI (kg/m2): 31.8 (05-05-22 @ 08:00), 30.2 (04-29-22 @ 10:59), 30.2 (03-02-22 @ 18:50)    [ ] PPSV2 < or = 30% [ ] significant weight loss [ ] poor nutritional intake [ ] anasarca   Artificial Nutrition [ ]     REFERRALS:   [ ]Chaplaincy  [ ] Hospice  [ ]Child Life  [ ]Social Work  [ ]Case management [ ]Holistic Therapy [ ] Physical Therapy [ ] Dietary   Goals of Care Document:     Please do not hesitate to reach out to us either via Microsoft Teams or pager [phone] for further details or queries.    More Escamilla PGY-5  Geriatric and Palliative Care Medicine Fellow   GAP TEAM PALLIATIVE CARE UNIT PROGRESS NOTE:      [  ] Patient on hospice program.    INDICATION FOR PALLIATIVE CARE UNIT SERVICES/Interval HPI: symptom focused care in the setting of end stage heart failure with secondary renal failure with worsening encephalopathy    OVERNIGHT EVENTS: Pt required 1 dose of ativan this AM for anxiety. Patient was very anxious this morning per RN. Speaking about her finances and worried about her , required medication to prevent worsening of anxiety. Pt opening her eyes but not conversing with team this AM. 2 grandsons and dtr Claudia spoken to this AM, they were able to speak a few words with patient this AM.     DNR on chart: Yes  Yes      Allergies    Bactrim (Unknown)  Flagyl (Hives; Pruritus)  Macrobid (Other)  sulfa drugs (Hives)  Zosyn (Other)    Intolerances    MEDICATIONS  (STANDING):  buMETAnide Injectable 2 milliGRAM(s) IV Push two times a day  nystatin Powder 1 Application(s) Topical every 8 hours    MEDICATIONS  (PRN):  acetaminophen  Suppository .. 650 milliGRAM(s) Rectal every 6 hours PRN Temp greater or equal to 38C (100.4F), Mild Pain (1 - 3)  bisacodyl Suppository 10 milliGRAM(s) Rectal daily PRN Constipation  calamine/zinc oxide Lotion 1 Application(s) Topical every 8 hours PRN Itching  HYDROmorphone  Injectable 0.2 milliGRAM(s) IV Push every 1 hour PRN Moderate Pain (4 - 6)  HYDROmorphone  Injectable 0.5 milliGRAM(s) IV Push every 1 hour PRN dyspnea  HYDROmorphone  Injectable 0.5 milliGRAM(s) IV Push every 2 hours PRN Severe Pain (7 - 10)  LORazepam   Injectable 0.5 milliGRAM(s) IV Push every 4 hours PRN Anxiety  ondansetron Injectable 4 milliGRAM(s) IV Push every 6 hours PRN Nausea and/or Vomiting    ITEMS UNCHECKED ARE NOT PRESENT    PRESENT SYMPTOMS: [x ]Unable to self-report  [x ]PAINADs [ x]RDOS  Source if other than patient:  [ ]Family   [ ]Team     Pain: [ ] yes [ ] no  QOL impact -   Location -                    Aggravating factors -  Quality -  Radiation -  Timing-  Severity (0-10 scale):  Minimal acceptable level (0-10 scale):     PAINAD Score: 0  [email or web address].[email or web address]/cog/painad.pdf (Ctrl +  left click to view)    Dyspnea:                           [ ]Mild [ ]Moderate [ ]Severe    RDOS: 0  0 to 2  minimal or no respiratory distress   3  mild distress  4 to 6 moderate distress  >7 severe distress  [email or web address] ([email or web address]/handouts/[email or web address]_Distress_Observation_Scale.pdf (Ctrl +  left click to view)     Anxiety:                             [ ]Mild [ ]Moderate [ ]Severe  Fatigue:                             [ ]Mild [ ]Moderate [ ]Severe  Nausea:                             [ ]Mild [ ]Moderate [ ]Severe  Loss of appetite:              [ ]Mild [ ]Moderate [ ]Severe  Constipation:                    [ ]Mild [ ]Moderate [ ]Severe  		  Other Symptoms:  [ ]All other review of systems negative     Palliative Performance Status Version 2:    10     %         [email or web address]/files/[email or web address]_performance_scale_[email or web address].pdf  PHYSICAL EXAM:   Vital Signs Last 24 Hrs  T(C): 36.4 (30 May 2022 08:50), Max: 36.4 (30 May 2022 08:49)  T(F): 97.5 (30 May 2022 08:50), Max: 97.5 (30 May 2022 08:49)  HR: 64 (30 May 2022 08:50) (64 - 64)  BP: 110/65 (30 May 2022 08:50) (110/65 - 110/65)  BP(mean): --  RR: 18 (30 May 2022 08:50) (18 - 18)  SpO2: 94% (30 May 2022 08:50) (90% - 94%) I&O's Summary    29 May 2022 07:01  -  30 May 2022 07:00  --------------------------------------------------------  IN: 0 mL / OUT: 350 mL / NET: -350 mL    30 May 2022 07:01  -  30 May 2022 11:07  --------------------------------------------------------  IN: 0 mL / OUT: 400 mL / NET: -400 mL      GENERAL: [ ] Cachexia  [ ]Alert  [ ]Oriented x   [ ]Lethargic  [x ]Unarousable  [ ]Verbal  [ ]Non-Verbal  Behavioral:   [ ] Anxiety  [ ] Delirium [ ] Agitation [ ] Other  HEENT:  [ ]Normal   [ ]Dry mouth   [ ]ET Tube/Trach  [ ]Oral lesions  PULMONARY:   [ ]Clear [ ]Tachypnea  [ ]Audible excessive secretions   [ ]Rhonchi        [ ]Right [ ]Left [ ]Bilateral  [ ]Crackles        [ ]Right [ ]Left [ ]Bilateral  [ ]Wheezing     [ ]Right [ ]Left [ ]Bilateral  [ x]Diminished BS [ ]Right [ ]Left [ x]Bilateral    CARDIOVASCULAR:    [x ]Regular [ ]Irregular [ ]Tachy  [ ]Jerel [ ]Murmur [ ]Other  GASTROINTESTINAL:  [x ]Soft  [ ]Distended   [x ]+BS  [x ]Non tender [ ]Tender  [ ]Other [ ]PEG [ ]OGT/ NGT   Last BM:  5/25  GENITOURINARY:  [ ]Normal [ ] Incontinent   [ ]Oliguria/Anuria   [ x]Heredia  MUSCULOSKELETAL:   [ ]Normal   [ ]Weakness  [x ]Bed/Wheelchair bound [ ]Edema  NEUROLOGIC:   [ ]No focal deficits  [ ] Cognitive impairment  [ ] Dysphagia [ ]Dysarthria [ ] Paresis [ ]Other   SKIN:   [ ]Normal  [ ]Rash  [ ]Other  [ ]Pressure ulcer(s)  [ ]y [ ]n  Present on admission      CRITICAL CARE:  [ ] Shock Present  [ ]Septic [ ]Cardiogenic [ ]Neurologic [ ]Hypovolemic  [ ]  Vasopressors [ ]  Inotropes   [ ] Respiratory failure present [ ] Mechanical Ventilation [ ] Non-invasive ventilatory support [ ] High-Flow  [ ] Acute  [ ] Chronic [ ] Hypoxic  [ ] Hypercarbic [ ] Other  [x ] Other organ failure , renal    LABS: none new            RADIOLOGY & ADDITIONAL STUDIES: none new    PROTEIN CALORIE MALNUTRITION: [ ] mild [ ] moderate [ ] severe  [ ] underweight [ ] morbid obesity    https://www.andeal.org/vault/8040/web/files/ONC/Table_Clinical%20Characteristics%20to%20Document%20Malnutrition-White%20JV%20et%20al%202012.pdf    Height (cm): 157.5 (04-29-22 @ 10:59), 157.5 (03-02-22 @ 18:50), 157.5 (12-20-21 @ 18:32)  Weight (kg): 78.9 (05-05-22 @ 08:00), 74.8 (12-20-21 @ 18:32)  BMI (kg/m2): 31.8 (05-05-22 @ 08:00), 30.2 (04-29-22 @ 10:59), 30.2 (03-02-22 @ 18:50)    [x ] PPSV2 < or = 30% [ ] significant weight loss [ ] poor nutritional intake [ ] anasarca   Artificial Nutrition [ ]     REFERRALS:   [x ]Chaplaincy  [ ] Hospice  [ ]Child Life  [x ]Social Work  [ ]Case management [ ]Holistic Therapy [ ] Physical Therapy [ ] Dietary   Goals of Care Document:

## 2022-05-30 NOTE — PROGRESS NOTE ADULT - PROBLEM SELECTOR PLAN 5
DNR/DNI   Surrogate - , 3 dtrs helping with decision making process  [facility] refer 5/27  hospice referral based on clinical course DNR/DNI   Surrogate - , 3 dtrs helping with decision making process  [facility] refer 5/27  dtr Claudia, 2 grandsons updated via phone, aware of discharge planning process  hospice referral based on clinical course

## 2022-05-30 NOTE — PROGRESS NOTE ADULT - PROBLEM SELECTOR PLAN 3
initially found to have small bilateral pleural effusions, worsening right pleural effusion  worsening renal function, not a candidate for RRT  continue with nasal cannula for support  fan for help with dyspnea initially found to have small bilateral pleural effusions, worsening right pleural effusion  worsening renal function, not a candidate for RRT  continue with nasal cannula for support

## 2022-05-30 NOTE — PROGRESS NOTE ADULT - ASSESSMENT
89yo F with PMH of HFpEF (EF 75% 03/2022), Afib (on Eliquis), CAD s/p CABG, HTN, DM2, CKD3, hypothyroidism, AS s/p TAVR (09/2021), s/p PPM BIBEMS for hypoxia of 88% on RA. Admitted for heart failure exacerbation. palliative consulted for GOC

## 2022-05-30 NOTE — PROGRESS NOTE ADULT - ASSESSMENT
ASSESSMENT/PLAN:  89yo F with PMH of HFpEF (EF 75% 03/2022), Afib (on Eliquis), HTN, DM2, CKD4, hypothyroidism, AS s/p TAVR (09/2021), s/p PPM BIBEMS for hypoxia  Lucita on top of underlying CKD stage 4 and now oliguria   Anemia  Nephrotic syndrome   Hyperphosphatemia and now hyperkalemia as of late, no new labs   Diastolic heart failure with moderate MR;    B/L effuisons and  drop in oxygenation   Change in mental status and confusion   Hepatitis     1 Renal- She has CKD stage 4 at baseline and  the predominance of proteinuria will ensue that slight changes in her volume status will have exaggerated responses with respect the kidneys.  She clearly has LE edema but the skin is not thick(as in lymphedema) and this is likely third spacing from nephrotic/subnephrotic range proteinuria   s/p Bumex gtt, now on Bumex 2mg IV bid   She is at the extremes of age and will not be offered HD or any form of renal replacement  S/p hyperkalemia protocol   (VBG potassium was 5.9)  2 Anemia-Can check iron stores and assess for iron or Epo products  3 GI-Trend LFT   4 ID- now off IV abx   5 Pulm-Chest CT with large right effusion. Would drainage for palliation purposes be an option?  6. 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     Plan:  - continue keppra 1000 mg BID  - repeat REEG  - if EEG improved, may d/c with outpt f/u in 2 - 4 wks

## 2022-05-31 NOTE — PROGRESS NOTE ADULT - PROBLEM SELECTOR PLAN 3
initially found to have small bilateral pleural effusions, worsening right pleural effusion  worsening renal function, not a candidate for RRT  continue with nasal cannula for support. initially found to have small bilateral pleural effusions, worsening right pleural effusion  worsening renal function, not a candidate for RRT, continue diuretics as clinical condition warrants, medically optimized.    continue with nasal cannula for dyspnea.

## 2022-05-31 NOTE — PROGRESS NOTE ADULT - ASSESSMENT
89yo F with PMH of HFpEF (EF 75% 03/2022), Afib (on Eliquis), CAD s/p CABG, HTN, DM2, CKD3, hypothyroidism, AS s/p TAVR (09/2021), s/p PPM BIBEMS for hypoxia of 88% on RA. Admitted for heart failure exacerbation. palliative consulted for GOC       Problem/Plan - 6:  ·  Problem: Palliative care encounter.   ·  Plan:  89yo F with PMH of HFpEF (EF 75% 03/2022), Afib (on Eliquis), CAD s/p CABG, HTN, DM2, CKD3, hypothyroidism, AS s/p TAVR (09/2021), s/p PPM BIBEMS for hypoxia of 88% on RA. Admitted for heart failure exacerbation. palliative consulted for GOC

## 2022-05-31 NOTE — PROGRESS NOTE ADULT - ATTENDING COMMENTS
89yo F with PMH of HFpEF (EF 75% 03/2022), Afib (on Eliquis), CAD s/p CABG, HTN, DM2, CKD3, hypothyroidism, AS s/p TAVR (09/2021), s/p PPM BIBEMS for hypoxia of 88% on RA. Admitted for heart failure exacerbation with course complicated by MARICARMEN/CKD requiring diuresis.  Family has opted for symptom directed care and patient is in PCU for EOL care.  Patient high risk of dying due to organ failure and hospice transition pending clinical course.  In the setting of parenteral controlled substance administration, clinical monitoring required for side effects such as respiratory depression, constipation and opioid induced neurotoxicity.  This patient is at high risk due to organ failure.  Patient assessment and plan discussed on interdisciplinary team rounds today.

## 2022-05-31 NOTE — PROGRESS NOTE ADULT - SUBJECTIVE AND OBJECTIVE BOX
GAP TEAM PALLIATIVE CARE UNIT PROGRESS NOTE:      [  ] Patient on hospice program.    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 Patient is less communicative, but is able to open her eyes and appears more delirious this morning, thus also is much less anxious. Patient did express she is worried this morning about her outcome and "when it will happen." Patient did not mention any concerns about her finances or about her . Patient  of anxiety. Patient is otherwise resting in bed comfortably and is in no acute distress. Patient denies any pain. Patient did receive Dulcolax this morning to aid in bowel movement.      DNR on chart: Yes  Yes      Allergies    Bactrim (Unknown)  Flagyl (Hives; Pruritus)  Macrobid (Other)  sulfa drugs (Hives)  Zosyn (Other)    Intolerances    MEDICATIONS  (STANDING):  buMETAnide Injectable 2 milliGRAM(s) IV Push two times a day  nystatin Powder 1 Application(s) Topical every 8 hours    MEDICATIONS  (PRN):  acetaminophen  Suppository .. 650 milliGRAM(s) Rectal every 6 hours PRN Temp greater or equal to 38C (100.4F), Mild Pain (1 - 3)  bisacodyl Suppository 10 milliGRAM(s) Rectal daily PRN Constipation  calamine/zinc oxide Lotion 1 Application(s) Topical every 8 hours PRN Itching  HYDROmorphone  Injectable 0.2 milliGRAM(s) IV Push every 1 hour PRN Moderate Pain (4 - 6)  HYDROmorphone  Injectable 0.5 milliGRAM(s) IV Push every 1 hour PRN dyspnea  HYDROmorphone  Injectable 0.5 milliGRAM(s) IV Push every 2 hours PRN Severe Pain (7 - 10)  LORazepam   Injectable 0.5 milliGRAM(s) IV Push every 4 hours PRN Anxiety  ondansetron Injectable 4 milliGRAM(s) IV Push every 6 hours PRN Nausea and/or Vomiting    ITEMS UNCHECKED ARE NOT PRESENT    PRESENT SYMPTOMS: [x]Unable to self-report  [ ]PAINADs [ ]RDOS  Source if other than patient:  [ ]Family   [ ]Team     Pain: [ ] yes [ ] no  QOL impact -   Location -                    Aggravating factors -  Quality -  Radiation -  Timing-  Severity (0-10 scale):  Minimal acceptable level (0-10 scale):     PAINAD Score:  [email or web address]/[email or web address].pdf (Ctrl +  left click to view)    Dyspnea:                           [ ]Mild [ ]Moderate [ ]Severe    RDOS:  0 to 2  minimal or no respiratory distress   3  mild distress  4 to 6 moderate distress  >7 severe distress  [email or web address]_Distress_Observation_[email or web address] (Ctrl +  left click to view)     Anxiety:                             [ ]Mild [ ]Moderate [ ]Severe  Fatigue:                             [ ]Mild [ ]Moderate [ ]Severe  Nausea:                             [ ]Mild [ ]Moderate [ ]Severe  Loss of appetite:              [ ]Mild [ ]Moderate [ ]Severe  Constipation:                    [ ]Mild [ ]Moderate [ ]Severe  		  Other Symptoms:  [ ]All other review of systems negative     Palliative Performance Status Version 2:         10%         [email or web address]  PHYSICAL EXAM:   Vital Signs Last 24 Hrs  T(C): 36.4 (31 May 2022 09:22), Max: 36.4 (31 May 2022 09:22)  T(F): 97.6 (31 May 2022 09:22), Max: 97.6 (31 May 2022 09:22)  HR: 62 (31 May 2022 09:22) (62 - 62)  BP: 100/41 (31 May 2022 09:22) (100/41 - 100/41)  BP(mean): --  RR: 18 (31 May 2022 09:22) (18 - 18)  SpO2: 92% (31 May 2022 09:22) (92% - 92%) I&O's Summary    30 May 2022 07:01  -  31 May 2022 07:00  --------------------------------------------------------  IN: 0 mL / OUT: 700 mL / NET: -700 mL      GENERAL: [ ] Cachexia  [x]Alert  [ ]Oriented [ ]Lethargic  [ ]Unarousable  [x]Verbal (minimally)  [ ]Non-Verbal  Behavioral:   [ ] Anxiety  [x] Delirium [ ] Agitation [ ] Other  HEENT:  [ ]Normal   [ ]Dry mouth   [ ]ET Tube/Trach  [ ]Oral lesions  PULMONARY:   [ ]Clear [ ]Tachypnea  [ ]Audible excessive secretions   [ ]Rhonchi        [ ]Right [ ]Left [ ]Bilateral  [ ]Crackles        [ ]Right [ ]Left [ ]Bilateral  [ ]Wheezing     [ ]Right [ ]Left [ ]Bilateral  [x]Diminished BS [ ]Right [ ]Left [x]Bilateral    CARDIOVASCULAR:    [x]Regular [ ]Irregular [ ]Tachy  [ ]Jerel [ ]Murmur [ ]Other  GASTROINTESTINAL:  [x]Soft  [ ]Distended   [x]+BS  [ ]Non tender [ ]Tender  [ ]Other [ ]PEG [ ]OGT/ NGT   Last BM:  5/25 with Dulcolax given today  GENITOURINARY:  [ ]Normal [ ] Incontinent   [ ]Oliguria/Anuria   [x]Heredia  MUSCULOSKELETAL:   [ ]Normal   [ ]Weakness  [x]Bed/Wheelchair bound [ ]Edema  NEUROLOGIC:   [ ]No focal deficits  [ ] Cognitive impairment  [ ] Dysphagia [ ]Dysarthria [ ] Paresis [ ]Other   SKIN:   [ ]Normal  [ ]Rash  [ ]Other  [ ]Pressure ulcer(s)  [ ]y [ ]n  Present on admission      CRITICAL CARE:  [ ] Shock Present  [ ]Septic [ ]Cardiogenic [ ]Neurologic [ ]Hypovolemic  [ ]  Vasopressors [ ]  Inotropes   [ ] Respiratory failure present [ ] Mechanical Ventilation [ ] Non-invasive ventilatory support [ ] High-Flow  [ ] Acute  [ ] Chronic [ ] Hypoxic  [ ] Hypercarbic [ ] Other  [x] Other organ failure (Renal)    LABS: No new labs    RADIOLOGY & ADDITIONAL STUDIES: No new imaging studies    PROTEIN CALORIE MALNUTRITION: [ ] mild [ ] moderate [ ] severe  [ ] underweight [ ] morbid obesity    https://www.andeal.org/vault/6149/web/files/ONC/Table_Clinical%20Characteristics%20to%20Document%20Malnutrition-White%20JV%20et%20al%202012.pdf    Height (cm): 157.5 (04-29-22 @ 10:59), 157.5 (03-02-22 @ 18:50), 157.5 (12-20-21 @ 18:32)  Weight (kg): 78.9 (05-05-22 @ 08:00), 74.8 (12-20-21 @ 18:32)  BMI (kg/m2): 31.8 (05-05-22 @ 08:00), 30.2 (04-29-22 @ 10:59), 30.2 (03-02-22 @ 18:50)    [x] PPSV2 < or = 30% [ ] significant weight loss [ ] poor nutritional intake [ ] anasarca   Artificial Nutrition [ ]     REFERRALS:   [x]Chaplaincy  [ ] Hospice  [ ]Child Life  [x]Social Work  [ ]Case management [ ]Holistic Therapy [ ] Physical Therapy [ ] Dietary   Goals of Care Document: See below GAP TEAM PALLIATIVE CARE UNIT PROGRESS NOTE:      [  ] Patient on hospice program.    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 Patient is less communicative, but is able to open her eyes and appears more delirious this morning, thus also is much less anxious. Patient did express she is worried this morning about her outcome and "when it will happen." Patient did not mention any concerns about her finances or about her . Patient  of anxiety. Patient is otherwise resting in bed comfortably and is in no acute distress. Patient denies any pain. Patient did receive Dulcolax this morning to aid in bowel movement.      DNR on chart: Yes  Yes      Allergies    Bactrim (Unknown)  Flagyl (Hives; Pruritus)  Macrobid (Other)  sulfa drugs (Hives)  Zosyn (Other)    Intolerances    MEDICATIONS  (STANDING):  buMETAnide Injectable 2 milliGRAM(s) IV Push two times a day  nystatin Powder 1 Application(s) Topical every 8 hours    MEDICATIONS  (PRN):  acetaminophen  Suppository .. 650 milliGRAM(s) Rectal every 6 hours PRN Temp greater or equal to 38C (100.4F), Mild Pain (1 - 3)  bisacodyl Suppository 10 milliGRAM(s) Rectal daily PRN Constipation  calamine/zinc oxide Lotion 1 Application(s) Topical every 8 hours PRN Itching  HYDROmorphone  Injectable 0.2 milliGRAM(s) IV Push every 1 hour PRN Moderate Pain (4 - 6)  HYDROmorphone  Injectable 0.5 milliGRAM(s) IV Push every 1 hour PRN dyspnea  HYDROmorphone  Injectable 0.5 milliGRAM(s) IV Push every 2 hours PRN Severe Pain (7 - 10)  LORazepam   Injectable 0.5 milliGRAM(s) IV Push every 4 hours PRN Anxiety  ondansetron Injectable 4 milliGRAM(s) IV Push every 6 hours PRN Nausea and/or Vomiting    ITEMS UNCHECKED ARE NOT PRESENT    PRESENT SYMPTOMS: [x]Unable to self-report  [x]PAINADs [x]RDOS  Source if other than patient:  [ ]Family   [x]Team     Pain: [ ] yes [x] no  QOL impact -   Location -                    Aggravating factors -  Quality -  Radiation -  Timing-  Severity (0-10 scale):  Minimal acceptable level (0-10 scale):     PAINAD Score: 0  http://geriatrictoolkit.[email or web address]/cog/painad.pdf (Ctrl +  left click to view)    Dyspnea:                           [ ]Mild [ ]Moderate [ ]Severe    RDOS: 0  0 to 2  minimal or no respiratory distress   3  mild distress  4 to 6 moderate distress  >7 severe distress  [email or web address]_Distress_Observation_Scale.pdf (Ctrl +  left click to view)     Anxiety:                             [ ]Mild [ ]Moderate [ ]Severe  Fatigue:                             [ ]Mild [ ]Moderate [ ]Severe  Nausea:                             [ ]Mild [ ]Moderate [ ]Severe  Loss of appetite:              [ ]Mild [ ]Moderate [ ]Severe  Constipation:                    [ ]Mild [ ]Moderate [ ]Severe  		  Other Symptoms:  [ ]All other review of systems negative     Palliative Performance Status Version 2:         10%         http://npcrc.org/files/news/palliative_performance_scale_[email or web address].pdf  PHYSICAL EXAM:   Vital Signs Last 24 Hrs  T(C): 36.4 (31 May 2022 09:22), Max: 36.4 (31 May 2022 09:22)  T(F): 97.6 (31 May 2022 09:22), Max: 97.6 (31 May 2022 09:22)  HR: 62 (31 May 2022 09:22) (62 - 62)  BP: 100/41 (31 May 2022 09:22) (100/41 - 100/41)  BP(mean): --  RR: 18 (31 May 2022 09:22) (18 - 18)  SpO2: 92% (31 May 2022 09:22) (92% - 92%) I&O's Summary    30 May 2022 07:01  -  31 May 2022 07:00  --------------------------------------------------------  IN: 0 mL / OUT: 700 mL / NET: -700 mL      GENERAL: [ ] Cachexia  [x]Alert  [ ]Oriented [ ]Lethargic  [ ]Unarousable  [x]Verbal (minimally)  [ ]Non-Verbal  Behavioral:   [ ] Anxiety  [x] Delirium [ ] Agitation [ ] Other  HEENT:  [ ]Normal   [ ]Dry mouth   [ ]ET Tube/Trach  [ ]Oral lesions  PULMONARY:   [ ]Clear [ ]Tachypnea  [ ]Audible excessive secretions   [ ]Rhonchi        [ ]Right [ ]Left [ ]Bilateral  [ ]Crackles        [ ]Right [ ]Left [ ]Bilateral  [ ]Wheezing     [ ]Right [ ]Left [ ]Bilateral  [x]Diminished BS [ ]Right [ ]Left [x]Bilateral    CARDIOVASCULAR:    [x]Regular [ ]Irregular [ ]Tachy  [ ]Jerel [ ]Murmur [ ]Other  GASTROINTESTINAL:  [x]Soft  [ ]Distended   [x]+BS  [ ]Non tender [ ]Tender  [ ]Other [ ]PEG [ ]OGT/ NGT   Last BM:  5/25 with Dulcolax given today  GENITOURINARY:  [ ]Normal [ ] Incontinent   [ ]Oliguria/Anuria   [x]Heredia  MUSCULOSKELETAL:   [ ]Normal   [ ]Weakness  [x]Bed/Wheelchair bound [ ]Edema  NEUROLOGIC:   [ ]No focal deficits  [ ] Cognitive impairment  [ ] Dysphagia [ ]Dysarthria [ ] Paresis [ ]Other   SKIN:   [ ]Normal  [ ]Rash  [ ]Other  [ ]Pressure ulcer(s)  [ ]y [ ]n  Present on admission      CRITICAL CARE:  [ ] Shock Present  [ ]Septic [ ]Cardiogenic [ ]Neurologic [ ]Hypovolemic  [ ]  Vasopressors [ ]  Inotropes   [ ] Respiratory failure present [ ] Mechanical Ventilation [ ] Non-invasive ventilatory support [ ] High-Flow  [ ] Acute  [ ] Chronic [ ] Hypoxic  [ ] Hypercarbic [ ] Other  [x] Other organ failure (Renal)    LABS: No new labs    RADIOLOGY & ADDITIONAL STUDIES: No new imaging studies    PROTEIN CALORIE MALNUTRITION: [ ] mild [ ] moderate [ ] severe  [ ] underweight [ ] morbid obesity    https://www.andeal.org/vault/2390/web/files/ONC/Table_Clinical%20Characteristics%20to%20Document%20Malnutrition-White%20JV%20et%20al%202012.pdf    Height (cm): 157.5 (04-29-22 @ 10:59), 157.5 (03-02-22 @ 18:50), 157.5 (12-20-21 @ 18:32)  Weight (kg): 78.9 (05-05-22 @ 08:00), 74.8 (12-20-21 @ 18:32)  BMI (kg/m2): 31.8 (05-05-22 @ 08:00), 30.2 (04-29-22 @ 10:59), 30.2 (03-02-22 @ 18:50)    [x] PPSV2 < or = 30% [ ] significant weight loss [ ] poor nutritional intake [ ] anasarca   Artificial Nutrition [ ]     REFERRALS:   [x]Chaplaincy  [ ] Hospice  [ ]Child Life  [x]Social Work  [ ]Case management [ ]Holistic Therapy [ ] Physical Therapy [ ] Dietary   Goals of Care Document: See below GAP TEAM PALLIATIVE CARE UNIT PROGRESS NOTE:      [  ] Patient on hospice program.    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 Patient is less communicative, but is able to open her eyes and appears more delirious this morning, thus also is much less anxious. Patient did express she is worried this morning about her outcome and "when it will happen." Patient did not mention any concerns about her finances or about her . Patient  of anxiety. Patient is otherwise resting in bed comfortably and is in no acute distress. Patient denies any pain. Patient did receive Dulcolax this morning to aid in bowel movement.      DNR on chart: Yes  Yes      Allergies    Bactrim (Unknown)  Flagyl (Hives; Pruritus)  Macrobid (Other)  sulfa drugs (Hives)  Zosyn (Other)    Intolerances    MEDICATIONS  (STANDING):  buMETAnide Injectable 2 milliGRAM(s) IV Push two times a day  nystatin Powder 1 Application(s) Topical every 8 hours    MEDICATIONS  (PRN):  acetaminophen  Suppository .. 650 milliGRAM(s) Rectal every 6 hours PRN Temp greater or equal to 38C (100.4F), Mild Pain (1 - 3)  bisacodyl Suppository 10 milliGRAM(s) Rectal daily PRN Constipation  calamine/zinc oxide Lotion 1 Application(s) Topical every 8 hours PRN Itching  HYDROmorphone  Injectable 0.2 milliGRAM(s) IV Push every 1 hour PRN Moderate Pain (4 - 6)  HYDROmorphone  Injectable 0.5 milliGRAM(s) IV Push every 1 hour PRN dyspnea  HYDROmorphone  Injectable 0.5 milliGRAM(s) IV Push every 2 hours PRN Severe Pain (7 - 10)  LORazepam   Injectable 0.5 milliGRAM(s) IV Push every 4 hours PRN Anxiety  ondansetron Injectable 4 milliGRAM(s) IV Push every 6 hours PRN Nausea and/or Vomiting    ITEMS UNCHECKED ARE NOT PRESENT    PRESENT SYMPTOMS: [x]Unable to self-report  [x]PAINADs [x]RDOS  Source if other than patient:  [ ]Family   [x]Team     Pain: [ ] yes [x] no  QOL impact -   Location -                    Aggravating factors -  Quality -  Radiation -  Timing-  Severity (0-10 scale):  Minimal acceptable level (0-10 scale):     PAINAD Score: 0  [email or web address]/cog/painad.pdf (Ctrl +  left click to view)    Dyspnea:                           [ ]Mild [ ]Moderate [ ]Severe    RDOS: 0  0 to 2  minimal or no respiratory distress   3  mild distress  4 to 6 moderate distress  >7 severe distress  [email or web address]_Distress_Observation_Scale.pdf (Ctrl +  left click to view)     Anxiety:                             [ ]Mild [ ]Moderate [ ]Severe  Fatigue:                             [ ]Mild [ ]Moderate [ ]Severe  Nausea:                             [ ]Mild [ ]Moderate [ ]Severe  Loss of appetite:              [ ]Mild [ ]Moderate [ ]Severe  Constipation:                    [ ]Mild [ ]Moderate [ ]Severe  		  Other Symptoms:  [x ]All other review of systems negative as per nursing    Palliative Performance Status Version 2:         10%         [email or web address]  PHYSICAL EXAM:   Vital Signs Last 24 Hrs  T(C): 36.4 (31 May 2022 09:22), Max: 36.4 (31 May 2022 09:22)  T(F): 97.6 (31 May 2022 09:22), Max: 97.6 (31 May 2022 09:22)  HR: 62 (31 May 2022 09:22) (62 - 62)  BP: 100/41 (31 May 2022 09:22) (100/41 - 100/41)  BP(mean): --  RR: 18 (31 May 2022 09:22) (18 - 18)  SpO2: 92% (31 May 2022 09:22) (92% - 92%) I&O's Summary    30 May 2022 07:01  -  31 May 2022 07:00  --------------------------------------------------------  IN: 0 mL / OUT: 700 mL / NET: -700 mL      GENERAL: [ ] Cachexia  [x]Alert  [ ]Oriented [ ]Lethargic  [ ]Unarousable  [x]Verbal (minimally)  [ ]Non-Verbal  Behavioral:   x[ ] Anxiety  [x] Delirium [ ] Agitation [ ] Other  HEENT:  [ ]Normal   [ ]Dry mouth   [ ]ET Tube/Trach  [ ]Oral lesions  PULMONARY:   [ ]Clear [ ]Tachypnea  [ ]Audible excessive secretions   [ ]Rhonchi        [ ]Right [ ]Left [ ]Bilateral  [ ]Crackles        [ ]Right [ ]Left [ ]Bilateral  [ ]Wheezing     [ ]Right [ ]Left [ ]Bilateral  [x]Diminished BS [ ]Right [ ]Left [x]Bilateral    CARDIOVASCULAR:    [x]Regular [ ]Irregular [ ]Tachy  [ ]Jerel [ ]Murmur [ ]Other  GASTROINTESTINAL:  [x]Soft  [ ]Distended   [x]+BS  x[ ]Non tender [ ]Tender  [ ]Other [ ]PEG [ ]OGT/ NGT   Last BM:  5/25 with Dulcolax given today  GENITOURINARY:  [x ]Normal [x ] Incontinent   [ ]Oliguria/Anuria   [x]Heredia  MUSCULOSKELETAL:   [ ]Normal   [ ]Weakness  [x]Bed/Wheelchair bound [ ]Edema  NEUROLOGIC:   [ ]No focal deficits  [x ] Cognitive impairment due to encephalopathy  [ ] Dysphagia [ ]Dysarthria [ ] Paresis [ ]Other   SKIN:   [ ]Normal  [ ]Rash  [x ]Other Diabetic heel ulcer right foot, skin tear right shin  [ ]Pressure ulcer(s)  [ ]y [ ]n  Present on admission      CRITICAL CARE:  [ ] Shock Present  [ ]Septic [ ]Cardiogenic [ ]Neurologic [ ]Hypovolemic  [ ]  Vasopressors [ ]  Inotropes   [ ] Respiratory failure present [ ] Mechanical Ventilation [ ] Non-invasive ventilatory support [ ] High-Flow  [ ] Acute  [ ] Chronic [ ] Hypoxic  [ ] Hypercarbic [ ] Other  [x] Other organ failure (Renal, heart)    LABS: No new labs    RADIOLOGY & ADDITIONAL STUDIES: No new imaging studies    PROTEIN CALORIE MALNUTRITION: [ ] mild [ ] moderate [ ] severe  [ ] underweight [ ] morbid obesity    https://www.andeal.org/vault/4440/web/files/ONC/Table_Clinical%20Characteristics%20to%20Document%20Malnutrition-White%20JV%20et%20al%202012.pdf    Height (cm): 157.5 (04-29-22 @ 10:59), 157.5 (03-02-22 @ 18:50), 157.5 (12-20-21 @ 18:32)  Weight (kg): 78.9 (05-05-22 @ 08:00), 74.8 (12-20-21 @ 18:32)  BMI (kg/m2): 31.8 (05-05-22 @ 08:00), 30.2 (04-29-22 @ 10:59), 30.2 (03-02-22 @ 18:50)    [x] PPSV2 < or = 30% [ ] significant weight loss [ ] poor nutritional intake [ ] anasarca   Artificial Nutrition [ ]     REFERRALS:   [x]Chaplaincy  [ ] Hospice  [ ]Child Life  [x]Social Work  [ ]Case management [ ]Holistic Therapy [ ] Physical Therapy [ ] Dietary   Goals of Care Document: See below

## 2022-05-31 NOTE — PROGRESS NOTE ADULT - NS PRO AD PATIENT TYPE
Medical Orders for Life-Sustaining Treatment (MOLST)
Do Not Resuscitate (DNR)/Medical Orders for Life-Sustaining Treatment (MOLST)

## 2022-05-31 NOTE — PROGRESS NOTE ADULT - PROBLEM SELECTOR PLAN 1
c/w Dilaudid IV 0.2 mg q1H PRN (has not needed in last 24 hours)  c/w IV bumex 2 mg Iv BID  fan for comfort.

## 2022-05-31 NOTE — PROGRESS NOTE ADULT - CONVERSATION DETAILS
Outreach made to  He and Claudia.  With , discussed the poor prognosis pertaining to the clinical course and chance for recovery to previous baseline.  acknowledged the poor prognosis and expressed his desire for her to stay at [facility] or at least locally as it is difficult for him to travel long distances if she is discharged to hospice unit.  again was emotional but was able to understand the physician perspective, but would like to discuss further with the team in regards to her the disposition options. He again discussed the 68 year only marriage and the recent move from [facility] to [facility] and how he was hoping to spend more time with her before this hospitalization occurred.  Daughter was also called and she echoed the desire of her father for the patient to either remain at [facility] if possible (due to location) or consider [facility] as a hospice unit as she feels [place] or other options farther away would not be feasible for the family to visit and see the patient. She also expressed that the patient seemed to be more alert and oriented earlier in the weekend but yesterday, started to see more confused and delirious. Daughter would also like to continue the conversation with the team after the hospice evaluation is completed.
Outreach made to  He.  Discussed clinical course, multiorgan dysfunction, poor prognosis for recovery to previous baseline  discussed options including PCU and symptom directed care.   emotional, discusses difficulty accepting situation but understanding physician perspective.  They have been  over 60 years, and even though she is 89, she had previously been doing well.  much emotional support provided.  wants to speak with rest of family before making decision.  Primary team present and also provided contact information for ongoing discussions regarding patients care.

## 2022-05-31 NOTE — PROGRESS NOTE ADULT - PROBLEM SELECTOR PLAN 2
c/w dilaudid IV 0.2-0.5 mg IV q1H PRN for moderate to severe pain (has not needed in last 24 hours)  bowel regimen while on opioids (gave Dulcolax today as last BM was 5/25)

## 2022-05-31 NOTE — PROGRESS NOTE ADULT - PROBLEM SELECTOR PLAN 5
DNR/DNI completed and in chart  Surrogate - , 3 daughters helping with decision making process  C refer 5/27 and updated today 5/31  Daughter Claudia and  He updated via phone today, aware of hospice evaluation and disposition decision regarding to discharge plan

## 2022-05-31 NOTE — PROGRESS NOTE ADULT - PROBLEM SELECTOR PLAN 6
emotional support provided to Daughter, Claudia, and , He, over the phone  continue care in PCU for symptom directed care.

## 2022-05-31 NOTE — CHART NOTE - NSCHARTNOTEFT_GEN_A_CORE
Interim Note    Pt now on comfort measures. RD attempted to see pt to obtain food preferences as pt on PO diet, however pt asking to be cleaned as on bed pan. Pt noted as lethargic and confused. Pt inappropriate for full nutrition follow up at this time.    RD remains available.  Sharri Isaacs, MS, RD, CDN, [facility] Pager #[phone]

## 2022-06-01 NOTE — PROGRESS NOTE ADULT - PROBLEM SELECTOR PLAN 5
initially found to have small bilateral pleural effusions, worsening right pleural effusion  worsening renal function, not a candidate for RRT, continue diuretics as clinical condition warrants, medically optimized.    continue with nasal cannula for dyspnea.

## 2022-06-01 NOTE — PROGRESS NOTE ADULT - ASSESSMENT
89yo F with PMH of HFpEF (EF 75% 03/2022), Afib (on Eliquis), CAD s/p CABG, HTN, DM2, CKD3, hypothyroidism, AS s/p TAVR (09/2021), s/p PPM BIBEMS for hypoxia of 88% on RA. Admitted for heart failure exacerbation. Palliative consulted for GOC. The patient transferred to the PCU for symptom management.

## 2022-06-01 NOTE — PROGRESS NOTE ADULT - PROBLEM SELECTOR PLAN 6
Called and spoke to the patient's spouse He. Updates provided.  Questions answered.  Emotional support provided.   He will be visiting this afternoon  Will continue with care in the PCU

## 2022-06-01 NOTE — PROGRESS NOTE ADULT - PROBLEM SELECTOR PLAN 1
Continue with Dilaudid IV 0.2 mg q1hr PRN ( 0 required within the last 24hr period)  Continue with IV Bumex 2 mg Iv BID  Fan for comfort.

## 2022-06-01 NOTE — PROGRESS NOTE ADULT - PROBLEM SELECTOR PLAN 3
Ativan 0.5mg IV q1hr PRN  Monitor for constipation, urinary retention, pain, hunger, thirst, etc.  Promote sleep wake cycle and reorientation as indicated.

## 2022-06-01 NOTE — PROGRESS NOTE ADULT - PROBLEM SELECTOR PLAN 2
Continue with Dilaudid 0.2mg IV q1hr PRN for moderate pain ( 0 required within the last 24hr period)  Continue with Dilaudid 0.5mg IV q1hr PRN for severe pain ( 0 required within the last 24hr period)  Bowel regimen while on opioids

## 2022-06-01 NOTE — PROGRESS NOTE ADULT - SUBJECTIVE AND OBJECTIVE BOX
GAP TEAM PALLIATIVE CARE UNIT PROGRESS NOTE:      [  ] Patient on hospice program.    INDICATION FOR PALLIATIVE CARE UNIT SERVICES/Interval HPI: symptom focused care in the setting of end stage heart failure with secondary renal failure with worsening encephalopathy    OVERNIGHT EVENTS: Chart reviewed. The patient is seen and examined at the bedside. She required PRN Ativan 0.5mg IV X1 within a 24hr period 8am-8am.    DNR on chart: Yes  Yes      Allergies    Bactrim (Unknown)  Flagyl (Hives; Pruritus)  Macrobid (Other)  sulfa drugs (Hives)  Zosyn (Other)    Intolerances    MEDICATIONS  (STANDING):  buMETAnide Injectable 2 milliGRAM(s) IV Push two times a day  nystatin Powder 1 Application(s) Topical every 8 hours    MEDICATIONS  (PRN):  acetaminophen  Suppository .. 650 milliGRAM(s) Rectal every 6 hours PRN Temp greater or equal to 38C (100.4F), Mild Pain (1 - 3)  bisacodyl Suppository 10 milliGRAM(s) Rectal daily PRN Constipation  calamine/zinc oxide Lotion 1 Application(s) Topical every 8 hours PRN Itching  HYDROmorphone  Injectable 0.5 milliGRAM(s) IV Push every 2 hours PRN Severe Pain (7 - 10)  HYDROmorphone  Injectable 0.2 milliGRAM(s) IV Push every 1 hour PRN Moderate Pain (4 - 6)  HYDROmorphone  Injectable 0.5 milliGRAM(s) IV Push every 1 hour PRN dyspnea  LORazepam   Injectable 0.5 milliGRAM(s) IV Push every 4 hours PRN Anxiety  ondansetron Injectable 4 milliGRAM(s) IV Push every 6 hours PRN Nausea and/or Vomiting    ITEMS UNCHECKED ARE NOT PRESENT    PRESENT SYMPTOMS: [X ]Unable to self-report  [X ]PAINADs [ X]RDOS  Source if other than patient:  [ ]Family   [ X]Team     Pain: [ ] yes [ X] no  QOL impact -   Location -                    Aggravating factors -  Quality -  Radiation -  Timing-  Severity (0-10 scale):  Minimal acceptable level (0-10 scale):     PAINAD Score: 0  [email or web address] (Ctrl +  left click to view)    Dyspnea:                           [ ]Mild [ ]Moderate [ ]Severe    RDOS: 0  0 to 2  minimal or no respiratory distress   3  mild distress  4 to 6 moderate distress  >7 severe distress  [email or web address]_Distress_Observation_[email or web address] (Ctrl +  left click to view)     Anxiety:                             [ ]Mild [ ]Moderate [ ]Severe  Fatigue:                             [ ]Mild [ ]Moderate [ ]Severe  Nausea:                             [ ]Mild [ ]Moderate [ ]Severe  Loss of appetite:              [ ]Mild [ ]Moderate [ ]Severe  Constipation:                    [ ]Mild [ ]Moderate [ ]Severe  		  Other Symptoms:  [ ]All other review of systems negative- unable to assess     Palliative Performance Status Version 2:  10 %         http://[email or web address].org/files/[email or web address]_performance_scale_[email or web address].pdf  PHYSICAL EXAM:   Vital Signs Last 24 Hrs  T(C): 36.6 (01 Jun 2022 09:13), Max: 36.6 (01 Jun 2022 09:13)  T(F): 97.9 (01 Jun 2022 09:13), Max: 97.9 (01 Jun 2022 09:13)  HR: 72 (01 Jun 2022 09:13) (72 - 72)  BP: 96/46 (01 Jun 2022 09:13) (96/46 - 96/46)  BP(mean): --  RR: 18 (01 Jun 2022 09:13) (18 - 18)  SpO2: 93% (01 Jun 2022 09:13) (93% - 93%) I&O's Summary    31 May 2022 07:01  -  01 Jun 2022 07:00  --------------------------------------------------------  IN: 0 mL / OUT: 225 mL / NET: -225 mL      GENERAL: [ ] Cachexia  [ ]Alert  [ ]Oriented x   [X ]Lethargic  [ ]Unarousable  [ ]Verbal  [ ]Non-Verbal  Behavioral:   [ ] Anxiety  [X ] Delirium [ ] Agitation [ ] Other  HEENT:  [X ]Normal   [ ]Dry mouth   [ ]ET Tube/Trach  [ ]Oral lesions  PULMONARY:   [ ]Clear [ ]Tachypnea  [ ]Audible excessive secretions   [ ]Rhonchi        [ ]Right [ ]Left [ ]Bilateral  [ ]Crackles        [ ]Right [ ]Left [ ]Bilateral  [ ]Wheezing     [ ]Right [ ]Left [ ]Bilateral  [ X]Diminished BS [ ]Right [ ]Left [ X]Bilateral    CARDIOVASCULAR:    [ X]Regular [ ]Irregular [ ]Tachy  [ ]Jerel [ ]Murmur [ ]Other  GASTROINTESTINAL:  [X ]Soft  [ ]Distended   [X ]+BS  [ X]Non tender [ ]Tender  [ ]Other [ ]PEG [ ]OGT/ NGT   Last BM: 5/31/22  GENITOURINARY:  [ ]Normal [ X] Incontinent   [ ]Oliguria/Anuria   [ X]Heredia  MUSCULOSKELETAL:   [ ]Normal   [ X]Weakness  [X ]Bed/Wheelchair bound [ ]Edema  NEUROLOGIC:   [ ]No focal deficits  [X ] Cognitive impairment  [ ] Dysphagia [ ]Dysarthria [ ] Paresis [ ]Other   SKIN:   [ ]Normal  [ ]Rash  [X ]Other- Skin tear on the right shin,  right heel diabatic ulcer. Please see nursing assessment for further details for which I have reviewed  [ ]Pressure ulcer(s)  [ ]y [ ]n  Present on admission     CRITICAL CARE:  [ ] Shock Present  [ ]Septic [ ]Cardiogenic [ ]Neurologic [ ]Hypovolemic  [ ]  Vasopressors [ ]  Inotropes   [ ] Respiratory failure present [ ] Mechanical Ventilation [ ] Non-invasive ventilatory support [ ] High-Flow  [ ] Acute  [ ] Chronic [ ] Hypoxic  [ ] Hypercarbic [ ] Other  [X ] Other organ failure- kidney, heart     LABS: None new    RADIOLOGY & ADDITIONAL STUDIES: None new    PROTEIN CALORIE MALNUTRITION: [ ] mild [ ] moderate [X ] severe- please see the nutritionist note  [ ] underweight [ ] morbid obesity    https://www.andeal.org/vault/2440/web/files/ONC/Table_Clinical%20Characteristics%20to%20Document%20Malnutrition-White%20JV%20et%20al%202012.pdf    Height (cm): 157.5 (04-29-22 @ 10:59), 157.5 (03-02-22 @ 18:50), 157.5 (12-20-21 @ 18:32)  Weight (kg): 78.9 (05-05-22 @ 08:00), 74.8 (12-20-21 @ 18:32)  BMI (kg/m2): 31.8 (05-05-22 @ 08:00), 30.2 (04-29-22 @ 10:59), 30.2 (03-02-22 @ 18:50)    [X ] PPSV2 < or = 30% [ ] significant weight loss [ ] poor nutritional intake [ ] anasarca   Artificial Nutrition [ ]     REFERRALS:   [ X]Chaplaincy  [ ] Hospice  [ ]Child Life  [ X]Social Work  [ ]Case management [ ]Holistic Therapy [ ] Physical Therapy [ ] Dietary   Goals of Care Document:

## 2022-06-01 NOTE — PROGRESS NOTE ADULT - PROBLEM SELECTOR PLAN 4
PPS 10%. The patient requires nursing assistance with all ADLs  Supportive care  Skin care per protocol  Frequent repositioning.

## 2022-06-02 NOTE — PROGRESS NOTE ADULT - PROBLEM SELECTOR PLAN 4
PPSV 10%.  The patient requires nursing assistance with all ADLs  Supportive care  Skin care per protocol  Frequent repositioning.

## 2022-06-02 NOTE — PROGRESS NOTE ADULT - ASSESSMENT
89yo F with PMH of HFpEF (EF 75% 03/2022), Afib (on Eliquis), CAD s/p CABG, HTN, DM2, CKD3, hypothyroidism, AS s/p TAVR (09/2021), s/p PPM BIBEMS for hypoxia of 88% on RA. Admitted for heart failure exacerbation. Palliative consulted for GOC. The patient transferred to the PCU for symptom management

## 2022-06-02 NOTE — PROGRESS NOTE ADULT - PROBLEM SELECTOR PLAN 7
Calamine and Lac-Hydrin applied BID for dry skin and itchiness  Famotidine 20mg IV PUSH every other day started (renally adjusted)  Monitor for symptom improvement and reposition for comfort

## 2022-06-02 NOTE — PROGRESS NOTE ADULT - PROBLEM SELECTOR PLAN 5
Called and spoke to the patient's spouse He. Updates provided. Questions answered. Emotional support provided.   Will continue with care in the PCU. Called and spoke to the patient's spouse He. Updates provided. Questions answered. Emotional support provided. He is coming to PCU today.  Will continue with care in the PCU.

## 2022-06-02 NOTE — PROGRESS NOTE ADULT - ATTENDING COMMENTS
89yo F with PMH of HFpEF (EF 75% 03/2022), Afib (on Eliquis), CAD s/p CABG, HTN, DM2, CKD3, hypothyroidism, AS s/p TAVR (09/2021), s/p PPM BIBEMS for hypoxia of 88% on RA. Admitted for heart failure exacerbation with course complicated by MARICARMEN/CKD requiring diuresis.  Family has opted for symptom directed care and patient is in PCU for EOL care.  Patient high risk of dying due to organ failure and hospice transition pending clinical course.  In the setting of parenteral controlled substance administration, clinical monitoring required for side effects such as respiratory depression, constipation and opioid induced neurotoxicity.  This patient is at high risk due to organ failure.  Patient assessment and plan discussed on interdisciplinary team rounds today.  Agree with plan to manage uremic pruritis.  Ongoing discussions regarding disposition planning.

## 2022-06-02 NOTE — PROGRESS NOTE ADULT - ATTENDING SUPERVISION STATEMENT
Fellow
Resident
Fellow
Fellow
Resident

## 2022-06-02 NOTE — PROGRESS NOTE ADULT - PROBLEM SELECTOR PLAN 2
Continue with Dilaudid 0.2mg IV q1hr PRN for moderate pain ( 0 required within the last 24hr period)  Continue with Dilaudid 0.5mg IV q1hr PRN for severe pain ( 0 required within the last 24hr period)  Bowel regimen while on opioids. Continue with Dilaudid 0.2mg IV q1hr PRN for moderate pain (0 required within the last 24hr period)  Continue with Dilaudid 0.5mg IV q1hr PRN for severe pain (0 required within the last 24hr period)  Bowel regimen while on opioids.

## 2022-06-02 NOTE — PROGRESS NOTE ADULT - PROBLEM SELECTOR PLAN 3
Initially found to have small bilateral pleural effusions, worsening right pleural effusion, worsening renal function, not a candidate for RRT, continue diuretics as clinical condition warrants, medically optimized.    Continue with nasal cannula for dyspnea.

## 2022-06-02 NOTE — PROGRESS NOTE ADULT - SUBJECTIVE AND OBJECTIVE BOX
GAP TEAM PALLIATIVE CARE UNIT PROGRESS NOTE:      [  ] Patient on hospice program.    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 Patient needed Ativan 0.5mg IV PRN x1 in last 24 hours. Patient again is less responsive verbally and responds to physical stimuli. Patient did not express any complaints and is in no acute distress.    DNR on chart: Yes  Yes      Allergies    Bactrim (Unknown)  Flagyl (Hives; Pruritus)  Macrobid (Other)  sulfa drugs (Hives)  Zosyn (Other)    Intolerances    MEDICATIONS  (STANDING):  buMETAnide Injectable 2 milliGRAM(s) IV Push two times a day  nystatin Powder 1 Application(s) Topical every 8 hours    MEDICATIONS  (PRN):  acetaminophen  Suppository .. 650 milliGRAM(s) Rectal every 6 hours PRN Temp greater or equal to 38C (100.4F), Mild Pain (1 - 3)  ammonium lactate 12% Lotion 1 Application(s) Topical two times a day PRN Dry skin  bisacodyl Suppository 10 milliGRAM(s) Rectal daily PRN Constipation  calamine/zinc oxide Lotion 1 Application(s) Topical every 8 hours PRN Itching  HYDROmorphone  Injectable 0.2 milliGRAM(s) IV Push every 1 hour PRN Moderate Pain (4 - 6)  HYDROmorphone  Injectable 0.5 milliGRAM(s) IV Push every 1 hour PRN dyspnea  HYDROmorphone  Injectable 0.5 milliGRAM(s) IV Push every 1 hour PRN Severe Pain (7 - 10)  LORazepam   Injectable 0.5 milliGRAM(s) IV Push every 1 hour PRN Anxiety  ondansetron Injectable 4 milliGRAM(s) IV Push every 6 hours PRN Nausea and/or Vomiting    ITEMS UNCHECKED ARE NOT PRESENT    PRESENT SYMPTOMS: [x]Unable to self-report  [x]PAINADs [x]RDOS  Source if other than patient:  [ ]Family   [x]Team     Pain: [ ] yes [x] no  QOL impact -   Location -                    Aggravating factors -  Quality -  Radiation -  Timing-  Severity (0-10 scale):  Minimal acceptable level (0-10 scale):     PAINAD Score: 0  [email or web address]/[email or web address].pdf (Ctrl +  left click to view)    Dyspnea:                           [ ]Mild [ ]Moderate [ ]Severe    RDOS: 0  0 to 2  minimal or no respiratory distress   3  mild distress  4 to 6 moderate distress  >7 severe distress  [email or web address]_Distress_Observation_[email or web address] (Ctrl +  left click to view)     Anxiety:                             [ ]Mild [ ]Moderate [ ]Severe  Fatigue:                             [ ]Mild [ ]Moderate [ ]Severe  Nausea:                             [ ]Mild [ ]Moderate [ ]Severe  Loss of appetite:              [ ]Mild [ ]Moderate [ ]Severe  Constipation:                    [ ]Mild [ ]Moderate [ ]Severe  		  Other Symptoms:  [ ]All other review of systems negative     Palliative Performance Status Version 2:  10%         http://npcrc.org/files/news/palliative_performance_scale_[email or web address].pdf  PHYSICAL EXAM:   Vital Signs Last 24 Hrs  T(C): 36.7 (02 Jun 2022 09:03), Max: 36.7 (02 Jun 2022 09:03)  T(F): 98 (02 Jun 2022 09:03), Max: 98 (02 Jun 2022 09:03)  HR: 76 (02 Jun 2022 09:03) (76 - 76)  BP: 116/71 (02 Jun 2022 09:03) (116/71 - 116/71)  BP(mean): --  RR: 18 (02 Jun 2022 09:03) (18 - 18)  SpO2: 92% (02 Jun 2022 09:03) (92% - 92%) I&O's Summary    01 Jun 2022 07:01  -  02 Jun 2022 07:00  --------------------------------------------------------  IN: 0 mL / OUT: 350 mL / NET: -350 mL      GENERAL: [ ] Cachexia  [ ]Alert  [ ]Oriented x   [ ]Lethargic  [ ]Unarousable  [ ]Verbal  [ ]Non-Verbal  Behavioral:   [ ] Anxiety  [ ] Delirium [ ] Agitation [ ] Other  HEENT:  [ ]Normal   [ ]Dry mouth   [ ]ET Tube/Trach  [ ]Oral lesions  PULMONARY:   [ ]Clear [ ]Tachypnea  [ ]Audible excessive secretions   [ ]Rhonchi        [ ]Right [ ]Left [ ]Bilateral  [ ]Crackles        [ ]Right [ ]Left [ ]Bilateral  [ ]Wheezing     [ ]Right [ ]Left [ ]Bilateral  [ ]Diminished BS [ ]Right [ ]Left [ ]Bilateral    CARDIOVASCULAR:    [ ]Regular [ ]Irregular [ ]Tachy  [ ]Jerel [ ]Murmur [ ]Other  GASTROINTESTINAL:  [ ]Soft  [ ]Distended   [ ]+BS  [ ]Non tender [ ]Tender  [ ]Other [ ]PEG [ ]OGT/ NGT   Last BM:    GENITOURINARY:  [ ]Normal [ ] Incontinent   [ ]Oliguria/Anuria   [ ]Heredia  MUSCULOSKELETAL:   [ ]Normal   [ ]Weakness  [ ]Bed/Wheelchair bound [ ]Edema  NEUROLOGIC:   [ ]No focal deficits  [ ] Cognitive impairment  [ ] Dysphagia [ ]Dysarthria [ ] Paresis [ ]Other   SKIN:   [ ]Normal  [ ]Rash  [ ]Other  [ ]Pressure ulcer(s)  [ ]y [ ]n  Present on admission      CRITICAL CARE:  [ ] Shock Present  [ ]Septic [ ]Cardiogenic [ ]Neurologic [ ]Hypovolemic  [ ]  Vasopressors [ ]  Inotropes   [ ] Respiratory failure present [ ] Mechanical Ventilation [ ] Non-invasive ventilatory support [ ] High-Flow  [ ] Acute  [ ] Chronic [ ] Hypoxic  [ ] Hypercarbic [ ] Other  [ ] Other organ failure     LABS:            RADIOLOGY & ADDITIONAL STUDIES:    PROTEIN CALORIE MALNUTRITION: [ ] mild [ ] moderate [ ] severe  [ ] underweight [ ] morbid obesity    https://www.andeal.org/vault/2440/web/files/ONC/Table_Clinical%20Characteristics%20to%20Document%20Malnutrition-White%20JV%20et%20al%782680.pdf    Height (cm): 157.5 (04-29-22 @ 10:59), 157.5 (03-02-22 @ 18:50), 157.5 (12-20-21 @ 18:32)  Weight (kg): 78.9 (05-05-22 @ 08:00), 74.8 (12-20-21 @ 18:32)  BMI (kg/m2): 31.8 (05-05-22 @ 08:00), 30.2 (04-29-22 @ 10:59), 30.2 (03-02-22 @ 18:50)    [ ] PPSV2 < or = 30% [ ] significant weight loss [ ] poor nutritional intake [ ] anasarca   Artificial Nutrition [ ]     REFERRALS:   [ ]Chaplaincy  [ ] Hospice  [ ]Child Life  [ ]Social Work  [ ]Case management [ ]Holistic Therapy [ ] Physical Therapy [ ] Dietary   Goals of Care Document:  GAP TEAM PALLIATIVE CARE UNIT PROGRESS NOTE:      [  ] Patient on hospice program.    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 Patient needed Ativan 0.5mg IV PRN x1 in last 24 hours. Patient again is less responsive verbally and responds to physical stimuli. Patient did not express any complaints and is in no acute distress.    DNR on chart: Yes  Yes      Allergies    Bactrim (Unknown)  Flagyl (Hives; Pruritus)  Macrobid (Other)  sulfa drugs (Hives)  Zosyn (Other)    Intolerances    MEDICATIONS  (STANDING):  buMETAnide Injectable 2 milliGRAM(s) IV Push two times a day  nystatin Powder 1 Application(s) Topical every 8 hours    MEDICATIONS  (PRN):  acetaminophen  Suppository .. 650 milliGRAM(s) Rectal every 6 hours PRN Temp greater or equal to 38C (100.4F), Mild Pain (1 - 3)  ammonium lactate 12% Lotion 1 Application(s) Topical two times a day PRN Dry skin  bisacodyl Suppository 10 milliGRAM(s) Rectal daily PRN Constipation  calamine/zinc oxide Lotion 1 Application(s) Topical every 8 hours PRN Itching  HYDROmorphone  Injectable 0.2 milliGRAM(s) IV Push every 1 hour PRN Moderate Pain (4 - 6)  HYDROmorphone  Injectable 0.5 milliGRAM(s) IV Push every 1 hour PRN dyspnea  HYDROmorphone  Injectable 0.5 milliGRAM(s) IV Push every 1 hour PRN Severe Pain (7 - 10)  LORazepam   Injectable 0.5 milliGRAM(s) IV Push every 1 hour PRN Anxiety  ondansetron Injectable 4 milliGRAM(s) IV Push every 6 hours PRN Nausea and/or Vomiting    ITEMS UNCHECKED ARE NOT PRESENT    PRESENT SYMPTOMS: [x]Unable to self-report  [x]PAINADs [x]RDOS  Source if other than patient:  [ ]Family   [x]Team     Pain: [ ] yes [x] no  QOL impact -   Location -                    Aggravating factors -  Quality -  Radiation -  Timing-  Severity (0-10 scale):  Minimal acceptable level (0-10 scale):     PAINAD Score: 0  [email or web address]/cog/[email or web address] (Ctrl +  left click to view)    Dyspnea:                           [ ]Mild [ ]Moderate [ ]Severe    RDOS: 0  0 to 2  minimal or no respiratory distress   3  mild distress  4 to 6 moderate distress  >7 severe distress  [email or web address]_Distress_Observation_[email or web address] (Ctrl +  left click to view)     Anxiety:                             [ ]Mild [ ]Moderate [ ]Severe  Fatigue:                             [ ]Mild [ ]Moderate [ ]Severe  Nausea:                             [ ]Mild [ ]Moderate [ ]Severe  Loss of appetite:              [ ]Mild [ ]Moderate [ ]Severe  Constipation:                    [ ]Mild [ ]Moderate [ ]Severe  		  Other Symptoms:  [ ]All other review of systems negative     Palliative Performance Status Version 2:  10%         http://npcrc.org/files/news/palliative_performance_scale_ppsv2.pdf  PHYSICAL EXAM:   Vital Signs Last 24 Hrs  T(C): 36.7 (02 Jun 2022 09:03), Max: 36.7 (02 Jun 2022 09:03)  T(F): 98 (02 Jun 2022 09:03), Max: 98 (02 Jun 2022 09:03)  HR: 76 (02 Jun 2022 09:03) (76 - 76)  BP: 116/71 (02 Jun 2022 09:03) (116/71 - 116/71)  BP(mean): --  RR: 18 (02 Jun 2022 09:03) (18 - 18)  SpO2: 92% (02 Jun 2022 09:03) (92% - 92%) I&O's Summary    01 Jun 2022 07:01  -  02 Jun 2022 07:00  --------------------------------------------------------  IN: 0 mL / OUT: 350 mL / NET: -350 mL      GENERAL: [ ] Cachexia  [ ]Alert  [ ]Oriented  [x]Lethargic  [ ]Unarousable  [x]Verbal (minimally) [ ]Non-Verbal  Behavioral:   [ ] Anxiety  [x] Delirium [ ] Agitation [ ] Other  HEENT:  [x]Normal   [ ]Dry mouth   [ ]ET Tube/Trach  [ ]Oral lesions  PULMONARY:   [ ]Clear [ ]Tachypnea  [ ]Audible excessive secretions   [ ]Rhonchi        [ ]Right [ ]Left [ ]Bilateral  [ ]Crackles        [ ]Right [ ]Left [ ]Bilateral  [ ]Wheezing     [ ]Right [ ]Left [ ]Bilateral  [x]Diminished BS [ ]Right [ ]Left [x]Bilateral    CARDIOVASCULAR:    [x]Regular [ ]Irregular [ ]Tachy  [ ]Jerel [ ]Murmur [ ]Other  GASTROINTESTINAL:  [x]Soft  [ ]Distended   [x]+BS  [ ]Non tender [ ]Tender  [ ]Other [ ]PEG [ ]OGT/ NGT   Last BM: 5/31    GENITOURINARY:  [ ]Normal [x] Incontinent   [ ]Oliguria/Anuria   [x]Heredia  MUSCULOSKELETAL:   [ ]Normal   [x]Weakness  [x]Bed/Wheelchair bound [ ]Edema  NEUROLOGIC:   [ ]No focal deficits  [x] Cognitive impairment  [ ] Dysphagia [ ]Dysarthria [ ] Paresis [ ]Other   SKIN: [ ]Normal  [ ]Rash  [x]Other (Skin tear on the right shin, right heel diabatic ulcer)  [ ]Pressure ulcer(s)  [ ]y [ ]n  Present on admission      CRITICAL CARE:  [ ] Shock Present  [ ]Septic [ ]Cardiogenic [ ]Neurologic [ ]Hypovolemic  [ ]  Vasopressors [ ]  Inotropes   [ ] Respiratory failure present [ ] Mechanical Ventilation [ ] Non-invasive ventilatory support [ ] High-Flow  [ ] Acute  [ ] Chronic [ ] Hypoxic  [ ] Hypercarbic [ ] Other  [x] Other organ failure (Kidney, heart)    LABS: No new labs    RADIOLOGY & ADDITIONAL STUDIES: No new studies    PROTEIN CALORIE MALNUTRITION: [ ] mild [ ] moderate [ ] severe  [ ] underweight [ ] morbid obesity    https://www.andeal.org/vault/2440/web/files/ONC/Table_Clinical%20Characteristics%20to%20Document%20Malnutrition-White%20JV%20et%20al%202012.pdf    Height (cm): 157.5 (04-29-22 @ 10:59), 157.5 (03-02-22 @ 18:50), 157.5 (12-20-21 @ 18:32)  Weight (kg): 78.9 (05-05-22 @ 08:00), 74.8 (12-20-21 @ 18:32)  BMI (kg/m2): 31.8 (05-05-22 @ 08:00), 30.2 (04-29-22 @ 10:59), 30.2 (03-02-22 @ 18:50)    [x] PPSV2 < or = 30% [ ] significant weight loss [ ] poor nutritional intake [ ] anasarca   Artificial Nutrition [ ]     REFERRALS:   [ ]Chaplaincy  [ ] Hospice  [ ]Child Life  [x]Social Work  [ ]Case management [ ]Holistic Therapy [ ] Physical Therapy [ ] Dietary   Goals of Care Document:

## 2022-06-02 NOTE — PROGRESS NOTE ADULT - PROBLEM SELECTOR PLAN 1
Continue with Dilaudid IV 0.2 mg q1hr PRN ( 0 required within the last 24hr period)  Continue with IV Bumex 2 mg Iv BID  Fan for comfort. Continue with Dilaudid IV 0.2 mg q1hr PRN (0 required within the last 24hr period)  Continue with IV Bumex 2 mg Iv BID  Fan for comfort.

## 2022-06-02 NOTE — PROGRESS NOTE ADULT - NUTRITIONAL ASSESSMENT
This patient has been assessed with a concern for Malnutrition and has been determined to have a diagnosis/diagnoses of Severe protein-calorie malnutrition.    This patient is being managed with:   Diet NPO-  Entered: Jun 2 2022  9:13AM

## 2022-06-02 NOTE — PROGRESS NOTE ADULT - PROBLEM SELECTOR PLAN 6
Ativan 0.5mg IV q1hr PRN (1 used in the last 24 hours)  Monitor for constipation, urinary retention, pain, hunger, thirst, etc.  Promote sleep wake cycle and reorientation as indicated.

## 2022-06-03 NOTE — PROGRESS NOTE ADULT - PROBLEM SELECTOR PLAN 7
Calamine and Lac-Hydrin applied BID for dry skin and itchiness  Improved in last 24 hours  Famotidine 20mg IV PUSH every other day started (renally adjusted) (next dose 6/4)  Monitor for symptom improvement and reposition for comfort.

## 2022-06-03 NOTE — PROGRESS NOTE ADULT - REASON FOR ADMISSION
SOB
CHF Exacerbation
SOB

## 2022-06-03 NOTE — PROGRESS NOTE ADULT - PROVIDER SPECIALTY LIST ADULT
Cardiology
Podiatry
Cardiology
Podiatry
Cardiology
Nephrology
Palliative Care
Podiatry
Podiatry
Internal Medicine
Palliative Care
Internal Medicine
Palliative Care
Internal Medicine
Palliative Care
Palliative Care
Internal Medicine
Palliative Care
Internal Medicine
Palliative Care
Internal Medicine

## 2022-06-03 NOTE — PROGRESS NOTE ADULT - PROBLEM SELECTOR PLAN 5
Called and spoke to the patient's spouse He. Updates provided. Questions answered. Emotional support provided. He is coming to PCU today.  Will continue with care in the PCU.

## 2022-06-03 NOTE — PROGRESS NOTE ADULT - PROBLEM SELECTOR PLAN 6
Pt presents to the ER with mom for complaints of left eye drainage that started this am Ativan 0.5mg IV q1hr PRN (0 used in the last 24 hours)  Monitor for constipation, urinary retention, pain, hunger, thirst, etc.  Promote sleep wake cycle and reorientation as indicated.

## 2022-06-03 NOTE — PROGRESS NOTE ADULT - SUBJECTIVE AND OBJECTIVE BOX
GAP TEAM PALLIATIVE CARE UNIT PROGRESS NOTE:      [  ] Patient on hospice program.    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 Patient's  came again yesterday and was at bedside all afternoon. Patient did not use any Ativan PRN in last 24 hours but received Dilaudid 0.5 IV PRN x1 for dyspnea and afterwards showed improvement. Patient remains minimally responsive, saying a few words that are often unintelligible with soft speech. Patient had Lac-Hydrin and Famotidine started yesterday for itching and patient has been scratching less in last 24 hours. Patient did not express any complaints and is in no acute distress.    DNR on chart: Yes  Yes      Allergies    Bactrim (Unknown)  Flagyl (Hives; Pruritus)  Macrobid (Other)  sulfa drugs (Hives)  Zosyn (Other)    Intolerances    MEDICATIONS  (STANDING):  ammonium lactate 12% Lotion 1 Application(s) Topical two times a day  buMETAnide Injectable 2 milliGRAM(s) IV Push two times a day  famotidine Injectable 20 milliGRAM(s) IV Push every 48 hours  nystatin Powder 1 Application(s) Topical every 8 hours    MEDICATIONS  (PRN):  acetaminophen  Suppository .. 650 milliGRAM(s) Rectal every 6 hours PRN Temp greater or equal to 38C (100.4F), Mild Pain (1 - 3)  ammonium lactate 12% Lotion 1 Application(s) Topical two times a day PRN Dry skin  bisacodyl Suppository 10 milliGRAM(s) Rectal daily PRN Constipation  calamine/zinc oxide Lotion 1 Application(s) Topical every 8 hours PRN Itching  HYDROmorphone  Injectable 0.2 milliGRAM(s) IV Push every 1 hour PRN Moderate Pain (4 - 6)  HYDROmorphone  Injectable 0.5 milliGRAM(s) IV Push every 1 hour PRN dyspnea  HYDROmorphone  Injectable 0.5 milliGRAM(s) IV Push every 1 hour PRN Severe Pain (7 - 10)  LORazepam   Injectable 0.5 milliGRAM(s) IV Push every 1 hour PRN Anxiety  ondansetron Injectable 4 milliGRAM(s) IV Push every 6 hours PRN Nausea and/or Vomiting    ITEMS UNCHECKED ARE NOT PRESENT    PRESENT SYMPTOMS: [x]Unable to self-report  [x]PAINADs [x]RDOS  Source if other than patient:  [ ]Family   [x]Team     Pain: [ ] yes [x] no  QOL impact -   Location -                    Aggravating factors -  Quality -  Radiation -  Timing-  Severity (0-10 scale):  Minimal acceptable level (0-10 scale):     PAINAD Score: 0  [email or web address] (Ctrl +  left click to view)    Dyspnea:                           [ ]Mild [ ]Moderate [ ]Severe    RDOS: 0  0 to 2  minimal or no respiratory distress   3  mild distress  4 to 6 moderate distress  >7 severe distress  [email or web address] (Ctrl +  left click to view)     Anxiety:                             [ ]Mild [ ]Moderate [ ]Severe  Fatigue:                             [ ]Mild [ ]Moderate [ ]Severe  Nausea:                             [ ]Mild [ ]Moderate [ ]Severe  Loss of appetite:              [ ]Mild [ ]Moderate [ ]Severe  Constipation:                    [ ]Mild [ ]Moderate [ ]Severe  		  Other Symptoms:  [ ]All other review of systems negative     Palliative Performance Status Version 2: 10%         http://npcrc.org/files/news/palliative_performance_scale_[email or web address].pdf  PHYSICAL EXAM:   Vital Signs Last 24 Hrs  T(C): 36.7 (03 Jun 2022 07:20), Max: 36.7 (03 Jun 2022 07:20)  T(F): 98 (03 Jun 2022 07:20), Max: 98 (03 Jun 2022 07:20)  HR: 70 (03 Jun 2022 07:20) (70 - 70)  BP: 129/68 (03 Jun 2022 07:20) (106/72 - 129/68)  BP(mean): --  RR: 18 (03 Jun 2022 07:20) (18 - 18)  SpO2: 96% (03 Jun 2022 07:20) (96% - 96%) I&O's Summary    02 Jun 2022 07:01  -  03 Jun 2022 07:00  --------------------------------------------------------  IN: 0 mL / OUT: 250 mL / NET: -250 mL      GENERAL: [ ] Cachexia  [ ]Alert  [ ]Oriented [x]Lethargic  [ ]Unarousable  [x]Verbal (minimally) [ ]Non-Verbal  Behavioral:   [ ] Anxiety  [ ] Delirium [ ] Agitation [ ] Other  HEENT:  [ ]Normal   [x]Dry mouth   [ ]ET Tube/Trach  [ ]Oral lesions  PULMONARY:   [ ]Clear [ ]Tachypnea  [ ]Audible excessive secretions   [ ]Rhonchi        [ ]Right [ ]Left [ ]Bilateral  [ ]Crackles        [ ]Right [ ]Left [ ]Bilateral  [ ]Wheezing     [ ]Right [ ]Left [ ]Bilateral  [x]Diminished BS [ ]Right [ ]Left [x]Bilateral    CARDIOVASCULAR:    [x]Regular [ ]Irregular [ ]Tachy  [ ]Jerel [ ]Murmur [ ]Other  GASTROINTESTINAL:  [x]Soft  [ ]Distended   [x]+BS  [ ]Non tender [ ]Tender  [ ]Other [ ]PEG [ ]OGT/ NGT   Last BM: 5/31   GENITOURINARY:  [ ]Normal [x] Incontinent   [ ]Oliguria/Anuria   [x]Heredia  MUSCULOSKELETAL:   [ ]Normal   [ ]Weakness  [ ]Bed/Wheelchair bound [ ]Edema  NEUROLOGIC:   [ ]No focal deficits  [x] Cognitive impairment  [ ] Dysphagia [ ]Dysarthria [ ] Paresis [ ]Other   SKIN:   [ ]Normal  [ ]Rash  [ ]Other  [ ]Pressure ulcer(s)  [ ]y [ ]n  Present on admission      CRITICAL CARE:  [ ] Shock Present  [ ]Septic [ ]Cardiogenic [ ]Neurologic [ ]Hypovolemic  [ ]  Vasopressors [ ]  Inotropes   [ ] Respiratory failure present [ ] Mechanical Ventilation [ ] Non-invasive ventilatory support [ ] High-Flow  [ ] Acute  [ ] Chronic [ ] Hypoxic  [ ] Hypercarbic [ ] Other  [x] Other organ failure (Kidney and Heart)    LABS: No new labs    RADIOLOGY & ADDITIONAL STUDIES: No new imaging studies    PROTEIN CALORIE MALNUTRITION: [ ] mild [ ] moderate [ ] severe  [ ] underweight [ ] morbid obesity    https://www.andeal.org/vault/1220/web/files/ONC/Table_Clinical%20Characteristics%20to%20Document%20Malnutrition-White%20JV%20et%20al%728313.pdf    Height (cm): 157.5 (04-29-22 @ 10:59), 157.5 (03-02-22 @ 18:50), 157.5 (12-20-21 @ 18:32)  Weight (kg): 78.9 (05-05-22 @ 08:00), 74.8 (12-20-21 @ 18:32)  BMI (kg/m2): 31.8 (05-05-22 @ 08:00), 30.2 (04-29-22 @ 10:59), 30.2 (03-02-22 @ 18:50)    [x] PPSV2 < or = 30% [ ] significant weight loss [ ] poor nutritional intake [ ] anasarca   Artificial Nutrition [ ]     REFERRALS:   [ ]Chaplaincy  [ ] Hospice  [ ]Child Life  [x]Social Work  [ ]Case management [ ]Holistic Therapy [ ] Physical Therapy [ ] Dietary   Goals of Care Document:

## 2022-06-03 NOTE — PROGRESS NOTE ADULT - PROBLEM SELECTOR PLAN 2
Continue with Dilaudid 0.2mg IV q1hr PRN for moderate pain (0 required within the last 24hr period)  Continue with Dilaudid 0.5mg IV q1hr PRN for severe pain (0 required within the last 24hr period)  Bowel regimen while on opioids.

## 2022-06-03 NOTE — PROGRESS NOTE ADULT - PROBLEM SELECTOR PLAN 1
Continue with Dilaudid IV 0.5 mg q1hr PRN (1 required within the last 24hr period)  Continue with IV Bumex 2 mg Iv BID  Fan for comfort.

## 2022-06-04 NOTE — PROVIDER CONTACT NOTE (OTHER) - RECOMMENDATIONS
Patient pronounced dead at 0112. Family request to be notified in the morning. No autopsy requested at this time.

## 2022-06-04 NOTE — PROVIDER CONTACT NOTE (OTHER) - ASSESSMENT
No response to external stimuli  No spontaneous respirations   No apical heart rate  Negative papillary response to light

## 2022-06-04 NOTE — DISCHARGE NOTE FOR THE EXPIRED PATIENT - HOSPITAL COURSE
89yo F with PMH of HFpEF (EF 75% 2022), Afib (on Eliquis), CAD s/p CABG, HTN, DM2, CKD3, hypothyroidism, AS s/p TAVR (2021), s/p PPM BIBEMS for hypoxia of 88% on RA. Admitted for heart failure exacerbation on 22 in setting of moderate mitral valve regurgitation, requiring supplemental oxygen and aggressive IV diuresis. Palliative consulted for GOC. The patient transferred to the PCU for symptom management. Patient  on 22 at 1:12AM.

## 2023-08-15 NOTE — ED ADULT TRIAGE NOTE - NS ED NURSE DIRECT TO ROOM YN
Apply Betadine to fourth toe once a day  Call [phone] to schedule arterial duplex text       TREATMENT ORDERS    Diabetes and the Feet  -- Many people with diabetes are aware that diabetes can damage the feet, but are unaware of how this occurs or what can be done.    -- 15% of people with diabetes will develop foot ulcers, which are slow or non-healing sores of the skin. When the skin has an open wound, bacteria can enter the body and cause an infection. Diabetic foot ulcers that become infected can lead to gangrene and amputation of the toes, foot or leg.  -- Ulcers of the feet occur because of two problems that develop slowly over time in our body -    The first is poor circulation. Poor circulation occurs when the arteries in your legs and feet become narrowed or clogged. This is called peripheral arterial disease or P.A.D.  The second reason that ulcers occur is because of nerve damage that occurs in the feet due to diabetes. The nerve damage decreases our ability to feel minor irritation of the feet and if unnoticed, this irritation can create a sore on the skin. This is called loss of protective sensation and is part of a condition called diabetic peripheral neuropathy, or D.P.N.  -- Preventing these two problems from occurring is something that you must work on every day. Research has showed that there are four factors that will reduce your risk of having a diabetes related amputation:  Good control of your blood glucose, with an A1C of less than 7%. High blood sugar damages the nerves, causing neuropathy.  Keeping your bad cholesterol under control, with a LDL of less than 100 mg/dl. High cholesterol clogs the arteries, causing poor circulation.  No smoking. Smoking causes narrowing of the arteries, reducing blood flow.  Keep active, with 30 minutes of walking daily. Walking improves the circulation to the feet and legs and can help reduce swelling by getting the calf muscles pumping.  (If you are not  active now, build up slowly by starting with a 5 minute walk three times a day and work up to a 10 minute walk three times daily).  The CDC estimates that half of all lower extremity amputations could be prevented with proper care and by following these 4 guidelines.     For Our Diabetic Patients:     Tips on Taking Care of Your Feet       15% of people with diabetes will develop foot ulcers, which are slow or non-healing sores. These are what lead to infection, hospitalization and amputation. By taking care of your feet, you can help prevent these consequences.      1. Wash your feet every day. Dry them carefully, especially between the toes. If you can't reach your feet, there are long handled brushes and other self-care items that can help.    2. Check your feet every day. Look for sores, blisters, red areas and feel for any warm or hot spots. If you cannot see the bottom of your feet, use a mirror or have someone help you. If you see any of the above symptoms, or have a change in color, shape, temperature or feeling in the foot, seek medical advice right away.    3. Apply lotion all over your feet, except between the toes, to prevent dry skin. Dry, flaky and scaly skin can easily become cracked and infected. We recommend Remedy Skin Repair Cream.    4. Protect your feet from temperature extremes. Avoid prolonged cold exposure. Avoid electric blankets. Test bath water just as you would before bathing a baby.    5. Do not walk barefoot. Always protect your feet by wearing shoes. If you don't like wearing shoes in the house, use a pair of slippers with a soft sole. Always wear socks with your shoes. Socks with acrylic fibers actually keep the feet drier than cotton socks. Always check inside your shoes for any foreign objects before wearing them.     6. Have your scheduled diabetic foot exams and wear the appropriate shoes.          Grade 0 Foot:    No impairment of circulation or nerve function, you are at no  increased risk of foot ulceration compared to a non-diabetic. You should have a foot exam every 12 months and professional shoe fit.        We have a list of shoe stores where a shoe professional will measure and properly fit your foot. Keep in mind that your shoe size may change over time. Studies have shown that 80% of people wear shoes that are too small for their feet. It may take some time to get used to your new size, but it is necessary to protect your feet.       Grade 1 Foot:    Some change in nerve function is noted, you are at a two times greater risk of developing a foot ulcer than someone without diabetes. You should have a foot exam every 6 months with foot care every 9-12 weeks and a professional shoe fitting approved for patients with diabetes.      We have a list of shoe stores where a shoe professional will measure and properly fit your foot. Keep in mind that your shoe size may change over time. Studies have shown that 80% of people wear shoes that are too small for their feet. It may take some time to get used to your new size, but it is necessary to protect your feet.      Grade 2 Foot:    Some change in both nerve function and circulation is noted. You are at a twelve times greater risk of developing a foot ulcer compared to someone without diabetes.    You should have a foot exam with foot care every 2-3 months and a professional shoe fitting approved for patients with diabetes.       We have a list of shoe stores where a shoe professional will measure and properly fit your foot. Keep in mind that your shoe size may change over time. Studies have shown that 80% of people wear shoes that are too small for their feet. It may take some time to get used to your new size, but it is necessary to protect your feet.      Grade 3 Foot:    Changes in both nerve function and circulation are noted with a prior history of foot ulceration. Your risk of re-ulceration is 36-72 times greater than a person  without diabetes.    You should have a foot exam with foot care every 1-2 months and should have approved extra-depth shoes for patients with diabetes along with a special protective shoe insert.       We will provide you with a prescription for these services.    No

## 2023-08-29 NOTE — PROGRESS NOTE ADULT - PROBLEM SELECTOR PROBLEM 7
Hypertension Topical Retinoid counseling:  Patient advised to apply a pea-sized amount only at bedtime and wait 30 minutes after washing their face before applying.  If too drying, patient may add a non-comedogenic moisturizer. The patient verbalized understanding of the proper use and possible adverse effects of retinoids.  All of the patient's questions and concerns were addressed.

## 2023-11-17 NOTE — DIETITIAN INITIAL EVALUATION ADULT - NS FNS REASON FOR WEIGHT CHANG
Continue Breztri twice daily, rinse your mouth out after use. Continue albuterol as needed.    I have referred you to pulmonary rehab.     I would like you to meet with our lung transplant team.     Continue to use your oxygen 24 hours per day.   
Dosing Weight 159.7 pounds  Weight as per flow sheets (12/12/21) 164 pounds  This indicates a 4.3 pound / 2.6% weight loss x ~5 months.   Per previous RD note, pt UBW ~185 pounds 13 months ago. This indicates a 25.3 pound / 13.6 % weight loss.   Patient repeatedly stating she is gaining weight and unsure why, as she is not eating.

## 2024-02-22 NOTE — PROGRESS NOTE ADULT - PROBLEM SELECTOR PLAN 3
Spontaneous, unlabored and symmetrical - R foot heel wound w/ well adhered eschar, negative probe to bone; xray: no gas, no OM   - Appreciate podiatry recs  - Dry sterile dressing applied to right heel with betadine, recommend daily dressing changes

## 2024-05-22 NOTE — PROGRESS NOTE ADULT - NS ATTEND OPT1 GEN_ALL_CORE
1000 Report received from KAMAR Cantu Pt AAOx4, NAD, resp nonlabored, skin warm/dry, resting comfortably in bed with family at bedside. Pt c/o cough, L flank pain . Pt denies headache, dizziness, chest pain, palpitations, SOB,  n/v/d, urinary symptoms, fevers, chills, weakness at this time. Pt awaiting for results  . Safety maintained with call bell within reach.
I attest my time as attending is greater than 50% of the total combined time spent on qualifying patient care activities by the PA/NP and attending.
I independently performed the documented:
I attest my time as attending is greater than 50% of the total combined time spent on qualifying patient care activities by the PA/NP and attending.
I independently performed the documented:
I independently performed the documented:

## 2024-12-09 NOTE — PROGRESS NOTE ADULT - PROBLEM SELECTOR PROBLEM 7
Detail Level: Detailed Consent: The patient's consent was obtained including but not limited to risks of crusting, scabbing, blistering, scarring, darker or lighter pigmentary change, recurrence, incomplete removal and infection. Post-Care Instructions: I reviewed with the patient in detail post-care instructions. Patient is to wear sunprotection, and avoid picking at any of the treated lesions. Pt may apply Vaseline to crusted or scabbing areas. Duration Of Freeze Thaw-Cycle (Seconds): 1 Number Of Freeze-Thaw Cycles: 3 freeze-thaw cycles Render Note In Bullet Format When Appropriate: No Show Applicator Variable?: Yes Hypertension

## 2025-07-08 NOTE — DISCHARGE NOTE PROVIDER - NSDCHHPTASSISTHOME_GEN_ALL_CORE
----- Message from Althea Aguila MA sent at 7/7/2025  5:59 PM CDT -----    Please have him continue Augmentin. Please make sure he changed his toothbrush and cleaned his dentures.  
----- Message from NICOLAS Stockton sent at 7/7/2025  8:39 AM CDT -----  Please have him continue Augmentin. Please make sure he changed his toothbrush and cleaned his dentures.  
Patient informed of recommendations.  
Patient returned call   
Writer left message for patient to return call regarding results and recommendations per provider. Awaiting patient return call.  
Patient Needs Assistance to Leave Residence...